# Patient Record
Sex: FEMALE | Race: WHITE | NOT HISPANIC OR LATINO | Employment: OTHER | URBAN - METROPOLITAN AREA
[De-identification: names, ages, dates, MRNs, and addresses within clinical notes are randomized per-mention and may not be internally consistent; named-entity substitution may affect disease eponyms.]

---

## 2017-01-04 ENCOUNTER — TELEPHONE (OUTPATIENT)
Dept: MEDICAL GROUP | Facility: CLINIC | Age: 82
End: 2017-01-04

## 2017-01-04 ENCOUNTER — HOSPITAL ENCOUNTER (OUTPATIENT)
Dept: PHYSICAL THERAPY | Facility: REHABILITATION | Age: 82
End: 2017-01-04
Attending: INTERNAL MEDICINE
Payer: MEDICARE

## 2017-01-04 DIAGNOSIS — G62.9 NEUROPATHY: ICD-10-CM

## 2017-01-04 PROCEDURE — 97110 THERAPEUTIC EXERCISES: CPT

## 2017-01-04 PROCEDURE — 97140 MANUAL THERAPY 1/> REGIONS: CPT

## 2017-01-04 RX ORDER — GABAPENTIN 300 MG/1
300 CAPSULE ORAL 3 TIMES DAILY
Qty: 90 CAP | Refills: 3 | Status: SHIPPED | OUTPATIENT
Start: 2017-01-04 | End: 2017-06-07

## 2017-01-05 NOTE — TELEPHONE ENCOUNTER
Please inform patient I have increased the dosage of gabapentin taking the new medication, If symptoms not improved or worsen return for evaluation.

## 2017-01-05 NOTE — TELEPHONE ENCOUNTER
Patient calling about her Gabapentin, Patient thinks that it is not strong enough  Please advise, she is at an 8 on the pain scale and nothing is helping  Please Call as soon as possible [phone]

## 2017-01-06 ENCOUNTER — HOSPITAL ENCOUNTER (OUTPATIENT)
Dept: PHYSICAL THERAPY | Facility: REHABILITATION | Age: 82
End: 2017-01-06
Attending: INTERNAL MEDICINE
Payer: MEDICARE

## 2017-01-06 PROCEDURE — 97110 THERAPEUTIC EXERCISES: CPT

## 2017-01-08 ENCOUNTER — HOSPITAL ENCOUNTER (OUTPATIENT)
Dept: RADIOLOGY | Facility: MEDICAL CENTER | Age: 82
End: 2017-01-08
Attending: INTERNAL MEDICINE
Payer: MEDICARE

## 2017-01-08 DIAGNOSIS — M79.605 LUMBAR PAIN WITH RADIATION DOWN LEFT LEG: ICD-10-CM

## 2017-01-08 DIAGNOSIS — M54.50 LUMBAR PAIN WITH RADIATION DOWN LEFT LEG: ICD-10-CM

## 2017-01-08 PROCEDURE — 72148 MRI LUMBAR SPINE W/O DYE: CPT

## 2017-01-09 ENCOUNTER — HOSPITAL ENCOUNTER (OUTPATIENT)
Dept: PHYSICAL THERAPY | Facility: REHABILITATION | Age: 82
End: 2017-01-09
Attending: INTERNAL MEDICINE
Payer: MEDICARE

## 2017-01-09 PROCEDURE — 97110 THERAPEUTIC EXERCISES: CPT

## 2017-01-11 ENCOUNTER — APPOINTMENT (OUTPATIENT)
Dept: PHYSICAL THERAPY | Facility: REHABILITATION | Age: 82
End: 2017-01-11
Attending: INTERNAL MEDICINE
Payer: MEDICARE

## 2017-01-11 ENCOUNTER — TELEPHONE (OUTPATIENT)
Dept: MEDICAL GROUP | Facility: CLINIC | Age: 82
End: 2017-01-11

## 2017-01-11 DIAGNOSIS — R93.7 ABNORMAL MRI, LUMBAR SPINE: ICD-10-CM

## 2017-01-11 DIAGNOSIS — G89.29 CHRONIC BILATERAL LOW BACK PAIN, WITH SCIATICA PRESENCE UNSPECIFIED: ICD-10-CM

## 2017-01-11 DIAGNOSIS — M48.061 SPINAL STENOSIS OF LUMBAR REGION: ICD-10-CM

## 2017-01-11 DIAGNOSIS — M54.5 CHRONIC BILATERAL LOW BACK PAIN, WITH SCIATICA PRESENCE UNSPECIFIED: ICD-10-CM

## 2017-01-11 NOTE — TELEPHONE ENCOUNTER
Patient called and is looking for the results of her MRI done on 1/8/17.  She states she is very anxious for results as PT has results however would not review with her.  To  for review.

## 2017-01-12 ENCOUNTER — TELEPHONE (OUTPATIENT)
Dept: MEDICAL GROUP | Facility: CLINIC | Age: 82
End: 2017-01-12

## 2017-01-12 ENCOUNTER — APPOINTMENT (OUTPATIENT)
Dept: PHYSICAL THERAPY | Facility: REHABILITATION | Age: 82
End: 2017-01-12
Attending: INTERNAL MEDICINE
Payer: MEDICARE

## 2017-01-12 NOTE — TELEPHONE ENCOUNTER
----- Message from Cash Fonseca M.D. sent at 1/11/2017  5:59 PM PST -----  Please inform patient of abnormal lumbar MRI, she has been referred to neurosurgery for further evaluation.

## 2017-01-13 ENCOUNTER — HOSPITAL ENCOUNTER (OUTPATIENT)
Dept: PHYSICAL THERAPY | Facility: REHABILITATION | Age: 82
End: 2017-01-13
Attending: INTERNAL MEDICINE
Payer: MEDICARE

## 2017-01-13 PROCEDURE — 97110 THERAPEUTIC EXERCISES: CPT

## 2017-01-16 ENCOUNTER — HOSPITAL ENCOUNTER (OUTPATIENT)
Dept: PHYSICAL THERAPY | Facility: REHABILITATION | Age: 82
End: 2017-01-16
Attending: INTERNAL MEDICINE
Payer: MEDICARE

## 2017-01-16 PROCEDURE — 97014 ELECTRIC STIMULATION THERAPY: CPT

## 2017-01-16 PROCEDURE — 97110 THERAPEUTIC EXERCISES: CPT

## 2017-01-18 ENCOUNTER — HOSPITAL ENCOUNTER (OUTPATIENT)
Dept: PHYSICAL THERAPY | Facility: REHABILITATION | Age: 82
End: 2017-01-18
Attending: INTERNAL MEDICINE
Payer: MEDICARE

## 2017-01-18 PROCEDURE — 97112 NEUROMUSCULAR REEDUCATION: CPT

## 2017-01-18 PROCEDURE — 97110 THERAPEUTIC EXERCISES: CPT

## 2017-01-19 ENCOUNTER — HOSPITAL ENCOUNTER (OUTPATIENT)
Dept: RADIOLOGY | Facility: MEDICAL CENTER | Age: 82
End: 2017-01-19
Attending: INTERNAL MEDICINE
Payer: MEDICARE

## 2017-01-19 DIAGNOSIS — Z13.9 SCREENING: ICD-10-CM

## 2017-01-19 DIAGNOSIS — M85.80 AGE-RELATED BONE LOSS: ICD-10-CM

## 2017-01-19 DIAGNOSIS — M85.80 OSTEOPENIA: ICD-10-CM

## 2017-01-19 PROCEDURE — 77080 DXA BONE DENSITY AXIAL: CPT

## 2017-01-19 PROCEDURE — 77063 BREAST TOMOSYNTHESIS BI: CPT

## 2017-01-24 ENCOUNTER — HOSPITAL ENCOUNTER (OUTPATIENT)
Dept: PHYSICAL THERAPY | Facility: REHABILITATION | Age: 82
End: 2017-01-24
Attending: INTERNAL MEDICINE
Payer: MEDICARE

## 2017-01-26 ENCOUNTER — HOSPITAL ENCOUNTER (OUTPATIENT)
Dept: PHYSICAL THERAPY | Facility: REHABILITATION | Age: 82
End: 2017-01-26
Attending: INTERNAL MEDICINE
Payer: MEDICARE

## 2017-01-26 PROCEDURE — 97110 THERAPEUTIC EXERCISES: CPT

## 2017-01-30 ENCOUNTER — APPOINTMENT (OUTPATIENT)
Dept: PHYSICAL THERAPY | Facility: REHABILITATION | Age: 82
End: 2017-01-30
Attending: INTERNAL MEDICINE
Payer: MEDICARE

## 2017-02-01 ENCOUNTER — HOSPITAL ENCOUNTER (OUTPATIENT)
Dept: PHYSICAL THERAPY | Facility: REHABILITATION | Age: 82
End: 2017-02-01
Attending: INTERNAL MEDICINE
Payer: MEDICARE

## 2017-02-01 PROCEDURE — 97110 THERAPEUTIC EXERCISES: CPT

## 2017-02-01 PROCEDURE — 97112 NEUROMUSCULAR REEDUCATION: CPT

## 2017-02-03 ENCOUNTER — HOSPITAL ENCOUNTER (OUTPATIENT)
Dept: PHYSICAL THERAPY | Facility: REHABILITATION | Age: 82
End: 2017-02-03
Attending: INTERNAL MEDICINE
Payer: MEDICARE

## 2017-02-03 PROCEDURE — 97110 THERAPEUTIC EXERCISES: CPT

## 2017-02-03 PROCEDURE — 97112 NEUROMUSCULAR REEDUCATION: CPT

## 2017-02-07 ENCOUNTER — HOSPITAL ENCOUNTER (OUTPATIENT)
Dept: PHYSICAL THERAPY | Facility: REHABILITATION | Age: 82
End: 2017-02-07
Attending: INTERNAL MEDICINE
Payer: MEDICARE

## 2017-02-07 PROCEDURE — 97112 NEUROMUSCULAR REEDUCATION: CPT

## 2017-02-07 PROCEDURE — 97110 THERAPEUTIC EXERCISES: CPT

## 2017-02-09 ENCOUNTER — HOSPITAL ENCOUNTER (OUTPATIENT)
Dept: PHYSICAL THERAPY | Facility: REHABILITATION | Age: 82
End: 2017-02-09
Attending: INTERNAL MEDICINE
Payer: MEDICARE

## 2017-02-09 PROCEDURE — 97112 NEUROMUSCULAR REEDUCATION: CPT

## 2017-02-09 PROCEDURE — 97110 THERAPEUTIC EXERCISES: CPT

## 2017-02-14 ENCOUNTER — HOSPITAL ENCOUNTER (OUTPATIENT)
Dept: PHYSICAL THERAPY | Facility: REHABILITATION | Age: 82
End: 2017-02-14
Attending: INTERNAL MEDICINE
Payer: MEDICARE

## 2017-02-14 PROCEDURE — 97110 THERAPEUTIC EXERCISES: CPT

## 2017-02-14 PROCEDURE — 97112 NEUROMUSCULAR REEDUCATION: CPT

## 2017-02-16 ENCOUNTER — HOSPITAL ENCOUNTER (OUTPATIENT)
Dept: PHYSICAL THERAPY | Facility: REHABILITATION | Age: 82
End: 2017-02-16
Attending: INTERNAL MEDICINE
Payer: MEDICARE

## 2017-02-16 PROCEDURE — 97110 THERAPEUTIC EXERCISES: CPT

## 2017-02-16 PROCEDURE — 97112 NEUROMUSCULAR REEDUCATION: CPT

## 2017-02-28 ENCOUNTER — OFFICE VISIT (OUTPATIENT)
Dept: CARDIOLOGY | Facility: MEDICAL CENTER | Age: 82
End: 2017-02-28
Payer: MEDICARE

## 2017-02-28 VITALS
WEIGHT: 148 LBS | HEART RATE: 82 BPM | HEIGHT: 63 IN | DIASTOLIC BLOOD PRESSURE: 80 MMHG | BODY MASS INDEX: 26.22 KG/M2 | OXYGEN SATURATION: 96 % | SYSTOLIC BLOOD PRESSURE: 152 MMHG

## 2017-02-28 DIAGNOSIS — Z96.659 STATUS POST TOTAL KNEE REPLACEMENT, UNSPECIFIED LATERALITY: ICD-10-CM

## 2017-02-28 DIAGNOSIS — R01.1 CARDIAC MURMUR: ICD-10-CM

## 2017-02-28 DIAGNOSIS — I34.0 NON-RHEUMATIC MITRAL REGURGITATION: ICD-10-CM

## 2017-02-28 PROCEDURE — 99214 OFFICE O/P EST MOD 30 MIN: CPT | Performed by: INTERNAL MEDICINE

## 2017-02-28 PROCEDURE — 1036F TOBACCO NON-USER: CPT | Performed by: INTERNAL MEDICINE

## 2017-02-28 PROCEDURE — G8432 DEP SCR NOT DOC, RNG: HCPCS | Performed by: INTERNAL MEDICINE

## 2017-02-28 PROCEDURE — G8484 FLU IMMUNIZE NO ADMIN: HCPCS | Performed by: INTERNAL MEDICINE

## 2017-02-28 PROCEDURE — 4040F PNEUMOC VAC/ADMIN/RCVD: CPT | Performed by: INTERNAL MEDICINE

## 2017-02-28 PROCEDURE — G8420 CALC BMI NORM PARAMETERS: HCPCS | Performed by: INTERNAL MEDICINE

## 2017-02-28 PROCEDURE — 1101F PT FALLS ASSESS-DOCD LE1/YR: CPT | Performed by: INTERNAL MEDICINE

## 2017-02-28 ASSESSMENT — ENCOUNTER SYMPTOMS
MUSCULOSKELETAL NEGATIVE: 1
RESPIRATORY NEGATIVE: 1
GASTROINTESTINAL NEGATIVE: 1
BRUISES/BLEEDS EASILY: 0
SORE THROAT: 0
FEVER: 0
CONSTITUTIONAL NEGATIVE: 1
CHILLS: 0
HEMOPTYSIS: 0
CARDIOVASCULAR NEGATIVE: 1
SHORTNESS OF BREATH: 0
LOSS OF CONSCIOUSNESS: 0
STRIDOR: 0
DIZZINESS: 0
PND: 0
EYES NEGATIVE: 1
NEUROLOGICAL NEGATIVE: 1
PALPITATIONS: 0
ORTHOPNEA: 0
COUGH: 0
WHEEZING: 0
CLAUDICATION: 0
SPUTUM PRODUCTION: 0
WEAKNESS: 0

## 2017-02-28 NOTE — PROGRESS NOTES
Subjective:   Yoli Carmichael is a 83 y.o. female who presents today as a follow-up for her hypertension and cardiac murmur. She's been going through a number of issues with her back and is scheduled to see a surgeon coming up here. She even went through a dobutamine stress echo approximately 5 months ago which was normal. She is concerned about a couple of instances she had after she was started on gabapentin. She said approximately 3 times she developed a tachycardia with associated shortness of breath dizziness changes in vision and a discomfort radiating down her left arm. The last approximately 2-3 hours in duration and self terminated. This is never happened to her before and since she stopped the gabapentin a few months ago has not come back. She does have borderline blood pressure but does not want take any medications. She otherwise has good functional capacity.    Past Medical History   Diagnosis Date   • Arrhythmia    • Arthritis      knees   • Bronchitis      long time ago   • Pneumonia      long time ago   • CATARACT      no surgery yet   • Dry eyes, bilateral 3/27/2014   • Osteopenia of the elderly 3/27/2014     Past Surgical History   Procedure Laterality Date   • Knee arthroplasty total  9/3/2013     Performed by Ramesh Styles M.D. at [facility]   • Tonsillectomy       Family History   Problem Relation Age of Onset   • Lung Disease Mother    • Heart Disease Father    • Lung Disease Father      Emphysema   • Cancer Maternal Aunt      Great Aunt and Cousin Ovarian CA   • Hypertension Maternal Aunt    • Cancer Daughter      Lung and Brain CA   • Cancer Daughter      Pituitary tumor     History   Smoking status   • Never Smoker    Smokeless tobacco   • Never Used     Comment: Pt exposed to second hand smoker     Allergies   Allergen Reactions   • Other Environmental      Seasonal allergies     Outpatient Encounter Prescriptions as of 2/28/2017   Medication Sig Dispense Refill   •  coenzyme Q-10 30 MG capsule Take 300 mg by mouth 2X A WEEK.     • ketotifen (ZADITOR) 0.025 % ophthalmic solution Place 1 Drop in both eyes 2 times a day.     • B Complex-C (SUPER B COMPLEX PO) Take  by mouth.     • Glycerin-Polysorbate 80 (REFRESH DRY EYE THERAPY) 1-1 % SOLN 1 Drop by Ophthalmic route 4 times a day.     • Calcium-Vitamin D-Vitamin K (CALCIUM + D) 500-1000-40 MG-UNT-MCG CHEW Take  by mouth 3 times a day.     • Multiple Vitamins-Minerals (PRESERVISION/LUTEIN PO) Take  by mouth 2 Times a Day.     • Cholecalciferol (VITAMIN D) 2000 UNITS CAPS Take 2,000 Units by mouth every day.     • Polyethyl Glycol-Propyl Glycol (SYSTANE OP) by Ophthalmic route as needed.     • gabapentin (NEURONTIN) 300 MG Cap Take 1 Cap by mouth 3 times a day. (Patient not taking: Reported on 2/28/2017) 90 Cap 3   • hydrocodone/acetaminophen (NORCO)  MG Tab Take 1-2 Tabs by mouth every 6 hours as needed. (Patient not taking: Reported on 2/28/2017) 30 Tab 0   • gabapentin (NEURONTIN) 100 MG Cap Take 1 Cap by mouth 3 times a day. (Patient not taking: Reported on 2/28/2017) 30 Cap 3   • MethylPREDNISolone (MEDROL DOSEPAK) 4 MG Tablet Therapy Pack UAD (Patient not taking: Reported on 2/28/2017) 1 Kit 0   • hydrocodone-acetaminophen (NORCO) 5-325 MG Tab per tablet Take 1 Tab by mouth every 6 hours as needed. (Patient not taking: Reported on 2/28/2017) 16 Tab 0   • cyclobenzaprine (FLEXERIL) 5 MG tablet Take 1 Tab by mouth 3 times a day as needed. (Patient not taking: Reported on 2/28/2017) 30 Tab 1     No facility-administered encounter medications on file as of 2/28/2017.     Review of Systems   Constitutional: Negative.  Negative for fever, chills and malaise/fatigue.   HENT: Negative.  Negative for sore throat.    Eyes: Negative.    Respiratory: Negative.  Negative for cough, hemoptysis, sputum production, shortness of breath, wheezing and stridor.    Cardiovascular: Negative.  Negative for chest pain, palpitations, orthopnea,  "claudication, leg swelling and PND.   Gastrointestinal: Negative.    Genitourinary: Negative.    Musculoskeletal: Negative.    Skin: Negative.    Neurological: Negative.  Negative for dizziness, loss of consciousness and weakness.   Endo/Heme/Allergies: Negative.  Does not bruise/bleed easily.   All other systems reviewed and are negative.       Objective:   /80 mmHg  Pulse 82  Ht 1.6 m (5' 3\")  Wt 67.132 kg (148 lb)  BMI 26.22 kg/m2  SpO2 96%    Physical Exam   Constitutional: She is oriented to person, place, and time. She appears well-developed and well-nourished. No distress.   HENT:   Head: Normocephalic.   Mouth/Throat: Oropharynx is clear and moist.   Eyes: EOM are normal. Pupils are equal, round, and reactive to light. Right eye exhibits no discharge. Left eye exhibits no discharge. No scleral icterus.   Neck: Normal range of motion. Neck supple. No JVD present. No tracheal deviation present.   Cardiovascular: Normal rate, regular rhythm, S1 normal, S2 normal, normal heart sounds, intact distal pulses and normal pulses.  Exam reveals no gallop, no S3, no S4 and no friction rub.    No murmur heard.   No systolic murmur is present    No diastolic murmur is present   Pulses:       Carotid pulses are 2+ on the right side, and 2+ on the left side.       Radial pulses are 2+ on the right side, and 2+ on the left side.        Dorsalis pedis pulses are 2+ on the right side, and 2+ on the left side.        Posterior tibial pulses are 2+ on the right side, and 2+ on the left side.   Pulmonary/Chest: Effort normal and breath sounds normal. No respiratory distress. She has no wheezes. She has no rales.   Abdominal: Soft. Bowel sounds are normal. She exhibits no distension and no mass. There is no tenderness. There is no rebound and no guarding.   Musculoskeletal: She exhibits no edema.   Neurological: She is alert and oriented to person, place, and time. No cranial nerve deficit.   Skin: Skin is warm and dry. " She is not diaphoretic. No pallor.   Psychiatric: She has a normal mood and affect. Her behavior is normal. Judgment and thought content normal.   Nursing note and vitals reviewed.      Assessment:     1. Cardiac murmur     2. Non-rheumatic mitral regurgitation     3. Status post total knee replacement, unspecified laterality         Medical Decision Making:  Today's Assessment / Status / Plan:     83-year-old female with back pain awaiting surgery with a instance which sounds consistent with either a sinus tachycardia or a run of atrial fibrillation in the setting of medications. Because this is not come back this is difficult diagnosis but seems that it may be a self-limiting instance. I did tell her to come back to clinic or call us as soon as possible should this return. At that point we will consider getting a Holter or event recorder. In terms of her preop risk stratification she did have a dobutamine echocardiogram which was unremarkable.    1. Tachycardia    - clinical follow up    2. HTN    - at goal    3. Pre-op    -Velazquez risk 0.22%, moderate risk    - no further testing    Thank for you allowing me to take part in your patient's care, please call should you have any questions or would like to discuss this patient.

## 2017-02-28 NOTE — Clinical Note
[facility]   [address] [place] [address]  [place] [address]  Phone: [phone]  Fax: [phone]              Yoli Carmichael  8/21/1933    Encounter Date: 2/28/2017    Margarito Hastings M.D.          PROGRESS NOTE:  Subjective:   Yoli Carmichael is a 83 y.o. female who presents today as a follow-up for her hypertension and cardiac murmur. She's been going through a number of issues with her back and is scheduled to see a surgeon coming up here. She even went through a dobutamine stress echo approximately 5 months ago which was normal. She is concerned about a couple of instances she had after she was started on gabapentin. She said approximately 3 times she developed a tachycardia with associated shortness of breath dizziness changes in vision and a discomfort radiating down her left arm. The last approximately 2-3 hours in duration and self terminated. This is never happened to her before and since she stopped the gabapentin a few months ago has not come back. She does have borderline blood pressure but does not want take any medications. She otherwise has good functional capacity.    Past Medical History   Diagnosis Date   • Arrhythmia    • Arthritis      knees   • Bronchitis      long time ago   • Pneumonia      long time ago   • CATARACT      no surgery yet   • Dry eyes, bilateral 3/27/2014   • Osteopenia of the elderly 3/27/2014     Past Surgical History   Procedure Laterality Date   • Knee arthroplasty total  9/3/2013     Performed by Ramesh Styles M.D. at [facility]   • Tonsillectomy       Family History   Problem Relation Age of Onset   • Lung Disease Mother    • Heart Disease Father    • Lung Disease Father      Emphysema   • Cancer Maternal Aunt      Great Aunt and Cousin Ovarian CA   • Hypertension Maternal Aunt    • Cancer Daughter      Lung and Brain CA   • Cancer Daughter      Pituitary tumor     History   Smoking status   • Never  Smoker    Smokeless tobacco   • Never Used     Comment: Pt exposed to second hand smoker     Allergies   Allergen Reactions   • Other Environmental      Seasonal allergies     Outpatient Encounter Prescriptions as of 2/28/2017   Medication Sig Dispense Refill   • coenzyme Q-10 30 MG capsule Take 300 mg by mouth 2X A WEEK.     • ketotifen (ZADITOR) 0.025 % ophthalmic solution Place 1 Drop in both eyes 2 times a day.     • B Complex-C (SUPER B COMPLEX PO) Take  by mouth.     • Glycerin-Polysorbate 80 (REFRESH DRY EYE THERAPY) 1-1 % SOLN 1 Drop by Ophthalmic route 4 times a day.     • Calcium-Vitamin D-Vitamin K (CALCIUM + D) 500-1000-40 MG-UNT-MCG CHEW Take  by mouth 3 times a day.     • Multiple Vitamins-Minerals (PRESERVISION/LUTEIN PO) Take  by mouth 2 Times a Day.     • Cholecalciferol (VITAMIN D) 2000 UNITS CAPS Take 2,000 Units by mouth every day.     • Polyethyl Glycol-Propyl Glycol (SYSTANE OP) by Ophthalmic route as needed.     • gabapentin (NEURONTIN) 300 MG Cap Take 1 Cap by mouth 3 times a day. (Patient not taking: Reported on 2/28/2017) 90 Cap 3   • hydrocodone/acetaminophen (NORCO)  MG Tab Take 1-2 Tabs by mouth every 6 hours as needed. (Patient not taking: Reported on 2/28/2017) 30 Tab 0   • gabapentin (NEURONTIN) 100 MG Cap Take 1 Cap by mouth 3 times a day. (Patient not taking: Reported on 2/28/2017) 30 Cap 3   • MethylPREDNISolone (MEDROL DOSEPAK) 4 MG Tablet Therapy Pack UAD (Patient not taking: Reported on 2/28/2017) 1 Kit 0   • hydrocodone-acetaminophen (NORCO) 5-325 MG Tab per tablet Take 1 Tab by mouth every 6 hours as needed. (Patient not taking: Reported on 2/28/2017) 16 Tab 0   • cyclobenzaprine (FLEXERIL) 5 MG tablet Take 1 Tab by mouth 3 times a day as needed. (Patient not taking: Reported on 2/28/2017) 30 Tab 1     No facility-administered encounter medications on file as of 2/28/2017.     Review of Systems   Constitutional: Negative.  Negative for fever, chills and malaise/fatigue.   "  HENT: Negative.  Negative for sore throat.    Eyes: Negative.    Respiratory: Negative.  Negative for cough, hemoptysis, sputum production, shortness of breath, wheezing and stridor.    Cardiovascular: Negative.  Negative for chest pain, palpitations, orthopnea, claudication, leg swelling and PND.   Gastrointestinal: Negative.    Genitourinary: Negative.    Musculoskeletal: Negative.    Skin: Negative.    Neurological: Negative.  Negative for dizziness, loss of consciousness and weakness.   Endo/Heme/Allergies: Negative.  Does not bruise/bleed easily.   All other systems reviewed and are negative.       Objective:   /80 mmHg  Pulse 82  Ht 1.6 m (5' 3\")  Wt 67.132 kg (148 lb)  BMI 26.22 kg/m2  SpO2 96%    Physical Exam   Constitutional: She is oriented to person, place, and time. She appears well-developed and well-nourished. No distress.   HENT:   Head: Normocephalic.   Mouth/Throat: Oropharynx is clear and moist.   Eyes: EOM are normal. Pupils are equal, round, and reactive to light. Right eye exhibits no discharge. Left eye exhibits no discharge. No scleral icterus.   Neck: Normal range of motion. Neck supple. No JVD present. No tracheal deviation present.   Cardiovascular: Normal rate, regular rhythm, S1 normal, S2 normal, normal heart sounds, intact distal pulses and normal pulses.  Exam reveals no gallop, no S3, no S4 and no friction rub.    No murmur heard.   No systolic murmur is present    No diastolic murmur is present   Pulses:       Carotid pulses are 2+ on the right side, and 2+ on the left side.       Radial pulses are 2+ on the right side, and 2+ on the left side.        Dorsalis pedis pulses are 2+ on the right side, and 2+ on the left side.        Posterior tibial pulses are 2+ on the right side, and 2+ on the left side.   Pulmonary/Chest: Effort normal and breath sounds normal. No respiratory distress. She has no wheezes. She has no rales.   Abdominal: Soft. Bowel sounds are normal. She " exhibits no distension and no mass. There is no tenderness. There is no rebound and no guarding.   Musculoskeletal: She exhibits no edema.   Neurological: She is alert and oriented to person, place, and time. No cranial nerve deficit.   Skin: Skin is warm and dry. She is not diaphoretic. No pallor.   Psychiatric: She has a normal mood and affect. Her behavior is normal. Judgment and thought content normal.   Nursing note and vitals reviewed.      Assessment:     1. Cardiac murmur     2. Non-rheumatic mitral regurgitation     3. Status post total knee replacement, unspecified laterality         Medical Decision Making:  Today's Assessment / Status / Plan:     83-year-old female with back pain awaiting surgery with a instance which sounds consistent with either a sinus tachycardia or a run of atrial fibrillation in the setting of medications. Because this is not come back this is difficult diagnosis but seems that it may be a self-limiting instance. I did tell her to come back to clinic or call us as soon as possible should this return. At that point we will consider getting a Holter or event recorder. In terms of her preop risk stratification she did have a dobutamine echocardiogram which was unremarkable.    1. Tachycardia    - clinical follow up    2. HTN    - at goal    3. Pre-op    -Velazquez risk 0.22%, moderate risk    - no further testing    Thank for you allowing me to take part in your patient's care, please call should you have any questions or would like to discuss this patient.      Cash Fonseca M.D.  [address] [place] [address]  VIA In Basket

## 2017-02-28 NOTE — MR AVS SNAPSHOT
"        oYli March Amol   2017 11:30 AM   Office Visit   MRN: [id number]    Department:  [facility]   Dept Phone:  [phone]    Description:  Female : 1933   Provider:  Margarito Hastings M.D.           Reason for Visit     New Patient           Allergies as of 2017     Allergen Noted Reactions    Other Environmental 2013       Seasonal allergies      You were diagnosed with     Cardiac murmur   [894972]       Non-rheumatic mitral regurgitation   [111802]       Status post total knee replacement, unspecified laterality   [521269]         Vital Signs     Blood Pressure Pulse Height Weight Body Mass Index Oxygen Saturation    152/80 mmHg 82 1.6 m (5' 3\") 67.132 kg (148 lb) 26.22 kg/m2 96%    Smoking Status                   Never Smoker            Basic Information     Date Of Birth Sex Race Ethnicity Preferred Language    1933 Female White Non- English      Your appointments     Mar 27, 2017 10:00 AM   Established Patient with Cash Fonseca M.D.   [facility] ([facility] [place])    [address] [place] [id number] [phone]           You will be receiving a confirmation call a few days before your appointment from our automated call confirmation system.              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   9/3/2013 - Present    Dry eyes, bilateral H04.123   3/27/2014 - Present    Osteopenia of the elderly (Chronic) M85.80   3/27/2014 - Present    Cough R05   10/28/2014 - Present    Cardiac murmur R01.1   2015 - Present    Mitral regurgitation I34.0   2015 - Present      Health Maintenance        Date Due Completion Dates    IMM DTaP/Tdap/Td Vaccine (1 - Tdap) 1952 ---    IMM PNEUMOCOCCAL 65+ (ADULT) LOW/MEDIUM RISK SERIES (2 of 2 - PPSV23) 3/9/2016 3/9/2015    IMM INFLUENZA (1) 2016    MAMMOGRAM 2018, 2015, 2014, " 11/20/2014, 11/19/2013, 11/5/2012, 11/3/2011, 10/12/2010, 10/9/2009, 10/9/2009, 9/16/2008, 9/16/2008, 8/27/2007, 8/27/2007    BONE DENSITY 1/19/2022 1/19/2017, 12/18/2015, 11/20/2014, 11/19/2013, 11/5/2012, 11/3/2011, 10/12/2010, 10/9/2009, 6/23/2008, 5/17/2007, 5/19/2005    COLONOSCOPY 3/27/2023 3/27/2013 (Prv Comp)    Override on 3/27/2013: Previously completed (Dr Davidson)            Current Immunizations     13-VALENT PCV PREVNAR 3/9/2015    Influenza TIV (IM) 9/4/2013  4:25 AM    SHINGLES VACCINE 3/26/2013      Below and/or attached are the medications your provider expects you to take. Review all of your home medications and newly ordered medications with your provider and/or pharmacist. Follow medication instructions as directed by your provider and/or pharmacist. Please keep your medication list with you and share with your provider. Update the information when medications are discontinued, doses are changed, or new medications (including over-the-counter products) are added; and carry medication information at all times in the event of emergency situations     Allergies:  OTHER ENVIRONMENTAL - (reactions not documented)               Medications  Valid as of: February 28, 2017 - 11:56 AM    Generic Name Brand Name Tablet Size Instructions for use    B Complex-C   Take  by mouth.        Calcium-Vitamin D-Vitamin K (Chew Tab) Calcium-Vitamin D-Vitamin K 500-1000-40 MG-UNT-MCG Take  by mouth 3 times a day.        Cholecalciferol (Cap) Vitamin D 2000 UNITS Take 2,000 Units by mouth every day.        Coenzyme Q10 (Cap) coenzyme Q-10 30 MG Take 300 mg by mouth 2X A WEEK.        Cyclobenzaprine HCl (Tab) FLEXERIL 5 MG Take 1 Tab by mouth 3 times a day as needed.        Gabapentin (Cap) NEURONTIN 100 MG Take 1 Cap by mouth 3 times a day.        Gabapentin (Cap) NEURONTIN 300 MG Take 1 Cap by mouth 3 times a day.        Glycerin-Polysorbate 80 (Solution) Glycerin-Polysorbate 80 1-1 % 1 Drop by Ophthalmic route 4  times a day.        Hydrocodone-Acetaminophen (Tab) NORCO 5-325 MG Take 1 Tab by mouth every 6 hours as needed.        Hydrocodone-Acetaminophen (Tab) NORCO  MG Take 1-2 Tabs by mouth every 6 hours as needed.        Ketotifen Fumarate (Solution) ZADITOR 0.025 % Place 1 Drop in both eyes 2 times a day.        MethylPREDNISolone (Tablet Therapy Pack) MEDROL DOSEPAK 4 MG UAD        Multiple Vitamins-Minerals   Take  by mouth 2 Times a Day.        Polyethyl Glycol-Propyl Glycol   by Ophthalmic route as needed.        .                 Medicines prescribed today were sent to:     [id number] - [place], [place] - [address]    [address] [address]    Phone: [phone] Fax: [phone]    Open 24 Hours?: No    [id number] - [place], [place] - [address]    [address] [facility] [address]    Phone: [phone] Fax: [phone]    Open 24 Hours?: No      Medication refill instructions:       If your prescription bottle indicates you have medication refills left, it is not necessary to call your provider’s office. Please contact your pharmacy and they will refill your medication.    If your prescription bottle indicates you do not have any refills left, you may request refills at any time through one of the following ways: The online [employer] system (except Urgent Care), by calling your provider’s office, or by asking your pharmacy to contact your provider’s office with a refill request. Medication refills are processed only during regular business hours and may not be available until the next business day. Your provider may request additional information or to have a follow-up visit with you prior to refilling your medication.   *Please Note: Medication refills are assigned a new Rx number when refilled electronically. Your pharmacy may indicate that no refills were authorized even though a new prescription for the same medication is available at the pharmacy. Please request  the medicine by name with the pharmacy before contacting your provider for a refill.           MyChart Access Code: Activation code not generated  Current MyChart Status: Active

## 2017-04-10 ENCOUNTER — OFFICE VISIT (OUTPATIENT)
Dept: URGENT CARE | Facility: PHYSICIAN GROUP | Age: 82
End: 2017-04-10
Payer: MEDICARE

## 2017-04-10 VITALS
SYSTOLIC BLOOD PRESSURE: 148 MMHG | OXYGEN SATURATION: 95 % | RESPIRATION RATE: 14 BRPM | DIASTOLIC BLOOD PRESSURE: 70 MMHG | BODY MASS INDEX: 26.05 KG/M2 | HEART RATE: 89 BPM | TEMPERATURE: 99.7 F | WEIGHT: 147 LBS

## 2017-04-10 DIAGNOSIS — J22 LRTI (LOWER RESPIRATORY TRACT INFECTION): ICD-10-CM

## 2017-04-10 PROCEDURE — 99214 OFFICE O/P EST MOD 30 MIN: CPT | Performed by: FAMILY MEDICINE

## 2017-04-10 PROCEDURE — G8419 CALC BMI OUT NRM PARAM NOF/U: HCPCS | Performed by: FAMILY MEDICINE

## 2017-04-10 PROCEDURE — G8432 DEP SCR NOT DOC, RNG: HCPCS | Performed by: FAMILY MEDICINE

## 2017-04-10 PROCEDURE — 4040F PNEUMOC VAC/ADMIN/RCVD: CPT | Performed by: FAMILY MEDICINE

## 2017-04-10 PROCEDURE — 1101F PT FALLS ASSESS-DOCD LE1/YR: CPT | Performed by: FAMILY MEDICINE

## 2017-04-10 PROCEDURE — 1036F TOBACCO NON-USER: CPT | Performed by: FAMILY MEDICINE

## 2017-04-10 RX ORDER — AMOXICILLIN AND CLAVULANATE POTASSIUM 875; 125 MG/1; MG/1
1 TABLET, FILM COATED ORAL 2 TIMES DAILY
Qty: 14 TAB | Refills: 0 | Status: SHIPPED | OUTPATIENT
Start: 2017-04-10 | End: 2017-04-17

## 2017-04-10 ASSESSMENT — ENCOUNTER SYMPTOMS
FEVER: 1
SHORTNESS OF BREATH: 1
WHEEZING: 1
COUGH: 1
CHILLS: 1

## 2017-04-10 NOTE — PATIENT INSTRUCTIONS

## 2017-04-10 NOTE — MR AVS SNAPSHOT
Yoli Carmichael   4/10/2017 11:00 AM   Office Visit   MRN: [id number]    Department:  [facility]   Dept Phone:  [phone]    Description:  Female : 1933   Provider:  Mich Deluca M.D.           Reason for Visit     Cough cough/congestion x5 days      Allergies as of 4/10/2017     Allergen Noted Reactions    Other Environmental 2013       Seasonal allergies      You were diagnosed with     LRTI (lower respiratory tract infection)   [688667]         Vital Signs     Blood Pressure Pulse Temperature Respirations Weight Oxygen Saturation    148/70 mmHg 89 37.6 °C (99.7 °F) 14 66.679 kg (147 lb) 95%    Smoking Status                   Never Smoker            Basic Information     Date Of Birth Sex Race Ethnicity Preferred Language    1933 Female White Non- English      Your appointments     May 08, 2017 11:00 AM   Established Patient with Cash Fonseca M.D.   [facility]    [address] [facility] [address] [phone]           You will be receiving a confirmation call a few days before your appointment from our automated call confirmation system.              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   9/3/2013 - Present    Dry eyes, bilateral H04.123   3/27/2014 - Present    Osteopenia of the elderly (Chronic) M85.80   3/27/2014 - Present    Cough R05   10/28/2014 - Present    Cardiac murmur R01.1   2015 - Present    Mitral regurgitation I34.0   2015 - Present      Health Maintenance        Date Due Completion Dates    IMM DTaP/Tdap/Td Vaccine (1 - Tdap) 1952 ---    IMM PNEUMOCOCCAL 65+ (ADULT) LOW/MEDIUM RISK SERIES (2 of 2 - PPSV23) 3/9/2016 3/9/2015    MAMMOGRAM 2018, 2015, 2014, 2014, 2013, 2012, 11/3/2011, 10/12/2010, 10/9/2009, 10/9/2009, 2008, 2008, 2007, 2007    BONE DENSITY 1/19/2022 1/19/2017, 12/18/2015, 11/20/2014, 11/19/2013, 11/5/2012, 11/3/2011, 10/12/2010, 10/9/2009, 6/23/2008, 5/17/2007, 5/19/2005    COLONOSCOPY 3/27/2023 3/27/2013 (Prv Comp)    Override on 3/27/2013: Previously completed (Dr Davidson)            Current Immunizations     13-VALENT PCV PREVNAR 3/9/2015    Influenza TIV (IM) 9/4/2013  4:25 AM    SHINGLES VACCINE 3/26/2013      Below and/or attached are the medications your provider expects you to take. Review all of your home medications and newly ordered medications with your provider and/or pharmacist. Follow medication instructions as directed by your provider and/or pharmacist. Please keep your medication list with you and share with your provider. Update the information when medications are discontinued, doses are changed, or new medications (including over-the-counter products) are added; and carry medication information at all times in the event of emergency situations     Allergies:  OTHER ENVIRONMENTAL - (reactions not documented)               Medications  Valid as of: April 10, 2017 - 11:48 AM    Generic Name Brand Name Tablet Size Instructions for use    Amoxicillin-Pot Clavulanate (Tab) AUGMENTIN 875-125 MG Take 1 Tab by mouth 2 times a day for 7 days.        B Complex-C   Take  by mouth.        Calcium-Vitamin D-Vitamin K (Chew Tab) Calcium-Vitamin D-Vitamin K 500-1000-40 MG-UNT-MCG Take  by mouth 3 times a day.        Cholecalciferol (Cap) Vitamin D 2000 UNITS Take 2,000 Units by mouth every day.        Coenzyme Q10 (Cap) coenzyme Q-10 30 MG Take 300 mg by mouth 2X A WEEK.        Cyclobenzaprine HCl (Tab) FLEXERIL 5 MG Take 1 Tab by mouth 3 times a day as needed.        Gabapentin (Cap) NEURONTIN 100 MG Take 1 Cap by mouth 3 times a day.        Gabapentin (Cap) NEURONTIN 300 MG Take 1 Cap by mouth 3 times a day.        Glycerin-Polysorbate 80 (Solution) Glycerin-Polysorbate 80 1-1 % 1 Drop by Ophthalmic route 4 times a day.         Hydrocodone-Acetaminophen (Tab) NORCO 5-325 MG Take 1 Tab by mouth every 6 hours as needed.        Hydrocodone-Acetaminophen (Tab) NORCO  MG Take 1-2 Tabs by mouth every 6 hours as needed.        Ketotifen Fumarate (Solution) ZADITOR 0.025 % Place 1 Drop in both eyes 2 times a day.        MethylPREDNISolone (Tablet Therapy Pack) MEDROL DOSEPAK 4 MG UAD        Multiple Vitamins-Minerals   Take  by mouth 2 Times a Day.        Polyethyl Glycol-Propyl Glycol   by Ophthalmic route as needed.        .                 Medicines prescribed today were sent to:     [id number] - [place], [place] - [address]    [address] [address]    Phone: [phone] Fax: [phone]    Open 24 Hours?: No    [id number] - [place], [place] - [address]    [address] [facility] [address]    Phone: [phone] Fax: [phone]    Open 24 Hours?: No      Medication refill instructions:       If your prescription bottle indicates you have medication refills left, it is not necessary to call your provider’s office. Please contact your pharmacy and they will refill your medication.    If your prescription bottle indicates you do not have any refills left, you may request refills at any time through one of the following ways: The online c[employer]ayda system (except Urgent Care), by calling your provider’s office, or by asking your pharmacy to contact your provider’s office with a refill request. Medication refills are processed only during regular business hours and may not be available until the next business day. Your provider may request additional information or to have a follow-up visit with you prior to refilling your medication.   *Please Note: Medication refills are assigned a new Rx number when refilled electronically. Your pharmacy may indicate that no refills were authorized even though a new prescription for the same medication is available at the pharmacy. Please request the medicine by name  with the pharmacy before contacting your provider for a refill.        Instructions    Bronchitis  Bronchitis is the body's way of reacting to injury and/or infection (inflammation) of the bronchi. Bronchi are the air tubes that extend from the windpipe into the lungs. If the inflammation becomes severe, it may cause shortness of breath.  CAUSES   Inflammation may be caused by:  · A virus.  · Germs (bacteria).  · Dust.  · Allergens.  · Pollutants and many other irritants.  The cells lining the bronchial tree are covered with tiny hairs (cilia). These constantly beat upward, away from the lungs, toward the mouth. This keeps the lungs free of pollutants. When these cells become too irritated and are unable to do their job, mucus begins to develop. This causes the characteristic cough of bronchitis. The cough clears the lungs when the cilia are unable to do their job. Without either of these protective mechanisms, the mucus would settle in the lungs. Then you would develop pneumonia.  Smoking is a common cause of bronchitis and can contribute to pneumonia. Stopping this habit is the single most important thing you can do to help yourself.  TREATMENT   · Your caregiver may prescribe an antibiotic if the cough is caused by bacteria. Also, medicines that open up your airways make it easier to breathe. Your caregiver may also recommend or prescribe an expectorant. It will loosen the mucus to be coughed up. Only take over-the-counter or prescription medicines for pain, discomfort, or fever as directed by your caregiver.  · Removing whatever causes the problem (smoking, for example) is critical to preventing the problem from getting worse.  · Cough suppressants may be prescribed for relief of cough symptoms.  · Inhaled medicines may be prescribed to help with symptoms now and to help prevent problems from returning.  · For those with recurrent (chronic) bronchitis, there may be a need for steroid medicines.  SEEK IMMEDIATE  MEDICAL CARE IF:   · During treatment, you develop more pus-like mucus (purulent sputum).  · You have a fever.  · Your baby is older than 3 months with a rectal temperature of 102° F (38.9° C) or higher.  · Your baby is 3 months old or younger with a rectal temperature of 100.4° F (38° C) or higher.  · You become progressively more ill.  · You have increased difficulty breathing, wheezing, or shortness of breath.  It is necessary to seek immediate medical care if you are elderly or sick from any other disease.  MAKE SURE YOU:   · Understand these instructions.  · Will watch your condition.  · Will get help right away if you are not doing well or get worse.  Document Released: 12/18/2006 Document Revised: 03/11/2013 Document Reviewed: 10/27/2009  [employer]® Patient Information ©2014 [employer].            [employer] Access Code: Activation code not generated  Current pocketfungames Status: Active

## 2017-04-11 NOTE — PROGRESS NOTES
Subjective:      Yoli Carmichael is a 83 y.o. female who presents with Cough            Cough  This is a new problem. The current episode started in the past 7 days. The problem has been gradually worsening. The problem occurs constantly. The cough is productive of sputum. Associated symptoms include chills, a fever, shortness of breath and wheezing. Pertinent negatives include no nasal congestion or postnasal drip.       Review of Systems   Constitutional: Positive for fever and chills.   HENT: Negative for postnasal drip.    Respiratory: Positive for cough, shortness of breath and wheezing.      Allergies   Allergen Reactions   • Other Environmental      Seasonal allergies         Objective:     /70 mmHg  Pulse 89  Temp(Src) 37.6 °C (99.7 °F)  Resp 14  Wt 66.679 kg (147 lb)  SpO2 95%     Physical Exam   Constitutional: She is oriented to person, place, and time. She appears well-developed and well-nourished. No distress.   HENT:   Head: Normocephalic and atraumatic.   Eyes: Conjunctivae and EOM are normal. Pupils are equal, round, and reactive to light.   Cardiovascular: Normal rate and regular rhythm.    No murmur heard.  Pulmonary/Chest: Effort normal. No respiratory distress. She has wheezes. She has no rales.   Abdominal: Soft. She exhibits no distension. There is no tenderness.   Neurological: She is alert and oriented to person, place, and time. She has normal reflexes. No sensory deficit.   Skin: Skin is warm and dry.   Psychiatric: She has a normal mood and affect.               Assessment/Plan:     1. LRTI (lower respiratory tract infection)  Differential diagnosis, natural history, supportive care, and indications for immediate follow-up discussed.   - amoxicillin-clavulanate (AUGMENTIN) 875-125 MG Tab; Take 1 Tab by mouth 2 times a day for 7 days.  Dispense: 14 Tab; Refill: 0

## 2017-05-10 ENCOUNTER — TELEPHONE (OUTPATIENT)
Dept: MEDICAL GROUP | Facility: CLINIC | Age: 82
End: 2017-05-10

## 2017-05-10 DIAGNOSIS — M54.50 CHRONIC BILATERAL LOW BACK PAIN WITHOUT SCIATICA: ICD-10-CM

## 2017-05-10 DIAGNOSIS — G89.29 CHRONIC BILATERAL LOW BACK PAIN WITHOUT SCIATICA: ICD-10-CM

## 2017-05-10 NOTE — TELEPHONE ENCOUNTER
Physical therapist called and said Yoli needs physical therapy    ICD Codes    Lower back pain M54.5    Hip Pain unspecified M25.559

## 2017-05-12 ENCOUNTER — HOSPITAL ENCOUNTER (OUTPATIENT)
Dept: PHYSICAL THERAPY | Facility: REHABILITATION | Age: 82
End: 2017-05-12
Attending: INTERNAL MEDICINE
Payer: MEDICARE

## 2017-05-12 PROCEDURE — 97140 MANUAL THERAPY 1/> REGIONS: CPT

## 2017-05-12 PROCEDURE — 97110 THERAPEUTIC EXERCISES: CPT

## 2017-05-16 ENCOUNTER — HOSPITAL ENCOUNTER (OUTPATIENT)
Dept: PHYSICAL THERAPY | Facility: REHABILITATION | Age: 82
End: 2017-05-16
Attending: INTERNAL MEDICINE
Payer: MEDICARE

## 2017-05-16 PROCEDURE — 97012 MECHANICAL TRACTION THERAPY: CPT

## 2017-05-16 PROCEDURE — 97110 THERAPEUTIC EXERCISES: CPT

## 2017-05-23 ENCOUNTER — HOSPITAL ENCOUNTER (OUTPATIENT)
Dept: PHYSICAL THERAPY | Facility: REHABILITATION | Age: 82
End: 2017-05-23
Attending: INTERNAL MEDICINE
Payer: MEDICARE

## 2017-05-23 PROCEDURE — 97110 THERAPEUTIC EXERCISES: CPT

## 2017-05-25 ENCOUNTER — HOSPITAL ENCOUNTER (OUTPATIENT)
Dept: PHYSICAL THERAPY | Facility: REHABILITATION | Age: 82
End: 2017-05-25
Attending: INTERNAL MEDICINE
Payer: MEDICARE

## 2017-05-25 PROCEDURE — 97112 NEUROMUSCULAR REEDUCATION: CPT

## 2017-05-25 PROCEDURE — 97110 THERAPEUTIC EXERCISES: CPT

## 2017-05-25 PROCEDURE — 97014 ELECTRIC STIMULATION THERAPY: CPT

## 2017-06-05 ENCOUNTER — HOSPITAL ENCOUNTER (OUTPATIENT)
Dept: PHYSICAL THERAPY | Facility: REHABILITATION | Age: 82
End: 2017-06-05
Attending: INTERNAL MEDICINE
Payer: MEDICARE

## 2017-06-05 PROCEDURE — 97110 THERAPEUTIC EXERCISES: CPT

## 2017-06-05 PROCEDURE — 97140 MANUAL THERAPY 1/> REGIONS: CPT

## 2017-06-07 ENCOUNTER — OFFICE VISIT (OUTPATIENT)
Dept: MEDICAL GROUP | Facility: PHYSICIAN GROUP | Age: 82
End: 2017-06-07
Payer: MEDICARE

## 2017-06-07 DIAGNOSIS — R73.9 HYPERGLYCEMIA: ICD-10-CM

## 2017-06-07 DIAGNOSIS — G89.29 CHRONIC LEFT-SIDED LOW BACK PAIN WITH LEFT-SIDED SCIATICA: ICD-10-CM

## 2017-06-07 DIAGNOSIS — E55.9 VITAMIN D INSUFFICIENCY: ICD-10-CM

## 2017-06-07 DIAGNOSIS — E78.5 DYSLIPIDEMIA: ICD-10-CM

## 2017-06-07 DIAGNOSIS — M54.42 CHRONIC LEFT-SIDED LOW BACK PAIN WITH LEFT-SIDED SCIATICA: ICD-10-CM

## 2017-06-07 PROCEDURE — G8419 CALC BMI OUT NRM PARAM NOF/U: HCPCS | Performed by: INTERNAL MEDICINE

## 2017-06-07 PROCEDURE — 1036F TOBACCO NON-USER: CPT | Performed by: INTERNAL MEDICINE

## 2017-06-07 PROCEDURE — 99214 OFFICE O/P EST MOD 30 MIN: CPT | Performed by: INTERNAL MEDICINE

## 2017-06-07 PROCEDURE — 1101F PT FALLS ASSESS-DOCD LE1/YR: CPT | Performed by: INTERNAL MEDICINE

## 2017-06-07 PROCEDURE — G8432 DEP SCR NOT DOC, RNG: HCPCS | Performed by: INTERNAL MEDICINE

## 2017-06-07 PROCEDURE — 4040F PNEUMOC VAC/ADMIN/RCVD: CPT | Performed by: INTERNAL MEDICINE

## 2017-06-07 NOTE — MR AVS SNAPSHOT
"Yoli March Benjaminboby   2017 9:20 AM   Office Visit   MRN: [id number]    Department:  [facility]   Dept Phone:  [phone]    Description:  Female : 1933   Provider:  Cash Fonseca M.D.           Allergies as of 2017     Allergen Noted Reactions    Other Environmental 2013       Seasonal allergies      You were diagnosed with     Chronic left-sided low back pain with left-sided sciatica   [2172519]       Vitamin D insufficiency   [941609]       Dyslipidemia   [832109]       Hyperglycemia   [102387]         Vital Signs     Blood Pressure Pulse Temperature Respirations Height Weight    122/68 mmHg 66 36.9 °C (98.5 °F) 16 1.6 m (5' 2.99\") 65.772 kg (145 lb)    Body Mass Index Oxygen Saturation Smoking Status             25.69 kg/m2 97% Never Smoker          Basic Information     Date Of Birth Sex Race Ethnicity Preferred Language    1933 Female White Non- English      Your appointments     2017  3:00 PM   PT Follow Up 45 Minutes with [facility] ([place])    [address]  [facility] [address]   [phone]            2017  9:30 AM   PT Follow Up 45 Minutes with [facility] ([place])    [address]  [facility] [address]   [phone]            2017 10:00 AM   Established Patient with Cash Fonseca M.D.   [facility] (--)    [address]  [address]  [facility] [address] [phone]           You will be receiving a confirmation call a few days before your appointment from our automated call confirmation system.              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   9/3/2013 - Present    Dry eyes, bilateral H04.123   3/27/2014 - Present    Osteopenia of the elderly (Chronic) M85.80   3/27/2014 - Present    Cough R05   10/28/2014 - Present    Cardiac " murmur R01.1   5/7/2015 - Present    Mitral regurgitation I34.0   5/7/2015 - Present      Health Maintenance        Date Due Completion Dates    IMM DTaP/Tdap/Td Vaccine (1 - Tdap) 8/21/1952 ---    IMM PNEUMOCOCCAL 65+ (ADULT) LOW/MEDIUM RISK SERIES (2 of 2 - PPSV23) 3/9/2016 3/9/2015    MAMMOGRAM 1/19/2018 1/19/2017, 12/18/2015, 12/2/2014, 11/20/2014, 11/19/2013, 11/5/2012, 11/3/2011, 10/12/2010, 10/9/2009, 10/9/2009, 9/16/2008, 9/16/2008, 8/27/2007, 8/27/2007    BONE DENSITY 1/19/2022 1/19/2017, 12/18/2015, 11/20/2014, 11/19/2013, 11/5/2012, 11/3/2011, 10/12/2010, 10/9/2009, 6/23/2008, 5/17/2007, 5/19/2005    COLONOSCOPY 3/27/2023 3/27/2013 (Prv Comp)    Override on 3/27/2013: Previously completed (Dr Davidson)            Current Immunizations     13-VALENT PCV PREVNAR 3/9/2015    Influenza TIV (IM) 9/4/2013  4:25 AM    SHINGLES VACCINE 3/26/2013      Below and/or attached are the medications your provider expects you to take. Review all of your home medications and newly ordered medications with your provider and/or pharmacist. Follow medication instructions as directed by your provider and/or pharmacist. Please keep your medication list with you and share with your provider. Update the information when medications are discontinued, doses are changed, or new medications (including over-the-counter products) are added; and carry medication information at all times in the event of emergency situations     Allergies:  OTHER ENVIRONMENTAL - (reactions not documented)               Medications  Valid as of: June 07, 2017 -  9:38 AM    Generic Name Brand Name Tablet Size Instructions for use    B Complex-C   Take  by mouth.        Calcium-Vitamin D-Vitamin K (Chew Tab) Calcium-Vitamin D-Vitamin K 500-1000-40 MG-UNT-MCG Take  by mouth 3 times a day.        Cholecalciferol (Cap) Vitamin D 2000 UNITS Take 2,000 Units by mouth every day.        Coenzyme Q10 (Cap) coenzyme Q-10 30 MG Take 300 mg by mouth 2X A WEEK.         Glycerin-Polysorbate 80 (Solution) Glycerin-Polysorbate 80 1-1 % 1 Drop by Ophthalmic route 4 times a day.        Ketotifen Fumarate (Solution) ZADITOR 0.025 % Place 1 Drop in both eyes 2 times a day.        Multiple Vitamins-Minerals   Take  by mouth 2 Times a Day.        .                 Medicines prescribed today were sent to:     [id number] - [place], [place] - [id number] [facility] [place] [address]    Phone: [phone] Fax: [phone]    Open 24 Hours?: No      Medication refill instructions:       If your prescription bottle indicates you have medication refills left, it is not necessary to call your provider’s office. Please contact your pharmacy and they will refill your medication.    If your prescription bottle indicates you do not have any refills left, you may request refills at any time through one of the following ways: The online [employer] system (except Urgent Care), by calling your provider’s office, or by asking your pharmacy to contact your provider’s office with a refill request. Medication refills are processed only during regular business hours and may not be available until the next business day. Your provider may request additional information or to have a follow-up visit with you prior to refilling your medication.   *Please Note: Medication refills are assigned a new Rx number when refilled electronically. Your pharmacy may indicate that no refills were authorized even though a new prescription for the same medication is available at the pharmacy. Please request the medicine by name with the pharmacy before contacting your provider for a refill.        Your To Do List     Future Labs/Procedures Complete By Expires    HEMOGLOBIN A1C  As directed 6/7/2018    LIPOPROTEIN A  As directed 6/7/2018    VITAMIN D,25 HYDROXY  As directed 6/7/2018         [employer] Access Code: Activation code not generated  Current [employer] Status: Active

## 2017-06-07 NOTE — PROGRESS NOTES
Subjective:  Yoli is a 83 y.o. female with the following   Past Medical History   Diagnosis Date   • Arrhythmia    • Arthritis      knees   • Bronchitis      long time ago   • Pneumonia      long time ago   • CATARACT      no surgery yet   • Dry eyes, bilateral 3/27/2014   • Osteopenia of the elderly 3/27/2014      Family History   Problem Relation Age of Onset   • Lung Disease Mother    • Heart Disease Father    • Lung Disease Father      Emphysema   • Cancer Maternal Aunt      Great Aunt and Cousin Ovarian CA   • Hypertension Maternal Aunt    • Cancer Daughter      Lung and Brain CA   • Cancer Daughter      Pituitary tumor     The patient is on the following medications:   Current outpatient prescriptions:   •  coenzyme Q-10 30 MG capsule, Take 300 mg by mouth 2X A WEEK., Disp: , Rfl:   •  ketotifen (ZADITOR) 0.025 % ophthalmic solution, Place 1 Drop in both eyes 2 times a day., Disp: , Rfl:   •  B Complex-C (SUPER B COMPLEX PO), Take  by mouth., Disp: , Rfl:   •  Glycerin-Polysorbate 80 (REFRESH DRY EYE THERAPY) 1-1 % SOLN, 1 Drop by Ophthalmic route 4 times a day., Disp: , Rfl:   •  Calcium-Vitamin D-Vitamin K (CALCIUM + D) 500-1000-40 MG-UNT-MCG CHEW, Take  by mouth 3 times a day., Disp: , Rfl:   •  Multiple Vitamins-Minerals (PRESERVISION/LUTEIN PO), Take  by mouth 2 Times a Day., Disp: , Rfl:   •  Cholecalciferol (VITAMIN D) 2000 UNITS CAPS, Take 2,000 Units by mouth every day., Disp: , Rfl:     HPI; patient is here today for follow-up visit, stating that could not tolerate side effects of gabapentin but seemed to help to control her back pain, she had neurosurgical consultation and given option of lumbar surgery, physical therapy or injection, per patient she took the physical therapy and seemed to help her significantly until recently that she she had recurrence after lifting her suitcase while traveling, currently attending physical therapy..  Patient is also on vitamin D and calcium supplements or  "vitamin D insufficiency and osteopenia denies recent fall or injuries. Patient also has had history of borderline hyperglycemia and dyslipidemia denies having polyuria, polydipsia, chest pain or history of CAD.  ROS:  See HPI    Blood pressure 122/68, pulse 66, temperature 36.9 °C (98.5 °F), resp. rate 16, height 1.6 m (5' 2.99\"), weight 65.772 kg (145 lb), SpO2 97 %.on RA  Objective:  Patient is well appearing and in no acute distress.  Pharynx is clear.  Neck is soft and supple with no cervical or supraclavicular lymphadenopathy, thyromegaly or masses, no JVD.  Lungs clear to auscultation bilaterally with normal respiratory effort. Abdomen soft, non-tender on palpation,not distended. Heart regular rate and rhythm without murmur. Extremities without any clubbing, cyanosis, or edema.    Assessment and Plan:    1. Lower back pain with left lower extremity radicular pain; status post neurosurgery consultation with Dr. Doe, per patient she had a good response to physical therapy first round, recently has recurrence after lifting heavy suitcase, started her physical therapy couple days ago. Patient also could not tolerate side effects of gabapentin, prefers no medical therapy.  Previous lumbar MRI;   FINDINGS:  Alignment in the lumbar spine is normal. Again noted is chronic severe collapse of T10. Marrow signal in the lumbar vertebral bodies shows chronic discogenic endplate changes with small multifocal Schmorl's node defects at L3-4, L4-5, L5-S1. There is   mild anterior marginal endplate spurring throughout the lumbar spine with moderate spurring at T11-T12.    The prevertebral and paraspinous soft tissues are unremarkable.. There is a large eccentric cortical cyst at the upper pole the right kidney measuring 54 mm. Smaller renal cysts are noted at the lower pole of the left kidney.    The gallbladder shows a large gallstone as well as innumerable additional gallstones (T2 axial images 26-34).    The conus is " normal in position and signal with its tip at the L1-L2 level.    There is mild-moderate degenerative disc space narrowing at L3-4 and L4-5 and advanced degenerative disc space narrowing at L5-S1. There are vacuum disc phenomena at L3-L4 and L4-L5.    At T12-L1, no abnormality.    At L1-2, no abnormality.    At L2-3, there is moderate diffuse disc bulging. There is a moderate-sized left-sided cephalad disc extrusion or free fragment which extends nearly up to the L2 superior endplate. There is left lateral recess stenosis. There is no overall central spinal   stenosis. There is moderate left foraminal stenosis and borderline inferior foraminal stenosis on the right.    At L3-4, there is moderate diffuse disc bulging including lateral disc bulging. There is mild hypertrophic facet arthropathy and ligamentum flavum hypertrophy. The thecal sac is at the lower range of normal without deacon central stenosis. There is marked   bilateral foraminal stenosis.    At L4-5, there is moderate disc bulging with a small superimposed central disc protrusion. There is moderate hypertrophic facet arthropathy and ligamentum flavum hypertrophy. There is marked central stenosis (T2 axial image 13, series 6). There is   moderate-marked bilateral foraminal stenosis.    At L5-S1, there is minor diffuse disc bulging and posterior marginal spurring. There is mild hypertrophic facet arthropathy. The thecal sac assumes a triangular configuration and is toward the lower range of normal. There is no deacon central stenosis.   There is moderate bilateral foraminal stenosis.         Impression        1.  Cholelithiasis.  2.  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 This was present on chest radiograph from 12/12/2014.  3.  L2-3 disc bulging and moderate-sized left-sided cephalad disc extrusion or free fragment. Left lateral recess compromise. Moderate left foraminal stenosis. Borderline right inferior foraminal stenosis.  4.  L3-4  moderate diffuse disc bulging with lateral disc bulging. Facet arthropathy. Marked bilateral foraminal stenosis.  5.  L4-5 moderate disc bulging. Small superimposed central disc protrusion. Moderate ligamentous and facet hypertrophy. Marked central stenosis.  6.  L5-S1 minor disc bulging and posterior marginal spurring. Mild facet arthropathy. Moderate bilateral foraminal stenosis with disc space narrowing contributing to the foraminal compromise.       2. Vitamin D insufficiency/ osteopenia; currently on OTC vitamin D3-2000 units on calcium supplements, previous bone density scan;   FINDINGS:  The mean bone mineral density for the lumbar spine is 1.000 g/cm2, which corresponds to a T score of -1.5 and a Z score of 0.3.    The proximal left femur has a mean bone mineral density of 0.743 g/cm2, with a T score of -2.1 and a Z score of 0.0.    When compared with the most recent study dated 12/18/2015, there has been a 6.4% increase in the bone mineral density of the lumbar spine and a 1.2% increase in the bone mineral density of the left femur.         Impression        According to the World Health Organization classification, bone mineral density of this patient is osteopenic.       3. History of hyperglycemia       4. History of dyslipidemia       Patient is advised on preventive and supportive care, diet and lifestyle modification, daily walking ,exercise as tolerated, Check requested labs and return for evaluation per scheduled appointment.  Please note that this dictation was created using voice recognition software. I have worked with consultants from the vendor as well as technical experts from [employer] [facility] [employer] to optimize the interface. I have made every reasonable attempt to correct obvious errors, but I expect that there are errors of grammar and possibly content that I did not discover before finalizing the note.

## 2017-06-08 ENCOUNTER — APPOINTMENT (OUTPATIENT)
Dept: PHYSICAL THERAPY | Facility: REHABILITATION | Age: 82
End: 2017-06-08
Attending: INTERNAL MEDICINE
Payer: MEDICARE

## 2017-06-09 ENCOUNTER — HOSPITAL ENCOUNTER (OUTPATIENT)
Dept: PHYSICAL THERAPY | Facility: REHABILITATION | Age: 82
End: 2017-06-09
Attending: INTERNAL MEDICINE
Payer: MEDICARE

## 2017-06-09 PROCEDURE — 97140 MANUAL THERAPY 1/> REGIONS: CPT | Mod: XU

## 2017-06-09 PROCEDURE — 97110 THERAPEUTIC EXERCISES: CPT

## 2017-06-09 PROCEDURE — 97012 MECHANICAL TRACTION THERAPY: CPT

## 2017-06-19 ENCOUNTER — HOSPITAL ENCOUNTER (OUTPATIENT)
Dept: LAB | Facility: MEDICAL CENTER | Age: 82
End: 2017-06-19
Attending: INTERNAL MEDICINE
Payer: MEDICARE

## 2017-06-19 DIAGNOSIS — E78.5 DYSLIPIDEMIA: ICD-10-CM

## 2017-06-19 DIAGNOSIS — R73.9 HYPERGLYCEMIA: ICD-10-CM

## 2017-06-19 DIAGNOSIS — E55.9 VITAMIN D INSUFFICIENCY: ICD-10-CM

## 2017-06-19 LAB
25(OH)D3 SERPL-MCNC: 39 NG/ML (ref 30–100)
EST. AVERAGE GLUCOSE BLD GHB EST-MCNC: 111 MG/DL
HBA1C MFR BLD: 5.5 % (ref 0–5.6)

## 2017-06-19 PROCEDURE — 83695 ASSAY OF LIPOPROTEIN(A): CPT

## 2017-06-19 PROCEDURE — 82306 VITAMIN D 25 HYDROXY: CPT

## 2017-06-19 PROCEDURE — 36415 COLL VENOUS BLD VENIPUNCTURE: CPT

## 2017-06-19 PROCEDURE — 83036 HEMOGLOBIN GLYCOSYLATED A1C: CPT

## 2017-06-21 LAB — LPA SERPL-MCNC: 21 MG/DL

## 2017-06-22 VITALS
HEART RATE: 66 BPM | BODY MASS INDEX: 25.69 KG/M2 | HEIGHT: 63 IN | WEIGHT: 145 LBS | OXYGEN SATURATION: 97 % | DIASTOLIC BLOOD PRESSURE: 68 MMHG | TEMPERATURE: 98.4 F | SYSTOLIC BLOOD PRESSURE: 122 MMHG | RESPIRATION RATE: 16 BRPM

## 2017-06-23 ENCOUNTER — HOSPITAL ENCOUNTER (OUTPATIENT)
Dept: PHYSICAL THERAPY | Facility: REHABILITATION | Age: 82
End: 2017-06-23
Attending: INTERNAL MEDICINE
Payer: MEDICARE

## 2017-06-23 PROCEDURE — 97110 THERAPEUTIC EXERCISES: CPT

## 2017-06-27 ENCOUNTER — HOSPITAL ENCOUNTER (OUTPATIENT)
Dept: PHYSICAL THERAPY | Facility: REHABILITATION | Age: 82
End: 2017-06-27
Attending: INTERNAL MEDICINE
Payer: MEDICARE

## 2017-06-27 PROCEDURE — 97112 NEUROMUSCULAR REEDUCATION: CPT

## 2017-06-27 PROCEDURE — 97110 THERAPEUTIC EXERCISES: CPT

## 2017-07-03 ENCOUNTER — HOSPITAL ENCOUNTER (OUTPATIENT)
Dept: PHYSICAL THERAPY | Facility: REHABILITATION | Age: 82
End: 2017-07-03
Attending: INTERNAL MEDICINE
Payer: MEDICARE

## 2017-07-03 PROCEDURE — 97112 NEUROMUSCULAR REEDUCATION: CPT

## 2017-07-03 PROCEDURE — 97110 THERAPEUTIC EXERCISES: CPT

## 2017-07-06 ENCOUNTER — HOSPITAL ENCOUNTER (OUTPATIENT)
Dept: PHYSICAL THERAPY | Facility: REHABILITATION | Age: 82
End: 2017-07-06
Attending: INTERNAL MEDICINE
Payer: MEDICARE

## 2017-07-06 PROCEDURE — 97110 THERAPEUTIC EXERCISES: CPT

## 2017-07-06 PROCEDURE — 97112 NEUROMUSCULAR REEDUCATION: CPT

## 2017-07-13 ENCOUNTER — APPOINTMENT (OUTPATIENT)
Dept: PHYSICAL THERAPY | Facility: REHABILITATION | Age: 82
End: 2017-07-13
Attending: INTERNAL MEDICINE
Payer: MEDICARE

## 2017-07-14 ENCOUNTER — HOSPITAL ENCOUNTER (OUTPATIENT)
Dept: PHYSICAL THERAPY | Facility: REHABILITATION | Age: 82
End: 2017-07-14
Attending: INTERNAL MEDICINE
Payer: MEDICARE

## 2017-07-14 PROCEDURE — 97014 ELECTRIC STIMULATION THERAPY: CPT

## 2017-07-14 PROCEDURE — 97110 THERAPEUTIC EXERCISES: CPT

## 2017-07-14 PROCEDURE — 97112 NEUROMUSCULAR REEDUCATION: CPT

## 2017-07-17 ENCOUNTER — OFFICE VISIT (OUTPATIENT)
Dept: MEDICAL GROUP | Facility: PHYSICIAN GROUP | Age: 82
End: 2017-07-17
Payer: MEDICARE

## 2017-07-17 VITALS
WEIGHT: 148 LBS | OXYGEN SATURATION: 93 % | HEART RATE: 78 BPM | RESPIRATION RATE: 18 BRPM | DIASTOLIC BLOOD PRESSURE: 70 MMHG | BODY MASS INDEX: 26.22 KG/M2 | SYSTOLIC BLOOD PRESSURE: 130 MMHG | TEMPERATURE: 97.7 F | HEIGHT: 63 IN

## 2017-07-17 DIAGNOSIS — E55.9 VITAMIN D DEFICIENCY: ICD-10-CM

## 2017-07-17 DIAGNOSIS — M51.36 DEGENERATIVE DISC DISEASE, LUMBAR: ICD-10-CM

## 2017-07-17 DIAGNOSIS — E78.5 DYSLIPIDEMIA: ICD-10-CM

## 2017-07-17 DIAGNOSIS — R73.9 HYPERGLYCEMIA: ICD-10-CM

## 2017-07-17 PROBLEM — M51.369 DEGENERATIVE DISC DISEASE, LUMBAR: Status: ACTIVE | Noted: 2017-07-17

## 2017-07-17 PROCEDURE — 99213 OFFICE O/P EST LOW 20 MIN: CPT | Performed by: INTERNAL MEDICINE

## 2017-07-17 ASSESSMENT — PATIENT HEALTH QUESTIONNAIRE - PHQ9: CLINICAL INTERPRETATION OF PHQ2 SCORE: 0

## 2017-07-17 NOTE — MR AVS SNAPSHOT
"Namitaliborio Carmichael   2017 3:40 PM   Office Visit   MRN: [id number]    Department:  [facility]   Dept Phone:  [phone]    Description:  Female : 1933   Provider:  Cash Fonseca M.D.           Reason for Visit     Follow-Up           Allergies as of 2017     Allergen Noted Reactions    Other Environmental 2013       Seasonal allergies      You were diagnosed with     Vitamin D deficiency   [0884458]       Hyperglycemia   [357918]       Dyslipidemia   [351637]         Vital Signs     Blood Pressure Pulse Temperature Respirations Height Weight    130/70 mmHg 78 36.5 °C (97.7 °F) 18 1.6 m (5' 2.99\") 67.132 kg (148 lb)    Body Mass Index Oxygen Saturation Smoking Status             26.22 kg/m2 93% Never Smoker          Basic Information     Date Of Birth Sex Race Ethnicity Preferred Language    1933 Female White Non- English      Your appointments     2017  9:30 AM   PT Follow Up 30 Minutes with Agapito LUIS M. Justin, P.T.   [facility] ([place]    [address]  [facility] [address]   [phone]            2017  9:30 AM   PT Follow Up 30 Minutes with Agapito E. Anderson, P.T.   [facility] ([place]    [address]  [facility] [address]   [phone]            2017 12:00 PM   PT Follow Up 30 Minutes with Agapito E. Anderson, P.T.   [facility] ([place]    [address]  [facility] [address]   [phone]            2017 10:30 AM   PT Follow Up 30 Minutes with Agapito E. Anderson, P.T.   [facility] ([place]    [address]  [facility] [address]   [phone]            2018 11:00 AM   Established Patient with Cash Fonseca M.D.   [facility] (--)    [address]  [address]  [facility] [id number] [phone]           You will be receiving a confirmation call a few days before your appointment from " our automated call confirmation system.              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   9/3/2013 - Present    Dry eyes, bilateral H04.123   3/27/2014 - Present    Osteopenia of the elderly (Chronic) M85.80   3/27/2014 - Present    Cough R05   10/28/2014 - Present    Cardiac murmur R01.1   5/7/2015 - Present    Mitral regurgitation I34.0   5/7/2015 - Present      Health Maintenance        Date Due Completion Dates    IMM DTaP/Tdap/Td Vaccine (1 - Tdap) 8/21/1952 ---    IMM PNEUMOCOCCAL 65+ (ADULT) LOW/MEDIUM RISK SERIES (2 of 2 - PPSV23) 3/9/2016 3/9/2015    IMM INFLUENZA (1) 9/1/2017 9/4/2013    MAMMOGRAM 1/19/2018 1/19/2017, 12/18/2015, 12/2/2014, 11/20/2014, 11/19/2013, 11/5/2012, 11/3/2011, 10/12/2010, 10/9/2009, 10/9/2009, 9/16/2008, 9/16/2008, 8/27/2007, 8/27/2007    BONE DENSITY 1/19/2022 1/19/2017, 12/18/2015, 11/20/2014, 11/19/2013, 11/5/2012, 11/3/2011, 10/12/2010, 10/9/2009, 6/23/2008, 5/17/2007, 5/19/2005    COLONOSCOPY 3/27/2023 3/27/2013 (Prv Comp)    Override on 3/27/2013: Previously completed (Dr Davidson)            Current Immunizations     13-VALENT PCV PREVNAR 3/9/2015    Influenza TIV (IM) 9/4/2013  4:25 AM    SHINGLES VACCINE 3/26/2013      Below and/or attached are the medications your provider expects you to take. Review all of your home medications and newly ordered medications with your provider and/or pharmacist. Follow medication instructions as directed by your provider and/or pharmacist. Please keep your medication list with you and share with your provider. Update the information when medications are discontinued, doses are changed, or new medications (including over-the-counter products) are added; and carry medication information at all times in the event of emergency situations     Allergies:  OTHER ENVIRONMENTAL - (reactions not documented)               Medications  Valid as of: July 17, 2017 -  4:00 PM      Generic Name Brand Name Tablet Size Instructions for use    B Complex-C   Take  by mouth.        Calcium-Vitamin D-Vitamin K (Chew Tab) Calcium-Vitamin D-Vitamin K 500-1000-40 MG-UNT-MCG Take  by mouth 3 times a day.        Cholecalciferol (Cap) Vitamin D 2000 UNITS Take 2,000 Units by mouth every day.        Coenzyme Q10 (Cap) coenzyme Q-10 30 MG Take 300 mg by mouth 2X A WEEK.        Glycerin-Polysorbate 80 (Solution) Glycerin-Polysorbate 80 1-1 % 1 Drop by Ophthalmic route 4 times a day.        Ketotifen Fumarate (Solution) ZADITOR 0.025 % Place 1 Drop in both eyes 2 times a day.        Multiple Vitamins-Minerals   Take  by mouth 2 Times a Day.        .                 Medicines prescribed today were sent to:     [id number] [place], [place] - [address] [address]    Phone: [phone] Fax: [phone]    Open 24 Hours?: No      Medication refill instructions:       If your prescription bottle indicates you have medication refills left, it is not necessary to call your provider’s office. Please contact your pharmacy and they will refill your medication.    If your prescription bottle indicates you do not have any refills left, you may request refills at any time through one of the following ways: The online [employer] system (except Urgent Care), by calling your provider’s office, or by asking your pharmacy to contact your provider’s office with a refill request. Medication refills are processed only during regular business hours and may not be available until the next business day. Your provider may request additional information or to have a follow-up visit with you prior to refilling your medication.   *Please Note: Medication refills are assigned a new Rx number when refilled electronically. Your pharmacy may indicate that no refills were authorized even though a new prescription for the same medication is available at the pharmacy. Please request the medicine  by name with the pharmacy before contacting your provider for a refill.        Your To Do List     Future Labs/Procedures Complete By Expires    HBA1C  1/13/2018 7/17/2018    VITAMIN D,25 HYDROXY  As directed 7/17/2018         MyChart Access Code: Activation code not generated  Current MyChart Status: Active

## 2017-07-17 NOTE — PROGRESS NOTES
"Subjective:  Yoli is a 83 y.o. female with the following   Past Medical History   Diagnosis Date   • Arrhythmia    • Arthritis      knees   • Bronchitis      long time ago   • Pneumonia      long time ago   • CATARACT      no surgery yet   • Dry eyes, bilateral 3/27/2014   • Osteopenia of the elderly 3/27/2014      Family History   Problem Relation Age of Onset   • Lung Disease Mother    • Heart Disease Father    • Lung Disease Father      Emphysema   • Cancer Maternal Aunt      Great Aunt and Cousin Ovarian CA   • Hypertension Maternal Aunt    • Cancer Daughter      Lung and Brain CA   • Cancer Daughter      Pituitary tumor     The patient is on the following medications:   Current outpatient prescriptions:   •  coenzyme Q-10 30 MG capsule, Take 300 mg by mouth 2X A WEEK., Disp: , Rfl:   •  ketotifen (ZADITOR) 0.025 % ophthalmic solution, Place 1 Drop in both eyes 2 times a day., Disp: , Rfl:   •  B Complex-C (SUPER B COMPLEX PO), Take  by mouth., Disp: , Rfl:   •  Glycerin-Polysorbate 80 (REFRESH DRY EYE THERAPY) 1-1 % SOLN, 1 Drop by Ophthalmic route 4 times a day., Disp: , Rfl:   •  Calcium-Vitamin D-Vitamin K (CALCIUM + D) 500-1000-40 MG-UNT-MCG CHEW, Take  by mouth 3 times a day., Disp: , Rfl:   •  Multiple Vitamins-Minerals (PRESERVISION/LUTEIN PO), Take  by mouth 2 Times a Day., Disp: , Rfl:   •  Cholecalciferol (VITAMIN D) 2000 UNITS CAPS, Take 2,000 Units by mouth every day., Disp: , Rfl:     HPI; Patient is here today for follow-up visit regarding her recent labs, she is being currently followed by physical therapy for chronic back pain stating that her symptoms are tolerable, could not tolerate oral medication for pain. She has had history of hyperglycemia denies having polyuria or polydipsia, currently on vitamin D supplements for vitamin D deficiency.  ROS:  See HPI    Blood pressure 130/70, pulse 78, temperature 36.5 °C (97.7 °F), resp. rate 18, height 1.6 m (5' 2.99\"), weight 67.132 kg (148 lb), " SpO2 93 %.on RA  Objective:  Patient is well appearing and in no acute distress.  Pharynx is clear.  Neck is soft and supple with no cervical or supraclavicular lymphadenopathy, thyromegaly or masses, no JVD.  Lungs clear to auscultation bilaterally with normal respiratory effort. Abdomen soft, non-tender on palpation,not distended. Heart regular rate and rhythm without murmur. Extremities without any clubbing, cyanosis, or edema.    Assessment and Plan:  1. History of hyperglycemia; gradual diet modification;     Ref. Range 9/19/2016 08:13 6/19/2017 09:11   Glycohemoglobin Latest Ref Range: 0.0-5.6 % 5.9 (H) 5.5       2. Borderline dyslipidemia; patient prefers no medical therapy.    Ref. Range 3/28/2014 09:10 1/13/2015 09:35 6/19/2017 09:11   Cholesterol,Tot Latest Ref Range: 100-199 mg/dL 185 208 (H)    Triglycerides Latest Ref Range: 0-149 mg/dL 180 (H) 126    HDL Latest Ref Range: >=40 mg/dL 55 63    LDL Latest Ref Range: <100 mg/dL 94 120 (H)    Lipoprotein A Latest Ref Range: <=29 mg/dL   21     3. Vitamin D deficiency; on OTC vitamin D3-5000 units daily.     Ref. Range 9/19/2016 08:13 6/19/2017 09:11   25-Hydroxy   Vitamin D 25 Latest Ref Range:  ng/mL 27 (L) 39       4. Chronic low back pain/ degenerative disc disease; status post neurosurgery consultation, currently followed by physical therapy.         5. Increased BMI        Patient is advised on preventive and supportive care, diet and lifestyle modification, daily walking ,exercise and weight loss, monitor labs.   Please note that this dictation was created using voice recognition software. I have worked with consultants from the vendor as well as technical experts from [employer] to optimize the interface. I have made every reasonable attempt to correct obvious errors, but I expect that there are errors of grammar and possibly content that I did not discover before finalizing the note.

## 2017-07-18 ENCOUNTER — APPOINTMENT (OUTPATIENT)
Dept: PHYSICAL THERAPY | Facility: REHABILITATION | Age: 82
End: 2017-07-18
Attending: INTERNAL MEDICINE
Payer: MEDICARE

## 2017-07-20 ENCOUNTER — HOSPITAL ENCOUNTER (OUTPATIENT)
Dept: PHYSICAL THERAPY | Facility: REHABILITATION | Age: 82
End: 2017-07-20
Attending: INTERNAL MEDICINE
Payer: MEDICARE

## 2017-07-20 PROCEDURE — 97014 ELECTRIC STIMULATION THERAPY: CPT

## 2017-07-20 PROCEDURE — 97110 THERAPEUTIC EXERCISES: CPT

## 2017-07-25 ENCOUNTER — APPOINTMENT (OUTPATIENT)
Dept: PHYSICAL THERAPY | Facility: REHABILITATION | Age: 82
End: 2017-07-25
Attending: INTERNAL MEDICINE
Payer: MEDICARE

## 2017-07-28 ENCOUNTER — HOSPITAL ENCOUNTER (OUTPATIENT)
Dept: PHYSICAL THERAPY | Facility: REHABILITATION | Age: 82
End: 2017-07-28
Attending: INTERNAL MEDICINE
Payer: MEDICARE

## 2017-07-28 PROCEDURE — 97112 NEUROMUSCULAR REEDUCATION: CPT

## 2017-07-28 PROCEDURE — 97110 THERAPEUTIC EXERCISES: CPT

## 2017-08-04 ENCOUNTER — PATIENT OUTREACH (OUTPATIENT)
Dept: HEALTH INFORMATION MANAGEMENT | Facility: OTHER | Age: 82
End: 2017-08-04

## 2017-08-04 NOTE — PROGRESS NOTES
Attempt #:2    WebIZ Checked & Epic Updated: yes  HealthConnect Verified: yes  Verify PCP: yes    Communication Preference Obtained: yes     Review Care Team: yes    Annual Wellness Visit Scheduling  1. Scheduling Status:Scheduled       Care Gap Scheduling (Attempt to Schedule EACH Overdue Care Gap!)     Health Maintenance Due   Topic Date Due   • IMM DTaP/Tdap/Td Vaccine (1 - Tdap) Scheduled   • Annual Wellness Visit  Scheduled   • IMM PNEUMOCOCCAL 65+ (ADULT) LOW/MEDIUM RISK SERIES (2 of 2 - PPSV23) Scheduled         MyChart Activation: already active  [employer] Daisha: no  Virtual Visits: no  Opt In to Text Messages: no

## 2017-08-22 ENCOUNTER — TELEPHONE (OUTPATIENT)
Dept: MEDICAL GROUP | Facility: PHYSICIAN GROUP | Age: 82
End: 2017-08-22

## 2017-08-22 NOTE — TELEPHONE ENCOUNTER
ANNUAL WELLNESS VISIT PRE-VISIT PLANNING     1.  Reviewed note from last office visit with PCP: YES    2.  If any orders were placed at last visit, do we have Results/Consult Notes?        •  Labs - Labs ordered, NOT completed. Patient advised to complete prior to next appointment.       •  Imaging - Imaging was not ordered at last office visit.       •  Referrals - No referrals were ordered at last office visit.    3.  Immunizations were updated in [facility] using WebIZ?: Yes       •  WebIZ Recommendations: PREVNAR (PCV13)  and TDAP       •  Is patient due for Tdap? NO       •  Is patient due for Shingles? NO     4.  Patient is due for the following Health Maintenance Topics:   Health Maintenance Due   Topic Date Due   • IMM DTaP/Tdap/Td Vaccine (1 - Tdap) 08/21/1952   • Annual Wellness Visit  03/28/2015   • IMM PNEUMOCOCCAL 65+ (ADULT) LOW/MEDIUM RISK SERIES (2 of 2 - PPSV23) 03/09/2016   • IMM INFLUENZA (1) 09/01/2017       - Patient has completed PREVNAR (PCV13)  and ZOSTAVAX (Shingles) Immunization(s) per WebIZ. Chart has been updated.    5.  Reviewed/Updated the following with patient:       •   Preferred Pharmacy? YES       •   Preferred Lab? YES       •   Medications? YES. Was Abstract Encounter opened and chart updated? YES       •   Social History? YES. Was Abstract Encounter opened and chart updated? YES       •   Family History? YES. Was Abstract Encounter opened and chart updated? YES    6.  Care Team Updated:       •   DME Company (gait device, O2, CPAP, etc.): NO       •   Other Specialists (eye doctor, derm, GYN, cardiology, endo, etc): YES    7.  Patient has the following Care Path diagnoses on Problem List:  NONE    8.  Specialty Comments was updated with diagnosis information provided by [facility]: NO    9.  Patient was advised: “This is a free wellness visit. The provider will screen for medical conditions to help you stay healthy. If you have other concerns to address you may be asked to discuss these at a  separate visit or there may be an additional fee.”     6.  Patient was informed to arrive 15 min prior to their scheduled appointment and bring in their medication bottles.

## 2017-08-30 ENCOUNTER — OFFICE VISIT (OUTPATIENT)
Dept: MEDICAL GROUP | Facility: PHYSICIAN GROUP | Age: 82
End: 2017-08-30
Payer: MEDICARE

## 2017-08-30 VITALS
RESPIRATION RATE: 16 BRPM | OXYGEN SATURATION: 94 % | WEIGHT: 148.59 LBS | HEIGHT: 63 IN | BODY MASS INDEX: 26.33 KG/M2 | HEART RATE: 83 BPM | SYSTOLIC BLOOD PRESSURE: 137 MMHG | TEMPERATURE: 98.1 F | DIASTOLIC BLOOD PRESSURE: 63 MMHG

## 2017-08-30 DIAGNOSIS — R63.8 INCREASED BMI: ICD-10-CM

## 2017-08-30 DIAGNOSIS — E55.9 VITAMIN D DEFICIENCY: ICD-10-CM

## 2017-08-30 DIAGNOSIS — M51.36 DEGENERATIVE DISC DISEASE, LUMBAR: ICD-10-CM

## 2017-08-30 DIAGNOSIS — M54.42 CHRONIC BILATERAL LOW BACK PAIN WITH LEFT-SIDED SCIATICA: ICD-10-CM

## 2017-08-30 DIAGNOSIS — G89.29 CHRONIC BILATERAL LOW BACK PAIN WITH LEFT-SIDED SCIATICA: ICD-10-CM

## 2017-08-30 PROCEDURE — G0439 PPPS, SUBSEQ VISIT: HCPCS | Performed by: INTERNAL MEDICINE

## 2017-08-30 ASSESSMENT — PATIENT HEALTH QUESTIONNAIRE - PHQ9: CLINICAL INTERPRETATION OF PHQ2 SCORE: 0

## 2017-08-30 NOTE — PROGRESS NOTES
Chief Complaint   Patient presents with   • Annual Wellness Visit         HPI:  Yoli is a 84 y.o. here for Medicare Annual Wellness Visit, Patient has had chronic intermittent low back pain usually when getting up in the morning, improves with walking and exercise, per patient she was seen by neurosurgery suggested epidural injection, per patient she is been thinking about it. Patient is also on vitamin D supplements for vitamin D deficiency.      Patient Active Problem List    Diagnosis Date Noted   • Degenerative disc disease, lumbar 07/17/2017   • Cardiac murmur 05/07/2015   • Mitral regurgitation 05/07/2015   • Cough 10/28/2014   • Dry eyes, bilateral 03/27/2014   • Osteopenia of the elderly 03/27/2014   • S/P total knee replacement right 09/03/2013       Current Outpatient Prescriptions   Medication Sig Dispense Refill   • coenzyme Q-10 30 MG capsule Take 300 mg by mouth 2X A WEEK.     • ketotifen (ZADITOR) 0.025 % ophthalmic solution Place 1 Drop in both eyes 2 times a day.     • B Complex-C (SUPER B COMPLEX PO) Take  by mouth.     • Glycerin-Polysorbate 80 (REFRESH DRY EYE THERAPY) 1-1 % SOLN 1 Drop by Ophthalmic route 4 times a day.     • Calcium-Vitamin D-Vitamin K (CALCIUM + D) 500-1000-40 MG-UNT-MCG CHEW Take  by mouth 3 times a day.     • Multiple Vitamins-Minerals (PRESERVISION/LUTEIN PO) Take  by mouth 2 Times a Day.     • Cholecalciferol (VITAMIN D) 2000 UNITS CAPS Take 2,000 Units by mouth every day.       No current facility-administered medications for this visit.         Patient is taking medications as noted in medication list.  Current supplements as per medication list.     Allergies: Other environmental    Current social contact/activities: bowling  [employer] traveling     Is patient current with immunizations? No, due for TDAP. Patient is interested in receiving PNEUMOVAX (PPSV23) today.    She  reports that she has never smoked. She has never used smokeless tobacco. She reports that  she drinks alcohol. She reports that she does not use drugs.  Counseling given: Not Answered        DPA/Advanced directive: Patient has Advanced Directive on file.     ROS:    Gait: Uses no assistive device   Ostomy: no   Other tubes: no   Amputations: no  Chronic oxygen use no   Last eye exam 04/17   Wears hearing aids: no   : Denies incontinence.       Screening:        Little interest or pleasure in doing things?  0 - not at all  Feeling down, depressed, or hopeless? 0 - not at all  Patient Health Questionnaire Score: 0    If depressive symptoms identified deferred to follow up visit unless specifically addressed in assessment and plan.    Interpretation of PHQ-9 Total Score   Score Severity   1-4 No Depression   5-9 Mild Depression   10-14 Moderate Depression   15-19 Moderately Severe Depression   20-27 Severe Depression    Screening for Cognitive Impairment    Three Minute Recall (apple, watch, ziggy)  3/3    Draw clock face with all 12 numbers set to the hand to show 10 minutes past 11 o'clock  1 5/5  If cognitive concerns identified, deferred for follow up unless specifically addressed in assessment and plan.    Fall Risk Assessment    Has the patient had two or more falls in the last year or any fall with injury in the last year?  No  If fall risk identified, deferred for follow up unless specifically addressed in assessment and plan.    Safety Assessment    Throw rugs on floor.  Yes  Handrails on all stairs.  Yes  Good lighting in all hallways.  Yes  Difficulty hearing.  No  Patient counseled about all safety risks that were identified.    Functional Assessment ADLs    Are there any barriers preventing you from cooking for yourself or meeting nutritional needs?  No.    Are there any barriers preventing you from driving safely or obtaining transportation?  No.    Are there any barriers preventing you from using a telephone or calling for help?  No.    Are there any barriers preventing you from shopping?   No.    Are there any barriers preventing you from taking care of your own finances?  No.    Are there any barriers preventing you from managing your medications?  No.    Are you currently engaging any exercise or physical activity?  No.       Health Maintenance Summary                IMM DTaP/Tdap/Td Vaccine Overdue 8/21/1952     Annual Wellness Visit Overdue 3/28/2015      Done 3/27/2014     IMM PNEUMOCOCCAL 65+ (ADULT) LOW/MEDIUM RISK SERIES Overdue 3/9/2016      Done 3/9/2015 Imm Admin: Pneumococcal Conjugate Vaccine (Prevnar/PCV-13)    IMM INFLUENZA Next Due 9/1/2017      Done 9/4/2013 Imm Admin: Influenza TIV (IM)    MAMMOGRAM Next Due 1/19/2018      Done 1/19/2017 MA-MAMMO SCREENING BILAT W/TOMOSYNTHESIS W/CAD     Patient has more history with this topic...    BONE DENSITY Next Due 1/19/2022      Done 1/19/2017 DS-BONE DENSITY STUDY (DEXA)     Patient has more history with this topic...    COLONOSCOPY Next Due 3/27/2023      Previously completed 3/27/2013 Dr Davidson          Patient Care Team:  Cash Fonseca M.D. as PCP - General (Internal Medicine)  Braxton Shannon O.D. as Consulting Physician (Optometry)  Kyara Bee M.D. as Consulting Physician (Ophthalmology)  Ruperto Patino M.D. as Consulting Physician (Cardiology)  Margarito Hastings M.D. (Cardiology)  Orlando Patel M.D. (Dermatology)    Social History   Substance Use Topics   • Smoking status: Never Smoker   • Smokeless tobacco: Never Used      Comment: Pt exposed to second hand smoker   • Alcohol use Yes      Comment: 1-2 per wk max     Family History   Problem Relation Age of Onset   • Lung Disease Mother    • Heart Disease Father    • Lung Disease Father      Emphysema   • Cancer Maternal Aunt      Great Aunt and Cousin Ovarian CA   • Hypertension Maternal Aunt    • Cancer Daughter      Lung and Brain CA   • Cancer Daughter      Pituitary tumor     She  has a past medical history of Arrhythmia; Arthritis; Bronchitis; CATARACT; Dry  "eyes, bilateral (3/27/2014); Osteopenia of the elderly (3/27/2014); and Pneumonia.   Past Surgical History:   Procedure Laterality Date   • KNEE ARTHROPLASTY TOTAL  9/3/2013    Performed by Ramesh Styles M.D. at SURGERY [facility] ORS   • TONSILLECTOMY             Exam:     Blood pressure 137/63, pulse 83, temperature 36.7 °C (98.1 °F), resp. rate 16, height 1.6 m (5' 3\"), weight 67.4 kg (148 lb 9.4 oz), SpO2 94 %. Body mass index is 26.32 kg/m².    Hearing    Dentition  Alert, oriented in no acute distress.  Eye contact is good, speech goal directed, affect calm      Assessment and Plan. The following treatment and monitoring plan is recommended:    1. History of hyperglycemia; gradual diet modification;      Ref. Range 9/19/2016 08:13 6/19/2017 09:11   Glycohemoglobin Latest Ref Range: 0.0-5.6 % 5.9 (H) 5.5         2. Borderline dyslipidemia; patient prefers no medical therapy.     Ref. Range 3/28/2014 09:10 1/13/2015 09:35 6/19/2017 09:11   Cholesterol,Tot Latest Ref Range: 100-199 mg/dL 185 208 (H)     Triglycerides Latest Ref Range: 0-149 mg/dL 180 (H) 126     HDL Latest Ref Range: >=40 mg/dL 55 63     LDL Latest Ref Range: <100 mg/dL 94 120 (H)     Lipoprotein A Latest Ref Range: <=29 mg/dL     21      3. Vitamin D deficiency; on OTC vitamin D3-5000 units daily.       Ref. Range 9/19/2016 08:13 6/19/2017 09:11   25-Hydroxy   Vitamin D 25 Latest Ref Range:  ng/mL 27 (L) 39         4. Chronic low back pain/ degenerative disc disease; status post neurosurgery consultation, Suggested epidural.    Patient her symptoms are more pronounced in the morning and improves with walking and exercise        5. Increased BMI           Patient is advised on preventive and supportive care regarding low back pain, diet and lifestyle modification, daily walking ,exercise and weight loss, monitor labs.     Services suggested:   Health Care Screening recommendations as per orders if indicated.  Referrals offered: " PT/OT/Nutrition counseling/Behavioral Health/Smoking cessation as per orders if indicated.    Discussion today about general wellness and lifestyle habits:    · Prevent falls and reduce trip hazards; Cautioned about securing or removing rugs.  · Have a working fire alarm and carbon monoxide detector;   · Engage in regular physical activity and social activities       Follow-up: No Follow-up on file.

## 2017-11-10 ENCOUNTER — OFFICE VISIT (OUTPATIENT)
Dept: CARDIOLOGY | Facility: MEDICAL CENTER | Age: 82
End: 2017-11-10
Payer: MEDICARE

## 2017-11-10 VITALS
BODY MASS INDEX: 27.18 KG/M2 | HEIGHT: 63 IN | OXYGEN SATURATION: 96 % | HEART RATE: 76 BPM | DIASTOLIC BLOOD PRESSURE: 80 MMHG | SYSTOLIC BLOOD PRESSURE: 144 MMHG | WEIGHT: 153.4 LBS

## 2017-11-10 DIAGNOSIS — I34.0 NON-RHEUMATIC MITRAL REGURGITATION: ICD-10-CM

## 2017-11-10 DIAGNOSIS — R01.1 CARDIAC MURMUR: ICD-10-CM

## 2017-11-10 DIAGNOSIS — R00.2 PALPITATIONS: ICD-10-CM

## 2017-11-10 DIAGNOSIS — I10 ESSENTIAL HYPERTENSION, BENIGN: ICD-10-CM

## 2017-11-10 PROCEDURE — 99204 OFFICE O/P NEW MOD 45 MIN: CPT | Performed by: INTERNAL MEDICINE

## 2017-11-10 RX ORDER — CYCLOSPORINE 0.5 MG/ML
1 EMULSION OPHTHALMIC 2 TIMES DAILY PRN
COMMUNITY
End: 2021-05-07

## 2017-11-10 RX ORDER — LOSARTAN POTASSIUM 25 MG/1
25 TABLET ORAL DAILY
Qty: 30 TAB | Refills: 6 | Status: SHIPPED | OUTPATIENT
Start: 2017-11-10 | End: 2017-12-11

## 2017-11-10 ASSESSMENT — ENCOUNTER SYMPTOMS
RESPIRATORY NEGATIVE: 1
PALPITATIONS: 1
LOSS OF CONSCIOUSNESS: 0
CONSTITUTIONAL NEGATIVE: 1
DIZZINESS: 1
MUSCULOSKELETAL NEGATIVE: 1
GASTROINTESTINAL NEGATIVE: 1

## 2017-11-10 NOTE — PROGRESS NOTES
Subjective:   Yoli Carmichael is a 84 y.o. female who presents todayFor evaluation of palpitations. She has infrequent episodes of feeling very lightheaded and a sensation as though something is passing in front of her eyes. She then develops palpitations. She was able to count her pulse on at least one of these episodes and noticed it to be in the 160-180 beat per minute range. She's had no deacon syncope.  There is no history of known MI, chest pain or exercise intolerance. She is not a smoker. The patient had a recent stress echo that was negative for ischemia.  Echo 2015 revealed EF of 60% and mild mitral insufficiency.  Past Medical History:   Diagnosis Date   • Arrhythmia    • Arthritis     knees   • Bronchitis     long time ago   • CATARACT     no surgery yet   • Dry eyes, bilateral 3/27/2014   • Osteopenia of the elderly 3/27/2014   • Pneumonia     long time ago     Past Surgical History:   Procedure Laterality Date   • KNEE ARTHROPLASTY TOTAL  9/3/2013    Performed by Ramesh Styles M.D. at SURGERY [facility] ORS   • TONSILLECTOMY       Family History   Problem Relation Age of Onset   • Lung Disease Mother    • Heart Disease Father    • Lung Disease Father      Emphysema   • Cancer Maternal Aunt      Great Aunt and Cousin Ovarian CA   • Hypertension Maternal Aunt    • Cancer Daughter      Lung and Brain CA   • Cancer Daughter      Pituitary tumor     History   Smoking Status   • Never Smoker   Smokeless Tobacco   • Never Used     Comment: Pt exposed to second hand smoker     Allergies   Allergen Reactions   • Other Environmental      Seasonal allergies     Outpatient Encounter Prescriptions as of 11/10/2017   Medication Sig Dispense Refill   • CycloSPORINE (RESTASIS OP) by Ophthalmic route.     • losartan (COZAAR) 25 MG Tab Take 1 Tab by mouth every day. 30 Tab 6   • coenzyme Q-10 30 MG capsule Take 300 mg by mouth 2X A WEEK.     • ketotifen (ZADITOR) 0.025 % ophthalmic solution Place 1 Drop in both eyes  "2 times a day.     • B Complex-C (SUPER B COMPLEX PO) Take  by mouth.     • Glycerin-Polysorbate 80 (REFRESH DRY EYE THERAPY) 1-1 % SOLN 1 Drop by Ophthalmic route 4 times a day.     • Calcium-Vitamin D-Vitamin K (CALCIUM + D) 500-1000-40 MG-UNT-MCG CHEW Take  by mouth 3 times a day.     • Multiple Vitamins-Minerals (PRESERVISION/LUTEIN PO) Take  by mouth 2 Times a Day.     • Cholecalciferol (VITAMIN D) 2000 UNITS CAPS Take 2,000 Units by mouth every day.       No facility-administered encounter medications on file as of 11/10/2017.      Review of Systems   Constitutional: Negative.    HENT: Negative.    Respiratory: Negative.    Cardiovascular: Positive for palpitations.   Gastrointestinal: Negative.    Musculoskeletal: Negative.    Skin: Negative.    Neurological: Positive for dizziness. Negative for loss of consciousness.   Endo/Heme/Allergies: Negative.         Objective:   /80   Pulse 76   Ht 1.6 m (5' 3\")   Wt 69.6 kg (153 lb 6.4 oz)   SpO2 96%   BMI 27.17 kg/m²     Physical Exam   Constitutional: She is oriented to person, place, and time. She appears well-developed and well-nourished. No distress.   HENT:   Head: Atraumatic.   Eyes: Conjunctivae and EOM are normal. Pupils are equal, round, and reactive to light.   Neck: Neck supple. No JVD present.   Cardiovascular: Normal rate and regular rhythm.    Murmur heard.   Systolic murmur is present with a grade of 1/6   Pulmonary/Chest: Effort normal and breath sounds normal. No respiratory distress. She has no wheezes. She has no rales.   Abdominal: Soft. There is no tenderness.   Musculoskeletal: She exhibits no edema.   Neurological: She is alert and oriented to person, place, and time.   Skin: Skin is warm and dry. She is not diaphoretic.       Assessment:     1. Cardiac murmur  HOLTER MONITOR / EVENT RECORDER   2. Palpitations  HOLTER MONITOR / EVENT RECORDER   3. Essential hypertension, benign  HOLTER MONITOR / EVENT RECORDER   4. Non-rheumatic " mitral regurgitation         Medical Decision Making:  Today's Assessment / Status / Plan:   The patient describes episodes of presyncope associated with rapid heart beating. Suspect she is either having PSVT or PAF. The episodes are infrequent and not related to any activity. She will need long-term monitoring and a SEEQ monitor will be ordered.  Her blood pressure by my reading is consistently 162 systolic. I think she should does have hypertension and she'll be started on a small dose of losartan at 25 mg a day. The patient will monitor blood pressures at home and, in about a month for reevaluation.

## 2017-11-13 ENCOUNTER — TELEPHONE (OUTPATIENT)
Dept: CARDIOLOGY | Facility: MEDICAL CENTER | Age: 82
End: 2017-11-13

## 2017-11-13 DIAGNOSIS — R55 POSTURAL DIZZINESS WITH PRESYNCOPE: ICD-10-CM

## 2017-11-13 DIAGNOSIS — R42 POSTURAL DIZZINESS WITH PRESYNCOPE: ICD-10-CM

## 2017-11-13 DIAGNOSIS — R00.2 PALPITATIONS: ICD-10-CM

## 2017-11-13 NOTE — TELEPHONE ENCOUNTER
----- Message from Rohini Hilario, Med Ass't sent at 11/13/2017  2:46 PM PST -----  Regarding: Seeq monitor needs to be changed to a Zio patch   CORINA Elder ordered this monitor for the patient last Friday. It needs to be changed due to the patient's insurance. The only monitor that [facility] will cover besides a holter is a Zio patch.    Thanks,    SK

## 2017-12-11 ENCOUNTER — OFFICE VISIT (OUTPATIENT)
Dept: CARDIOLOGY | Facility: MEDICAL CENTER | Age: 82
End: 2017-12-11
Payer: MEDICARE

## 2017-12-11 VITALS
HEIGHT: 64 IN | OXYGEN SATURATION: 95 % | SYSTOLIC BLOOD PRESSURE: 158 MMHG | DIASTOLIC BLOOD PRESSURE: 76 MMHG | HEART RATE: 74 BPM

## 2017-12-11 DIAGNOSIS — I34.0 NON-RHEUMATIC MITRAL REGURGITATION: ICD-10-CM

## 2017-12-11 DIAGNOSIS — E78.5 DYSLIPIDEMIA: ICD-10-CM

## 2017-12-11 DIAGNOSIS — I10 ESSENTIAL HYPERTENSION, BENIGN: Primary | ICD-10-CM

## 2017-12-11 DIAGNOSIS — R00.2 PALPITATIONS: ICD-10-CM

## 2017-12-11 PROCEDURE — 99214 OFFICE O/P EST MOD 30 MIN: CPT | Performed by: NURSE PRACTITIONER

## 2017-12-11 RX ORDER — LOSARTAN POTASSIUM 50 MG/1
50 TABLET ORAL EVERY EVENING
Qty: 90 TAB | Refills: 3 | Status: SHIPPED | OUTPATIENT
Start: 2017-12-11 | End: 2018-01-17

## 2017-12-11 ASSESSMENT — ENCOUNTER SYMPTOMS
CLAUDICATION: 0
SHORTNESS OF BREATH: 0
MYALGIAS: 0
DIZZINESS: 0
ABDOMINAL PAIN: 0
BACK PAIN: 1
WEAKNESS: 0
COUGH: 0
ORTHOPNEA: 0
PALPITATIONS: 0
PND: 0

## 2017-12-11 NOTE — PROGRESS NOTES
"Subjective:   Yoli  \"Duong\" Anca Carmichael is a 84 y.o. female who presents today To follow-up on high blood pressure and medications. She was last seen on November 10, 2017 by Dr. Velasquez at which time she was complaining of an episodes of a darkness going across her vision followed by palpitations and lightheadedness.    He ordered a Zio Patch which was not covered by her insurance therefore was changed to Holter monitor. The Holter is scheduled for December 27, 2017. She has not had any further episodes.    Her blood pressure was elevated at her last appointment therefore he added losartan 25 mg daily. She not been on any prescription medicines previous. She states her legs feel weak when she walks to her mailbox. She admits that she has not been exercising.    She denies any chest tightness, heaviness or pressure. No shortness of breath. No ankle edema.    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    Past Medical History:   Diagnosis Date   • Arrhythmia    • Arthritis     knees   • Bronchitis     long time ago   • CATARACT     no surgery yet   • Dry eyes, bilateral 3/27/2014   • Osteopenia of the elderly 3/27/2014   • Pneumonia     long time ago     Past Surgical History:   Procedure Laterality Date   • KNEE ARTHROPLASTY TOTAL  9/3/2013    Performed by Ramesh Styles M.D. at SURGERY [facility] ORS   • TONSILLECTOMY       Family History   Problem Relation Age of Onset   • Lung Disease Mother    • Heart Disease Father 54     MI   • Lung Disease Father      Emphysema   • Cancer Maternal Aunt      Great Aunt and Cousin Ovarian CA   • Hypertension Maternal Aunt    • Cancer Daughter      Lung and Brain CA   • Cancer Daughter      Pituitary tumor     History   Smoking Status   • Never Smoker   Smokeless Tobacco   • Never Used     Comment: Pt exposed to second hand smoker     Allergies   Allergen Reactions   • Other Environmental      Seasonal allergies " "    Outpatient Encounter Prescriptions as of 12/11/2017   Medication Sig Dispense Refill   • TURMERIC PO Take  by mouth.     • losartan (COZAAR) 50 MG Tab Take 1 Tab by mouth every evening. 90 Tab 3   • CycloSPORINE (RESTASIS OP) by Ophthalmic route.     • coenzyme Q-10 30 MG capsule Take 300 mg by mouth 2X A WEEK.     • ketotifen (ZADITOR) 0.025 % ophthalmic solution Place 1 Drop in both eyes 2 times a day.     • B Complex-C (SUPER B COMPLEX PO) Take  by mouth.     • Glycerin-Polysorbate 80 (REFRESH DRY EYE THERAPY) 1-1 % SOLN 1 Drop by Ophthalmic route 4 times a day.     • Calcium-Vitamin D-Vitamin K (CALCIUM + D) 500-1000-40 MG-UNT-MCG CHEW Take  by mouth 3 times a day.     • Multiple Vitamins-Minerals (PRESERVISION/LUTEIN PO) Take  by mouth 2 Times a Day.     • Cholecalciferol (VITAMIN D) 2000 UNITS CAPS Take 2,000 Units by mouth every day.     • [DISCONTINUED] losartan (COZAAR) 25 MG Tab Take 1 Tab by mouth every day. 30 Tab 6     No facility-administered encounter medications on file as of 12/11/2017.      Review of Systems   Constitutional: Negative for malaise/fatigue.   Respiratory: Negative for cough and shortness of breath.    Cardiovascular: Negative for chest pain, palpitations, orthopnea, claudication, leg swelling and PND.   Gastrointestinal: Negative for abdominal pain.   Musculoskeletal: Positive for back pain (chronic bulging disc). Negative for myalgias.   Neurological: Negative for dizziness and weakness.        Objective:   /76   Pulse 74   Ht 1.626 m (5' 4\")   SpO2 95%     Physical Exam   Constitutional: She is oriented to person, place, and time. She appears well-developed and well-nourished.   HENT:   Head: Normocephalic.   Eyes: Conjunctivae are normal.   Neck: No JVD present. No thyromegaly present.   Cardiovascular: Normal rate, regular rhythm, S1 normal and S2 normal.  Exam reveals no gallop, no S3, no S4 and no friction rub.    Murmur heard.   Systolic (fifth intercostal space " at the midclavicular line a soft 1/6 murmur.) murmur is present   Pulmonary/Chest: Effort normal and breath sounds normal. No respiratory distress. She has no wheezes. She has no rales.   Abdominal: Soft. Bowel sounds are normal. She exhibits no distension. There is no tenderness.   Musculoskeletal: She exhibits no edema.   Neurological: She is alert and oriented to person, place, and time.   Skin: Skin is warm and dry.   Psychiatric: She has a normal mood and affect.       Assessment:     1. Essential hypertension, benign     2. Palpitations     3. Non-rheumatic mitral regurgitation     4. Dyslipidemia  COMP METABOLIC PANEL    LIPID PROFILE       Medical Decision Making:  Today's Assessment / Status / Plan:     Hypertension: Her blood pressure is not controlled. We'll increase losartan to 50 mg daily. I've encouraged her to obtain a arm cuff and measure her blood pressure morning and evening.    Palpitations: No further episodes. I'm concerned about her risk of stroke being that she had a visual disturbance with her palpitations. I've recommended she take aspirin 81 mg. She is reluctant to do so due to bruising. I've left the decision up to her.    Mitral regurgitation: She has a soft 1/6 murmur. Asymptomatic.    Dyslipidemia: Last lipids done 2 years ago were slightly elevated. We'll check lipids and metabolic panel prior to follow-up appointment.    She will follow-up with myself in one month sooner if problems.    Collaborating Provider: Dr. Bhat.

## 2017-12-27 ENCOUNTER — NON-PROVIDER VISIT (OUTPATIENT)
Dept: CARDIOLOGY | Facility: MEDICAL CENTER | Age: 82
End: 2017-12-27
Payer: MEDICARE

## 2017-12-27 DIAGNOSIS — I47.10 SVT (SUPRAVENTRICULAR TACHYCARDIA): ICD-10-CM

## 2017-12-27 DIAGNOSIS — R00.2 PALPITATIONS: ICD-10-CM

## 2017-12-27 DIAGNOSIS — I49.3 PVC (PREMATURE VENTRICULAR CONTRACTION): ICD-10-CM

## 2017-12-28 ENCOUNTER — TELEPHONE (OUTPATIENT)
Dept: CARDIOLOGY | Facility: MEDICAL CENTER | Age: 82
End: 2017-12-28

## 2018-01-16 ENCOUNTER — HOSPITAL ENCOUNTER (OUTPATIENT)
Dept: LAB | Facility: MEDICAL CENTER | Age: 83
End: 2018-01-16
Attending: NURSE PRACTITIONER
Payer: MEDICARE

## 2018-01-16 ENCOUNTER — HOSPITAL ENCOUNTER (OUTPATIENT)
Dept: LAB | Facility: MEDICAL CENTER | Age: 83
End: 2018-01-16
Attending: INTERNAL MEDICINE
Payer: MEDICARE

## 2018-01-16 ENCOUNTER — TELEPHONE (OUTPATIENT)
Dept: MEDICAL GROUP | Facility: PHYSICIAN GROUP | Age: 83
End: 2018-01-16

## 2018-01-16 ENCOUNTER — TELEPHONE (OUTPATIENT)
Dept: CARDIOLOGY | Facility: MEDICAL CENTER | Age: 83
End: 2018-01-16

## 2018-01-16 DIAGNOSIS — E78.5 DYSLIPIDEMIA: ICD-10-CM

## 2018-01-16 DIAGNOSIS — R73.9 HYPERGLYCEMIA: ICD-10-CM

## 2018-01-16 DIAGNOSIS — E55.9 VITAMIN D DEFICIENCY: ICD-10-CM

## 2018-01-16 LAB
25(OH)D3 SERPL-MCNC: 42 NG/ML (ref 30–100)
ALBUMIN SERPL BCP-MCNC: 4.1 G/DL (ref 3.2–4.9)
ALBUMIN/GLOB SERPL: 1.4 G/DL
ALP SERPL-CCNC: 63 U/L (ref 30–99)
ALT SERPL-CCNC: 15 U/L (ref 2–50)
ANION GAP SERPL CALC-SCNC: 5 MMOL/L (ref 0–11.9)
AST SERPL-CCNC: 20 U/L (ref 12–45)
BILIRUB SERPL-MCNC: 0.7 MG/DL (ref 0.1–1.5)
BUN SERPL-MCNC: 16 MG/DL (ref 8–22)
CALCIUM SERPL-MCNC: 9.3 MG/DL (ref 8.5–10.5)
CHLORIDE SERPL-SCNC: 107 MMOL/L (ref 96–112)
CHOLEST SERPL-MCNC: 187 MG/DL (ref 100–199)
CO2 SERPL-SCNC: 27 MMOL/L (ref 20–33)
CREAT SERPL-MCNC: 0.88 MG/DL (ref 0.5–1.4)
EST. AVERAGE GLUCOSE BLD GHB EST-MCNC: 120 MG/DL
GLOBULIN SER CALC-MCNC: 2.9 G/DL (ref 1.9–3.5)
GLUCOSE SERPL-MCNC: 99 MG/DL (ref 65–99)
HBA1C MFR BLD: 5.8 % (ref 0–5.6)
HDLC SERPL-MCNC: 50 MG/DL
LDLC SERPL CALC-MCNC: 100 MG/DL
POTASSIUM SERPL-SCNC: 4.2 MMOL/L (ref 3.6–5.5)
PROT SERPL-MCNC: 7 G/DL (ref 6–8.2)
SODIUM SERPL-SCNC: 139 MMOL/L (ref 135–145)
TRIGL SERPL-MCNC: 187 MG/DL (ref 0–149)

## 2018-01-16 PROCEDURE — 36415 COLL VENOUS BLD VENIPUNCTURE: CPT

## 2018-01-16 PROCEDURE — 83036 HEMOGLOBIN GLYCOSYLATED A1C: CPT

## 2018-01-16 PROCEDURE — 82306 VITAMIN D 25 HYDROXY: CPT

## 2018-01-16 PROCEDURE — 80061 LIPID PANEL: CPT

## 2018-01-16 PROCEDURE — 80053 COMPREHEN METABOLIC PANEL: CPT

## 2018-01-17 ENCOUNTER — TELEPHONE (OUTPATIENT)
Dept: CARDIOLOGY | Facility: MEDICAL CENTER | Age: 83
End: 2018-01-17

## 2018-01-17 DIAGNOSIS — I10 ESSENTIAL HYPERTENSION, BENIGN: ICD-10-CM

## 2018-01-17 RX ORDER — LOSARTAN POTASSIUM 50 MG/1
TABLET ORAL
Qty: 60 TAB | Refills: 11 | OUTPATIENT
Start: 2018-01-17 | End: 2018-01-25 | Stop reason: SDUPTHER

## 2018-01-17 NOTE — TELEPHONE ENCOUNTER
Patient states her BP has been consistently high for the past 2 weeks, day and night.  She states there is quite a difference in readings in each arm with her new cuff.  She is taking Losartan 50mg at night.  She had the stomach flu a few weeks ago and has felt poorly ever since.  Her next FV is soon, 1/25 and she will bring her new BP cuff.  She will see her PCP regarding her weakness, shaky feeling, poor appetite and dizziness.  She is trying hard to take fluids and eat.  To JAVIER APN to please advise on consistent high -180's.

## 2018-01-17 NOTE — TELEPHONE ENCOUNTER
STEPHANIE Cruz R.N.   Caller: Unspecified (Yesterday,  4:15 PM)             I monitored the patient has been eating soup since she has been sick. If so the sodium may have raised her blood pressure. Whatever take losartan 50 mg twice a day and monitor blood pressure on the highest arm in the morning and in the evening. Call back in a few days with blood pressure readings.    Previous Messages         Patient informed.  New Losartan dose called in.

## 2018-01-17 NOTE — TELEPHONE ENCOUNTER
VOICEMAIL  1. Caller Name: Yoli                     Call Back Number: [phone] (home)     2. Message: Patient LVM stating her BP has been elevated and she would like to schedule and appointment.     3. Patient approves office to leave a detailed voicemail/MyChart message: yes

## 2018-01-17 NOTE — TELEPHONE ENCOUNTER
----- Message from Edith Jain sent at 1/16/2018  3:29 PM PST -----  Regarding: erratic b/p, tired, weak, lightheaded, no appetite  Contact: [phone]  JAVIER/mary    Pt calling to report erratic b/p since she started taking losartan.    At 3:30pm today 163/91, pulse 79.  On 1/13 b/p 182/82, pulse 86.  Pt is very tired and lightheaded, somewhat dizzy, feels weak, shaky, has no appetitie for about 2 weeks.  Please call Yoli at:  [phone]

## 2018-01-18 ENCOUNTER — TELEPHONE (OUTPATIENT)
Dept: CARDIOLOGY | Facility: MEDICAL CENTER | Age: 83
End: 2018-01-18

## 2018-01-18 PROCEDURE — 0298T PR EXT ECG > 48HR TO 21 DAY REVIEW AND INTERPRETATN: CPT | Performed by: INTERNAL MEDICINE

## 2018-01-18 PROCEDURE — 0296T PR EXT ECG > 48HR TO 21 DAY RCRD W/CONECT INTL RCRD: CPT | Performed by: INTERNAL MEDICINE

## 2018-01-18 RX ORDER — METOPROLOL SUCCINATE 25 MG/1
25 TABLET, EXTENDED RELEASE ORAL DAILY
Qty: 90 TAB | Refills: 3 | OUTPATIENT
Start: 2018-01-18 | End: 2019-01-19 | Stop reason: SDUPTHER

## 2018-01-18 NOTE — TELEPHONE ENCOUNTER
Dr. Velasquez reviewed recent [facility] report. Per Dr. Velasquez: documents runs of SVT, longest=36 min. Pt. To start Toprol 25mg QD.  Called pt. To advise. RX called to [facility]. Pt. Has FV 1-25-18 with Navdeep.

## 2018-01-25 ENCOUNTER — OFFICE VISIT (OUTPATIENT)
Dept: CARDIOLOGY | Facility: MEDICAL CENTER | Age: 83
End: 2018-01-25
Payer: MEDICARE

## 2018-01-25 VITALS
SYSTOLIC BLOOD PRESSURE: 142 MMHG | HEIGHT: 64 IN | HEART RATE: 68 BPM | DIASTOLIC BLOOD PRESSURE: 76 MMHG | OXYGEN SATURATION: 96 % | WEIGHT: 151 LBS | BODY MASS INDEX: 25.78 KG/M2

## 2018-01-25 DIAGNOSIS — R00.2 PALPITATIONS: ICD-10-CM

## 2018-01-25 DIAGNOSIS — I47.10 SVT (SUPRAVENTRICULAR TACHYCARDIA): ICD-10-CM

## 2018-01-25 DIAGNOSIS — I34.0 NON-RHEUMATIC MITRAL REGURGITATION: ICD-10-CM

## 2018-01-25 DIAGNOSIS — I10 ESSENTIAL HYPERTENSION, BENIGN: ICD-10-CM

## 2018-01-25 PROCEDURE — 99214 OFFICE O/P EST MOD 30 MIN: CPT | Performed by: NURSE PRACTITIONER

## 2018-01-25 RX ORDER — LOSARTAN POTASSIUM 50 MG/1
TABLET ORAL
Qty: 180 TAB | Refills: 3 | Status: SHIPPED | OUTPATIENT
Start: 2018-01-25 | End: 2019-01-15 | Stop reason: SDUPTHER

## 2018-01-25 ASSESSMENT — ENCOUNTER SYMPTOMS
MYALGIAS: 0
ABDOMINAL PAIN: 0
BACK PAIN: 1
PND: 0
CLAUDICATION: 0
ORTHOPNEA: 0
COUGH: 0
DIZZINESS: 0
SHORTNESS OF BREATH: 0
PALPITATIONS: 1
WEAKNESS: 0

## 2018-01-25 NOTE — PROGRESS NOTES
"Subjective:   Yoli \"Duong\"  Anca Carmichael is a 84 y.o. female who presents today to follow-up on palpitations. She had been seen by Dr. Velasquez ordered a Zio Patch that she had episodes of palpitations. The monitor showed SVT therefore she was called and started on Toprol-XL 25 mg daily.    Since starting this medication only a few days ago, she's not had any palpitations. She rarely feels them.    She has not been monitoring her blood pressure at home. She does complain of some lightheadedness and wonders if it is caused by the medications.    She feels generally well and denies any chest tightness, heaviness or pressure. No shortness of breath or ankle swelling.    Past Medical History:   Diagnosis Date   • Arrhythmia    • Arthritis     knees   • Bronchitis     long time ago   • CATARACT     no surgery yet   • Dry eyes, bilateral 3/27/2014   • Osteopenia of the elderly 3/27/2014   • Pneumonia     long time ago     Past Surgical History:   Procedure Laterality Date   • KNEE ARTHROPLASTY TOTAL  9/3/2013    Performed by Ramesh Styles M.D. at SURGERY [facility] ORS   • TONSILLECTOMY       Family History   Problem Relation Age of Onset   • Lung Disease Mother    • Heart Disease Father 54     MI   • Lung Disease Father      Emphysema   • Cancer Maternal Aunt      Great Aunt and Cousin Ovarian CA   • Hypertension Maternal Aunt    • Cancer Daughter      Lung and Brain CA   • Cancer Daughter      Pituitary tumor     History   Smoking Status   • Never Smoker   Smokeless Tobacco   • Never Used     Comment: Pt exposed to second hand smoker     Allergies   Allergen Reactions   • Other Environmental      Seasonal allergies     Outpatient Encounter Prescriptions as of 1/25/2018   Medication Sig Dispense Refill   • losartan (COZAAR) 50 MG Tab Take 1 tablet twice daily. 180 Tab 3   • metoprolol SR (TOPROL XL) 25 MG TABLET SR 24 HR Take 1 Tab by mouth every day. 90 Tab 3   • TURMERIC PO Take  by mouth.     • CycloSPORINE (RESTASIS " "OP) by Ophthalmic route 2 Times a Day.     • B Complex-C (SUPER B COMPLEX PO) Take  by mouth.     • Calcium-Vitamin D-Vitamin K (CALCIUM + D) 500-1000-40 MG-UNT-MCG CHEW Take 1,000 mg by mouth every day.     • Multiple Vitamins-Minerals (PRESERVISION/LUTEIN PO) Take  by mouth 2 Times a Day.     • Cholecalciferol (VITAMIN D) 2000 UNITS CAPS Take 6,000 Units by mouth every day.     • [DISCONTINUED] losartan (COZAAR) 50 MG Tab Take 1 tablet twice daily. (Patient taking differently: Take 50 mg by mouth 2 Times a Day. Take 1 tablet twice daily.) 60 Tab 11   • coenzyme Q-10 30 MG capsule Take 300 mg by mouth 2X A WEEK.     • ketotifen (ZADITOR) 0.025 % ophthalmic solution Place 1 Drop in both eyes 2 times a day.     • Glycerin-Polysorbate 80 (REFRESH DRY EYE THERAPY) 1-1 % SOLN 1 Drop by Ophthalmic route 4 times a day.       No facility-administered encounter medications on file as of 1/25/2018.      Review of Systems   Constitutional: Negative for malaise/fatigue.   Respiratory: Negative for cough and shortness of breath.    Cardiovascular: Positive for palpitations (rare.). Negative for chest pain, orthopnea, claudication, leg swelling and PND.   Gastrointestinal: Negative for abdominal pain.   Musculoskeletal: Positive for back pain (chronic bulging disc). Negative for myalgias.   Neurological: Negative for dizziness and weakness.        Objective:   /76   Pulse 68   Ht 1.626 m (5' 4\")   Wt 68.5 kg (151 lb)   SpO2 96%   BMI 25.92 kg/m²     Physical Exam   Constitutional: She is oriented to person, place, and time. She appears well-developed and well-nourished.   HENT:   Head: Normocephalic.   Eyes: Conjunctivae are normal.   Neck: No JVD present. No thyromegaly present.   Cardiovascular: Normal rate, regular rhythm, S1 normal and S2 normal.  Exam reveals no gallop, no S3, no S4 and no friction rub.    Murmur heard.   Systolic (fifth intercostal space at the midclavicular line a soft 1/6 murmur.) murmur is " present   Pulmonary/Chest: Effort normal and breath sounds normal. No respiratory distress. She has no wheezes. She has no rales.   Abdominal: Soft. Bowel sounds are normal. She exhibits no distension. There is no tenderness.   Musculoskeletal: She exhibits no edema.   Neurological: She is alert and oriented to person, place, and time.   Skin: Skin is warm and dry.   Psychiatric: She has a normal mood and affect.     Results for LARRY BAILEY (MRN [id number]) as of 1/25/2018 11:16   Ref. Range 1/16/2018 09:22   Sodium Latest Ref Range: 135 - 145 mmol/L 139   Potassium Latest Ref Range: 3.6 - 5.5 mmol/L 4.2   Chloride Latest Ref Range: 96 - 112 mmol/L 107   Co2 Latest Ref Range: 20 - 33 mmol/L 27   Anion Gap Latest Ref Range: 0.0 - 11.9  5.0   Glucose Latest Ref Range: 65 - 99 mg/dL 99   Bun Latest Ref Range: 8 - 22 mg/dL 16   Creatinine Latest Ref Range: 0.50 - 1.40 mg/dL 0.88   GFR If  Latest Ref Range: >60 mL/min/1.73 m 2 >60   GFR If Non  Latest Ref Range: >60 mL/min/1.73 m 2 >60   Calcium Latest Ref Range: 8.5 - 10.5 mg/dL 9.3   AST(SGOT) Latest Ref Range: 12 - 45 U/L 20   ALT(SGPT) Latest Ref Range: 2 - 50 U/L 15   Alkaline Phosphatase Latest Ref Range: 30 - 99 U/L 63   Total Bilirubin Latest Ref Range: 0.1 - 1.5 mg/dL 0.7   Albumin Latest Ref Range: 3.2 - 4.9 g/dL 4.1   Total Protein Latest Ref Range: 6.0 - 8.2 g/dL 7.0   Globulin Latest Ref Range: 1.9 - 3.5 g/dL 2.9   A-G Ratio Latest Units: g/dL 1.4   Cholesterol,Tot Latest Ref Range: 100 - 199 mg/dL 187   Triglycerides Latest Ref Range: 0 - 149 mg/dL 187 (H)   HDL Latest Ref Range: >=40 mg/dL 50   LDL Latest Ref Range: <100 mg/dL 100 (H)       December 27, 2017: Zio Patch shows episodes of SVT rate 130 to one 50 bpm.    Assessment:     1. Palpitations  ZIO PATCH MONITOR   2. SVT (supraventricular tachycardia) (CMS-HCC)     3. Essential hypertension, benign  losartan (COZAAR) 50 MG Tab   4. Non-rheumatic mitral  regurgitation         Medical Decision Making:  Today's Assessment / Status / Plan:     Palpitations: Patient really feels palpitations but had some previously. She's not had any since starting metoprolol.    SVT: Seen on Zio Patch monitor. She will take the Toprol to suppress the SVT. If she gets into a rapid, sustained rhythm she may take more metoprolol. Use of the EMS system for sustained SVT was reviewed with the patient.    Hypertension: Her blood pressures but higher than I would like it. She's only been on the Toprol for a few days. I hope that her blood pressure will come down after she has been on the medicine for a week or 2. I would like her to monitor her blood pressure. If her blood pressure is not improved we will increase the losartan to 100 mg.    Mitral regurgitation: She has a murmur but is essentially asymptomatic.    She will follow-up in 6 months with Dr. Velasquez or myself. She will send us a message using my chart with her blood pressure in a few weeks. She will follow-up sooner if problems.    Collaborating Provider: Dr. Allyssa Rose.

## 2018-01-30 ENCOUNTER — PATIENT OUTREACH (OUTPATIENT)
Dept: HEALTH INFORMATION MANAGEMENT | Facility: OTHER | Age: 83
End: 2018-01-30

## 2018-01-30 NOTE — PROGRESS NOTES
1. Attempt #: 1    2. HealthConnect Verified: yes    3. Verify PCP: yes    4. Care Team Updated:       •   DME Company (gait device, O2, CPAP, etc.): YES       •   Other Specialists (eye doctor, derm, GYN, cardiology, endo, etc): YES    5.  Reviewed/Updated the following with patient:       •   Communication Preference Obtained? YES       •   Preferred Pharmacy? YES       •   Preferred Lab? YES       •   Family History (document living status of immediate family members and if + hx of cancer, diabetes, hypertension, hyperlipidemia, heart attack, stroke) YES. Was Abstract Encounter opened and chart updated? YES    6. Byliner Activation: already active    7. Byliner Daisha: no    8. Annual Wellness Visit Scheduling  Scheduling Status:Scheduled      9. Care Gap Scheduling (Attempt to Schedule EACH Overdue Care Gap!)     Health Maintenance Due   Topic Date Due   • IMM DTaP/Tdap/Td Vaccine (1 - Tdap) 08/21/1952   • Annual Wellness Visit  03/28/2015   • IMM PNEUMOCOCCAL 65+ (ADULT) LOW/MEDIUM RISK SERIES (2 of 2 - PPSV23) 03/09/2016   • IMM INFLUENZA (1) 09/01/2017   • MAMMOGRAM  01/19/2018        Scheduled patient for Annual Wellness Visit    10. Patient was advised: “This is a free wellness visit. The provider will screen for medical conditions to help you stay healthy. If you have other concerns to address you may be asked to discuss these at a separate visit or there may be an additional fee.”     11. Patient was informed to arrive 15 min prior to their scheduled appointment and bring in their medication bottles.

## 2018-01-31 ENCOUNTER — HOSPITAL ENCOUNTER (OUTPATIENT)
Dept: RADIOLOGY | Facility: MEDICAL CENTER | Age: 83
End: 2018-01-31
Attending: FAMILY MEDICINE
Payer: MEDICARE

## 2018-01-31 DIAGNOSIS — Z12.31 SCREENING MAMMOGRAM, ENCOUNTER FOR: ICD-10-CM

## 2018-01-31 PROCEDURE — 77067 SCR MAMMO BI INCL CAD: CPT

## 2018-02-16 ENCOUNTER — TELEPHONE (OUTPATIENT)
Dept: CARDIOLOGY | Facility: MEDICAL CENTER | Age: 83
End: 2018-02-16

## 2018-02-16 NOTE — TELEPHONE ENCOUNTER
----- Message from Jeanette Arzate sent at 2/15/2018 11:11 AM PST -----  Regarding: Patient has questions about Losartan  JAVIER/Gia    Patient has questions about her Losartan and wants a call back at [phone].

## 2018-02-17 NOTE — TELEPHONE ENCOUNTER
Patient wanted to verify that her Losartan is total 100mg/d and yes this is correct.  She verbalizes understanding.

## 2018-03-07 ENCOUNTER — APPOINTMENT (RX ONLY)
Dept: URBAN - METROPOLITAN AREA CLINIC 4 | Facility: CLINIC | Age: 83
Setting detail: DERMATOLOGY
End: 2018-03-07

## 2018-03-07 DIAGNOSIS — D18.0 HEMANGIOMA: ICD-10-CM

## 2018-03-07 DIAGNOSIS — L20.89 OTHER ATOPIC DERMATITIS: ICD-10-CM

## 2018-03-07 DIAGNOSIS — D22 MELANOCYTIC NEVI: ICD-10-CM

## 2018-03-07 DIAGNOSIS — L81.4 OTHER MELANIN HYPERPIGMENTATION: ICD-10-CM

## 2018-03-07 DIAGNOSIS — Z85.828 PERSONAL HISTORY OF OTHER MALIGNANT NEOPLASM OF SKIN: ICD-10-CM

## 2018-03-07 DIAGNOSIS — L82.1 OTHER SEBORRHEIC KERATOSIS: ICD-10-CM

## 2018-03-07 PROBLEM — I10 ESSENTIAL (PRIMARY) HYPERTENSION: Status: ACTIVE | Noted: 2018-03-07

## 2018-03-07 PROBLEM — L20.84 INTRINSIC (ALLERGIC) ECZEMA: Status: ACTIVE | Noted: 2018-03-07

## 2018-03-07 PROBLEM — D18.01 HEMANGIOMA OF SKIN AND SUBCUTANEOUS TISSUE: Status: ACTIVE | Noted: 2018-03-07

## 2018-03-07 PROBLEM — D22.39 MELANOCYTIC NEVI OF OTHER PARTS OF FACE: Status: ACTIVE | Noted: 2018-03-07

## 2018-03-07 PROCEDURE — 99213 OFFICE O/P EST LOW 20 MIN: CPT

## 2018-03-07 PROCEDURE — ? COUNSELING

## 2018-03-07 ASSESSMENT — LOCATION SIMPLE DESCRIPTION DERM
LOCATION SIMPLE: LEFT PRETIBIAL REGION
LOCATION SIMPLE: RIGHT UPPER BACK
LOCATION SIMPLE: LEFT POSTERIOR UPPER ARM
LOCATION SIMPLE: ABDOMEN
LOCATION SIMPLE: RIGHT HAND
LOCATION SIMPLE: RIGHT FOREARM
LOCATION SIMPLE: LEFT FOREHEAD
LOCATION SIMPLE: RIGHT LOWER BACK
LOCATION SIMPLE: LEFT CHEEK
LOCATION SIMPLE: RIGHT UPPER ARM
LOCATION SIMPLE: LEFT THIGH
LOCATION SIMPLE: LEFT UPPER ARM
LOCATION SIMPLE: LEFT HAND
LOCATION SIMPLE: LEFT FOREARM
LOCATION SIMPLE: RIGHT POSTERIOR UPPER ARM
LOCATION SIMPLE: RIGHT PRETIBIAL REGION
LOCATION SIMPLE: RIGHT THIGH

## 2018-03-07 ASSESSMENT — LOCATION DETAILED DESCRIPTION DERM
LOCATION DETAILED: LEFT PROXIMAL DORSAL FOREARM
LOCATION DETAILED: RIGHT RADIAL DORSAL HAND
LOCATION DETAILED: LEFT ANTERIOR PROXIMAL THIGH
LOCATION DETAILED: LEFT ANTERIOR PROXIMAL UPPER ARM
LOCATION DETAILED: LEFT ULNAR DORSAL HAND
LOCATION DETAILED: LEFT MEDIAL FOREHEAD
LOCATION DETAILED: RIGHT PROXIMAL DORSAL FOREARM
LOCATION DETAILED: RIGHT SUPERIOR MEDIAL UPPER BACK
LOCATION DETAILED: RIGHT INFERIOR MEDIAL UPPER BACK
LOCATION DETAILED: RIGHT INFERIOR MEDIAL MIDBACK
LOCATION DETAILED: RIGHT ANTERIOR DISTAL UPPER ARM
LOCATION DETAILED: EPIGASTRIC SKIN
LOCATION DETAILED: RIGHT PROXIMAL PRETIBIAL REGION
LOCATION DETAILED: LEFT CENTRAL MALAR CHEEK
LOCATION DETAILED: RIGHT PROXIMAL POSTERIOR UPPER ARM
LOCATION DETAILED: RIGHT ANTERIOR DISTAL THIGH
LOCATION DETAILED: LEFT PROXIMAL POSTERIOR UPPER ARM
LOCATION DETAILED: LEFT PROXIMAL PRETIBIAL REGION

## 2018-03-07 ASSESSMENT — LOCATION ZONE DERM
LOCATION ZONE: FACE
LOCATION ZONE: ARM
LOCATION ZONE: TRUNK
LOCATION ZONE: HAND
LOCATION ZONE: LEG

## 2018-04-04 ENCOUNTER — PATIENT OUTREACH (OUTPATIENT)
Dept: HEALTH INFORMATION MANAGEMENT | Facility: OTHER | Age: 83
End: 2018-04-04

## 2018-04-04 NOTE — PROGRESS NOTES
1. Attempt #: FINAL  2. HealthConnect Verified: no    3. Verify PCP: yes    4. Care Team Updated:       •   DME Company (gait device, O2, CPAP, etc.): NO       •   Other Specialists (eye doctor, derm, GYN, cardiology, endo, etc): YES    5.  Reviewed/Updated the following with patient:       •   Communication Preference Obtained? YES       •   Preferred Pharmacy? YES       •   Preferred Lab? YES       •   Family History (document living status of immediate family members and if + hx of cancer, diabetes, hypertension, hyperlipidemia, heart attack, stroke) YES. Was Abstract Encounter opened and chart updated? NO    6. TotalTakeout Activation: already active    7. TotalTakeout Daisha: yes    8. Annual Wellness Visit Scheduling  Scheduling Status:Scheduled      9. Care Gap Scheduling (Attempt to Schedule EACH Overdue Care Gap!)     Health Maintenance Due   Topic Date Due   • IMM DTaP/Tdap/Td Vaccine (1 - Tdap) 08/21/1952   • Annual Wellness Visit  03/28/2015   • IMM PNEUMOCOCCAL 65+ (ADULT) LOW/MEDIUM RISK SERIES (2 of 2 - PPSV23) 03/09/2016        Patient has completed   10. Patient was advised: “This is a free wellness visit. The provider will screen for medical conditions to help you stay healthy. If you have other concerns to address you may be asked to discuss these at a separate visit or there may be an additional fee.”     11. Patient was informed to arrive 15 min prior to their scheduled appointment and bring in their medication bottles.

## 2018-05-25 NOTE — PROGRESS NOTES
Outcome: Left Message    Please transfer to Patient Outreach Team at [phone] when patient returns call.    WebIZ Checked & Epic Updated:  yes    Attempt # 1

## 2018-06-19 ENCOUNTER — OFFICE VISIT (OUTPATIENT)
Dept: MEDICAL GROUP | Facility: PHYSICIAN GROUP | Age: 83
End: 2018-06-19
Payer: MEDICARE

## 2018-06-19 VITALS
SYSTOLIC BLOOD PRESSURE: 128 MMHG | DIASTOLIC BLOOD PRESSURE: 56 MMHG | BODY MASS INDEX: 25.78 KG/M2 | OXYGEN SATURATION: 95 % | HEIGHT: 64 IN | HEART RATE: 92 BPM | TEMPERATURE: 97.9 F | WEIGHT: 151 LBS

## 2018-06-19 DIAGNOSIS — H04.123 DRY EYES, BILATERAL: ICD-10-CM

## 2018-06-19 DIAGNOSIS — E78.5 DYSLIPIDEMIA: ICD-10-CM

## 2018-06-19 DIAGNOSIS — S42.301A CLOSED FRACTURE OF SHAFT OF RIGHT HUMERUS, UNSPECIFIED FRACTURE MORPHOLOGY, INITIAL ENCOUNTER: ICD-10-CM

## 2018-06-19 DIAGNOSIS — R79.89 LOW SERUM VITAMIN D: ICD-10-CM

## 2018-06-19 DIAGNOSIS — I10 ESSENTIAL HYPERTENSION, BENIGN: ICD-10-CM

## 2018-06-19 PROBLEM — S42.309A CLOSED FRACTURE OF SHAFT OF HUMERUS: Status: ACTIVE | Noted: 2018-06-19

## 2018-06-19 PROCEDURE — 99214 OFFICE O/P EST MOD 30 MIN: CPT | Performed by: FAMILY MEDICINE

## 2018-06-19 RX ORDER — AMOXICILLIN 875 MG/1
875 TABLET, COATED ORAL 2 TIMES DAILY
COMMUNITY
End: 2018-07-02

## 2018-06-19 RX ORDER — SULFAMETHOXAZOLE AND TRIMETHOPRIM 800; 160 MG/1; MG/1
1 TABLET ORAL 2 TIMES DAILY
COMMUNITY
End: 2018-07-02

## 2018-06-19 NOTE — ASSESSMENT & PLAN NOTE
This problem is new to me. Patient sees ophthalmology for this issue. She is currently on 2 different eyedrops to help with symptom management. She also reports a family history of macular degeneration but currently denies any symptoms personally.

## 2018-06-19 NOTE — ASSESSMENT & PLAN NOTE
This problem is new to me. Patient reports compliance with losartan 50 mg daily and metoprolol 25 mg daily. She currently denies any headaches is reporting some mild dizziness. Review of her blood pressure shows that her diastolic pressures of 56 today.

## 2018-06-19 NOTE — ASSESSMENT & PLAN NOTE
This problem is new to me. Patient has been evaluated in the remote past for elevated cholesterol but not recently.

## 2018-06-19 NOTE — ASSESSMENT & PLAN NOTE
This problem is new to me. Again patient reports that it has been a while since she has been evaluated for her vitamin D level. She does take supplemental vitamin D 3000 international units twice daily

## 2018-06-19 NOTE — PROGRESS NOTES
Subjective:   Yoli Carmichael is a 84 y.o. female here today for dry eyes, hypertension, fracture of the humerus, elevated lipids, low vitamin D    Dry eyes, bilateral  This problem is new to me. Patient sees ophthalmology for this issue. She is currently on 2 different eyedrops to help with symptom management. She also reports a family history of macular degeneration but currently denies any symptoms personally.    Essential hypertension, benign  This problem is new to me. Patient reports compliance with losartan 50 mg daily and metoprolol 25 mg daily. She currently denies any headaches is reporting some mild dizziness. Review of her blood pressure shows that her diastolic pressures of 56 today.    Closed fracture of shaft of humerus  This problem is new to me. Patient reports approximately 2 weeks ago she fell on concrete and fractured the upper part of her humerus along with getting a bruise on multiple parts of her body. She was seen in the ER immediately and evaluated and has been placed in a splint. She is also followed up with orthopedist on multiple occasions and states that she has been told she does not need surgery at this time.    Dyslipidemia  This problem is new to me. Patient has been evaluated in the remote past for elevated cholesterol but not recently.    Low serum vitamin D  This problem is new to me. Again patient reports that it has been a while since she has been evaluated for her vitamin D level. She does take supplemental vitamin D 3000 international units twice daily     Patient is here today to establish care    Current medicines (including changes today)  Current Outpatient Prescriptions   Medication Sig Dispense Refill   • sulfamethoxazole-trimethoprim (BACTRIM DS) 800-160 MG tablet Take 1 Tab by mouth 2 times a day.     • amoxicillin (AMOXIL) 875 MG tablet Take 875 mg by mouth 2 times a day.     • losartan (COZAAR) 50 MG Tab Take 1 tablet twice daily. 180 Tab 3   • metoprolol SR  "(TOPROL XL) 25 MG TABLET SR 24 HR Take 1 Tab by mouth every day. 90 Tab 3   • TURMERIC PO Take  by mouth.     • CycloSPORINE (RESTASIS OP) by Ophthalmic route 2 Times a Day.     • coenzyme Q-10 30 MG capsule Take 300 mg by mouth 2X A WEEK.     • ketotifen (ZADITOR) 0.025 % ophthalmic solution Place 1 Drop in both eyes 2 times a day.     • B Complex-C (SUPER B COMPLEX PO) Take  by mouth.     • Glycerin-Polysorbate 80 (REFRESH DRY EYE THERAPY) 1-1 % SOLN 1 Drop by Ophthalmic route 4 times a day.     • Calcium-Vitamin D-Vitamin K (CALCIUM + D) 500-1000-40 MG-UNT-MCG CHEW Take 1,000 mg by mouth every day.     • Multiple Vitamins-Minerals (PRESERVISION/LUTEIN PO) Take  by mouth 2 Times a Day.     • Cholecalciferol (VITAMIN D) 2000 UNITS CAPS Take 6,000 Units by mouth every day.       No current facility-administered medications for this visit.      She  has a past medical history of Arrhythmia; Arthritis; Bronchitis; CATARACT; Dry eyes, bilateral (3/27/2014); Osteopenia of the elderly (3/27/2014); and Pneumonia.    ROS   No chest pain, no shortness of breath, no abdominal pain  + Right arm pain     Objective:     Blood pressure 128/56, pulse 92, temperature 36.6 °C (97.9 °F), height 1.626 m (5' 4\"), weight 68.5 kg (151 lb), SpO2 95 %. Body mass index is 25.92 kg/m².   Physical Exam:  Alert, oriented in no acute distress.  Eye contact is good, speech goal directed, affect calm  HEENT: conjunctiva non-injected, sclera non-icteric.  Pinna normal. TM pearly gray.   Oral mucous membranes pink and moist with no lesions.  Neck No adenopathy or masses in the neck or supraclavicular regions.  Lungs: clear to auscultation bilaterally with good excursion.  CV: regular rate and rhythm.  Abdomen: soft, nontender, No CVAT  Ext: no edema, color normal, vascularity normal, temperature normal  MSK: Right arm in a waste sling with no noted swelling or erythema extending into the hand or lower part of the arm      Assessment and Plan: "   The following treatment plan was discussed   1. Closed fracture of shaft of right humerus, unspecified fracture morphology, initial encounter      Stable. Continue follow-up and recommendations from orthopedics as directed   2. Essential hypertension, benign      Stable. Continue current medication. Since patient is having some dizziness, asked her to follow-up with cardiology   3. Dry eyes, bilateral      Stable. Continue current medications; continue follow-up with ophthalmology as directed   4. Dyslipidemia      Stable. Consider ordering labs after patient has gone through a healing process for her shoulder   5. Low serum vitamin D      Stable. Continue supplementation for now; recheck labs in 3 months       Followup: Return in about 3 months (around 9/19/2018) for shoulder fx, Short.

## 2018-06-19 NOTE — ASSESSMENT & PLAN NOTE
This problem is new to me. Patient reports approximately 2 weeks ago she fell on concrete and fractured the upper part of her humerus along with getting a bruise on multiple parts of her body. She was seen in the ER immediately and evaluated and has been placed in a splint. She is also followed up with orthopedist on multiple occasions and states that she has been told she does not need surgery at this time.

## 2018-06-21 ENCOUNTER — TELEPHONE (OUTPATIENT)
Dept: CARDIOLOGY | Facility: MEDICAL CENTER | Age: 83
End: 2018-06-21

## 2018-06-21 NOTE — TELEPHONE ENCOUNTER
I am not worried about a diastolic of 61.  I am more concerned that her systolic blood pressures in the 140s where the goal is 120-130 systolic.  Have her continue on her current medication regimen and monitor blood pressure morning and evening after sitting resting for 5 minutes.    Does sound like she needs to hydrate more.  She can take any kind of liquid for hydration.  It does not have to be water.  It is a fallacy that things that contain caffeine are dehydrating.  She could take coffee as long as is not excessive.  Have her call back in 2 weeks with blood pressure readings.

## 2018-06-21 NOTE — TELEPHONE ENCOUNTER
Patient saw her PCP on 6/19 for lightheadedness and dizziness and was told that her diastolic of 58 is too low and can be the cause her dizziness and that it must be >60.  Her BP today is 144/61.  She admits to not being adequately hydrated, states she does not like water and will try flavored water.  She is on Losartan 50mg BID and Toprol 25mg at night.  To JAVIER YOU to please advise.

## 2018-06-21 NOTE — TELEPHONE ENCOUNTER
----- Message from Jeanette Arzate sent at 6/21/2018  3:15 PM PDT -----  Regarding: Problems with dizziness, lightheadedness and low diastolic  JAVIER/Gia    Patient is having problems with dizziness, lightheadedness and her diastolic this afternoon is 61. She wants a call back at [phone].

## 2018-06-22 NOTE — TELEPHONE ENCOUNTER
STEPHANIE Cruz routed conversation to You 4 minutes ago (4:53 PM)      STEPHANIE Cruz 6 minutes ago (4:51 PM)        I am not worried about a diastolic of 61.  I am more concerned that her systolic blood pressures in the 140s where the goal is 120-130 systolic.  Have her continue on her current medication regimen and monitor blood pressure morning and evening after sitting resting for 5 minutes.     Does sound like she needs to hydrate more.  She can take any kind of liquid for hydration.  It does not have to be water.  It is a fallacy that things that contain caffeine are dehydrating.  She could take coffee as long as is not excessive.  Have her call back in 2 weeks with blood pressure readings.         Documentation      Patient informed and agrees.

## 2018-06-28 ENCOUNTER — TELEPHONE (OUTPATIENT)
Dept: CARDIOLOGY | Facility: MEDICAL CENTER | Age: 83
End: 2018-06-28

## 2018-06-28 NOTE — TELEPHONE ENCOUNTER
"Call received from patient with BP's for the past week.  Bp's mainly 140's/60's, lowest 129/56, highest 149/59.  She continues to feel very dizzy, vision is impaired, she can't focus and her legs are weak.  She states she is sure it is from her Toprol and Losartan, she \"read all these side effects\" and does not want to take them.  She also read that she must be weaned off of them.  To JAVIER YOU to please advise.  "

## 2018-06-29 ENCOUNTER — PHYSICAL THERAPY (OUTPATIENT)
Dept: PHYSICAL THERAPY | Facility: REHABILITATION | Age: 83
End: 2018-06-29
Attending: ORTHOPAEDIC SURGERY
Payer: MEDICARE

## 2018-06-29 VITALS — HEART RATE: 83 BPM | DIASTOLIC BLOOD PRESSURE: 65 MMHG | OXYGEN SATURATION: 94 % | SYSTOLIC BLOOD PRESSURE: 137 MMHG

## 2018-06-29 DIAGNOSIS — M25.511 RIGHT SHOULDER PAIN, UNSPECIFIED CHRONICITY: ICD-10-CM

## 2018-06-29 DIAGNOSIS — S42.221A 2-PART DISPLACED FRACTURE OF SURGICAL NECK OF RIGHT HUMERUS, INITIAL ENCOUNTER FOR CLOSED FRACTURE: ICD-10-CM

## 2018-06-29 PROCEDURE — 97161 PT EVAL LOW COMPLEX 20 MIN: CPT

## 2018-06-29 PROCEDURE — 97110 THERAPEUTIC EXERCISES: CPT

## 2018-06-29 SDOH — ECONOMIC STABILITY: GENERAL: QUALITY OF LIFE: FAIR

## 2018-06-29 ASSESSMENT — ENCOUNTER SYMPTOMS
PAIN SCALE AT HIGHEST: 9
PAIN SCALE: 0
PAIN SCALE AT LOWEST: 0

## 2018-06-29 NOTE — TELEPHONE ENCOUNTER
Tell patient that her blood pressures are acceptable.  If she was dizzy from the medication her blood pressure would be less than 100 systolic.  Also let her know that almost every medication says weakness and dizziness as a side effect.  In order to have the side effects the blood pressure would have to be very low.  Her symptoms are probably from something else and I recommend that she continue on her medications.    The metoprolol is to keep her heart from going too fast as she had SVT.  If she were to stop the metoprolol she may have a rapid heart rate that was very uncomfortable.  If she were to stop the losartan and her blood pressure became elevated she could have damaged her kidneys or suffer a stroke.  I suggest she schedule an appointment to evaluate these symptoms further.

## 2018-06-29 NOTE — OP THERAPY EVALUATION
Outpatient Physical Therapy  INITIAL EVALUATION    [facility] [place]  [address], [address]  [place] [address]  Phone:  [phone]  Fax:  [phone]    Date of Evaluation: 2018    Patient: Yoli Carmichael  YOB: 1933  MRN: [id number]     Referring Provider: Shukri Bradley M.D.  [address] [place] [address]   Referring Diagnosis Pain in right shoulder [M25.511];2-part displaced fracture of surgical neck of right humerus, initial encounter for closed fracture [S42.221A]     Time Calculation  Start time: 1330  Stop time: 1430 Time Calculation (min): 60 minutes     Physical Therapy Occurrence Codes    Date of onset of impairment:  18   Date physical therapy care plan established or reviewed:  18   Date physical therapy treatment started:  18          Chief Complaint: Shoulder Problem    Visit Diagnoses     ICD-10-CM   1. Right shoulder pain, unspecified chronicity M25.511   2. 2-part displaced fracture of surgical neck of right humerus, initial encounter for closed fracture S42.221A         Subjective:   History of Present Illness:     Date of onset:  2018  Quality of life:  Fair  Prior level of function:  New onset shoulder pain due to fall  Pain:     Current pain ratin    At best pain ratin    At worst pain ratin  Diagnostic Tests:     X-ray: abnormal    Activities of Daily Living:     Patient reported ADL status: Unable to wash hair  Limited with self care activities  Decreased dressing ability  Limited overall mobility  Unable to lift  Patient Goals:     Patient goals for therapy:  Increased strength, decreased pain and increased motion      Past Medical History:   Diagnosis Date   • Arrhythmia    • Arthritis     knees   • Bronchitis     long time ago   • CATARACT     no surgery yet   • Dry eyes, bilateral 3/27/2014   • Osteopenia of the elderly 3/27/2014   • Pneumonia     long time ago     Past Surgical History:    Procedure Laterality Date   • KNEE ARTHROPLASTY TOTAL  9/3/2013    Performed by Ramesh Styles M.D. at SURGERY [facility]   • TONSILLECTOMY       Social History   Substance Use Topics   • Smoking status: Never Smoker   • Smokeless tobacco: Never Used      Comment: Pt exposed to second hand smoker   • Alcohol use Yes      Comment: 1-2 week     Family and Occupational History     Social History   • Marital status:      Spouse name: N/A   • Number of children: N/A   • Years of education: N/A       Objective     Observations     Additional Observation Details  Noted multiple sites of bruising and noted a palpable lump on the lateral portion of the arm - x ray report not yet received     Postural Observations  Seated posture: poor  Standing posture: poor  Correction of posture: has no consistent effect        Neurological Testing     Sensation     Shoulder   Left Shoulder   Intact: light touch    Right Shoulder   Intact: light touch    Reflexes   Left   Biceps (C5/C6): normal (2+)  Triceps (C7): normal (2+)  Eldridge's reflex: negative    Right   Biceps (C5/C6): normal (2+)  Triceps (C7): normal (2+)  Eldridge's reflex: negative    Myotome testing   Cervical (left)   C4 (shoulder shrug): 5  C5 (deltoid): 5  C6 (biceps): 5  C7 (triceps): 5  C8 (thumb extension): 5  T1 (intrinsics): 5    Cervical (right)   C4 (shoulder shrug): 5  C8 (thumb extension): 5  T1 (intrinsics): 5    Palpation     Right   Hypertonic in the cervical paraspinals, levator scapulae and upper trapezius.   Tenderness of the anterior deltoid, biceps, pectoralis major and upper trapezius.     Active Range of Motion     Right Shoulder   Flexion: 119 degrees   Abduction: 119 degrees   External rotation 0°: 50 degrees     Passive Range of Motion   Left Shoulder   Flexion: 81 degrees   Abduction: 55 degrees   External rotation 0°: 30 degrees     Scapular Mobility     Additional Scapular Mobility Details  limited    Joint Play   Left Shoulder      Anterior capsule: hypomobile    Posterior capsule: hypomobile    Inferior capsule: hypomobile    Right Shoulder     Anterior capsule: hypomobile    Posterior capsule: hypomobile    Inferior capsule: hypomobile    Strength:      Left Shoulder   Planes of Motion   Flexion: 4+   Abduction: 4+   External rotation at 0°: 4   Isolated Muscles   Biceps: 5   Triceps: 5     Additional Strength Details  MMT on right deferred secondary to post fx status and no reported restrictions on script will call to clarify         Therapeutic Exercises (CPT 11028):     1. Pendulums , 10-30x 2-3x day    2. Wrist AROM, 10-30x 2-3x day    3. Elbow flexion/ext, 10-30x 2-3x day    4. Chair Pendulums for PROM flexion to first discomfort, 5-15x 1-3x day      Time-based treatments/modalities:  Therapeutic exercise minutes (CPT 23249): 10 minutes       Assessment, Response and Plan:   Impairments: abnormal or restricted ROM, activity intolerance, impaired functional mobility, impaired physical strength and pain with function    Assessment details:  Patient presents with signs and symptoms consistent with a humeral fracture. Patient limitations include weakness, decreased ROM, and pain. Patient demonstrated palpable lump on lateral arm. Patient will benefit from therapy to improve the aforementioned deficits and decrease further functional decline.   Prognosis: fair    Goals:   Short Term Goals:   1) Patient's flexion will improve by 90 to facilitate for eating tasks.  2) Patient's elbow extension will improve to 0 deg to facilitate normal elbow tasks.  Short term goal time span:  2-4 weeks      Long Term Goals:    1) Patient's shoulder flexion will improve by 30deg to allow for washing of hair.  2) Patient's SPADI will improve by 15 to demonstrate functional improvement  Long term goal time span:  6-8 weeks    Plan:   Therapy options:  Physical therapy treatment to continue  Planned therapy interventions:  E Stim Unattended (CPT 42777), Hot or  Cold Pack Therapy (CPT 35820), Manual Therapy (CPT 07315), Neuromuscular Re-education (CPT 69693), Therapeutic Exercise (CPT 81435) and Therapeutic Activities (CPT 72418)  Frequency:  2x week  Duration in weeks:  8  Discussed with:  Patient  Plan details:  Progress per protocol for displaced fx of humerus until MD motion restrictions are clarified,       Functional Limitation G-Codes and Severity Modifiers  PT Functional Assessment Tool Used: SPADI  PT Functional Assessment Score: 91   Current:     Goal:       Referring provider co-signature:  I have reviewed this plan of care and my co-signature certifies the need for services.  Certification Dates:   From 6/29/18     To 8/24/18    Physician Signature: ________________________________ Date: ______________

## 2018-06-29 NOTE — TELEPHONE ENCOUNTER
Patient informed and states she disagrees.  She states she saw her PCP and was advised to call us regarding her symptoms and low diastolic blood pressure.  She states she is going to cut both medications in half until she can be seen.  Scheduled for 7/2 as she requested to see JAVIER YOU.

## 2018-07-02 ENCOUNTER — OFFICE VISIT (OUTPATIENT)
Dept: CARDIOLOGY | Facility: MEDICAL CENTER | Age: 83
End: 2018-07-02
Payer: MEDICARE

## 2018-07-02 VITALS
HEART RATE: 76 BPM | SYSTOLIC BLOOD PRESSURE: 136 MMHG | OXYGEN SATURATION: 96 % | DIASTOLIC BLOOD PRESSURE: 62 MMHG | BODY MASS INDEX: 25.1 KG/M2 | WEIGHT: 147 LBS | HEIGHT: 64 IN

## 2018-07-02 DIAGNOSIS — I10 ESSENTIAL HYPERTENSION, BENIGN: ICD-10-CM

## 2018-07-02 DIAGNOSIS — R42 DIZZINESS: ICD-10-CM

## 2018-07-02 DIAGNOSIS — R00.2 PALPITATIONS: ICD-10-CM

## 2018-07-02 PROCEDURE — 99214 OFFICE O/P EST MOD 30 MIN: CPT | Performed by: NURSE PRACTITIONER

## 2018-07-02 ASSESSMENT — ENCOUNTER SYMPTOMS
ABDOMINAL PAIN: 0
PALPITATIONS: 1
PND: 0
COUGH: 0
BACK PAIN: 1
MYALGIAS: 0
WEAKNESS: 0
ORTHOPNEA: 0
CLAUDICATION: 0
DIZZINESS: 1
SHORTNESS OF BREATH: 0

## 2018-07-02 NOTE — PROGRESS NOTES
"Chief Complaint   Patient presents with   • Dizziness     wondering if it may be meds, last for a while and getting worse       Subjective:   Yoli \"Duong\"  Anca Carmichael is a 84 y.o. female who presents today complaining of dizziness.  She called the office a few times last week complaining of episodes of dizziness feeling that this was due to her medications.    She told my nurse that she had read all the side effects of her medications and felt that the dizziness was due to her meds.  She has been checking her blood pressure and it has been running 120-130/60-70.  This morning her blood pressure was 151/68 prior to taking medication.  She has no low blood pressures recorded.    She complains of feeling off balance.  She also describes  that her vision is not clear but she does not truly describe blurred vision.  Her last eye exam was 6 months ago.    She also states that her legs feel weak and shaky.  She sustained a fall a month ago and fractured her right humerus.  She is undergoing physical therapy.  She admits that she is worried about walking since she feels off balance.    She was given metoprolol for SVT which was found on monitor.  She rarely has any palpitations since starting the metoprolol.    Past Medical History:   Diagnosis Date   • Arrhythmia    • Arthritis     knees   • Bronchitis     long time ago   • CATARACT     no surgery yet   • Dry eyes, bilateral 3/27/2014   • Osteopenia of the elderly 3/27/2014   • Pneumonia     long time ago     Past Surgical History:   Procedure Laterality Date   • KNEE ARTHROPLASTY TOTAL  9/3/2013    Performed by Ramesh Styles M.D. at SURGERY [facility]   • TONSILLECTOMY       Family History   Problem Relation Age of Onset   • Lung Disease Mother      frequent pneumonia   • Heart Disease Father 54     MI   • Lung Disease Father      Emphysema   • Heart Attack Father    • Cancer Maternal Aunt      Great Aunt and Cousin Ovarian CA   • Hypertension Maternal Aunt    • " Cancer Daughter      Lung and Brain CA   • Seizures Daughter    • Cancer Daughter      Pituitary tumor   • Cancer Brother      leukemia     Social History     Social History   • Marital status:      Spouse name: N/A   • Number of children: N/A   • Years of education: N/A     Occupational History   • Not on file.     Social History Main Topics   • Smoking status: Never Smoker   • Smokeless tobacco: Never Used      Comment: Pt exposed to second hand smoker   • Alcohol use Yes      Comment: 1-2 week   • Drug use: Yes     Types: Marijuana      Comment: medical(prn)   • Sexual activity: Not on file     Other Topics Concern   • Not on file     Social History Narrative   • No narrative on file     Allergies   Allergen Reactions   • Other Environmental      Seasonal allergies     Outpatient Encounter Prescriptions as of 7/2/2018   Medication Sig Dispense Refill   • losartan (COZAAR) 50 MG Tab Take 1 tablet twice daily. 180 Tab 3   • metoprolol SR (TOPROL XL) 25 MG TABLET SR 24 HR Take 1 Tab by mouth every day. 90 Tab 3   • TURMERIC PO Take  by mouth.     • B Complex-C (SUPER B COMPLEX PO) Take  by mouth.     • Calcium-Vitamin D-Vitamin K (CALCIUM + D) 500-1000-40 MG-UNT-MCG CHEW Take 1,000 mg by mouth every day.     • Multiple Vitamins-Minerals (PRESERVISION/LUTEIN PO) Take  by mouth 2 Times a Day.     • Cholecalciferol (VITAMIN D) 2000 UNITS CAPS Take 6,000 Units by mouth every day.     • [DISCONTINUED] sulfamethoxazole-trimethoprim (BACTRIM DS) 800-160 MG tablet Take 1 Tab by mouth 2 times a day.     • [DISCONTINUED] amoxicillin (AMOXIL) 875 MG tablet Take 875 mg by mouth 2 times a day.     • CycloSPORINE (RESTASIS OP) by Ophthalmic route 2 Times a Day.     • coenzyme Q-10 30 MG capsule Take 300 mg by mouth 2X A WEEK.     • ketotifen (ZADITOR) 0.025 % ophthalmic solution Place 1 Drop in both eyes 2 times a day.     • Glycerin-Polysorbate 80 (REFRESH DRY EYE THERAPY) 1-1 % SOLN 1 Drop by Ophthalmic route 4 times a  "day.       No facility-administered encounter medications on file as of 7/2/2018.      Review of Systems   Constitutional: Negative for malaise/fatigue.   Respiratory: Negative for cough and shortness of breath.    Cardiovascular: Positive for palpitations (rare.). Negative for chest pain, orthopnea, claudication, leg swelling and PND.   Gastrointestinal: Negative for abdominal pain.   Musculoskeletal: Positive for back pain (chronic bulging disc). Negative for myalgias.   Neurological: Positive for dizziness (lightheaeded). Negative for weakness.        Objective:   /62   Pulse 76   Ht 1.626 m (5' 4\")   Wt 66.7 kg (147 lb)   SpO2 96%   BMI 25.23 kg/m²     Physical Exam   Constitutional: She is oriented to person, place, and time. She appears well-developed and well-nourished.   HENT:   Head: Normocephalic.   Eyes: EOM are normal.   Neck: No JVD present.   Cardiovascular: Normal rate and regular rhythm.    Murmur heard.   Systolic murmur is present with a grade of 2/6   Pulmonary/Chest: Effort normal and breath sounds normal.   Abdominal: Soft. Bowel sounds are normal.   Musculoskeletal: She exhibits no edema.   Neurological: She is alert and oriented to person, place, and time.   Romberg is with a small amount of sway but patient does not need to step out.  She is able to walk heel to toe for approximately 4 steps with good balance.  She was able to walk down the cobos with good strength and balance.  She said she had to concentrate with walking.  No obvious instability.   Skin: Skin is warm and dry.   Psychiatric: She has a normal mood and affect.   Anxious about her health and worried about  feeling off balance.       Assessment:     1. Dizziness     2. Essential hypertension, benign     3. Palpitations         Medical Decision Making:  Today's Assessment / Status / Plan:     Dizziness: She describes feeling lightheaded and having her vision not clear.  I do not believe this is due to her medications or " to her blood pressure as it is in a good range.    She has good strength and balance on exam today.  Her carotids are without bruit.  I do not find anything worrisome.    Hypertension: Her blood pressure is well controlled on her current regimen.  She appears to be tolerating the losartan fine.  She asked about changing medication.  I think if we change medications we would run into lots of side effects and the potential for her blood pressure to get out of control.  Also, I do not find a diastolic blood pressure of 60 to be worrisome.  She is willing to continue on the losartan.    Palpitations: She had SVT on a monitor.  She rarely has any palpitations now.  She had an episode this morning where for a few seconds her vision was not completely clear which may have been a short palpitation.  I would like her to continue on the metoprolol for SVT suppression as well as her blood pressure.    It appears she has anxiety about feeling off balance and is worried about falling.  This makes sense to me since she did have a fall and is recovering from a fracture.  I have reassured her that I do not think there is anything worrisome happening with her health.  I would like her to exercise to strengthen her legs.  Also would like her to take an aspirin 81 mg daily for stroke prevention.    She will follow-up as scheduled with myself on July 25, 2018.  We will also get her an appointment with Dr. Velasquez in 3-6 months.    Collaborating Provider: Dr. Antione Bhat.

## 2018-07-02 NOTE — LETTER
"     [facility]   [address] [place] [address] [address]  [place] [address]  Phone: [phone]  Fax: [phone]              Yoli Carmichael  8/21/1933    Encounter Date: 7/2/2018    STEPHANIE Cruz          PROGRESS NOTE:  Chief Complaint   Patient presents with   • Dizziness     wondering if it may be meds, last for a while and getting worse       Subjective:   Yoli \"Duong\"  Anca Carmichael is a 84 y.o. female who presents today complaining of dizziness.  She called the office a few times last week complaining of episodes of dizziness feeling that this was due to her medications.    She told my nurse that she had read all the side effects of her medications and felt that the dizziness was due to her meds.  She has been checking her blood pressure and it has been running 120-130/60-70.  This morning her blood pressure was 151/68 prior to taking medication.  She has no low blood pressures recorded.    She complains of feeling off balance.  She also describes  that her vision is not clear but she does not truly describe blurred vision.  Her last eye exam was 6 months ago.    She also states that her legs feel weak and shaky.  She sustained a fall a month ago and fractured her right humerus.  She is undergoing physical therapy.  She admits that she is worried about walking since she feels off balance.    She was given metoprolol for SVT which was found on monitor.  She rarely has any palpitations since starting the metoprolol.    Past Medical History:   Diagnosis Date   • Arrhythmia    • Arthritis     knees   • Bronchitis     long time ago   • CATARACT     no surgery yet   • Dry eyes, bilateral 3/27/2014   • Osteopenia of the elderly 3/27/2014   • Pneumonia     long time ago     Past Surgical History:   Procedure Laterality Date   • KNEE ARTHROPLASTY TOTAL  9/3/2013    Performed by Ramesh Styles M.D. at SURGERY [facility]   • TONSILLECTOMY       Family History   Problem " Relation Age of Onset   • Lung Disease Mother      frequent pneumonia   • Heart Disease Father 54     MI   • Lung Disease Father      Emphysema   • Heart Attack Father    • Cancer Maternal Aunt      Great Aunt and Cousin Ovarian CA   • Hypertension Maternal Aunt    • Cancer Daughter      Lung and Brain CA   • Seizures Daughter    • Cancer Daughter      Pituitary tumor   • Cancer Brother      leukemia     Social History     Social History   • Marital status:      Spouse name: N/A   • Number of children: N/A   • Years of education: N/A     Occupational History   • Not on file.     Social History Main Topics   • Smoking status: Never Smoker   • Smokeless tobacco: Never Used      Comment: Pt exposed to second hand smoker   • Alcohol use Yes      Comment: 1-2 week   • Drug use: Yes     Types: Marijuana      Comment: medical(prn)   • Sexual activity: Not on file     Other Topics Concern   • Not on file     Social History Narrative   • No narrative on file     Allergies   Allergen Reactions   • Other Environmental      Seasonal allergies     Outpatient Encounter Prescriptions as of 7/2/2018   Medication Sig Dispense Refill   • losartan (COZAAR) 50 MG Tab Take 1 tablet twice daily. 180 Tab 3   • metoprolol SR (TOPROL XL) 25 MG TABLET SR 24 HR Take 1 Tab by mouth every day. 90 Tab 3   • TURMERIC PO Take  by mouth.     • B Complex-C (SUPER B COMPLEX PO) Take  by mouth.     • Calcium-Vitamin D-Vitamin K (CALCIUM + D) 500-1000-40 MG-UNT-MCG CHEW Take 1,000 mg by mouth every day.     • Multiple Vitamins-Minerals (PRESERVISION/LUTEIN PO) Take  by mouth 2 Times a Day.     • Cholecalciferol (VITAMIN D) 2000 UNITS CAPS Take 6,000 Units by mouth every day.     • [DISCONTINUED] sulfamethoxazole-trimethoprim (BACTRIM DS) 800-160 MG tablet Take 1 Tab by mouth 2 times a day.     • [DISCONTINUED] amoxicillin (AMOXIL) 875 MG tablet Take 875 mg by mouth 2 times a day.     • CycloSPORINE (RESTASIS OP) by Ophthalmic route 2 Times a  "Day.     • coenzyme Q-10 30 MG capsule Take 300 mg by mouth 2X A WEEK.     • ketotifen (ZADITOR) 0.025 % ophthalmic solution Place 1 Drop in both eyes 2 times a day.     • Glycerin-Polysorbate 80 (REFRESH DRY EYE THERAPY) 1-1 % SOLN 1 Drop by Ophthalmic route 4 times a day.       No facility-administered encounter medications on file as of 7/2/2018.      Review of Systems   Constitutional: Negative for malaise/fatigue.   Respiratory: Negative for cough and shortness of breath.    Cardiovascular: Positive for palpitations (rare.). Negative for chest pain, orthopnea, claudication, leg swelling and PND.   Gastrointestinal: Negative for abdominal pain.   Musculoskeletal: Positive for back pain (chronic bulging disc). Negative for myalgias.   Neurological: Positive for dizziness (lightheaeded). Negative for weakness.        Objective:   /62   Pulse 76   Ht 1.626 m (5' 4\")   Wt 66.7 kg (147 lb)   SpO2 96%   BMI 25.23 kg/m²      Physical Exam   Constitutional: She is oriented to person, place, and time. She appears well-developed and well-nourished.   HENT:   Head: Normocephalic.   Eyes: EOM are normal.   Neck: No JVD present.   Cardiovascular: Normal rate and regular rhythm.    Murmur heard.   Systolic murmur is present with a grade of 2/6   Pulmonary/Chest: Effort normal and breath sounds normal.   Abdominal: Soft. Bowel sounds are normal.   Musculoskeletal: She exhibits no edema.   Neurological: She is alert and oriented to person, place, and time.   Romberg is with a small amount of sway but patient does not need to step out.  She is able to walk heel to toe for approximately 4 steps with good balance.  She was able to walk down the cobos with good strength and balance.  She said she had to concentrate with walking.  No obvious instability.   Skin: Skin is warm and dry.   Psychiatric: She has a normal mood and affect.   Anxious about her health and worried about  feeling off balance.       Assessment:     1. " Dizziness     2. Essential hypertension, benign     3. Palpitations         Medical Decision Making:  Today's Assessment / Status / Plan:     Dizziness: She describes feeling lightheaded and having her vision not clear.  I do not believe this is due to her medications or to her blood pressure as it is in a good range.    She has good strength and balance on exam today.  Her carotids are without bruit.  I do not find anything worrisome.    Hypertension: Her blood pressure is well controlled on her current regimen.  She appears to be tolerating the losartan fine.  She asked about changing medication.  I think if we change medications we would run into lots of side effects and the potential for her blood pressure to get out of control.  Also, I do not find a diastolic blood pressure of 60 to be worrisome.  She is willing to continue on the losartan.    Palpitations: She had SVT on a monitor.  She rarely has any palpitations now.  She had an episode this morning where for a few seconds her vision was not completely clear which may have been a short palpitation.  I would like her to continue on the metoprolol for SVT suppression as well as her blood pressure.    It appears she has anxiety about feeling off balance and is worried about falling.  This makes sense to me since she did have a fall and is recovering from a fracture.  I have reassured her that I do not think there is anything worrisome happening with her health.  I would like her to exercise to strengthen her legs.  Also would like her to take an aspirin 81 mg daily for stroke prevention.    She will follow-up as scheduled with myself on July 25, 2018.  We will also get her an appointment with Dr. Velasquez in 3-6 months.    Collaborating Provider: Dr. Antione Bhat.        No Recipients

## 2018-07-06 ENCOUNTER — PHYSICAL THERAPY (OUTPATIENT)
Dept: PHYSICAL THERAPY | Facility: REHABILITATION | Age: 83
End: 2018-07-06
Attending: ORTHOPAEDIC SURGERY
Payer: MEDICARE

## 2018-07-06 DIAGNOSIS — S42.221A 2-PART DISPLACED FRACTURE OF SURGICAL NECK OF RIGHT HUMERUS, INITIAL ENCOUNTER FOR CLOSED FRACTURE: ICD-10-CM

## 2018-07-06 DIAGNOSIS — M25.511 RIGHT SHOULDER PAIN, UNSPECIFIED CHRONICITY: ICD-10-CM

## 2018-07-06 PROCEDURE — 97110 THERAPEUTIC EXERCISES: CPT

## 2018-07-06 NOTE — OP THERAPY DAILY TREATMENT
Outpatient Physical Therapy  DAILY TREATMENT     [facility] [place]  [address], [address]  [place] [address]  Phone:  [phone]  Fax:  [phone]    Date: 07/06/2018    Patient: Yoli Carmichael  YOB: 1933  MRN: [id number]     Time Calculation  Start time: 1326  Stop time: 1404 Time Calculation (min): 38 minutes     Chief Complaint: Shoulder Problem    Visit #: 2    SUBJECTIVE:  Notes that her arm is getting better. States decreased pain and overall improved function.     OBJECTIVE:  Current objective measures: Shoulder PROM flexion 92deg          Therapeutic Exercises (CPT 97528):     1. Pulley, x5min scaption    2. Scap ret/depression, x15    3. Pendulums, x30    4. Elbow flexion/extension, x20 1lb    5. Table / Ball slides, x30    6. Deltoid isometrics with pillow, x15 each    7. AAROM elbow extension, x20, with L 1 band feedback    8. Gear shift flexion with dowel at corner on floor, x10    9. AAROM ball flexion, x20    10. AAROM flexion with cane, x20    Therapeutic Treatments and Modalities:     1. Hot or Cold Pack Therapy (CPT 87744), x5min    Time-based treatments/modalities:  Therapeutic exercise minutes (CPT 36900): 38 minutes       Pain rating before treatment: 0  Pain rating after treatment: 0    ASSESSMENT:   Response to treatment: Patient responded well to therapy with a slight increase in ROM with no increase in pain.     PLAN/RECOMMENDATIONS:   Plan for treatment: therapy treatment to continue next visit.  Planned interventions for next visit: E-stim unattended (CPT 06075), manual therapy (CPT 03501), neuromuscular re-education (CPT 96543) and therapeutic exercise (CPT 90804).

## 2018-07-09 ENCOUNTER — PHYSICAL THERAPY (OUTPATIENT)
Dept: PHYSICAL THERAPY | Facility: REHABILITATION | Age: 83
End: 2018-07-09
Attending: ORTHOPAEDIC SURGERY
Payer: MEDICARE

## 2018-07-09 DIAGNOSIS — S42.221A 2-PART DISPLACED FRACTURE OF SURGICAL NECK OF RIGHT HUMERUS, INITIAL ENCOUNTER FOR CLOSED FRACTURE: ICD-10-CM

## 2018-07-09 DIAGNOSIS — M25.511 RIGHT SHOULDER PAIN, UNSPECIFIED CHRONICITY: ICD-10-CM

## 2018-07-09 PROCEDURE — 97110 THERAPEUTIC EXERCISES: CPT

## 2018-07-09 PROCEDURE — 97014 ELECTRIC STIMULATION THERAPY: CPT

## 2018-07-09 NOTE — OP THERAPY DAILY TREATMENT
Outpatient Physical Therapy  DAILY TREATMENT     [facility] Outpatient Physical Therapy [place]  [address]  [place] [address]  Phone:  [phone]  Fax:  [phone]    Date: 07/09/2018    Patient: Yoli Carmichael  YOB: 1933  MRN: [id number]     Time Calculation  Start time: 1125  Stop time: 1203 Time Calculation (min): 38 minutes     Chief Complaint: Shoulder Problem    Visit #: 3    SUBJECTIVE:  Patient reports that she did a lot more around her home and notes now an increase ache in the arm.     OBJECTIVE:  Current objective measures: Shoulder PROM flexion 92deg          Therapeutic Exercises (CPT 37390):     1. Pulley, x5min scaption, Noted small increase in pain    2. Scap ret/depression, x15    3. Pendulums, x30    4. Elbow flexion/extension, x20 1lb    5. Table / Ball slides, x30    6. Deltoid isometrics with pillow, x15 each    8. Gear shift flexion with dowel at corner on floor, x10    9. AAROM ball flexion, x20    10. AAROM flexion with cane, x20    11. Dowel er to no tension, x10    Therapeutic Treatments and Modalities:     1. E Stim Unattended (CPT 50329), R shoulder IFC with CP 80-150hz    Time-based treatments/modalities:  Therapeutic exercise minutes (CPT 59447): 23 minutes       Pain rating before treatment: 1  Pain rating after treatment: 1 ache    ASSESSMENT:   Response to treatment: Patient responded fair to exercise. Noted an ache in her shoulder/arm pre and post exercise. Patient notes that symptoms have increase due to an increase in her activity.     PLAN/RECOMMENDATIONS:   Plan for treatment: therapy treatment to continue next visit.  Planned interventions for next visit: decrease exercise intesity.

## 2018-07-11 ENCOUNTER — PHYSICAL THERAPY (OUTPATIENT)
Dept: PHYSICAL THERAPY | Facility: REHABILITATION | Age: 83
End: 2018-07-11
Attending: ORTHOPAEDIC SURGERY
Payer: MEDICARE

## 2018-07-11 DIAGNOSIS — M25.511 RIGHT SHOULDER PAIN, UNSPECIFIED CHRONICITY: ICD-10-CM

## 2018-07-11 DIAGNOSIS — S42.221A 2-PART DISPLACED FRACTURE OF SURGICAL NECK OF RIGHT HUMERUS, INITIAL ENCOUNTER FOR CLOSED FRACTURE: ICD-10-CM

## 2018-07-11 PROCEDURE — 97140 MANUAL THERAPY 1/> REGIONS: CPT

## 2018-07-11 PROCEDURE — 97110 THERAPEUTIC EXERCISES: CPT

## 2018-07-11 NOTE — OP THERAPY DAILY TREATMENT
Outpatient Physical Therapy  DAILY TREATMENT     [facility] Outpatient Physical Therapy [place]  [address]  [place] [address]  Phone:  [phone]  Fax:  [phone]    Date: 07/11/2018    Patient: Yoli Carmichael  YOB: 1933  MRN: [id number]     Time Calculation  Start time: 0945  Stop time: 1024 Time Calculation (min): 39 minutes     Chief Complaint: Shoulder Problem    Visit #: 4    SUBJECTIVE:  Patient reports that her shoulder is doing slightly better. Notes that she has stopped using her iron.     OBJECTIVE:  Current objective measures: Shoulder flexion 85deg AAROM  ;  ER AAROM 23deg          Therapeutic Exercises (CPT 13258):     1. Pulley, DNT    2. Shoulder AAROM dowel: flexion/ER/Abd , x30 each    3. Scap ret/depression, 2x10    4. Pendulums , x30    5. Table top ball rolls, x30    6. Floor broom flexion/scaption/abduction, x30    Therapeutic Treatments and Modalities:     1. Manual Therapy (CPT 82873), R shoulder, PROM: flexion/ER/IR  x10min with STM to deltoid and bicep/tricep    Time-based treatments/modalities:  Manual therapy minutes (CPT 31944): 13 minutes  Therapeutic exercise minutes (CPT 39726): 25 minutes       Pain rating before treatment: 0  Pain rating after treatment: 0    ASSESSMENT:   Response to treatment: Patient responded fair to therapy with AAROM still below 90deg. Patient reports low pain and increasing function. She notes pain along the neck of the humerus, when present.     PLAN/RECOMMENDATIONS:   Plan for treatment: therapy treatment to continue next visit.  Planned interventions for next visit: E-stim unattended (CPT 67732), neuromuscular re-education (CPT 60145) and therapeutic exercise (CPT 45230).

## 2018-07-16 ENCOUNTER — PHYSICAL THERAPY (OUTPATIENT)
Dept: PHYSICAL THERAPY | Facility: REHABILITATION | Age: 83
End: 2018-07-16
Attending: ORTHOPAEDIC SURGERY
Payer: MEDICARE

## 2018-07-16 DIAGNOSIS — M25.511 RIGHT SHOULDER PAIN, UNSPECIFIED CHRONICITY: ICD-10-CM

## 2018-07-16 DIAGNOSIS — S42.221A 2-PART DISPLACED FRACTURE OF SURGICAL NECK OF RIGHT HUMERUS, INITIAL ENCOUNTER FOR CLOSED FRACTURE: ICD-10-CM

## 2018-07-16 PROCEDURE — 97110 THERAPEUTIC EXERCISES: CPT

## 2018-07-16 PROCEDURE — 97140 MANUAL THERAPY 1/> REGIONS: CPT

## 2018-07-16 NOTE — OP THERAPY DAILY TREATMENT
Outpatient Physical Therapy  DAILY TREATMENT     [facility] Outpatient Physical Therapy [place]  [address], [address]  [place] [address]  Phone:  [phone]  Fax:  [phone]    Date: 07/16/2018    Patient: Yoli Carmichael  YOB: 1933  MRN: [id number]     Time Calculation  Start time: 1155  Stop time: 1235 Time Calculation (min): 40 minutes     Chief Complaint: Shoulder Problem    Visit #: 5    SUBJECTIVE:  Patient reports more use of her arm but states she still hurts at her end of range.     OBJECTIVE:  Current objective measures: 89deg R shoulder flexion AAROM          Therapeutic Exercises (CPT 04237):     1. Pulley, x5min scaption/flexion    2. Shoulder AAROM dowel: flexion/ER/Abd , x30 each    3. Scap ret/depression, 2x10    4. Pendulums , x30    5. Table top ball rolls, x30    6. Floor broom flexion/scaption/abduction, x30    7. Abduction AAROM with dowel, x20    Therapeutic Treatments and Modalities:     1. Manual Therapy (CPT 16627), R shoulder, PROM: flexion/ER/IR  x15min with STM to deltoid and bicep/tricep    Time-based treatments/modalities:  Manual therapy minutes (CPT 36199): 15 minutes  Therapeutic exercise minutes (CPT 93871): 25 minutes       Pain rating before treatment: 0  Pain rating after treatment: 1    ASSESSMENT:   Response to treatment: Patient reported a slight increase in soreness from exercise today. ROM is slowly improving and function is improving as well.     PLAN/RECOMMENDATIONS:   Plan for treatment: therapy treatment to continue next visit.  Planned interventions for next visit: E-stim unattended (CPT 16015), manual therapy (CPT 33965), neuromuscular re-education (CPT 69326) and therapeutic exercise (CPT 00258).

## 2018-07-18 ENCOUNTER — PHYSICAL THERAPY (OUTPATIENT)
Dept: PHYSICAL THERAPY | Facility: REHABILITATION | Age: 83
End: 2018-07-18
Attending: ORTHOPAEDIC SURGERY
Payer: MEDICARE

## 2018-07-18 DIAGNOSIS — M25.511 RIGHT SHOULDER PAIN, UNSPECIFIED CHRONICITY: ICD-10-CM

## 2018-07-18 DIAGNOSIS — S42.221A 2-PART DISPLACED FRACTURE OF SURGICAL NECK OF RIGHT HUMERUS, INITIAL ENCOUNTER FOR CLOSED FRACTURE: ICD-10-CM

## 2018-07-18 PROCEDURE — 97110 THERAPEUTIC EXERCISES: CPT

## 2018-07-18 PROCEDURE — 97140 MANUAL THERAPY 1/> REGIONS: CPT

## 2018-07-18 NOTE — OP THERAPY DAILY TREATMENT
Outpatient Physical Therapy  DAILY TREATMENT     [facility] Outpatient Physical Therapy [place]  [address]  [place] [address]  Phone:  [phone]  Fax:  [phone]    Date: 07/18/2018    Patient: Yoli Carmichael  YOB: 1933  MRN: [id number]     Time Calculation  Start time: 0955  Stop time: 1033 Time Calculation (min): 38 minutes     Chief Complaint: No chief complaint on file.    Visit #: 6    SUBJECTIVE:  Patient reports soreness after exercise but notes that function is increasing. States she was able to use the shiftier in her car with one hand today.     OBJECTIVE:  Current objective measures: AAROM 91 deg on cane  [place] 103deg          Therapeutic Exercises (CPT 20773):     1. Pulley, x5min scaption/flexion/abduction    2. Shoulder AAROM dowel: flexion/ER/Abd , x30 each    3. Scap ret/depression, 2x10    4. Level 1 deltoid iso, x20 each    5. Table top ball rolls, x30    6. Floor broom flexion/scaption/abduction, x30    7. Abduction AAROM with dowel, x20    8. Bike, x5min    Therapeutic Treatments and Modalities:     1. Manual Therapy (CPT 45420), R shoulder, PROM: flexion/ER/IR  x8min     Time-based treatments/modalities:  Manual therapy minutes (CPT 45776): 8 minutes  Therapeutic exercise minutes (CPT 97964): 30 minutes       Pain rating before treatment: 0  Pain rating after treatment: 1    ASSESSMENT:   Response to treatment: Patient continues to gradually demonstrate increased ROM and reports of increased function. Patient notes minimal pain.     PLAN/RECOMMENDATIONS:   Plan for treatment: therapy treatment to continue next visit.  Planned interventions for next visit: E-stim unattended (CPT 29313), neuromuscular re-education (CPT 94611) and therapeutic exercise (CPT 52414).

## 2018-07-23 ENCOUNTER — PHYSICAL THERAPY (OUTPATIENT)
Dept: PHYSICAL THERAPY | Facility: REHABILITATION | Age: 83
End: 2018-07-23
Attending: ORTHOPAEDIC SURGERY
Payer: MEDICARE

## 2018-07-23 DIAGNOSIS — M25.511 RIGHT SHOULDER PAIN, UNSPECIFIED CHRONICITY: ICD-10-CM

## 2018-07-23 DIAGNOSIS — S42.221A 2-PART DISPLACED FRACTURE OF SURGICAL NECK OF RIGHT HUMERUS, INITIAL ENCOUNTER FOR CLOSED FRACTURE: ICD-10-CM

## 2018-07-23 PROCEDURE — 97112 NEUROMUSCULAR REEDUCATION: CPT

## 2018-07-23 PROCEDURE — 97110 THERAPEUTIC EXERCISES: CPT

## 2018-07-23 NOTE — OP THERAPY DAILY TREATMENT
Outpatient Physical Therapy  DAILY TREATMENT     [facility] Outpatient Physical Therapy [place]  [address]  [place] [address]  Phone:  [phone]  Fax:  [phone]    Date: 07/23/2018    Patient: Yoli Carmichael  YOB: 1933  MRN: [id number]     Time Calculation  Start time: 1008  Stop time: 1050 Time Calculation (min): 42 minutes     Chief Complaint: Shoulder Problem    Visit #: 7    SUBJECTIVE:  Patient reports that she is now able to drive without any increase in pain.     OBJECTIVE:  Current objective measures: Shoulder flexion 96deg with scap compensation   AAROM ER 28deg  AROM: ER 11          Therapeutic Exercises (CPT 78374):     1. Pulley, x5min scaption/flexion/abduction    2. Shoulder AAROM dowel: flexion/ER/Abd , x30 each    3. Scap ret/depression, 2x10    4. Level 1 deltoid iso, x20 each    5. [place] with R fixed, x30    6. Floor broom flexion/scaption/abduction, x30    7. Abduction AAROM with dowel, x20    8. Gear shift flexion, x30    Therapeutic Treatments and Modalities:     1. Manual Therapy (CPT 83012), R shoulder, PROM: flexion/ER/IR  x10min    Time-based treatments/modalities:  Therapeutic exercise minutes (CPT 40602): 30 minutes  Neuromusc re-ed, balance, coor, post minutes (CPT 35657): 10 minutes       Pain rating before treatment: 1  Pain rating after treatment: 1    ASSESSMENT:   Response to treatment: Patient's ROM is steadily improving. Patient still demonstrates compensation with scapular motion with flexion. Patient's AAROM/PROM continues to remains greater than AROM. Plan to increase strength training.     PLAN/RECOMMENDATIONS:   Plan for treatment: therapy treatment to continue next visit.  Planned interventions for next visit: E-stim unattended (CPT 05816), manual therapy (CPT 75741), neuromuscular re-education (CPT 02605) and therapeutic exercise (CPT 79131).

## 2018-07-25 ENCOUNTER — PHYSICAL THERAPY (OUTPATIENT)
Dept: PHYSICAL THERAPY | Facility: REHABILITATION | Age: 83
End: 2018-07-25
Attending: ORTHOPAEDIC SURGERY
Payer: MEDICARE

## 2018-07-25 DIAGNOSIS — M25.511 RIGHT SHOULDER PAIN, UNSPECIFIED CHRONICITY: ICD-10-CM

## 2018-07-25 DIAGNOSIS — S42.221A 2-PART DISPLACED FRACTURE OF SURGICAL NECK OF RIGHT HUMERUS, INITIAL ENCOUNTER FOR CLOSED FRACTURE: ICD-10-CM

## 2018-07-25 PROCEDURE — 97110 THERAPEUTIC EXERCISES: CPT

## 2018-07-25 PROCEDURE — 97140 MANUAL THERAPY 1/> REGIONS: CPT

## 2018-07-25 NOTE — OP THERAPY PROGRESS SUMMARY
Outpatient Physical Therapy  PROGRESS SUMMARY NOTE      [facility] [place]  [address]  [place] [address]  Phone:  [phone]  Fax:  [phone]    Date of Visit: 07/25/2018    Patient: Yoli Carmichael  YOB: 1933  MRN: [id number]     Referring Provider: Shukri Bradley M.D.  [address] [place] [address]   Referring Diagnosis Pain in right shoulder [M25.511];2-part displaced fracture of surgical neck of right humerus, initial encounter for closed fracture [S42.221A]     Visit Diagnoses     ICD-10-CM   1. Right shoulder pain, unspecified chronicity M25.511   2. 2-part displaced fracture of surgical neck of right humerus, initial encounter for closed fracture S42.221A       Rehab Potential: good    Physical Therapy Occurrence Codes    Date of onset of impairment:  6/2/18   Date physical therapy care plan established or reviewed:  6/29/18   Date physical therapy treatment started:  6/29/18          Cert Period: 7/25/18 to 9/19/18  Progress Report Period: 6/29/18 to 7/25/18    Functional Limitation G-Codes and Severity Modifiers  PT Functional Assessment Tool Used: SPADI  PT Functional Assessment Score: 38         Objective Findings and Assessment:   Patient progression towards goals: Patient has now been seen for 8 visits. Patient is progressing gradually with most gains in ER > flexion > IR. Patient continues to note an ache post therapy. Patient's function has significantly improved now with a SPADI score of 38. Patient will continue to progressed with ROM pending all is fine and healing with her x-ray.     Objective findings and assessment details: ER AAROM: 41deg at 0 R  Flexion AAROM 100deg  Elbow ext AROM: 0deg    Goals:   Short Term Goals:   1) Patient's flexion will improve by 90 to facilitate for eating tasks. MET  2) Patient's elbow extension will improve to 0 deg to facilitate normal elbow tasks. MET  Short term goal time span:  4-6 weeks       Long Term Goals:    1) Patient's shoulder flexion will improve by 30deg to allow for washing of hair. Not met  2) Patient's SPADI will improve by 15 to demonstrate functional improvement. MET  Long term goal time span:  6-8 weeks    Plan:   Planned therapy interventions:  E Stim Unattended (CPT 96043), Manual Therapy (CPT 57589), Neuromuscular Re-education (CPT 20316), Therapeutic Exercise (CPT 37924) and Hot or Cold Pack Therapy (CPT 80079)  Frequency:  2x week  Duration in weeks:  8  Plan details:  Continue to progress per post op fracture protocol.         Referring provider co-signature:  I have reviewed this plan of care and my co-signature certifies the need for services.    Physician Signature: ________________________________ Date: ______________

## 2018-07-25 NOTE — OP THERAPY DAILY TREATMENT
Outpatient Physical Therapy  DAILY TREATMENT     [facility] Outpatient Physical Therapy [place]  [address]  [place] [address]  Phone:  [phone]  Fax:  [phone]    Date: 07/25/2018    Patient: Yoli Carmichael  YOB: 1933  MRN: [id number]     Time Calculation  Start time: 1008  Stop time: 1050 Time Calculation (min): 42 minutes     Chief Complaint: Shoulder Problem    Visit #: 8    SUBJECTIVE:  Patient reports that her arm feels achy today. Notes overall increased pain.     OBJECTIVE:  Current objective measures: ER AAROM: 41deg at 0 R  Flexion AAROM 100deg  Elbow ext AROM: 0deg    PT Functional Assessment Tool Used: SPADI  PT Functional Assessment Score: 38       Therapeutic Exercises (CPT 12853):     1. Pulley, x5min scaption/flexion/abduction    2. Shoulder AAROM dowel: flexion/ER/Abd , x30 each    3. Scap ret/depression, 2x10    4. Level 1 deltoid iso, x20 each    5. [place] with R fixed, x30    6. Floor broom flexion/scaption/abduction, x30    7. Abduction AAROM with dowel, x20    8. Gear shift flexion, x30    9. Wall walks, x20    Therapeutic Treatments and Modalities:     1. Manual Therapy (CPT 32201), R shoulder, PROM: flexion/ER/IR  x10min    Time-based treatments/modalities:  Manual therapy minutes (CPT 30810): 10 minutes  Therapeutic exercise minutes (CPT 62713): 32 minutes       Pain rating before treatment: 3  Pain rating after treatment: 0    ASSESSMENT:   Response to treatment: Patient's ROM is gradually improving as well is function. Patient continues to demonstrate restriction with terminal flexion.     PLAN/RECOMMENDATIONS:   Plan for treatment: therapy treatment to continue next visit.  Planned interventions for next visit: E-stim unattended (CPT 51714), manual therapy (CPT 04602), neuromuscular re-education (CPT 97627) and therapeutic exercise (CPT 62419).

## 2018-07-30 ENCOUNTER — PHYSICAL THERAPY (OUTPATIENT)
Dept: PHYSICAL THERAPY | Facility: REHABILITATION | Age: 83
End: 2018-07-30
Attending: ORTHOPAEDIC SURGERY
Payer: MEDICARE

## 2018-07-30 DIAGNOSIS — S42.221A 2-PART DISPLACED FRACTURE OF SURGICAL NECK OF RIGHT HUMERUS, INITIAL ENCOUNTER FOR CLOSED FRACTURE: ICD-10-CM

## 2018-07-30 DIAGNOSIS — M25.511 RIGHT SHOULDER PAIN, UNSPECIFIED CHRONICITY: ICD-10-CM

## 2018-07-30 PROCEDURE — 97110 THERAPEUTIC EXERCISES: CPT

## 2018-07-30 NOTE — OP THERAPY DAILY TREATMENT
Outpatient Physical Therapy  DAILY TREATMENT     [facility] Outpatient Physical Therapy [place]  [address]  [place] [facility] [address]  Phone:  [phone]  Fax:  [phone]    Date: 07/30/2018    Patient: Yoli Carmichael  YOB: 1933  MRN: [id number]     Time Calculation  Start time: 1000  Stop time: 1038 Time Calculation (min): 38 minutes     Chief Complaint: Shoulder Problem    Visit #: 9    SUBJECTIVE:  Patient reports that she is feeling good today with no pain at this time.     OBJECTIVE:  Current objective measures: 102 deg flexion          Therapeutic Exercises (CPT 73130):     1. Pulley, x5min scaption/flexion/abduction    2. Shoulder AAROM dowel: flexion/ER/Abd , x30 each    3. Scap ret/depression, 2x10    4. Level 1 deltoid iso, x20 each    5. [place] with R fixed, x30    6. Floor broom flexion/scaption/abduction, x30    7. Abduction AAROM with dowel, x20    8. Gear shift flexion, x30    9. Wall walks, x20    10. Supine AAROM flexion dowel, x20    11. Bike, x5min      Time-based treatments/modalities:  Therapeutic exercise minutes (CPT 67686): 38 minutes       Pain rating before treatment: 0  Pain rating after treatment: 0    ASSESSMENT:   Response to treatment: Patient has responded well to therapy with an overall increase in ROM with minimal increase in discomfort.     PLAN/RECOMMENDATIONS:   Plan for treatment: therapy treatment to continue next visit.  Planned interventions for next visit: manual therapy (CPT 35844), neuromuscular re-education (CPT 85596) and therapeutic exercise (CPT 70168).

## 2018-08-01 ENCOUNTER — PHYSICAL THERAPY (OUTPATIENT)
Dept: PHYSICAL THERAPY | Facility: REHABILITATION | Age: 83
End: 2018-08-01
Attending: ORTHOPAEDIC SURGERY
Payer: MEDICARE

## 2018-08-01 DIAGNOSIS — S42.221A 2-PART DISPLACED FRACTURE OF SURGICAL NECK OF RIGHT HUMERUS, INITIAL ENCOUNTER FOR CLOSED FRACTURE: ICD-10-CM

## 2018-08-01 DIAGNOSIS — M25.511 RIGHT SHOULDER PAIN, UNSPECIFIED CHRONICITY: ICD-10-CM

## 2018-08-01 PROCEDURE — 97140 MANUAL THERAPY 1/> REGIONS: CPT

## 2018-08-01 PROCEDURE — 97110 THERAPEUTIC EXERCISES: CPT

## 2018-08-01 NOTE — OP THERAPY DAILY TREATMENT
Outpatient Physical Therapy  DAILY TREATMENT     [facility] Outpatient Physical Therapy [place]  [address]  [place] [facility] [address]  Phone:  [phone]  Fax:  [phone]    Date: 08/01/2018    Patient: Yoli Carmichael  YOB: 1933  MRN: [id number]     Time Calculation  Start time: 0955  Stop time: 1034 Time Calculation (min): 39 minutes     Chief Complaint: Shoulder Problem    Visit #: 10    SUBJECTIVE:  Patient reports that the shoulder is doing good with little to no pain. Notes only pain at end ranges of stretching.     OBJECTIVE:  Current objective measures: Shoulder flexion AROM:  90deg        AAROM:  106deg     R          Therapeutic Exercises (CPT 84336):     1. Pulley, x5min scaption/flexion/abduction    2. Shoulder AAROM dowel: flexion/ER/Abd supine, x30 each    3. Scap ret/depression, 2x10    6. Floor broom flexion/scaption/abduction, x30    7. Abduction AAROM with dowel, x20    8. Gear shift flexion, x30    9. Wall walks, x20    10. Supine AAROM flexion dowel, x20    11. Bike, x5min    12. Scap 3lbs, xfatigue    Therapeutic Treatments and Modalities:     1. Manual Therapy (CPT 08585), PROM / MWM R shoulder into flexion and ER, x13min    Time-based treatments/modalities:  Manual therapy minutes (CPT 25120): 13 minutes  Therapeutic exercise minutes (CPT 83498): 25 minutes       Pain rating before treatment: 0  Pain rating after treatment: 0    ASSESSMENT:   Response to treatment: Patient responded well to therapy with an increase in ROM with no increase in pain. AROM continues to lag behind.    PLAN/RECOMMENDATIONS:   Plan for treatment: therapy treatment to continue next visit.  Planned interventions for next visit: manual therapy (CPT 00275), neuromuscular re-education (CPT 19924) and therapeutic exercise (CPT 21451).

## 2018-08-07 ENCOUNTER — PHYSICAL THERAPY (OUTPATIENT)
Dept: PHYSICAL THERAPY | Facility: REHABILITATION | Age: 83
End: 2018-08-07
Attending: ORTHOPAEDIC SURGERY
Payer: MEDICARE

## 2018-08-07 DIAGNOSIS — S42.221A 2-PART DISPLACED FRACTURE OF SURGICAL NECK OF RIGHT HUMERUS, INITIAL ENCOUNTER FOR CLOSED FRACTURE: ICD-10-CM

## 2018-08-07 DIAGNOSIS — M25.511 RIGHT SHOULDER PAIN, UNSPECIFIED CHRONICITY: ICD-10-CM

## 2018-08-07 PROCEDURE — 97110 THERAPEUTIC EXERCISES: CPT

## 2018-08-07 PROCEDURE — 97140 MANUAL THERAPY 1/> REGIONS: CPT

## 2018-08-07 NOTE — OP THERAPY DAILY TREATMENT
Outpatient Physical Therapy  DAILY TREATMENT     [facility] Outpatient Physical Therapy [place]  [address]  [place] [address]  Phone:  [phone]  Fax:  [phone]    Date: 08/07/2018    Patient: Yoli Carmichael  YOB: 1933  MRN: [id number]     Time Calculation  Start time: 1138  Stop time: 1218 Time Calculation (min): 40 minutes     Chief Complaint: Shoulder Problem    Visit #: 11    SUBJECTIVE:  Patient reports that she has been doing more with her shoulder but notes that improvement is still gradual. Notes she was able to wash her hair.     OBJECTIVE:  Current objective measures:   Shoulder flexion AROM:  92deg        AAROM:  109deg     R          Therapeutic Exercises (CPT 35919):     1. Pulley, x5min scaption/flexion/abduction    2. Shoulder AAROM dowel: flexion/ER/Abd supine, x30 each    3. Scap ret/depression, 2x10    6. Floor broom flexion/scaption/abduction, x30    7. Abduction AAROM with dowel, x20    8. Gear shift flexion, x30    9. Wall walks, x20    10. Supine AAROM flexion dowel, x20    12. Scap 3lbs, xfatigue    Therapeutic Treatments and Modalities:     1. Manual Therapy (CPT 70371), PROM / MWM R shoulder into flexion and ER, x10min    Time-based treatments/modalities:  Manual therapy minutes (CPT 81905): 10 minutes  Therapeutic exercise minutes (CPT 57978): 30 minutes       Pain rating before treatment: 0  Pain rating after treatment: 0    ASSESSMENT:   Response to treatment: Patient responded well to therapy with an overall increase in ROM. Patient continues to demonstrate a capsular end feel at the end of flexion.     PLAN/RECOMMENDATIONS:   Plan for treatment: therapy treatment to continue next visit.  Planned interventions for next visit: manual therapy (CPT 63694), neuromuscular re-education (CPT 32261) and therapeutic exercise (CPT 02261).

## 2018-08-08 ENCOUNTER — PHYSICAL THERAPY (OUTPATIENT)
Dept: PHYSICAL THERAPY | Facility: REHABILITATION | Age: 83
End: 2018-08-08
Attending: ORTHOPAEDIC SURGERY
Payer: MEDICARE

## 2018-08-08 DIAGNOSIS — S42.221A 2-PART DISPLACED FRACTURE OF SURGICAL NECK OF RIGHT HUMERUS, INITIAL ENCOUNTER FOR CLOSED FRACTURE: ICD-10-CM

## 2018-08-08 DIAGNOSIS — M25.511 RIGHT SHOULDER PAIN, UNSPECIFIED CHRONICITY: ICD-10-CM

## 2018-08-08 PROCEDURE — 97110 THERAPEUTIC EXERCISES: CPT

## 2018-08-08 NOTE — OP THERAPY DAILY TREATMENT
Outpatient Physical Therapy  DAILY TREATMENT     [facility] Outpatient Physical Therapy [place]  [address]  [place] [address]  Phone:  [phone]  Fax:  [phone]    Date: 08/08/2018    Patient: Yoli Carmichael  YOB: 1933  MRN: [id number]     Time Calculation  Start time: 1100  Stop time: 1138 Time Calculation (min): 38 minutes     Chief Complaint: Shoulder Problem    Visit #: 12    SUBJECTIVE:  Patient notes that she did well yesterday. Notes that she felt good leaving here. Notes that she still cannot brush her hair fully with R hand.     OBJECTIVE:  Current objective measures:   RC strength screen R: Supra 3-/5  ER: 4/5  IR: 4/5          Therapeutic Exercises (CPT 04849):     1. Pulley, x5min scaption/flexion/abduction    2. Shoulder AAROM dowel: flexion/ER/Abd supine, x30 each    3. Scap ret/depression, 2x10    4. Scaption 1lb, xfatigue    5. Supine ER with pillow and dowel, x20    6. Floor broom flexion/scaption/abduction, x30    7. Abduction AAROM with dowel, x20    8. Gear shift flexion, x30    9. Wall walks, x20    10. Supine AAROM flexion dowel, x20      Time-based treatments/modalities:  Therapeutic exercise minutes (CPT 64814): 38 minutes       Pain rating before treatment: 0  Pain rating after treatment: 1    ASSESSMENT:   Response to treatment: Patient's ROM gains are beginning to slow. Patient does demonstrate weakness with supra testing. Patient will continue to push end range stretching to light discomfort and continue as long as pain does not increase.     PLAN/RECOMMENDATIONS:   Plan for treatment: therapy treatment to continue next visit.  Planned interventions for next visit: manual therapy (CPT 14798), neuromuscular re-education (CPT 36368) and therapeutic exercise (CPT 54165).

## 2018-08-13 ENCOUNTER — APPOINTMENT (OUTPATIENT)
Dept: PHYSICAL THERAPY | Facility: REHABILITATION | Age: 83
End: 2018-08-13
Attending: ORTHOPAEDIC SURGERY
Payer: MEDICARE

## 2018-08-14 ENCOUNTER — TELEPHONE (OUTPATIENT)
Dept: MEDICAL GROUP | Facility: PHYSICIAN GROUP | Age: 83
End: 2018-08-14

## 2018-08-14 NOTE — TELEPHONE ENCOUNTER
Future Appointments       Provider Department Center    8/15/2018 12:00 PM Agapito SAUL Anderson, PT, DPT [facility] Outpatient Physical Therapy [facility]    8/17/2018 8:00 AM Agapito SAUL Anderson, PT, DPT [facility] Outpatient Physical Therapy [place]    8/20/2018 12:00 PM Agapito SAUL Anderson, PT, DPT [facility] Outpatient Physical Therapy [facility]    8/22/2018 1:30 PM Agapito SAUL Anderson, PT, DPT [facility] Outpatient Physical Therapy [facility]    9/5/2018 10:20 AM Rohini Solano D.O.; [facility]  [facility]    9/27/2018 10:00 AM Rohini Solano D.O. [facility]    11/6/2018 10:40 AM Reuben Velasquez M.D. [facility]       ANNUAL WELLNESS VISIT PRE-VISIT PLANNING WITH OUTREACH    1.  If any orders were placed at last visit, do we have Results/Consult Notes?        •  Labs - Labs were not ordered at last office visit.       •  Imaging - Imaging was not ordered at last office visit.       •  Referrals - No referrals were ordered at last office visit.    2.  Immunizations were updated in Epic using WebIZ?:Yes       •  WebIZ Recommendations: PNEUMOVAX (PPSV23), TDAP and SHINGRIX (Shingles)       •  Is patient due for Tdap? YES. Patient was notified of copay/out of pocket cost.       •  Is patient due for Shingles?YES. Patient was notified of copay/out of pocket cost.    3.  Patient has the following Care Path diagnoses on Problem List:  NONE    4.  MDX printed for Provider? YES     Health Maintenance Due   Topic Date Due   • IMM DTaP/Tdap/Td Vaccine (1 - Tdap) 08/21/1952   • IMM ZOSTER VACCINES (2 of 3) 05/21/2013   • Annual Wellness Visit  Scheduled for 9/5/18   • IMM PNEUMOCOCCAL 65+ (ADULT) LOW/MEDIUM RISK SERIES (2 of 2 - PPSV23) 03/09/2016

## 2018-08-15 ENCOUNTER — PHYSICAL THERAPY (OUTPATIENT)
Dept: PHYSICAL THERAPY | Facility: REHABILITATION | Age: 83
End: 2018-08-15
Attending: ORTHOPAEDIC SURGERY
Payer: MEDICARE

## 2018-08-15 DIAGNOSIS — M25.511 RIGHT SHOULDER PAIN, UNSPECIFIED CHRONICITY: ICD-10-CM

## 2018-08-15 DIAGNOSIS — S42.221A 2-PART DISPLACED FRACTURE OF SURGICAL NECK OF RIGHT HUMERUS, INITIAL ENCOUNTER FOR CLOSED FRACTURE: ICD-10-CM

## 2018-08-15 PROCEDURE — 97110 THERAPEUTIC EXERCISES: CPT

## 2018-08-15 PROCEDURE — 97140 MANUAL THERAPY 1/> REGIONS: CPT

## 2018-08-15 NOTE — OP THERAPY DAILY TREATMENT
Outpatient Physical Therapy  DAILY TREATMENT     [facility] Outpatient Physical Therapy [place]  [address], [address]  [place] [facility] [address]  Phone:  [phone]  Fax:  [phone]    Date: 08/15/2018    Patient: Yoli Carmichael  YOB: 1933  MRN: [id number]     Time Calculation  Start time: 1200  Stop time: 1240 Time Calculation (min): 40 minutes     Chief Complaint: Shoulder Problem    Visit #: 13    SUBJECTIVE:  Patient notes increased soreness in the shoulder today. Notes that symptoms have been worse only after end range stretching.     OBJECTIVE:  Current objective measures:   Shoulder flexion AROM:  90deg        AAROM:  118deg     R          Therapeutic Exercises (CPT 68164):     1. Pulley, x5min scaption/flexion/abduction    2. Shoulder AAROM dowel: flexion/ER/Abd supine, x30 each    3. Scap ret/depression, 2x10    6. Floor broom flexion/scaption/abduction, x30    7. Abduction AAROM with dowel, x20    8. Gear shift flexion, x30    9. Wall walks, x20    12. Scap 3lbs, xfatigue    Therapeutic Treatments and Modalities:     1. Manual Therapy (CPT 57006), PROM / MWM R shoulder into flexion and ER, x10min    Time-based treatments/modalities:  Manual therapy minutes (CPT 97421): 10 minutes  Therapeutic exercise minutes (CPT 28671): 30 minutes       Pain rating before treatment: 2  Pain rating after treatment: 2    ASSESSMENT:   Response to treatment: Patient's PROM/AAROM improved however noted a small decrease in AROM. Patient is now able to do her hair with compensation.     PLAN/RECOMMENDATIONS:   Plan for treatment: therapy treatment to continue next visit.  Planned interventions for next visit: E-stim unattended (CPT 69537), manual therapy (CPT 65603), neuromuscular re-education (CPT 06571) and therapeutic exercise (CPT 04659).

## 2018-08-17 ENCOUNTER — PHYSICAL THERAPY (OUTPATIENT)
Dept: PHYSICAL THERAPY | Facility: REHABILITATION | Age: 83
End: 2018-08-17
Attending: ORTHOPAEDIC SURGERY
Payer: MEDICARE

## 2018-08-17 DIAGNOSIS — M25.511 RIGHT SHOULDER PAIN, UNSPECIFIED CHRONICITY: ICD-10-CM

## 2018-08-17 DIAGNOSIS — S42.221A 2-PART DISPLACED FRACTURE OF SURGICAL NECK OF RIGHT HUMERUS, INITIAL ENCOUNTER FOR CLOSED FRACTURE: ICD-10-CM

## 2018-08-17 PROCEDURE — 97140 MANUAL THERAPY 1/> REGIONS: CPT

## 2018-08-17 PROCEDURE — 97110 THERAPEUTIC EXERCISES: CPT

## 2018-08-17 NOTE — OP THERAPY DAILY TREATMENT
Outpatient Physical Therapy  DAILY TREATMENT     [facility] Outpatient Physical Therapy [place]  [address], [address]  [place] [place] [address]  Phone:  [phone]  Fax:  [phone]    Date: 08/17/2018    Patient: Yoli Carmichael  YOB: 1933  MRN: [id number]     Time Calculation  Start time: 0800  Stop time: 0838 Time Calculation (min): 38 minutes     Chief Complaint: Shoulder Problem    Visit #: 14    SUBJECTIVE:  Patient reports that her shoulder is sore only after activity. Notes that she is feeling ok today.     OBJECTIVE:  Current objective measures:   Shoulder flexion  R AROM:  96deg        AAROM:  118deg  With compensation          Therapeutic Exercises (CPT 42590):     1. Pulley, x5min scaption/flexion/abduction    2. Shoulder AAROM dowel: flexion/ER/Abd supine, x30 each    3. Scap ret/depression, 2x10    6. Floor broom flexion/scaption/abduction, x30    7. Abduction AAROM with dowel, x20    8. Gear shift flexion, x30    9. Wall walks, x20    12. Scap 3lbs, xfatigue    Therapeutic Treatments and Modalities:     1. Manual Therapy (CPT 17260), PROM / MWM R shoulder into flexion and ER, x10min    Time-based treatments/modalities:  Manual therapy minutes (CPT 87379): 10 minutes  Therapeutic exercise minutes (CPT 71686): 28 minutes       Pain rating before treatment: 0  Pain rating after treatment: 2    ASSESSMENT:   Response to treatment: Patient responded well to therapy with a small increase in AROM. Due to a slight plateau in PROM patient will trial a more aggressive HEP.     PLAN/RECOMMENDATIONS:   Plan for treatment: therapy treatment to continue next visit.  Planned interventions for next visit: manual therapy (CPT 61998), neuromuscular re-education (CPT 10221) and therapeutic exercise (CPT 43028).

## 2018-08-20 ENCOUNTER — PHYSICAL THERAPY (OUTPATIENT)
Dept: PHYSICAL THERAPY | Facility: REHABILITATION | Age: 83
End: 2018-08-20
Attending: ORTHOPAEDIC SURGERY
Payer: MEDICARE

## 2018-08-20 DIAGNOSIS — M25.511 RIGHT SHOULDER PAIN, UNSPECIFIED CHRONICITY: ICD-10-CM

## 2018-08-20 DIAGNOSIS — S42.221A 2-PART DISPLACED FRACTURE OF SURGICAL NECK OF RIGHT HUMERUS, INITIAL ENCOUNTER FOR CLOSED FRACTURE: ICD-10-CM

## 2018-08-20 PROCEDURE — 97110 THERAPEUTIC EXERCISES: CPT

## 2018-08-20 PROCEDURE — 97140 MANUAL THERAPY 1/> REGIONS: CPT

## 2018-08-20 NOTE — OP THERAPY DAILY TREATMENT
Outpatient Physical Therapy  DAILY TREATMENT     [facility] Outpatient Physical Therapy [place]  [address]  [place] [address]  Phone:  [phone]  Fax:  [phone]    Date: 08/20/2018    Patient: Yoli Carmichael  YOB: 1933  MRN: [id number]     Time Calculation  Start time: 1152  Stop time: 1230 Time Calculation (min): 38 minutes     Chief Complaint: Shoulder Problem    Visit #: 15    SUBJECTIVE:  Patient reports no pain and states she feels slight improvement.     OBJECTIVE:  Current objective measures:   Shoulder flexion  R AROM:  96deg        AAROM:  121deg  With compensation          Therapeutic Exercises (CPT 29704):     1. Pulley, x5min scaption/flexion/abduction    2. Shoulder AAROM dowel: flexion/ER/Abd supine, x30 each    3. Scap ret/depression, 2x10    6. Floor broom flexion/scaption/abduction, x30    7. Abduction AAROM with dowel, x20    8. Gear shift flexion, x30    9. Wall walks, x20    12. Scap 3lbs, xfatigue    Therapeutic Treatments and Modalities:     1. Manual Therapy (CPT 89811), PROM / MWM R shoulder into flexion and ER, x10min    Time-based treatments/modalities:  Manual therapy minutes (CPT 94850): 10 minutes  Therapeutic exercise minutes (CPT 93855): 28 minutes       Pain rating before treatment: 0  Pain rating after treatment: 1    ASSESSMENT:   Response to treatment: Patient continues to demonstrate gradual gains with therapy. Patient will now trial 1x per week. If gains continue wean to HEP.     PLAN/RECOMMENDATIONS:   Plan for treatment: therapy treatment to continue next visit.  Planned interventions for next visit: manual therapy (CPT 42721), neuromuscular re-education (CPT 05888) and therapeutic exercise (CPT 69578).

## 2018-08-22 ENCOUNTER — PHYSICAL THERAPY (OUTPATIENT)
Dept: PHYSICAL THERAPY | Facility: REHABILITATION | Age: 83
End: 2018-08-22
Attending: ORTHOPAEDIC SURGERY
Payer: MEDICARE

## 2018-08-22 DIAGNOSIS — S42.221A 2-PART DISPLACED FRACTURE OF SURGICAL NECK OF RIGHT HUMERUS, INITIAL ENCOUNTER FOR CLOSED FRACTURE: ICD-10-CM

## 2018-08-22 DIAGNOSIS — M25.511 RIGHT SHOULDER PAIN, UNSPECIFIED CHRONICITY: ICD-10-CM

## 2018-08-22 PROCEDURE — 97140 MANUAL THERAPY 1/> REGIONS: CPT

## 2018-08-22 PROCEDURE — 97110 THERAPEUTIC EXERCISES: CPT

## 2018-08-22 NOTE — OP THERAPY DAILY TREATMENT
Outpatient Physical Therapy  DAILY TREATMENT     [facility] Outpatient Physical Therapy [place]  [address]  [place] [address]  Phone:  [phone]  Fax:  [phone]    Date: 08/22/2018    Patient: Yoli Carmichael  YOB: 1933  MRN: [id number]     Time Calculation  Start time: 1323  Stop time: 1401 Time Calculation (min): 38 minutes     Chief Complaint: Shoulder Problem    Visit #: 16    SUBJECTIVE:  Notes that the shoulder is feeling ok. States she can now partially comb her hear with her right arm.     OBJECTIVE:  Current objective measures:   Shoulder flexion  R AROM:  96deg        AAROM:  121deg  With compensation          Therapeutic Exercises (CPT 34486):     1. Pulley, x5min scaption/flexion/abduction    2. Shoulder AAROM dowel: flexion/ER/Abd supine, x30 each    3. Scap ret/depression, 2x10    6. Floor broom flexion/scaption/abduction, x30    7. Abduction AAROM with dowel, x20    8. Gear shift flexion, x30    9. Wall walks, x20    10. AROM ER/IR L1, xfatigue    12. Scap 3lbs, xfatigue    Therapeutic Treatments and Modalities:     1. Manual Therapy (CPT 13710), PROM / MWM R shoulder into flexion and ER, x10min    Time-based treatments/modalities:  Manual therapy minutes (CPT 56457): 8 minutes  Therapeutic exercise minutes (CPT 45837): 30 minutes       Pain rating before treatment: 0  Pain rating after treatment: 3    ASSESSMENT:   Response to treatment: Patient responded fair to therapy. ROM gains have been slowing down. Patient to work more on AROM to restore more function. Patient will now miss one week of PT due to vacation.     PLAN/RECOMMENDATIONS:   Plan for treatment: therapy treatment to continue next visit.  Planned interventions for next visit: manual therapy (CPT 76347), neuromuscular re-education (CPT 30011) and therapeutic exercise (CPT 52410).

## 2018-09-07 ENCOUNTER — PHYSICAL THERAPY (OUTPATIENT)
Dept: PHYSICAL THERAPY | Facility: REHABILITATION | Age: 83
End: 2018-09-07
Attending: ORTHOPAEDIC SURGERY
Payer: MEDICARE

## 2018-09-07 DIAGNOSIS — M25.511 RIGHT SHOULDER PAIN, UNSPECIFIED CHRONICITY: ICD-10-CM

## 2018-09-07 DIAGNOSIS — S42.221A 2-PART DISPLACED FRACTURE OF SURGICAL NECK OF RIGHT HUMERUS, INITIAL ENCOUNTER FOR CLOSED FRACTURE: ICD-10-CM

## 2018-09-07 PROCEDURE — 97110 THERAPEUTIC EXERCISES: CPT

## 2018-09-07 PROCEDURE — 97140 MANUAL THERAPY 1/> REGIONS: CPT

## 2018-09-07 NOTE — OP THERAPY DAILY TREATMENT
Outpatient Physical Therapy  DAILY TREATMENT     [facility] Outpatient Physical Therapy [place]  [address]  [facility] [address]  Phone:  [phone]  Fax:  [phone]    Date: 09/07/2018    Patient: Yoli Carmichael  YOB: 1933  MRN: [id number]     Time Calculation  Start time: 1200  Stop time: 1240 Time Calculation (min): 40 minutes     Chief Complaint: Shoulder Problem    Visit #: 17    SUBJECTIVE:  Patient reports that she did not keep up with HEP on vacation. States that she feels her shoulder tightened up. Notes that she can comb slightly more of her hair.     OBJECTIVE:  Current objective measures:   Shoulder flexion  R AROM:  93deg        AAROM:  123deg  With compensation          Therapeutic Exercises (CPT 78478):     1. Pulley, x5min scaption/flexion/abduction    2. Shoulder AAROM dowel: flexion/ER/Abd supine, x30 each    3. Scap ret/depression, 2x10    6. Floor broom flexion/scaption/abduction, x30    7. Abduction AAROM with dowel, x20    8. Gear shift flexion, x30    9. Wall walks, x20    10. AROM ER/IR L1, xfatigue    12. Scap 3lbs, xfatigue    Therapeutic Treatments and Modalities:     1. Manual Therapy (CPT 96180), PROM / MWM R shoulder into flexion and ER, x10min    Time-based treatments/modalities:  Manual therapy minutes (CPT 89548): 10 minutes  Therapeutic exercise minutes (CPT 14898): 30 minutes       Pain rating before treatment: 0  Pain rating after treatment: 1    ASSESSMENT:   Response to treatment: Patient responded fair to therapy with a slight increase in pain and fatigue. Patient will be trailed with 1-2 more sessions to determine if further improvement is possible if not consider discharge to [facility].     PLAN/RECOMMENDATIONS:   Plan for treatment: therapy treatment to continue next visit.  Planned interventions for next visit: manual therapy (CPT 89997) and therapeutic exercise (CPT 78112).

## 2018-09-12 ENCOUNTER — APPOINTMENT (OUTPATIENT)
Dept: PHYSICAL THERAPY | Facility: REHABILITATION | Age: 83
End: 2018-09-12
Attending: ORTHOPAEDIC SURGERY
Payer: MEDICARE

## 2018-09-19 ENCOUNTER — PHYSICAL THERAPY (OUTPATIENT)
Dept: PHYSICAL THERAPY | Facility: REHABILITATION | Age: 83
End: 2018-09-19
Attending: ORTHOPAEDIC SURGERY
Payer: MEDICARE

## 2018-09-19 DIAGNOSIS — S42.221A 2-PART DISPLACED FRACTURE OF SURGICAL NECK OF RIGHT HUMERUS, INITIAL ENCOUNTER FOR CLOSED FRACTURE: ICD-10-CM

## 2018-09-19 DIAGNOSIS — M25.511 RIGHT SHOULDER PAIN, UNSPECIFIED CHRONICITY: ICD-10-CM

## 2018-09-19 PROCEDURE — 97110 THERAPEUTIC EXERCISES: CPT

## 2018-09-19 NOTE — OP THERAPY DAILY TREATMENT
Outpatient Physical Therapy  DAILY TREATMENT     [facility] Outpatient Physical Therapy [place]  [address]  [place] [address]  Phone:  [phone]  Fax:  [phone]    Date: 09/19/2018    Patient: Yoli Carmichael  YOB: 1933  MRN: [id number]     Time Calculation  Start time: 0925  Stop time: 1003 Time Calculation (min): 38 minutes     Chief Complaint: Shoulder Problem    Visit #: 18    SUBJECTIVE:  Patient reports that symptoms are still doing ok. Notes pain only when she reaches her end range. Patient notes she is still unable to brush the back of her hair with her R hand.     OBJECTIVE:  Current objective measures:   Flexion AAROM 123deg  Flexion AROM: 100deg          Therapeutic Exercises (CPT 33706):     1. Pulley, x5min scaption/flexion/abduction    2. Shoulder AAROM dowel: flexion/ER/Abd supine, x30 each    3. Scap ret/depression, 2x10    4. Table slides, x20    5. Pendulums, x20    6. Floor broom flexion/scaption/abduction, x30    7. Abduction AAROM with dowel, x20    8. Gear shift flexion, x30    9. Wall walks, x20    10. AROM ER/IR L2, xfatigue    12. Scap 3lbs, xfatigue      Time-based treatments/modalities:  Therapeutic exercise minutes (CPT 74094): 38 minutes        Pain rating before treatment: 0  Pain rating after treatment: 2    ASSESSMENT:   Response to treatment:  Patient has demonstrated slight change with AROM. Patient continues to remain stable with her PROM/AAROM. Patient will now trial a HEP for 1 week. Patient is reaching near max improvement at this time. Plan to fully reval at next visit       PLAN/RECOMMENDATIONS:   Plan for treatment: therapy treatment to continue next visit.  Planned interventions for next visit: manual therapy (CPT 68986), neuromuscular re-education (CPT 66493) and therapeutic exercise (CPT 62791).

## 2018-09-27 ENCOUNTER — OFFICE VISIT (OUTPATIENT)
Dept: MEDICAL GROUP | Facility: PHYSICIAN GROUP | Age: 83
End: 2018-09-27
Payer: MEDICARE

## 2018-09-27 ENCOUNTER — PHYSICAL THERAPY (OUTPATIENT)
Dept: PHYSICAL THERAPY | Facility: REHABILITATION | Age: 83
End: 2018-09-27
Attending: ORTHOPAEDIC SURGERY
Payer: MEDICARE

## 2018-09-27 VITALS
WEIGHT: 146 LBS | HEIGHT: 64 IN | HEART RATE: 82 BPM | OXYGEN SATURATION: 96 % | SYSTOLIC BLOOD PRESSURE: 118 MMHG | DIASTOLIC BLOOD PRESSURE: 64 MMHG | BODY MASS INDEX: 24.92 KG/M2 | TEMPERATURE: 97.8 F

## 2018-09-27 DIAGNOSIS — S42.221A 2-PART DISPLACED FRACTURE OF SURGICAL NECK OF RIGHT HUMERUS, INITIAL ENCOUNTER FOR CLOSED FRACTURE: ICD-10-CM

## 2018-09-27 DIAGNOSIS — M85.80 OSTEOPENIA OF THE ELDERLY: Chronic | ICD-10-CM

## 2018-09-27 DIAGNOSIS — S42.301D CLOSED FRACTURE OF SHAFT OF RIGHT HUMERUS WITH ROUTINE HEALING, UNSPECIFIED FRACTURE MORPHOLOGY, SUBSEQUENT ENCOUNTER: ICD-10-CM

## 2018-09-27 DIAGNOSIS — M25.511 RIGHT SHOULDER PAIN, UNSPECIFIED CHRONICITY: ICD-10-CM

## 2018-09-27 DIAGNOSIS — I10 ESSENTIAL HYPERTENSION, BENIGN: ICD-10-CM

## 2018-09-27 DIAGNOSIS — F32.9 REACTIVE DEPRESSION: ICD-10-CM

## 2018-09-27 PROCEDURE — 99214 OFFICE O/P EST MOD 30 MIN: CPT | Performed by: FAMILY MEDICINE

## 2018-09-27 PROCEDURE — 97110 THERAPEUTIC EXERCISES: CPT

## 2018-09-27 RX ORDER — ESCITALOPRAM OXALATE 10 MG/1
10 TABLET ORAL DAILY
Qty: 90 TAB | Refills: 0 | Status: SHIPPED | OUTPATIENT
Start: 2018-09-27 | End: 2018-10-20 | Stop reason: SDUPTHER

## 2018-09-27 ASSESSMENT — PATIENT HEALTH QUESTIONNAIRE - PHQ9
SUM OF ALL RESPONSES TO PHQ QUESTIONS 1-9: 6
CLINICAL INTERPRETATION OF PHQ2 SCORE: 2
5. POOR APPETITE OR OVEREATING: 0 - NOT AT ALL

## 2018-09-27 NOTE — ASSESSMENT & PLAN NOTE
Ongoing issue.  Patient has completed her course of physical therapy.  She states that she still has decreased active range of motion.  She is Michi followed up with orthopedist she was recommended to surgery.  At this time she has declined surgery.  She currently denies any numbness or tingling; only reports restrictive active range of motion; currently denies any pain.

## 2018-09-27 NOTE — ASSESSMENT & PLAN NOTE
This problem is new to me.  Patient is reporting depressive symptoms due to multiple stressors in her family.  She states that she feels sad and feels it is difficult to manage because of the sadness.  She currently denies any suicidal or homicidal ideation including no previous attempts, no current plan and a good support system.  Patient reports that her daughter had previously been on an antidepressant and did very well.

## 2018-09-27 NOTE — PROGRESS NOTES
Subjective:   Yoli Carmichael is a 85 y.o. female here today for osteopenia, hypertension, fractured humerus, depression    Osteopenia of the elderly  Ongoing issues; patient was diagnosed with osteopenia based on results from previous DEXA scan.  Her most recent fracture was to the humerus of the right arm.  Otherwise she does not have a significant history of long bone or vertebral fractures.    Essential hypertension, benign  Ongoing issues; patient compliant with losartan 50 mg daily; currently denies any headache, dizziness, chest pain; current blood pressure is within a recommended range    Closed fracture of shaft of humerus  Ongoing issue.  Patient has completed her course of physical therapy.  She states that she still has decreased active range of motion.  She is Michi followed up with orthopedist she was recommended to surgery.  At this time she has declined surgery.  She currently denies any numbness or tingling; only reports restrictive active range of motion; currently denies any pain.    Reactive depression  This problem is new to me.  Patient is reporting depressive symptoms due to multiple stressors in her family.  She states that she feels sad and feels it is difficult to manage because of the sadness.  She currently denies any suicidal or homicidal ideation including no previous attempts, no current plan and a good support system.  Patient reports that her daughter had previously been on an antidepressant and did very well.         Current medicines (including changes today)  Current Outpatient Prescriptions   Medication Sig Dispense Refill   • escitalopram (LEXAPRO) 10 MG Tab Take 1 Tab by mouth every day. 90 Tab 0   • losartan (COZAAR) 50 MG Tab Take 1 tablet twice daily. 180 Tab 3   • metoprolol SR (TOPROL XL) 25 MG TABLET SR 24 HR Take 1 Tab by mouth every day. 90 Tab 3   • TURMERIC PO Take  by mouth.     • CycloSPORINE (RESTASIS OP) by Ophthalmic route 2 Times a Day.     • coenzyme Q-10 30  "MG capsule Take 300 mg by mouth 2X A WEEK.     • ketotifen (ZADITOR) 0.025 % ophthalmic solution Place 1 Drop in both eyes 2 times a day.     • B Complex-C (SUPER B COMPLEX PO) Take  by mouth.     • Glycerin-Polysorbate 80 (REFRESH DRY EYE THERAPY) 1-1 % SOLN 1 Drop by Ophthalmic route 4 times a day.     • Calcium-Vitamin D-Vitamin K (CALCIUM + D) 500-1000-40 MG-UNT-MCG CHEW Take 1,000 mg by mouth every day.     • Multiple Vitamins-Minerals (PRESERVISION/LUTEIN PO) Take  by mouth 2 Times a Day.     • Cholecalciferol (VITAMIN D) 2000 UNITS CAPS Take 6,000 Units by mouth every day.       No current facility-administered medications for this visit.      She  has a past medical history of Arrhythmia; Arthritis; Bronchitis; CATARACT; Dry eyes, bilateral (3/27/2014); Osteopenia of the elderly (3/27/2014); and Pneumonia.    ROS   No chest pain, no shortness of breath, no abdominal pain  + Feelings of sadness     Objective:     Blood pressure 118/64, pulse 82, temperature 36.6 °C (97.8 °F), height 1.626 m (5' 4\"), weight 66.2 kg (146 lb), SpO2 96 %. Body mass index is 25.06 kg/m².   Physical Exam:  Alert, oriented in no acute distress.  Eye contact is good, speech goal directed, affect calm  HEENT: conjunctiva non-injected, sclera non-icteric.  Pinna normal. TM pearly gray.   Oral mucous membranes pink and moist with no lesions.  Neck No adenopathy or masses in the neck or supraclavicular regions.  Lungs: clear to auscultation bilaterally with good excursion.  CV: regular rate and rhythm.  Abdomen: soft, nontender, No CVAT  Ext: no edema, color normal, vascularity normal, temperature normal  Psych: Normal affect and mood with good insight and judgment to current condition      Assessment and Plan:   The following treatment plan was discussed   1. Closed fracture of shaft of right humerus with routine healing, unspecified fracture morphology, subsequent encounter      Stable.  Strongly encouraged patient to follow with " physical therapy and with orthopedist to consider other options if necessary.   2. Osteopenia of the elderly  DS-BONE DENSITY STUDY (DEXA)    Stable.  Bone scan ordered for reevaluation; monitor for results   3. Reactive depression      Uncontrolled; trial of Lexapro 10 mg provided; medication reviewed with patient; monitor for tolerance and effect   4. Essential hypertension, benign      Stable.  Continue current medications; follow with cardiology as directed       Followup: Return in about 3 months (around 12/27/2018) for depression, Short.

## 2018-09-27 NOTE — OP THERAPY DISCHARGE SUMMARY
Outpatient Physical Therapy  DISCHARGE SUMMARY NOTE      [facility] [place]  [address]  [place] [address]  Phone:  [phone]  Fax:  [phone]    Date of Visit: 09/27/2018    Patient: Yoli Carmichael  YOB: 1933  MRN: [id number]     Referring Provider: Shukri Bradley M.D.  [address] [place] [address]   Referring Diagnosis Pain in right shoulder [M25.511];2-part displaced fracture of surgical neck of right humerus, initial encounter for closed fracture [S42.221A]     Physical Therapy Occurrence Codes    Date of onset of impairment:  6/2/18   Date physical therapy care plan established or reviewed:  6/29/18   Date physical therapy treatment started:  6/29/18              Your patient is being discharged from Physical Therapy with the following comments:   · Goals partially met    Comments:  Patient has reached near max medical improvement.      Limitations Remaining:  Unable to brush the back of her hair with R hand    Recommendations:  Discharge to [facility]    Agapito Anderson PT, DPT    Date: 9/27/2018

## 2018-09-27 NOTE — ASSESSMENT & PLAN NOTE
Ongoing issues; patient compliant with losartan 50 mg daily; currently denies any headache, dizziness, chest pain; current blood pressure is within a recommended range

## 2018-09-27 NOTE — OP THERAPY DAILY TREATMENT
Outpatient Physical Therapy  DAILY TREATMENT     [facility] Outpatient Physical Therapy [place]  [address]  [place] [facility] [address]  Phone:  [phone]  Fax:  [phone]    Date: 09/27/2018    Patient: Yoli Carmichael  YOB: 1933  MRN: [id number]     Time Calculation  Start time: 1350  Stop time: 1430 Time Calculation (min): 40 minutes     Chief Complaint: Shoulder Problem    Visit #: 19    SUBJECTIVE:  Patient reports that her motion has stabilized and that she is still does her HEP. Notes that she does not want to go to ortho at this time and is ok with discharge to [facility].     OBJECTIVE:  Current objective measures:   Flexion AAROM 123deg  Flexion AROM: 100deg    Short Term Goals:   1) Patient's flexion will improve by 90 to facilitate for eating tasks. MET  2) Patient's elbow extension will improve to 0 deg to facilitate normal elbow tasks. MET        Long Term Goals:    1) Patient's shoulder flexion will improve by 30deg to allow for washing of hair. MET  2) Patient's SPADI will improve by 15 to demonstrate functional improvement Not assessed           Therapeutic Exercises (CPT 25338):     1. Pulley, x5min scaption/flexion/abduction    2. Shoulder AAROM dowel: flexion/ER/Abd supine, x30 each    3. Scap ret/depression, 2x10    4. Table slides, x20    5. Pendulums, x20    6. Floor broom flexion/scaption/abduction, x30    7. Abduction AAROM with dowel, x20    8. Gear shift flexion, x30    9. Wall walks, x20    10. AROM ER/IR L2, xfatigue    12. Scap 3lbs, xfatigue      Time-based treatments/modalities:  Therapeutic exercise minutes (CPT 10144): 40 minutes       Pain rating before treatment: 1  Pain rating after treatment: 1    ASSESSMENT:   Response to treatment: Patient has reached near maximal improvement at this time. Patient will now continue with HEP and continue to work on ROM.     PLAN/RECOMMENDATIONS:   Plan for treatment: Discharge to [facility].  Planned interventions for next visit:  Discharge to [facility].

## 2018-09-27 NOTE — ASSESSMENT & PLAN NOTE
Ongoing issues; patient was diagnosed with osteopenia based on results from previous DEXA scan.  Her most recent fracture was to the humerus of the right arm.  Otherwise she does not have a significant history of long bone or vertebral fractures.

## 2018-10-03 ENCOUNTER — HOSPITAL ENCOUNTER (OUTPATIENT)
Dept: RADIOLOGY | Facility: MEDICAL CENTER | Age: 83
End: 2018-10-03
Attending: FAMILY MEDICINE
Payer: MEDICARE

## 2018-10-03 DIAGNOSIS — M85.80 OSTEOPENIA OF THE ELDERLY: Chronic | ICD-10-CM

## 2018-10-03 PROCEDURE — 77080 DXA BONE DENSITY AXIAL: CPT

## 2018-10-26 ENCOUNTER — OFFICE VISIT (OUTPATIENT)
Dept: URGENT CARE | Facility: PHYSICIAN GROUP | Age: 83
End: 2018-10-26
Payer: MEDICARE

## 2018-10-26 ENCOUNTER — HOSPITAL ENCOUNTER (OUTPATIENT)
Facility: MEDICAL CENTER | Age: 83
End: 2018-10-26
Attending: NURSE PRACTITIONER
Payer: MEDICARE

## 2018-10-26 VITALS
RESPIRATION RATE: 20 BRPM | OXYGEN SATURATION: 97 % | HEART RATE: 60 BPM | SYSTOLIC BLOOD PRESSURE: 138 MMHG | TEMPERATURE: 97 F | DIASTOLIC BLOOD PRESSURE: 80 MMHG

## 2018-10-26 DIAGNOSIS — L03.115 CELLULITIS OF RIGHT LOWER EXTREMITY: ICD-10-CM

## 2018-10-26 DIAGNOSIS — L53.9 ERYTHEMA: ICD-10-CM

## 2018-10-26 DIAGNOSIS — M25.473 ANKLE SWELLING, UNSPECIFIED LATERALITY: ICD-10-CM

## 2018-10-26 LAB
ALBUMIN SERPL BCP-MCNC: 4 G/DL (ref 3.2–4.9)
ALBUMIN/GLOB SERPL: 1.3 G/DL
ALP SERPL-CCNC: 55 U/L (ref 30–99)
ALT SERPL-CCNC: 14 U/L (ref 2–50)
ANION GAP SERPL CALC-SCNC: 9 MMOL/L (ref 0–11.9)
AST SERPL-CCNC: 19 U/L (ref 12–45)
BILIRUB SERPL-MCNC: 0.8 MG/DL (ref 0.1–1.5)
BNP SERPL-MCNC: 158 PG/ML (ref 0–100)
BUN SERPL-MCNC: 18 MG/DL (ref 8–22)
CALCIUM SERPL-MCNC: 9.7 MG/DL (ref 8.5–10.5)
CHLORIDE SERPL-SCNC: 107 MMOL/L (ref 96–112)
CO2 SERPL-SCNC: 27 MMOL/L (ref 20–33)
CREAT SERPL-MCNC: 0.9 MG/DL (ref 0.5–1.4)
GLOBULIN SER CALC-MCNC: 3.2 G/DL (ref 1.9–3.5)
GLUCOSE SERPL-MCNC: 93 MG/DL (ref 65–99)
POTASSIUM SERPL-SCNC: 4.3 MMOL/L (ref 3.6–5.5)
PROT SERPL-MCNC: 7.2 G/DL (ref 6–8.2)
SODIUM SERPL-SCNC: 143 MMOL/L (ref 135–145)

## 2018-10-26 PROCEDURE — 99214 OFFICE O/P EST MOD 30 MIN: CPT | Performed by: NURSE PRACTITIONER

## 2018-10-26 PROCEDURE — 80053 COMPREHEN METABOLIC PANEL: CPT

## 2018-10-26 PROCEDURE — 83880 ASSAY OF NATRIURETIC PEPTIDE: CPT

## 2018-10-26 RX ORDER — CEPHALEXIN 500 MG/1
500 CAPSULE ORAL 4 TIMES DAILY
Qty: 28 CAP | Refills: 0 | Status: SHIPPED | OUTPATIENT
Start: 2018-10-26 | End: 2018-11-02

## 2018-10-26 NOTE — PROGRESS NOTES
Chief Complaint   Patient presents with   • Rash     x1 week, both ankles       HISTORY OF PRESENT ILLNESS: Patient is a 85 y.o. female who presents to urgent care today with complaints of ankle swelling and redness. States that for the past week she has had some swelling to bilateral ankles, R>L. In addition, she has noticed a gradual onset of some redness to her anterior right shin. The area is somewhat tender, non-itchy, non-draining. She denies any injury or trauma. She denies fever, chills, joint pain, malaise, urinary changes, shortness of breath, chest pain, or fatigue. States she otherwise feels well. She has not tried anything for treatment. Denies previous history of the same. She has a history of palpitations, HTN and SVT; denies history of known heart failure, she sees a cardiologist for her cardiac history. She admits to recently starting Lexapro, otherwise denies any recent changes to her health.         Patient Active Problem List    Diagnosis Date Noted   • Reactive depression 09/27/2018   • Closed fracture of shaft of humerus 06/19/2018   • Dyslipidemia 06/19/2018   • Low serum vitamin D 06/19/2018   • Palpitations 11/10/2017   • Essential hypertension, benign 11/10/2017   • Degenerative disc disease, lumbar 07/17/2017   • Non-rheumatic mitral regurgitation 05/07/2015   • Cough 10/28/2014   • Dry eyes, bilateral 03/27/2014   • Osteopenia of the elderly 03/27/2014   • S/P total knee replacement right 09/03/2013       Allergies:Other environmental    Current Outpatient Prescriptions Ordered in [facility]   Medication Sig Dispense Refill   • escitalopram (LEXAPRO) 10 MG Tab TAKE ONE TABLET BY MOUTH ONE TIME DAILY  90 Tab 2   • losartan (COZAAR) 50 MG Tab Take 1 tablet twice daily. 180 Tab 3   • metoprolol SR (TOPROL XL) 25 MG TABLET SR 24 HR Take 1 Tab by mouth every day. 90 Tab 3   • TURMERIC PO Take  by mouth.     • CycloSPORINE (RESTASIS OP) by Ophthalmic route 2 Times a Day.     • coenzyme Q-10 30 MG  capsule Take 300 mg by mouth 2X A WEEK.     • ketotifen (ZADITOR) 0.025 % ophthalmic solution Place 1 Drop in both eyes 2 times a day.     • B Complex-C (SUPER B COMPLEX PO) Take  by mouth.     • Glycerin-Polysorbate 80 (REFRESH DRY EYE THERAPY) 1-1 % SOLN 1 Drop by Ophthalmic route 4 times a day.     • Calcium-Vitamin D-Vitamin K (CALCIUM + D) 500-1000-40 MG-UNT-MCG CHEW Take 1,000 mg by mouth every day.     • Multiple Vitamins-Minerals (PRESERVISION/LUTEIN PO) Take  by mouth 2 Times a Day.     • Cholecalciferol (VITAMIN D) 2000 UNITS CAPS Take 6,000 Units by mouth every day.       No current [facility]-ordered facility-administered medications on file.        Past Medical History:   Diagnosis Date   • Arrhythmia    • Arthritis     knees   • Bronchitis     long time ago   • CATARACT     no surgery yet   • Dry eyes, bilateral 3/27/2014   • Osteopenia of the elderly 3/27/2014   • Pneumonia     long time ago       Social History   Substance Use Topics   • Smoking status: Never Smoker   • Smokeless tobacco: Never Used      Comment: Pt exposed to second hand smoker   • Alcohol use Yes      Comment: 1-2 week       Family Status   Relation Status   • Mo  at age 92        old age   • Fa  at age 85        CHF   • MAunt    • Rishi  at age 48        Lung C A   • Rishi Alive   • Rishi Alive   • Bro      Family History   Problem Relation Age of Onset   • Lung Disease Mother         frequent pneumonia   • Heart Disease Father 54        MI   • Lung Disease Father         Emphysema   • Heart Attack Father    • Cancer Maternal Aunt         Great Aunt and Cousin Ovarian CA   • Hypertension Maternal Aunt    • Cancer Daughter         Lung and Brain CA   • Seizures Daughter    • Cancer Daughter         Pituitary tumor   • Cancer Brother         leukemia       ROS:  Review of Systems   Constitutional: Negative for fever, chills, weight loss, malaise, and fatigue.   HENT: Negative for ear pain, nosebleeds,  congestion, sore throat and neck pain.    Eyes: Negative for vision changes.   Neuro: Negative for headache, sensory changes, weakness, seizure, LOC.   Cardiovascular: Negative for chest pain, palpitations, orthopnea.  Respiratory: Negative for cough, sputum production, shortness of breath and wheezing.   Gastrointestinal: Negative for abdominal pain, nausea, vomiting or diarrhea.   Genitourinary: Negative for dysuria, urgency and frequency.  Musculoskeletal: Positive for bilateral leg swelling, R>L. Negative for falls, neck pain, back pain, joint pain, myalgias.   Skin: Positive for tender erythema RLE. Negative for diaphoresis.     Exam:  Blood pressure 138/80, pulse 60, temperature 36.1 °C (97 °F), resp. rate 20, SpO2 97 %.  General: well-nourished, well-developed female in NAD  Head: normocephalic, atraumatic  Eyes: PERRLA, no conjunctival injection, acuity grossly intact, lids normal.  Ears: normal shape and symmetry, no tenderness, no discharge. External canals are without any significant edema or erythema. Tympanic membranes are without any inflammation, no effusion. Gross auditory acuity is intact.  Nose: symmetrical without tenderness, no discharge.  Mouth/Throat: reasonable hygiene, no erythema, exudates or tonsillar enlargement.  Neck: no masses, range of motion within normal limits, no tracheal deviation. No obvious thyroid enlargement.   Lymph: no cervical adenopathy. No supraclavicular adenopathy.   Neuro: alert and oriented. Cranial nerves 1-12 grossly intact. No sensory deficit.   Cardiovascular: regular rate and rhythm. No edema.  Pulmonary: no distress. Chest is symmetrical with respiration, no wheezes, crackles, or rhonchi.   Musculoskeletal: appropriate muscle tone, gait is stable. There is trace edema noted to left ankle. There is +1 edema noted to right ankle.   Skin: warm, dry, intact, no clubbing, no cyanosis, no rashes. There is a [id number] area of erythema to anterior RLE, non-draining, no  lesions, no streaking.   Psych: appropriate mood, affect, judgement.         Assessment/Plan:  1. Ankle swelling, unspecified laterality  COMP METABOLIC PANEL    BTYPE NATRIURETIC PEPTIDE   2. Cellulitis of right lower extremity  COMP METABOLIC PANEL    BTYPE NATRIURETIC PEPTIDE    cephALEXin (KEFLEX) 500 MG Cap         Lab Results   Component Value Date/Time    SODIUM 143 10/26/2018 12:38 PM    POTASSIUM 4.3 10/26/2018 12:38 PM    CHLORIDE 107 10/26/2018 12:38 PM    CO2 27 10/26/2018 12:38 PM    GLUCOSE 93 10/26/2018 12:38 PM    BUN 18 10/26/2018 12:38 PM    CREATININE 0.90 10/26/2018 12:38 PM              The patient is a very pleasant 86 y/o female with complaints of bilateral leg swelling and right sided erythema. She is non-toxic in appearance. There is +1 dependent edema noted right side and a trace left side. Her CMP is without significant abnormalities. Her BNP is slightly elevated at 158 and this could account for her her swelling. She denies history of known CHF. Her labs are discussed with her and she is instructed to follow up with her PCP this week regarding. She is given STRICT ER precautions. In addition, there is a small area of erythema noted to RLE and will cover with abx due to suspected infectious process, which also may lead to more swelling in this extremity. Probiotic used encouraged. Supportive care, differential diagnoses, and indications for immediate follow-up discussed with patient. Pathogenesis of diagnosis discussed including typical length and natural progression. Instructed to return to clinic or nearest emergency department for any change in condition, further concerns, or worsening of symptoms. Patient states understanding of the plan of care and discharge instructions.  Instructed to make an appointment, for follow up, with her primary care provider.        Please note that this dictation was created using voice recognition software. I have made every reasonable attempt to  correct obvious errors, but I expect that there are errors of grammar and possibly content that I did not discover before finalizing the note.      JAREK Villar.           Addendum 10/31/18: The patient was called for re-evaluation, she reports improvement in erythema and swelling, denies worsening, she has a follow up with cardiology tomorrow, encouraged to call back to the clinic or return to clinic with any questions or concerns.       JAREK Villar.

## 2018-11-01 ENCOUNTER — OFFICE VISIT (OUTPATIENT)
Dept: CARDIOLOGY | Facility: MEDICAL CENTER | Age: 83
End: 2018-11-01
Payer: MEDICARE

## 2018-11-01 VITALS
WEIGHT: 149 LBS | OXYGEN SATURATION: 95 % | SYSTOLIC BLOOD PRESSURE: 144 MMHG | BODY MASS INDEX: 25.44 KG/M2 | DIASTOLIC BLOOD PRESSURE: 72 MMHG | HEART RATE: 60 BPM | HEIGHT: 64 IN

## 2018-11-01 DIAGNOSIS — I10 ESSENTIAL HYPERTENSION, BENIGN: ICD-10-CM

## 2018-11-01 DIAGNOSIS — I34.0 NON-RHEUMATIC MITRAL REGURGITATION: ICD-10-CM

## 2018-11-01 DIAGNOSIS — R60.0 LOCALIZED EDEMA: Primary | ICD-10-CM

## 2018-11-01 PROCEDURE — 99214 OFFICE O/P EST MOD 30 MIN: CPT | Performed by: NURSE PRACTITIONER

## 2018-11-01 RX ORDER — POTASSIUM CHLORIDE 20 MEQ/1
20 TABLET, EXTENDED RELEASE ORAL PRN
Qty: 30 TAB | Refills: 11 | Status: SHIPPED | OUTPATIENT
Start: 2018-11-01 | End: 2020-06-26

## 2018-11-01 RX ORDER — FUROSEMIDE 40 MG/1
40 TABLET ORAL PRN
Qty: 30 TAB | Refills: 11 | Status: ON HOLD | OUTPATIENT
Start: 2018-11-01 | End: 2021-04-04

## 2018-11-01 ASSESSMENT — ENCOUNTER SYMPTOMS
PALPITATIONS: 0
SHORTNESS OF BREATH: 0
COUGH: 0
ORTHOPNEA: 0
MYALGIAS: 0
BACK PAIN: 1
DIZZINESS: 1
CLAUDICATION: 0
ABDOMINAL PAIN: 0
PND: 0
WEAKNESS: 0

## 2018-11-01 NOTE — LETTER
"     [facility]   [address] [place] [address] [address]  [address] [address]  Phone: [phone]  Fax: [phone]              Yoli Carmichael  8/21/1933    Encounter Date: 11/1/2018    STEPHANIE Cruz          PROGRESS NOTE:  Chief Complaint   Patient presents with   • HTN (Uncontrolled)     F/V DX:HTN       Subjective:   Yoli \"Duong\"  Anca Carmichael is a 85 y.o. female who presents today to follow-up on ankle edema.  She went to urgent care on October 26, 2018 and was evaluated for edema plus a red area on her right ankle.  It was not felt that she had a cellulitis and there was no break in the skin.  The only laboratory abnormality was a slight increase in her BNP at 158.    She continues to have ankle edema which is minimal in the morning and worsens throughout the day.  She does admit to drinking a large amount of water and did have chili yesterday which was salty per her report.  She has a history of some ankle swelling particularly with traveling and has been on Lasix in the past.    She denies any shortness of breath, palpitations or chest discomfort.  She is worried about congestive heart failure since her father had this.    Past Medical History:   Diagnosis Date   • Arrhythmia    • Arthritis     knees   • Bronchitis     long time ago   • CATARACT     no surgery yet   • Dry eyes, bilateral 3/27/2014   • Osteopenia of the elderly 3/27/2014   • Pneumonia     long time ago     Past Surgical History:   Procedure Laterality Date   • KNEE ARTHROPLASTY TOTAL  9/3/2013    Performed by Ramesh Styles M.D. at SURGERY [facility] ORS   • TONSILLECTOMY       Family History   Problem Relation Age of Onset   • Lung Disease Mother         frequent pneumonia   • Heart Disease Father 54        MI   • Lung Disease Father         Emphysema   • Heart Attack Father    • Cancer Maternal Aunt         Great Aunt and Cousin Ovarian CA   • Hypertension Maternal Aunt    • Cancer Daughter    "   Lung and Brain CA   • Seizures Daughter    • Cancer Daughter         Pituitary tumor   • Cancer Brother         leukemia     Social History     Social History   • Marital status:      Spouse name: N/A   • Number of children: N/A   • Years of education: N/A     Occupational History   • Not on file.     Social History Main Topics   • Smoking status: Never Smoker   • Smokeless tobacco: Never Used      Comment: Pt exposed to second hand smoker   • Alcohol use Yes      Comment: 1-2 week   • Drug use: Yes     Types: Marijuana      Comment: medical(prn)   • Sexual activity: Not on file     Other Topics Concern   • Not on file     Social History Narrative   • No narrative on file     Allergies   Allergen Reactions   • Other Environmental      Seasonal allergies     Outpatient Encounter Prescriptions as of 11/1/2018   Medication Sig Dispense Refill   • furosemide (LASIX) 40 MG Tab Take 1 Tab by mouth as needed. For ankle swelling. 30 Tab 11   • potassium chloride SA (KDUR) 20 MEQ Tab CR Take 1 Tab by mouth as needed. When taking Lasix. 30 Tab 11   • cephALEXin (KEFLEX) 500 MG Cap Take 1 Cap by mouth 4 times a day for 7 days. 28 Cap 0   • escitalopram (LEXAPRO) 10 MG Tab TAKE ONE TABLET BY MOUTH ONE TIME DAILY  90 Tab 2   • losartan (COZAAR) 50 MG Tab Take 1 tablet twice daily. 180 Tab 3   • metoprolol SR (TOPROL XL) 25 MG TABLET SR 24 HR Take 1 Tab by mouth every day. 90 Tab 3   • TURMERIC PO Take  by mouth.     • CycloSPORINE (RESTASIS OP) by Ophthalmic route 2 Times a Day.     • coenzyme Q-10 30 MG capsule Take 300 mg by mouth 2X A WEEK.     • ketotifen (ZADITOR) 0.025 % ophthalmic solution Place 1 Drop in both eyes 2 times a day.     • B Complex-C (SUPER B COMPLEX PO) Take  by mouth.     • Glycerin-Polysorbate 80 (REFRESH DRY EYE THERAPY) 1-1 % SOLN 1 Drop by Ophthalmic route 4 times a day.     • Calcium-Vitamin D-Vitamin K (CALCIUM + D) 500-1000-40 MG-UNT-MCG CHEW Take 1,000 mg by mouth every day.     • Multiple  "Vitamins-Minerals (PRESERVISION/LUTEIN PO) Take  by mouth 2 Times a Day.     • Cholecalciferol (VITAMIN D) 2000 UNITS CAPS Take 6,000 Units by mouth every day.       No facility-administered encounter medications on file as of 11/1/2018.      Review of Systems   Constitutional: Negative for malaise/fatigue.   Respiratory: Negative for cough and shortness of breath.    Cardiovascular: Positive for leg swelling (Right slightly worse than the left.). Negative for chest pain, palpitations, orthopnea, claudication and PND.   Gastrointestinal: Negative for abdominal pain.   Musculoskeletal: Positive for back pain (chronic bulging disc). Negative for myalgias.   Neurological: Positive for dizziness (lightheaeded). Negative for weakness.        Objective:   /72 (BP Location: Left arm, Patient Position: Sitting, BP Cuff Size: Adult)   Pulse 60   Ht 1.626 m (5' 4\")   Wt 67.6 kg (149 lb)   SpO2 95%   BMI 25.58 kg/m²      Physical Exam   Constitutional: She is oriented to person, place, and time. She appears well-developed and well-nourished.   HENT:   Head: Normocephalic.   Eyes: EOM are normal.   Neck: No JVD present.   Cardiovascular: Normal rate, regular rhythm and normal heart sounds.    Pulmonary/Chest: Effort normal and breath sounds normal.   Abdominal: Soft. Bowel sounds are normal.   Musculoskeletal: She exhibits edema (1+ edema on the right trace edema on the left.  Some superficial varicose veins present bilaterally.).   Neurological: She is alert and oriented to person, place, and time.   Skin: Skin is warm and dry.   Psychiatric: She has a normal mood and affect.     Results for LARRY BAILEY (MRN [id number])    Ref. Range 10/26/2018 12:38   Sodium Latest Ref Range: 135 - 145 mmol/L 143   Potassium Latest Ref Range: 3.6 - 5.5 mmol/L 4.3   Chloride Latest Ref Range: 96 - 112 mmol/L 107   Co2 Latest Ref Range: 20 - 33 mmol/L 27   Anion Gap Latest Ref Range: 0.0 - 11.9  9.0   Glucose Latest Ref Range: " 65 - 99 mg/dL 93   Bun Latest Ref Range: 8 - 22 mg/dL 18   Creatinine Latest Ref Range: 0.50 - 1.40 mg/dL 0.90   GFR If  Latest Ref Range: >60 mL/min/1.73 m 2 >60   GFR If Nonedema: She has mild  Latest Ref Range: >60 mL/min/1.73 m 2 59 (A)   Calcium Latest Ref Range: 8.5 - 10.5 mg/dL 9.7   AST(SGOT) Latest Ref Range: 12 - 45 U/L 19   ALT(SGPT) Latest Ref Range: 2 - 50 U/L 14   Alkaline Phosphatase Latest Ref Range: 30 - 99 U/L 55   Total Bilirubin Latest Ref Range: 0.1 - 1.5 mg/dL 0.8   Albumin Latest Ref Range: 3.2 - 4.9 g/dL 4.0   Total Protein Latest Ref Range: 6.0 - 8.2 g/dL 7.2   Globulin Latest Ref Range: 1.9 - 3.5 g/dL 3.2   A-G Ratio Latest Units: g/dL 1.3   B Natriuretic Peptide Latest Ref Range: 0 - 100 pg/mL 158 (H)     Assessment:     1. Localized edema  EC-ECHOCARDIOGRAM COMPLETE W/O CONT    furosemide (LASIX) 40 MG Tab    potassium chloride SA (KDUR) 20 MEQ Tab CR   2. Non-rheumatic mitral regurgitation     3. Essential hypertension, benign         Medical Decision Making:  Today's Assessment / Status / Plan:   Edema: She has mild ankle edema which is probably multifactorial including venous insufficiency, dependency, fluid and sodium indiscretion.  I will give her Lasix 40 mg and potassium 20 mEq to take as needed.  She can take a half dose if needed.  Written instructions are given.    Mitral regurgitation: She has a history of mitral regurgitation and her last echo was in 2016.  Her ejection fraction was normal at that time therefore I am not worried about a slight increase in BNP of 158.  Older women often have a BNP higher than 100 which can be normal for them.  I find no heart failure signs or symptoms.  However I will do echocardiogram to rule out a cardiomyopathy or worsening valve disease.    Hypertension: Her blood pressure is elevated in the office today.  She will continue to check it at home.  She tells me her blood pressure normally runs in the 130  systolic    She will follow-up as scheduled with Dr. Velasquez on November 6, 2018.      Collaborating Provider: Dr Salinas.        No Recipients

## 2018-11-01 NOTE — PROGRESS NOTES
"Chief Complaint   Patient presents with   • HTN (Uncontrolled)     F/V DX:HTN       Subjective:   Yoli \"Duong\"  Anca Carmichael is a 85 y.o. female who presents today to follow-up on ankle edema.  She went to urgent care on October 26, 2018 and was evaluated for edema plus a red area on her right ankle.  It was not felt that she had a cellulitis and there was no break in the skin.  The only laboratory abnormality was a slight increase in her BNP at 158.    She continues to have ankle edema which is minimal in the morning and worsens throughout the day.  She does admit to drinking a large amount of water and did have chili yesterday which was salty per her report.  She has a history of some ankle swelling particularly with traveling and has been on Lasix in the past.    She denies any shortness of breath, palpitations or chest discomfort.  She is worried about congestive heart failure since her father had this.    Past Medical History:   Diagnosis Date   • Arrhythmia    • Arthritis     knees   • Bronchitis     long time ago   • CATARACT     no surgery yet   • Dry eyes, bilateral 3/27/2014   • Osteopenia of the elderly 3/27/2014   • Pneumonia     long time ago     Past Surgical History:   Procedure Laterality Date   • KNEE ARTHROPLASTY TOTAL  9/3/2013    Performed by Ramesh Styles M.D. at SURGERY [facility] ORS   • TONSILLECTOMY       Family History   Problem Relation Age of Onset   • Lung Disease Mother         frequent pneumonia   • Heart Disease Father 54        MI   • Lung Disease Father         Emphysema   • Heart Attack Father    • Cancer Maternal Aunt         Great Aunt and Cousin Ovarian CA   • Hypertension Maternal Aunt    • Cancer Daughter         Lung and Brain CA   • Seizures Daughter    • Cancer Daughter         Pituitary tumor   • Cancer Brother         leukemia     Social History     Social History   • Marital status:      Spouse name: N/A   • Number of children: N/A   • Years of education: N/A "     Occupational History   • Not on file.     Social History Main Topics   • Smoking status: Never Smoker   • Smokeless tobacco: Never Used      Comment: Pt exposed to second hand smoker   • Alcohol use Yes      Comment: 1-2 week   • Drug use: Yes     Types: Marijuana      Comment: medical(prn)   • Sexual activity: Not on file     Other Topics Concern   • Not on file     Social History Narrative   • No narrative on file     Allergies   Allergen Reactions   • Other Environmental      Seasonal allergies     Outpatient Encounter Prescriptions as of 11/1/2018   Medication Sig Dispense Refill   • furosemide (LASIX) 40 MG Tab Take 1 Tab by mouth as needed. For ankle swelling. 30 Tab 11   • potassium chloride SA (KDUR) 20 MEQ Tab CR Take 1 Tab by mouth as needed. When taking Lasix. 30 Tab 11   • cephALEXin (KEFLEX) 500 MG Cap Take 1 Cap by mouth 4 times a day for 7 days. 28 Cap 0   • escitalopram (LEXAPRO) 10 MG Tab TAKE ONE TABLET BY MOUTH ONE TIME DAILY  90 Tab 2   • losartan (COZAAR) 50 MG Tab Take 1 tablet twice daily. 180 Tab 3   • metoprolol SR (TOPROL XL) 25 MG TABLET SR 24 HR Take 1 Tab by mouth every day. 90 Tab 3   • TURMERIC PO Take  by mouth.     • CycloSPORINE (RESTASIS OP) by Ophthalmic route 2 Times a Day.     • coenzyme Q-10 30 MG capsule Take 300 mg by mouth 2X A WEEK.     • ketotifen (ZADITOR) 0.025 % ophthalmic solution Place 1 Drop in both eyes 2 times a day.     • B Complex-C (SUPER B COMPLEX PO) Take  by mouth.     • Glycerin-Polysorbate 80 (REFRESH DRY EYE THERAPY) 1-1 % SOLN 1 Drop by Ophthalmic route 4 times a day.     • Calcium-Vitamin D-Vitamin K (CALCIUM + D) 500-1000-40 MG-UNT-MCG CHEW Take 1,000 mg by mouth every day.     • Multiple Vitamins-Minerals (PRESERVISION/LUTEIN PO) Take  by mouth 2 Times a Day.     • Cholecalciferol (VITAMIN D) 2000 UNITS CAPS Take 6,000 Units by mouth every day.       No facility-administered encounter medications on file as of 11/1/2018.      Review of Systems  "  Constitutional: Negative for malaise/fatigue.   Respiratory: Negative for cough and shortness of breath.    Cardiovascular: Positive for leg swelling (Right slightly worse than the left.). Negative for chest pain, palpitations, orthopnea, claudication and PND.   Gastrointestinal: Negative for abdominal pain.   Musculoskeletal: Positive for back pain (chronic bulging disc). Negative for myalgias.   Neurological: Positive for dizziness (lightheaeded). Negative for weakness.        Objective:   /72 (BP Location: Left arm, Patient Position: Sitting, BP Cuff Size: Adult)   Pulse 60   Ht 1.626 m (5' 4\")   Wt 67.6 kg (149 lb)   SpO2 95%   BMI 25.58 kg/m²     Physical Exam   Constitutional: She is oriented to person, place, and time. She appears well-developed and well-nourished.   HENT:   Head: Normocephalic.   Eyes: EOM are normal.   Neck: No JVD present.   Cardiovascular: Normal rate, regular rhythm and normal heart sounds.    Pulmonary/Chest: Effort normal and breath sounds normal.   Abdominal: Soft. Bowel sounds are normal.   Musculoskeletal: She exhibits edema (1+ edema on the right trace edema on the left.  Some superficial varicose veins present bilaterally.).   Neurological: She is alert and oriented to person, place, and time.   Skin: Skin is warm and dry.   Psychiatric: She has a normal mood and affect.     Results for LARRY BAILEY (MRN [id number])    Ref. Range 10/26/2018 12:38   Sodium Latest Ref Range: 135 - 145 mmol/L 143   Potassium Latest Ref Range: 3.6 - 5.5 mmol/L 4.3   Chloride Latest Ref Range: 96 - 112 mmol/L 107   Co2 Latest Ref Range: 20 - 33 mmol/L 27   Anion Gap Latest Ref Range: 0.0 - 11.9  9.0   Glucose Latest Ref Range: 65 - 99 mg/dL 93   Bun Latest Ref Range: 8 - 22 mg/dL 18   Creatinine Latest Ref Range: 0.50 - 1.40 mg/dL 0.90   GFR If  Latest Ref Range: >60 mL/min/1.73 m 2 >60   GFR If Nonedema: She has mild  Latest Ref Range: >60 mL/min/1.73 m " 2 59 (A)   Calcium Latest Ref Range: 8.5 - 10.5 mg/dL 9.7   AST(SGOT) Latest Ref Range: 12 - 45 U/L 19   ALT(SGPT) Latest Ref Range: 2 - 50 U/L 14   Alkaline Phosphatase Latest Ref Range: 30 - 99 U/L 55   Total Bilirubin Latest Ref Range: 0.1 - 1.5 mg/dL 0.8   Albumin Latest Ref Range: 3.2 - 4.9 g/dL 4.0   Total Protein Latest Ref Range: 6.0 - 8.2 g/dL 7.2   Globulin Latest Ref Range: 1.9 - 3.5 g/dL 3.2   A-G Ratio Latest Units: g/dL 1.3   B Natriuretic Peptide Latest Ref Range: 0 - 100 pg/mL 158 (H)     Assessment:     1. Localized edema  EC-ECHOCARDIOGRAM COMPLETE W/O CONT    furosemide (LASIX) 40 MG Tab    potassium chloride SA (KDUR) 20 MEQ Tab CR   2. Non-rheumatic mitral regurgitation     3. Essential hypertension, benign         Medical Decision Making:  Today's Assessment / Status / Plan:   Edema: She has mild ankle edema which is probably multifactorial including venous insufficiency, dependency, fluid and sodium indiscretion.  I will give her Lasix 40 mg and potassium 20 mEq to take as needed.  She can take a half dose if needed.  Written instructions are given.    Mitral regurgitation: She has a history of mitral regurgitation and her last echo was in 2016.  Her ejection fraction was normal at that time therefore I am not worried about a slight increase in BNP of 158.  Older women often have a BNP higher than 100 which can be normal for them.  I find no heart failure signs or symptoms.  However I will do echocardiogram to rule out a cardiomyopathy or worsening valve disease.    Hypertension: Her blood pressure is elevated in the office today.  She will continue to check it at home.  She tells me her blood pressure normally runs in the 130 systolic    She will follow-up as scheduled with Dr. Velasquez on November 6, 2018.      Collaborating Provider: Dr Salinas.

## 2018-11-02 ENCOUNTER — HOSPITAL ENCOUNTER (OUTPATIENT)
Dept: CARDIOLOGY | Facility: MEDICAL CENTER | Age: 83
End: 2018-11-02
Attending: NURSE PRACTITIONER
Payer: MEDICARE

## 2018-11-02 DIAGNOSIS — R60.0 LOCALIZED EDEMA: ICD-10-CM

## 2018-11-02 LAB
LV EJECT FRACT  99904: 70
LV EJECT FRACT MOD 2C 99903: 75.98
LV EJECT FRACT MOD 4C 99902: 76.23
LV EJECT FRACT MOD BP 99901: 76.09

## 2018-11-02 PROCEDURE — 93306 TTE W/DOPPLER COMPLETE: CPT | Mod: 26 | Performed by: INTERNAL MEDICINE

## 2018-11-02 PROCEDURE — 93306 TTE W/DOPPLER COMPLETE: CPT

## 2018-11-06 ENCOUNTER — OFFICE VISIT (OUTPATIENT)
Dept: CARDIOLOGY | Facility: MEDICAL CENTER | Age: 83
End: 2018-11-06
Payer: MEDICARE

## 2018-11-06 VITALS
WEIGHT: 146 LBS | HEIGHT: 64 IN | HEART RATE: 64 BPM | SYSTOLIC BLOOD PRESSURE: 146 MMHG | OXYGEN SATURATION: 93 % | BODY MASS INDEX: 24.92 KG/M2 | DIASTOLIC BLOOD PRESSURE: 60 MMHG

## 2018-11-06 DIAGNOSIS — R60.0 LOCALIZED EDEMA: ICD-10-CM

## 2018-11-06 DIAGNOSIS — I47.10 SVT (SUPRAVENTRICULAR TACHYCARDIA): ICD-10-CM

## 2018-11-06 DIAGNOSIS — I10 ESSENTIAL HYPERTENSION, BENIGN: ICD-10-CM

## 2018-11-06 DIAGNOSIS — I34.0 NON-RHEUMATIC MITRAL REGURGITATION: ICD-10-CM

## 2018-11-06 DIAGNOSIS — E78.5 DYSLIPIDEMIA: ICD-10-CM

## 2018-11-06 PROCEDURE — 99213 OFFICE O/P EST LOW 20 MIN: CPT | Performed by: INTERNAL MEDICINE

## 2018-11-06 ASSESSMENT — ENCOUNTER SYMPTOMS
CONSTITUTIONAL NEGATIVE: 1
PALPITATIONS: 0
DIZZINESS: 0
NEUROLOGICAL NEGATIVE: 1
DEPRESSION: 1
GASTROINTESTINAL NEGATIVE: 1
MUSCULOSKELETAL NEGATIVE: 1
RESPIRATORY NEGATIVE: 1
LOSS OF CONSCIOUSNESS: 0

## 2018-11-06 NOTE — PROGRESS NOTES
Chief Complaint   Patient presents with   • Shortness of Breath       Subjective:   Yoli Carmichael is a 85 y.o. female who presents today for follow-up of palpitations, SVT treated with metoprolol, and hypertension.  Since starting metoprolol, her palpitations have gotten much better.  SVT was documented on outpatient monitoring.  She has been doing some traveling.  The patient fell while in [place] and fractured her right humerus.  She is just finishing physical therapy.  No chest pain.  She has chronic mild edema of the right ankle after her knee replacement surgery.  She rarely takes furosemide.  Echo from earlier this month shows EF of 70%, mild LVH and mild mitral insufficiency.  Pulmonary pressure is 40.    Past Medical History:   Diagnosis Date   • Arrhythmia    • Arthritis     knees   • Bronchitis     long time ago   • CATARACT     no surgery yet   • Dry eyes, bilateral 3/27/2014   • Osteopenia of the elderly 3/27/2014   • Pneumonia     long time ago     Past Surgical History:   Procedure Laterality Date   • KNEE ARTHROPLASTY TOTAL  9/3/2013    Performed by Ramesh Styles M.D. at SURGERY [facility]   • TONSILLECTOMY       Family History   Problem Relation Age of Onset   • Lung Disease Mother         frequent pneumonia   • Heart Disease Father 54        MI   • Lung Disease Father         Emphysema   • Heart Attack Father    • Cancer Maternal Aunt         Great Aunt and Cousin Ovarian CA   • Hypertension Maternal Aunt    • Cancer Daughter         Lung and Brain CA   • Seizures Daughter    • Cancer Daughter         Pituitary tumor   • Cancer Brother         leukemia     Social History     Social History   • Marital status:      Spouse name: N/A   • Number of children: N/A   • Years of education: N/A     Occupational History   • Not on file.     Social History Main Topics   • Smoking status: Never Smoker   • Smokeless tobacco: Never Used      Comment: Pt exposed to second hand smoker   •  Alcohol use Yes      Comment: 1-2 week   • Drug use: Yes     Types: Marijuana      Comment: medical(prn)   • Sexual activity: Not on file     Other Topics Concern   • Not on file     Social History Narrative   • No narrative on file     Allergies   Allergen Reactions   • Other Environmental      Seasonal allergies     Outpatient Encounter Prescriptions as of 11/6/2018   Medication Sig Dispense Refill   • furosemide (LASIX) 40 MG Tab Take 1 Tab by mouth as needed. For ankle swelling. 30 Tab 11   • potassium chloride SA (KDUR) 20 MEQ Tab CR Take 1 Tab by mouth as needed. When taking Lasix. 30 Tab 11   • escitalopram (LEXAPRO) 10 MG Tab TAKE ONE TABLET BY MOUTH ONE TIME DAILY  90 Tab 2   • losartan (COZAAR) 50 MG Tab Take 1 tablet twice daily. 180 Tab 3   • metoprolol SR (TOPROL XL) 25 MG TABLET SR 24 HR Take 1 Tab by mouth every day. 90 Tab 3   • TURMERIC PO Take  by mouth.     • coenzyme Q-10 30 MG capsule Take 300 mg by mouth 2X A WEEK.     • B Complex-C (SUPER B COMPLEX PO) Take  by mouth.     • Multiple Vitamins-Minerals (PRESERVISION/LUTEIN PO) Take  by mouth 2 Times a Day.     • Cholecalciferol (VITAMIN D) 2000 UNITS CAPS Take 6,000 Units by mouth every day.     • CycloSPORINE (RESTASIS OP) by Ophthalmic route 2 Times a Day.     • ketotifen (ZADITOR) 0.025 % ophthalmic solution Place 1 Drop in both eyes 2 times a day.     • Glycerin-Polysorbate 80 (REFRESH DRY EYE THERAPY) 1-1 % SOLN 1 Drop by Ophthalmic route 4 times a day.     • Calcium-Vitamin D-Vitamin K (CALCIUM + D) 500-1000-40 MG-UNT-MCG CHEW Take 1,000 mg by mouth every day.       No facility-administered encounter medications on file as of 11/6/2018.      Review of Systems   Constitutional: Negative.    HENT: Negative.    Respiratory: Negative.    Cardiovascular: Positive for leg swelling. Negative for palpitations.   Gastrointestinal: Negative.    Musculoskeletal: Negative.    Skin: Negative.    Neurological: Negative.  Negative for dizziness and loss  "of consciousness.   Endo/Heme/Allergies: Negative.    Psychiatric/Behavioral: Positive for depression.        Objective:   /60 (BP Location: Left arm, Patient Position: Sitting, BP Cuff Size: Adult)   Pulse 64   Ht 1.626 m (5' 4\")   Wt 66.2 kg (146 lb)   SpO2 93%   BMI 25.06 kg/m²     Physical Exam   Constitutional: She is oriented to person, place, and time. No distress.   HENT:   Head: Normocephalic and atraumatic.   Eyes: Pupils are equal, round, and reactive to light. No scleral icterus.   Neck: No JVD present.   Cardiovascular: Normal rate and regular rhythm.    No murmur heard.  Pulmonary/Chest: No respiratory distress. She has no wheezes.   Abdominal: Soft. She exhibits no distension. There is no tenderness.   Musculoskeletal: She exhibits edema.   1+ edema on the right, none on the left   Neurological: She is alert and oriented to person, place, and time.   Skin: Skin is warm.   Psychiatric: She has a normal mood and affect.       Assessment:     1. Dyslipidemia     2. Essential hypertension, benign     3. Localized edema     4. Non-rheumatic mitral regurgitation     5. SVT (supraventricular tachycardia) (HCC)         Medical Decision Making:  Today's Assessment / Status / Plan:   Hypertension: Under good control her current medications.    SVT: Controlled with low-dose metoprolol.    Edema of the right ankle: This is most likely secondary to her previous knee surgery.  Her echo results were reviewed with her.  She was reassured that the mitral valve is only mildly regurgitant and will never cause a problem.  Return in 6 months.  Discussed appropriate use of diuretic.  Also discussed some physical therapy for strengthening of her quadriceps muscles  "

## 2018-11-25 ENCOUNTER — OFFICE VISIT (OUTPATIENT)
Dept: URGENT CARE | Facility: PHYSICIAN GROUP | Age: 83
End: 2018-11-25
Payer: MEDICARE

## 2018-11-25 VITALS
BODY MASS INDEX: 25.1 KG/M2 | SYSTOLIC BLOOD PRESSURE: 140 MMHG | WEIGHT: 147 LBS | DIASTOLIC BLOOD PRESSURE: 76 MMHG | HEART RATE: 60 BPM | RESPIRATION RATE: 16 BRPM | TEMPERATURE: 98.4 F | OXYGEN SATURATION: 97 % | HEIGHT: 64 IN

## 2018-11-25 DIAGNOSIS — R05.9 COUGH: ICD-10-CM

## 2018-11-25 PROCEDURE — 99214 OFFICE O/P EST MOD 30 MIN: CPT | Performed by: PHYSICIAN ASSISTANT

## 2018-11-25 RX ORDER — DOXYCYCLINE HYCLATE 100 MG
100 TABLET ORAL 2 TIMES DAILY
Qty: 20 TAB | Refills: 0 | Status: SHIPPED | OUTPATIENT
Start: 2018-11-25 | End: 2018-12-05

## 2018-11-25 RX ORDER — BENZONATATE 200 MG/1
200 CAPSULE ORAL 3 TIMES DAILY PRN
Qty: 60 CAP | Refills: 0 | Status: SHIPPED | OUTPATIENT
Start: 2018-11-25 | End: 2018-12-11

## 2018-11-25 NOTE — PROGRESS NOTES
Subjective:      Yoli Carmichael is a 85 y.o. female who presents with Cough (dry cough x 10 days)    PMH:  has a past medical history of Arrhythmia; Arthritis; Bronchitis; CATARACT; Dry eyes, bilateral (3/27/2014); Osteopenia of the elderly (3/27/2014); and Pneumonia.  MEDS:   Current Outpatient Prescriptions:   •  doxycycline (VIBRAMYCIN) 100 MG Tab, Take 1 Tab by mouth 2 times a day for 10 days., Disp: 20 Tab, Rfl: 0  •  benzonatate (TESSALON) 200 MG capsule, Take 1 Cap by mouth 3 times a day as needed for Cough., Disp: 60 Cap, Rfl: 0  •  Calcium-Vitamin D-Vitamin K (CALCIUM + D) 500-1000-40 MG-UNT-MCG CHEW, Take 1,000 mg by mouth every day., Disp: , Rfl:   •  furosemide (LASIX) 40 MG Tab, Take 1 Tab by mouth as needed. For ankle swelling., Disp: 30 Tab, Rfl: 11  •  potassium chloride SA (KDUR) 20 MEQ Tab CR, Take 1 Tab by mouth as needed. When taking Lasix., Disp: 30 Tab, Rfl: 11  •  escitalopram (LEXAPRO) 10 MG Tab, TAKE ONE TABLET BY MOUTH ONE TIME DAILY  (Patient not taking: Reported on 11/25/2018), Disp: 90 Tab, Rfl: 2  •  losartan (COZAAR) 50 MG Tab, Take 1 tablet twice daily., Disp: 180 Tab, Rfl: 3  •  metoprolol SR (TOPROL XL) 25 MG TABLET SR 24 HR, Take 1 Tab by mouth every day., Disp: 90 Tab, Rfl: 3  •  TURMERIC PO, Take  by mouth., Disp: , Rfl:   •  CycloSPORINE (RESTASIS OP), by Ophthalmic route 2 Times a Day., Disp: , Rfl:   •  coenzyme Q-10 30 MG capsule, Take 300 mg by mouth 2X A WEEK., Disp: , Rfl:   •  ketotifen (ZADITOR) 0.025 % ophthalmic solution, Place 1 Drop in both eyes 2 times a day., Disp: , Rfl:   •  B Complex-C (SUPER B COMPLEX PO), Take  by mouth., Disp: , Rfl:   •  Glycerin-Polysorbate 80 (REFRESH DRY EYE THERAPY) 1-1 % SOLN, 1 Drop by Ophthalmic route 4 times a day., Disp: , Rfl:   •  Multiple Vitamins-Minerals (PRESERVISION/LUTEIN PO), Take  by mouth 2 Times a Day., Disp: , Rfl:   •  Cholecalciferol (VITAMIN D) 2000 UNITS CAPS, Take 6,000 Units by mouth every day., Disp: , Rfl:    ALLERGIES:   Allergies   Allergen Reactions   • Other Environmental      Seasonal allergies     SURGHX:   Past Surgical History:   Procedure Laterality Date   • KNEE ARTHROPLASTY TOTAL  9/3/2013    Performed by Ramesh Styles M.D. at SURGERY [facility]   • TONSILLECTOMY       SOCHX:  reports that she has never smoked. She has never used smokeless tobacco. She reports that she drinks alcohol. She reports that she uses drugs, including Marijuana.  FH: family history includes Cancer in her brother, daughter, daughter, and maternal aunt; Heart Attack in her father; Heart Disease (age of onset: 54) in her father; Hypertension in her maternal aunt; Lung Disease in her father and mother; Seizures in her daughter.          Patient presents with:  Cough: dry cough x 10 days, intermittent fever but none today.  Pt has cough that began during a recent trip to [place]. Pt denies wheeze, sob, orthopnea, or lower extremity swelling.  Pt states she expected the cough to be improving by now, but it seems to have remained the same or slightly worsened.  Her daughter who traveled with her also had this cough but hers has resolved already.  Pt had PNA a few years ago, feels similar     Cough   This is a new problem. The current episode started 1 to 4 weeks ago. The problem has been gradually worsening. The problem occurs every few minutes. The cough is non-productive (mostly dry, but is productive at times). Associated symptoms include a fever (intermittent). Pertinent negatives include no chest pain, ear congestion, ear pain, headaches, nasal congestion, postnasal drip, rhinorrhea or wheezing. Risk factors for lung disease include travel. She has tried body position changes (tessalon) for the symptoms. The treatment provided mild relief. Her past medical history is significant for pneumonia.       Review of Systems   Constitutional: Positive for fever (intermittent).   HENT: Negative for ear pain, postnasal drip and rhinorrhea.   "  Respiratory: Positive for cough. Negative for wheezing.    Cardiovascular: Negative for chest pain.   Neurological: Negative for headaches.          Objective:     /76 (BP Location: Left arm, Patient Position: Sitting, BP Cuff Size: Adult)   Pulse 60   Temp 36.9 °C (98.4 °F) (Temporal)   Resp 16   Ht 1.626 m (5' 4\")   Wt 66.7 kg (147 lb)   SpO2 97%   BMI 25.23 kg/m²      Physical Exam   Constitutional: She is oriented to person, place, and time. She appears well-developed and well-nourished. No distress.   HENT:   Head: Normocephalic and atraumatic.   Nose: Nose normal.   Eyes: Pupils are equal, round, and reactive to light. Conjunctivae and EOM are normal.   Neck: Normal range of motion. Neck supple.   Cardiovascular: Normal rate, regular rhythm and normal heart sounds.    Pulmonary/Chest: Effort normal. She has no decreased breath sounds. She has no wheezes. She has rhonchi. She has no rales.   Abdominal: Soft.   Musculoskeletal: Normal range of motion.   Neurological: She is alert and oriented to person, place, and time. Gait normal.   Skin: Skin is warm and dry. Capillary refill takes less than 2 seconds.   Psychiatric: She has a normal mood and affect.   Nursing note and vitals reviewed.         Assessment/Plan:     1. Cough  doxycycline (VIBRAMYCIN) 100 MG Tab    benzonatate (TESSALON) 200 MG capsule     Pt has history of PNA with similar symptoms,  will start on abx.  If no improvement in symptoms, pt to return to clinic for xray.     Rx for tessalon refilled, pt feels she gets better relief from this than with rx cough medicine.      PT should follow up with PCP in 1-2 days for re-evaluation if symptoms have not improved.  Discussed red flags and reasons to return to UC or ED.  Pt and/or family verbalized understanding of diagnosis and follow up instructions and was offered informational handout on diagnosis.  PT discharged.          "

## 2018-11-27 ASSESSMENT — ENCOUNTER SYMPTOMS
HEADACHES: 0
FEVER: 1
COUGH: 1
WHEEZING: 0
RHINORRHEA: 0

## 2018-11-30 ENCOUNTER — HOSPITAL ENCOUNTER (OUTPATIENT)
Dept: RADIOLOGY | Facility: MEDICAL CENTER | Age: 83
End: 2018-11-30
Attending: PHYSICIAN ASSISTANT
Payer: MEDICARE

## 2018-11-30 ENCOUNTER — OFFICE VISIT (OUTPATIENT)
Dept: URGENT CARE | Facility: PHYSICIAN GROUP | Age: 83
End: 2018-11-30
Payer: MEDICARE

## 2018-11-30 VITALS
HEART RATE: 80 BPM | BODY MASS INDEX: 24.59 KG/M2 | SYSTOLIC BLOOD PRESSURE: 104 MMHG | TEMPERATURE: 98.7 F | RESPIRATION RATE: 12 BRPM | WEIGHT: 144 LBS | HEIGHT: 64 IN | DIASTOLIC BLOOD PRESSURE: 60 MMHG | OXYGEN SATURATION: 97 %

## 2018-11-30 DIAGNOSIS — R05.3 PERSISTENT COUGH: ICD-10-CM

## 2018-11-30 DIAGNOSIS — R05.3 PERSISTENT COUGH: Primary | ICD-10-CM

## 2018-11-30 DIAGNOSIS — R11.0 NAUSEA: ICD-10-CM

## 2018-11-30 DIAGNOSIS — T88.7XXA SIDE EFFECT OF MEDICATION: ICD-10-CM

## 2018-11-30 PROCEDURE — 99214 OFFICE O/P EST MOD 30 MIN: CPT | Performed by: PHYSICIAN ASSISTANT

## 2018-11-30 PROCEDURE — 71046 X-RAY EXAM CHEST 2 VIEWS: CPT

## 2018-11-30 RX ORDER — AMOXICILLIN AND CLAVULANATE POTASSIUM 875; 125 MG/1; MG/1
1 TABLET, FILM COATED ORAL 2 TIMES DAILY
Qty: 20 TAB | Refills: 0 | Status: SHIPPED | OUTPATIENT
Start: 2018-11-30 | End: 2018-12-11

## 2018-11-30 ASSESSMENT — ENCOUNTER SYMPTOMS
WHEEZING: 0
COUGH: 1
SPUTUM PRODUCTION: 1
FEVER: 0

## 2018-11-30 NOTE — PROGRESS NOTES
Subjective:      Yoli Carmichael is a 85 y.o. female who presents with Cough (on going cough, nausea, loss of appetite, x 12 days  sob x 2days)    PMH:  has a past medical history of Arrhythmia; Arthritis; Bronchitis; CATARACT; Dry eyes, bilateral (3/27/2014); Osteopenia of the elderly (3/27/2014); and Pneumonia.  MEDS:   Current Outpatient Prescriptions:   •  doxycycline (VIBRAMYCIN) 100 MG Tab, Take 1 Tab by mouth 2 times a day for 10 days. (Patient not taking: Reported on 11/30/2018), Disp: 20 Tab, Rfl: 0  •  benzonatate (TESSALON) 200 MG capsule, Take 1 Cap by mouth 3 times a day as needed for Cough. (Patient not taking: Reported on 11/30/2018), Disp: 60 Cap, Rfl: 0  •  furosemide (LASIX) 40 MG Tab, Take 1 Tab by mouth as needed. For ankle swelling., Disp: 30 Tab, Rfl: 11  •  potassium chloride SA (KDUR) 20 MEQ Tab CR, Take 1 Tab by mouth as needed. When taking Lasix., Disp: 30 Tab, Rfl: 11  •  escitalopram (LEXAPRO) 10 MG Tab, TAKE ONE TABLET BY MOUTH ONE TIME DAILY  (Patient not taking: Reported on 11/25/2018), Disp: 90 Tab, Rfl: 2  •  losartan (COZAAR) 50 MG Tab, Take 1 tablet twice daily., Disp: 180 Tab, Rfl: 3  •  metoprolol SR (TOPROL XL) 25 MG TABLET SR 24 HR, Take 1 Tab by mouth every day., Disp: 90 Tab, Rfl: 3  •  TURMERIC PO, Take  by mouth., Disp: , Rfl:   •  CycloSPORINE (RESTASIS OP), by Ophthalmic route 2 Times a Day., Disp: , Rfl:   •  coenzyme Q-10 30 MG capsule, Take 300 mg by mouth 2X A WEEK., Disp: , Rfl:   •  ketotifen (ZADITOR) 0.025 % ophthalmic solution, Place 1 Drop in both eyes 2 times a day., Disp: , Rfl:   •  B Complex-C (SUPER B COMPLEX PO), Take  by mouth., Disp: , Rfl:   •  Glycerin-Polysorbate 80 (REFRESH DRY EYE THERAPY) 1-1 % SOLN, 1 Drop by Ophthalmic route 4 times a day., Disp: , Rfl:   •  Calcium-Vitamin D-Vitamin K (CALCIUM + D) 500-1000-40 MG-UNT-MCG CHEW, Take 1,000 mg by mouth every day., Disp: , Rfl:   •  Multiple Vitamins-Minerals (PRESERVISION/LUTEIN PO), Take  by  mouth 2 Times a Day., Disp: , Rfl:   •  Cholecalciferol (VITAMIN D) 2000 UNITS CAPS, Take 6,000 Units by mouth every day., Disp: , Rfl:   ALLERGIES:   Allergies   Allergen Reactions   • Other Environmental      Seasonal allergies     SURGHX:   Past Surgical History:   Procedure Laterality Date   • KNEE ARTHROPLASTY TOTAL  9/3/2013    Performed by Ramesh Styles M.D. at SURGERY [facility]   • TONSILLECTOMY       SOCHX:  reports that she has never smoked. She has never used smokeless tobacco. She reports that she drinks alcohol. She reports that she uses drugs, including Marijuana.  FH: Reviewed with patient, not pertinent to this visit.           Patient presents with:  Cough: on going cough, nausea, loss of appetite, x 12 days  sob x 2days.  Pt was started on doxycycline for her cough 5 days ago.  Pt states she has had significant nausea and just doesn't feel well after taking each dose.  Pt did not take it this morning due to the nausea,  but is concerned about her cough not improving.             Cough   This is a new problem. The current episode started 1 to 4 weeks ago. The problem has been unchanged. The problem occurs every few minutes. The cough is productive of sputum. Pertinent negatives include no chest pain, fever, nasal congestion, postnasal drip or wheezing. The symptoms are aggravated by exercise, lying down and cold air. She has tried body position changes (antibiotics) for the symptoms. The treatment provided no relief. Her past medical history is significant for pneumonia.       Review of Systems   Constitutional: Negative for fever.   HENT: Negative for postnasal drip.    Respiratory: Positive for cough and sputum production. Negative for wheezing.    Cardiovascular: Negative for chest pain.   All other systems reviewed and are negative.         Objective:     /60 (BP Location: Left arm, Patient Position: Sitting, BP Cuff Size: Adult)   Pulse 80   Temp 37.1 °C (98.7 °F) (Temporal)    "Resp 12   Ht 1.626 m (5' 4\")   Wt 65.3 kg (144 lb)   SpO2 97%   BMI 24.72 kg/m²      Physical Exam   Constitutional: She is oriented to person, place, and time. She appears well-developed and well-nourished. No distress.   HENT:   Head: Normocephalic and atraumatic.   Nose: Nose normal.   Mouth/Throat: Oropharynx is clear and moist.   Eyes: Pupils are equal, round, and reactive to light. Conjunctivae and EOM are normal.   Neck: Normal range of motion. Neck supple.   Cardiovascular: Normal rate, regular rhythm and normal heart sounds.    Pulmonary/Chest: Effort normal and breath sounds normal. She has no rales.   Abdominal: Soft.   Musculoskeletal: Normal range of motion.   Neurological: She is alert and oriented to person, place, and time. Gait normal.   Skin: Skin is warm and dry. Capillary refill takes less than 2 seconds.   Psychiatric: She has a normal mood and affect.   Nursing note and vitals reviewed.            Impression       Slight hyperinflation. No focal consolidation or pleural effusions. No radiographic evidence of pneumonia.   Reading Provider Reading Date   Isra Gatica M.D. Nov 30, 2018   Signing Provider Signing Date Signing Time   Isra Gatica M.D. Nov 30, 2018  2:02 PM       Assessment/Plan:     1. Persistent cough  DX-CHEST-2 VIEWS    amoxicillin-clavulanate (AUGMENTIN) 875-125 MG Tab   2. Nausea     3. Side effect of medication       Pt to dc her doxycycline and tessalon, pt can begin the augmentin today.      PT should follow up with PCP in 1-2 days for re-evaluation if symptoms have not improved.  Discussed red flags and reasons to return to UC or ED.  Pt and/or family verbalized understanding of diagnosis and follow up instructions and was offered informational handout on diagnosis.  PT discharged.       .        "

## 2018-12-11 ENCOUNTER — OFFICE VISIT (OUTPATIENT)
Dept: MEDICAL GROUP | Facility: PHYSICIAN GROUP | Age: 83
End: 2018-12-11
Payer: MEDICARE

## 2018-12-11 VITALS
RESPIRATION RATE: 12 BRPM | BODY MASS INDEX: 24.41 KG/M2 | HEART RATE: 94 BPM | HEIGHT: 64 IN | TEMPERATURE: 98.2 F | WEIGHT: 143 LBS | OXYGEN SATURATION: 98 % | SYSTOLIC BLOOD PRESSURE: 122 MMHG | DIASTOLIC BLOOD PRESSURE: 64 MMHG

## 2018-12-11 DIAGNOSIS — I10 ESSENTIAL HYPERTENSION, BENIGN: ICD-10-CM

## 2018-12-11 DIAGNOSIS — R05.9 COUGH: ICD-10-CM

## 2018-12-11 DIAGNOSIS — I47.10 SVT (SUPRAVENTRICULAR TACHYCARDIA): ICD-10-CM

## 2018-12-11 PROCEDURE — 99214 OFFICE O/P EST MOD 30 MIN: CPT | Performed by: FAMILY MEDICINE

## 2018-12-11 NOTE — ASSESSMENT & PLAN NOTE
Chronic stable medical condition.  Currently well controlled on metoprolol and losartan.  Patient would like to decrease her pill burden and is requesting to decrease her dose of losartan today.

## 2018-12-11 NOTE — ASSESSMENT & PLAN NOTE
Chronic ongoing medical condition.  Chronic persistent cough with postnasal drip.  Seen twice in urgent care and treated both with doxycycline and Augmentin and no change in cough.  States that her symptoms started when she was placed on metoprolol and losartan.  Thinking back she says that she may have had similar symptoms since she had a trip to [place] last summer.  Denies fevers chills, chest pain, shortness of breath, night sweats

## 2018-12-11 NOTE — ASSESSMENT & PLAN NOTE
"Chronic ongoing medical condition.  Currently following with cardiology Dr. Velasquez and taking metoprolol 25 mg daily and losartan 50 mg twice daily.  She is intermittently having \"episodes\" and will check her blood pressure and find her heart rate is in the 160s.  Previous Holter monitor testing shows intermittent SVT no history of A. fib or a flutter.  No syncope, dizziness, loss of consciousness.  "

## 2018-12-11 NOTE — PROGRESS NOTES
"CC:  Diagnoses of Cough, SVT (supraventricular tachycardia) (HCC), and Essential hypertension, benign were pertinent to this visit.    HISTORY OF THE PRESENT ILLNESS: Patient is a 85 y.o. female. This pleasant patient is here today to establish primary care provider.    Cough  Chronic ongoing medical condition.  Chronic persistent cough with postnasal drip.  Seen twice in urgent care and treated both with doxycycline and Augmentin and no change in cough.  States that her symptoms started when she was placed on metoprolol and losartan.  Thinking back she says that she may have had similar symptoms since she had a trip to [place] last summer.  Denies fevers chills, chest pain, shortness of breath, night sweats    SVT (supraventricular tachycardia) (HCC)  Chronic ongoing medical condition.  Currently following with cardiology Dr. Velasquez and taking metoprolol 25 mg daily and losartan 50 mg twice daily.  She is intermittently having \"episodes\" and will check her blood pressure and find her heart rate is in the 160s.  Previous Holter monitor testing shows intermittent SVT no history of A. fib or a flutter.  No syncope, dizziness, loss of consciousness.    Essential hypertension, benign  Chronic stable medical condition.  Currently well controlled on metoprolol and losartan.  Patient would like to decrease her pill burden and is requesting to decrease her dose of losartan today.    Allergies: Doxycycline and Other environmental    Current Outpatient Prescriptions Ordered in [facility]   Medication Sig Dispense Refill   • losartan (COZAAR) 50 MG Tab Take 1 tablet twice daily. 180 Tab 3   • furosemide (LASIX) 40 MG Tab Take 1 Tab by mouth as needed. For ankle swelling. 30 Tab 11   • potassium chloride SA (KDUR) 20 MEQ Tab CR Take 1 Tab by mouth as needed. When taking Lasix. 30 Tab 11   • metoprolol SR (TOPROL XL) 25 MG TABLET SR 24 HR Take 1 Tab by mouth every day. 90 Tab 3   • TURMERIC PO Take  by mouth.     • CycloSPORINE " (RESTASIS OP) by Ophthalmic route 2 Times a Day.     • coenzyme Q-10 30 MG capsule Take 300 mg by mouth 2X A WEEK.     • ketotifen (ZADITOR) 0.025 % ophthalmic solution Place 1 Drop in both eyes 2 times a day.     • B Complex-C (SUPER B COMPLEX PO) Take  by mouth.     • Glycerin-Polysorbate 80 (REFRESH DRY EYE THERAPY) 1-1 % SOLN 1 Drop by Ophthalmic route 4 times a day.     • Calcium-Vitamin D-Vitamin K (CALCIUM + D) 500-1000-40 MG-UNT-MCG CHEW Take 1,000 mg by mouth every day.     • Multiple Vitamins-Minerals (PRESERVISION/LUTEIN PO) Take  by mouth 2 Times a Day.     • Cholecalciferol (VITAMIN D) 2000 UNITS CAPS Take 6,000 Units by mouth every day.       No current [facility]-ordered facility-administered medications on file.        Past Medical History:   Diagnosis Date   • Arrhythmia    • Arthritis     knees   • Bronchitis     long time ago   • CATARACT     no surgery yet   • Dry eyes, bilateral 3/27/2014   • Osteopenia of the elderly 3/27/2014   • Pneumonia     long time ago       Past Surgical History:   Procedure Laterality Date   • KNEE ARTHROPLASTY TOTAL  9/3/2013    Performed by Ramesh Styles M.D. at SURGERY [facility]S   • TONSILLECTOMY         Social History   Substance Use Topics   • Smoking status: Never Smoker   • Smokeless tobacco: Never Used      Comment: Pt exposed to second hand smoker   • Alcohol use Yes      Comment: 1-2 week       Social History     Social History Narrative   • No narrative on file       Family History   Problem Relation Age of Onset   • Lung Disease Mother         frequent pneumonia   • Heart Disease Father 54        MI   • Lung Disease Father         Emphysema   • Heart Attack Father    • Cancer Maternal Aunt         Great Aunt and Cousin Ovarian CA   • Hypertension Maternal Aunt    • Cancer Daughter         Lung and Brain CA   • Seizures Daughter    • Cancer Daughter         Pituitary tumor   • Cancer Brother         leukemia       ROS:   Constitutional: No Fevers,  "Chills  Resp: No Shortness of breath  CV: No Chest pain  GI: No Nausea/Vomiting  Skin: No rashes        Exam: Blood pressure 122/64, pulse 94, temperature 36.8 °C (98.2 °F), resp. rate 12, height 1.626 m (5' 4\"), weight 64.9 kg (143 lb), SpO2 98 %. Body mass index is 24.55 kg/m².    GENERAL: No acute distress  HENT: Atraumatic, normocephalic, external auditory canals clear bilaterally, TMs translucent and pearly gray, nares clear without discharge, posterior pharynx clear without erythema or exudates.  EYES: Extraocular movements intact, pupils equal and reactive to light.  NECK: Supple, FROM  CHEST: No deformities, Equal chest expansion, regular rate and rhythm, no murmurs, gallops, or rubs.  RESP: Unlabored, no stridor or audible wheeze.  No wheezes, crackles, nor rhonchi  ABD: Soft, Nontender, Non-Distended.  Normal bowel sounds.  No hepatosplenomegaly  Extremities: No Clubbing, Cyanosis, or Edema.  Skin: Warm/dry, without rases  Neuro: A/O x 4, CN 2-12 Grossly intact, Motor/sensory grosly intact  Psych: Normal behavior, normal affect      Assessment/Plan:  1. Cough  Chronic ongoing medical condition.  Chronic postnasal drip.  Discussed OTC nasal saline rinse nasal steroid use once daily.  Follow-up in 2 weeks    2. SVT (supraventricular tachycardia) (HCC)  Chronic stable medical condition.  Currently well controlled with metoprolol.  Continue to follow with cardiology.    3. Essential hypertension, benign  Chronic stable medical condition.  Trial of losartan 50 mg once daily intended to decrease patient's pill burden.  Follow-up blood pressure in 2 weeks.      Please note that this dictation was created using voice recognition software. I have made every reasonable attempt to correct obvious errors, but I expect that there are errors of grammar and possibly content that I did not discover before finalizing the note.  "

## 2018-12-21 ENCOUNTER — TELEPHONE (OUTPATIENT)
Dept: MEDICAL GROUP | Facility: PHYSICIAN GROUP | Age: 83
End: 2018-12-21

## 2018-12-21 NOTE — TELEPHONE ENCOUNTER
Future Appointments       Provider Department Center    12/26/2018 1:20 PM Juan Santana M.D. [facility] [place]    5/21/2019 10:40 AM Reuben Velasquez M.D. [facility]           ESTABLISHED PATIENT PRE-VISIT PLANNING     Note: Patient will not be contacted if there is no indication to call. PT was not Contacted.    1.    Reviewed note from last office visit with PCP: YES Last office visit: 12/11/18    2.  If any orders were placed at last visit, do we have Results/Consult Notes?        •  Labs - Labs were not ordered at last office visit.        •  Imaging - Imaging was not ordered at last office visit.        •  Referrals - No referrals were ordered at last office visit.     3.  Immunizations were updated in [facility] using WebIZ?: Yes       •  Web Iz Recommendations: FLU, PNEUMOVAX (PPSV23), TDAP and SHINGRIX (Shingles)    4.  Patient is due for the following Health Maintenance Topics:   Health Maintenance Due   Topic Date Due   • IMM DTaP/Tdap/Td Vaccine (1 - Tdap) 08/21/1952   • IMM ZOSTER VACCINES (2 of 3) 05/21/2013   • Annual Wellness Visit  03/28/2015   • IMM PNEUMOCOCCAL 65+ (ADULT) LOW/MEDIUM RISK SERIES (2 of 2 - PPSV23) 03/09/2016   • IMM INFLUENZA (1) 09/01/2018       5.  Patient was not informed to arrive 15 min prior to their scheduled appointment and bring in their medication bottles.

## 2018-12-26 ENCOUNTER — OFFICE VISIT (OUTPATIENT)
Dept: MEDICAL GROUP | Facility: PHYSICIAN GROUP | Age: 83
End: 2018-12-26
Payer: MEDICARE

## 2018-12-26 VITALS
BODY MASS INDEX: 23.9 KG/M2 | HEIGHT: 64 IN | HEART RATE: 74 BPM | TEMPERATURE: 97 F | WEIGHT: 140 LBS | DIASTOLIC BLOOD PRESSURE: 62 MMHG | OXYGEN SATURATION: 97 % | SYSTOLIC BLOOD PRESSURE: 118 MMHG

## 2018-12-26 DIAGNOSIS — I10 ESSENTIAL HYPERTENSION, BENIGN: ICD-10-CM

## 2018-12-26 PROCEDURE — 99214 OFFICE O/P EST MOD 30 MIN: CPT | Performed by: FAMILY MEDICINE

## 2018-12-26 ASSESSMENT — PAIN SCALES - GENERAL: PAINLEVEL: NO PAIN

## 2018-12-27 NOTE — ASSESSMENT & PLAN NOTE
Chronic ongoing medical condition.  Currently well controlled with lisinopril and metoprolol.  At last visit we decreased her losartan to 50 mg daily which she is taking as one half tab twice daily.  Blood pressure log within normal limits.  Blood pressure today in the office normal.  Patient denies any visual acuity changes any headaches, denies lightheadedness, dizziness, syncope.

## 2018-12-27 NOTE — PROGRESS NOTES
CC:The encounter diagnosis was Essential hypertension, benign.      HISTORY OF PRESENT ILLNESS: Patient is a 85 y.o. female established patient who presents today to follow-up on blood pressure      Essential hypertension, benign  Chronic ongoing medical condition.  Currently well controlled with lisinopril and metoprolol.  At last visit we decreased her losartan to 50 mg daily which she is taking as one half tab twice daily.  Blood pressure log within normal limits.  Blood pressure today in the office normal.  Patient denies any visual acuity changes any headaches, denies lightheadedness, dizziness, syncope.      Patient Active Problem List    Diagnosis Date Noted   • SVT (supraventricular tachycardia) ([facility]) 11/06/2018   • Localized edema 11/01/2018   • Reactive depression 09/27/2018   • Closed fracture of shaft of humerus 06/19/2018   • Dyslipidemia 06/19/2018   • Low serum vitamin D 06/19/2018   • Palpitations 11/10/2017   • Essential hypertension, benign 11/10/2017   • Degenerative disc disease, lumbar 07/17/2017   • Non-rheumatic mitral regurgitation 05/07/2015   • Cough 10/28/2014   • Dry eyes, bilateral 03/27/2014   • Osteopenia of the elderly 03/27/2014   • S/P total knee replacement right 09/03/2013      Allergies:Doxycycline and Other environmental    Current Outpatient Prescriptions   Medication Sig Dispense Refill   • furosemide (LASIX) 40 MG Tab Take 1 Tab by mouth as needed. For ankle swelling. 30 Tab 11   • potassium chloride SA (KDUR) 20 MEQ Tab CR Take 1 Tab by mouth as needed. When taking Lasix. 30 Tab 11   • losartan (COZAAR) 50 MG Tab Take 1 tablet twice daily. 180 Tab 3   • metoprolol SR (TOPROL XL) 25 MG TABLET SR 24 HR Take 1 Tab by mouth every day. 90 Tab 3   • TURMERIC PO Take  by mouth.     • CycloSPORINE (RESTASIS OP) by Ophthalmic route 2 Times a Day.     • coenzyme Q-10 30 MG capsule Take 300 mg by mouth 2X A WEEK.     • ketotifen (ZADITOR) 0.025 % ophthalmic solution Place 1 Drop in  "both eyes 2 times a day.     • B Complex-C (SUPER B COMPLEX PO) Take  by mouth.     • Glycerin-Polysorbate 80 (REFRESH DRY EYE THERAPY) 1-1 % SOLN 1 Drop by Ophthalmic route 4 times a day.     • Calcium-Vitamin D-Vitamin K (CALCIUM + D) 500-1000-40 MG-UNT-MCG CHEW Take 1,000 mg by mouth every day.     • Multiple Vitamins-Minerals (PRESERVISION/LUTEIN PO) Take  by mouth 2 Times a Day.     • Cholecalciferol (VITAMIN D) 2000 UNITS CAPS Take 6,000 Units by mouth every day.       No current facility-administered medications for this visit.        Social History   Substance Use Topics   • Smoking status: Never Smoker   • Smokeless tobacco: Never Used      Comment: Pt exposed to second hand smoker   • Alcohol use Yes      Comment: 1-2 week     Social History     Social History Narrative   • No narrative on file       Family History   Problem Relation Age of Onset   • Lung Disease Mother         frequent pneumonia   • Heart Disease Father 54        MI   • Lung Disease Father         Emphysema   • Heart Attack Father    • Cancer Maternal Aunt         Great Aunt and Cousin Ovarian CA   • Hypertension Maternal Aunt    • Cancer Daughter         Lung and Brain CA   • Seizures Daughter    • Cancer Daughter         Pituitary tumor   • Cancer Brother         leukemia       Review of Systems:  Denies chest pain and shortness of breath      Exam:    Blood pressure 118/62, pulse 74, temperature 36.1 °C (97 °F), height 1.626 m (5' 4\"), weight 63.5 kg (140 lb), SpO2 97 %. Body mass index is 24.03 kg/m².    GENERAL: No acute distress  HENT: Atraumatic, normocephalic  EYES: Extraocular movements intact, pupils equal and reactive to light  NECK: Supple, FROM  CHEST: No deformities, Equal chest expansion  RESP: Unlabored, no stridor or audible wheeze  ABD: Soft, Nontender, Non-Distended  Extremities: No Clubbing, Cyanosis, or Edema  Skin: Warm/dry, without rases  Neuro: A/O x 4, CN 2-12 Grossly intact, Motor/sensory grosly intact  Psych: " Normal behavior, normal affect      Assessment/Plan:  1. Essential hypertension, benign  Chronic ongoing medical condition.  Counseled on decreasing losartan from 50 mg daily (25 mg twice daily) to 25 mg daily.  Extensive amount of time spent discussing losartan and recent medicine recall that has been in the news lately.  Advised patient that she check with her pharmacy to verify that her losartan did not come from a recalled manufacture.  Extensive amount of time discussing the recall of losartan from specific manufactures.  Counseled at length about de-escalating medication regimen at her request to find the lowest effective dose of medication.  Counseled on the necessity of metoprolol given her other comorbid conditions.    Patient was seen for 25 minutes face to face of which, 22 minutes was spent counseling regarding the above mentioned problems.

## 2019-01-08 ENCOUNTER — NON-PROVIDER VISIT (OUTPATIENT)
Dept: MEDICAL GROUP | Facility: PHYSICIAN GROUP | Age: 84
End: 2019-01-08
Payer: MEDICARE

## 2019-01-08 ENCOUNTER — TELEPHONE (OUTPATIENT)
Dept: MEDICAL GROUP | Facility: PHYSICIAN GROUP | Age: 84
End: 2019-01-08

## 2019-01-08 VITALS — DIASTOLIC BLOOD PRESSURE: 64 MMHG | SYSTOLIC BLOOD PRESSURE: 122 MMHG

## 2019-01-08 NOTE — PROGRESS NOTES
Yoli Carmichael is a 85 y.o. female here for a non-provider visit for bp check     122/64    If abnormal, was an in office provider notified today? Yes  Routed to PCP? Yes

## 2019-01-08 NOTE — TELEPHONE ENCOUNTER
If pain is not improving or is worsening, I recommend following up with either urgent care or with me.

## 2019-01-11 ENCOUNTER — OFFICE VISIT (OUTPATIENT)
Dept: MEDICAL GROUP | Facility: PHYSICIAN GROUP | Age: 84
End: 2019-01-11
Payer: MEDICARE

## 2019-01-11 VITALS
HEART RATE: 71 BPM | HEIGHT: 64 IN | SYSTOLIC BLOOD PRESSURE: 132 MMHG | TEMPERATURE: 94 F | DIASTOLIC BLOOD PRESSURE: 64 MMHG | BODY MASS INDEX: 24.07 KG/M2 | WEIGHT: 141 LBS | OXYGEN SATURATION: 94 %

## 2019-01-11 DIAGNOSIS — H92.01 EAR PAIN, RIGHT: ICD-10-CM

## 2019-01-11 PROCEDURE — 99213 OFFICE O/P EST LOW 20 MIN: CPT | Performed by: FAMILY MEDICINE

## 2019-01-11 ASSESSMENT — PAIN SCALES - GENERAL: PAINLEVEL: 1=MINIMAL PAIN

## 2019-01-11 NOTE — ASSESSMENT & PLAN NOTE
New problem.  Patient with impacted right external auditory canal that was disimpacted 2 weeks ago and had some residual ear pain that continued to improve.  However she was concerned about the duration of her pain.  Pain is currently resolved as of yesterday.  States she has normal hearing, no tinnitus, no bleeding.

## 2019-01-11 NOTE — PROGRESS NOTES
CC:The encounter diagnosis was Ear pain, right.      HISTORY OF PRESENT ILLNESS: Patient is a 85 y.o. female established patient who presents today to follow-up on ear pain after right cerumen disimpaction.    Ear pain, right  New problem.  Patient with impacted right external auditory canal that was disimpacted 2 weeks ago and had some residual ear pain that continued to improve.  However she was concerned about the duration of her pain.  Pain is currently resolved as of yesterday.  States she has normal hearing, no tinnitus, no bleeding.      Patient Active Problem List    Diagnosis Date Noted   • Ear pain, right 01/11/2019   • SVT (supraventricular tachycardia) ([facility]) 11/06/2018   • Localized edema 11/01/2018   • Reactive depression 09/27/2018   • Closed fracture of shaft of humerus 06/19/2018   • Dyslipidemia 06/19/2018   • Low serum vitamin D 06/19/2018   • Palpitations 11/10/2017   • Essential hypertension, benign 11/10/2017   • Degenerative disc disease, lumbar 07/17/2017   • Non-rheumatic mitral regurgitation 05/07/2015   • Cough 10/28/2014   • Dry eyes, bilateral 03/27/2014   • Osteopenia of the elderly 03/27/2014   • S/P total knee replacement right 09/03/2013      Allergies:Doxycycline and Other environmental    Current Outpatient Prescriptions   Medication Sig Dispense Refill   • furosemide (LASIX) 40 MG Tab Take 1 Tab by mouth as needed. For ankle swelling. 30 Tab 11   • potassium chloride SA (KDUR) 20 MEQ Tab CR Take 1 Tab by mouth as needed. When taking Lasix. 30 Tab 11   • losartan (COZAAR) 50 MG Tab Take 1 tablet twice daily. 180 Tab 3   • metoprolol SR (TOPROL XL) 25 MG TABLET SR 24 HR Take 1 Tab by mouth every day. 90 Tab 3   • TURMERIC PO Take  by mouth.     • CycloSPORINE (RESTASIS OP) by Ophthalmic route 2 Times a Day.     • coenzyme Q-10 30 MG capsule Take 300 mg by mouth 2X A WEEK.     • ketotifen (ZADITOR) 0.025 % ophthalmic solution Place 1 Drop in both eyes 2 times a day.     • B Complex-C  "(SUPER B COMPLEX PO) Take  by mouth.     • Glycerin-Polysorbate 80 (REFRESH DRY EYE THERAPY) 1-1 % SOLN 1 Drop by Ophthalmic route 4 times a day.     • Calcium-Vitamin D-Vitamin K (CALCIUM + D) 500-1000-40 MG-UNT-MCG CHEW Take 1,000 mg by mouth every day.     • Multiple Vitamins-Minerals (PRESERVISION/LUTEIN PO) Take  by mouth 2 Times a Day.     • Cholecalciferol (VITAMIN D) 2000 UNITS CAPS Take 6,000 Units by mouth every day.       No current facility-administered medications for this visit.        Social History   Substance Use Topics   • Smoking status: Never Smoker   • Smokeless tobacco: Never Used      Comment: Pt exposed to second hand smoker   • Alcohol use Yes      Comment: 1-2 week     Social History     Social History Narrative   • No narrative on file       Family History   Problem Relation Age of Onset   • Lung Disease Mother         frequent pneumonia   • Heart Disease Father 54        MI   • Lung Disease Father         Emphysema   • Heart Attack Father    • Cancer Maternal Aunt         Great Aunt and Cousin Ovarian CA   • Hypertension Maternal Aunt    • Cancer Daughter         Lung and Brain CA   • Seizures Daughter    • Cancer Daughter         Pituitary tumor   • Cancer Brother         leukemia       Review of Systems:    Denies chest pain and shortness of breath    Exam:    Blood pressure 132/64, pulse 71, temperature (!) 34.4 °C (94 °F), height 1.626 m (5' 4\"), weight 64 kg (141 lb), SpO2 94 %. Body mass index is 24.2 kg/m².    GENERAL: No acute distress  HENT: Atraumatic, normocephalic, TMs clear bilaterally.  No cerumen noted  EYES: Extraocular movements intact, pupils equal and reactive to light  NECK: Supple, FROM  CHEST: No deformities, Equal chest expansion  RESP: Unlabored, no stridor or audible wheeze  ABD: Soft, Nontender, Non-Distended  Extremities: No Clubbing, Cyanosis, or Edema  Skin: Warm/dry, without rases  Neuro: A/O x 4, CN 2-12 Grossly intact, Motor/sensory grosly intact  Psych: " Normal behavior, normal affect      Assessment/Plan:  1. Ear pain, right  New problem to examiner.  Patient presents with ear pain status post disimpaction.  Currently resolved.  Counseled on nasal saline and nasal steroid use to aid with any potential eustachian tube dysfunction related to edema from disimpaction.    Follow-up at regular scheduled appointment on March 7, 2019.

## 2019-01-15 ENCOUNTER — TELEPHONE (OUTPATIENT)
Dept: CARDIOLOGY | Facility: MEDICAL CENTER | Age: 84
End: 2019-01-15

## 2019-01-15 DIAGNOSIS — I10 ESSENTIAL HYPERTENSION, BENIGN: ICD-10-CM

## 2019-01-15 RX ORDER — LOSARTAN POTASSIUM 50 MG/1
TABLET ORAL
Qty: 180 TAB | Refills: 0 | COMMUNITY
Start: 2019-01-15 | End: 2019-06-20

## 2019-01-15 NOTE — TELEPHONE ENCOUNTER
Per Dr. Velasquez: pt. Can increase Toprol to 25mg BID if palpitations occur more frequently.  Left message for pt. To call.

## 2019-01-15 NOTE — TELEPHONE ENCOUNTER
"Spoke with pt. She reported 3 brief episodes of SVT this month. Duration <1 min. Today she was ironing when she experienced it. IA=754/74 right afterward. P=150 during palpitations, then decreased to 83 bpm 5 min. Later.   She takes Toprol 25mg QD and PCP has decreased Losartan to 25mg 1/2 tab daily. She is feeling \"really well\" on lower Losartan dose.   To Dr. Velasquez.  "

## 2019-01-15 NOTE — TELEPHONE ENCOUNTER
"----- Message from Sana Keene sent at 1/15/2019 11:00 AM PST -----  Regarding: patient had another episode  CORINA/Jael      Patient said she was told to call right away if she had another \"episode\". She wants a call back at [phone].  "

## 2019-01-17 NOTE — TELEPHONE ENCOUNTER
Spoke with pt. She will monitor frequency of episodes. If palpitations occur more frequently or last longer, she will call; then she can increase Toprol to BID. For now she will continue Toprol 25mg once daily.

## 2019-01-19 DIAGNOSIS — I10 ESSENTIAL HYPERTENSION: ICD-10-CM

## 2019-01-21 RX ORDER — METOPROLOL SUCCINATE 25 MG/1
TABLET, EXTENDED RELEASE ORAL
Qty: 90 TAB | Refills: 3 | Status: SHIPPED | OUTPATIENT
Start: 2019-01-21 | End: 2019-05-21 | Stop reason: SDUPTHER

## 2019-03-13 ENCOUNTER — APPOINTMENT (RX ONLY)
Dept: URBAN - METROPOLITAN AREA CLINIC 22 | Facility: CLINIC | Age: 84
Setting detail: DERMATOLOGY
End: 2019-03-13

## 2019-03-13 DIAGNOSIS — L81.4 OTHER MELANIN HYPERPIGMENTATION: ICD-10-CM

## 2019-03-13 DIAGNOSIS — Z85.828 PERSONAL HISTORY OF OTHER MALIGNANT NEOPLASM OF SKIN: ICD-10-CM

## 2019-03-13 DIAGNOSIS — L57.0 ACTINIC KERATOSIS: ICD-10-CM

## 2019-03-13 DIAGNOSIS — D18.0 HEMANGIOMA: ICD-10-CM

## 2019-03-13 DIAGNOSIS — L85.3 XEROSIS CUTIS: ICD-10-CM

## 2019-03-13 DIAGNOSIS — L72.0 EPIDERMAL CYST: ICD-10-CM

## 2019-03-13 DIAGNOSIS — D22 MELANOCYTIC NEVI: ICD-10-CM

## 2019-03-13 DIAGNOSIS — L82.1 OTHER SEBORRHEIC KERATOSIS: ICD-10-CM

## 2019-03-13 PROBLEM — D22.0 MELANOCYTIC NEVI OF LIP: Status: ACTIVE | Noted: 2019-03-13

## 2019-03-13 PROBLEM — D18.01 HEMANGIOMA OF SKIN AND SUBCUTANEOUS TISSUE: Status: ACTIVE | Noted: 2019-03-13

## 2019-03-13 PROBLEM — D22.5 MELANOCYTIC NEVI OF TRUNK: Status: ACTIVE | Noted: 2019-03-13

## 2019-03-13 PROCEDURE — 17003 DESTRUCT PREMALG LES 2-14: CPT

## 2019-03-13 PROCEDURE — ? LIQUID NITROGEN

## 2019-03-13 PROCEDURE — 17000 DESTRUCT PREMALG LESION: CPT

## 2019-03-13 PROCEDURE — ? DEFER

## 2019-03-13 PROCEDURE — ? COUNSELING

## 2019-03-13 PROCEDURE — 99214 OFFICE O/P EST MOD 30 MIN: CPT | Mod: 25

## 2019-03-13 ASSESSMENT — LOCATION ZONE DERM
LOCATION ZONE: TRUNK
LOCATION ZONE: NOSE
LOCATION ZONE: FACE
LOCATION ZONE: ARM
LOCATION ZONE: HAND
LOCATION ZONE: LIP
LOCATION ZONE: LEG

## 2019-03-13 ASSESSMENT — LOCATION SIMPLE DESCRIPTION DERM
LOCATION SIMPLE: RIGHT POSTERIOR UPPER ARM
LOCATION SIMPLE: RIGHT TEMPLE
LOCATION SIMPLE: CHEST
LOCATION SIMPLE: LEFT FOREARM
LOCATION SIMPLE: RIGHT THIGH
LOCATION SIMPLE: LEFT THIGH
LOCATION SIMPLE: RIGHT FOREARM
LOCATION SIMPLE: NOSE
LOCATION SIMPLE: RIGHT LIP
LOCATION SIMPLE: UPPER BACK
LOCATION SIMPLE: LEFT UPPER ARM
LOCATION SIMPLE: LEFT LIP
LOCATION SIMPLE: RIGHT HAND
LOCATION SIMPLE: LEFT POSTERIOR UPPER ARM
LOCATION SIMPLE: ABDOMEN
LOCATION SIMPLE: LEFT FOREHEAD
LOCATION SIMPLE: RIGHT UPPER BACK
LOCATION SIMPLE: RIGHT EYEBROW

## 2019-03-13 ASSESSMENT — LOCATION DETAILED DESCRIPTION DERM
LOCATION DETAILED: STERNAL NOTCH
LOCATION DETAILED: RIGHT MEDIAL UPPER BACK
LOCATION DETAILED: EPIGASTRIC SKIN
LOCATION DETAILED: LEFT ANTERIOR PROXIMAL THIGH
LOCATION DETAILED: RIGHT CENTRAL TEMPLE
LOCATION DETAILED: LEFT INFERIOR VERMILION LIP
LOCATION DETAILED: RIGHT MEDIAL EYEBROW
LOCATION DETAILED: RIGHT ANTERIOR DISTAL THIGH
LOCATION DETAILED: LEFT PROXIMAL POSTERIOR UPPER ARM
LOCATION DETAILED: RIGHT NASAL DORSUM
LOCATION DETAILED: SUPERIOR THORACIC SPINE
LOCATION DETAILED: LOWER STERNUM
LOCATION DETAILED: LEFT VENTRAL PROXIMAL FOREARM
LOCATION DETAILED: RIGHT INFERIOR VERMILION LIP
LOCATION DETAILED: RIGHT VENTRAL PROXIMAL FOREARM
LOCATION DETAILED: NASAL ROOT
LOCATION DETAILED: RIGHT DISTAL POSTERIOR UPPER ARM
LOCATION DETAILED: SUBXIPHOID
LOCATION DETAILED: INFERIOR THORACIC SPINE
LOCATION DETAILED: LEFT ANTERIOR PROXIMAL UPPER ARM
LOCATION DETAILED: RIGHT RADIAL DORSAL HAND
LOCATION DETAILED: LEFT INFERIOR MEDIAL FOREHEAD

## 2019-03-13 NOTE — PROCEDURE: DEFER
Instructions (Optional): Explained cosmetic and will incur additional fee for service
Detail Level: Detailed
Introduction Text (Please End With A Colon): The following procedure was deferred:

## 2019-03-13 NOTE — HPI: BUMPS
[facility] ambulatory encounter  INTERNAL MEDICINE OFFICE VISIT    CHIEF COMPLAINT:    Chief Complaint   Patient presents with   â¢ Sinus Problem   â¢ Sore Throat   â¢ Ear Problem       SUBJECTIVE:  Paul Fontanez is a 32year old female who presented requesting evaluation for sinus pain/pressure, ear pain, and headache. She states she began having sinus pressure on Monday. Ear pressure began on Wednesday. Today she began with headaches/pressure behind her eyes. She thinks she might have run a temperature on Monday into Tuesday, but did not check. Yesterday she took DayQuil. She states she has an 11 month at home (whom she breast feeds) who is also sick. Review of systems:     Constitutional:  No fevers or chills. No fatigue. Respiratory:  No shortness of breath. No cough. No wheezing. Cardiovascular:  No chest pain. No palpitations. No edema. No syncope. PAST HISTORIES:  I have reviewed the patient's medications and allergies, past medical, surgical, social and family history, updating these as appropriate. See Histories section of the electronic medical record for a display of this information. OBJECTIVE:  Physical Exam:    Vital Signs:    Vitals:    03/01/19 1424   BP: 90/54   Pulse: 64   Temp: 98.2 Â°F (36.8 Â°C)     HEENT:  Normocephalic, atraumatic. Conjunctivae pink. Mucous membranes moist and pink. Bilateral tympanic membranes minimally convex, no erythema. Pharynx with minimal erythema. No enlargement of lymph nodes. Respiratory:  Clear to auscultation bilaterally. Normal inspiratory effort. Cardiovascular:  Regular rate and rhythm. Normal S1, S2. No S3, S4. LAB RESULTS:  Pertinent labs reviewed. Assessment:   1. Sinus pain            PLAN:    No orders of the defined types were placed in this encounter. Return if symptoms worsen or fail to improve. Mucinex 600-1200 mg twice a day for sinus congestion/mucus production for next 7 days.     Claritin or other
antihistamine daily for post nasal drip-take at least 7 days. Tylenol or Ibuprofen for pain/fever as needed  Drink plenty of fluids. Instructions provided as documented in the after visit summary. The patient indicated understanding of the diagnosis and agreed with the plan of care. Adolph Zambrano, NP Student acting as Scribe for [place] who saw and examined patient today.
How Severe Are Your Bumps?: mild
Have Your Bumps Been Treated?: not been treated
Is This A New Presentation, Or A Follow-Up?: Bump

## 2019-03-19 ENCOUNTER — OFFICE VISIT (OUTPATIENT)
Dept: MEDICAL GROUP | Facility: PHYSICIAN GROUP | Age: 84
End: 2019-03-19
Payer: MEDICARE

## 2019-03-19 VITALS
WEIGHT: 142 LBS | HEART RATE: 63 BPM | BODY MASS INDEX: 24.24 KG/M2 | OXYGEN SATURATION: 98 % | HEIGHT: 64 IN | TEMPERATURE: 98.8 F | DIASTOLIC BLOOD PRESSURE: 82 MMHG | SYSTOLIC BLOOD PRESSURE: 136 MMHG

## 2019-03-19 DIAGNOSIS — R26.89 IMBALANCE: ICD-10-CM

## 2019-03-19 DIAGNOSIS — I10 ESSENTIAL HYPERTENSION, BENIGN: ICD-10-CM

## 2019-03-19 DIAGNOSIS — I47.10 SVT (SUPRAVENTRICULAR TACHYCARDIA): ICD-10-CM

## 2019-03-19 PROCEDURE — 99214 OFFICE O/P EST MOD 30 MIN: CPT | Performed by: FAMILY MEDICINE

## 2019-03-19 PROCEDURE — 8041 PR SCP AHA: Performed by: FAMILY MEDICINE

## 2019-03-19 NOTE — PROGRESS NOTES
"Subjective:     Yoli Carmichael is a 85 y.o. female here today for blood pressure and balance follow-up and Annual Health Assessment.    Imbalance  New problem to examiner.  Patient states that she has ongoing gait imbalance.  Since her ground-level fall in [place] in June of last year she has had ongoing difficulty with balance feeling that her feet are not steady underneath her.  Denies any room spinning dizziness, denies any near-syncope or syncope.  Symptoms wax and wane over time.  No known aggravating or alleviating factors.  No unilateral or focal neuro deficits that she has noticed.    Essential hypertension, benign  Chronic stable medical condition.  Currently well controlled on losartan 50 mg daily and metoprolol 25 mg daily.  Currently following with Dr. Velasquez Of cardiology.    SVT (supraventricular tachycardia) (HCC)  Chronic ongoing medical condition.  Currently following with cardiology, Dr. Velasquez.  Currently taking metoprolol 25 mg daily and losartan 50 mg daily.  She continues to complain of \"episodes\" of having her heart rate in the 160s.  Denies any syncope, dizziness, loss of consciousness.      Health Maintenance Summary                IMM DTaP/Tdap/Td Vaccine Overdue 8/21/1952     IMM ZOSTER VACCINES Overdue 5/21/2013      Done 3/26/2013 Imm Admin: Zoster Vaccine Live (ZVL) (Zostavax)    Annual Wellness Visit Overdue 3/28/2015      Done 3/27/2014     IMM PNEUMOCOCCAL 65+ (ADULT) LOW/MEDIUM RISK SERIES Overdue 3/9/2016      Done 3/9/2015 Imm Admin: Pneumococcal Conjugate Vaccine (Prevnar/PCV-13)    MAMMOGRAM Overdue 1/31/2019      Done 1/31/2018 MA-MAMMO SCREENING BILAT W/TOMOSYNTHESIS W/CAD     Patient has more history with this topic...    BONE DENSITY Next Due 10/3/2023      Done 10/3/2018 DS-BONE DENSITY STUDY (DEXA)     Patient has more history with this topic...    COLONOSCOPY Next Due 2/8/2028      Done 2/8/2018 REFERRAL TO GI FOR COLONOSCOPY     Patient has more history with this " topic...          Annual Health Assessment Questions:    1.  Are you currently engaging in any exercise or physical activity? No      2.  How would you describe your mood or emotional well-being today? good    3.  Have you had any falls in the last year? Yes    4.  Have you noticed any problems with your balance or had difficulty walking? Yes balance issue has exercised to do daily    5.  In the last six months have you experienced any leakage of urine? No    6. DPA/Advanced Directive: Patient has Living Will, but it is not on file. Instructed to bring in a copy to scan into their chart.    Current medicines (including changes today)  Current Outpatient Prescriptions   Medication Sig Dispense Refill   • metoprolol SR (TOPROL XL) 25 MG TABLET SR 24 HR TAKE 1 TABLET BY MOUTH EVERY DAY 90 Tab 3   • losartan (COZAAR) 50 MG Tab Take 1/2 tablet twice daily 180 Tab 0   • furosemide (LASIX) 40 MG Tab Take 1 Tab by mouth as needed. For ankle swelling. 30 Tab 11   • potassium chloride SA (KDUR) 20 MEQ Tab CR Take 1 Tab by mouth as needed. When taking Lasix. 30 Tab 11   • TURMERIC PO Take  by mouth.     • CycloSPORINE (RESTASIS OP) by Ophthalmic route 2 Times a Day.     • coenzyme Q-10 30 MG capsule Take 300 mg by mouth 2X A WEEK.     • ketotifen (ZADITOR) 0.025 % ophthalmic solution Place 1 Drop in both eyes 2 times a day.     • B Complex-C (SUPER B COMPLEX PO) Take  by mouth.     • Glycerin-Polysorbate 80 (REFRESH DRY EYE THERAPY) 1-1 % SOLN 1 Drop by Ophthalmic route 4 times a day.     • Calcium-Vitamin D-Vitamin K (CALCIUM + D) 500-1000-40 MG-UNT-MCG CHEW Take 1,000 mg by mouth every day.     • Multiple Vitamins-Minerals (PRESERVISION/LUTEIN PO) Take  by mouth 2 Times a Day.     • Cholecalciferol (VITAMIN D) 2000 UNITS CAPS Take 6,000 Units by mouth every day.       No current facility-administered medications for this visit.        She  has a past medical history of Arrhythmia; Arthritis; Bronchitis; CATARACT; Dry eyes,  "bilateral (3/27/2014); Osteopenia of the elderly (3/27/2014); and Pneumonia.    Doxycycline and Other environmental    She  reports that she has never smoked. She has never used smokeless tobacco. She reports that she drinks alcohol. She reports that she does not use drugs.  Counseling given: Yes      ROS   No chest pain, no shortness of breath, no abdominal pain.     Objective:     Physical Exam:  Blood pressure 136/82, pulse 63, temperature 37.1 °C (98.8 °F), height 1.626 m (5' 4\"), weight 64.4 kg (142 lb), SpO2 98 %. Body mass index is 24.37 kg/m².     Constitutional: Alert, no distress.  Skin: Warm, dry, good turgor, no rashes in visible areas.  Eye: Equal, round and reactive, conjunctiva clear, lids normal.  ENMT: Lips without lesions, good dentition, oropharynx clear.  Neck: Trachea midline, no masses, no thyromegaly. No cervical or supraclavicular lymphadenopathy.  Respiratory: Unlabored respiratory effort, lungs clear to auscultation, no wheezes, no rhonchi.  Cardiovascular: Normal S1, S2, no murmur, no edema.  Abdomen: Soft, non-tender, no masses, no hepatosplenomegaly.  Psych: Alert and oriented x3, normal affect and mood.    Assessment and Plan:     1. Imbalance  New problem to examiner.  Referral to physical therapy as below.  Follow-up in 3-4 months.  - REFERRAL TO PHYSICAL THERAPY Reason for Therapy: Eval/Treat/Report    2. Essential hypertension, benign  Chronic ongoing medical condition.  Currently well controlled with losartan and metoprolol.  No changes indicated at this time.    3. SVT (supraventricular tachycardia) (HCC)  Chronic stable medical condition.  Continue to follow with cardiology, continue metoprolol.    Discussion today about general wellness and lifestyle habits:    · Engage in regular physical activity and social activities.  · Prevent falls and reduce trip hazards; using ambulatory aides, hearing and vision testing if appropriate.  · Steps to improve urinary " incontinence.  · Advanced care planning.    Follow-Up: Return in about 3 months (around 6/19/2019).         PLEASE NOTE: This dictation was created using voice recognition software. I have made every reasonable attempt to correct obvious errors, but I expect that there are errors of grammar and possibly content that I did not discover before finalizing the note.

## 2019-03-19 NOTE — ASSESSMENT & PLAN NOTE
Chronic stable medical condition.  Currently well controlled on losartan 50 mg daily and metoprolol 25 mg daily.  Currently following with Dr. Velasquez Of cardiology.

## 2019-03-19 NOTE — ASSESSMENT & PLAN NOTE
"Chronic ongoing medical condition.  Currently following with cardiology, Dr. Velasquez.  Currently taking metoprolol 25 mg daily and losartan 50 mg daily.  She continues to complain of \"episodes\" of having her heart rate in the 160s.  Denies any syncope, dizziness, loss of consciousness.  "

## 2019-03-19 NOTE — ASSESSMENT & PLAN NOTE
New problem to examiner.  Patient states that she has ongoing gait imbalance.  Since her ground-level fall in [place] in June of last year she has had ongoing difficulty with balance feeling that her feet are not steady underneath her.  Denies any room spinning dizziness, denies any near-syncope or syncope.  Symptoms wax and wane over time.  No known aggravating or alleviating factors.  No unilateral or focal neuro deficits that she has noticed.

## 2019-04-08 ENCOUNTER — PHYSICAL THERAPY (OUTPATIENT)
Dept: PHYSICAL THERAPY | Facility: REHABILITATION | Age: 84
End: 2019-04-08
Attending: FAMILY MEDICINE
Payer: MEDICARE

## 2019-04-08 DIAGNOSIS — R26.89 BALANCE PROBLEM: ICD-10-CM

## 2019-04-08 PROCEDURE — 97110 THERAPEUTIC EXERCISES: CPT

## 2019-04-08 PROCEDURE — 97161 PT EVAL LOW COMPLEX 20 MIN: CPT

## 2019-04-08 SDOH — ECONOMIC STABILITY: GENERAL: QUALITY OF LIFE: GOOD

## 2019-04-08 ASSESSMENT — ACTIVITIES OF DAILY LIVING (ADL): POOR_BALANCE: 1

## 2019-04-08 ASSESSMENT — ENCOUNTER SYMPTOMS
PAIN SCALE AT HIGHEST: 0
PAIN SCALE AT LOWEST: 0
PAIN SCALE: 0

## 2019-04-08 NOTE — OP THERAPY EVALUATION
Outpatient Physical Therapy  INITIAL EVALUATION    [facility] [place]  [address]  [place] [address]  Phone:  [phone]  Fax:  [phone]    Date of Evaluation: 2019    Patient: Yoli Carmichael  YOB: 1933  MRN: [id number]     Referring Provider: Juan Santana M.D.  [address] [facility] [place] [address]   Referring Diagnosis Imbalance [R26.89]     Time Calculation  Start time: 1400  Stop time: 1500 Time Calculation (min): 60 minutes     Physical Therapy Occurrence Codes    Date of onset of impairment:  3/19/19   Date physical therapy care plan established or reviewed:  19   Date physical therapy treatment started:  19          Chief Complaint: Loss Of Balance    Visit Diagnoses     ICD-10-CM   1. Balance problem R26.89         Subjective:   History of Present Illness:     Date of onset:  3/19/2019  Quality of life:  Good  Prior level of function:  Minor ongoing balance deficits   Pain:     Current pain ratin    At best pain ratin    At worst pain ratin  Activities of Daily Living:     Patient reported ADL status: Limited with rapid turning   Limited with backward walking  Patient Goals:     Patient goals for therapy:  Increased strength, improved balance and increased motion    Patient is a 85 y.o. female that presents to therapy with balance difficulties. States that symptoms were insidious in onset. Reports that she occasionally trips but does not hit the floor. Notes that she feels it maybe her eyes. Reports that symptoms now not changing. States that she has to concentrate on her walking.   Past Medical History:   Diagnosis Date   • Arrhythmia    • Arthritis     knees   • Bronchitis     long time ago   • CATARACT     no surgery yet   • Dry eyes, bilateral 3/27/2014   • Osteopenia of the elderly 3/27/2014   • Pneumonia     long time ago     Past Surgical History:   Procedure Laterality Date   • KNEE ARTHROPLASTY TOTAL   9/3/2013    Performed by Ramesh Styles M.D. at [facility]   • TONSILLECTOMY       Social History   Substance Use Topics   • Smoking status: Never Smoker   • Smokeless tobacco: Never Used      Comment: Pt exposed to second hand smoker   • Alcohol use Yes      Comment: 1-2 week     Family and Occupational History     Social History   • Marital status:      Spouse name: N/A   • Number of children: N/A   • Years of education: N/A       Objective     Postural Observations  Seated posture: fair  Standing posture: fair        Neurological Testing     Reflexes   Left   Patellar (L4): trace (1+)  Achilles (S1): trace (1+)  Ankle clonus reflex: negative  Babinski sign: negative    Right   Patellar (L4): trace (1+)  Achilles (S1): trace (1+)  Ankle clonus reflex: negative  Babinski sign: negative    Myotome testing   Lumbar (left)   L1 (hip flexors): 5  L2 (hip flexors): 5  L3 (knee extensors): 5  L4 (ankle dorsiflexors): 5  L5 (great toe extension): 5  S1 (ankle plantar flexors): 5    Lumbar (right)   L1 (hip flexors): 5  L2 (hip flexors): 5  L3 (knee extensors): 5  L4 (ankle dorsiflexors): 5  L5 (great toe extension): 5  S1 (ankle plantar flexors): 5    Dermatome testing   Lumbar (left)   All left lumbar dermatomes intact    Lumbar (right)   All right lumbar dermatomes intact    Additional Neurological Details  Proprioception: intact  Shin to shin (-)    Active Range of Motion     Lumbar   Flexion: within functional limits  Extension: decreased  Left lateral flexion: decreased  Right lateral flexion: decreased    Additional Active Range of Motion Details  Limited hip ER/IR B  Flexion WNL    Strength:      Left Hip   Planes of Motion   Abduction: 4  Adduction: 4-    Right Hip   Planes of Motion   Abduction: 4  Adduction: 4-    Left Knee   Flexion: 5    Right Knee   Flexion: 5        Therapeutic Exercises (CPT 18612):     1. Basic Tra, x5min    2. Seated hip abd, xfatigue      Time-based  treatments/modalities:  Therapeutic exercise minutes (CPT 31505): 10 minutes       Assessment, Response and Plan:   Impairments: abnormal or restricted ROM, impaired balance and limited mobility    Assessment details:  Patient presents with signs and symptoms consistent with minor balance deficit. Patient limitations include weakness and minor balance deficits. Patient demonstrated difficulty with single limb support. Patient will benefit from skilled therapy to improve the aforementioned deficits and decrease further functional decline.   Prognosis: fair    Goals:   Short Term Goals:   1) Patient's hip abd will improve by a half muscle grade to facilitate improved hip control.  2) Patient's ability to SLS will improve to facilitate standing >5seconds.  Short term goal time span:  2-4 weeks      Long Term Goals:    1) Patient's TUG score will improve to 12sec or less.  2) Patient's BBS will improve by 8 to demonstrate functional improvement  Long term goal time span:  6-8 weeks    Plan:   Therapy options:  Physical therapy treatment to continue  Planned therapy interventions:  E Stim Unattended (CPT 65075), Hot or Cold Pack Therapy (CPT 90439), Manual Therapy (CPT 69295), Neuromuscular Re-education (CPT 01753) and Therapeutic Exercise (CPT 40257)  Frequency:  2x week  Duration in weeks:  6  Discussed with:  Patient      Functional Limitations and Severity Modifiers  PT Functional Assessment Tool Used: BBS/TUG/5x5 sit to stand  PT Functional Assessment Score: 45/13sec no ad/10sec     Referring provider co-signature:  I have reviewed this plan of care and my co-signature certifies the need for services.  Certification Dates:   From 4/8/19     To 5/27/19    Physician Signature: ________________________________ Date: ______________

## 2019-04-11 ENCOUNTER — PHYSICAL THERAPY (OUTPATIENT)
Dept: PHYSICAL THERAPY | Facility: REHABILITATION | Age: 84
End: 2019-04-11
Attending: FAMILY MEDICINE
Payer: MEDICARE

## 2019-04-11 DIAGNOSIS — R26.89 BALANCE PROBLEM: ICD-10-CM

## 2019-04-11 PROCEDURE — 97112 NEUROMUSCULAR REEDUCATION: CPT

## 2019-04-11 PROCEDURE — 97110 THERAPEUTIC EXERCISES: CPT

## 2019-04-11 NOTE — OP THERAPY DAILY TREATMENT
Outpatient Physical Therapy  DAILY TREATMENT     [facility] [place]  [address], [address]  [facility] [address]  Phone:  [phone]  Fax:  [phone]    Date: 04/11/2019    Patient: Yoli Carmichael  YOB: 1933  MRN: [id number]     Time Calculation  Start time: 0900  Stop time: 0930 Time Calculation (min): 30 minutes     Chief Complaint: Loss Of Balance    Visit #: 2    SUBJECTIVE:  Patient reports no falls and states that she is feeling ok at this time.    OBJECTIVE:  Current objective measures:   Increased sway with Romberg          Therapeutic Exercises (CPT 62542):     1. Basic TrA edu, x5min    2. Bike , x5min    3. Basic TrA with LE lift, x20    Therapeutic Treatments and Modalities:     1. Neuromuscular Re-education (CPT 54505), balance in corner NBOS eyes open/closed; balance on foam pad eyes open/closed nbos/wbos    Time-based treatments/modalities:  Therapeutic exercise minutes (CPT 60473): 15 minutes  Neuromusc re-ed, balance, coor, post minutes (CPT 29146): 15 minutes       Pain rating before treatment: 0  Pain rating after treatment: 0    ASSESSMENT:   Response to treatment: Patient responded well to therapy with an in session improvement from 2 sec NBOS on pad to 10sec NBOS on pad.     PLAN/RECOMMENDATIONS:   Plan for treatment: therapy treatment to continue next visit.  Planned interventions for next visit: manual therapy (CPT 07136), neuromuscular re-education (CPT 53125) and therapeutic exercise (CPT 70143).

## 2019-04-17 ENCOUNTER — PHYSICAL THERAPY (OUTPATIENT)
Dept: PHYSICAL THERAPY | Facility: REHABILITATION | Age: 84
End: 2019-04-17
Attending: FAMILY MEDICINE
Payer: MEDICARE

## 2019-04-17 DIAGNOSIS — R26.89 BALANCE PROBLEM: ICD-10-CM

## 2019-04-17 PROCEDURE — 97110 THERAPEUTIC EXERCISES: CPT

## 2019-04-17 PROCEDURE — 97112 NEUROMUSCULAR REEDUCATION: CPT

## 2019-04-17 NOTE — OP THERAPY DAILY TREATMENT
Outpatient Physical Therapy  DAILY TREATMENT     [facility] [place]  [address]  [facility] [address]  Phone:  [phone]  Fax:  [phone]    Date: 04/17/2019    Patient: Yoli Carmichael  YOB: 1933  MRN: [id number]     Time Calculation  Start time: 1005  Stop time: 1043 Time Calculation (min): 38 minutes     Chief Complaint: Loss Of Balance    Visit #: 3    SUBJECTIVE:  Patient reports that symptoms are stable. States that overall she is doing well. No slips or falls.     OBJECTIVE:  Current objective measures:   Balance on foam roller now 11sec max          Therapeutic Exercises (CPT 59205):     1. Basic TrA edu, x5min    2. Bike , x5min    3. Basic TrA with LE lift, x20    4. Seated hip abd L2, xfatigue    5. Sit to stand, x10    Therapeutic Treatments and Modalities:     1. Neuromuscular Re-education (CPT 14077), balance in corner NBOS eyes open/closed; balance on foam pad eyes open/closed nbos/wbos balance on 1/2 foam roller ; sit to stand transfers with eyes closed; DD sits with all legs off; seated on dd with contralateral flexion    Time-based treatments/modalities:  Therapeutic exercise minutes (CPT 37210): 15 minutes  Neuromusc re-ed, balance, coor, post minutes (CPT 05684): 23 minutes       Pain rating before treatment: 0  Pain rating after treatment: 0    ASSESSMENT:   Response to treatment: Patient is starting to progress with balance training demonstrating an increase in hold times. Patient will continue with balance training program as above.     PLAN/RECOMMENDATIONS:   Plan for treatment: therapy treatment to continue next visit.  Planned interventions for next visit: E-stim unattended (CPT 44861), manual therapy (CPT 52665), neuromuscular re-education (CPT 44630) and therapeutic exercise (CPT 75527).

## 2019-04-23 ENCOUNTER — PHYSICAL THERAPY (OUTPATIENT)
Dept: PHYSICAL THERAPY | Facility: REHABILITATION | Age: 84
End: 2019-04-23
Attending: FAMILY MEDICINE
Payer: MEDICARE

## 2019-04-23 DIAGNOSIS — R26.89 BALANCE PROBLEM: ICD-10-CM

## 2019-04-23 PROCEDURE — 97110 THERAPEUTIC EXERCISES: CPT

## 2019-04-23 PROCEDURE — 97112 NEUROMUSCULAR REEDUCATION: CPT

## 2019-04-23 NOTE — OP THERAPY DAILY TREATMENT
Outpatient Physical Therapy  DAILY TREATMENT     [facility] [place]  [address], [address]  [facility] [address]  Phone:  [phone]  Fax:  [phone]    Date: 2019    Patient: Yoli Carmichael  YOB: 1933  MRN: [id number]     Time Calculation  Start time: 1000  Stop time: 1030 Time Calculation (min): 30 minutes     Chief Complaint: Loss Of Balance    Visit #: 4    SUBJECTIVE:  Patient reports no slips or trips. States she is doing fairly well.     OBJECTIVE:  Current objective measures:   TUsec          Therapeutic Exercises (CPT 78603):     2. Nu Step, x8min    3. Basic TrA with LE lift, x20    4. Shuttle, L5 x90    5. Sit to stand, x10    Therapeutic Treatments and Modalities:     1. Neuromuscular Re-education (CPT 33734), balance in corner NBOS eyes open/closed; balance on foam pad eyes open/closed nbos/wbos balance on 1/2 foam roller ; sit to stand transfers with eyes closed; DD sits with all legs off; seated on dd with contralateral flexion    Time-based treatments/modalities:  Therapeutic exercise minutes (CPT 87081): 20 minutes  Neuromusc re-ed, balance, coor, post minutes (CPT 86774): 10 minutes       Pain rating before treatment: 0  Pain rating after treatment: 0    ASSESSMENT:   Response to treatment: Patient responded well to therapy with an overall improvement in 1/2 foam roller stance time. Now 15sec.    PLAN/RECOMMENDATIONS:   Plan for treatment: therapy treatment to continue next visit.  Planned interventions for next visit: E-stim unattended (CPT 78362), manual therapy (CPT 61748), neuromuscular re-education (CPT 72264) and therapeutic exercise (CPT 25250).

## 2019-04-25 ENCOUNTER — PHYSICAL THERAPY (OUTPATIENT)
Dept: PHYSICAL THERAPY | Facility: REHABILITATION | Age: 84
End: 2019-04-25
Attending: FAMILY MEDICINE
Payer: MEDICARE

## 2019-04-25 DIAGNOSIS — R26.89 BALANCE PROBLEM: ICD-10-CM

## 2019-04-25 PROCEDURE — 97112 NEUROMUSCULAR REEDUCATION: CPT

## 2019-04-25 PROCEDURE — 97110 THERAPEUTIC EXERCISES: CPT

## 2019-04-25 NOTE — OP THERAPY DAILY TREATMENT
Outpatient Physical Therapy  DAILY TREATMENT     [facility] [place]  [address], [address]  [facility] [address]  Phone:  [phone]  Fax:  [phone]    Date: 2019    Patient: Yoli Carmichael  YOB: 1933  MRN: [id number]     Time Calculation  Start time: 1000  Stop time: 1030 Time Calculation (min): 30 minutes     Chief Complaint: Loss Of Balance    Visit #: 5    SUBJECTIVE:  Notes no trips or slips. States she is doing well.     OBJECTIVE:  Current objective measures:   TU.18sec          Therapeutic Exercises (CPT 61225):     2. Nu Step, x8min    3. Basic TrA with LE lift, x20    4. Shuttle, L5 x90    5. Sit to stand, x10    Therapeutic Treatments and Modalities:     1. Neuromuscular Re-education (CPT 13304), balance in corner NBOS eyes open/closed; balance on foam pad eyes open/closed nbos/wbos balance on 1/2 foam roller ; sit to stand transfers with eyes closed; DD sits with all legs off; standing dd     Time-based treatments/modalities:  Therapeutic exercise minutes (CPT 04023): 15 minutes  Neuromusc re-ed, balance, coor, post minutes (CPT 92403): 15 minutes       Pain rating before treatment: 0  Pain rating after treatment: 0    ASSESSMENT:   Response to treatment: Patient responded well to therapy with an improvement in TUG score. Patient will continue with her current program.     PLAN/RECOMMENDATIONS:   Plan for treatment: therapy treatment to continue next visit.  Planned interventions for next visit: manual therapy (CPT 92172), neuromuscular re-education (CPT 92656) and therapeutic exercise (CPT 44465).

## 2019-04-30 ENCOUNTER — PHYSICAL THERAPY (OUTPATIENT)
Dept: PHYSICAL THERAPY | Facility: REHABILITATION | Age: 84
End: 2019-04-30
Attending: FAMILY MEDICINE
Payer: MEDICARE

## 2019-04-30 DIAGNOSIS — R26.89 BALANCE PROBLEM: ICD-10-CM

## 2019-04-30 PROCEDURE — 97112 NEUROMUSCULAR REEDUCATION: CPT

## 2019-04-30 PROCEDURE — 97110 THERAPEUTIC EXERCISES: CPT

## 2019-05-02 ENCOUNTER — PHYSICAL THERAPY (OUTPATIENT)
Dept: PHYSICAL THERAPY | Facility: REHABILITATION | Age: 84
End: 2019-05-02
Attending: FAMILY MEDICINE
Payer: MEDICARE

## 2019-05-02 DIAGNOSIS — R26.89 BALANCE PROBLEM: ICD-10-CM

## 2019-05-02 PROCEDURE — 97112 NEUROMUSCULAR REEDUCATION: CPT

## 2019-05-02 PROCEDURE — 97110 THERAPEUTIC EXERCISES: CPT

## 2019-05-02 NOTE — OP THERAPY DAILY TREATMENT
Outpatient Physical Therapy  DAILY TREATMENT     [facility] [place]  [address]  [facility] [address]  Phone:  [phone]  Fax:  [phone]    Date: 2019    Patient: Yoli Carmichael  YOB: 1933  MRN: [id number]     Time Calculation  Start time: 1000  Stop time: 1030 Time Calculation (min): 30 minutes     Chief Complaint: Loss Of Balance    Visit #: 7    SUBJECTIVE:  Patient reports no slips or trips. Notes that she still has difficulty walking in tandem.     OBJECTIVE:  Current objective measures:   TU.94sec  5x5 sit to stand: 8.84sec          Therapeutic Exercises (CPT 76356):     2. Nu Step, x10min, L5    3. Basic TrA with LE lift, x20    5. Sit to stand, x10    Therapeutic Treatments and Modalities:     1. Neuromuscular Re-education (CPT 02682), balance in corner NBOS eyes open/closed; balance on foam pad eyes open/closed nbos/wbos balance on 1/2 foam roller ; sit to stand transfers with eyes closed; DD sits with all legs off; standing dd     Time-based treatments/modalities:  Therapeutic exercise minutes (CPT 71512): 15 minutes  Neuromusc re-ed, balance, coor, post minutes (CPT 53270): 15 minutes       Pain rating before treatment: 0  Pain rating after treatment: 0    ASSESSMENT:   Response to treatment: Patient responded well to therapy with an overall improvement in TUG score and 5x5 sit to stand score.     PLAN/RECOMMENDATIONS:   Plan for treatment: therapy treatment to continue next visit.  Planned interventions for next visit: manual therapy (CPT 56791), neuromuscular re-education (CPT 91397) and therapeutic exercise (CPT 60017).

## 2019-05-07 ENCOUNTER — PHYSICAL THERAPY (OUTPATIENT)
Dept: PHYSICAL THERAPY | Facility: REHABILITATION | Age: 84
End: 2019-05-07
Attending: FAMILY MEDICINE
Payer: MEDICARE

## 2019-05-07 DIAGNOSIS — R26.89 BALANCE PROBLEM: ICD-10-CM

## 2019-05-07 PROCEDURE — 97112 NEUROMUSCULAR REEDUCATION: CPT

## 2019-05-07 PROCEDURE — 97110 THERAPEUTIC EXERCISES: CPT

## 2019-05-07 NOTE — OP THERAPY DAILY TREATMENT
Outpatient Physical Therapy  DAILY TREATMENT     [facility] [place]  [address]  [facility] [address]  Phone:  [phone]  Fax:  [phone]    Date: 2019    Patient: Yoli Carmichael  YOB: 1933  MRN: [id number]     Time Calculation  Start time: 1100  Stop time: 1130 Time Calculation (min): 30 minutes     Chief Complaint: Loss Of Balance    Visit #: 8    SUBJECTIVE:  Patient reports that she did well over her recent trip. No trips or slips even over uneven terrain.     OBJECTIVE:  Current objective measures:   TU.5sec          Therapeutic Exercises (CPT 69152):     2. Nu Step, x10min, L5    3. Basic TrA with LE lift, x20    5. Sit to stand, x10    Therapeutic Treatments and Modalities:     1. Neuromuscular Re-education (CPT 90405), balance in corner NBOS eyes open/closed; balance on foam pad eyes open/closed nbos/wbos balance on 1/2 foam roller ; sit to stand transfers with eyes closed; DD sits with all legs off; standing dd     Time-based treatments/modalities:  Therapeutic exercise minutes (CPT 93099): 15 minutes  Neuromusc re-ed, balance, coor, post minutes (CPT 33653): 15 minutes           ASSESSMENT:   Response to treatment: Patient reports that she is doing better. Notes an overall improvement in strength and endurance. Patient still does not feel 100% confident for advanced challenges.     PLAN/RECOMMENDATIONS:   Plan for treatment: therapy treatment to continue next visit.  Planned interventions for next visit: manual therapy (CPT 00206), neuromuscular re-education (CPT 89638) and therapeutic exercise (CPT 69318).

## 2019-05-09 ENCOUNTER — PHYSICAL THERAPY (OUTPATIENT)
Dept: PHYSICAL THERAPY | Facility: REHABILITATION | Age: 84
End: 2019-05-09
Attending: FAMILY MEDICINE
Payer: MEDICARE

## 2019-05-09 DIAGNOSIS — R26.89 BALANCE PROBLEM: ICD-10-CM

## 2019-05-09 PROCEDURE — 97110 THERAPEUTIC EXERCISES: CPT

## 2019-05-09 PROCEDURE — 97112 NEUROMUSCULAR REEDUCATION: CPT

## 2019-05-09 NOTE — OP THERAPY DAILY TREATMENT
Outpatient Physical Therapy  DAILY TREATMENT     [facility] [place]  [address], [address]  [place] [address]  Phone:  [phone]  Fax:  [phone]    Date: 05/09/2019    Patient: Yoli Carmichael  YOB: 1933  MRN: [id number]     Time Calculation  Start time: 1100  Stop time: 1130 Time Calculation (min): 30 minutes     Chief Complaint: Loss Of Balance    Visit #: 9    SUBJECTIVE:  Patient reports no slips trips or falls. States she is doing well.   OBJECTIVE:  Current objective measures:   Now able to ambulate mod tandem          Therapeutic Exercises (CPT 17949):     2. Nu Step, x10min, L5    3. Basic TrA with LE lift, x20    4. SL hip abduciton, x15 b    5. Sit to stand, x10, eyes closed    Therapeutic Treatments and Modalities:     1. Neuromuscular Re-education (CPT 69268), balance in corner NBOS eyes open/closed; balance on foam pad eyes open/closed nbos/wbos balance on 1/2 foam roller ; sit to stand transfers with eyes closed; DD sits with all legs off; standing dd     Time-based treatments/modalities:  Therapeutic exercise minutes (CPT 67919): 15 minutes  Neuromusc re-ed, balance, coor, post minutes (CPT 00902): 15 minutes       Pain rating before treatment: 0  Pain rating after treatment: 0    ASSESSMENT:   Response to treatment: Patient responded well now able to ambulate mod tandem with CGA. Patient is progressing well with decreased TUG score and improved mobility.     PLAN/RECOMMENDATIONS:   Plan for treatment: therapy treatment to continue next visit.  Planned interventions for next visit: continue with current treatment.

## 2019-05-14 ENCOUNTER — APPOINTMENT (OUTPATIENT)
Dept: PHYSICAL THERAPY | Facility: REHABILITATION | Age: 84
End: 2019-05-14
Attending: FAMILY MEDICINE
Payer: MEDICARE

## 2019-05-16 ENCOUNTER — APPOINTMENT (OUTPATIENT)
Dept: PHYSICAL THERAPY | Facility: REHABILITATION | Age: 84
End: 2019-05-16
Attending: FAMILY MEDICINE
Payer: MEDICARE

## 2019-05-21 ENCOUNTER — OFFICE VISIT (OUTPATIENT)
Dept: CARDIOLOGY | Facility: MEDICAL CENTER | Age: 84
End: 2019-05-21
Payer: MEDICARE

## 2019-05-21 VITALS
SYSTOLIC BLOOD PRESSURE: 132 MMHG | DIASTOLIC BLOOD PRESSURE: 64 MMHG | WEIGHT: 147 LBS | HEIGHT: 64 IN | OXYGEN SATURATION: 97 % | BODY MASS INDEX: 25.1 KG/M2 | HEART RATE: 72 BPM

## 2019-05-21 DIAGNOSIS — E78.5 DYSLIPIDEMIA: ICD-10-CM

## 2019-05-21 DIAGNOSIS — I10 ESSENTIAL HYPERTENSION, BENIGN: ICD-10-CM

## 2019-05-21 DIAGNOSIS — I10 ESSENTIAL HYPERTENSION: ICD-10-CM

## 2019-05-21 DIAGNOSIS — R26.89 IMBALANCE: ICD-10-CM

## 2019-05-21 DIAGNOSIS — I47.10 SVT (SUPRAVENTRICULAR TACHYCARDIA): ICD-10-CM

## 2019-05-21 PROCEDURE — 99214 OFFICE O/P EST MOD 30 MIN: CPT | Performed by: INTERNAL MEDICINE

## 2019-05-21 RX ORDER — METOPROLOL SUCCINATE 50 MG/1
TABLET, EXTENDED RELEASE ORAL
Qty: 90 TAB | Refills: 3 | Status: SHIPPED | OUTPATIENT
Start: 2019-05-21 | End: 2020-05-18

## 2019-05-21 RX ORDER — LOSARTAN POTASSIUM 25 MG/1
12.5 TABLET ORAL DAILY
COMMUNITY
End: 2019-09-11 | Stop reason: SDUPTHER

## 2019-05-21 RX ORDER — AMOXICILLIN 500 MG/1
CAPSULE ORAL
COMMUNITY
Start: 2019-04-08 | End: 2020-06-26

## 2019-05-21 ASSESSMENT — ENCOUNTER SYMPTOMS
MEMORY LOSS: 0
RESPIRATORY NEGATIVE: 1
PALPITATIONS: 0
MUSCULOSKELETAL NEGATIVE: 1
DEPRESSION: 1
GASTROINTESTINAL NEGATIVE: 1
NEUROLOGICAL NEGATIVE: 1
LOSS OF CONSCIOUSNESS: 0
CONSTITUTIONAL NEGATIVE: 1
DIZZINESS: 0

## 2019-05-21 NOTE — PROGRESS NOTES
Chief Complaint   Patient presents with   • HTN (Controlled)   • Supraventricular Tachycardia (SVT)   • Palpitations       Subjective:   Yoli Carmichael is a 85 y.o. female who presents today for follow-up of SVT.  She had a bit more problems with palpitations and has increased her metoprolol to 25 mg a morning and 12 and half in the afternoon which has helped, but not totally alleviated the symptoms.  Otherwise no interval problems except for difficulty with balance.  She is currently undergoing outpatient physical therapy for this.    Past Medical History:   Diagnosis Date   • Arrhythmia    • Arthritis     knees   • Bronchitis     long time ago   • CATARACT     no surgery yet   • Dry eyes, bilateral 3/27/2014   • Osteopenia of the elderly 3/27/2014   • Pneumonia     long time ago     Past Surgical History:   Procedure Laterality Date   • KNEE ARTHROPLASTY TOTAL  9/3/2013    Performed by Ramesh Styles M.D. at SURGERY [facility] ORS   • TONSILLECTOMY       Family History   Problem Relation Age of Onset   • Lung Disease Mother         frequent pneumonia   • Heart Disease Father 54        MI   • Lung Disease Father         Emphysema   • Heart Attack Father    • Cancer Maternal Aunt         Great Aunt and Cousin Ovarian CA   • Hypertension Maternal Aunt    • Cancer Daughter         Lung and Brain CA   • Seizures Daughter    • Cancer Daughter         Pituitary tumor   • Cancer Brother         leukemia     Social History     Social History   • Marital status:      Spouse name: N/A   • Number of children: N/A   • Years of education: N/A     Occupational History   • Not on file.     Social History Main Topics   • Smoking status: Never Smoker   • Smokeless tobacco: Never Used      Comment: Pt exposed to second hand smoker   • Alcohol use Yes      Comment: 1-2 week   • Drug use: No   • Sexual activity: No     Other Topics Concern   • Not on file     Social History Narrative   • No narrative on file     Allergies    Allergen Reactions   • Doxycycline Nausea   • Other Environmental      Seasonal allergies     Outpatient Encounter Prescriptions as of 5/21/2019   Medication Sig Dispense Refill   • losartan (COZAAR) 25 MG Tab Take 12.5 mg by mouth 2 Times a Day.     • metoprolol SR (TOPROL XL) 50 MG TABLET SR 24 HR One tab P.O. daily 90 Tab 3   • furosemide (LASIX) 40 MG Tab Take 1 Tab by mouth as needed. For ankle swelling. 30 Tab 11   • TURMERIC PO Take  by mouth.     • coenzyme Q-10 30 MG capsule Take 300 mg by mouth 2X A WEEK.     • B Complex-C (SUPER B COMPLEX PO) Take  by mouth.     • Calcium-Vitamin D-Vitamin K (CALCIUM + D) 500-1000-40 MG-UNT-MCG CHEW Take 1,000 mg by mouth every day.     • Multiple Vitamins-Minerals (PRESERVISION/LUTEIN PO) Take  by mouth 2 Times a Day.     • Cholecalciferol (VITAMIN D) 2000 UNITS CAPS Take 6,000 Units by mouth every day.     • amoxicillin (AMOXIL) 500 MG Cap      • [DISCONTINUED] metoprolol SR (TOPROL XL) 25 MG TABLET SR 24 HR TAKE 1 TABLET BY MOUTH EVERY DAY 90 Tab 3   • losartan (COZAAR) 50 MG Tab Take 1/2 tablet twice daily 180 Tab 0   • potassium chloride SA (KDUR) 20 MEQ Tab CR Take 1 Tab by mouth as needed. When taking Lasix. 30 Tab 11   • CycloSPORINE (RESTASIS OP) by Ophthalmic route 2 Times a Day.     • ketotifen (ZADITOR) 0.025 % ophthalmic solution Place 1 Drop in both eyes 2 times a day.     • Glycerin-Polysorbate 80 (REFRESH DRY EYE THERAPY) 1-1 % SOLN 1 Drop by Ophthalmic route 4 times a day.       No facility-administered encounter medications on file as of 5/21/2019.      Review of Systems   Constitutional: Negative.    HENT: Negative.    Respiratory: Negative.    Cardiovascular: Negative for palpitations and leg swelling.   Gastrointestinal: Negative.    Musculoskeletal: Negative.    Skin: Negative.    Neurological: Negative.  Negative for dizziness and loss of consciousness.        Difficulty with balance   Endo/Heme/Allergies: Negative.    Psychiatric/Behavioral:  "Positive for depression. Negative for memory loss.        Objective:   /64 (BP Location: Left arm, Patient Position: Sitting, BP Cuff Size: Adult)   Pulse 72   Ht 1.626 m (5' 4\")   Wt 66.7 kg (147 lb)   SpO2 97%   BMI 25.23 kg/m²     Physical Exam   Constitutional: She is oriented to person, place, and time. No distress.   HENT:   Head: Normocephalic and atraumatic.   Eyes: Pupils are equal, round, and reactive to light. No scleral icterus.   Neck: No JVD present.   Cardiovascular: Normal rate and regular rhythm.    No murmur heard.  Pulmonary/Chest: No respiratory distress. She has no wheezes.   Abdominal: Soft. She exhibits no distension. There is no tenderness.   Musculoskeletal: She exhibits no edema.   Neurological: She is alert and oriented to person, place, and time.   Skin: Skin is warm.   Psychiatric: She has a normal mood and affect.       Assessment:     1. Dyslipidemia  Lipid Profile    CBC WITH DIFFERENTIAL    Comp Metabolic Panel   2. Essential hypertension, benign  Comp Metabolic Panel   3. Imbalance     4. SVT (supraventricular tachycardia) (HCC)     5. Essential hypertension  metoprolol SR (TOPROL XL) 50 MG TABLET SR 24 HR       Medical Decision Making:  Today's Assessment / Status / Plan:   SVT: Incompletely controlled on a total dose of 37.5 mg of metoprolol daily.  We will increase her metoprolol succinate to 50 mg a day.  She is reassured that this is still in the low-dose range.    Hypertension: Under good control on her current medical regimen.    Balance problems: In reviewing her chart she has not had a B12 level drawn since 2012.  She requests additional lab work as well.  B12 will be drawn.  Return in about a month for reevaluation of her SVT and the higher dose metoprolol.  "

## 2019-05-22 ENCOUNTER — PHYSICAL THERAPY (OUTPATIENT)
Dept: PHYSICAL THERAPY | Facility: REHABILITATION | Age: 84
End: 2019-05-22
Attending: FAMILY MEDICINE
Payer: MEDICARE

## 2019-05-22 DIAGNOSIS — R26.89 BALANCE PROBLEM: ICD-10-CM

## 2019-05-22 PROCEDURE — 97112 NEUROMUSCULAR REEDUCATION: CPT

## 2019-05-22 NOTE — OP THERAPY DAILY TREATMENT
Outpatient Physical Therapy  DAILY TREATMENT     [facility] [place]  [address], [address]  [place] [address]  Phone:  [phone]  Fax:  [phone]    Date: 2019    Patient: Yoli Carmichael  YOB: 1933  MRN: [id number]     Time Calculation  Start time: 1400  Stop time: 1430 Time Calculation (min): 30 minutes     Chief Complaint: Loss Of Balance    Visit #: 10    SUBJECTIVE:  Patient reports no slips or trips and states she was able to go on a small hike with no difficulty.     OBJECTIVE:  Current objective measures:   TU.2sec          Therapeutic Exercises (CPT 29327):     2. Nu Step, x10min, L5    3. Basic TrA with LE lift, x20    4. SL hip abduciton, x15 b    5. Sit to stand, x10, eyes closed    Therapeutic Treatments and Modalities:     1. Neuromuscular Re-education (CPT 62545), balance in corner NBOS eyes open/closed; balance on foam pad eyes open/closed nbos/wbos balance on 1/2 foam roller ; sit to stand transfers with eyes closed; DD sits with all legs off; standing dd     Time-based treatments/modalities:  Therapeutic exercise minutes (CPT 12701): 10 minutes  Neuromusc re-ed, balance, coor, post minutes (CPT 36040): 20 minutes         ASSESSMENT:   Response to treatment: Reassess next session. Patient will decrease in therapy frequency due to progress. Plan to continue to transition patient to [facility].     PLAN/RECOMMENDATIONS:   Plan for treatment: therapy treatment to continue next visit.  Planned interventions for next visit: manual therapy (CPT 29377), neuromuscular re-education (CPT 90528) and therapeutic exercise (CPT 85169).

## 2019-05-31 ENCOUNTER — APPOINTMENT (OUTPATIENT)
Dept: PHYSICAL THERAPY | Facility: REHABILITATION | Age: 84
End: 2019-05-31
Attending: FAMILY MEDICINE
Payer: MEDICARE

## 2019-06-06 ENCOUNTER — PHYSICAL THERAPY (OUTPATIENT)
Dept: PHYSICAL THERAPY | Facility: REHABILITATION | Age: 84
End: 2019-06-06
Attending: FAMILY MEDICINE
Payer: MEDICARE

## 2019-06-06 DIAGNOSIS — M25.511 RIGHT SHOULDER PAIN, UNSPECIFIED CHRONICITY: ICD-10-CM

## 2019-06-06 DIAGNOSIS — S42.221A 2-PART DISPLACED FRACTURE OF SURGICAL NECK OF RIGHT HUMERUS, INITIAL ENCOUNTER FOR CLOSED FRACTURE: ICD-10-CM

## 2019-06-06 DIAGNOSIS — R26.89 BALANCE PROBLEM: ICD-10-CM

## 2019-06-06 PROCEDURE — 97112 NEUROMUSCULAR REEDUCATION: CPT

## 2019-06-06 PROCEDURE — 97110 THERAPEUTIC EXERCISES: CPT

## 2019-06-06 NOTE — OP THERAPY DAILY TREATMENT
Outpatient Physical Therapy  DAILY TREATMENT     [facility] Outpatient Physical Therapy [place]  [address], [address]  [place] [address]  Phone:  [phone]  Fax:  [phone]    Date: 2019    Patient: Yoli Carmichael  YOB: 1933  MRN: [id number]     Time Calculation  Start time: 1200  Stop time: 1230 Time Calculation (min): 30 minutes     Chief Complaint: Loss Of Balance    Visit #: 11    SUBJECTIVE:  Patient reports that she is doing well. Notes no slips or falls. Patient will trial a gym.     OBJECTIVE:  Current objective measures:   TU.2sec  5x5 sit to stand: 8.84sec          Therapeutic Exercises (CPT 41700):     2. Nu Step, x10min, L5    3. Basic TrA with LE lift, x20    4. SL hip abduciton, x15 b    5. Sit to stand, x10, eyes closed    Therapeutic Treatments and Modalities:     1. Neuromuscular Re-education (CPT 18686), balance in corner NBOS eyes open/closed; balance on foam pad eyes open/closed nbos/wbos balance on 1/2 foam roller ; sit to stand transfers with eyes closed; DD sits with all legs off; standing dd     Time-based treatments/modalities:  Therapeutic exercise minutes (CPT 71932): 10 minutes  Neuromusc re-ed, balance, coor, post minutes (CPT 89252): 20 minutes       Pain rating before treatment: 0  Pain rating after treatment: 0    ASSESSMENT:   Response to treatment: Patient responded well to therapy with an improvement in gait an overall improved TUG and sit to stand score.     PLAN/RECOMMENDATIONS:   Plan for treatment: Trial full HEP.  Planned interventions for next visit: Hold PT 3 weeks

## 2019-06-11 ENCOUNTER — TELEPHONE (OUTPATIENT)
Dept: CARDIOLOGY | Facility: MEDICAL CENTER | Age: 84
End: 2019-06-11

## 2019-06-13 ENCOUNTER — APPOINTMENT (OUTPATIENT)
Dept: PHYSICAL THERAPY | Facility: REHABILITATION | Age: 84
End: 2019-06-13
Attending: FAMILY MEDICINE
Payer: MEDICARE

## 2019-06-14 ENCOUNTER — HOSPITAL ENCOUNTER (OUTPATIENT)
Dept: LAB | Facility: MEDICAL CENTER | Age: 84
End: 2019-06-14
Attending: INTERNAL MEDICINE
Payer: MEDICARE

## 2019-06-14 DIAGNOSIS — I10 ESSENTIAL HYPERTENSION, BENIGN: ICD-10-CM

## 2019-06-14 DIAGNOSIS — E78.5 DYSLIPIDEMIA: ICD-10-CM

## 2019-06-14 LAB
BASOPHILS # BLD AUTO: 1.1 % (ref 0–1.8)
BASOPHILS # BLD: 0.08 K/UL (ref 0–0.12)
EOSINOPHIL # BLD AUTO: 0.22 K/UL (ref 0–0.51)
EOSINOPHIL NFR BLD: 3.1 % (ref 0–6.9)
ERYTHROCYTE [DISTWIDTH] IN BLOOD BY AUTOMATED COUNT: 45.8 FL (ref 35.9–50)
HCT VFR BLD AUTO: 41.4 % (ref 37–47)
HGB BLD-MCNC: 13.1 G/DL (ref 12–16)
IMM GRANULOCYTES # BLD AUTO: 0.04 K/UL (ref 0–0.11)
IMM GRANULOCYTES NFR BLD AUTO: 0.6 % (ref 0–0.9)
LYMPHOCYTES # BLD AUTO: 2.97 K/UL (ref 1–4.8)
LYMPHOCYTES NFR BLD: 41.3 % (ref 22–41)
MCH RBC QN AUTO: 28.4 PG (ref 27–33)
MCHC RBC AUTO-ENTMCNC: 31.6 G/DL (ref 33.6–35)
MCV RBC AUTO: 89.6 FL (ref 81.4–97.8)
MONOCYTES # BLD AUTO: 0.87 K/UL (ref 0–0.85)
MONOCYTES NFR BLD AUTO: 12.1 % (ref 0–13.4)
NEUTROPHILS # BLD AUTO: 3.01 K/UL (ref 2–7.15)
NEUTROPHILS NFR BLD: 41.8 % (ref 44–72)
NRBC # BLD AUTO: 0 K/UL
NRBC BLD-RTO: 0 /100 WBC
PLATELET # BLD AUTO: 236 K/UL (ref 164–446)
PMV BLD AUTO: 10.7 FL (ref 9–12.9)
RBC # BLD AUTO: 4.62 M/UL (ref 4.2–5.4)
VIT B12 SERPL-MCNC: 353 PG/ML (ref 211–911)
WBC # BLD AUTO: 7.2 K/UL (ref 4.8–10.8)

## 2019-06-14 PROCEDURE — 80053 COMPREHEN METABOLIC PANEL: CPT

## 2019-06-14 PROCEDURE — 85025 COMPLETE CBC W/AUTO DIFF WBC: CPT

## 2019-06-14 PROCEDURE — 36415 COLL VENOUS BLD VENIPUNCTURE: CPT

## 2019-06-14 PROCEDURE — 82607 VITAMIN B-12: CPT

## 2019-06-14 PROCEDURE — 80061 LIPID PANEL: CPT

## 2019-06-15 LAB
ALBUMIN SERPL BCP-MCNC: 3.9 G/DL (ref 3.2–4.9)
ALBUMIN/GLOB SERPL: 1.3 G/DL
ALP SERPL-CCNC: 57 U/L (ref 30–99)
ALT SERPL-CCNC: 11 U/L (ref 2–50)
ANION GAP SERPL CALC-SCNC: 6 MMOL/L (ref 0–11.9)
AST SERPL-CCNC: 18 U/L (ref 12–45)
BILIRUB SERPL-MCNC: 0.6 MG/DL (ref 0.1–1.5)
BUN SERPL-MCNC: 22 MG/DL (ref 8–22)
CALCIUM SERPL-MCNC: 9.2 MG/DL (ref 8.5–10.5)
CHLORIDE SERPL-SCNC: 108 MMOL/L (ref 96–112)
CHOLEST SERPL-MCNC: 198 MG/DL (ref 100–199)
CO2 SERPL-SCNC: 25 MMOL/L (ref 20–33)
CREAT SERPL-MCNC: 0.99 MG/DL (ref 0.5–1.4)
GLOBULIN SER CALC-MCNC: 3 G/DL (ref 1.9–3.5)
GLUCOSE SERPL-MCNC: 95 MG/DL (ref 65–99)
HDLC SERPL-MCNC: 59 MG/DL
LDLC SERPL CALC-MCNC: 114 MG/DL
POTASSIUM SERPL-SCNC: 4.3 MMOL/L (ref 3.6–5.5)
PROT SERPL-MCNC: 6.9 G/DL (ref 6–8.2)
SODIUM SERPL-SCNC: 139 MMOL/L (ref 135–145)
TRIGL SERPL-MCNC: 125 MG/DL (ref 0–149)

## 2019-06-18 ENCOUNTER — TELEPHONE (OUTPATIENT)
Dept: MEDICAL GROUP | Facility: PHYSICIAN GROUP | Age: 84
End: 2019-06-18

## 2019-06-18 NOTE — TELEPHONE ENCOUNTER
Future Appointments       Provider Department Center    6/20/2019 10:45 AM STEPHANIE Cruz [facility]     6/26/2019 11:20 AM Juan Santana M.D. [facility] [place]        ESTABLISHED PATIENT PRE-VISIT PLANNING     Patient was NOT contacted to complete PVP.    1.  Reviewed notes from the last few office visits within the medical group: Yes 3/19/19    2.  If any orders were placed at last visit or intended to be done for this visit (i.e. 6 mos follow-up), do we have Results/Consult Notes?        •  Labs - Labs ordered, completed on 6/14/19 and results are in chart.       •  Imaging - Imaging was not ordered at last office visit.       •  Referrals - Referral ordered, patient has NOT been seen.    3. Is this appointment scheduled as a Hospital Follow-Up? No    4.  Immunizations were updated in [employer] using WebIZ?: Yes       •  Web Iz Recommendations: PNEUMOVAX (PPSV23), TDAP, VARICELLA (Chicken Pox)  and SHINGRIX (Shingles)    5.  Patient is due for the following Health Maintenance Topics:   Health Maintenance Due   Topic Date Due   • IMM DTaP/Tdap/Td Vaccine (1 - Tdap) 08/21/1952   • IMM ZOSTER VACCINES (2 of 3) 05/21/2013   • Annual Wellness Visit  03/28/2015   • IMM PNEUMOCOCCAL 65+ (ADULT) LOW/MEDIUM RISK SERIES (2 of 2 - PPSV23) 03/09/2016   • MAMMOGRAM  01/31/2019       6. Orders for overdue Health Maintenance topics pended in Pre-Charting? NO    7.  AHA (MDX) form printed for Provider? No, already completed    8.  Patient was NOT informed to arrive 15 min prior to their scheduled appointment and bring in their medication bottles.

## 2019-06-20 ENCOUNTER — OFFICE VISIT (OUTPATIENT)
Dept: CARDIOLOGY | Facility: MEDICAL CENTER | Age: 84
End: 2019-06-20
Payer: MEDICARE

## 2019-06-20 VITALS
HEIGHT: 64 IN | OXYGEN SATURATION: 96 % | SYSTOLIC BLOOD PRESSURE: 140 MMHG | HEART RATE: 64 BPM | WEIGHT: 147 LBS | DIASTOLIC BLOOD PRESSURE: 56 MMHG | BODY MASS INDEX: 25.1 KG/M2

## 2019-06-20 DIAGNOSIS — I10 ESSENTIAL HYPERTENSION, BENIGN: ICD-10-CM

## 2019-06-20 DIAGNOSIS — R60.0 LOCALIZED EDEMA: ICD-10-CM

## 2019-06-20 DIAGNOSIS — R00.2 PALPITATIONS: ICD-10-CM

## 2019-06-20 PROCEDURE — 99214 OFFICE O/P EST MOD 30 MIN: CPT | Performed by: NURSE PRACTITIONER

## 2019-06-20 ASSESSMENT — ENCOUNTER SYMPTOMS
ABDOMINAL PAIN: 0
WEAKNESS: 0
COUGH: 0
SHORTNESS OF BREATH: 0
DIZZINESS: 1
CLAUDICATION: 0
PND: 0
MYALGIAS: 0
PALPITATIONS: 0
ORTHOPNEA: 0

## 2019-06-20 NOTE — PROGRESS NOTES
"Chief Complaint   Patient presents with   • Heart Murmur     follow up lab results       Subjective:   Yoli  \"Duong\" Anca Carmichael is a 85 y.o. female who presents today to follow-up on palpitations and hypertension.  She was last seen by Dr. Velasquez on May 21, 2019.  He changed metoprolol tartrate to metoprolol succinate 50 mg once daily.  Her primary care was worried that her blood pressure was too low therefore losartan was reduced to 12.5 mg daily.  She has been taking both of these medications in the morning.    She had an episode a few days ago where she felt very lightheaded, had blurry vision and fatigue.  This resolved after a few minutes.  She states this is never happened to her before.  She is felt fine since.  She has not been checking her blood pressure at home.    She denies any palpitations and feels like if the metoprolol is working.  She does feel lightheaded despite normal blood pressures.    Past Medical History:   Diagnosis Date   • Arrhythmia    • Arthritis     knees   • Bronchitis     long time ago   • CATARACT     no surgery yet   • Dry eyes, bilateral 3/27/2014   • Osteopenia of the elderly 3/27/2014   • Pneumonia     long time ago     Past Surgical History:   Procedure Laterality Date   • KNEE ARTHROPLASTY TOTAL  9/3/2013    Performed by Ramesh Styles M.D. at SURGERY [facility] ORS   • TONSILLECTOMY       Family History   Problem Relation Age of Onset   • Lung Disease Mother         frequent pneumonia   • Heart Disease Father 54        MI   • Lung Disease Father         Emphysema   • Heart Attack Father    • Cancer Maternal Aunt         Great Aunt and Cousin Ovarian CA   • Hypertension Maternal Aunt    • Cancer Daughter         Lung and Brain CA   • Seizures Daughter    • Cancer Daughter         Pituitary tumor   • Cancer Brother         leukemia     Social History     Social History   • Marital status:      Spouse name: N/A   • Number of children: N/A   • Years of education: N/A "     Occupational History   • Not on file.     Social History Main Topics   • Smoking status: Never Smoker   • Smokeless tobacco: Never Used      Comment: Pt exposed to second hand smoker   • Alcohol use Yes      Comment: 1-2 week   • Drug use: No   • Sexual activity: No     Other Topics Concern   • Not on file     Social History Narrative   • No narrative on file     Allergies   Allergen Reactions   • Doxycycline Nausea   • Other Environmental      Seasonal allergies     Outpatient Encounter Prescriptions as of 6/20/2019   Medication Sig Dispense Refill   • losartan (COZAAR) 25 MG Tab Take 12.5 mg by mouth every day.     • metoprolol SR (TOPROL XL) 50 MG TABLET SR 24 HR One tab P.O. daily 90 Tab 3   • TURMERIC PO Take  by mouth.     • coenzyme Q-10 30 MG capsule Take 300 mg by mouth 2X A WEEK.     • B Complex-C (SUPER B COMPLEX PO) Take  by mouth.     • Calcium-Vitamin D-Vitamin K (CALCIUM + D) 500-1000-40 MG-UNT-MCG CHEW Take 1,000 mg by mouth every day.     • Multiple Vitamins-Minerals (PRESERVISION/LUTEIN PO) Take  by mouth 2 Times a Day.     • Cholecalciferol (VITAMIN D) 2000 UNITS CAPS Take 6,000 Units by mouth every day.     • amoxicillin (AMOXIL) 500 MG Cap      • [DISCONTINUED] losartan (COZAAR) 50 MG Tab Take 1/2 tablet twice daily 180 Tab 0   • furosemide (LASIX) 40 MG Tab Take 1 Tab by mouth as needed. For ankle swelling. 30 Tab 11   • potassium chloride SA (KDUR) 20 MEQ Tab CR Take 1 Tab by mouth as needed. When taking Lasix. 30 Tab 11   • CycloSPORINE (RESTASIS OP) by Ophthalmic route 2 Times a Day.     • ketotifen (ZADITOR) 0.025 % ophthalmic solution Place 1 Drop in both eyes 2 times a day.     • Glycerin-Polysorbate 80 (REFRESH DRY EYE THERAPY) 1-1 % SOLN 1 Drop by Ophthalmic route 4 times a day.       No facility-administered encounter medications on file as of 6/20/2019.      Review of Systems   Constitutional: Negative for malaise/fatigue.   Respiratory: Negative for cough and shortness of breath.  "   Cardiovascular: Negative for chest pain, palpitations, orthopnea, claudication, leg swelling and PND.   Gastrointestinal: Negative for abdominal pain.   Musculoskeletal: Negative for myalgias.   Neurological: Positive for dizziness (lightheaded). Negative for weakness.        Objective:   /56 (BP Location: Left arm, Patient Position: Sitting)   Pulse 64   Ht 1.626 m (5' 4\")   Wt 66.7 kg (147 lb)   SpO2 96%   BMI 25.23 kg/m²     Physical Exam   Constitutional: She is oriented to person, place, and time. She appears well-developed and well-nourished.   HENT:   Head: Normocephalic.   Eyes: EOM are normal.   Neck: No JVD present.   Cardiovascular: Normal rate, regular rhythm and normal heart sounds.    Pulmonary/Chest: Effort normal and breath sounds normal.   Abdominal: Soft. Bowel sounds are normal.   Musculoskeletal: She exhibits no edema.   Neurological: She is alert and oriented to person, place, and time.   Skin: Skin is warm and dry.   Psychiatric: She has a normal mood and affect.     Results for LARRY BAILEY (MRN [id number])    Ref. Range 6/14/2019 07:44   WBC Latest Ref Range: 4.8 - 10.8 K/uL 7.2   RBC Latest Ref Range: 4.20 - 5.40 M/uL 4.62   Hemoglobin Latest Ref Range: 12.0 - 16.0 g/dL 13.1   Hematocrit Latest Ref Range: 37.0 - 47.0 % 41.4   MCV Latest Ref Range: 81.4 - 97.8 fL 89.6   MCH Latest Ref Range: 27.0 - 33.0 pg 28.4   MCHC Latest Ref Range: 33.6 - 35.0 g/dL 31.6 (L)   RDW Latest Ref Range: 35.9 - 50.0 fL 45.8   Platelet Count Latest Ref Range: 164 - 446 K/uL 236   MPV Latest Ref Range: 9.0 - 12.9 fL 10.7   Neutrophils-Polys Latest Ref Range: 44.00 - 72.00 % 41.80 (L)   Neutrophils (Absolute) Latest Ref Range: 2.00 - 7.15 K/uL 3.01   Lymphocytes Latest Ref Range: 22.00 - 41.00 % 41.30 (H)   Lymphs (Absolute) Latest Ref Range: 1.00 - 4.80 K/uL 2.97   Monocytes Latest Ref Range: 0.00 - 13.40 % 12.10   Monos (Absolute) Latest Ref Range: 0.00 - 0.85 K/uL 0.87 (H)   Eosinophils Latest " Ref Range: 0.00 - 6.90 % 3.10   Eos (Absolute) Latest Ref Range: 0.00 - 0.51 K/uL 0.22   Basophils Latest Ref Range: 0.00 - 1.80 % 1.10   Baso (Absolute) Latest Ref Range: 0.00 - 0.12 K/uL 0.08   Immature Granulocytes Latest Ref Range: 0.00 - 0.90 % 0.60   Immature Granulocytes (abs) Latest Ref Range: 0.00 - 0.11 K/uL 0.04   Nucleated RBC Latest Units: /100 WBC 0.00   NRBC (Absolute) Latest Units: K/uL 0.00   Sodium Latest Ref Range: 135 - 145 mmol/L 139   Potassium Latest Ref Range: 3.6 - 5.5 mmol/L 4.3   Chloride Latest Ref Range: 96 - 112 mmol/L 108   Co2 Latest Ref Range: 20 - 33 mmol/L 25   Anion Gap Latest Ref Range: 0.0 - 11.9  6.0   Glucose Latest Ref Range: 65 - 99 mg/dL 95   Bun Latest Ref Range: 8 - 22 mg/dL 22   Creatinine Latest Ref Range: 0.50 - 1.40 mg/dL 0.99   GFR If  Latest Ref Range: >60 mL/min/1.73 m 2 >60   GFR If Non  Latest Ref Range: >60 mL/min/1.73 m 2 53 (A)   Calcium Latest Ref Range: 8.5 - 10.5 mg/dL 9.2   AST(SGOT) Latest Ref Range: 12 - 45 U/L 18   ALT(SGPT) Latest Ref Range: 2 - 50 U/L 11   Alkaline Phosphatase Latest Ref Range: 30 - 99 U/L 57   Total Bilirubin Latest Ref Range: 0.1 - 1.5 mg/dL 0.6   Albumin Latest Ref Range: 3.2 - 4.9 g/dL 3.9   Total Protein Latest Ref Range: 6.0 - 8.2 g/dL 6.9   Globulin Latest Ref Range: 1.9 - 3.5 g/dL 3.0   A-G Ratio Latest Units: g/dL 1.3   Cholesterol,Tot Latest Ref Range: 100 - 199 mg/dL 198   Triglycerides Latest Ref Range: 0 - 149 mg/dL 125   HDL Latest Ref Range: >=40 mg/dL 59   LDL Latest Ref Range: <100 mg/dL 114 (H)   Vitamin B12 -True Cobalamin Latest Ref Range: 211 - 911 pg/mL 353     Assessment:     1. Essential hypertension, benign     2. Palpitations     3. Localized edema         Medical Decision Making:  Today's Assessment / Status / Plan:   Hypertension: Blood pressure is a bit higher than I would like it today.  She had an episode of lightheadedness it sounds like she had a low blood pressure.  It  may be that she was taking both the losartan and metoprolol together.  I would like her to take the losartan in the morning.  Since she is on losartan 12.5 mg we will have her monitor her blood pressure and if it is consistently greater than 140 we will have her go back to losartan 25 mg daily.    Palpitations: Improved on the higher dose of metoprolol.  I would like her to take the metoprolol succinate 50 mg in the evening.  Again she will monitor her blood pressure.    Edema: None today.  Take Lasix as needed.  She has not needed to take any for several weeks.    She will follow-up in 6 months with Dr Velasquez.  She has an appointment approximately month with her primary care who can check her blood pressure again and adjust medications as needed.    Collaborating Provider: Dr Salinas.    Please note that this dictation was created using voice recognition software. I have made every reasonable attempt to correct obvious errors, but it is possible there are errors of grammar and possibly content that I did not discover before finalizing the note.

## 2019-06-21 ENCOUNTER — APPOINTMENT (OUTPATIENT)
Dept: PHYSICAL THERAPY | Facility: REHABILITATION | Age: 84
End: 2019-06-21
Attending: FAMILY MEDICINE
Payer: MEDICARE

## 2019-06-26 ENCOUNTER — OFFICE VISIT (OUTPATIENT)
Dept: MEDICAL GROUP | Facility: PHYSICIAN GROUP | Age: 84
End: 2019-06-26
Payer: MEDICARE

## 2019-06-26 VITALS
OXYGEN SATURATION: 94 % | HEART RATE: 84 BPM | DIASTOLIC BLOOD PRESSURE: 60 MMHG | TEMPERATURE: 97.7 F | BODY MASS INDEX: 25.44 KG/M2 | HEIGHT: 64 IN | WEIGHT: 149 LBS | SYSTOLIC BLOOD PRESSURE: 150 MMHG

## 2019-06-26 DIAGNOSIS — R00.2 PALPITATIONS: ICD-10-CM

## 2019-06-26 DIAGNOSIS — F33.1 MODERATE EPISODE OF RECURRENT MAJOR DEPRESSIVE DISORDER (HCC): ICD-10-CM

## 2019-06-26 DIAGNOSIS — I10 ESSENTIAL HYPERTENSION, BENIGN: ICD-10-CM

## 2019-06-26 PROCEDURE — 99214 OFFICE O/P EST MOD 30 MIN: CPT | Performed by: FAMILY MEDICINE

## 2019-06-26 RX ORDER — ESCITALOPRAM OXALATE 5 MG/1
5 TABLET ORAL DAILY
Qty: 30 TAB | Refills: 0 | Status: SHIPPED | OUTPATIENT
Start: 2019-06-26 | End: 2019-07-23 | Stop reason: SDUPTHER

## 2019-06-26 ASSESSMENT — PAIN SCALES - GENERAL: PAINLEVEL: NO PAIN

## 2019-06-26 NOTE — ASSESSMENT & PLAN NOTE
Chronic ongoing medical condition.  Previously on Lexapro 10 mg daily and was discontinued due to possible lack of efficacy any longer.  Patient currently feeling symptoms of emotional lability, depression and sadness, low energy and increased eating.  Denies any suicidal thoughts or desires.  Requesting to restart Lexapro.

## 2019-06-26 NOTE — ASSESSMENT & PLAN NOTE
Chronic stable medical condition.  Palpitations have resolved since increasing metoprolol to 50 mg daily.

## 2019-06-26 NOTE — ASSESSMENT & PLAN NOTE
Chronic ongoing medical condition.  Elevated blood pressures today.  Patient had losartan reduced from 50 mg to 12.5 mg and metoprolol increased to 50 mg daily from 25 mg daily.  Changes were due to recurrent SVT however patient states that she has less energy which may possibly be related to her depression.

## 2019-06-26 NOTE — PROGRESS NOTES
CC:Diagnoses of Moderate episode of recurrent major depressive disorder (HCC), Essential hypertension, benign, and Palpitations were pertinent to this visit.      HISTORY OF PRESENT ILLNESS: Patient is a 85 y.o. female established patient who presents today to follow-up on blood pressure.    Moderate episode of recurrent major depressive disorder (HCC)  Chronic ongoing medical condition.  Previously on Lexapro 10 mg daily and was discontinued due to possible lack of efficacy any longer.  Patient currently feeling symptoms of emotional lability, depression and sadness, low energy and increased eating.  Denies any suicidal thoughts or desires.  Requesting to restart Lexapro.    Essential hypertension, benign  Chronic ongoing medical condition.  Elevated blood pressures today.  Patient had losartan reduced from 50 mg to 12.5 mg and metoprolol increased to 50 mg daily from 25 mg daily.  Changes were due to recurrent SVT however patient states that she has less energy which may possibly be related to her depression.    Palpitations  Chronic stable medical condition.  Palpitations have resolved since increasing metoprolol to 50 mg daily.      Patient Active Problem List    Diagnosis Date Noted   • Moderate episode of recurrent major depressive disorder (HCC) 06/26/2019   • Imbalance 03/19/2019   • Ear pain, right 01/11/2019   • SVT (supraventricular tachycardia) (HCC) 11/06/2018   • Localized edema 11/01/2018   • Reactive depression 09/27/2018   • Closed fracture of shaft of humerus 06/19/2018   • Dyslipidemia 06/19/2018   • Low serum vitamin D 06/19/2018   • Palpitations 11/10/2017   • Essential hypertension, benign 11/10/2017   • Degenerative disc disease, lumbar 07/17/2017   • Non-rheumatic mitral regurgitation 05/07/2015   • Dry eyes, bilateral 03/27/2014   • Osteopenia of the elderly 03/27/2014   • S/P total knee replacement right 09/03/2013      Allergies:Doxycycline and Other environmental    Current Outpatient  Prescriptions   Medication Sig Dispense Refill   • escitalopram (LEXAPRO) 5 MG tablet Take 1 Tab by mouth every day. 30 Tab 0   • losartan (COZAAR) 25 MG Tab Take 12.5 mg by mouth every day.     • metoprolol SR (TOPROL XL) 50 MG TABLET SR 24 HR One tab P.O. daily 90 Tab 3   • furosemide (LASIX) 40 MG Tab Take 1 Tab by mouth as needed. For ankle swelling. 30 Tab 11   • TURMERIC PO Take  by mouth.     • CycloSPORINE (RESTASIS OP) by Ophthalmic route 2 Times a Day.     • coenzyme Q-10 30 MG capsule Take 300 mg by mouth 2X A WEEK.     • ketotifen (ZADITOR) 0.025 % ophthalmic solution Place 1 Drop in both eyes 2 times a day.     • B Complex-C (SUPER B COMPLEX PO) Take  by mouth.     • Glycerin-Polysorbate 80 (REFRESH DRY EYE THERAPY) 1-1 % SOLN 1 Drop by Ophthalmic route 4 times a day.     • Calcium-Vitamin D-Vitamin K (CALCIUM + D) 500-1000-40 MG-UNT-MCG CHEW Take 1,000 mg by mouth every day.     • Multiple Vitamins-Minerals (PRESERVISION/LUTEIN PO) Take  by mouth 2 Times a Day.     • Cholecalciferol (VITAMIN D) 2000 UNITS CAPS Take 6,000 Units by mouth every day.     • amoxicillin (AMOXIL) 500 MG Cap      • potassium chloride SA (KDUR) 20 MEQ Tab CR Take 1 Tab by mouth as needed. When taking Lasix. 30 Tab 11     No current facility-administered medications for this visit.        Social History   Substance Use Topics   • Smoking status: Never Smoker   • Smokeless tobacco: Never Used      Comment: Pt exposed to second hand smoker   • Alcohol use Yes      Comment: 1-2 week     Social History     Social History Narrative   • No narrative on file       Family History   Problem Relation Age of Onset   • Lung Disease Mother         frequent pneumonia   • Heart Disease Father 54        MI   • Lung Disease Father         Emphysema   • Heart Attack Father    • Cancer Maternal Aunt         Great Aunt and Cousin Ovarian CA   • Hypertension Maternal Aunt    • Cancer Daughter         Lung and Brain CA   • Seizures Daughter    •  "Cancer Daughter         Pituitary tumor   • Cancer Brother         leukemia       Review of Systems:    MSK: No weakness  Skin: No rashes  Neuro: No Headaches  Psych: No Suicidal ideations    All remaining systems reviewed and found to be negative, except as stated above.    Exam:    /60 (BP Location: Left arm, Patient Position: Sitting, BP Cuff Size: Adult)   Pulse 84   Temp 36.5 °C (97.7 °F)   Ht 1.626 m (5' 4\")   Wt 67.6 kg (149 lb)   SpO2 94%  Body mass index is 25.58 kg/m².    General:  Well nourished, well developed female in NAD  HENT: Atraumatic, normocephalic  EYES: Extraocular movements intact, pupils equal and reactive to light  NECK: Supple, FROM  CHEST: No deformities, Equal chest expansion  RESP: Unlabored, no stridor or audible wheeze  ABD: Soft, Nontender, Non-Distended  Extremities: No Clubbing, Cyanosis, or Edema  Skin: Warm/dry, without rashes  Neuro: A/O x 4, CN 2-12 Grossly intact, Motor/sensory grossly intact  Psych: Normal behavior, normal affect      Assessment/Plan:  1. Moderate episode of recurrent major depressive disorder (HCC)  Chronic ongoing medical problem.  Restarting Lexapro.  Follow-up in 1 month.  - escitalopram (LEXAPRO) 5 MG tablet; Take 1 Tab by mouth every day.  Dispense: 30 Tab; Refill: 0    2. Essential hypertension, benign  Chronic ongoing medical condition.  Continue metoprolol 50 mg daily and losartan 12.5 mg daily.  Follow-up in 1 month.    3. Palpitations  Chronic stable medical condition.  Currently resolving on new dose of metoprolol.  Follow-up in 1 month for blood pressure check      Please note that this dictation was created using voice recognition software. I have worked with consultants from the vendor as well as technical experts from [employer] to optimize the interface. I have made every reasonable attempt to correct obvious errors, but I expect that there are errors of grammar and possibly content that I did not discover before finalizing the " note.

## 2019-07-23 DIAGNOSIS — F33.1 MODERATE EPISODE OF RECURRENT MAJOR DEPRESSIVE DISORDER (HCC): ICD-10-CM

## 2019-07-23 NOTE — TELEPHONE ENCOUNTER
Was the patient seen in the last year in this department? Yes LOV 06/26/19    Does patient have an active prescription for medications requested? No     Received Request Via: Pharmacy

## 2019-07-24 RX ORDER — ESCITALOPRAM OXALATE 5 MG/1
5 TABLET ORAL DAILY
Qty: 90 TAB | Refills: 3 | Status: SHIPPED | OUTPATIENT
Start: 2019-07-24 | End: 2020-06-26 | Stop reason: SDUPTHER

## 2019-07-30 ENCOUNTER — APPOINTMENT (OUTPATIENT)
Dept: MEDICAL GROUP | Facility: PHYSICIAN GROUP | Age: 84
End: 2019-07-30
Payer: MEDICARE

## 2019-08-01 ENCOUNTER — TELEPHONE (OUTPATIENT)
Dept: MEDICAL GROUP | Facility: PHYSICIAN GROUP | Age: 84
End: 2019-08-01

## 2019-08-01 NOTE — TELEPHONE ENCOUNTER
Future Appointments       Provider Department Center    8/8/2019 10:00 AM Juan Santana M.D. [facility] [place] [facility]    12/31/2019 10:20 AM Reuben Velasquez M.D. [facility]         ESTABLISHED PATIENT PRE-VISIT PLANNING     Patient was NOT contacted to complete PVP.    1.  Reviewed notes from the last few office visits within the medical group: Yes 6/26/19    2.  If any orders were placed at last visit or intended to be done for this visit (i.e. 6 mos follow-up), do we have Results/Consult Notes?        •  Labs - Labs were not ordered at last office visit.       •  Imaging - Imaging was not ordered at last office visit.       •  Referrals - No referrals were ordered at last office visit.    3. Is this appointment scheduled as a Hospital Follow-Up? No    4.  Immunizations were updated in [employer] using Web[employer]?: Yes       •  Web [employer] Recommendations: FLU, PNEUMOVAX (PPSV23), TDAP, VARICELLA (Chicken Pox)  and SHINGRIX (Shingles)    5.  Patient is due for the following Health Maintenance Topics:   Health Maintenance Due   Topic Date Due   • IMM DTaP/Tdap/Td Vaccine (1 - Tdap) 08/21/1952   • IMM ZOSTER VACCINES (2 of 3) 05/21/2013   • Annual Wellness Visit  03/28/2015   • IMM PNEUMOCOCCAL VACCINE: 65+ Years (2 of 2 - PPSV23) 03/09/2016   • MAMMOGRAM  01/31/2019       6. Orders for overdue Health Maintenance topics pended in Pre-Charting? NO    7.  AHA (MDX) form printed for Provider? No, already completed    8.  Patient was NOT informed to arrive 15 min prior to their scheduled appointment and bring in their medication bottles.

## 2019-08-08 ENCOUNTER — OFFICE VISIT (OUTPATIENT)
Dept: MEDICAL GROUP | Facility: PHYSICIAN GROUP | Age: 84
End: 2019-08-08
Payer: MEDICARE

## 2019-08-08 VITALS
SYSTOLIC BLOOD PRESSURE: 124 MMHG | WEIGHT: 151 LBS | HEIGHT: 64 IN | BODY MASS INDEX: 25.78 KG/M2 | HEART RATE: 74 BPM | OXYGEN SATURATION: 96 % | DIASTOLIC BLOOD PRESSURE: 64 MMHG | TEMPERATURE: 98.7 F

## 2019-08-08 DIAGNOSIS — F33.1 MODERATE EPISODE OF RECURRENT MAJOR DEPRESSIVE DISORDER (HCC): ICD-10-CM

## 2019-08-08 PROCEDURE — 99214 OFFICE O/P EST MOD 30 MIN: CPT | Performed by: FAMILY MEDICINE

## 2019-08-08 ASSESSMENT — PAIN SCALES - GENERAL: PAINLEVEL: NO PAIN

## 2019-08-08 NOTE — PROGRESS NOTES
CC:The encounter diagnosis was Moderate episode of recurrent major depressive disorder (HCC).      HISTORY OF PRESENT ILLNESS: Patient is a 85 y.o. female established patient who presents today to follow-up on depression.    Moderate episode of recurrent major depressive disorder (HCC)  Chronic ongoing medical condition.  Patient here to follow-up on restarting her Lexapro at 5 mg daily.  Patient states that she has felt better but it is subtle.  She is not crying as often as she used to and in general feels better about her emotional state.  Denies any suicidality.  Does not want a dose change today.      Patient Active Problem List    Diagnosis Date Noted   • Moderate episode of recurrent major depressive disorder (HCC) 06/26/2019   • Imbalance 03/19/2019   • Ear pain, right 01/11/2019   • SVT (supraventricular tachycardia) ([facility]) 11/06/2018   • Localized edema 11/01/2018   • Reactive depression 09/27/2018   • Closed fracture of shaft of humerus 06/19/2018   • Dyslipidemia 06/19/2018   • Low serum vitamin D 06/19/2018   • Palpitations 11/10/2017   • Essential hypertension, benign 11/10/2017   • Degenerative disc disease, lumbar 07/17/2017   • Non-rheumatic mitral regurgitation 05/07/2015   • Dry eyes, bilateral 03/27/2014   • Osteopenia of the elderly 03/27/2014   • S/P total knee replacement right 09/03/2013      Allergies:Doxycycline and Other environmental    Current Outpatient Medications   Medication Sig Dispense Refill   • Zoster Vac Recomb Adjuvanted (SHINGRIX) 50 MCG/0.5ML Recon Susp 0.5 mL by Intramuscular route Once for 1 dose. 0.5 mL 1   • escitalopram (LEXAPRO) 5 MG tablet Take 1 Tab by mouth every day. 90 Tab 3   • amoxicillin (AMOXIL) 500 MG Cap      • losartan (COZAAR) 25 MG Tab Take 12.5 mg by mouth every day.     • metoprolol SR (TOPROL XL) 50 MG TABLET SR 24 HR One tab P.O. daily 90 Tab 3   • furosemide (LASIX) 40 MG Tab Take 1 Tab by mouth as needed. For ankle swelling. 30 Tab 11   • potassium  chloride SA (KDUR) 20 MEQ Tab CR Take 1 Tab by mouth as needed. When taking Lasix. 30 Tab 11   • TURMERIC PO Take  by mouth.     • CycloSPORINE (RESTASIS OP) by Ophthalmic route 2 Times a Day.     • coenzyme Q-10 30 MG capsule Take 300 mg by mouth 2X A WEEK.     • ketotifen (ZADITOR) 0.025 % ophthalmic solution Place 1 Drop in both eyes 2 times a day.     • B Complex-C (SUPER B COMPLEX PO) Take  by mouth.     • Glycerin-Polysorbate 80 (REFRESH DRY EYE THERAPY) 1-1 % SOLN 1 Drop by Ophthalmic route 4 times a day.     • Calcium-Vitamin D-Vitamin K (CALCIUM + D) 500-1000-40 MG-UNT-MCG CHEW Take 1,000 mg by mouth every day.     • Multiple Vitamins-Minerals (PRESERVISION/LUTEIN PO) Take  by mouth 2 Times a Day.     • Cholecalciferol (VITAMIN D) 2000 UNITS CAPS Take 6,000 Units by mouth every day.       No current facility-administered medications for this visit.        Social History     Tobacco Use   • Smoking status: Never Smoker   • Smokeless tobacco: Never Used   • Tobacco comment: Pt exposed to second hand smoker   Substance Use Topics   • Alcohol use: Yes     Comment: 1-2 week   • Drug use: No     Social History     Social History Narrative   • Not on file       Family History   Problem Relation Age of Onset   • Lung Disease Mother         frequent pneumonia   • Heart Disease Father 54        MI   • Lung Disease Father         Emphysema   • Heart Attack Father    • Cancer Maternal Aunt         Great Aunt and Cousin Ovarian CA   • Hypertension Maternal Aunt    • Cancer Daughter         Lung and Brain CA   • Seizures Daughter    • Cancer Daughter         Pituitary tumor   • Cancer Brother         leukemia       Review of Systems:    MSK: No weakness  Skin: No rashes  Neuro: No Headaches  Psych: No Suicidal ideations    All remaining systems reviewed and found to be negative, except as stated above.    Exam:    /64 (BP Location: Left arm, Patient Position: Sitting, BP Cuff Size: Adult)   Pulse 74   Temp 37.1  "°C (98.7 °F)   Ht 1.626 m (5' 4\")   Wt 68.5 kg (151 lb)   SpO2 96%  Body mass index is 25.92 kg/m².    General:  Well nourished, well developed female in NAD  HENT: Atraumatic, normocephalic  EYES: Extraocular movements intact, pupils equal and reactive to light  NECK: Supple, FROM  CHEST: No deformities, Equal chest expansion  RESP: Unlabored, no stridor or audible wheeze  ABD: Soft, Nontender, Non-Distended  Extremities: No Clubbing, Cyanosis, or Edema  Skin: Warm/dry, without rashes  Neuro: A/O x 4, CN 2-12 Grossly intact, Motor/sensory grossly intact  Psych: Normal behavior, normal affect    Assessment/Plan:  1. Moderate episode of recurrent major depressive disorder (HCC)  Chronic ongoing medical condition.  Continue Lexapro 5 mg daily.  Extensive time spent counseling on depression, anxiety, and expected changes in emotional state while on medication.  Counseled on seeking out therapy and professional counseling.    Patient was seen for 25 minutes face to face of which, 15 minutes was spent counseling regarding the above mentioned problems, care coordination, and answering questions.    Please note that this dictation was created using voice recognition software. I have worked with consultants from the vendor as well as technical experts from [facility] [email or web address] to optimize the interface. I have made every reasonable attempt to correct obvious errors, but I expect that there are errors of grammar and possibly content that I did not discover before finalizing the note.  "

## 2019-08-08 NOTE — ASSESSMENT & PLAN NOTE
Chronic ongoing medical condition.  Patient here to follow-up on restarting her Lexapro at 5 mg daily.  Patient states that she has felt better but it is subtle.  She is not crying as often as she used to and in general feels better about her emotional state.  Denies any suicidality.  Does not want a dose change today.

## 2019-08-22 ENCOUNTER — TELEPHONE (OUTPATIENT)
Dept: PHYSICAL THERAPY | Facility: REHABILITATION | Age: 84
End: 2019-08-22

## 2019-08-22 NOTE — OP THERAPY DISCHARGE SUMMARY
Outpatient Physical Therapy  DISCHARGE SUMMARY NOTE      [facility] [place]  [address], [address]  [place] [address]  Phone:  [phone]  Fax:  [phone]    Date of Visit: 08/22/2019    Patient: Duong Carmichael  YOB: 1933  MRN: [id number]     Referring Provider: Juan Santana M.D.   Referring Diagnosis Imbalance [R26.89]     Physical Therapy Occurrence Codes    Date of onset of impairment:  3/19/19   Date physical therapy care plan established or reviewed:  4/8/19   Date physical therapy treatment started:  4/8/19            Your patient is being discharged from Physical Therapy with the following comments:   · Goals partially met    Comments:  See last note     Limitations Remaining:  See last note    Recommendations:  Admin discharge secondary to lapse in POC      Agapito Anderson, PT, DPT    Date: 8/22/2019

## 2019-09-11 ENCOUNTER — TELEPHONE (OUTPATIENT)
Dept: CARDIOLOGY | Facility: MEDICAL CENTER | Age: 84
End: 2019-09-11

## 2019-09-11 DIAGNOSIS — I10 ESSENTIAL HYPERTENSION, BENIGN: Primary | ICD-10-CM

## 2019-09-11 RX ORDER — LOSARTAN POTASSIUM 25 MG/1
12.5 TABLET ORAL DAILY
Qty: 50 TAB | Refills: 3 | Status: SHIPPED | OUTPATIENT
Start: 2019-09-11 | End: 2020-10-20

## 2019-09-11 NOTE — TELEPHONE ENCOUNTER
Pt of  is requesting a new prescription for Losartan Potassium 12.5 mg. Pharmacy is [facility] in [place]. If question's can be reached at [phone].    Thank You

## 2019-09-12 ENCOUNTER — TELEPHONE (OUTPATIENT)
Dept: MEDICAL GROUP | Facility: PHYSICIAN GROUP | Age: 84
End: 2019-09-12

## 2019-09-12 DIAGNOSIS — Z78.0 POSTMENOPAUSAL: ICD-10-CM

## 2019-09-12 DIAGNOSIS — Z12.39 SCREENING FOR BREAST CANCER: ICD-10-CM

## 2019-09-13 NOTE — TELEPHONE ENCOUNTER
Please sign the pended orders for Mammo and Dexa the patient is trying schedule these and the imaging dept told her she needs an order from her Doctor

## 2019-10-16 ENCOUNTER — HOSPITAL ENCOUNTER (OUTPATIENT)
Dept: RADIOLOGY | Facility: MEDICAL CENTER | Age: 84
End: 2019-10-16
Attending: FAMILY MEDICINE
Payer: MEDICARE

## 2019-10-16 DIAGNOSIS — Z78.0 POSTMENOPAUSAL: ICD-10-CM

## 2019-10-16 DIAGNOSIS — Z12.39 SCREENING FOR BREAST CANCER: ICD-10-CM

## 2019-10-16 PROCEDURE — 77063 BREAST TOMOSYNTHESIS BI: CPT

## 2019-10-16 PROCEDURE — 77080 DXA BONE DENSITY AXIAL: CPT

## 2019-11-07 ENCOUNTER — OFFICE VISIT (OUTPATIENT)
Dept: URGENT CARE | Facility: PHYSICIAN GROUP | Age: 84
End: 2019-11-07
Payer: MEDICARE

## 2019-11-07 VITALS
HEIGHT: 64 IN | OXYGEN SATURATION: 98 % | TEMPERATURE: 98 F | WEIGHT: 151 LBS | SYSTOLIC BLOOD PRESSURE: 140 MMHG | DIASTOLIC BLOOD PRESSURE: 70 MMHG | RESPIRATION RATE: 14 BRPM | BODY MASS INDEX: 25.78 KG/M2 | HEART RATE: 60 BPM

## 2019-11-07 DIAGNOSIS — L03.032 CELLULITIS OF FIFTH TOE OF LEFT FOOT: ICD-10-CM

## 2019-11-07 PROCEDURE — 99213 OFFICE O/P EST LOW 20 MIN: CPT | Performed by: FAMILY MEDICINE

## 2019-11-07 RX ORDER — CEPHALEXIN 250 MG/1
250 CAPSULE ORAL 4 TIMES DAILY
Qty: 28 CAP | Refills: 0 | Status: SHIPPED | OUTPATIENT
Start: 2019-11-07 | End: 2019-11-14

## 2019-11-07 ASSESSMENT — ENCOUNTER SYMPTOMS
VOMITING: 0
MYALGIAS: 0
CHILLS: 0
EYE PAIN: 0
FEVER: 0
DIZZINESS: 0
SHORTNESS OF BREATH: 0
NAUSEA: 0
SORE THROAT: 0

## 2019-11-07 NOTE — PROGRESS NOTES
"Subjective:   Yoli Carmichael is a 86 y.o. female who presents for Toe Pain (x 3 weeks, L pinky toe. discolored, swelling )        86-year-old female presents to the urgent care with a chief complaint of left fifth small toe redness and swelling.  The patient initially noticed mild sensitivity in the left fifth toe while driving to the [place] to go on a cruise.  The patient was able to go on the cruise trip yet with gradual onset of swelling redness and some pain in the toe.  The patient denies traumatic injury denies any object falling on the toe.  The patient has recently had a pedicure with no complications.  Patient denies a history of gout.    Toe Injury   This is a new problem. Episode onset: October 19, 2019. Pertinent negatives include no chest pain, chills, fever, myalgias, nausea, rash, sore throat or vomiting. Exacerbated by: Direct pressure. Treatments tried: Antibiotic cream. The treatment provided no relief.     Review of Systems   Constitutional: Negative for chills and fever.   HENT: Negative for sore throat.    Eyes: Negative for pain.   Respiratory: Negative for shortness of breath.    Cardiovascular: Negative for chest pain.   Gastrointestinal: Negative for nausea and vomiting.   Genitourinary: Negative for hematuria.   Musculoskeletal: Negative for myalgias.   Skin: Negative for rash.   Neurological: Negative for dizziness.     Allergies   Allergen Reactions   • Other Environmental      Seasonal allergies      Objective:   /70   Pulse 60   Temp 36.7 °C (98 °F) (Temporal)   Resp 14   Ht 1.613 m (5' 3.5\")   Wt 68.5 kg (151 lb)   SpO2 98%   BMI 26.33 kg/m²   Physical Exam  Constitutional:       General: She is not in acute distress.     Appearance: She is well-developed.   HENT:      Head: Normocephalic and atraumatic.   Eyes:      Conjunctiva/sclera: Conjunctivae normal.      Pupils: Pupils are equal, round, and reactive to light.   Cardiovascular:      Rate and Rhythm: Normal " rate and regular rhythm.      Heart sounds: Murmur present.   Pulmonary:      Effort: Pulmonary effort is normal. No respiratory distress.      Breath sounds: Normal breath sounds.   Abdominal:      General: There is no distension.      Palpations: Abdomen is soft.      Tenderness: There is no tenderness.   Musculoskeletal:         General: Swelling and tenderness present.      Left foot: Normal range of motion. Tenderness and swelling present. No bony tenderness.      Comments: Left fifth toe diffuse erythema, edema, no ingrown toenail, no deformity diffuse mild tenderness to palpation, no fluctuant abscess   Skin:     General: Skin is warm and dry.   Neurological:      Mental Status: She is alert and oriented to person, place, and time.      Sensory: No sensory deficit.      Deep Tendon Reflexes: Reflexes are normal and symmetric.           Assessment/Plan:   1. Cellulitis of fifth toe of left foot  - cephALEXin (KEFLEX) 250 MG Cap; Take 1 Cap by mouth 4 times a day for 7 days.  Dispense: 28 Cap; Refill: 0    Differential diagnosis, natural history, supportive care, and indications for immediate follow-up discussed.     Advised the patient to follow-up with the primary care physician for recheck, reevaluation, and consideration of further management.

## 2019-11-08 ENCOUNTER — PATIENT OUTREACH (OUTPATIENT)
Dept: HEALTH INFORMATION MANAGEMENT | Facility: OTHER | Age: 84
End: 2019-11-08

## 2019-11-22 ENCOUNTER — TELEPHONE (OUTPATIENT)
Dept: MEDICAL GROUP | Facility: PHYSICIAN GROUP | Age: 84
End: 2019-11-22

## 2019-11-22 RX ORDER — AMOXICILLIN AND CLAVULANATE POTASSIUM 875; 125 MG/1; MG/1
1 TABLET, FILM COATED ORAL 2 TIMES DAILY
Qty: 20 TAB | Refills: 0 | Status: SHIPPED | OUTPATIENT
Start: 2019-11-22 | End: 2020-06-26

## 2019-11-22 NOTE — TELEPHONE ENCOUNTER
VOICEMAIL : 11/22/19 @ 9:38 am  1. Caller Name: Yoli Carmichael                      Call Back Number: [phone] (home)     2. Message: Pt called asking for a refill on her antibiotic for her infected toe that hasn't cleared up totally. Pt stated she was advised by [facility] Visit on 11/07/19 if it doesn't clear up to come in and get another round of antibiotics but Pt is requesting if you can fill it for her but will return to [facility] if need be.    3. Patient approves office to leave a detailed voicemail/MyChart message: N\A

## 2019-11-24 ENCOUNTER — OFFICE VISIT (OUTPATIENT)
Dept: URGENT CARE | Facility: PHYSICIAN GROUP | Age: 84
End: 2019-11-24
Payer: MEDICARE

## 2019-11-24 VITALS
HEART RATE: 61 BPM | BODY MASS INDEX: 25.27 KG/M2 | OXYGEN SATURATION: 97 % | TEMPERATURE: 97.8 F | SYSTOLIC BLOOD PRESSURE: 152 MMHG | HEIGHT: 64 IN | WEIGHT: 148 LBS | DIASTOLIC BLOOD PRESSURE: 76 MMHG

## 2019-11-24 DIAGNOSIS — L08.9 TOE INFECTION: ICD-10-CM

## 2019-11-24 PROCEDURE — 99214 OFFICE O/P EST MOD 30 MIN: CPT | Performed by: PHYSICIAN ASSISTANT

## 2019-11-24 RX ORDER — SULFAMETHOXAZOLE AND TRIMETHOPRIM 800; 160 MG/1; MG/1
1 TABLET ORAL 2 TIMES DAILY
Qty: 14 TAB | Refills: 0 | Status: SHIPPED | OUTPATIENT
Start: 2019-11-24 | End: 2019-12-01

## 2019-11-24 RX ORDER — SULFAMETHOXAZOLE AND TRIMETHOPRIM 800; 160 MG/1; MG/1
1 TABLET ORAL 2 TIMES DAILY
Qty: 14 TAB | Refills: 0 | Status: SHIPPED | OUTPATIENT
Start: 2019-11-24 | End: 2019-11-24 | Stop reason: SDUPTHER

## 2019-11-24 ASSESSMENT — ENCOUNTER SYMPTOMS
PALPITATIONS: 0
SHORTNESS OF BREATH: 0
SORE THROAT: 0
MYALGIAS: 0
FEVER: 0
COUGH: 0

## 2019-11-25 NOTE — PROGRESS NOTES
Subjective:      Duong Carmichael is a 86 y.o. female who presents with Toe Pain (cellulitis on toe, left foot, still sensitive, swelling [facility])            Rash   This is a new problem. The current episode started in the past 7 days. The problem has been waxing and waning since onset. Location: left foot. The rash is characterized by pain, peeling and redness. She was exposed to nothing. Pertinent negatives include no cough, fever, joint pain, shortness of breath or sore throat. Past treatments include antibiotics. The treatment provided significant relief.       Review of Systems   Constitutional: Negative for fever and malaise/fatigue.   HENT: Negative for sore throat.    Respiratory: Negative for cough and shortness of breath.    Cardiovascular: Negative for chest pain and palpitations.   Musculoskeletal: Negative for joint pain and myalgias.   Skin: Positive for rash.   All other systems reviewed and are negative.    PMH:  has a past medical history of Arrhythmia, Arthritis, Bronchitis, CATARACT, Dry eyes, bilateral (3/27/2014), Osteopenia of the elderly (3/27/2014), and Pneumonia.  MEDS:   Current Outpatient Medications:   •  sulfamethoxazole-trimethoprim (BACTRIM DS) 800-160 MG tablet, Take 1 Tab by mouth 2 times a day for 7 days., Disp: 14 Tab, Rfl: 0  •  mupirocin (BACTROBAN) 2 % Ointment, Apply 1 cm to affected area(s) 2 times a day for 10 days., Disp: 1 Tube, Rfl: 0  •  losartan (COZAAR) 25 MG Tab, Take 0.5 Tabs by mouth every day., Disp: 50 Tab, Rfl: 3  •  escitalopram (LEXAPRO) 5 MG tablet, Take 1 Tab by mouth every day., Disp: 90 Tab, Rfl: 3  •  metoprolol SR (TOPROL XL) 50 MG TABLET SR 24 HR, One tab P.O. daily, Disp: 90 Tab, Rfl: 3  •  furosemide (LASIX) 40 MG Tab, Take 1 Tab by mouth as needed. For ankle swelling., Disp: 30 Tab, Rfl: 11  •  potassium chloride SA (KDUR) 20 MEQ Tab CR, Take 1 Tab by mouth as needed. When taking Lasix., Disp: 30 Tab, Rfl: 11  •  TURMERIC PO, Take  by mouth., Disp: , Rfl:  "  •  CycloSPORINE (RESTASIS OP), by Ophthalmic route 2 Times a Day., Disp: , Rfl:   •  coenzyme Q-10 30 MG capsule, Take 300 mg by mouth 2X A WEEK., Disp: , Rfl:   •  ketotifen (ZADITOR) 0.025 % ophthalmic solution, Place 1 Drop in both eyes 2 times a day., Disp: , Rfl:   •  B Complex-C (SUPER B COMPLEX PO), Take  by mouth., Disp: , Rfl:   •  Glycerin-Polysorbate 80 (REFRESH DRY EYE THERAPY) 1-1 % SOLN, 1 Drop by Ophthalmic route 4 times a day., Disp: , Rfl:   •  Calcium-Vitamin D-Vitamin K (CALCIUM + D) 500-1000-40 MG-UNT-MCG CHEW, Take 1,000 mg by mouth every day., Disp: , Rfl:   •  Cholecalciferol (VITAMIN D) 2000 UNITS CAPS, Take 6,000 Units by mouth every day., Disp: , Rfl:   •  amoxicillin-clavulanate (AUGMENTIN) 875-125 MG Tab, Take 1 Tab by mouth 2 times a day. (Patient not taking: Reported on 11/24/2019), Disp: 20 Tab, Rfl: 0  •  amoxicillin (AMOXIL) 500 MG Cap, , Disp: , Rfl:   •  Multiple Vitamins-Minerals (PRESERVISION/LUTEIN PO), Take  by mouth 2 Times a Day., Disp: , Rfl:   ALLERGIES:   Allergies   Allergen Reactions   • Other Environmental      Seasonal allergies     SURGHX:   Past Surgical History:   Procedure Laterality Date   • KNEE ARTHROPLASTY TOTAL  9/3/2013    Performed by Ramesh Styles M.D. at SURGERY [facility]S   • TONSILLECTOMY       SOCHX:  reports that she has never smoked. She has never used smokeless tobacco. She reports current alcohol use. She reports that she does not use drugs.  FH: Family history was reviewed, no pertinent findings to report  Medications, Allergies, and current problem list reviewed today in Epic         Objective:     Blood Pressure 152/76   Pulse 61   Temperature 36.6 °C (97.8 °F)   Height 1.626 m (5' 4\")   Weight 67.1 kg (148 lb)   Oxygen Saturation 97%   Body Mass Index 25.40 kg/m²      Physical Exam  Vitals signs reviewed.   Constitutional:       Appearance: She is well-developed.   Cardiovascular:      Rate and Rhythm: Normal rate and regular " rhythm.      Heart sounds: Normal heart sounds.   Pulmonary:      Effort: Pulmonary effort is normal.      Breath sounds: Normal breath sounds.   Musculoskeletal: Normal range of motion.   Skin:     General: Skin is warm and dry.      Findings: Erythema present.      Comments: Rash distributions:  Located:left fifth digit of foot  Size: 2x3 cm  Pattern: cellulitic   Neurological:      Mental Status: She is alert and oriented to person, place, and time.   Psychiatric:         Behavior: Behavior normal.         Thought Content: Thought content normal.         Judgment: Judgment normal.                 Assessment/Plan:       1. Toe infection    - sulfamethoxazole-trimethoprim (BACTRIM DS) 800-160 MG tablet; Take 1 Tab by mouth 2 times a day for 7 days.  Dispense: 14 Tab; Refill: 0  - mupirocin (BACTROBAN) 2 % Ointment; Apply 1 cm to affected area(s) 2 times a day for 10 days.  Dispense: 1 Tube; Refill: 0    Differential diagnosis, natural history, supportive care discussed. Follow-up with primary care provider within 7-10 days, emergency room precautions discussed.  Patient and/or family appears understanding of information.  Handout and review of patients diagnosis and treatment was discussed extensively.

## 2019-12-31 ENCOUNTER — OFFICE VISIT (OUTPATIENT)
Dept: CARDIOLOGY | Facility: MEDICAL CENTER | Age: 84
End: 2019-12-31
Payer: MEDICARE

## 2019-12-31 VITALS
DIASTOLIC BLOOD PRESSURE: 72 MMHG | OXYGEN SATURATION: 97 % | BODY MASS INDEX: 25.1 KG/M2 | WEIGHT: 147 LBS | SYSTOLIC BLOOD PRESSURE: 134 MMHG | HEIGHT: 64 IN | HEART RATE: 60 BPM

## 2019-12-31 DIAGNOSIS — I10 ESSENTIAL HYPERTENSION, BENIGN: ICD-10-CM

## 2019-12-31 DIAGNOSIS — I47.10 SVT (SUPRAVENTRICULAR TACHYCARDIA): ICD-10-CM

## 2019-12-31 PROCEDURE — 99213 OFFICE O/P EST LOW 20 MIN: CPT | Performed by: INTERNAL MEDICINE

## 2019-12-31 RX ORDER — INFLUENZA A VIRUS A/MICHIGAN/45/2015 X-275 (H1N1) ANTIGEN (FORMALDEHYDE INACTIVATED), INFLUENZA A VIRUS A/SINGAPORE/INFIMH-16-0019/2016 IVR-186 (H3N2) ANTIGEN (FORMALDEHYDE INACTIVATED), AND INFLUENZA B VIRUS B/MARYLAND/15/2016 BX-69A (A B/COLORADO/6/2017-LIKE VIRUS) ANTIGEN (FORMALDEHYDE INACTIVATED) 60; 60; 60 UG/.5ML; UG/.5ML; UG/.5ML
INJECTION, SUSPENSION INTRAMUSCULAR
Refills: 0 | COMMUNITY
Start: 2019-11-16 | End: 2020-06-26

## 2019-12-31 ASSESSMENT — ENCOUNTER SYMPTOMS
DEPRESSION: 1
GASTROINTESTINAL NEGATIVE: 1
RESPIRATORY NEGATIVE: 1
LOSS OF CONSCIOUSNESS: 0
CONSTITUTIONAL NEGATIVE: 1
PALPITATIONS: 0
NEUROLOGICAL NEGATIVE: 1
INSOMNIA: 0
MEMORY LOSS: 0
MUSCULOSKELETAL NEGATIVE: 1
DIZZINESS: 0

## 2019-12-31 NOTE — PROGRESS NOTES
Chief Complaint   Patient presents with   • Hypertension       Subjective:   Duong Carmichael is a 86 y.o. female who presents today for follow-up of SVT and hypertension.  Last visit, her metoprolol was increased and she is tolerating this well.  She has had no palpitations at all.  No history of exertional dyspnea, dizziness or edema.  Her blood pressures at home have been under good control.  No other interval health issues.  Patient is accompanied by her daughter today.    Past Medical History:   Diagnosis Date   • Arrhythmia    • Arthritis     knees   • Bronchitis     long time ago   • CATARACT     no surgery yet   • Dry eyes, bilateral 3/27/2014   • Osteopenia of the elderly 3/27/2014   • Pneumonia     long time ago     Past Surgical History:   Procedure Laterality Date   • KNEE ARTHROPLASTY TOTAL  9/3/2013    Performed by Ramesh Styles M.D. at [facility]   • TONSILLECTOMY       Family History   Problem Relation Age of Onset   • Lung Disease Mother         frequent pneumonia   • Heart Disease Father 54        MI   • Lung Disease Father         Emphysema   • Heart Attack Father    • Cancer Maternal Aunt         Great Aunt and Cousin Ovarian CA   • Hypertension Maternal Aunt    • Cancer Daughter         Lung and Brain CA   • Seizures Daughter    • Cancer Daughter         Pituitary tumor   • Cancer Brother         leukemia     Social History     Socioeconomic History   • Marital status:      Spouse name: Not on file   • Number of children: Not on file   • Years of education: Not on file   • Highest education level: Not on file   Occupational History   • Not on file   Social Needs   • Financial resource strain: Not on file   • Food insecurity:     Worry: Not on file     Inability: Not on file   • Transportation needs:     Medical: Not on file     Non-medical: Not on file   Tobacco Use   • Smoking status: Never Smoker   • Smokeless tobacco: Never Used   • Tobacco comment: Pt exposed to second hand  smoker   Substance and Sexual Activity   • Alcohol use: Yes     Comment: 1-2 week   • Drug use: No   • Sexual activity: Never   Lifestyle   • Physical activity:     Days per week: Not on file     Minutes per session: Not on file   • Stress: Not on file   Relationships   • Social connections:     Talks on phone: Not on file     Gets together: Not on file     Attends Restorationist service: Not on file     Active member of club or organization: Not on file     Attends meetings of clubs or organizations: Not on file     Relationship status: Not on file   • Intimate partner violence:     Fear of current or ex partner: Not on file     Emotionally abused: Not on file     Physically abused: Not on file     Forced sexual activity: Not on file   Other Topics Concern   • Not on file   Social History Narrative   • Not on file     Allergies   Allergen Reactions   • Other Environmental      Seasonal allergies     Outpatient Encounter Medications as of 12/31/2019   Medication Sig Dispense Refill   • losartan (COZAAR) 25 MG Tab Take 0.5 Tabs by mouth every day. 50 Tab 3   • escitalopram (LEXAPRO) 5 MG tablet Take 1 Tab by mouth every day. 90 Tab 3   • amoxicillin (AMOXIL) 500 MG Cap      • metoprolol SR (TOPROL XL) 50 MG TABLET SR 24 HR One tab P.O. daily 90 Tab 3   • furosemide (LASIX) 40 MG Tab Take 1 Tab by mouth as needed. For ankle swelling. 30 Tab 11   • potassium chloride SA (KDUR) 20 MEQ Tab CR Take 1 Tab by mouth as needed. When taking Lasix. 30 Tab 11   • TURMERIC PO Take  by mouth.     • CycloSPORINE (RESTASIS OP) by Ophthalmic route 2 Times a Day.     • coenzyme Q-10 30 MG capsule Take 300 mg by mouth 2X A WEEK.     • ketotifen (ZADITOR) 0.025 % ophthalmic solution Place 1 Drop in both eyes 2 times a day.     • B Complex-C (SUPER B COMPLEX PO) Take  by mouth.     • Glycerin-Polysorbate 80 (REFRESH DRY EYE THERAPY) 1-1 % SOLN 1 Drop by Ophthalmic route 4 times a day.     • Calcium-Vitamin D-Vitamin K (CALCIUM + D)  "500-1000-40 MG-UNT-MCG CHEW Take 1,000 mg by mouth every day.     • Multiple Vitamins-Minerals (PRESERVISION/LUTEIN PO) Take  by mouth 2 Times a Day.     • Cholecalciferol (VITAMIN D) 2000 UNITS CAPS Take 6,000 Units by mouth every day.     • FLUZONE HIGH-DOSE 0.5 ML Suspension Prefilled Syringe injection ADM 0.5ML IM UTD  0   • amoxicillin-clavulanate (AUGMENTIN) 875-125 MG Tab Take 1 Tab by mouth 2 times a day. (Patient not taking: Reported on 12/31/2019) 20 Tab 0     No facility-administered encounter medications on file as of 12/31/2019.      Review of Systems   Constitutional: Negative.    HENT: Negative.    Respiratory: Negative.    Cardiovascular: Negative for palpitations and leg swelling.   Gastrointestinal: Negative.    Musculoskeletal: Negative.    Skin: Negative.    Neurological: Negative.  Negative for dizziness and loss of consciousness.        Difficulty with balance   Endo/Heme/Allergies: Negative.    Psychiatric/Behavioral: Positive for depression. Negative for memory loss. The patient does not have insomnia.         Objective:   /72 (BP Location: Left arm, Patient Position: Sitting, BP Cuff Size: Adult)   Pulse 60   Ht 1.613 m (5' 3.5\")   Wt 66.7 kg (147 lb)   SpO2 97%   BMI 25.63 kg/m²     Physical Exam   Constitutional: She is oriented to person, place, and time. No distress.   HENT:   Head: Normocephalic and atraumatic.   Eyes: Pupils are equal, round, and reactive to light. No scleral icterus.   Neck: No JVD present.   Cardiovascular: Normal rate and regular rhythm.   Murmur heard.   Systolic murmur is present with a grade of 1/6.  Pulmonary/Chest: No respiratory distress. She has no wheezes.   Abdominal: Soft. She exhibits no distension. There is no tenderness.   Musculoskeletal:         General: No edema.   Neurological: She is alert and oriented to person, place, and time.   Skin: Skin is warm.   Psychiatric: She has a normal mood and affect.       Assessment:     1. SVT " (supraventricular tachycardia) (HCC)     2. Essential hypertension, benign         Medical Decision Making:  Today's Assessment / Status / Plan:   PSVT: Controlled on current medical regimen.  Continue same dose of metoprolol.  Hypertension: Under excellent control on current medications.  All in all she is doing well.  Return in 1 year.

## 2020-03-05 ENCOUNTER — APPOINTMENT (RX ONLY)
Dept: URBAN - METROPOLITAN AREA CLINIC 22 | Facility: CLINIC | Age: 85
Setting detail: DERMATOLOGY
End: 2020-03-05

## 2020-03-05 DIAGNOSIS — L82.0 INFLAMED SEBORRHEIC KERATOSIS: ICD-10-CM

## 2020-03-05 DIAGNOSIS — L72.0 EPIDERMAL CYST: ICD-10-CM

## 2020-03-05 DIAGNOSIS — D18.0 HEMANGIOMA: ICD-10-CM

## 2020-03-05 DIAGNOSIS — L81.4 OTHER MELANIN HYPERPIGMENTATION: ICD-10-CM

## 2020-03-05 DIAGNOSIS — L82.1 OTHER SEBORRHEIC KERATOSIS: ICD-10-CM

## 2020-03-05 DIAGNOSIS — Z85.828 PERSONAL HISTORY OF OTHER MALIGNANT NEOPLASM OF SKIN: ICD-10-CM

## 2020-03-05 DIAGNOSIS — D22 MELANOCYTIC NEVI: ICD-10-CM

## 2020-03-05 PROBLEM — D22.5 MELANOCYTIC NEVI OF TRUNK: Status: ACTIVE | Noted: 2020-03-05

## 2020-03-05 PROBLEM — D18.01 HEMANGIOMA OF SKIN AND SUBCUTANEOUS TISSUE: Status: ACTIVE | Noted: 2020-03-05

## 2020-03-05 PROCEDURE — ? COUNSELING

## 2020-03-05 PROCEDURE — 17110 DESTRUCTION B9 LES UP TO 14: CPT

## 2020-03-05 PROCEDURE — 99214 OFFICE O/P EST MOD 30 MIN: CPT | Mod: 25

## 2020-03-05 PROCEDURE — ? LIQUID NITROGEN

## 2020-03-05 ASSESSMENT — LOCATION SIMPLE DESCRIPTION DERM
LOCATION SIMPLE: LEFT FOREHEAD
LOCATION SIMPLE: RIGHT TEMPLE
LOCATION SIMPLE: CHEST
LOCATION SIMPLE: RIGHT UPPER BACK
LOCATION SIMPLE: RIGHT FOREARM
LOCATION SIMPLE: RIGHT SHOULDER
LOCATION SIMPLE: LEFT FOREARM
LOCATION SIMPLE: LEFT LIP
LOCATION SIMPLE: LEFT UPPER ARM
LOCATION SIMPLE: UPPER BACK
LOCATION SIMPLE: LEFT THIGH
LOCATION SIMPLE: ABDOMEN

## 2020-03-05 ASSESSMENT — LOCATION DETAILED DESCRIPTION DERM
LOCATION DETAILED: LEFT INFERIOR MEDIAL FOREHEAD
LOCATION DETAILED: LOWER STERNUM
LOCATION DETAILED: RIGHT CENTRAL TEMPLE
LOCATION DETAILED: LEFT MEDIAL SUPERIOR CHEST
LOCATION DETAILED: RIGHT VENTRAL PROXIMAL FOREARM
LOCATION DETAILED: RIGHT ANTERIOR SHOULDER
LOCATION DETAILED: LEFT ANTERIOR PROXIMAL THIGH
LOCATION DETAILED: EPIGASTRIC SKIN
LOCATION DETAILED: INFERIOR THORACIC SPINE
LOCATION DETAILED: LEFT ANTERIOR DISTAL UPPER ARM
LOCATION DETAILED: SUPERIOR THORACIC SPINE
LOCATION DETAILED: LEFT INFERIOR VERMILION LIP
LOCATION DETAILED: LEFT VENTRAL PROXIMAL FOREARM
LOCATION DETAILED: RIGHT MEDIAL UPPER BACK
LOCATION DETAILED: STERNAL NOTCH

## 2020-03-05 ASSESSMENT — LOCATION ZONE DERM
LOCATION ZONE: TRUNK
LOCATION ZONE: LIP
LOCATION ZONE: ARM
LOCATION ZONE: LEG
LOCATION ZONE: FACE

## 2020-05-12 ENCOUNTER — TELEPHONE (OUTPATIENT)
Dept: HEALTH INFORMATION MANAGEMENT | Facility: OTHER | Age: 85
End: 2020-05-12

## 2020-05-12 NOTE — TELEPHONE ENCOUNTER
Good morning ,    The patient has requested lab orders to complete before her next appointment.    Thank you.

## 2020-05-12 NOTE — PROGRESS NOTES
1. HealthConnect Verified: yes    2. Verify PCP: yes    3. Review and add  to Care Team: yes    4. WebIZ Checked & Epic Updated: Yes  WebIZ Recommendations: PNEUMOVAX (PPSV23), TDAP and SHINGRIX (Shingles)  Is patient due for Tdap? YES. Patient was not notified of copay/out of pocket cost.  Is patient due for Shingles? YES. Patient was not notified of copay/out of pocket cost.    5. Reviewed/Updated the following with patient:       •   Communication Preference Obtained? YES  • [employer] Activation: sent activation code       •   E-Mail Address Obtained? YES       •   Appointment Day and Time Preferences? YES       •   Preferred Pharmacy? YES       •   Preferred Lab? YES    6. Care Gap Scheduling (Attempt to Schedule EACH Overdue Care Gap!)    Scheduled patient for Annual Wellness Visit

## 2020-05-16 DIAGNOSIS — I10 ESSENTIAL HYPERTENSION: ICD-10-CM

## 2020-05-16 NOTE — TELEPHONE ENCOUNTER
Please call notify patient that lab work will not be required prior to next appointment.  If lab work is required we can ordered at that appointment time.

## 2020-05-18 RX ORDER — METOPROLOL SUCCINATE 50 MG/1
TABLET, EXTENDED RELEASE ORAL
Qty: 100 TAB | Refills: 2 | Status: SHIPPED | OUTPATIENT
Start: 2020-05-18 | End: 2021-02-22

## 2020-06-17 ENCOUNTER — TELEPHONE (OUTPATIENT)
Dept: MEDICAL GROUP | Facility: PHYSICIAN GROUP | Age: 85
End: 2020-06-17

## 2020-06-17 NOTE — TELEPHONE ENCOUNTER
ESTABLISHED PATIENT PRE-VISIT PLANNING     Patient was NOT contacted to complete PVP.    1.  Reviewed notes from the last few office visits within the medical group: Yes, lov 08/08/2019    2.  If any orders were placed at last visit or intended to be done for this visit (i.e. 6 mos follow-up), do we have Results/Consult Notes?        •  Labs - Labs were not ordered at last office visit.       •  Imaging - Imaging was not ordered at last office visit.       •  Referrals - No referrals were ordered at last office visit.    3. Is this appointment scheduled as a Hospital Follow-Up? No    4.  Immunizations were updated in [facility] using WebIZ?: Yes       •  Web Iz Recommendations: FLU, PNEUMOVAX (PPSV23), TDAP, VARICELLA (Chicken Pox)  and SHINGRIX (Shingles)    5.  Patient is due for the following Health Maintenance Topics:   Health Maintenance Due   Topic Date Due   • IMM DTaP/Tdap/Td Vaccine (1 - Tdap) 08/21/1952   • Annual Wellness Visit  03/28/2015   • IMM PNEUMOCOCCAL VACCINE: 65+ Years (2 of 2 - PPSV23) 03/09/2016   • IMM ZOSTER VACCINES (3 of 3) 10/03/2019     6. Orders for overdue Health Maintenance topics pended in Pre-Charting? NO    7.  AHA (MDX) form printed for Provider? YES    8.  Patient was NOT informed to arrive 15 min prior to their scheduled appointment and bring in their medication bottles.

## 2020-06-26 ENCOUNTER — OFFICE VISIT (OUTPATIENT)
Dept: MEDICAL GROUP | Facility: PHYSICIAN GROUP | Age: 85
End: 2020-06-26
Payer: MEDICARE

## 2020-06-26 VITALS
OXYGEN SATURATION: 92 % | TEMPERATURE: 98.7 F | HEART RATE: 69 BPM | WEIGHT: 148 LBS | BODY MASS INDEX: 26.22 KG/M2 | HEIGHT: 63 IN

## 2020-06-26 DIAGNOSIS — Z23 NEED FOR VACCINATION: ICD-10-CM

## 2020-06-26 DIAGNOSIS — I47.10 SVT (SUPRAVENTRICULAR TACHYCARDIA) (HCC): ICD-10-CM

## 2020-06-26 DIAGNOSIS — I10 ESSENTIAL HYPERTENSION, BENIGN: ICD-10-CM

## 2020-06-26 DIAGNOSIS — E78.5 DYSLIPIDEMIA: ICD-10-CM

## 2020-06-26 DIAGNOSIS — F33.1 MODERATE EPISODE OF RECURRENT MAJOR DEPRESSIVE DISORDER (HCC): ICD-10-CM

## 2020-06-26 PROCEDURE — 99214 OFFICE O/P EST MOD 30 MIN: CPT | Performed by: FAMILY MEDICINE

## 2020-06-26 PROCEDURE — G0009 ADMIN PNEUMOCOCCAL VACCINE: HCPCS | Performed by: FAMILY MEDICINE

## 2020-06-26 PROCEDURE — 90732 PPSV23 VACC 2 YRS+ SUBQ/IM: CPT | Performed by: FAMILY MEDICINE

## 2020-06-26 PROCEDURE — 8041 PR SCP AHA: Performed by: FAMILY MEDICINE

## 2020-06-26 RX ORDER — ESCITALOPRAM OXALATE 5 MG/1
5 TABLET ORAL DAILY
Qty: 90 TAB | Refills: 3 | Status: SHIPPED | OUTPATIENT
Start: 2020-06-26 | End: 2021-10-11

## 2020-06-26 ASSESSMENT — FIBROSIS 4 INDEX: FIB4 SCORE: 1.98

## 2020-06-26 ASSESSMENT — ENCOUNTER SYMPTOMS: GENERAL WELL-BEING: EXCELLENT

## 2020-06-26 ASSESSMENT — ACTIVITIES OF DAILY LIVING (ADL): BATHING_REQUIRES_ASSISTANCE: 0

## 2020-06-26 NOTE — ASSESSMENT & PLAN NOTE
Chronic ongoing medical condition.  Patient not currently taking any cholesterol-lowering medication at this time.  New laboratory studies ordered to investigate.  No interval MI or stroke.

## 2020-06-26 NOTE — ASSESSMENT & PLAN NOTE
Chronic ongoing medical condition.  Patient currently taking losartan 12.5 mg once daily, metoprolol succinate 50 mg once daily.  Blood pressure currently well controlled today.  Denies any headaches, dizziness, chest pain, shortness of breath.

## 2020-06-26 NOTE — ASSESSMENT & PLAN NOTE
Chronic stable medical condition.  Currently follows with Dr. Velasquez of cardiology.  Currently rate controlled with metoprolol succinate 50 mg once daily.  Denies any dizziness, lightheadedness, syncope.

## 2020-06-26 NOTE — ASSESSMENT & PLAN NOTE
Chronic stable medical condition.  Currently taking Lexapro 5 mg once daily.  Patient contemplating discontinuing medication which I think is a possibility however she has numerous stressors from deaths in the family earlier this year.  PHQ 9 score of 0 today.

## 2020-06-26 NOTE — PROGRESS NOTES
Subjective:     Yoli Carmichael is a 86 y.o. female here today for Annual Health Assessment.  Patient denies any pain or medical complaints today.    Dyslipidemia  Chronic ongoing medical condition.  Patient not currently taking any cholesterol-lowering medication at this time.  New laboratory studies ordered to investigate.  No interval MI or stroke.    Essential hypertension, benign  Chronic ongoing medical condition.  Patient currently taking losartan 12.5 mg once daily, metoprolol succinate 50 mg once daily.  Blood pressure currently well controlled today.  Denies any headaches, dizziness, chest pain, shortness of breath.    Moderate episode of recurrent major depressive disorder (HCC)  Chronic stable medical condition.  Currently taking Lexapro 5 mg once daily.  Patient contemplating discontinuing medication which I think is a possibility however she has numerous stressors from deaths in the family earlier this year.  PHQ 9 score of 0 today.    SVT (supraventricular tachycardia) (HCC)  Chronic stable medical condition.  Currently follows with Dr. Velasquez of cardiology.  Currently rate controlled with metoprolol succinate 50 mg once daily.  Denies any dizziness, lightheadedness, syncope.     Health Maintenance Summary                IMM DTaP/Tdap/Td Vaccine Overdue 8/21/1952     Annual Wellness Visit Overdue 3/28/2015      Done 3/27/2014     IMM PNEUMOCOCCAL VACCINE: 65+ Years Overdue 3/9/2016      Done 3/9/2015 Imm Admin: Pneumococcal Conjugate Vaccine (Prevnar/PCV-13)    IMM ZOSTER VACCINES Overdue 10/3/2019      Done 8/8/2019 Imm Admin: Recombinant Zoster Vaccine (RZV) (Shingrix)     Patient has more history with this topic...    MAMMOGRAM Next Due 10/16/2020      Done 10/16/2019 MA-SCREENING MAMMO BILAT W/TOMOSYNTHESIS W/CAD     Patient has more history with this topic...    BONE DENSITY Next Due 10/16/2024      Done 10/16/2019 DS-BONE DENSITY STUDY (DEXA)     Patient has more history with this topic...     COLONOSCOPY Next Due 2/8/2028      Done 2/8/2018 REFERRAL TO GI FOR COLONOSCOPY     Patient has more history with this topic...         Annual Health Assessment Questions:     1.  Are you currently engaging in any exercise or physical activity? Yes    2.  How would you describe your mood or emotional well-being today? good    3.  Have you had any falls in the last year? No    4.  Have you noticed any problems with your balance or had difficulty walking? No    5.  In the last six months have you experienced any leakage of urine? No    6. DPA/Advanced Directive: Patient has Advanced Directive on file.     Current medicines (including changes today)  Current Outpatient Medications   Medication Sig Dispense Refill   • escitalopram (LEXAPRO) 5 MG tablet Take 1 Tab by mouth every day. 90 Tab 3   • metoprolol SR (TOPROL XL) 50 MG TABLET SR 24 HR TAKE ONE TABLET BY MOUTH ONCE DAILY 100 Tab 2   • losartan (COZAAR) 25 MG Tab Take 0.5 Tabs by mouth every day. 50 Tab 3   • furosemide (LASIX) 40 MG Tab Take 1 Tab by mouth as needed. For ankle swelling. 30 Tab 11   • TURMERIC PO Take  by mouth.     • CycloSPORINE (RESTASIS OP) by Ophthalmic route 2 Times a Day.     • coenzyme Q-10 30 MG capsule Take 300 mg by mouth 2X A WEEK.     • ketotifen (ZADITOR) 0.025 % ophthalmic solution Place 1 Drop in both eyes 2 times a day.     • B Complex-C (SUPER B COMPLEX PO) Take  by mouth.     • Glycerin-Polysorbate 80 (REFRESH DRY EYE THERAPY) 1-1 % SOLN 1 Drop by Ophthalmic route 4 times a day.     • Calcium-Vitamin D-Vitamin K (CALCIUM + D) 500-1000-40 MG-UNT-MCG CHEW Take 1,000 mg by mouth every day.     • Cholecalciferol (VITAMIN D) 2000 UNITS CAPS Take 6,000 Units by mouth every day.       No current facility-administered medications for this visit.        She  has a past medical history of Arrhythmia, Arthritis, Bronchitis, CATARACT, Dry eyes, bilateral (3/27/2014), Osteopenia of the elderly (3/27/2014), and Pneumonia.    Other  "environmental    She  reports that she has never smoked. She has never used smokeless tobacco. She reports current alcohol use. She reports that she does not use drugs.  Counseling given: Not Answered  Comment: Pt exposed to second hand smoker      ROS   No chest pain, no shortness of breath, no abdominal pain.     Objective:     Physical Exam:  Pulse 69   Temp 37.1 °C (98.7 °F) (Temporal)   Ht 1.6 m (5' 3\")   Wt 67.1 kg (148 lb)   SpO2 92%  Body mass index is 26.22 kg/m².   Constitutional: Alert, no distress.  Skin: Warm, dry, good turgor, no rashes in visible areas.  Eye: Equal, round and reactive, conjunctiva clear, lids normal.  ENMT: Lips without lesions, good dentition, oropharynx clear.  Neck: Trachea midline, no masses, no thyromegaly. No cervical or supraclavicular lymphadenopathy.  Respiratory: Unlabored respiratory effort, lungs clear to auscultation, no wheezes, no rhonchi.  Cardiovascular: Normal S1, S2, no murmur, no edema.  Abdomen: Soft, non-tender, no masses, no hepatosplenomegaly.  Psych: Alert and oriented x3, normal affect and mood.    Assessment and Plan:     1. Moderate episode of recurrent major depressive disorder (HCC)  Chronic stable medical condition.  Currently in remission.  Continue citalopram for the time being.  Gave the option for patient to discontinue at her leisure.  No need to taper.  - escitalopram (LEXAPRO) 5 MG tablet; Take 1 Tab by mouth every day.  Dispense: 90 Tab; Refill: 3    2. SVT (supraventricular tachycardia) (HCC)  Chronic paroxysmal SVT.  Continue metoprolol succinate 50 mg once daily.  Follow-up with labs as below.  - CBC WITH DIFFERENTIAL; Future  - Comp Metabolic Panel; Future  - Lipid Profile; Future    3. Essential hypertension, benign  Stable medical condition.  Continue losartan and metoprolol as prescribed.  Labs as below.  - CBC WITH DIFFERENTIAL; Future  - Comp Metabolic Panel; Future  - Lipid Profile; Future    4. Dyslipidemia  Chronic ongoing medical " condition.  Reevaluate with labs as below.  - CBC WITH DIFFERENTIAL; Future  - Comp Metabolic Panel; Future  - Lipid Profile; Future    5. Need for vaccination  - Pneumococal Polysaccharide Vaccine 23-Valent =>3YO SQ/IM      Discussion today about general wellness and lifestyle habits:    · Engage in regular physical activity and social activities.  · Prevent falls and reduce trip hazards; using ambulatory aides, hearing and vision testing if appropriate.  · Steps to improve urinary incontinence.  · Advanced care planning.    Follow-Up: Return in about 1 year (around 6/26/2021) for AWV- hc.         PLEASE NOTE: This dictation was created using voice recognition software. I have made every reasonable attempt to correct obvious errors, but I expect that there are errors of grammar and possibly content that I did not discover before finalizing the note.

## 2020-06-29 ENCOUNTER — HOSPITAL ENCOUNTER (OUTPATIENT)
Dept: LAB | Facility: MEDICAL CENTER | Age: 85
End: 2020-06-29
Attending: FAMILY MEDICINE
Payer: MEDICARE

## 2020-06-29 DIAGNOSIS — E78.5 DYSLIPIDEMIA: ICD-10-CM

## 2020-06-29 DIAGNOSIS — I47.10 SVT (SUPRAVENTRICULAR TACHYCARDIA) (HCC): ICD-10-CM

## 2020-06-29 DIAGNOSIS — I10 ESSENTIAL HYPERTENSION, BENIGN: ICD-10-CM

## 2020-06-29 LAB
ALBUMIN SERPL BCP-MCNC: 4 G/DL (ref 3.2–4.9)
ALBUMIN/GLOB SERPL: 1.4 G/DL
ALP SERPL-CCNC: 68 U/L (ref 30–99)
ALT SERPL-CCNC: 12 U/L (ref 2–50)
ANION GAP SERPL CALC-SCNC: 11 MMOL/L (ref 7–16)
AST SERPL-CCNC: 22 U/L (ref 12–45)
BASOPHILS # BLD AUTO: 1.5 % (ref 0–1.8)
BASOPHILS # BLD: 0.1 K/UL (ref 0–0.12)
BILIRUB SERPL-MCNC: 0.6 MG/DL (ref 0.1–1.5)
BUN SERPL-MCNC: 16 MG/DL (ref 8–22)
CALCIUM SERPL-MCNC: 9.1 MG/DL (ref 8.5–10.5)
CHLORIDE SERPL-SCNC: 105 MMOL/L (ref 96–112)
CHOLEST SERPL-MCNC: 200 MG/DL (ref 100–199)
CO2 SERPL-SCNC: 23 MMOL/L (ref 20–33)
CREAT SERPL-MCNC: 0.82 MG/DL (ref 0.5–1.4)
EOSINOPHIL # BLD AUTO: 0.17 K/UL (ref 0–0.51)
EOSINOPHIL NFR BLD: 2.5 % (ref 0–6.9)
ERYTHROCYTE [DISTWIDTH] IN BLOOD BY AUTOMATED COUNT: 46.5 FL (ref 35.9–50)
FASTING STATUS PATIENT QL REPORTED: NORMAL
GLOBULIN SER CALC-MCNC: 2.8 G/DL (ref 1.9–3.5)
GLUCOSE SERPL-MCNC: 91 MG/DL (ref 65–99)
HCT VFR BLD AUTO: 43.7 % (ref 37–47)
HDLC SERPL-MCNC: 62 MG/DL
HGB BLD-MCNC: 13.7 G/DL (ref 12–16)
IMM GRANULOCYTES # BLD AUTO: 0.03 K/UL (ref 0–0.11)
IMM GRANULOCYTES NFR BLD AUTO: 0.4 % (ref 0–0.9)
LDLC SERPL CALC-MCNC: 111 MG/DL
LYMPHOCYTES # BLD AUTO: 2.3 K/UL (ref 1–4.8)
LYMPHOCYTES NFR BLD: 33.8 % (ref 22–41)
MCH RBC QN AUTO: 28.2 PG (ref 27–33)
MCHC RBC AUTO-ENTMCNC: 31.4 G/DL (ref 33.6–35)
MCV RBC AUTO: 89.9 FL (ref 81.4–97.8)
MONOCYTES # BLD AUTO: 0.72 K/UL (ref 0–0.85)
MONOCYTES NFR BLD AUTO: 10.6 % (ref 0–13.4)
NEUTROPHILS # BLD AUTO: 3.48 K/UL (ref 2–7.15)
NEUTROPHILS NFR BLD: 51.2 % (ref 44–72)
NRBC # BLD AUTO: 0 K/UL
NRBC BLD-RTO: 0 /100 WBC
PLATELET # BLD AUTO: 201 K/UL (ref 164–446)
PMV BLD AUTO: 11.1 FL (ref 9–12.9)
POTASSIUM SERPL-SCNC: 4.1 MMOL/L (ref 3.6–5.5)
PROT SERPL-MCNC: 6.8 G/DL (ref 6–8.2)
RBC # BLD AUTO: 4.86 M/UL (ref 4.2–5.4)
SODIUM SERPL-SCNC: 139 MMOL/L (ref 135–145)
TRIGL SERPL-MCNC: 136 MG/DL (ref 0–149)
WBC # BLD AUTO: 6.8 K/UL (ref 4.8–10.8)

## 2020-06-29 PROCEDURE — 36415 COLL VENOUS BLD VENIPUNCTURE: CPT

## 2020-06-29 PROCEDURE — 80053 COMPREHEN METABOLIC PANEL: CPT

## 2020-06-29 PROCEDURE — 85025 COMPLETE CBC W/AUTO DIFF WBC: CPT

## 2020-06-29 PROCEDURE — 80061 LIPID PANEL: CPT

## 2020-07-20 ENCOUNTER — PATIENT OUTREACH (OUTPATIENT)
Dept: HEALTH INFORMATION MANAGEMENT | Facility: OTHER | Age: 85
End: 2020-07-20

## 2020-07-20 NOTE — PROGRESS NOTES
Member called and wanted to know why she had a bill with a balance of $10.00 for date of service 06-26-20 because she stated she made a payment with her debit card for that day. I did let her know that [facility] did not receive a payment and if she could find the statement from her bank for that day with the balance then we can fix that. I checked her insurance and there is the claim [id number] with a co pay of $10.00.

## 2020-10-17 DIAGNOSIS — I10 ESSENTIAL HYPERTENSION, BENIGN: ICD-10-CM

## 2020-10-20 ENCOUNTER — PATIENT MESSAGE (OUTPATIENT)
Dept: CARDIOLOGY | Facility: MEDICAL CENTER | Age: 85
End: 2020-10-20

## 2020-10-20 RX ORDER — LOSARTAN POTASSIUM 25 MG/1
TABLET ORAL
Qty: 50 TAB | Refills: 0 | Status: SHIPPED | OUTPATIENT
Start: 2020-10-20 | End: 2021-01-28

## 2020-11-01 ENCOUNTER — HOSPITAL ENCOUNTER (OUTPATIENT)
Facility: MEDICAL CENTER | Age: 85
End: 2020-11-01
Attending: PHYSICIAN ASSISTANT
Payer: MEDICARE

## 2020-11-01 PROCEDURE — U0003 INFECTIOUS AGENT DETECTION BY NUCLEIC ACID (DNA OR RNA); SEVERE ACUTE RESPIRATORY SYNDROME CORONAVIRUS 2 (SARS-COV-2) (CORONAVIRUS DISEASE [COVID-19]), AMPLIFIED PROBE TECHNIQUE, MAKING USE OF HIGH THROUGHPUT TECHNOLOGIES AS DESCRIBED BY CMS-2020-01-R: HCPCS

## 2020-11-02 LAB
COVID ORDER STATUS COVID19: NORMAL
SARS-COV-2 RNA RESP QL NAA+PROBE: NOTDETECTED
SPECIMEN SOURCE: NORMAL

## 2020-11-10 ENCOUNTER — TELEPHONE (OUTPATIENT)
Dept: HEALTH INFORMATION MANAGEMENT | Facility: OTHER | Age: 85
End: 2020-11-10

## 2020-11-10 DIAGNOSIS — M85.80 OSTEOPENIA OF THE ELDERLY: Chronic | ICD-10-CM

## 2020-11-10 DIAGNOSIS — Z78.0 POSTMENOPAUSAL: ICD-10-CM

## 2020-11-10 NOTE — TELEPHONE ENCOUNTER
Hello,  Can you please have your MA acknowledge patients care gaps?  She has completed her AWV and has gotten her vaccines at her local pharmacy.   Thank you!  Rupali

## 2020-12-29 ENCOUNTER — HOSPITAL ENCOUNTER (OUTPATIENT)
Dept: RADIOLOGY | Facility: MEDICAL CENTER | Age: 85
End: 2020-12-29
Attending: FAMILY MEDICINE
Payer: MEDICARE

## 2020-12-29 DIAGNOSIS — Z12.31 VISIT FOR SCREENING MAMMOGRAM: ICD-10-CM

## 2020-12-29 DIAGNOSIS — M85.80 OSTEOPENIA OF THE ELDERLY: ICD-10-CM

## 2020-12-29 DIAGNOSIS — Z78.0 POSTMENOPAUSAL: ICD-10-CM

## 2020-12-29 PROCEDURE — 77080 DXA BONE DENSITY AXIAL: CPT

## 2020-12-29 PROCEDURE — 77067 SCR MAMMO BI INCL CAD: CPT

## 2020-12-30 NOTE — RESULT ENCOUNTER NOTE
Hello.  I am covering some test results for Dr. Santana, today.   Your recent DEXA scan notes some osteoporosis (thin bone), but this is a similar result as before.   If you wish to discuss this further with your doctor, please contact him when he returns next week.  Thank you,    Donnell Vaca M.D.

## 2020-12-30 NOTE — RESULT ENCOUNTER NOTE
Hello.  I am covering some test results for Dr. Santana, today.   Your results show a normal mammogram test.   You can discuss this further with your doctor, when they return next week.  Thank you,   Donnell Vaca M.D.

## 2021-01-05 ENCOUNTER — OFFICE VISIT (OUTPATIENT)
Dept: CARDIOLOGY | Facility: MEDICAL CENTER | Age: 86
End: 2021-01-05
Payer: MEDICARE

## 2021-01-05 VITALS
DIASTOLIC BLOOD PRESSURE: 76 MMHG | BODY MASS INDEX: 25.37 KG/M2 | WEIGHT: 143.2 LBS | HEART RATE: 66 BPM | SYSTOLIC BLOOD PRESSURE: 120 MMHG | RESPIRATION RATE: 14 BRPM | OXYGEN SATURATION: 96 % | HEIGHT: 63 IN

## 2021-01-05 VITALS — HEIGHT: 63 IN | BODY MASS INDEX: 26.22 KG/M2

## 2021-01-05 DIAGNOSIS — R00.2 PALPITATIONS: ICD-10-CM

## 2021-01-05 DIAGNOSIS — I34.0 NON-RHEUMATIC MITRAL REGURGITATION: ICD-10-CM

## 2021-01-05 DIAGNOSIS — R60.0 LOCALIZED EDEMA: ICD-10-CM

## 2021-01-05 DIAGNOSIS — I47.10 SVT (SUPRAVENTRICULAR TACHYCARDIA) (HCC): ICD-10-CM

## 2021-01-05 PROCEDURE — 99213 OFFICE O/P EST LOW 20 MIN: CPT | Performed by: INTERNAL MEDICINE

## 2021-01-05 PROCEDURE — 99999 PR NO CHARGE: CPT | Performed by: INTERNAL MEDICINE

## 2021-01-05 RX ORDER — A/SINGAPORE/GP1908/2015 IVR-180 (AN A/MICHIGAN/45/2015 (H1N1)PDM09-LIKE VIRUS, A/HONG KONG/4801/2014, NYMC X-263B (H3N2) (AN A/HONG KONG/4801/2014-LIKE VIRUS), AND B/BRISBANE/60/2008, WILD TYPE (A B/BRISBANE/60/2008-LIKE VIRUS) 15; 15; 15 UG/.5ML; UG/.5ML; UG/.5ML
INJECTION, SUSPENSION INTRAMUSCULAR
COMMUNITY
Start: 2020-10-09 | End: 2021-04-02

## 2021-01-05 ASSESSMENT — ENCOUNTER SYMPTOMS
PALPITATIONS: 0
DIZZINESS: 1
SHORTNESS OF BREATH: 0
DEPRESSION: 1
RESPIRATORY NEGATIVE: 1
MUSCULOSKELETAL NEGATIVE: 1
CARDIOVASCULAR NEGATIVE: 1
GASTROINTESTINAL NEGATIVE: 1
CONSTITUTIONAL NEGATIVE: 1

## 2021-01-05 ASSESSMENT — FIBROSIS 4 INDEX: FIB4 SCORE: 2.75

## 2021-01-05 NOTE — PROGRESS NOTES
"Chief Complaint   Patient presents with   • Hypertension   • Dyslipidemia       Subjective:   Duong Carmichael is a 87 y.o. female who presents today for follow-up of hypertension and SVT.  She has had no sense of palpitations.  Only complaint is that she gets a rather ill-defined and very brief sensation of something \"passing across her face\".  This lasts only a second or so noise occurs with standing.  She has had no syncope.  No sense of sustained tachyarrhythmias.  Past Medical History:   Diagnosis Date   • Arrhythmia    • Arthritis     knees   • Bronchitis     long time ago   • CATARACT     no surgery yet   • Dry eyes, bilateral 3/27/2014   • Osteopenia of the elderly 3/27/2014   • Pneumonia     long time ago     Past Surgical History:   Procedure Laterality Date   • KNEE ARTHROPLASTY TOTAL  9/3/2013    Performed by Ramesh Styles M.D. at SURGERY [facility]   • TONSILLECTOMY       Family History   Problem Relation Age of Onset   • Lung Disease Mother         frequent pneumonia   • Heart Disease Father 54        MI   • Lung Disease Father         Emphysema   • Heart Attack Father    • Cancer Maternal Aunt         Great Aunt and Cousin Ovarian CA   • Hypertension Maternal Aunt    • Cancer Daughter         Lung and Brain CA   • Seizures Daughter    • Cancer Daughter         Pituitary tumor   • Cancer Brother         leukemia     Social History     Socioeconomic History   • Marital status:      Spouse name: Not on file   • Number of children: Not on file   • Years of education: Not on file   • Highest education level: Not on file   Occupational History   • Not on file   Social Needs   • Financial resource strain: Not on file   • Food insecurity     Worry: Not on file     Inability: Not on file   • Transportation needs     Medical: Not on file     Non-medical: Not on file   Tobacco Use   • Smoking status: Never Smoker   • Smokeless tobacco: Never Used   • Tobacco comment: Pt exposed to second hand smoker "   Substance and Sexual Activity   • Alcohol use: Yes     Comment: 1-2 week   • Drug use: No   • Sexual activity: Never   Lifestyle   • Physical activity     Days per week: Not on file     Minutes per session: Not on file   • Stress: Not on file   Relationships   • Social connections     Talks on phone: Not on file     Gets together: Not on file     Attends Protestant service: Not on file     Active member of club or organization: Not on file     Attends meetings of clubs or organizations: Not on file     Relationship status: Not on file   • Intimate partner violence     Fear of current or ex partner: Not on file     Emotionally abused: Not on file     Physically abused: Not on file     Forced sexual activity: Not on file   Other Topics Concern   • Not on file   Social History Narrative   • Not on file     Allergies   Allergen Reactions   • Other Environmental      Seasonal allergies     Outpatient Encounter Medications as of 1/5/2021   Medication Sig Dispense Refill   • FLUAD QUADRIVALENT 0.5 ML Prefilled Syringe      • losartan (COZAAR) 25 MG Tab take 1/2 tablet by mouth every day  50 Tab 0   • escitalopram (LEXAPRO) 5 MG tablet Take 1 Tab by mouth every day. 90 Tab 3   • metoprolol SR (TOPROL XL) 50 MG TABLET SR 24 HR TAKE ONE TABLET BY MOUTH ONCE DAILY 100 Tab 2   • furosemide (LASIX) 40 MG Tab Take 1 Tab by mouth as needed. For ankle swelling. 30 Tab 11   • coenzyme Q-10 30 MG capsule Take 300 mg by mouth 2X A WEEK.     • ketotifen (ZADITOR) 0.025 % ophthalmic solution Place 1 Drop in both eyes 2 times a day.     • B Complex-C (SUPER B COMPLEX PO) Take  by mouth.     • Glycerin-Polysorbate 80 (REFRESH DRY EYE THERAPY) 1-1 % SOLN 1 Drop by Ophthalmic route 4 times a day.     • Calcium-Vitamin D-Vitamin K (CALCIUM + D) 500-1000-40 MG-UNT-MCG CHEW Take 1,000 mg by mouth every day.     • Cholecalciferol (VITAMIN D) 2000 UNITS CAPS Take 6,000 Units by mouth every day.     • TURMERIC PO Take  by mouth.     •  "CycloSPORINE (RESTASIS OP) by Ophthalmic route 2 Times a Day.       No facility-administered encounter medications on file as of 1/5/2021.      Review of Systems   Constitutional: Negative.    HENT: Negative.    Respiratory: Negative.  Negative for shortness of breath.    Cardiovascular: Negative.  Negative for chest pain and palpitations.   Gastrointestinal: Negative.    Musculoskeletal: Negative.    Skin: Negative.    Neurological: Positive for dizziness.        Very brief episodes of like something passing across her face that lasts only a second or 2   Endo/Heme/Allergies: Negative.    Psychiatric/Behavioral: Positive for depression.        Objective:   /76 (BP Location: Left arm, Patient Position: Sitting, BP Cuff Size: Adult)   Pulse 66   Resp 14   Ht 1.6 m (5' 3\")   Wt 65 kg (143 lb 3.2 oz)   SpO2 96%   BMI 25.37 kg/m²     Physical Exam   Constitutional: She is oriented to person, place, and time. No distress.   Evidence of orthostatic blood pressure with blood pressure dropping from 155 systolic left arm supine to 130 systolic left arm standing   HENT:   Head: Normocephalic and atraumatic.   Eyes: Pupils are equal, round, and reactive to light. No scleral icterus.   Neck: No JVD present.   Cardiovascular: Normal rate and regular rhythm.   No murmur heard.  Pulmonary/Chest: No respiratory distress. She has no wheezes.   Abdominal: Soft. She exhibits no distension. There is no abdominal tenderness.   Musculoskeletal:         General: No edema.   Neurological: She is alert and oriented to person, place, and time.   Skin: Skin is warm.   Psychiatric: She has a normal mood and affect.       Assessment:     1. SVT (supraventricular tachycardia) (HCC)  Holter Monitor Study   2. Palpitations  Holter Monitor Study   3. Non-rheumatic mitral regurgitation  Holter Monitor Study   4. Localized edema  Holter Monitor Study       Medical Decision Making:  Today's Assessment / Status / Plan:   PSVT: No " symptomatic palpitations.  She is on metoprolol    Hypertension under good control with current medication.  She has significant orthostatic hypotension as noted in the physical exam    Ill-defined episodes of transient lightheadedness.  She describes it as something passing across her face lasts only a a second or so.  This may be due to orthostatic hypotension, arrhythmia, sinus pause in center.  A heart monitor will be placed.  Otherwise return in 6 months

## 2021-01-11 DIAGNOSIS — Z23 NEED FOR VACCINATION: ICD-10-CM

## 2021-01-28 DIAGNOSIS — I10 ESSENTIAL HYPERTENSION, BENIGN: ICD-10-CM

## 2021-01-28 RX ORDER — LOSARTAN POTASSIUM 25 MG/1
TABLET ORAL
Qty: 50 TAB | Refills: 6 | Status: ON HOLD
Start: 2021-01-28 | End: 2021-04-04

## 2021-02-16 ENCOUNTER — HOSPITAL ENCOUNTER (OUTPATIENT)
Dept: LAB | Facility: MEDICAL CENTER | Age: 86
End: 2021-02-16
Attending: OPHTHALMOLOGY
Payer: MEDICARE

## 2021-02-16 LAB
RHEUMATOID FACT SER IA-ACNC: <10 IU/ML (ref 0–14)
TREPONEMA PALLIDUM IGG+IGM AB [PRESENCE] IN SERUM OR PLASMA BY IMMUNOASSAY: NORMAL

## 2021-02-16 PROCEDURE — 85549 MURAMIDASE: CPT

## 2021-02-16 PROCEDURE — 86255 FLUORESCENT ANTIBODY SCREEN: CPT

## 2021-02-16 PROCEDURE — 86038 ANTINUCLEAR ANTIBODIES: CPT

## 2021-02-16 PROCEDURE — 82164 ANGIOTENSIN I ENZYME TEST: CPT

## 2021-02-16 PROCEDURE — 86431 RHEUMATOID FACTOR QUANT: CPT

## 2021-02-16 PROCEDURE — 36415 COLL VENOUS BLD VENIPUNCTURE: CPT

## 2021-02-16 PROCEDURE — 83516 IMMUNOASSAY NONANTIBODY: CPT | Mod: 91

## 2021-02-16 PROCEDURE — 86480 TB TEST CELL IMMUN MEASURE: CPT

## 2021-02-16 PROCEDURE — 86780 TREPONEMA PALLIDUM: CPT

## 2021-02-17 LAB
GAMMA INTERFERON BACKGROUND BLD IA-ACNC: 0.03 IU/ML
M TB IFN-G BLD-IMP: NEGATIVE
M TB IFN-G CD4+ BCKGRND COR BLD-ACNC: 0 IU/ML
MITOGEN IGNF BCKGRD COR BLD-ACNC: >10 IU/ML
QFT TB2 - NIL TBQ2: 0 IU/ML

## 2021-02-18 LAB — ACE SERPL-CCNC: 27 U/L (ref 9–67)

## 2021-02-19 LAB
ANCA IGG TITR SER IF: NORMAL {TITER}
NUCLEAR IGG SER QL IA: NORMAL

## 2021-02-20 LAB
MYELOPEROXIDASE AB SER-ACNC: 3 AU/ML (ref 0–19)
PROTEINASE3 AB SER-ACNC: 1 AU/ML (ref 0–19)
T PALLIDUM IGG SER QL IF: NON REACTIVE

## 2021-02-22 DIAGNOSIS — I10 ESSENTIAL HYPERTENSION: ICD-10-CM

## 2021-02-22 LAB — LYSOZYME SERPL-MCNC: 1.52 UG/ML

## 2021-02-23 RX ORDER — METOPROLOL SUCCINATE 50 MG/1
TABLET, EXTENDED RELEASE ORAL
Qty: 100 TABLET | Refills: 1 | Status: SHIPPED | OUTPATIENT
Start: 2021-02-23 | End: 2021-05-03

## 2021-02-26 ENCOUNTER — PATIENT OUTREACH (OUTPATIENT)
Dept: HEALTH INFORMATION MANAGEMENT | Facility: OTHER | Age: 86
End: 2021-02-26

## 2021-03-01 ENCOUNTER — NON-PROVIDER VISIT (OUTPATIENT)
Dept: CARDIOLOGY | Facility: MEDICAL CENTER | Age: 86
End: 2021-03-01
Payer: MEDICARE

## 2021-03-01 ENCOUNTER — TELEPHONE (OUTPATIENT)
Dept: CARDIOLOGY | Facility: MEDICAL CENTER | Age: 86
End: 2021-03-01

## 2021-03-01 DIAGNOSIS — R00.2 PALPITATIONS: ICD-10-CM

## 2021-03-01 PROCEDURE — 93268 ECG RECORD/REVIEW: CPT | Performed by: INTERNAL MEDICINE

## 2021-03-01 NOTE — TELEPHONE ENCOUNTER
Patient enrolled in the 30 day Bio-[employer] [facility] Heart monitoring program per .  >In office hookup with Baseline transmitted.  >Pending EOS.

## 2021-04-02 ENCOUNTER — HOSPITAL ENCOUNTER (INPATIENT)
Facility: MEDICAL CENTER | Age: 86
LOS: 1 days | DRG: 641 | End: 2021-04-04
Attending: EMERGENCY MEDICINE | Admitting: INTERNAL MEDICINE
Payer: MEDICARE

## 2021-04-02 DIAGNOSIS — N17.9 AKI (ACUTE KIDNEY INJURY) (HCC): ICD-10-CM

## 2021-04-02 DIAGNOSIS — R55 SYNCOPE, UNSPECIFIED SYNCOPE TYPE: ICD-10-CM

## 2021-04-02 LAB
ALBUMIN SERPL BCP-MCNC: 4 G/DL (ref 3.2–4.9)
ALBUMIN/GLOB SERPL: 1.3 G/DL
ALP SERPL-CCNC: 66 U/L (ref 30–99)
ALT SERPL-CCNC: 11 U/L (ref 2–50)
ANION GAP SERPL CALC-SCNC: 14 MMOL/L (ref 7–16)
APPEARANCE UR: CLEAR
AST SERPL-CCNC: 21 U/L (ref 12–45)
BASOPHILS # BLD AUTO: 0.9 % (ref 0–1.8)
BASOPHILS # BLD: 0.08 K/UL (ref 0–0.12)
BILIRUB SERPL-MCNC: 0.4 MG/DL (ref 0.1–1.5)
BILIRUB UR QL STRIP.AUTO: NEGATIVE
BUN SERPL-MCNC: 28 MG/DL (ref 8–22)
CALCIUM SERPL-MCNC: 9.6 MG/DL (ref 8.4–10.2)
CHLORIDE SERPL-SCNC: 104 MMOL/L (ref 96–112)
CO2 SERPL-SCNC: 21 MMOL/L (ref 20–33)
COLOR UR: YELLOW
CREAT SERPL-MCNC: 1.81 MG/DL (ref 0.5–1.4)
EKG IMPRESSION: NORMAL
EOSINOPHIL # BLD AUTO: 0.18 K/UL (ref 0–0.51)
EOSINOPHIL NFR BLD: 2.1 % (ref 0–6.9)
ERYTHROCYTE [DISTWIDTH] IN BLOOD BY AUTOMATED COUNT: 47.2 FL (ref 35.9–50)
GLOBULIN SER CALC-MCNC: 3 G/DL (ref 1.9–3.5)
GLUCOSE SERPL-MCNC: 158 MG/DL (ref 65–99)
GLUCOSE UR STRIP.AUTO-MCNC: NEGATIVE MG/DL
HCT VFR BLD AUTO: 38 % (ref 37–47)
HGB BLD-MCNC: 12.3 G/DL (ref 12–16)
IMM GRANULOCYTES # BLD AUTO: 0.05 K/UL (ref 0–0.11)
IMM GRANULOCYTES NFR BLD AUTO: 0.6 % (ref 0–0.9)
KETONES UR STRIP.AUTO-MCNC: NEGATIVE MG/DL
LEUKOCYTE ESTERASE UR QL STRIP.AUTO: NEGATIVE
LYMPHOCYTES # BLD AUTO: 1.65 K/UL (ref 1–4.8)
LYMPHOCYTES NFR BLD: 18.9 % (ref 22–41)
MAGNESIUM SERPL-MCNC: 2.2 MG/DL (ref 1.5–2.5)
MCH RBC QN AUTO: 28 PG (ref 27–33)
MCHC RBC AUTO-ENTMCNC: 32.4 G/DL (ref 33.6–35)
MCV RBC AUTO: 86.4 FL (ref 81.4–97.8)
MICRO URNS: NORMAL
MONOCYTES # BLD AUTO: 0.81 K/UL (ref 0–0.85)
MONOCYTES NFR BLD AUTO: 9.3 % (ref 0–13.4)
NEUTROPHILS # BLD AUTO: 5.95 K/UL (ref 2–7.15)
NEUTROPHILS NFR BLD: 68.2 % (ref 44–72)
NITRITE UR QL STRIP.AUTO: NEGATIVE
NRBC # BLD AUTO: 0 K/UL
NRBC BLD-RTO: 0 /100 WBC
PH UR STRIP.AUTO: 6 [PH] (ref 5–8)
PLATELET # BLD AUTO: 231 K/UL (ref 164–446)
PMV BLD AUTO: 10.1 FL (ref 9–12.9)
POTASSIUM SERPL-SCNC: 4 MMOL/L (ref 3.6–5.5)
PROT SERPL-MCNC: 7 G/DL (ref 6–8.2)
PROT UR QL STRIP: NEGATIVE MG/DL
RBC # BLD AUTO: 4.4 M/UL (ref 4.2–5.4)
RBC UR QL AUTO: NEGATIVE
SODIUM SERPL-SCNC: 139 MMOL/L (ref 135–145)
SP GR UR STRIP.AUTO: 1.01
TROPONIN T SERPL-MCNC: 17 NG/L (ref 6–19)
TROPONIN T SERPL-MCNC: 20 NG/L (ref 6–19)
TSH SERPL DL<=0.005 MIU/L-ACNC: 1.51 UIU/ML (ref 0.38–5.33)
WBC # BLD AUTO: 8.7 K/UL (ref 4.8–10.8)

## 2021-04-02 PROCEDURE — G0378 HOSPITAL OBSERVATION PER HR: HCPCS

## 2021-04-02 PROCEDURE — 84484 ASSAY OF TROPONIN QUANT: CPT

## 2021-04-02 PROCEDURE — 99285 EMERGENCY DEPT VISIT HI MDM: CPT

## 2021-04-02 PROCEDURE — 80053 COMPREHEN METABOLIC PANEL: CPT

## 2021-04-02 PROCEDURE — U0005 INFEC AGEN DETEC AMPLI PROBE: HCPCS

## 2021-04-02 PROCEDURE — 83036 HEMOGLOBIN GLYCOSYLATED A1C: CPT

## 2021-04-02 PROCEDURE — 99219 PR INITIAL OBSERVATION CARE,LEVL II: CPT | Performed by: INTERNAL MEDICINE

## 2021-04-02 PROCEDURE — 84443 ASSAY THYROID STIM HORMONE: CPT

## 2021-04-02 PROCEDURE — 93005 ELECTROCARDIOGRAM TRACING: CPT | Performed by: EMERGENCY MEDICINE

## 2021-04-02 PROCEDURE — 83735 ASSAY OF MAGNESIUM: CPT

## 2021-04-02 PROCEDURE — 93005 ELECTROCARDIOGRAM TRACING: CPT

## 2021-04-02 PROCEDURE — U0003 INFECTIOUS AGENT DETECTION BY NUCLEIC ACID (DNA OR RNA); SEVERE ACUTE RESPIRATORY SYNDROME CORONAVIRUS 2 (SARS-COV-2) (CORONAVIRUS DISEASE [COVID-19]), AMPLIFIED PROBE TECHNIQUE, MAKING USE OF HIGH THROUGHPUT TECHNOLOGIES AS DESCRIBED BY CMS-2020-01-R: HCPCS

## 2021-04-02 PROCEDURE — 81003 URINALYSIS AUTO W/O SCOPE: CPT

## 2021-04-02 PROCEDURE — 85025 COMPLETE CBC W/AUTO DIFF WBC: CPT

## 2021-04-02 PROCEDURE — 36415 COLL VENOUS BLD VENIPUNCTURE: CPT

## 2021-04-02 RX ORDER — METOPROLOL SUCCINATE 25 MG/1
50 TABLET, EXTENDED RELEASE ORAL
Status: DISCONTINUED | OUTPATIENT
Start: 2021-04-03 | End: 2021-04-04 | Stop reason: HOSPADM

## 2021-04-02 RX ORDER — BISACODYL 10 MG
10 SUPPOSITORY, RECTAL RECTAL
Status: DISCONTINUED | OUTPATIENT
Start: 2021-04-02 | End: 2021-04-04 | Stop reason: HOSPADM

## 2021-04-02 RX ORDER — SODIUM CHLORIDE, SODIUM LACTATE, POTASSIUM CHLORIDE, CALCIUM CHLORIDE 600; 310; 30; 20 MG/100ML; MG/100ML; MG/100ML; MG/100ML
500 INJECTION, SOLUTION INTRAVENOUS ONCE
Status: ACTIVE | OUTPATIENT
Start: 2021-04-02 | End: 2021-04-03

## 2021-04-02 RX ORDER — POLYETHYLENE GLYCOL 3350 17 G/17G
1 POWDER, FOR SOLUTION ORAL
Status: DISCONTINUED | OUTPATIENT
Start: 2021-04-02 | End: 2021-04-04 | Stop reason: HOSPADM

## 2021-04-02 RX ORDER — ACETAMINOPHEN 325 MG/1
650 TABLET ORAL EVERY 6 HOURS PRN
Status: DISCONTINUED | OUTPATIENT
Start: 2021-04-02 | End: 2021-04-04 | Stop reason: HOSPADM

## 2021-04-02 RX ORDER — AMOXICILLIN 250 MG
2 CAPSULE ORAL 2 TIMES DAILY
Status: DISCONTINUED | OUTPATIENT
Start: 2021-04-02 | End: 2021-04-04 | Stop reason: HOSPADM

## 2021-04-02 RX ORDER — ONDANSETRON 4 MG/1
4 TABLET, ORALLY DISINTEGRATING ORAL EVERY 4 HOURS PRN
Status: DISCONTINUED | OUTPATIENT
Start: 2021-04-02 | End: 2021-04-04 | Stop reason: HOSPADM

## 2021-04-02 RX ORDER — ESCITALOPRAM OXALATE 10 MG/1
5 TABLET ORAL DAILY
Status: DISCONTINUED | OUTPATIENT
Start: 2021-04-03 | End: 2021-04-04 | Stop reason: HOSPADM

## 2021-04-02 RX ORDER — GINSENG 100 MG
50 CAPSULE ORAL DAILY
COMMUNITY
End: 2021-05-07

## 2021-04-02 RX ORDER — ONDANSETRON 2 MG/ML
4 INJECTION INTRAMUSCULAR; INTRAVENOUS EVERY 4 HOURS PRN
Status: DISCONTINUED | OUTPATIENT
Start: 2021-04-02 | End: 2021-04-04 | Stop reason: HOSPADM

## 2021-04-02 RX ORDER — PREDNISOLONE ACETATE 10 MG/ML
1 SUSPENSION/ DROPS OPHTHALMIC SEE ADMIN INSTRUCTIONS
COMMUNITY
Start: 2021-03-15 | End: 2021-05-18

## 2021-04-02 RX ORDER — LOSARTAN POTASSIUM 25 MG/1
12.5 TABLET ORAL EVERY EVENING
Status: DISCONTINUED | OUTPATIENT
Start: 2021-04-02 | End: 2021-04-02

## 2021-04-02 ASSESSMENT — LIFESTYLE VARIABLES
HAVE PEOPLE ANNOYED YOU BY CRITICIZING YOUR DRINKING: NO
AVERAGE NUMBER OF DAYS PER WEEK YOU HAVE A DRINK CONTAINING ALCOHOL: 0
ON A TYPICAL DAY WHEN YOU DRINK ALCOHOL HOW MANY DRINKS DO YOU HAVE: 1
TOTAL SCORE: 0
EVER HAD A DRINK FIRST THING IN THE MORNING TO STEADY YOUR NERVES TO GET RID OF A HANGOVER: NO
CONSUMPTION TOTAL: NEGATIVE
HAVE YOU EVER FELT YOU SHOULD CUT DOWN ON YOUR DRINKING: NO
ALCOHOL_USE: YES
TOTAL SCORE: 0
HOW MANY TIMES IN THE PAST YEAR HAVE YOU HAD 5 OR MORE DRINKS IN A DAY: 0
TOTAL SCORE: 0
EVER FELT BAD OR GUILTY ABOUT YOUR DRINKING: NO

## 2021-04-02 ASSESSMENT — PATIENT HEALTH QUESTIONNAIRE - PHQ9
1. LITTLE INTEREST OR PLEASURE IN DOING THINGS: NOT AT ALL
SUM OF ALL RESPONSES TO PHQ9 QUESTIONS 1 AND 2: 0
2. FEELING DOWN, DEPRESSED, IRRITABLE, OR HOPELESS: NOT AT ALL

## 2021-04-02 ASSESSMENT — ENCOUNTER SYMPTOMS
BACK PAIN: 0
DIARRHEA: 0
SHORTNESS OF BREATH: 0
CONSTIPATION: 0
HEADACHES: 0
ABDOMINAL PAIN: 0
NAUSEA: 0
NERVOUS/ANXIOUS: 0
VOMITING: 0
DIZZINESS: 0
FEVER: 0
COUGH: 0
INSOMNIA: 0
PALPITATIONS: 0

## 2021-04-02 ASSESSMENT — COGNITIVE AND FUNCTIONAL STATUS - GENERAL
SUGGESTED CMS G CODE MODIFIER MOBILITY: CH
SUGGESTED CMS G CODE MODIFIER DAILY ACTIVITY: CH
DAILY ACTIVITIY SCORE: 24
MOBILITY SCORE: 24

## 2021-04-02 ASSESSMENT — FIBROSIS 4 INDEX
FIB4 SCORE: 2.75
FIB4 SCORE: 2.38

## 2021-04-02 NOTE — ED TRIAGE NOTES
"Pt is accompanied by family.  She presents complaining of dizziness with a syncopal episode experienced at approximately 1215 this afternoon, and lasting for a minute.    She has a BIO-TEL heart monitor in place.  She is followed by cardiology for recurrence of lightheadedness.   Chief Complaint   Patient presents with   • Dizziness   • Syncope     BP (!) 167/64   Pulse 84   Temp 36.1 °C (97 °F) (Temporal)   Resp 20   Ht 1.6 m (5' 3\")   Wt 61 kg (134 lb 7.7 oz)   SpO2 94%   BMI 23.82 kg/m²      "

## 2021-04-02 NOTE — ED PROVIDER NOTES
ED Provider Note    CHIEF COMPLAINT  Chief Complaint   Patient presents with   • Dizziness   • Syncope       HPI  Yoli Carmichael is a 87 y.o. female who presents with a syncopal event while she was speaking with her grandson.  She states that she felt flushed and lost postural tone.  She does not recall what happened next.  She reportedly passed out for seconds and her grandson caught her before she fell to the ground so she did not sustain any trauma.  She was able to walk weekly with support inside the home after this event.    She notes that over the past several months she has had intermittent flushing episodes and lightheadedness though never syncope.  She has been seen by Dr. Velasquez from cardiology for this and has an event recorder in place.  She is supposed to have it interpreted on Sunday.    No chest pain or trouble breathing.  No headache.  Feels generally weak though no focal weakness.  No vomiting or diarrhea recently.  No bloody stool or black stool.  No fevers or recent illness.    Incidentally, she is being followed by an  ophthalmologist/retina specialist and being treated with steroid eyedrops for as of yet undiagnosed retinopathy.  She notes that vision has improved with eyedrops.  Again, denies any headaches or neck pain.  Denies any focal numbness or weakness.    REVIEW OF SYSTEMS  See HPI for further details. All other systems are negative.     PAST MEDICAL HISTORY   has a past medical history of Arrhythmia, Arthritis, Bronchitis, CATARACT, Dry eyes, bilateral (3/27/2014), Osteopenia of the elderly (3/27/2014), and Pneumonia.    SOCIAL HISTORY  Social History     Tobacco Use   • Smoking status: Never Smoker   • Smokeless tobacco: Never Used   • Tobacco comment: Pt exposed to second hand smoker   Substance and Sexual Activity   • Alcohol use: Yes     Comment: Occasionally   • Drug use: No   • Sexual activity: Never       SURGICAL HISTORY   has a past surgical history that includes knee  "arthroplasty total (9/3/2013) and tonsillectomy.    CURRENT MEDICATIONS  Home Medications     Reviewed by Matthew Ayers (Pharmacy Tech) on 04/02/21 at 1628  Med List Status: Complete   Medication Last Dose Status   B Complex-C (SUPER B COMPLEX PO) 4/1/2021 Active   Cholecalciferol (VITAMIN D-3) 125 MCG (5000 UT) Tab 4/1/2021 Active   Coenzyme Q10 (COQ10 PO) 3/31/2021 Active   cyclosporin (RESTASIS) 0.05 % ophthalmic emulsion prn Active   escitalopram (LEXAPRO) 5 MG tablet 4/1/2021 Active   furosemide (LASIX) 40 MG Tab prn Active   Glycerin-Polysorbate 80 (REFRESH DRY EYE THERAPY) 1-1 % SOLN prn Active   losartan (COZAAR) 25 MG Tab 4/2/2021 Active   metoprolol SR (TOPROL XL) 50 MG TABLET SR 24 HR 4/1/2021 Active   prednisoLONE acetate (PRED FORTE) 1 % Suspension 4/2/2021 Active   vitamin D (CHOLECALCIFEROL) 1000 Unit (25 mcg) Tab 4/1/2021 Active   Zinc 50 MG Tab 4/1/2021 Active                ALLERGIES  Allergies   Allergen Reactions   • Other Environmental      Seasonal allergies       PHYSICAL EXAM  VITAL SIGNS: BP (!) 167/64   Pulse 84   Temp 36.1 °C (97 °F) (Temporal)   Resp 20   Ht 1.6 m (5' 3\")   Wt 61 kg (134 lb 7.7 oz)   SpO2 94%   BMI 23.82 kg/m²   Pulse ox interpretation: I interpret this pulse ox as normal.  Constitutional: Alert in no apparent distress.  HENT: No signs of trauma, Bilateral external ears normal, Nose normal.   Eyes: Pupils are equal and reactive, Conjunctiva normal, Non-icteric.   Neck: Normal range of motion, No tenderness, Supple, No stridor.   Cardiovascular: Regular rate and rhythm.  Systolic murmur.  Thorax & Lungs: Normal breath sounds, No respiratory distress, No wheezing, No chest tenderness.   Abdomen: Bowel sounds normal, Soft, No tenderness, No masses, No pulsatile masses. No peritoneal signs.  Skin: Warm, Dry, No erythema, No rash.   Back: No bony tenderness, No CVA tenderness.   Extremities: Intact distal pulses, No edema, No tenderness, No " cyanosis  Musculoskeletal: Good range of motion in all major joints. No tenderness to palpation or major deformities noted.   Neurologic: Alert, cranial nerves II through XII grossly intact, normal motor function and gait, Normal sensory function, No focal deficits noted.         DIAGNOSTIC STUDIES / PROCEDURES    EKG - Physician interpretation  Results for orders placed or performed during the hospital encounter of 21   EKG   Result Value Ref Range    Report       [facility] Emergency Dept.    Test Date:  2021  Pt Name:    LARRY BAILEY               Department: [facility]  MRN:        [id number]                      Room:  Gender:     Female                       Technician: ROGELIO  :        [id number]                   Requested By:ER TRIAGE PROTOCOL  Order #:    [id number]                    Reading MD: JOE SALDIVAR MD    Measurements  Intervals                                Axis  Rate:       74                           P:          42  HI:         206                          QRS:        60  QRSD:       78                           T:          81  QT:         392  QTc:        435    Interpretive Statements  Sinus rhythm  Compared to ECG 2015 14:30:20  Electronically Signed On 2021 15:38:51 PDT by JOE SALDIVAR MD         LABS  Labs Reviewed   CBC WITH DIFFERENTIAL - Abnormal; Notable for the following components:       Result Value    MCHC 32.4 (*)     Lymphocytes 18.90 (*)     All other components within normal limits   COMP METABOLIC PANEL - Abnormal; Notable for the following components:    Glucose 158 (*)     Bun 28 (*)     Creatinine 1.81 (*)     All other components within normal limits   TROPONIN - Abnormal; Notable for the following components:    Troponin T 20 (*)     All other components within normal limits   ESTIMATED GFR - Abnormal; Notable for the following components:    GFR If  32 (*)     GFR If Non  26 (*)     All other  components within normal limits   MAGNESIUM   URINALYSIS   SARS-COV-2, PCR (IN-HOUSE)    Narrative:     Have you been in close contact with a person who is suspected  or known to be positive for COVID-19 within the last 30 days  (e.g. last seen that person < 30 days ago)->Unknown         RADIOLOGY  No orders to display         COURSE & MEDICAL DECISION MAKING    Medications   lactated ringers infusion (BOLUS) (has no administration in time range)       Pertinent Labs & Imaging studies reviewed. (See chart for details)  87 y.o. female presenting after a syncopal event.  She was caught prior to falling to the ground.  She does not recall all of the events.  No reported seizure activity.  Syncopal event lasted seconds.  She did have a prodrome where she felt flushed.  She has been monitored with an event recorder for recurrent flushing sensation and lightheadedness though never syncope.  Her cardiologist is Dr. Velasquez.   Spoke with Dr. Blanco on call for cardiology he will look into the event recorder to see if there was an arrhythmia to explain the patient's syncopal event today.    Spoke with the hospitalist who is agreeable to the hospitalization for further work-up of syncope.  No obvious suggestion of subarachnoid hemorrhage and no significant risk factors for pulmonary embolus.  No chest pain to suggest ACS or dissection.  No history to suggest GI bleed and no evidence of anemia.  No nausea, vomiting, diarrhea however the patient does have BRIAN concerning for possible prerenal cause as the patient does not have prior history of renal failure.  Urinalysis is unremarkable, no proteinuria or evidence of infection.  She was given a small fluid bolus to see if this should help with her renal function.    HYDRATION: Based on the patient's presentation of Acute Kindney Injury and Dehydration the patient was given IV fluids. IV Hydration was used because oral hydration was not as rapid as required. Upon recheck following  "hydration, the patient was stable.      /44   Pulse 72   Temp 36.1 °C (97 °F) (Temporal)   Resp (!) 25   Ht 1.6 m (5' 3\")   Wt 61 kg (134 lb 7.7 oz)   SpO2 93%   BMI 23.82 kg/m²       FINAL IMPRESSION  1. Syncope, unspecified syncope type    2. BRIAN (acute kidney injury) (HCC)            Electronically signed by: Skyler Hassan M.D., 4/2/2021 3:41 PM    "

## 2021-04-02 NOTE — CONSULTS
Asked by Dr. Hassan to evaluate the patient with Biotel but noted syncope today we checked in with Biotel and she has not had any significant arrhythmia today longest pause noted so far has been 2 seconds    Her syncope was unlikely due to rhythm issues would do standard work-up for her age and she can complete the Biotel for formal assessment.    Please call cardiology for any concerning rhythm changes while you are on observation    It is my pleasure to participate in the care of Ms. Carmichael.  Please do not hesitate to contact me with questions or concerns.    Margarito Blanco MD PhD [facility]  Cardiologist [facility]    Please note that this dictation was created using voice recognition software. There may be errors I did not discover before finalizing the note.     4/2/2021  4:48 PM

## 2021-04-02 NOTE — ED NOTES
Pharmacy Medication Reconciliation      Medication reconciliation updated and complete per pt at bedside  Allergies have been verified and updated   No oral ABX within the last 14 days  [facility]

## 2021-04-03 ENCOUNTER — APPOINTMENT (OUTPATIENT)
Dept: RADIOLOGY | Facility: MEDICAL CENTER | Age: 86
DRG: 641 | End: 2021-04-03
Attending: FAMILY MEDICINE
Payer: MEDICARE

## 2021-04-03 ENCOUNTER — APPOINTMENT (OUTPATIENT)
Dept: RADIOLOGY | Facility: MEDICAL CENTER | Age: 86
DRG: 641 | End: 2021-04-03
Attending: INTERNAL MEDICINE
Payer: MEDICARE

## 2021-04-03 ENCOUNTER — APPOINTMENT (OUTPATIENT)
Dept: CARDIOLOGY | Facility: MEDICAL CENTER | Age: 86
DRG: 641 | End: 2021-04-03
Attending: INTERNAL MEDICINE
Payer: MEDICARE

## 2021-04-03 LAB
ALBUMIN SERPL BCP-MCNC: 3.6 G/DL (ref 3.2–4.9)
BUN SERPL-MCNC: 28 MG/DL (ref 8–22)
CALCIUM SERPL-MCNC: 9.3 MG/DL (ref 8.4–10.2)
CHLORIDE SERPL-SCNC: 107 MMOL/L (ref 96–112)
CO2 SERPL-SCNC: 19 MMOL/L (ref 20–33)
CREAT SERPL-MCNC: 1.63 MG/DL (ref 0.5–1.4)
D DIMER PPP IA.FEU-MCNC: 0.82 UG/ML (FEU) (ref 0–0.5)
ERYTHROCYTE [DISTWIDTH] IN BLOOD BY AUTOMATED COUNT: 47.4 FL (ref 35.9–50)
EST. AVERAGE GLUCOSE BLD GHB EST-MCNC: 123 MG/DL
GLUCOSE SERPL-MCNC: 103 MG/DL (ref 65–99)
HBA1C MFR BLD: 5.9 % (ref 4–5.6)
HCT VFR BLD AUTO: 35.3 % (ref 37–47)
HGB BLD-MCNC: 11.2 G/DL (ref 12–16)
LV EJECT FRACT  99904: 75
LV EJECT FRACT MOD 2C 99903: 79.59
LV EJECT FRACT MOD 4C 99902: 82.25
LV EJECT FRACT MOD BP 99901: 80.53
MAGNESIUM SERPL-MCNC: 2.2 MG/DL (ref 1.5–2.5)
MCH RBC QN AUTO: 27.8 PG (ref 27–33)
MCHC RBC AUTO-ENTMCNC: 31.7 G/DL (ref 33.6–35)
MCV RBC AUTO: 87.6 FL (ref 81.4–97.8)
PHOSPHATE SERPL-MCNC: 3.3 MG/DL (ref 2.5–4.5)
PLATELET # BLD AUTO: 214 K/UL (ref 164–446)
PMV BLD AUTO: 10.8 FL (ref 9–12.9)
POTASSIUM SERPL-SCNC: 4.3 MMOL/L (ref 3.6–5.5)
RBC # BLD AUTO: 4.03 M/UL (ref 4.2–5.4)
SARS-COV-2 RNA RESP QL NAA+PROBE: NOTDETECTED
SODIUM SERPL-SCNC: 139 MMOL/L (ref 135–145)
SPECIMEN SOURCE: NORMAL
WBC # BLD AUTO: 10.4 K/UL (ref 4.8–10.8)

## 2021-04-03 PROCEDURE — 700102 HCHG RX REV CODE 250 W/ 637 OVERRIDE(OP): Performed by: INTERNAL MEDICINE

## 2021-04-03 PROCEDURE — A9270 NON-COVERED ITEM OR SERVICE: HCPCS | Performed by: FAMILY MEDICINE

## 2021-04-03 PROCEDURE — 80069 RENAL FUNCTION PANEL: CPT

## 2021-04-03 PROCEDURE — A9270 NON-COVERED ITEM OR SERVICE: HCPCS | Performed by: INTERNAL MEDICINE

## 2021-04-03 PROCEDURE — 93306 TTE W/DOPPLER COMPLETE: CPT | Mod: 26 | Performed by: INTERNAL MEDICINE

## 2021-04-03 PROCEDURE — 76536 US EXAM OF HEAD AND NECK: CPT

## 2021-04-03 PROCEDURE — 83735 ASSAY OF MAGNESIUM: CPT

## 2021-04-03 PROCEDURE — 770020 HCHG ROOM/CARE - TELE (206)

## 2021-04-03 PROCEDURE — 700111 HCHG RX REV CODE 636 W/ 250 OVERRIDE (IP): Performed by: INTERNAL MEDICINE

## 2021-04-03 PROCEDURE — 85379 FIBRIN DEGRADATION QUANT: CPT

## 2021-04-03 PROCEDURE — 96372 THER/PROPH/DIAG INJ SC/IM: CPT

## 2021-04-03 PROCEDURE — 85027 COMPLETE CBC AUTOMATED: CPT

## 2021-04-03 PROCEDURE — 99232 SBSQ HOSP IP/OBS MODERATE 35: CPT | Performed by: FAMILY MEDICINE

## 2021-04-03 PROCEDURE — 71045 X-RAY EXAM CHEST 1 VIEW: CPT

## 2021-04-03 PROCEDURE — 93306 TTE W/DOPPLER COMPLETE: CPT

## 2021-04-03 PROCEDURE — 700102 HCHG RX REV CODE 250 W/ 637 OVERRIDE(OP): Performed by: FAMILY MEDICINE

## 2021-04-03 PROCEDURE — 700105 HCHG RX REV CODE 258: Performed by: FAMILY MEDICINE

## 2021-04-03 PROCEDURE — 97165 OT EVAL LOW COMPLEX 30 MIN: CPT

## 2021-04-03 PROCEDURE — 93880 EXTRACRANIAL BILAT STUDY: CPT

## 2021-04-03 PROCEDURE — 97161 PT EVAL LOW COMPLEX 20 MIN: CPT

## 2021-04-03 RX ORDER — SODIUM CHLORIDE 9 MG/ML
INJECTION, SOLUTION INTRAVENOUS CONTINUOUS
Status: DISCONTINUED | OUTPATIENT
Start: 2021-04-03 | End: 2021-04-04 | Stop reason: HOSPADM

## 2021-04-03 RX ORDER — ATORVASTATIN CALCIUM 20 MG/1
20 TABLET, FILM COATED ORAL EVERY EVENING
Status: DISCONTINUED | OUTPATIENT
Start: 2021-04-03 | End: 2021-04-04 | Stop reason: HOSPADM

## 2021-04-03 RX ADMIN — METOPROLOL SUCCINATE 50 MG: 25 TABLET, EXTENDED RELEASE ORAL at 23:16

## 2021-04-03 RX ADMIN — SODIUM CHLORIDE: 9 INJECTION, SOLUTION INTRAVENOUS at 11:14

## 2021-04-03 RX ADMIN — ENOXAPARIN SODIUM 40 MG: 40 INJECTION SUBCUTANEOUS at 05:34

## 2021-04-03 RX ADMIN — ESCITALOPRAM OXALATE 5 MG: 10 TABLET ORAL at 05:32

## 2021-04-03 RX ADMIN — ATORVASTATIN CALCIUM 20 MG: 20 TABLET, FILM COATED ORAL at 17:37

## 2021-04-03 RX ADMIN — METOPROLOL SUCCINATE 50 MG: 25 TABLET, EXTENDED RELEASE ORAL at 00:04

## 2021-04-03 RX ADMIN — SODIUM CHLORIDE: 9 INJECTION, SOLUTION INTRAVENOUS at 23:18

## 2021-04-03 ASSESSMENT — COGNITIVE AND FUNCTIONAL STATUS - GENERAL
HELP NEEDED FOR BATHING: A LITTLE
MOBILITY SCORE: 24
DAILY ACTIVITIY SCORE: 23
SUGGESTED CMS G CODE MODIFIER DAILY ACTIVITY: CI
SUGGESTED CMS G CODE MODIFIER MOBILITY: CH

## 2021-04-03 ASSESSMENT — GAIT ASSESSMENTS
DEVIATION: STEP TO
GAIT LEVEL OF ASSIST: SUPERVISED
DISTANCE (FEET): 200

## 2021-04-03 ASSESSMENT — ENCOUNTER SYMPTOMS
FEVER: 0
SHORTNESS OF BREATH: 0
COUGH: 0
DIZZINESS: 0
FOCAL WEAKNESS: 0
CHILLS: 0

## 2021-04-03 ASSESSMENT — FIBROSIS 4 INDEX: FIB4 SCORE: 2.57

## 2021-04-03 ASSESSMENT — ACTIVITIES OF DAILY LIVING (ADL): TOILETING: INDEPENDENT

## 2021-04-03 NOTE — PROGRESS NOTES
2 RN skin check completed with Shonda CANO.  Devices in place (telemetry monitor, PIV, personal heart monitor).  Skin assessed under devices (intact).  Confirmed pressure ulcers found on (none).  New potential pressure ulcers noted on (none). Wound consult placed (not needed).  The following interventions in place: pt turns self and has pillows for support/positioning.    Additional: Bilateral feet dryness, lotion provided. Skin overall intact, no abnormalities to note.

## 2021-04-03 NOTE — ASSESSMENT & PLAN NOTE
- Witnessed complete syncope short duration, no trauma as grandson was able to catch the patient before hitting the ground.   -Patient was given a cardiac event monitor due to these near syncopal events - nothing seen on monitor   -Hold losartan and Lasix - pt states she hasn't taken her Lasix in years  - Echo with LVOT obstruction - discussed with Dr Dailey, not true HOCM but would cause syncope in a volume depleted state  - Pt states she barely drinks any water, does not like the taste.  We discussed the absolute need of avoiding dehydration as it will cause recurrent syncope with her LVOT obstruction.   - COntinue IVFs, renal function is slowly improving, pt appears euvolemic on exam. 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  Can discuss with Cardiology at her next follow up. ASA/Lipitor at discharge.

## 2021-04-03 NOTE — CARE PLAN
Problem: Communication  Goal: The ability to communicate needs accurately and effectively will improve  Outcome: PROGRESSING AS EXPECTED     Problem: Safety  Goal: Will remain free from injury  Outcome: PROGRESSING AS EXPECTED  Goal: Will remain free from falls  Outcome: PROGRESSING AS EXPECTED     Problem: Venous Thromboembolism (VTW)/Deep Vein Thrombosis (DVT) Prevention:  Goal: Patient will participate in Venous Thrombosis (VTE)/Deep Vein Thrombosis (DVT)Prevention Measures  Outcome: PROGRESSING AS EXPECTED     Problem: Knowledge Deficit  Goal: Knowledge of disease process/condition, treatment plan, diagnostic tests, and medications will improve  Outcome: PROGRESSING AS EXPECTED

## 2021-04-03 NOTE — ASSESSMENT & PLAN NOTE
Patient noted to be on losartan and metoprolol at home  -We will hold losartan due to acute renal failure  -Continue metoprolol to prevent any rebound tachycardia  - Add Norvasc as renal function not completely normalized, will not discharge with her home Losartan

## 2021-04-03 NOTE — THERAPY
Physical Therapy   Initial Evaluation     Patient Name: Yoli Carmichael  Age:  87 y.o., Sex:  female  Medical Record #: [id number]  Today's Date: 4/3/2021          Assessment  Patient is 87 y.o. female with a diagnosis of syncope Pt lives at home alone but will be staying with daughter for a while.Pt is safe with bed mob,transfers,ambulation and stairs,she has no equipment needs     Plan    Recommend Physical Therapy for Evaluation only      04/03/21 1100   Prior Living Situation   Prior Services None   Housing / Facility 1 Story House   Steps Into Home 1   Steps In Home 0   Equipment Owned None   Prior Level of Functional Mobility   Bed Mobility Independent   Transfer Status Independent   Ambulation Independent   Distance Ambulation (Feet)   (1/2 block)   Assistive Devices Used None   Stairs Independent   Balance Assessment   Sitting Balance (Static) Good   Sitting Balance (Dynamic) Good   Standing Balance (Static) Good   Standing Balance (Dynamic) Fair +   Weight Shift Sitting Good   Weight Shift Standing Good   Gait Analysis   Gait Level Of Assist Supervised   Assistive Device None   Distance (Feet) 200   # of Times Distance was Traveled 1   Deviation Step To   # of Stairs Climbed 2   Level of Assist with Stairs Supervised   Weight Bearing Status full   Functional Mobility   Sit to Stand Supervised   Bed, Chair, Wheelchair Transfer Supervised   Transfer Method Stand Step   Activity Tolerance   Sitting Edge of Bed 10   Standing 10   Patient / Family Goals    Patient / Family Goal #1 Home   Anticipated Discharge Equipment and Recommendations   DC Equipment Recommendations None   Discharge Recommendations Anticipate that the patient will have no further physical therapy needs after discharge from the hospital       DC Equipment Recommendations: (P) None  Discharge Recommendations: (P) Anticipate that the patient will have no further physical therapy needs after discharge from the hospital

## 2021-04-03 NOTE — PROGRESS NOTES
Assumed care of patient, bedside report complete with RACHAEL Connolly.  Patient resting in bed, no voiced needs at this time.  Safety precautions in place.

## 2021-04-03 NOTE — PROGRESS NOTES
Patient requested her night time dose of metoprolol. Dose was scheduled for 0600. Spoke to Manda in pharmacy and medication rescheduled to now 4/3/2021 0000.

## 2021-04-03 NOTE — PROGRESS NOTES
Telemetry Shift Summary     Rhythm SR with 1st degree AVB  HR Range 60s-70s  Ectopy rare PACs, rare PVCs  Measurement .32/.08/.42        Normal Values  Rhythm SR  HR Range    Measurements 0.12-0.20 / 0.06-0.10  / 0.30-0.52

## 2021-04-03 NOTE — H&P
Hospital Medicine History & Physical Note    Date of Service  4/2/2021    Primary Care Physician  Juan Santana M.D.    Consultants  Dr. Blanco cardiology    Code Status  Full Code    Chief Complaint  Chief Complaint   Patient presents with   • Dizziness   • Syncope       History of Presenting Illness  87F Pmh arrhythmia, arthritis, bronchitis, prior near syncope admitted 4/2/2021 Dizziness and syncopal episode at 1215.  Patient stated she was outside with her grandson, leaning on the side of his car when she began to feel flushed and started to slip down.  Grandson was able to catch the patient before she fell down.  Patient mentioned for a brief moment she could not recall the events likely passed out.  Patient does remember her grandson helping her into the house.  Feels generalized weakness.  After the event the patient denied any incontinence urinary or fecal, denied any confusion, denied any tongue biting.    Patient has event recorder from Dr. Velasquez for history of near syncope events.  ED spoke with Dr. Blacno, left note patient's [employer] event recorder, he noted there was no significant arrhythmia today and longest pause was 2 seconds, syncope unlikely due to any rhythm issues.  EDP ordered  mL 1 time.    Review of Systems  Review of Systems   Constitutional: Negative for fever and malaise/fatigue.   Respiratory: Negative for cough and shortness of breath.    Cardiovascular: Negative for chest pain and palpitations.   Gastrointestinal: Negative for abdominal pain, constipation, diarrhea, nausea and vomiting.   Musculoskeletal: Negative for back pain and joint pain.   Neurological: Negative for dizziness and headaches.   Psychiatric/Behavioral: The patient is not nervous/anxious and does not have insomnia.        Past Medical History   has a past medical history of Arrhythmia, Arthritis, Bronchitis, CATARACT, Dry eyes, bilateral (3/27/2014), Osteopenia of the elderly (3/27/2014), and  Pneumonia.    Surgical History   has a past surgical history that includes knee arthroplasty total (9/3/2013) and tonsillectomy.     Family History  family history includes Cancer in her brother, daughter, daughter, and maternal aunt; Heart Attack in her father; Heart Disease (age of onset: 54) in her father; Hypertension in her maternal aunt; Lung Disease in her father and mother; Seizures in her daughter.     Social History   reports that she has never smoked. She has never used smokeless tobacco. She reports current alcohol use. She reports that she does not use drugs.    Allergies  Allergies   Allergen Reactions   • Other Environmental      Seasonal allergies       Medications  Prior to Admission Medications   Prescriptions Last Dose Informant Patient Reported? Taking?   B Complex-C (SUPER B COMPLEX PO) 4/1/2021 at am Patient Yes No   Sig: Take 1 tablet by mouth every morning.   Cholecalciferol (VITAMIN D-3) 125 MCG (5000 UT) Tab 4/1/2021 at am Patient Yes Yes   Sig: Take 5,000 Units by mouth every day. 5000 unit tablet + 1000 unit tablet for a total of 6000 units daily   Coenzyme Q10 (COQ10 PO) 3/31/2021 at am Patient Yes Yes   Sig: Take 1 capsule by mouth two times a week. Sunday & Wednesday   Glycerin-Polysorbate 80 (REFRESH DRY EYE THERAPY) 1-1 % SOLN prn at prn Patient Yes No   Sig: Administer 1 Drop into affected eye(s) 4 times a day as needed (dry eye).   Zinc 50 MG Tab 4/1/2021 at am Patient Yes Yes   Sig: Take 50 mg by mouth every day.   cyclosporin (RESTASIS) 0.05 % ophthalmic emulsion prn at prn Patient Yes No   Sig: Administer 1 Drop into affected eye(s) 2 times a day as needed (dry eye).   escitalopram (LEXAPRO) 5 MG tablet 4/1/2021 at am Patient No No   Sig: Take 1 Tab by mouth every day.   furosemide (LASIX) 40 MG Tab prn at prn Patient No No   Sig: Take 1 Tab by mouth as needed. For ankle swelling.   Patient taking differently: Take 40 mg by mouth 1 time a day as needed. For ankle swelling.    losartan (COZAAR) 25 MG Tab 4/2/2021 at 0930 Patient No No   Sig: TAKE 1/2 TABLET BY MOUTH ONCE DAILY.   metoprolol SR (TOPROL XL) 50 MG TABLET SR 24 HR 4/1/2021 at pm Patient No No   Sig: TAKE ONE TABLET BY MOUTH ONCE DAILY   prednisoLONE acetate (PRED FORTE) 1 % Suspension 4/2/2021 at 1200 Patient Yes No   Sig: Administer 1 Drop into both eyes see administration instructions. 1 drop in left eye four times a day   1 drop in right eye six times a day   vitamin D (CHOLECALCIFEROL) 1000 Unit (25 mcg) Tab 4/1/2021 at am Patient Yes No   Sig: Take 1,000 Units by mouth every day. 1000 unit tablet + 5000 unit tablet for a total of 6000 units daily      Facility-Administered Medications: None       Physical Exam  Temp:  [36.1 °C (97 °F)-36.7 °C (98 °F)] 36.7 °C (98 °F)  Pulse:  [68-84] 74  Resp:  [15-25] 19  BP: (127-167)/(44-84) 157/56  SpO2:  [93 %-96 %] 96 %    Physical Exam  Vitals and nursing note reviewed.   Constitutional:       General: She is not in acute distress.     Appearance: Normal appearance. She is not ill-appearing.   Cardiovascular:      Rate and Rhythm: Normal rate.      Pulses: Normal pulses.           Carotid pulses are on the right side with bruit.     Heart sounds: Murmur (Loudest in apex area) present.   Pulmonary:      Effort: Pulmonary effort is normal. No respiratory distress.      Breath sounds: Normal breath sounds. No wheezing.   Abdominal:      General: Abdomen is flat. Bowel sounds are normal. There is no distension.      Palpations: Abdomen is soft.      Tenderness: There is no abdominal tenderness.   Musculoskeletal:         General: No swelling or tenderness. Normal range of motion.      Right lower leg: Edema (Trace) present.      Left lower leg: Edema (Trace) present.   Skin:     General: Skin is warm.      Capillary Refill: Capillary refill takes less than 2 seconds.      Coloration: Skin is not jaundiced.      Findings: No erythema.   Neurological:      General: No focal deficit  present.      Mental Status: She is alert and oriented to person, place, and time. Mental status is at baseline.      Motor: No weakness.   Psychiatric:         Mood and Affect: Mood normal.         Behavior: Behavior normal.         Thought Content: Thought content normal.         Judgment: Judgment normal.         Laboratory:  Recent Labs     04/02/21  1609   WBC 8.7   RBC 4.40   HEMOGLOBIN 12.3   HEMATOCRIT 38.0   MCV 86.4   MCH 28.0   MCHC 32.4*   RDW 47.2   PLATELETCT 231   MPV 10.1     Recent Labs     04/02/21  1609   SODIUM 139   POTASSIUM 4.0   CHLORIDE 104   CO2 21   GLUCOSE 158*   BUN 28*   CREATININE 1.81*   CALCIUM 9.6     Recent Labs     04/02/21  1609   ALTSGPT 11   ASTSGOT 21   ALKPHOSPHAT 66   TBILIRUBIN 0.4   GLUCOSE 158*         No results for input(s): NTPROBNP in the last 72 hours.      Recent Labs     04/02/21  1609   TROPONINT 20*       Imaging:  EC-ECHOCARDIOGRAM COMPLETE W/O CONT    (Results Pending)   US-CAROTID DOPPLER BILAT    (Results Pending)     Assessment/Plan:  I anticipate this patient is appropriate for observation status at this time.    * Syncope- (present on admission)  Assessment & Plan  Patient had witnessed complete syncope short duration, no trauma as grandson was able to catch the patient before hitting the ground.  Patient is noted to be on losartan and Lasix at home, acute renal failure on admission.  Patient may be severely volume depleted.  Patient's first troponin = 20.  Patient's description of prior near syncopal events, included sitting on her couch.  Patient was given a cardiac event monitor due to these near syncopal events.  Physical exam includes right-sided carotid bruit.  Differential includes vasovagal versus volume depletion, less likely vascular and autonomic dysfunction.  -We will check orthostatic vitals  -Hold losartan and Lasix  -PT/OT  -2D echocardiogram ordered  -Carotid ultrasounds ordered  -A1c, TSH and troponins ordered    Essential hypertension,  benign- (present on admission)  Assessment & Plan  Patient noted to be on losartan Lasix and metoprolol at home  -We will hold losartan Lasix due to acute renal failure  -We will need to restart metoprolol to prevent any rebound tachycardia    Palpitations- (present on admission)  Assessment & Plan  Patient is on event recorder by itself at this time, Dr. Blanco cardiology evaluated and did not find any arrhythmias at this time.  -Continue patient's metoprolol home dose    Acute renal failure (HCC)- (present on admission)  Assessment & Plan  Patient's creatinine is 1.8, highly elevated from her baseline 0.8.  Patient is noted to be on losartan and Lasix at home.  Patient may be severely volume depleted causing her syncope.  -Patient will need IV fluids at this time  -Hold losartan and Lasix  -Orthostatic blood pressures    DVT prophylaxis Lovenox

## 2021-04-03 NOTE — PROGRESS NOTES
[facility] Medicine Daily Progress Note    Date of Service  4/3/2021    Chief Complaint  87 y.o. female admitted 4/2/2021 with syncope    Hospital Course  87F Pmh arrhythmia, arthritis, bronchitis, prior near syncope admitted 4/2/2021 Dizziness and syncopal episode at 1215.  Patient stated she was outside with her grandson, leaning on the side of his car when she began to feel flushed and started to slip down.  Grandson was able to catch the patient before she fell down.  Patient mentioned for a brief moment she could not recall the events likely passed out.  Patient does remember her grandson helping her into the house.  Feels generalized weakness.  After the event the patient denied any incontinence urinary or fecal, denied any confusion, denied any tongue biting.     Patient has event recorder from Dr. Velasquez for history of near syncope events.  ED spoke with Dr. Blanco, left note patient's [employer] event recorder, he noted there was no significant arrhythmia today and longest pause was 2 seconds, syncope unlikely due to any rhythm issues.  EDP ordered  mL 1 time.       Interval Problem Update  4/3/21 - Pt with mild improvement in Cr, no recurrence of symptoms, not orthostatic. Bps stable. States she doesn't drink much water at all as she doesn't prefer the taste. Staying for IVFs today.    Consultants/Specialty  Cardiology    Code Status  Full Code    Disposition  Home in the am    Review of Systems  Review of Systems   Constitutional: Negative for chills and fever.   Respiratory: Negative for cough and shortness of breath.    Cardiovascular: Negative for chest pain and leg swelling.   Neurological: Negative for dizziness and focal weakness.   All other systems reviewed and are negative.       Physical Exam  Temp:  [36.1 °C (97 °F)-36.7 °C (98 °F)] 36.4 °C (97.5 °F)  Pulse:  [64-86] 69  Resp:  [15-25] 16  BP: (125-167)/(44-84) 143/59  SpO2:  [93 %-98 %] 95 %    Physical Exam  Vitals and nursing note reviewed.    Constitutional:       Appearance: Normal appearance.   HENT:      Head: Normocephalic and atraumatic.      Mouth/Throat:      Mouth: Mucous membranes are dry.   Cardiovascular:      Rate and Rhythm: Normal rate and regular rhythm.      Heart sounds: No murmur.   Pulmonary:      Effort: Pulmonary effort is normal. No respiratory distress.      Breath sounds: Normal breath sounds. No wheezing or rales.   Abdominal:      General: Abdomen is flat. Bowel sounds are normal.      Palpations: Abdomen is soft.   Musculoskeletal:         General: No swelling.   Skin:     General: Skin is warm and dry.   Neurological:      General: No focal deficit present.      Mental Status: She is alert and oriented to person, place, and time.   Psychiatric:         Mood and Affect: Mood normal.         Behavior: Behavior normal.         Fluids    Intake/Output Summary (Last 24 hours) at 4/3/2021 1100  Last data filed at 4/3/2021 0800  Gross per 24 hour   Intake 780 ml   Output --   Net 780 ml       Laboratory  Recent Labs     04/02/21  1609 04/03/21  0211   WBC 8.7 10.4   RBC 4.40 4.03*   HEMOGLOBIN 12.3 11.2*   HEMATOCRIT 38.0 35.3*   MCV 86.4 87.6   MCH 28.0 27.8   MCHC 32.4* 31.7*   RDW 47.2 47.4   PLATELETCT 231 214   MPV 10.1 10.8     Recent Labs     04/02/21  1609 04/03/21  0211   SODIUM 139 139   POTASSIUM 4.0 4.3   CHLORIDE 104 107   CO2 21 19*   GLUCOSE 158* 103*   BUN 28* 28*   CREATININE 1.81* 1.63*   CALCIUM 9.6 9.3                   Imaging  EC-ECHOCARDIOGRAM COMPLETE W/O CONT   Final Result      US-CAROTID DOPPLER BILAT    (Results Pending)   DX-CHEST-PORTABLE (1 VIEW)    (Results Pending)        Assessment/Plan  * Syncope- (present on admission)  Assessment & Plan  - Witnessed complete syncope short duration, no trauma as grandson was able to catch the patient before hitting the ground.   -Patient was given a cardiac event monitor due to these near syncopal events.   -Hold losartan and Lasix - pt states she hasn't taken  her Lasix in years  - Echo with LVOT obstruction - discussed with Dr Dailey, not true HOCM but would cause syncope in a volume depleted state  - Pt states she barely drinks any water, does not like the taste.  We discussed the absolute need of avoiding dehydration as it will cause recurrent syncope with her LVOT obstruction.   - Start IVFs, if renal function better in the am, can dc  - Carotids pending  - Urine lytes pending     Essential hypertension, benign- (present on admission)  Assessment & Plan  Patient noted to be on losartan and metoprolol at home  -We will hold losartan due to acute renal failure  -Continue metoprolol to prevent any rebound tachycardia    Palpitations- (present on admission)  Assessment & Plan  Patient is on event recorder by itself at this time, Dr. Blanco cardiology evaluated and did not find any arrhythmias at this time.  -Continue patient's metoprolol home dose    Acute renal failure (HCC)- (present on admission)  Assessment & Plan  Patient's creatinine is 1.6, highly elevated from her baseline 0.8.  Patient is noted to be on losartan at home.    Volume depleted on exam, start IVFs  Encourage PO intake, pt states she drinks very little water       VTE prophylaxis: Renally dosed Lovenox

## 2021-04-03 NOTE — ASSESSMENT & PLAN NOTE
Patient is on event recorder by itself at this time, Dr. Blanco cardiology evaluated and did not find any arrhythmias at this time.  -Continue patient's metoprolol home dose

## 2021-04-03 NOTE — PROGRESS NOTES
Bedside report received from Cristin CANO. Patient is in bed and stable. Bed alarm is on. Bed locked and in lowest position, upper side rails up, call light in reach, whiteboard updated. POC discussed and pt verbalizes understanding.

## 2021-04-03 NOTE — ASSESSMENT & PLAN NOTE
Patient's baseline creatinine appears to be 0.8-1.0, still at 1.53 today  Continue IVFs  Encourage PO intake, pt states she drinks very little water  Urine lytes pending, renal ultrasound pending

## 2021-04-03 NOTE — PROGRESS NOTES
Telemetry Shift Summary     Rhythm Sinus rhythm with first degree AVB  HR Range 69-87 touched down to 46  Ectopy  rare PACs, rare Couplets, 1.3 second pause  Measurements .28/.08/.38              Normal Values  Rhythm SR  HR Range    Measurements 0.12-0.20 / 0.06-0.10  / 0.30-0.52

## 2021-04-03 NOTE — THERAPY
"Occupational Therapy   Initial Evaluation     Patient Name: Yoli Carmichael  Age:  87 y.o., Sex:  female  Medical Record #: [id number]  Today's Date: 4/3/2021          Assessment  Patient is 87 y.o. female admitted following syncopal event. A&Ox4. Shows good balance during ADL's. Performs basic self-cares with Sup. Good safety, good activity tolerance noted. Pt lives alone; indep prior with dtr helping with IADL's. Dtr present, states pt will stay with her upon d/c. No further acute OT needs.    Plan  Recommend Occupational Therapy for Evaluation only for the following treatments:  .  DC Equipment Recommendations: (P) Tub / Shower Seat  Discharge Recommendations: (P) Anticipate that the patient will have no further occupational therapy needs after discharge from the hospital      04/03/21 1129   Prior Living Situation   Prior Services Intermittent Physical Support for ADL Per Family   Housing / Facility 1 Story House   Steps Into Home 1   Steps In Home 0   Bathroom Set up Walk In Shower   Equipment Owned None   Lives with - Patient's Self Care Capacity Alone and Able to Care For Self   Comments Dtr lives ~10\" away; helps with some IADL's since Nov.   Prior Level of ADL Function   Self Feeding Independent   Grooming / Hygiene Independent   Bathing Independent   Dressing Independent   Toileting Independent   Prior Level of IADL Function   Medication Management Independent   Laundry Independent   Kitchen Mobility Independent   Finances Unable To Determine At This Time   Home Management Independent   Shopping Requires Assist   Prior Level Of Mobility Independent Without Device in Home   Driving / Transportation Relatives / Others Provide Transportation   Occupation (Pre-Hospital Vocational) Retired Due To Age   Leisure Interests Hobbies   Balance Assessment   Sitting Balance (Static) Good   Sitting Balance (Dynamic) Good   Standing Balance (Static) Good   Standing Balance (Dynamic) Fair +   Weight Shift Sitting Good "   Weight Shift Standing Good   Bed Mobility    Supine to Sit Modified Independent   ADL Assessment   Eating Independent   Grooming Supervision;Standing   Lower Body Dressing Supervision   Toileting Supervision   Functional Mobility   Sit to Stand Supervised   Bed, Chair, Wheelchair Transfer Supervised   Toilet Transfers Supervised

## 2021-04-04 ENCOUNTER — APPOINTMENT (OUTPATIENT)
Dept: RADIOLOGY | Facility: MEDICAL CENTER | Age: 86
DRG: 641 | End: 2021-04-04
Attending: FAMILY MEDICINE
Payer: MEDICARE

## 2021-04-04 VITALS
TEMPERATURE: 97.7 F | BODY MASS INDEX: 24.24 KG/M2 | HEIGHT: 64 IN | DIASTOLIC BLOOD PRESSURE: 50 MMHG | RESPIRATION RATE: 16 BRPM | OXYGEN SATURATION: 99 % | HEART RATE: 60 BPM | SYSTOLIC BLOOD PRESSURE: 145 MMHG | WEIGHT: 141.98 LBS

## 2021-04-04 PROBLEM — R55 SYNCOPE: Status: RESOLVED | Noted: 2021-04-02 | Resolved: 2021-04-04

## 2021-04-04 PROBLEM — N17.9 ACUTE RENAL FAILURE (HCC): Status: RESOLVED | Noted: 2021-04-02 | Resolved: 2021-04-04

## 2021-04-04 PROBLEM — R00.2 PALPITATIONS: Status: RESOLVED | Noted: 2017-11-10 | Resolved: 2021-04-04

## 2021-04-04 PROBLEM — D64.9 ANEMIA: Status: ACTIVE | Noted: 2021-04-04

## 2021-04-04 LAB
ANION GAP SERPL CALC-SCNC: 9 MMOL/L (ref 7–16)
BASOPHILS # BLD AUTO: 0.9 % (ref 0–1.8)
BASOPHILS # BLD: 0.08 K/UL (ref 0–0.12)
BUN SERPL-MCNC: 26 MG/DL (ref 8–22)
CALCIUM SERPL-MCNC: 8.4 MG/DL (ref 8.4–10.2)
CHLORIDE SERPL-SCNC: 111 MMOL/L (ref 96–112)
CO2 SERPL-SCNC: 19 MMOL/L (ref 20–33)
CREAT SERPL-MCNC: 1.53 MG/DL (ref 0.5–1.4)
CREAT UR-MCNC: 28.92 MG/DL
EOSINOPHIL # BLD AUTO: 0.41 K/UL (ref 0–0.51)
EOSINOPHIL NFR BLD: 4.6 % (ref 0–6.9)
ERYTHROCYTE [DISTWIDTH] IN BLOOD BY AUTOMATED COUNT: 47.7 FL (ref 35.9–50)
FERRITIN SERPL-MCNC: 146 NG/ML (ref 10–291)
FOLATE SERPL-MCNC: 15.2 NG/ML
GLUCOSE SERPL-MCNC: 91 MG/DL (ref 65–99)
HCT VFR BLD AUTO: 31.8 % (ref 37–47)
HGB BLD-MCNC: 9.9 G/DL (ref 12–16)
IMM GRANULOCYTES # BLD AUTO: 0.04 K/UL (ref 0–0.11)
IMM GRANULOCYTES NFR BLD AUTO: 0.4 % (ref 0–0.9)
IRON SATN MFR SERPL: 28 % (ref 15–55)
IRON SERPL-MCNC: 54 UG/DL (ref 40–170)
LYMPHOCYTES # BLD AUTO: 2.55 K/UL (ref 1–4.8)
LYMPHOCYTES NFR BLD: 28.6 % (ref 22–41)
MCH RBC QN AUTO: 27.3 PG (ref 27–33)
MCHC RBC AUTO-ENTMCNC: 31.1 G/DL (ref 33.6–35)
MCV RBC AUTO: 87.6 FL (ref 81.4–97.8)
MONOCYTES # BLD AUTO: 1.11 K/UL (ref 0–0.85)
MONOCYTES NFR BLD AUTO: 12.4 % (ref 0–13.4)
NEUTROPHILS # BLD AUTO: 4.73 K/UL (ref 2–7.15)
NEUTROPHILS NFR BLD: 53.1 % (ref 44–72)
NRBC # BLD AUTO: 0 K/UL
NRBC BLD-RTO: 0 /100 WBC
PLATELET # BLD AUTO: 189 K/UL (ref 164–446)
PMV BLD AUTO: 10.6 FL (ref 9–12.9)
POTASSIUM SERPL-SCNC: 4 MMOL/L (ref 3.6–5.5)
RBC # BLD AUTO: 3.63 M/UL (ref 4.2–5.4)
SODIUM SERPL-SCNC: 139 MMOL/L (ref 135–145)
SODIUM UR-SCNC: 66 MMOL/L
TIBC SERPL-MCNC: 194 UG/DL (ref 250–450)
UIBC SERPL-MCNC: 140 UG/DL (ref 110–370)
VIT B12 SERPL-MCNC: 623 PG/ML (ref 211–911)
WBC # BLD AUTO: 8.9 K/UL (ref 4.8–10.8)

## 2021-04-04 PROCEDURE — A9270 NON-COVERED ITEM OR SERVICE: HCPCS | Performed by: INTERNAL MEDICINE

## 2021-04-04 PROCEDURE — A9270 NON-COVERED ITEM OR SERVICE: HCPCS | Performed by: FAMILY MEDICINE

## 2021-04-04 PROCEDURE — 83540 ASSAY OF IRON: CPT

## 2021-04-04 PROCEDURE — 700111 HCHG RX REV CODE 636 W/ 250 OVERRIDE (IP): Performed by: FAMILY MEDICINE

## 2021-04-04 PROCEDURE — 700102 HCHG RX REV CODE 250 W/ 637 OVERRIDE(OP): Performed by: INTERNAL MEDICINE

## 2021-04-04 PROCEDURE — 82607 VITAMIN B-12: CPT

## 2021-04-04 PROCEDURE — 82728 ASSAY OF FERRITIN: CPT

## 2021-04-04 PROCEDURE — 85025 COMPLETE CBC W/AUTO DIFF WBC: CPT

## 2021-04-04 PROCEDURE — 700102 HCHG RX REV CODE 250 W/ 637 OVERRIDE(OP): Performed by: FAMILY MEDICINE

## 2021-04-04 PROCEDURE — 82570 ASSAY OF URINE CREATININE: CPT

## 2021-04-04 PROCEDURE — 84300 ASSAY OF URINE SODIUM: CPT

## 2021-04-04 PROCEDURE — 83550 IRON BINDING TEST: CPT

## 2021-04-04 PROCEDURE — 76775 US EXAM ABDO BACK WALL LIM: CPT

## 2021-04-04 PROCEDURE — 82746 ASSAY OF FOLIC ACID SERUM: CPT

## 2021-04-04 PROCEDURE — 99239 HOSP IP/OBS DSCHRG MGMT >30: CPT | Performed by: FAMILY MEDICINE

## 2021-04-04 PROCEDURE — 700105 HCHG RX REV CODE 258: Performed by: FAMILY MEDICINE

## 2021-04-04 PROCEDURE — 80048 BASIC METABOLIC PNL TOTAL CA: CPT

## 2021-04-04 RX ORDER — AMLODIPINE BESYLATE 5 MG/1
5 TABLET ORAL
Status: DISCONTINUED | OUTPATIENT
Start: 2021-04-04 | End: 2021-04-04 | Stop reason: HOSPADM

## 2021-04-04 RX ORDER — AMLODIPINE BESYLATE 5 MG/1
5 TABLET ORAL DAILY
Qty: 30 TABLET | Refills: 0 | Status: SHIPPED | OUTPATIENT
Start: 2021-04-05 | End: 2021-04-30

## 2021-04-04 RX ORDER — ATORVASTATIN CALCIUM 20 MG/1
20 TABLET, FILM COATED ORAL EVERY EVENING
Qty: 30 TABLET | Refills: 0 | Status: SHIPPED | OUTPATIENT
Start: 2021-04-04 | End: 2021-05-03

## 2021-04-04 RX ADMIN — ATORVASTATIN CALCIUM 20 MG: 20 TABLET, FILM COATED ORAL at 16:38

## 2021-04-04 RX ADMIN — ENOXAPARIN SODIUM 30 MG: 30 INJECTION SUBCUTANEOUS at 05:36

## 2021-04-04 RX ADMIN — ESCITALOPRAM OXALATE 5 MG: 10 TABLET ORAL at 05:37

## 2021-04-04 RX ADMIN — AMLODIPINE BESYLATE 5 MG: 5 TABLET ORAL at 09:17

## 2021-04-04 RX ADMIN — SODIUM CHLORIDE: 9 INJECTION, SOLUTION INTRAVENOUS at 09:03

## 2021-04-04 ASSESSMENT — PAIN DESCRIPTION - PAIN TYPE
TYPE: ACUTE PAIN

## 2021-04-04 ASSESSMENT — ENCOUNTER SYMPTOMS
FEVER: 0
CHILLS: 0
SHORTNESS OF BREATH: 0
DIZZINESS: 0
COUGH: 0
FOCAL WEAKNESS: 0

## 2021-04-04 ASSESSMENT — FIBROSIS 4 INDEX: FIB4 SCORE: 2.91

## 2021-04-04 NOTE — PROGRESS NOTES
Bedside report received from Marissa CANO. Patient is in bed and stable. Bed locked and in lowest position, upper side rails up, call light in reach, whiteboard updated. POC discussed and pt verbalizes understanding.

## 2021-04-04 NOTE — PROGRESS NOTES
[facility] Medicine Daily Progress Note    Date of Service  4/4/2021    Chief Complaint  87 y.o. female admitted 4/2/2021 with syncope    Hospital Course  87F Pmh arrhythmia, arthritis, bronchitis, prior near syncope admitted 4/2/2021 Dizziness and syncopal episode at 1215.  Patient stated she was outside with her grandson, leaning on the side of his car when she began to feel flushed and started to slip down.  Grandson was able to catch the patient before she fell down.  Patient mentioned for a brief moment she could not recall the events likely passed out.  Patient does remember her grandson helping her into the house.  Feels generalized weakness.  After the event the patient denied any incontinence urinary or fecal, denied any confusion, denied any tongue biting.     Patient has event recorder from Dr. Velasquez for history of near syncope events.  ED spoke with Dr. Blanco, left note patient's [employer] event recorder, he noted there was no significant arrhythmia today and longest pause was 2 seconds, syncope unlikely due to any rhythm issues.  EDP ordered  mL 1 time.       Interval Problem Update  4/3/21 - Pt with mild improvement in Cr, no recurrence of symptoms, not orthostatic. Bps stable. States she doesn't drink much water at all as she doesn't prefer the taste. Staying for IVFs today.    4/4 - Mild improvement in creatinine, pt states she is urinating quite a bit but I/os not documented.Renal function is not quite to her baseline but shows steady improvement. No further neurologic symptoms.  Awaiting thyroid ultrasound and urine lytes, getting renal u/s today as well.     Consultants/Specialty  Cardiology    Code Status  Full Code    Disposition  Home today vs tomorrow    Review of Systems  Review of Systems   Constitutional: Negative for chills and fever.   Respiratory: Negative for cough and shortness of breath.    Cardiovascular: Negative for chest pain and leg swelling.   Neurological: Negative for  dizziness and focal weakness.   All other systems reviewed and are negative.       Physical Exam  Temp:  [36.3 °C (97.3 °F)-36.5 °C (97.7 °F)] 36.5 °C (97.7 °F)  Pulse:  [61-70] 61  Resp:  [16-18] 18  BP: (129-158)/(40-59) 158/57  SpO2:  [93 %-100 %] 93 %    Physical Exam  Vitals and nursing note reviewed.   Constitutional:       Appearance: Normal appearance.   HENT:      Head: Normocephalic and atraumatic.      Mouth/Throat:      Mouth: Mucous membranes are dry.   Cardiovascular:      Rate and Rhythm: Normal rate and regular rhythm.      Heart sounds: No murmur.   Pulmonary:      Effort: Pulmonary effort is normal. No respiratory distress.      Breath sounds: Normal breath sounds. No wheezing or rales.   Abdominal:      General: Abdomen is flat. Bowel sounds are normal.      Palpations: Abdomen is soft.   Musculoskeletal:         General: No swelling.   Skin:     General: Skin is warm and dry.   Neurological:      General: No focal deficit present.      Mental Status: She is alert and oriented to person, place, and time.   Psychiatric:         Mood and Affect: Mood normal.         Behavior: Behavior normal.         Fluids    Intake/Output Summary (Last 24 hours) at 4/4/2021 0755  Last data filed at 4/4/2021 0607  Gross per 24 hour   Intake 1200 ml   Output --   Net 1200 ml       Laboratory  Recent Labs     04/02/21  1609 04/03/21  0211 04/04/21  0158   WBC 8.7 10.4 8.9   RBC 4.40 4.03* 3.63*   HEMOGLOBIN 12.3 11.2* 9.9*   HEMATOCRIT 38.0 35.3* 31.8*   MCV 86.4 87.6 87.6   MCH 28.0 27.8 27.3   MCHC 32.4* 31.7* 31.1*   RDW 47.2 47.4 47.7   PLATELETCT 231 214 189   MPV 10.1 10.8 10.6     Recent Labs     04/02/21  1609 04/03/21  0211 04/04/21  0158   SODIUM 139 139 139   POTASSIUM 4.0 4.3 4.0   CHLORIDE 104 107 111   CO2 21 19* 19*   GLUCOSE 158* 103* 91   BUN 28* 28* 26*   CREATININE 1.81* 1.63* 1.53*   CALCIUM 9.6 9.3 8.4                   Imaging  US-CAROTID DOPPLER BILAT   Final Result      DX-CHEST-PORTABLE (1  VIEW)   Final Result      No acute cardiopulmonary abnormality.      EC-ECHOCARDIOGRAM COMPLETE W/O CONT   Final Result      US-THYROID    (Results Pending)   US-RENAL    (Results Pending)        Assessment/Plan  * Syncope- (present on admission)  Assessment & Plan  - Witnessed complete syncope short duration, no trauma as grandson was able to catch the patient before hitting the ground.   -Patient was given a cardiac event monitor due to these near syncopal events - nothing seen on monitor   -Hold losartan and Lasix - pt states she hasn't taken her Lasix in years  - Echo with LVOT obstruction - discussed with Dr Dailey, not true HOCM but would cause syncope in a volume depleted state  - Pt states she barely drinks any water, does not like the taste.  We discussed the absolute need of avoiding dehydration as it will cause recurrent syncope with her LVOT obstruction.   - COntinue IVFs, renal function is slowly improving, pt appears euvolemic on exam. 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  Can discuss with Cardiology at her next follow up. ASA/Lipitor at discharge.       Essential hypertension, benign- (present on admission)  Assessment & Plan  Patient noted to be on losartan and metoprolol at home  -We will hold losartan due to acute renal failure  -Continue metoprolol to prevent any rebound tachycardia  - Add Norvasc as renal function not completely normalized, will not discharge with her home Losartan    Palpitations- (present on admission)  Assessment & Plan  Patient is on event recorder by itself at this time, Dr. Blanco cardiology evaluated and did not find any arrhythmias at this time.  -Continue patient's metoprolol home dose    Anemia  Assessment & Plan  - Check indices, no overt bleeding     Acute renal failure (HCC)- (present on admission)  Assessment & Plan  Patient's baseline creatinine appears to  be 0.8-1.0, still at 1.53 today  Continue IVFs  Encourage PO intake, pt states she drinks very little water  Urine lytes pending, renal ultrasound pending        VTE prophylaxis: Renally dosed Lovenox

## 2021-04-04 NOTE — DISCHARGE INSTRUCTIONS
Discharge Instructions    Discharged to home by car with relative. Discharged via wheelchair, hospital escort: Yes.  Special equipment needed: Not Applicable    Be sure to schedule a follow-up appointment with your primary care doctor or any specialists as instructed.     Discharge Plan:   Diet Plan: Discussed  Activity Level: Discussed  Confirmed Follow up Appointment: Patient to Call and Schedule Appointment  Confirmed Symptoms Management: Discussed  Medication Reconciliation Updated: Yes    I understand that a diet low in cholesterol, fat, and sodium is recommended for good health. Unless I have been given specific instructions below for another diet, I accept this instruction as my diet prescription.   Other diet:     Special Instructions: None    · Is patient discharged on Warfarin / Coumadin?   No     Depression / Suicide Risk    As you are discharged from this [facility] facility, it is important to learn how to keep safe from harming yourself.    Recognize the warning signs:  · Abrupt changes in personality, positive or negative- including increase in energy   · Giving away possessions  · Change in eating patterns- significant weight changes-  positive or negative  · Change in sleeping patterns- unable to sleep or sleeping all the time   · Unwillingness or inability to communicate  · Depression  · Unusual sadness, discouragement and loneliness  · Talk of wanting to die  · Neglect of personal appearance   · Rebelliousness- reckless behavior  · Withdrawal from people/activities they love  · Confusion- inability to concentrate     If you or a loved one observes any of these behaviors or has concerns about self-harm, here's what you can do:  · Talk about it- your feelings and reasons for harming yourself  · Remove any means that you might use to hurt yourself (examples: pills, rope, extension cords, firearm)  · Get professional help from the community (Mental Health, Substance Abuse, psychological  counseling)  · Do not be alone:Call your Safe Contact- someone whom you trust who will be there for you.  · Call your local CRISIS HOTLINE 384-6900 or 636-647-1567  · Call your local Children's Mobile Crisis Response Team [place] (985) 510-5398 or www.Attensity  · Call the toll free National Suicide Prevention Hotlines   · National Suicide Prevention Lifeline 967-464-EDYW (5291)  · National Hope Line Network 800-SUICIDE (767-3734)    Discharge Instructions    Discharged to home by car with relative. Discharged via wheelchair, hospital escort: Yes.  Special equipment needed: Not Applicable    Be sure to schedule a follow-up appointment with your primary care doctor or any specialists as instructed.     Discharge Plan:        I understand that a diet low in cholesterol, fat, and sodium is recommended for good health. Unless I have been given specific instructions below for another diet, I accept this instruction as my diet prescription.   Other diet: Cardiac      · Is patient discharged on Warfarin / Coumadin?   No     Depression / Suicide Risk    As you are discharged from this [facility] facility, it is important to learn how to keep safe from harming yourself.    Recognize the warning signs:  · Abrupt changes in personality, positive or negative- including increase in energy   · Giving away possessions  · Change in eating patterns- significant weight changes-  positive or negative  · Change in sleeping patterns- unable to sleep or sleeping all the time   · Unwillingness or inability to communicate  · Depression  · Unusual sadness, discouragement and loneliness  · Talk of wanting to die  · Neglect of personal appearance   · Rebelliousness- reckless behavior  · Withdrawal from people/activities they love  · Confusion- inability to concentrate     If you or a loved one observes any of these behaviors or has concerns about self-harm, here's what you can do:  · Talk about it- your feelings and reasons  for harming yourself  · Remove any means that you might use to hurt yourself (examples: pills, rope, extension cords, firearm)  · Get professional help from the community (Mental Health, Substance Abuse, psychological counseling)  · Do not be alone:Call your Safe Contact- someone whom you trust who will be there for you.  · Call your local CRISIS HOTLINE 115-4002 or 826-393-4775  · Call your local Children's Mobile Crisis Response Team [place] (789) 592-2450 or [employer]  · Call the toll free National Suicide Prevention Hotlines   · National Suicide Prevention Lifeline 308-433-QAKZ (4617)  · [employer] Line Network 800-SUICIDE (330-4808)        Acute Kidney Injury, Adult    Acute kidney injury is a sudden worsening of kidney function. The kidneys are organs that have several jobs. They filter the blood to remove waste products and extra fluid. They also maintain a healthy balance of minerals and hormones in the body, which helps control blood pressure and keep bones strong. With this condition, your kidneys do not do their jobs as well as they should.  This condition ranges from mild to severe. Over time it may develop into long-lasting (chronic) kidney disease. Early detection and treatment may prevent acute kidney injury from developing into a chronic condition.  What are the causes?  Common causes of this condition include:  · A problem with blood flow to the kidneys. This may be caused by:  ? Low blood pressure (hypotension) or shock.  ? Blood loss.  ? Heart and blood vessel (cardiovascular) disease.  ? Severe burns.  ? Liver disease.  · Direct damage to the kidneys. This may be caused by:  ? Certain medicines.  ? A kidney infection.  ? Poisoning.  ? Being around or in contact with toxic substances.  ? A surgical wound.  ? A hard, direct hit to the kidney area.  · A sudden blockage of urine flow. This may be caused by:  ? Cancer.  ? Kidney stones.  ? An enlarged prostate in males.  What are  the signs or symptoms?  Symptoms of this condition may not be obvious until the condition becomes severe. Symptoms of this condition can include:  · Tiredness (lethargy), or difficulty staying awake.  · Nausea or vomiting.  · Swelling (edema) of the face, legs, ankles, or feet.  · Problems with urination, such as:  ? Abdominal pain, or pain along the side of your stomach (flank).  ? Decreased urine production.  ? Decrease in the force of urine flow.  · Muscle twitches and cramps, especially in the legs.  · Confusion or trouble concentrating.  · Loss of appetite.  · Fever.  How is this diagnosed?  This condition may be diagnosed with tests, including:  · Blood tests.  · Urine tests.  · Imaging tests.  · A test in which a sample of tissue is removed from the kidneys to be examined under a microscope (kidney biopsy).  How is this treated?  Treatment for this condition depends on the cause and how severe the condition is. In mild cases, treatment may not be needed. The kidneys may heal on their own. In more severe cases, treatment will involve:  · Treating the cause of the kidney injury. This may involve changing any medicines you are taking or adjusting your dosage.  · Fluids. You may need specialized IV fluids to balance your body's needs.  · Having a catheter placed to drain urine and prevent blockages.  · Preventing problems from occurring. This may mean avoiding certain medicines or procedures that can cause further injury to the kidneys.  In some cases treatment may also require:  · A procedure to remove toxic wastes from the body (dialysis or continuous renal replacement therapy - CRRT).  · Surgery. This may be done to repair a torn kidney, or to remove the blockage from the urinary system.  Follow these instructions at home:  Medicines  · Take over-the-counter and prescription medicines only as told by your health care provider.  · Do not take any new medicines without your health care provider's approval. Many  medicines can worsen your kidney damage.  · Do not take any vitamin and mineral supplements without your health care provider's approval. Many nutritional supplements can worsen your kidney damage.  Lifestyle  · If your health care provider prescribed changes to your diet, follow them. You may need to decrease the amount of protein you eat.  · Achieve and maintain a healthy weight. If you need help with this, ask your health care provider.  · Start or continue an exercise plan. Try to exercise at least 30 minutes a day, 5 days a week.  · Do not use any tobacco products, such as cigarettes, chewing tobacco, and e-cigarettes. If you need help quitting, ask your health care provider.  General instructions  · Keep track of your blood pressure. Report changes in your blood pressure as told by your health care provider.  · Stay up to date with immunizations. Ask your health care provider which immunizations you need.  · Keep all follow-up visits as told by your health care provider. This is important.  Where to find more information  · American Association of Kidney Patients: www.aakp.org  · National Kidney Foundation: www.kidney.org  · American Kidney Fund: www.akfinc.org  · Life Options Rehabilitation Program:  ? www.lifeoptions.org  ? www.kidneyschool.org  Contact a health care provider if:  · Your symptoms get worse.  · You develop new symptoms.  Get help right away if:  · You develop symptoms of worsening kidney disease, which include:  ? Headaches.  ? Abnormally dark or light skin.  ? Easy bruising.  ? Frequent hiccups.  ? Chest pain.  ? Shortness of breath.  ? End of menstruation in women.  ? Seizures.  ? Confusion or altered mental status.  ? Abdominal or back pain.  ? Itchiness.  · You have a fever.  · Your body is producing less urine.  · You have pain or bleeding when you urinate.  Summary  · Acute kidney injury is a sudden worsening of kidney function.  · Acute kidney injury can be caused by problems with  blood flow to the kidneys, direct damage to the kidneys, and sudden blockage of urine flow.  · Symptoms of this condition may not be obvious until it becomes severe. Symptoms may include edema, lethargy, confusion, nausea or vomiting, and problems passing urine.  · This condition can usually be diagnosed with blood tests, urine tests, and imaging tests. Sometimes a kidney biopsy is done to diagnose this condition.  · Treatment for this condition often involves treating the underlying cause. It is treated with fluids, medicines, dialysis, diet changes, or surgery.  This information is not intended to replace advice given to you by your health care provider. Make sure you discuss any questions you have with your health care provider.  Document Released: 07/02/2012 Document Revised: 11/30/2018 Document Reviewed: 12/08/2017  [employer] Patient Education © 2020 [employer]        Atherosclerosis    Atherosclerosis is narrowing and hardening of the arteries. Arteries are blood vessels that carry blood from the heart to all parts of the body. This blood contains oxygen. Arteries can become narrow or clogged with a buildup of fat, cholesterol, calcium, and other substances (plaque). Plaque decreases the amount of blood that can flow through the artery.  Atherosclerosis can affect any artery in the body, including:  · Heart arteries (coronary artery disease). This may cause a heart attack.  · Brain arteries. This may cause a stroke (cerebrovascular accident).  · Leg, arm, and pelvis arteries (peripheral artery disease). This may cause pain and numbness.  · Kidney arteries. This may cause kidney (renal) failure.  Treatment may slow the disease and prevent further damage to the heart, brain, peripheral arteries, and kidneys.  What are the causes?  Atherosclerosis develops slowly over many years. The inner layers of your arteries become damaged and allow the gradual buildup of plaque. The exact cause of atherosclerosis is not  "fully understood. Symptoms of atherosclerosis do not occur until the artery becomes narrow or blocked.  What increases the risk?  The following factors may make you more likely to develop this condition:  · High blood pressure.  · High cholesterol.  · Being middle-aged or older.  · Having a family history of atherosclerosis.  · Having high blood fats (triglycerides).  · Diabetes.  · Being overweight.  · Smoking tobacco.  · Not exercising enough (sedentary lifestyle).  · Having a substance in the blood called C-reactive protein (CRP). This is a sign of increased levels of inflammation in the body.  · Sleep apnea.  · Being stressed.  · Drinking too much alcohol.  What are the signs or symptoms?  This condition may not cause any symptoms. If you have symptoms, they are caused by damage to an area of your body that is not getting enough blood.  · Coronary artery disease may cause chest pain and shortness of breath.  · Decreased blood supply to your brain may cause a stroke. Signs of a stroke may include sudden:  ? Weakness on one side of the body.  ? Confusion.  ? Changes in vision.  ? Inability to speak or understand speech.  ? Loss of balance, coordination, or the ability to walk.  ? Severe headache.  ? Loss of consciousness.  · Peripheral arterial disease may cause pain and numbness, often in the legs and hips.  · Renal failure may cause fatigue, nausea, swelling, and itchy skin.  How is this diagnosed?  This condition is diagnosed based on your medical history and a physical exam. During the exam:  · Your health care provider will:  ? Check your pulse in different places.  ? Listen for a \"whooshing\" sound over your arteries (bruit).  · You may have tests, such as:  ? Blood tests to check your levels of cholesterol, triglycerides, and CRP.  ? Electrocardiogram (ECG) to check for heart damage.  ? Chest X-ray to see if you have an enlarged heart, which is a sign of heart failure.  ? Stress test to see how your heart " reacts to exercise.  ? Echocardiogram to get images of the inside of your heart.  ? Ankle-brachial index to compare blood pressure in your arms to blood pressure in your ankles.  ? Ultrasound of your peripheral arteries to check blood flow.  ? CT scan to check for damage to your heart or brain.  ? X-rays of blood vessels after dye has been injected (angiogram) to check blood flow.  How is this treated?  Treatment starts with lifestyle changes, which may include:  · Changing your diet.  · Losing weight.  · Reducing stress.  · Exercising and being physically active more regularly.  · Not smoking.  You may also need medicine to:  · Lower triglycerides and cholesterol.  · Control blood pressure.  · Prevent blood clots.  · Lower inflammation in your body.  · Control your blood sugar.  Sometimes, surgery is needed to:  · Remove plaque from an artery (endarterectomy).  · Open or widen a narrowed heart artery (angioplasty).  · Create a new path for your blood with one of these procedures:  ? Heart (coronary) artery bypass graft surgery.  ? Peripheral artery bypass graft surgery.  Follow these instructions at home:  Eating and drinking    · Eat a heart-healthy diet. Talk with your health care provider or a diet and nutrition specialist (dietitian) if you need help. A heart-healthy diet involves:  ? Limiting unhealthy fats and increasing healthy fats. Some examples of healthy fats are olive oil and canola oil.  ? Eating plant-based foods, such as fruits, vegetables, nuts, whole grains, and legumes (such as peas and lentils).  · Limit alcohol intake to no more than 1 drink a day for nonpregnant women and 2 drinks a day for men. One drink equals 12 oz of beer, 5 oz of wine, or 1½ oz of hard liquor.  Lifestyle  · Follow an exercise program as told by your health care provider.  · Maintain a healthy weight. Lose weight if your health care provider says that you need to do that.  · Rest when you are tired.  · Learn to manage your  "stress.  · Do not use any products that contain nicotine or tobacco, such as cigarettes and e-cigarettes. If you need help quitting, ask your health care provider.  · Do not abuse drugs.  General instructions  · Take over-the-counter and prescription medicines only as told by your health care provider.  · Manage other health conditions as told by your health care provider.  · Keep all follow-up visits as told by your health care provider. This is important.  Contact a health care provider if:  · You have chest pain or discomfort. This includes squeezing chest pain that may feel like indigestion (angina).  · You have shortness of breath.  · You have an irregular heartbeat.  · You have unexplained fatigue.  · You have unexplained pain or numbness in an arm, leg, or hip.  · You have nausea, swelling of your hands or feet, and itchy skin.  Get help right away if:  · You have any symptoms of a heart attack, such as:  ? Chest pain.  ? Shortness of breath.  ? Pain in your neck, jaw, arms, back, or stomach.  ? Cold sweat.  ? Nausea.  ? Light-headedness.  · You have any symptoms of a stroke. \"BE FAST\" is an easy way to remember the main warning signs of a stroke:  ? B - Balance. Signs are dizziness, sudden trouble walking, or loss of balance.  ? E - Eyes. Signs are trouble seeing or a sudden change in vision.  ? F - Face. Signs are sudden weakness or numbness of the face, or the face or eyelid drooping on one side.  ? A - Arms. Signs are weakness or numbness in an arm. This happens suddenly and usually on one side of the body.  ? S - Speech. Signs are sudden trouble speaking, slurred speech, or trouble understanding what people say.  ? T - Time. Time to call emergency services. Write down what time symptoms started.  · You have other signs of a stroke, such as:  ? A sudden, severe headache with no known cause.  ? Nausea or vomiting.  ? Seizure.  These symptoms may represent a serious problem that is an emergency. Do not wait " to see if the symptoms will go away. Get medical help right away. Call your local emergency services (911 in the [place] Do not drive yourself to the hospital.  Summary  · Atherosclerosis is narrowing and hardening of the arteries.  · Arteries can become narrow or clogged with a buildup of fat, cholesterol, calcium, and other substances (plaque).  · This condition may not cause any symptoms. If you do have symptoms, they are caused by damage to an area of your body that is not getting enough blood.  · Treatment may include lifestyle changes and medicines. In some cases, surgery is needed.  This information is not intended to replace advice given to you by your health care provider. Make sure you discuss any questions you have with your health care provider.  Document Released: 03/09/2005 Document Revised: 03/29/2019 Document Reviewed: 08/23/2018  [employer] Patient Education © 2020 [employer]        Thyroid Needle Biopsy    Thyroid needle biopsy is a procedure to remove small samples of tissue or fluid from the thyroid gland. The samples are then examined under a microscope. The thyroid is a gland in the lower front area of the neck. It produces hormones that affect many important body processes, including growth and development, body temperature, and how the body uses food for energy (metabolism). This procedure is often done to help diagnose cancer, infection, or other problems with the thyroid.  During this procedure, a thin needle (fine needle) is inserted through the skin and into the thyroid gland. This is less invasive than a procedure in which an incision is made over the thyroid (open thyroid biopsy). Sometimes, an open thyroid biopsy may be done during a different surgery, such as surgery to remove a part or a whole section (lobe) of the thyroid gland (open lobectomy).  Tell a health care provider about:  · Any allergies you have.  · All medicines you are taking, including vitamins, herbs, eye drops,  creams, and over-the-counter medicines.  · Any problems you or family members have had with anesthetic medicines.  · Any blood disorders you have.  · Any surgeries you have had.  · Any medical conditions you have.  · Whether you are pregnant or may be pregnant.  What are the risks?  Generally, this is a safe procedure. However, problems may occur, including:  · Infection.  · Bleeding.  · Allergic reactions to medicines.  · Damage to nerves or blood vessels in the neck.  What happens before the procedure?  Medicines  · Ask your health care provider about:  ? Changing or stopping your regular medicines. This is especially important if you are taking diabetes medicines or blood thinners.  ? Taking medicines such as aspirin and ibuprofen. These medicines can thin your blood. Do not take these medicines unless your health care provider tells you to take them.  ? Taking over-the-counter medicines, vitamins, herbs, and supplements.  General instructions  · You may have blood tests.  · You may have an ultrasound before or during the needle biopsy.  What happens during the procedure?  · You will be asked to lie on your back with your head tipped backward to extend your neck. You may be asked to avoid coughing, talking, swallowing, or making sounds during some parts of the procedure.  · To lower your risk of infection:  ? Your health care team will wash or sanitize their hands.  ? The skin over your thyroid will be cleaned with a germ-killing (antiseptic) solution.  · A local anesthetic (lidocaine) may be injected into the skin over your thyroid, to numb the area.  · An ultrasound may be done to help guide the needle to the desired area of your thyroid.  · A fine needle will be inserted into your thyroid. The needle will be used to remove tissue or fluid samples as needed. The samples will be sent to a lab for examination.  · The needle will be removed.  · Pressure may be applied to your neck to reduce swelling and stop  bleeding.  The procedure may vary among health care providers and hospitals.  What happens after the procedure?  · It is up to you to get the results of your procedure. Ask your health care provider, or the department that is doing the procedure, when your results will be ready.  Summary  · Thyroid needle biopsy is a procedure to remove small samples of tissue or fluid from the thyroid gland.  · During this procedure, a thin needle (fine needle) is inserted through the skin and into the thyroid gland. This is less invasive than a procedure in which an incision is made over the thyroid (open thyroid biopsy).  · You will be asked to lie on your back with your head tipped backward to extend your neck. You may be asked to avoid coughing, talking, swallowing, or making sounds during some parts of the procedure.  This information is not intended to replace advice given to you by your health care provider. Make sure you discuss any questions you have with your health care provider.  Document Released: 10/14/2008 Document Revised: 11/30/2018 Document Reviewed: 10/01/2018  [employer] Patient Education © 2020 [employer]      Hand Washing  Germs such as bacteria, viruses, and parasites are found everywhere. They can be in the air and water. They can also be on surfaces like food, door handles, and your skin. Every day, your hands touch germs. Many of these germs can make you and your family sick. Washing your hands is one of the best ways to lower your risk of getting and sharing germs.  When should I wash my hands?  You should wash your hands whenever you think they are dirty. You should also wash your hands:  · Before:  ? Visiting a baby or anyone with a weakened disease-fighting system (immunesystem).  ? Putting in and taking out contact lenses.  · After:  ? Using the bathroom or helping someone else use the bathroom.  ? Working or playing outside.  ? Touching or taking out the garbage.  ? Touching anything dirty around  your home.  ? Sneezing, coughing, or blowing your nose.  ? Using a phone, including your mobile phone.  ? Touching an animal, animal food, animal poop, or its toys or leash.  ? Touching money.  ? Using household  or poisonous chemicals.  ? Handling dirty clothes, bedding, or rags.  ? Using public transportation.  ? Going shopping, especially if you use a shopping cart or basket.  ? Shaking hands.  ? Handling livestock, such as cows or sheep.  · Before and after:  ? Preparing food.  ? Eating.  ? Visiting or taking care of someone who is sick. This includes touching used tissues, toys, and clothes.  ? Changing a bandage (dressing).  ? Taking care of an injury or wound.  ? Giving or taking medicine.  ? Preparing a bottle for a baby.  ? Feeding a baby or young child.  ? Changing a diaper.  What is the right way to wash my hands?    2. Wet your hands with clean, running water. Turn off the water or move your hands out of the running water.  3. Apply liquid soap or bar soap to your hands.  4. Rub your hands together quickly to create lather.  5. Keep rubbing your hands together for at least 20 seconds. Thoroughly scrub all parts of your hands. This includes scrubbing under your fingernails and between your fingers.  6. Rinse your hands with clean, running water. Do this until all the soap is gone.  7. Dry your hands using an air dryer or a clean paper or cloth towel, or let your hands air-dry. Do not use your clothing or a dirty towel to dry your hands.  If you are in a public restroom, use your towel:  · To turn off the water faucet.  · To open the bathroom door.  How can I clean my hands if I do not have soap and water?    If soap and clean water are not available, use a hand-washing wipe, spray, or gel (hand ). Use one that contains at least 60% alcohol. If you are handling food, gels are not recommended as a replacement for hand washing with soap and water.  To use these products, follow the  directions on the product, and:  · Apply enough product to cover your hands.  · Make sure you wipe, rub, or spray the product so that it reaches every part of your hands and wrists. Include the backs of your hands, between your fingers, and under your fingernails.  · Rub the product onto your hands until it dries.  Summary  · Every day, your hands touch germs. Many of these germs can make you and your family sick.  · Washing your hands is one of the best ways to lower your risk of getting and sharing germs.  · If soap and clean water are not available, use a hand-washing wipe, spray, or gel.  This information is not intended to replace advice given to you by your health care provider. Make sure you discuss any questions you have with your health care provider.  Document Released: 11/30/2009 Document Revised: 09/26/2018 Document Reviewed: 09/26/2018  [employer] Patient Education © 2020 [employer]

## 2021-04-04 NOTE — DISCHARGE SUMMARY
Discharge Summary    CHIEF COMPLAINT ON ADMISSION  Chief Complaint   Patient presents with   • Dizziness   • Syncope       Reason for Admission  dizziness, fainted, light-headed, *     Admission Date  4/2/2021    CODE STATUS  Full Code    HPI & HOSPITAL COURSE  This is a 87 y.o. female here with syncope; she'd had issues with pre-syncope prior to admission, and actually had a [employer] event recorder in place that Dr Velasquez had ordered as an outpatient. Per Dr Blanco, there were no significant arrhythmias reocrded with the longest pause being 2 seconds. Echo, however, did reveal an LVOT obstruction which was discussed with Dr Dailey - in a volume depleted state, this certainly could have caused her syncopal episodes.  Pt does affirm that she drinks very little water throughout the day.    Patient's creatinine was elevated on admission - while creatinine did improve from 1.81 to 1.53 with IVFs, implying some degree of volume depletion, it did not improve as rapidly as anticipated. Renal ultrasound demonstrated medical renal disease without hydronephrosis - this, in combination with anemia of chronic disease by indices and a FeNA >1% indicates that pt has likely had progression to CKD since her last labs one year ago.I discussed with her that she may benefit from referral to Nephrology as an outpatient and I have sent a message to her PCP. In the meantime, until her creatinine demonstrates stability at her new baseline, I am changing her Losartan to Norvasc.    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 I will start a statin.    Thyroid nodule was noted on carotid u/s and a dedicated study of the thyroid has been obtained and is pending read; pt prefers to follow up on the read with her PCP rather than continuing hospitalization while awaiting read.      Therefore, she is discharged in good and stable  condition to home with close outpatient follow-up.    The patient met 2-midnight criteria for an inpatient stay at the time of discharge.    Discharge Date  4/4/21    FOLLOW UP ITEMS POST DISCHARGE  Thyroid ultrasound read    DISCHARGE DIAGNOSES  Principal Problem (Resolved):    Syncope POA: Yes  Active Problems:    Essential hypertension, benign POA: Yes    Anemia POA: Unknown  Resolved Problems:    Palpitations POA: Yes    Acute renal failure (HCC) POA: Yes      FOLLOW UP  Future Appointments   Date Time Provider Department Center   6/25/2021 10:20 AM Juan Santana M.D. [facility] [place]   6/29/2021 11:00 AM Reuben Velasquez M.D. [facility] None     Juan Santana M.D.  [address]  [place] [address]  [phone]    In 1 week      Reuben Velasquez M.D.  [address]  [place] [id number] [phone]    In 1 week        MEDICATIONS ON DISCHARGE     Medication List      START taking these medications      Instructions   amLODIPine 5 MG Tabs  Start taking on: April 5, 2021  Commonly known as: NORVASC   Take 1 tablet by mouth every day.  Dose: 5 mg     atorvastatin 20 MG Tabs  Commonly known as: LIPITOR   Take 1 tablet by mouth every evening.  Dose: 20 mg        CONTINUE taking these medications      Instructions   COQ10 PO   Take 1 capsule by mouth two times a week. Sunday & Wednesday  Dose: 1 capsule     escitalopram 5 MG tablet  Commonly known as: Lexapro   Take 1 Tab by mouth every day.  Dose: 5 mg     metoprolol SR 50 MG Tb24  Commonly known as: TOPROL XL   TAKE ONE TABLET BY MOUTH ONCE DAILY     prednisoLONE acetate 1 % Susp  Commonly known as: PRED FORTE   Administer 1 Drop into both eyes see administration instructions. 1 drop in left eye four times a day   1 drop in right eye six times a day  Dose: 1 Drop     Refresh Dry Eye Therapy 1-1 % Soln  Generic drug: Glycerin-Polysorbate 80   Administer 1 Drop into affected eye(s) 4 times a day as needed (dry eye).  Dose: 1 Drop     Restasis 0.05 %  ophthalmic emulsion  Generic drug: cyclosporin   Administer 1 Drop into affected eye(s) 2 times a day as needed (dry eye).  Dose: 1 Drop     SUPER B COMPLEX PO   Take 1 tablet by mouth every morning.  Dose: 1 tablet     * Vitamin D-3 125 MCG (5000 UT) Tabs   Take 5,000 Units by mouth every day. 5000 unit tablet + 1000 unit tablet for a total of 6000 units daily  Dose: 5,000 Units     * vitamin D 1000 Unit (25 mcg) Tabs  Commonly known as: cholecalciferol   Take 1,000 Units by mouth every day. 1000 unit tablet + 5000 unit tablet for a total of 6000 units daily  Dose: 1,000 Units     Zinc 50 MG Tabs   Take 50 mg by mouth every day.  Dose: 50 mg         * This list has 2 medication(s) that are the same as other medications prescribed for you. Read the directions carefully, and ask your doctor or other care provider to review them with you.            STOP taking these medications    furosemide 40 MG Tabs  Commonly known as: LASIX     losartan 25 MG Tabs  Commonly known as: COZAAR            Allergies  Allergies   Allergen Reactions   • Other Environmental      Seasonal allergies       DIET  Orders Placed This Encounter   Procedures   • Diet Order Diet: Cardiac     Standing Status:   Standing     Number of Occurrences:   1     Order Specific Question:   Diet:     Answer:   Cardiac [6]       ACTIVITY  As tolerated.  Weight bearing as tolerated    CONSULTATIONS  Cardiology     PROCEDURES  None    LABORATORY  Lab Results   Component Value Date    SODIUM 139 04/04/2021    POTASSIUM 4.0 04/04/2021    CHLORIDE 111 04/04/2021    CO2 19 (L) 04/04/2021    GLUCOSE 91 04/04/2021    BUN 26 (H) 04/04/2021    CREATININE 1.53 (H) 04/04/2021        Lab Results   Component Value Date    WBC 8.9 04/04/2021    HEMOGLOBIN 9.9 (L) 04/04/2021    HEMATOCRIT 31.8 (L) 04/04/2021    PLATELETCT 189 04/04/2021        Total time of the discharge process exceeds 38 minutes.

## 2021-04-04 NOTE — CARE PLAN
Problem: Communication  Goal: The ability to communicate needs accurately and effectively will improve  Outcome: PROGRESSING AS EXPECTED  Intervention: [place] patient and significant other/support system to call light to alert staff of needs  Flowsheets (Taken 4/4/2021 0108)  Oriented to::   Call Light & Bedside Controls   Bedside Report  Intervention: Educate patient and significant other/support system about the plan of care, procedures, treatments, medications and allow for questions  Note: Discussed POC with pt including thyroid u/s and possible outpatient biopsy after discharge, pt verbalized understanding.     Problem: Knowledge Deficit  Goal: Knowledge of the prescribed therapeutic regimen will improve  Outcome: PROGRESSING AS EXPECTED  Intervention: Discuss information regarding therpeutic regimen and document in education  Note: Discussed proper oral hydration to prevent dehydration with pt, as well as importance of adhering to medication regimen. Pt verbalized understanding.

## 2021-04-04 NOTE — CARE PLAN
Problem: Communication  Goal: The ability to communicate needs accurately and effectively will improve  Outcome: PROGRESSING AS EXPECTED     Problem: Safety  Goal: Will remain free from injury  Outcome: PROGRESSING AS EXPECTED  Goal: Will remain free from falls  Outcome: PROGRESSING AS EXPECTED     Problem: Venous Thromboembolism (VTW)/Deep Vein Thrombosis (DVT) Prevention:  Goal: Patient will participate in Venous Thrombosis (VTE)/Deep Vein Thrombosis (DVT)Prevention Measures  Outcome: PROGRESSING AS EXPECTED     Problem: Bowel/Gastric:  Goal: Normal bowel function is maintained or improved  Outcome: PROGRESSING AS EXPECTED  Goal: Will not experience complications related to bowel motility  Outcome: PROGRESSING AS EXPECTED     Problem: Knowledge Deficit  Goal: Knowledge of disease process/condition, treatment plan, diagnostic tests, and medications will improve  Outcome: PROGRESSING AS EXPECTED  Goal: Knowledge of the prescribed therapeutic regimen will improve  Outcome: PROGRESSING AS EXPECTED

## 2021-04-04 NOTE — PROGRESS NOTES
Pt aox4, calm, eupneic on room air. Moves all four extremities, up to bathroom frequently with standby assist. Denies pain. On tele monitor, SR in 60s. POC reviewed, including plan for Renal US, and need for urine sample. Pt verbalizes understanding. Assessment complete. Questions addressed, call light within reach, pt instructed to call for needs.

## 2021-04-04 NOTE — DISCHARGE PLANNING
Anticipated Discharge Disposition:   Home when medically cleared    Action:   Chart review complete.     Discussed patient's plan of care and plans for discharge during rounds.   Per MD, patient is pending a thyroid and renal US. Patient may discharge tomorrow if medically cleared. RM CM will continue to follow and assist as needed.     Barriers to Discharge:   Medical Clearance    Plan:   Wait for medical clearance   Hospital care management will continue to assist with discharge planning needs.     Care Transition Team Assessment    In the event of an emergency, the patient's NOK is her son, Clarita Sweeney.     Information Source  Orientation : Oriented x 4  Information Given By: Other (Comments)(chart review)  Who is responsible for making decisions for patient? : Patient    Elopement Risk  Legal Hold: No  Ambulatory or Self Mobile in Wheelchair: No-Not an Elopement Risk  Elopement Risk: Not at Risk for Elopement    Interdisciplinary Discharge Planning  Does Admitting Nurse Feel This Could be a Complex Discharge?: No  Primary Care Physician: ROSAURA CHOWDHURY M.D  Lives with - Patient's Self Care Capacity: Alone and Able to Care For Self  Patient or legal guardian wants to designate a caregiver: No  Support Systems: Children, Family Member(s)  Housing / Facility: [address]  Able to Return to Previous ADL's: Yes  Mobility Issues: No  Prior Services: Intermittent Physical Support for ADL Per Family  Patient Prefers to be Discharged to:: home  Assistance Needed: Unknown at this Time  Durable Medical Equipment: Other - Specify(cane)    Discharge Preparedness  What is your plan after discharge?: Home with help  What are your discharge supports?: Other (comment)(family members)  Prior Functional Level: Ambulatory    Functional Assesment  Prior Functional Level: Ambulatory    Finances  Financial Barriers to Discharge: No  Prescription Coverage: Yes    Vision / Hearing Impairment  Vision Impairment : Yes  Right Eye  Vision: Impaired, Wears Glasses  Left Eye Vision: Impaired, Wears Glasses  Hearing Impairment : No  Does Pt Need Special Equipment for the Hearing Impaired?: No    Advance Directive  Advance Directive?: None    Domestic Abuse  Have you ever been the victim of abuse or violence?: No  Physical Abuse or Sexual Abuse: No  Verbal Abuse or Emotional Abuse: No  Possible Abuse/Neglect Reported to:: Not Applicable    Psychological Assessment  History of Substance Abuse: None  History of Psychiatric Problems: No    Discharge Risks or Barriers  Discharge risks or barriers?: Complex medical needs  Patient risk factors: Complex medical needs    Anticipated Discharge Information  Discharge Disposition: Still a Patient (30)

## 2021-04-04 NOTE — PROGRESS NOTES
Telemetry Shift Summary     Rhythm SR/SB with 1*AVB  HR Range 50-70's  Ectopy None  Measurements 0.28 / 0.08 / 0.46         Normal Values  Rhythm SR  HR Range    Measurements 0.12-0.20 / 0.06-0.10  / 0.30-0.52

## 2021-04-05 ENCOUNTER — TELEPHONE (OUTPATIENT)
Dept: MEDICAL GROUP | Facility: PHYSICIAN GROUP | Age: 86
End: 2021-04-05

## 2021-04-05 DIAGNOSIS — E04.2 MULTIPLE THYROID NODULES: ICD-10-CM

## 2021-04-05 NOTE — PROGRESS NOTES
8150 Discharge education completed including general instructions and information related to chronic kidney disease, acute kidney injury, atherosclerosis, diet, activity, s/s to seek medical treatment. IV removed. Questions addressed. Educated to  new prescriptions, and medication information reviewed. Pt aox4, calm, on room air. Belongings sent with patient. Pt escorted via family member, declines an escort.

## 2021-04-05 NOTE — TELEPHONE ENCOUNTER
Patient left me a voicemail stating that she needs a follow up appointment.     I called patient back and got her voicemail. I gave patient my direct line so that she can call me directly to schedule an appointment.

## 2021-04-06 ENCOUNTER — OFFICE VISIT (OUTPATIENT)
Dept: CARDIOLOGY | Facility: MEDICAL CENTER | Age: 86
End: 2021-04-06
Payer: MEDICARE

## 2021-04-06 VITALS
WEIGHT: 135.4 LBS | OXYGEN SATURATION: 97 % | HEART RATE: 77 BPM | SYSTOLIC BLOOD PRESSURE: 130 MMHG | BODY MASS INDEX: 23.99 KG/M2 | DIASTOLIC BLOOD PRESSURE: 70 MMHG | HEIGHT: 63 IN | RESPIRATION RATE: 14 BRPM

## 2021-04-06 DIAGNOSIS — E78.5 DYSLIPIDEMIA: ICD-10-CM

## 2021-04-06 DIAGNOSIS — I47.10 SVT (SUPRAVENTRICULAR TACHYCARDIA) (HCC): ICD-10-CM

## 2021-04-06 DIAGNOSIS — I10 ESSENTIAL HYPERTENSION, BENIGN: ICD-10-CM

## 2021-04-06 DIAGNOSIS — N17.9 AKI (ACUTE KIDNEY INJURY) (HCC): ICD-10-CM

## 2021-04-06 DIAGNOSIS — E04.1 RIGHT THYROID NODULE: ICD-10-CM

## 2021-04-06 DIAGNOSIS — I34.0 NON-RHEUMATIC MITRAL REGURGITATION: ICD-10-CM

## 2021-04-06 DIAGNOSIS — D64.9 ANEMIA, UNSPECIFIED TYPE: ICD-10-CM

## 2021-04-06 DIAGNOSIS — N18.32 STAGE 3B CHRONIC KIDNEY DISEASE: ICD-10-CM

## 2021-04-06 PROCEDURE — 99214 OFFICE O/P EST MOD 30 MIN: CPT | Performed by: NURSE PRACTITIONER

## 2021-04-06 ASSESSMENT — ENCOUNTER SYMPTOMS
VOMITING: 0
SPUTUM PRODUCTION: 0
PND: 0
ORTHOPNEA: 0
CLAUDICATION: 0
DIZZINESS: 1
WEAKNESS: 0
PALPITATIONS: 0
WHEEZING: 0
SHORTNESS OF BREATH: 0
HEMOPTYSIS: 0
COUGH: 0
BLURRED VISION: 1
NAUSEA: 0

## 2021-04-06 ASSESSMENT — FIBROSIS 4 INDEX: FIB4 SCORE: 2.91

## 2021-04-06 NOTE — PROGRESS NOTES
Chief Complaint   Patient presents with   • Hypertension     F/V Dx:Essential hypertension, benign   • Supraventricular Tachycardia (SVT)     F/V Dx:SVT (supraventricular tachycardia) (HCC)       Subjective:   Duong Carmichael is a 87 y.o. female who presents today for hospital follow up syncope.     She is followed by Dr. Velasquez in our clinic, history of hypertension, SVT, and dizziness.     Hospitalized for presyncope 4/4/2021.  biotel monitor showed no significant arrhythmias with the longest pause being 2 seconds.  Echocardiogram showed LVOTpeak velocity is 2.6 m/sec. Systolic anterior motion of the anterior mitral valve leaflet..  Creatinine was elevated.  Ultrasound demonstrated renal disease without any hydronephrosis. Noted to be dehydrated and iv fluids was given. Carotid duplex showed greater than 50% narrowing right ICA, unable to do ct due to BRIAN. incidental finding right thyroid 2.9 cm mass. Surgery consult for outpatient biopsy.     Patient had two episodes since being home. While she was doing activity around the house. Very brief episodes of like something passing across her face that lasts only a second or 2, lightheadedness.     Denies any chest pain, sob, palpitations or leg swelling. She is currently living with her daughter and have been trying to increase her physical activity     Past Medical History:   Diagnosis Date   • Arrhythmia    • Arthritis     knees   • Bronchitis     long time ago   • CATARACT     no surgery yet   • Dry eyes, bilateral 3/27/2014   • Osteopenia of the elderly 3/27/2014   • Pneumonia     long time ago     Past Surgical History:   Procedure Laterality Date   • KNEE ARTHROPLASTY TOTAL  9/3/2013    Performed by Ramesh Styles M.D. at SURGERY [facility]   • TONSILLECTOMY       Family History   Problem Relation Age of Onset   • Lung Disease Mother         frequent pneumonia   • Heart Disease Father 54        MI   • Lung Disease Father         Emphysema   • Heart Attack  Father    • Cancer Maternal Aunt         Great Aunt and Cousin Ovarian CA   • Hypertension Maternal Aunt    • Cancer Daughter         Lung and Brain CA   • Seizures Daughter    • Cancer Daughter         Pituitary tumor   • Cancer Brother         leukemia     Social History     Socioeconomic History   • Marital status:      Spouse name: Not on file   • Number of children: Not on file   • Years of education: Not on file   • Highest education level: Not on file   Occupational History   • Not on file   Tobacco Use   • Smoking status: Never Smoker   • Smokeless tobacco: Never Used   • Tobacco comment: Pt exposed to second hand smoker   Substance and Sexual Activity   • Alcohol use: Yes     Comment: Occasionally   • Drug use: No   • Sexual activity: Never   Other Topics Concern   • Not on file   Social History Narrative   • Not on file     Social Determinants of Health     Financial Resource Strain:    • Difficulty of Paying Living Expenses:    Food Insecurity:    • Worried About Running Out of Food in the Last Year:    • Ran Out of Food in the Last Year:    Transportation Needs:    • Lack of Transportation (Medical):    • Lack of Transportation (Non-Medical):    Physical Activity:    • Days of Exercise per Week:    • Minutes of Exercise per Session:    Stress:    • Feeling of Stress :    Social Connections:    • Frequency of Communication with Friends and Family:    • Frequency of Social Gatherings with Friends and Family:    • Attends [employer] Services:    • Active Member of Clubs or Organizations:    • Attends Club or Organization Meetings:    • Marital Status:    Intimate Partner Violence:    • Fear of Current or Ex-Partner:    • Emotionally Abused:    • Physically Abused:    • Sexually Abused:      Allergies   Allergen Reactions   • Other Environmental      Seasonal allergies     Outpatient Encounter Medications as of 4/6/2021   Medication Sig Dispense Refill   • amLODIPine (NORVASC) 5 MG Tab Take 1 tablet by  mouth every day. 30 tablet 0   • atorvastatin (LIPITOR) 20 MG Tab Take 1 tablet by mouth every evening. 30 tablet 0   • prednisoLONE acetate (PRED FORTE) 1 % Suspension Administer 1 Drop into both eyes see administration instructions. 1 drop in left eye four times a day   1 drop in right eye six times a day     • Cholecalciferol (VITAMIN D-3) 125 MCG (5000 UT) Tab Take 5,000 Units by mouth every day. 5000 unit tablet + 1000 unit tablet for a total of 6000 units daily     • Coenzyme Q10 (COQ10 PO) Take 1 capsule by mouth two times a week. Sunday & Wednesday     • Zinc 50 MG Tab Take 50 mg by mouth every day.     • metoprolol SR (TOPROL XL) 50 MG TABLET SR 24 HR TAKE ONE TABLET BY MOUTH ONCE DAILY 100 tablet 1   • escitalopram (LEXAPRO) 5 MG tablet Take 1 Tab by mouth every day. 90 Tab 3   • cyclosporin (RESTASIS) 0.05 % ophthalmic emulsion Administer 1 Drop into affected eye(s) 2 times a day as needed (dry eye).     • B Complex-C (SUPER B COMPLEX PO) Take 1 tablet by mouth every morning.     • Glycerin-Polysorbate 80 (REFRESH DRY EYE THERAPY) 1-1 % SOLN Administer 1 Drop into affected eye(s) 4 times a day as needed (dry eye).     • vitamin D (CHOLECALCIFEROL) 1000 Unit (25 mcg) Tab Take 1,000 Units by mouth every day. 1000 unit tablet + 5000 unit tablet for a total of 6000 units daily       No facility-administered encounter medications on file as of 4/6/2021.     Review of Systems   Constitutional: Negative for malaise/fatigue.   Eyes: Positive for blurred vision.   Respiratory: Negative for cough, hemoptysis, sputum production, shortness of breath and wheezing.    Cardiovascular: Negative for chest pain, palpitations, orthopnea, claudication, leg swelling and PND.   Gastrointestinal: Negative for nausea and vomiting.   Neurological: Positive for dizziness. Negative for weakness.        Objective:   /70 (BP Location: Left arm, Patient Position: Sitting, BP Cuff Size: Adult)   Pulse 77   Resp 14   Ht 1.6 m  "(5' 3\")   Wt 61.4 kg (135 lb 6.4 oz)   SpO2 97%   BMI 23.99 kg/m²     Physical Exam   Constitutional: She is oriented to person, place, and time. She appears well-developed and well-nourished. No distress.   HENT:   Head: Normocephalic and atraumatic.   Eyes: Pupils are equal, round, and reactive to light.   Neck: No JVD present.   Cardiovascular: Normal rate, regular rhythm and normal heart sounds.   No murmur heard.  Pulmonary/Chest: Effort normal and breath sounds normal.   Abdominal: Soft. Bowel sounds are normal. She exhibits no distension.   Musculoskeletal:         General: No edema.   Neurological: She is alert and oriented to person, place, and time.   Skin: Skin is warm and dry. She is not diaphoretic.   Psychiatric: She has a normal mood and affect. Her behavior is normal. Judgment and thought content normal.         Echocardiography Laboratory   4/3/2021  Compared to the images of the prior study done 11/2/2018, intracavitary   gradient is present.  Hyperdynamic left ventricular systolic function.  Left ventricular ejection fraction is visually estimated to be greater than 75%.  Evidence of increased intracavity gradient, peak velocity is 2.6 m/sec.  Systolic anterior motion of the anterior mitral valve leaflet.    Carotid Duplex  4/3/2021   Greater than 50% narrowing right ICA   Right thyroid 2.9 cm mass for which dedicated thyroid US is advised to    characterize further.  Biopsy may be warranted.    US renal   4/4/2021  1.  No hydronephrosis.  2.  Bilateral renal cysts, which do not require imaging follow-up.  3.  Findings a medical renal disease.    Lab Results   Component Value Date/Time    CHOLSTRLTOT 200 (H) 06/29/2020 10:29 AM     (H) 06/29/2020 10:29 AM    HDL 62 06/29/2020 10:29 AM    TRIGLYCERIDE 136 06/29/2020 10:29 AM       Lab Results   Component Value Date/Time    SODIUM 139 04/04/2021 01:58 AM    POTASSIUM 4.0 04/04/2021 01:58 AM    CHLORIDE 111 04/04/2021 01:58 AM    CO2 19 (L) " 04/04/2021 01:58 AM    GLUCOSE 91 04/04/2021 01:58 AM    BUN 26 (H) 04/04/2021 01:58 AM    CREATININE 1.53 (H) 04/04/2021 01:58 AM     Lab Results   Component Value Date/Time    ALKPHOSPHAT 66 04/02/2021 04:09 PM    ASTSGOT 21 04/02/2021 04:09 PM    ALTSGPT 11 04/02/2021 04:09 PM    TBILIRUBIN 0.4 04/02/2021 04:09 PM        Assessment:     1. SVT (supraventricular tachycardia) (HCC)     2. Essential hypertension, benign     3. Anemia, unspecified type  CBC WITHOUT DIFFERENTIAL   4. Stage 3b chronic kidney disease  Basic Metabolic Panel   5. Dyslipidemia     6. Non-rheumatic mitral regurgitation     7. BRIAN (acute kidney injury) (HCC)     8. Right thyroid nodule         Medical Decision Making:  Today's Assessment / Status / Plan:     1. SVT:  - history of SVT , biotel monitor showed 140s bpm  - continue metoprolol XL 50mg qd     2. Hypertension:  - stable   - continue amlodipine 5mg qd     3. LVOT:  - recommend patient stay hydrated   - Evidence of increased intracavity gradient, peak velocity is 2.6 m/sec.    4. Anemia:  - repeat CBC, hemodilution contributing drop in the h&H. Denies any blood in the urine or stool     5. BRIAN:  - repeat BMP   - reinforce staying hydrated     6. Right thyroid nodule:  - Right thyroid 2.9 cm mass for which dedicated thyroid US is advised to    characterize further.    - biopsy pending surgery consult

## 2021-04-08 ENCOUNTER — HOSPITAL ENCOUNTER (OUTPATIENT)
Dept: LAB | Facility: MEDICAL CENTER | Age: 86
End: 2021-04-08
Attending: NURSE PRACTITIONER
Payer: MEDICARE

## 2021-04-08 DIAGNOSIS — N18.32 STAGE 3B CHRONIC KIDNEY DISEASE: ICD-10-CM

## 2021-04-08 DIAGNOSIS — D64.9 ANEMIA, UNSPECIFIED TYPE: ICD-10-CM

## 2021-04-08 LAB
ANION GAP SERPL CALC-SCNC: 10 MMOL/L (ref 7–16)
BUN SERPL-MCNC: 17 MG/DL (ref 8–22)
CALCIUM SERPL-MCNC: 9.1 MG/DL (ref 8.5–10.5)
CHLORIDE SERPL-SCNC: 109 MMOL/L (ref 96–112)
CO2 SERPL-SCNC: 20 MMOL/L (ref 20–33)
CREAT SERPL-MCNC: 1.55 MG/DL (ref 0.5–1.4)
ERYTHROCYTE [DISTWIDTH] IN BLOOD BY AUTOMATED COUNT: 48.9 FL (ref 35.9–50)
FASTING STATUS PATIENT QL REPORTED: NORMAL
GLUCOSE SERPL-MCNC: 107 MG/DL (ref 65–99)
HCT VFR BLD AUTO: 36 % (ref 37–47)
HGB BLD-MCNC: 11.4 G/DL (ref 12–16)
MCH RBC QN AUTO: 27.9 PG (ref 27–33)
MCHC RBC AUTO-ENTMCNC: 31.7 G/DL (ref 33.6–35)
MCV RBC AUTO: 88.2 FL (ref 81.4–97.8)
PLATELET # BLD AUTO: 213 K/UL (ref 164–446)
PMV BLD AUTO: 10.9 FL (ref 9–12.9)
POTASSIUM SERPL-SCNC: 4 MMOL/L (ref 3.6–5.5)
RBC # BLD AUTO: 4.08 M/UL (ref 4.2–5.4)
SODIUM SERPL-SCNC: 139 MMOL/L (ref 135–145)
WBC # BLD AUTO: 9.9 K/UL (ref 4.8–10.8)

## 2021-04-08 PROCEDURE — 85027 COMPLETE CBC AUTOMATED: CPT

## 2021-04-08 PROCEDURE — 80048 BASIC METABOLIC PNL TOTAL CA: CPT

## 2021-04-08 PROCEDURE — 36415 COLL VENOUS BLD VENIPUNCTURE: CPT

## 2021-04-14 ENCOUNTER — OFFICE VISIT (OUTPATIENT)
Dept: MEDICAL GROUP | Facility: PHYSICIAN GROUP | Age: 86
End: 2021-04-14
Payer: MEDICARE

## 2021-04-14 VITALS
HEIGHT: 63 IN | HEART RATE: 77 BPM | TEMPERATURE: 98.2 F | BODY MASS INDEX: 23.92 KG/M2 | DIASTOLIC BLOOD PRESSURE: 52 MMHG | WEIGHT: 135 LBS | SYSTOLIC BLOOD PRESSURE: 118 MMHG | OXYGEN SATURATION: 97 %

## 2021-04-14 DIAGNOSIS — I47.10 SVT (SUPRAVENTRICULAR TACHYCARDIA) (HCC): ICD-10-CM

## 2021-04-14 DIAGNOSIS — R53.81 PHYSICAL DECONDITIONING: ICD-10-CM

## 2021-04-14 DIAGNOSIS — F33.42 RECURRENT MAJOR DEPRESSIVE DISORDER, IN FULL REMISSION (HCC): ICD-10-CM

## 2021-04-14 DIAGNOSIS — N17.9 AKI (ACUTE KIDNEY INJURY) (HCC): ICD-10-CM

## 2021-04-14 DIAGNOSIS — E07.9 THYROID MASS: ICD-10-CM

## 2021-04-14 PROCEDURE — 99215 OFFICE O/P EST HI 40 MIN: CPT | Performed by: FAMILY MEDICINE

## 2021-04-14 PROCEDURE — 8041 PR SCP AHA: Performed by: FAMILY MEDICINE

## 2021-04-14 ASSESSMENT — ENCOUNTER SYMPTOMS
LOSS OF CONSCIOUSNESS: 1
CONSTIPATION: 0
SHORTNESS OF BREATH: 0
STRIDOR: 0
ABDOMINAL PAIN: 0
COUGH: 0
BACK PAIN: 0
BLOOD IN STOOL: 0
NAUSEA: 0
WEIGHT LOSS: 0
DIARRHEA: 0
CHILLS: 0
FLANK PAIN: 0
DIZZINESS: 1
MYALGIAS: 0
SINUS PAIN: 0
FEVER: 0
VOMITING: 0
DEPRESSION: 0
NECK PAIN: 0

## 2021-04-14 ASSESSMENT — LIFESTYLE VARIABLES: SUBSTANCE_ABUSE: 0

## 2021-04-14 ASSESSMENT — FIBROSIS 4 INDEX: FIB4 SCORE: 2.59

## 2021-04-14 NOTE — PROGRESS NOTES
"Annual Health Assessment Questions:    1.  Are you currently engaging in any exercise or physical activity? Yes    2.  How would you describe your mood or emotional well-being today? good    3.  Have you had any falls in the last year? Yes    4.  Have you noticed any problems with your balance or had difficulty walking? Yes    5.  In the last six months have you experienced any leakage of urine? No    6. DPA/Advanced Directive: Patient has Advanced Directive, but it is not on file. Instructed to bring in a copy to scan into their chart.     CC:Diagnoses of SVT (supraventricular tachycardia) ([facility]), Thyroid mass, BRIAN (acute kidney injury) (HCC), and Physical deconditioning were pertinent to this visit.      HISTORY OF PRESENT ILLNESS: Patient is a 87 y.o. female established patient who presents today to follow-up for recent hospitalization.  On 4/2/2021 patient was brought to the hospital emergency department for dizziness and syncope.  Patient had been having an ongoing work-up for arrhythmias and had been wearing a Zio patch monitor for 3 weeks prior to this presentation.  She had a prodrome of flushing, fatigue, and lightheadedness prior to LOC.  Patient was in the hospital for 2 days and was discharged on 4/4/2021 with a diagnosis of dehydration contributing to her syncope.  She also had acute kidney injury demonstrating/supporting dehydration as well.  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  Follow-up thyroid ultrasound demonstrates right thyroid lobe mass at 3.3 cm but no abnormality in thyroid labs.  Renal ultrasound shows no abnormalities.  Today patient states that she feels that she is returning to normal but still has episodes of lightheadedness and flushing that seem to pass on their own after a few seconds.  This is consistent with previous \"episodes\" that " she felt that seem to be correlating with paroxysmal supraventricular tachycardia noted on her prolonged heart monitor study.      Patient Active Problem List    Diagnosis Date Noted   • Essential hypertension, benign 11/10/2017   • BRIAN (acute kidney injury) ([facility]) 04/06/2021   • Right thyroid nodule 04/06/2021   • Anemia 04/04/2021   • Moderate episode of recurrent major depressive disorder ([facility]) 06/26/2019   • Imbalance 03/19/2019   • Ear pain, right 01/11/2019   • SVT (supraventricular tachycardia) ([facility]) 11/06/2018   • Localized edema 11/01/2018   • Reactive depression 09/27/2018   • Closed fracture of shaft of humerus 06/19/2018   • Dyslipidemia 06/19/2018   • Low serum vitamin D 06/19/2018   • Degenerative disc disease, lumbar 07/17/2017   • Non-rheumatic mitral regurgitation 05/07/2015   • Dry eyes, bilateral 03/27/2014   • Osteopenia of the elderly 03/27/2014   • S/P total knee replacement right 09/03/2013      Allergies:Other environmental    Current Outpatient Medications   Medication Sig Dispense Refill   • amLODIPine (NORVASC) 5 MG Tab Take 1 tablet by mouth every day. 30 tablet 0   • atorvastatin (LIPITOR) 20 MG Tab Take 1 tablet by mouth every evening. 30 tablet 0   • prednisoLONE acetate (PRED FORTE) 1 % Suspension Administer 1 Drop into both eyes see administration instructions. 1 drop in left eye four times a day   1 drop in right eye six times a day     • Cholecalciferol (VITAMIN D-3) 125 MCG (5000 UT) Tab Take 5,000 Units by mouth every day. 5000 unit tablet + 1000 unit tablet for a total of 6000 units daily     • Coenzyme Q10 (COQ10 PO) Take 1 capsule by mouth two times a week. Sunday & Wednesday     • Zinc 50 MG Tab Take 50 mg by mouth every day.     • metoprolol SR (TOPROL XL) 50 MG TABLET SR 24 HR TAKE ONE TABLET BY MOUTH ONCE DAILY 100 tablet 1   • escitalopram (LEXAPRO) 5 MG tablet Take 1 Tab by mouth every day. 90 Tab 3   • cyclosporin (RESTASIS) 0.05 % ophthalmic emulsion Administer 1 Drop  into affected eye(s) 2 times a day as needed (dry eye).     • B Complex-C (SUPER B COMPLEX PO) Take 1 tablet by mouth every morning.     • Glycerin-Polysorbate 80 (REFRESH DRY EYE THERAPY) 1-1 % SOLN Administer 1 Drop into affected eye(s) 4 times a day as needed (dry eye).     • vitamin D (CHOLECALCIFEROL) 1000 Unit (25 mcg) Tab Take 1,000 Units by mouth every day. 1000 unit tablet + 5000 unit tablet for a total of 6000 units daily       No current facility-administered medications for this visit.       Social History     Tobacco Use   • Smoking status: Never Smoker   • Smokeless tobacco: Never Used   • Tobacco comment: Pt exposed to second hand smoker   Substance Use Topics   • Alcohol use: Yes     Comment: Occasionally   • Drug use: No     Social History     Social History Narrative   • Not on file       Family History   Problem Relation Age of Onset   • Lung Disease Mother         frequent pneumonia   • Heart Disease Father 54        MI   • Lung Disease Father         Emphysema   • Heart Attack Father    • Cancer Maternal Aunt         Great Aunt and Cousin Ovarian CA   • Hypertension Maternal Aunt    • Cancer Daughter         Lung and Brain CA   • Seizures Daughter    • Cancer Daughter         Pituitary tumor   • Cancer Brother         leukemia       Review of Systems:  Review of Systems   Constitutional: Positive for malaise/fatigue. Negative for chills, fever and weight loss.   HENT: Negative for congestion and sinus pain.    Respiratory: Negative for cough, shortness of breath and stridor.    Cardiovascular: Negative for chest pain.   Gastrointestinal: Negative for abdominal pain, blood in stool, constipation, diarrhea, nausea and vomiting.   Genitourinary: Negative for dysuria, flank pain, frequency, hematuria and urgency.   Musculoskeletal: Negative for back pain, joint pain, myalgias and neck pain.   Neurological: Positive for dizziness and loss of consciousness (Presentation to ED).  "  Psychiatric/Behavioral: Negative for depression, substance abuse and suicidal ideas.        Exam:    /52 (BP Location: Right arm, Patient Position: Sitting, BP Cuff Size: Adult)   Pulse 77   Temp 36.8 °C (98.2 °F) (Temporal)   Ht 1.595 m (5' 2.8\")   Wt 61.2 kg (135 lb)   SpO2 97%  Body mass index is 24.07 kg/m².    General:  Well nourished, well developed female in NAD  Psych: Normal Behavior, Normal affect    LABS: 4/2/21 through 4/4/2021 and 4/8/2021: Results reviewed and discussed with the patient, questions answered.    Cardiac event monitor results reviewed and summarized as above    Ultrasound, chest x-ray, echocardiogram, reviewed and summarized as above.  Discussed with patient and daughter today.  Questions answered.    ED MD and hospitalist notes reviewed and summarized as above.    Assessment/Plan:  1. SVT (supraventricular tachycardia) (HCC)  Agree with discontinuing losartan and continuing amlodipine 5 mg daily.  Blood pressure currently well controlled.  Follow-up labs ordered as below.  Counseled on vagal maneuvers and handout provided today for vagal maneuvers should she feel tachycardic and symptomatic again in the future.  Follow-up with vagal maneuvers do not resolve her symptoms and tachycardia    2. Thyroid mass  New problem to examiner.  Fine-needle aspiration under ultrasound guidance ordered as below.  - US-FNA BIOPSY W/ US GUIDANCE; Future    3. BRIAN (acute kidney injury) (HCC)  Follow-up with labs below.  Continue minimum 32 ounces of water per day.  - CBC WITH DIFFERENTIAL; Future  - Basic Metabolic Panel; Future    4. Physical deconditioning  Referral to PT for ongoing feelings of weakness.  - REFERRAL TO PHYSICAL THERAPY    5.  Recurrent MDD in full remission  Stable.  Continue Lexapro 5 mg daily      My total time spent caring for the patient on the day of the encounter was 45 minutes.   This does not include time spent on separately billable procedures/tests.    Follow-up " in 1 month    Please note that this dictation was created using voice recognition software. I have worked with consultants from the vendor as well as technical experts from [facility] to optimize the interface. I have made every reasonable attempt to correct obvious errors, but I expect that there are errors of grammar and possibly content that I did not discover before finalizing the note.

## 2021-04-23 ENCOUNTER — OFFICE VISIT (OUTPATIENT)
Dept: CARDIOLOGY | Facility: MEDICAL CENTER | Age: 86
End: 2021-04-23
Payer: MEDICARE

## 2021-04-23 VITALS
BODY MASS INDEX: 23.21 KG/M2 | WEIGHT: 131 LBS | HEIGHT: 63 IN | SYSTOLIC BLOOD PRESSURE: 122 MMHG | HEART RATE: 74 BPM | OXYGEN SATURATION: 97 % | DIASTOLIC BLOOD PRESSURE: 64 MMHG

## 2021-04-23 DIAGNOSIS — I42.1 HOCM (HYPERTROPHIC OBSTRUCTIVE CARDIOMYOPATHY) (HCC): ICD-10-CM

## 2021-04-23 DIAGNOSIS — I10 ESSENTIAL HYPERTENSION, BENIGN: ICD-10-CM

## 2021-04-23 DIAGNOSIS — I47.10 SVT (SUPRAVENTRICULAR TACHYCARDIA) (HCC): ICD-10-CM

## 2021-04-23 DIAGNOSIS — E78.5 DYSLIPIDEMIA: ICD-10-CM

## 2021-04-23 PROCEDURE — 99214 OFFICE O/P EST MOD 30 MIN: CPT | Performed by: INTERNAL MEDICINE

## 2021-04-23 ASSESSMENT — FIBROSIS 4 INDEX: FIB4 SCORE: 2.59

## 2021-04-23 NOTE — PROGRESS NOTES
"Chief Complaint   Patient presents with   • Supraventricular Tachycardia (SVT)   • HTN (Controlled)   • Dyslipidemia       Subjective:   Duong Carmichael is a 87 y.o. female who presents today for home follow-up of hypertension and SVT.  She had no sense of palpitations.  Recent monitoring reveals no significant pauses or SVT.  She had an episode of syncope and was hospitalized in early April.  The tape patient was taken off metoprolol on discharge on amlodipine.  Her echo from April 3 was personally reviewed.  Shows hyperdynamic LV with EF of 75% and greater and evidence of mid cavity obstruction and a mid cavity outflow gradient.  She complains of the same symptom that she has in the past she feels as though something is \"passing over her face\" and she gets lightheaded.  She had an episode of syncope that prompted a hospitalization in early April but none since.    Past Medical History:   Diagnosis Date   • Arrhythmia    • Arthritis     knees   • Bronchitis     long time ago   • CATARACT     no surgery yet   • Dry eyes, bilateral 3/27/2014   • Osteopenia of the elderly 3/27/2014   • Pneumonia     long time ago     Past Surgical History:   Procedure Laterality Date   • KNEE ARTHROPLASTY TOTAL  9/3/2013    Performed by Ramesh Styles M.D. at SURGERY [facility]S   • TONSILLECTOMY       Family History   Problem Relation Age of Onset   • Lung Disease Mother         frequent pneumonia   • Heart Disease Father 54        MI   • Lung Disease Father         Emphysema   • Heart Attack Father    • Cancer Maternal Aunt         Great Aunt and Cousin Ovarian CA   • Hypertension Maternal Aunt    • Cancer Daughter         Lung and Brain CA   • Seizures Daughter    • Cancer Daughter         Pituitary tumor   • Cancer Brother         leukemia     Social History     Socioeconomic History   • Marital status:      Spouse name: Not on file   • Number of children: Not on file   • Years of education: Not on file   • Highest " education level: Not on file   Occupational History   • Not on file   Tobacco Use   • Smoking status: Never Smoker   • Smokeless tobacco: Never Used   • Tobacco comment: Pt exposed to second hand smoker   Substance and Sexual Activity   • Alcohol use: Yes     Comment: Occasionally   • Drug use: No   • Sexual activity: Never   Other Topics Concern   • Not on file   Social History Narrative   • Not on file     Social Determinants of Health     Financial Resource Strain:    • Difficulty of Paying Living Expenses:    Food Insecurity:    • Worried About Running Out of Food in the Last Year:    • Ran Out of Food in the Last Year:    Transportation Needs:    • Lack of Transportation (Medical):    • Lack of Transportation (Non-Medical):    Physical Activity:    • Days of Exercise per Week:    • Minutes of Exercise per Session:    Stress:    • Feeling of Stress :    Social Connections:    • Frequency of Communication with Friends and Family:    • Frequency of Social Gatherings with Friends and Family:    • Attends Church Services:    • Active Member of Clubs or Organizations:    • Attends Club or Organization Meetings:    • Marital Status:    Intimate Partner Violence:    • Fear of Current or Ex-Partner:    • Emotionally Abused:    • Physically Abused:    • Sexually Abused:      Allergies   Allergen Reactions   • Other Environmental      Seasonal allergies     Outpatient Encounter Medications as of 4/23/2021   Medication Sig Dispense Refill   • amLODIPine (NORVASC) 5 MG Tab Take 1 tablet by mouth every day. 30 tablet 0   • atorvastatin (LIPITOR) 20 MG Tab Take 1 tablet by mouth every evening. 30 tablet 0   • prednisoLONE acetate (PRED FORTE) 1 % Suspension Administer 1 Drop into both eyes see administration instructions. 1 drop in left eye four times a day   1 drop in right eye six times a day     • Cholecalciferol (VITAMIN D-3) 125 MCG (5000 UT) Tab Take 5,000 Units by mouth every day. 5000 unit tablet + 1000 unit tablet  "for a total of 6000 units daily     • Coenzyme Q10 (COQ10 PO) Take 1 capsule by mouth two times a week. Sunday & Wednesday     • Zinc 50 MG Tab Take 50 mg by mouth every day.     • metoprolol SR (TOPROL XL) 50 MG TABLET SR 24 HR TAKE ONE TABLET BY MOUTH ONCE DAILY 100 tablet 1   • escitalopram (LEXAPRO) 5 MG tablet Take 1 Tab by mouth every day. 90 Tab 3   • cyclosporin (RESTASIS) 0.05 % ophthalmic emulsion Administer 1 Drop into affected eye(s) 2 times a day as needed (dry eye).     • B Complex-C (SUPER B COMPLEX PO) Take 1 tablet by mouth every morning.     • Glycerin-Polysorbate 80 (REFRESH DRY EYE THERAPY) 1-1 % SOLN Administer 1 Drop into affected eye(s) 4 times a day as needed (dry eye).     • vitamin D (CHOLECALCIFEROL) 1000 Unit (25 mcg) Tab Take 1,000 Units by mouth every day. 1000 unit tablet + 5000 unit tablet for a total of 6000 units daily       No facility-administered encounter medications on file as of 4/23/2021.     ROS     Objective:   /64 (BP Location: Left arm, Patient Position: Sitting, BP Cuff Size: Adult)   Pulse 74   Ht 1.6 m (5' 3\")   Wt 59.4 kg (131 lb)   SpO2 97%   BMI 23.21 kg/m²     Physical Exam    Assessment:     1. SVT (supraventricular tachycardia) ([facility])  Basic Metabolic Panel   2. Essential hypertension, benign  Basic Metabolic Panel   3. Dyslipidemia  Basic Metabolic Panel   4. HOCM (hypertrophic obstructive cardiomyopathy) ([facility])  Basic Metabolic Panel       Medical Decision Making:  Today's Assessment / Status / Plan:   Hypertension: Under excellent control on current medications but patient has some mid cavity obliteration and therefore should not be in amlodipine.  Orthostatic blood pressure demonstrated 10 mm drop from lying to standing today.  She should be taken off amlodipine because of the mid cavity obliteration embolism will be restarted on metoprolol but only 25 mg a day.    Syncope: Etiology is uncertain may have been vasovagal.      Acute kidney injury: Lab " in the end the hospital demonstrated BUN and creatinine of 21.81 respectively.  GFR 26 on admission.  This is a new diagnosis for her.  We will repeat lab before next visit in 2 to 3 weeks.    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  Although the gradient is not severe drug such as diuretics and amlodipine should be avoided.    Plan:  Discontinue amlodipine  Restart metoprolol though at a lower dose (25 mg a day)  Because of her advanced age, she can discontinue statin that was added for primary prevention  Lab before next visit  Return 2 to 3 weeks

## 2021-04-27 ENCOUNTER — HOSPITAL ENCOUNTER (OUTPATIENT)
Dept: RADIOLOGY | Facility: MEDICAL CENTER | Age: 86
End: 2021-04-27
Attending: FAMILY MEDICINE
Payer: MEDICARE

## 2021-04-27 ENCOUNTER — TELEPHONE (OUTPATIENT)
Dept: CARDIOLOGY | Facility: MEDICAL CENTER | Age: 86
End: 2021-04-27

## 2021-04-27 DIAGNOSIS — E07.9 THYROID MASS: ICD-10-CM

## 2021-04-27 LAB — CYTOLOGY REG CYTOL: NORMAL

## 2021-04-27 PROCEDURE — 10005 FNA BX W/US GDN 1ST LES: CPT

## 2021-04-27 PROCEDURE — 88112 CYTOPATH CELL ENHANCE TECH: CPT

## 2021-04-27 NOTE — PROGRESS NOTES
Outpatient Interventional Radiology RN Note:    US-guided fine needle aspiration of right thyroid nodule completed by Dr. Ordaz; Procedure explained by MD prior to start and consent obtained, all questions/concerns addressed; Site marked and visualized; No procedural sedation required.    Procedure completed via right anterior neck; 1 jar of cytolyt obtained and sent to pathology for analysis; Puncture site covered with bandaid upon completion.    Patient tolerated the procedure well; Provided with appropriate discharge education, all questions/concerns addressed; Patient discharged home.

## 2021-04-27 NOTE — TELEPHONE ENCOUNTER
----- Message from Reuben Velasquez M.D. sent at 4/27/2021  7:43 AM PDT -----  Lab reviewed.  Renal function is reduced but stable.  I think she has a follow-up appointment in the very near future.  ----- Message -----  From: Jael Prince L.P.N.  Sent: 4/9/2021   2:54 PM PDT  To: Reuben Velasquez M.D.     6-10-21 with MAHAMED

## 2021-04-30 ENCOUNTER — TELEPHONE (OUTPATIENT)
Dept: CARDIOLOGY | Facility: MEDICAL CENTER | Age: 86
End: 2021-04-30

## 2021-04-30 NOTE — TELEPHONE ENCOUNTER
"Spoke with pt. She had 5 brief episodes (2-3 min.) of SVT on Sun. And I after awaking this am. She reported feeling \"shakey\" and unbalanced; she feels episodes coming and sits down or holds on to something.   Dr. Velasquez restarted pt. On Toprol 25mg Q PM (pt. Was previously taking Toprol 50mg).   Scheduled pt. To see [facility] on Mon.  "

## 2021-04-30 NOTE — TELEPHONE ENCOUNTER
RS    Pt called stating last night she had an episode and her blood pressure was 81/60 pulse 148, Pt took her readings again 5 minutes later and her blood pressure was 126/61 pulse 82. Pt states she had another episode this morning, but did not check her blood pressure. Pt states she is feeling ok now because she is sitting. Please call Pt back at [phone].    Thank you

## 2021-05-03 ENCOUNTER — OFFICE VISIT (OUTPATIENT)
Dept: CARDIOLOGY | Facility: MEDICAL CENTER | Age: 86
End: 2021-05-03
Payer: MEDICARE

## 2021-05-03 VITALS
SYSTOLIC BLOOD PRESSURE: 116 MMHG | HEIGHT: 63 IN | OXYGEN SATURATION: 96 % | RESPIRATION RATE: 14 BRPM | WEIGHT: 129 LBS | DIASTOLIC BLOOD PRESSURE: 60 MMHG | HEART RATE: 66 BPM | BODY MASS INDEX: 22.86 KG/M2

## 2021-05-03 DIAGNOSIS — I42.1 HOCM (HYPERTROPHIC OBSTRUCTIVE CARDIOMYOPATHY) (HCC): ICD-10-CM

## 2021-05-03 DIAGNOSIS — I34.0 NON-RHEUMATIC MITRAL REGURGITATION: ICD-10-CM

## 2021-05-03 DIAGNOSIS — I10 ESSENTIAL HYPERTENSION, BENIGN: ICD-10-CM

## 2021-05-03 DIAGNOSIS — I10 ESSENTIAL HYPERTENSION: ICD-10-CM

## 2021-05-03 DIAGNOSIS — E78.5 DYSLIPIDEMIA: ICD-10-CM

## 2021-05-03 DIAGNOSIS — I47.10 SVT (SUPRAVENTRICULAR TACHYCARDIA) (HCC): ICD-10-CM

## 2021-05-03 DIAGNOSIS — R26.89 IMBALANCE: ICD-10-CM

## 2021-05-03 PROCEDURE — 99214 OFFICE O/P EST MOD 30 MIN: CPT | Performed by: NURSE PRACTITIONER

## 2021-05-03 RX ORDER — METOPROLOL SUCCINATE 25 MG/1
25 TABLET, EXTENDED RELEASE ORAL DAILY
Qty: 90 TABLET | Refills: 3 | Status: ON HOLD | OUTPATIENT
Start: 2021-05-03 | End: 2021-05-13 | Stop reason: SDUPTHER

## 2021-05-03 ASSESSMENT — ENCOUNTER SYMPTOMS
SHORTNESS OF BREATH: 0
WHEEZING: 0
HEMOPTYSIS: 0
COUGH: 0
ORTHOPNEA: 0
PND: 0
PALPITATIONS: 0
WEAKNESS: 0
SPUTUM PRODUCTION: 0
BLURRED VISION: 1
DIZZINESS: 1
CLAUDICATION: 0
NAUSEA: 0
VOMITING: 0

## 2021-05-03 ASSESSMENT — FIBROSIS 4 INDEX: FIB4 SCORE: 2.59

## 2021-05-03 NOTE — PROGRESS NOTES
Chief Complaint   Patient presents with   • Supraventricular Tachycardia (SVT)       Subjective:   Duong Carmichael is a 87 y.o. female who presents today for follow up PSVT.     She is followed by Dr. Velasquez in our clinic, history of hypertension, SVT, and dizziness.     Hospitalized for presyncope 4/4/2021.  biotel monitor showed no significant arrhythmias with the longest pause being 2 seconds.  Echocardiogram showed LVOTpeak velocity is 2.6 m/sec. Systolic anterior motion of the anterior mitral valve leaflet..  Creatinine was elevated.  Ultrasound demonstrated renal disease without any hydronephrosis. Noted to be dehydrated and iv fluids was given. Carotid duplex showed greater than 50% narrowing right ICA, unable to do ct due to BRIAN. incidental finding right thyroid 2.9 cm mass. biospy showed cyst.    Patient called Friday, having lightheadedness dizziness.  She noted that her blood pressure dropped to systolic in the 80s with episodes of tachycardia up to the 140s.  Resolved within 5 minutes.  Patient had no improvement with vasovagal maneuvers.  She has been trying to increase her fluid intake.  She continues to take metoprolol 25 mg XL.    Denies any chest pain, sob, palpitations or leg swelling.     Past Medical History:   Diagnosis Date   • Arrhythmia    • Arthritis     knees   • Bronchitis     long time ago   • CATARACT     no surgery yet   • Dry eyes, bilateral 3/27/2014   • Osteopenia of the elderly 3/27/2014   • Pneumonia     long time ago     Past Surgical History:   Procedure Laterality Date   • KNEE ARTHROPLASTY TOTAL  9/3/2013    Performed by Ramesh Styles M.D. at SURGERY [facility] ORS   • TONSILLECTOMY       Family History   Problem Relation Age of Onset   • Lung Disease Mother         frequent pneumonia   • Heart Disease Father 54        MI   • Lung Disease Father         Emphysema   • Heart Attack Father    • Cancer Maternal Aunt         Great Aunt and Cousin Ovarian CA   • Hypertension Maternal  Aunt    • Cancer Daughter         Lung and Brain CA   • Seizures Daughter    • Cancer Daughter         Pituitary tumor   • Cancer Brother         leukemia     Social History     Socioeconomic History   • Marital status:      Spouse name: Not on file   • Number of children: Not on file   • Years of education: Not on file   • Highest education level: Not on file   Occupational History   • Not on file   Tobacco Use   • Smoking status: Never Smoker   • Smokeless tobacco: Never Used   • Tobacco comment: Pt exposed to second hand smoker   Substance and Sexual Activity   • Alcohol use: Yes     Comment: Occasionally   • Drug use: No   • Sexual activity: Never   Other Topics Concern   • Not on file   Social History Narrative   • Not on file     Social Determinants of Health     Financial Resource Strain:    • Difficulty of Paying Living Expenses:    Food Insecurity:    • Worried About Running Out of Food in the Last Year:    • Ran Out of Food in the Last Year:    Transportation Needs:    • Lack of Transportation (Medical):    • Lack of Transportation (Non-Medical):    Physical Activity:    • Days of Exercise per Week:    • Minutes of Exercise per Session:    Stress:    • Feeling of Stress :    Social Connections:    • Frequency of Communication with Friends and Family:    • Frequency of Social Gatherings with Friends and Family:    • Attends [employer] Services:    • Active Member of Clubs or Organizations:    • Attends Club or Organization Meetings:    • Marital Status:    Intimate Partner Violence:    • Fear of Current or Ex-Partner:    • Emotionally Abused:    • Physically Abused:    • Sexually Abused:      Allergies   Allergen Reactions   • Other Environmental      Seasonal allergies     Outpatient Encounter Medications as of 5/3/2021   Medication Sig Dispense Refill   • metoprolol SR (TOPROL XL) 25 MG TABLET SR 24 HR Take 1 tablet by mouth every day. 90 tablet 3   • prednisoLONE acetate (PRED FORTE) 1 % Suspension  Administer 1 Drop into both eyes see administration instructions. 1 drop in left eye four times a day   1 drop in right eye six times a day     • Cholecalciferol (VITAMIN D-3) 125 MCG (5000 UT) Tab Take 5,000 Units by mouth every day. 5000 unit tablet + 1000 unit tablet for a total of 6000 units daily     • Coenzyme Q10 (COQ10 PO) Take 1 capsule by mouth two times a week. Sunday & Wednesday     • Zinc 50 MG Tab Take 50 mg by mouth every day.     • escitalopram (LEXAPRO) 5 MG tablet Take 1 Tab by mouth every day. 90 Tab 3   • cyclosporin (RESTASIS) 0.05 % ophthalmic emulsion Administer 1 Drop into affected eye(s) 2 times a day as needed (dry eye).     • B Complex-C (SUPER B COMPLEX PO) Take 1 tablet by mouth every morning.     • Glycerin-Polysorbate 80 (REFRESH DRY EYE THERAPY) 1-1 % SOLN Administer 1 Drop into affected eye(s) 4 times a day as needed (dry eye).     • vitamin D (CHOLECALCIFEROL) 1000 Unit (25 mcg) Tab Take 1,000 Units by mouth every day. 1000 unit tablet + 5000 unit tablet for a total of 6000 units daily     • [DISCONTINUED] atorvastatin (LIPITOR) 20 MG Tab Take 1 tablet by mouth every evening. 30 tablet 0   • [DISCONTINUED] metoprolol SR (TOPROL XL) 50 MG TABLET SR 24 HR TAKE ONE TABLET BY MOUTH ONCE DAILY (Patient taking differently: Take 25 mg by mouth every day.) 100 tablet 1     No facility-administered encounter medications on file as of 5/3/2021.     Review of Systems   Constitutional: Negative for malaise/fatigue.   Eyes: Positive for blurred vision.   Respiratory: Negative for cough, hemoptysis, sputum production, shortness of breath and wheezing.    Cardiovascular: Negative for chest pain, palpitations, orthopnea, claudication, leg swelling and PND.   Gastrointestinal: Negative for nausea and vomiting.   Neurological: Positive for dizziness. Negative for weakness.        Objective:   /60 (BP Location: Right arm, Patient Position: Sitting, BP Cuff Size: Adult)   Pulse 66   Resp 14    "Ht 1.6 m (5' 3\")   Wt 58.5 kg (129 lb)   SpO2 96%   BMI 22.85 kg/m²     Physical Exam   Constitutional: She is oriented to person, place, and time. She appears well-developed and well-nourished. No distress.   HENT:   Head: Normocephalic and atraumatic.   Eyes: Pupils are equal, round, and reactive to light.   Neck: No JVD present.   Cardiovascular: Normal rate, regular rhythm and normal heart sounds.   No murmur heard.  Pulmonary/Chest: Effort normal and breath sounds normal.   Abdominal: Soft. Bowel sounds are normal. She exhibits no distension.   Musculoskeletal:         General: No edema.   Neurological: She is alert and oriented to person, place, and time.   Skin: Skin is warm and dry. She is not diaphoretic.   Psychiatric: She has a normal mood and affect. Her behavior is normal. Judgment and thought content normal.         Echocardiography Laboratory   4/3/2021  Compared to the images of the prior study done 11/2/2018, intracavitary   gradient is present.  Hyperdynamic left ventricular systolic function.  Left ventricular ejection fraction is visually estimated to be greater than 75%.  Evidence of increased intracavity gradient, peak velocity is 2.6 m/sec.  Systolic anterior motion of the anterior mitral valve leaflet.    Carotid Duplex  4/3/2021   Greater than 50% narrowing right ICA   Right thyroid 2.9 cm mass for which dedicated thyroid US is advised to    characterize further.  Biopsy may be warranted.    US renal   4/4/2021  1.  No hydronephrosis.  2.  Bilateral renal cysts, which do not require imaging follow-up.  3.  Findings a medical renal disease.    Lab Results   Component Value Date/Time    CHOLSTRLTOT 200 (H) 06/29/2020 10:29 AM     (H) 06/29/2020 10:29 AM    HDL 62 06/29/2020 10:29 AM    TRIGLYCERIDE 136 06/29/2020 10:29 AM       Lab Results   Component Value Date/Time    SODIUM 139 04/08/2021 01:46 PM    POTASSIUM 4.0 04/08/2021 01:46 PM    CHLORIDE 109 04/08/2021 01:46 PM    CO2 20 " 04/08/2021 01:46 PM    GLUCOSE 107 (H) 04/08/2021 01:46 PM    BUN 17 04/08/2021 01:46 PM    CREATININE 1.55 (H) 04/08/2021 01:46 PM     Lab Results   Component Value Date/Time    ALKPHOSPHAT 66 04/02/2021 04:09 PM    ASTSGOT 21 04/02/2021 04:09 PM    ALTSGPT 11 04/02/2021 04:09 PM    TBILIRUBIN 0.4 04/02/2021 04:09 PM        Assessment:     1. Essential hypertension  metoprolol SR (TOPROL XL) 25 MG TABLET SR 24 HR   2. SVT (supraventricular tachycardia) (HCC)     3. Imbalance  CT-HEAD W/O   4. Dyslipidemia     5. Non-rheumatic mitral regurgitation     6. Essential hypertension, benign     7. HOCM (hypertrophic obstructive cardiomyopathy) (HCC)         Medical Decision Making:  Today's Assessment / Status / Plan:     1. PSVT:  - history of SVT , biotel monitor showed 140s bpm. Having brief episodes of palpitations   - she will increase metoprolol XL 37.5mg qd. Will discuss with Dr. Velasquez regarding alternative medications.     2. Hypertension:  - stable     3. LVOT:  - recommend patient stay hydrated   - Evidence of increased intracavity gradient, peak velocity is 2.6 m/sec.  - continue bb therapy     5. A or CKI:  - repeat BMP   - reinforce staying hydrated     6. Right thyroid nodule:  - Right thyroid 2.9 cm mass for which dedicated thyroid US is advised to    characterize further.    - biopsy showed cyst, following up    7. Imbalance and weight loss 12lbs within a month:  - CT head     Follow up in 1 week, sooner as needed.

## 2021-05-04 ENCOUNTER — HOSPITAL ENCOUNTER (OUTPATIENT)
Dept: RADIOLOGY | Facility: MEDICAL CENTER | Age: 86
End: 2021-05-04
Attending: NURSE PRACTITIONER
Payer: MEDICARE

## 2021-05-04 DIAGNOSIS — R26.89 IMBALANCE: ICD-10-CM

## 2021-05-04 PROCEDURE — 70450 CT HEAD/BRAIN W/O DYE: CPT | Mod: MG

## 2021-05-05 ENCOUNTER — HOSPITAL ENCOUNTER (OUTPATIENT)
Dept: LAB | Facility: MEDICAL CENTER | Age: 86
End: 2021-05-05
Attending: FAMILY MEDICINE
Payer: MEDICARE

## 2021-05-05 DIAGNOSIS — N17.9 AKI (ACUTE KIDNEY INJURY) (HCC): ICD-10-CM

## 2021-05-05 LAB
ANION GAP SERPL CALC-SCNC: 11 MMOL/L (ref 7–16)
BASOPHILS # BLD AUTO: 1.2 % (ref 0–1.8)
BASOPHILS # BLD: 0.1 K/UL (ref 0–0.12)
BUN SERPL-MCNC: 27 MG/DL (ref 8–22)
CALCIUM SERPL-MCNC: 9.4 MG/DL (ref 8.5–10.5)
CHLORIDE SERPL-SCNC: 107 MMOL/L (ref 96–112)
CO2 SERPL-SCNC: 21 MMOL/L (ref 20–33)
CREAT SERPL-MCNC: 1.61 MG/DL (ref 0.5–1.4)
EOSINOPHIL # BLD AUTO: 0.34 K/UL (ref 0–0.51)
EOSINOPHIL NFR BLD: 3.9 % (ref 0–6.9)
ERYTHROCYTE [DISTWIDTH] IN BLOOD BY AUTOMATED COUNT: 50.4 FL (ref 35.9–50)
FASTING STATUS PATIENT QL REPORTED: NORMAL
GLUCOSE SERPL-MCNC: 100 MG/DL (ref 65–99)
HCT VFR BLD AUTO: 35.2 % (ref 37–47)
HGB BLD-MCNC: 11.1 G/DL (ref 12–16)
IMM GRANULOCYTES # BLD AUTO: 0.05 K/UL (ref 0–0.11)
IMM GRANULOCYTES NFR BLD AUTO: 0.6 % (ref 0–0.9)
LYMPHOCYTES # BLD AUTO: 2.44 K/UL (ref 1–4.8)
LYMPHOCYTES NFR BLD: 28.3 % (ref 22–41)
MCH RBC QN AUTO: 28.7 PG (ref 27–33)
MCHC RBC AUTO-ENTMCNC: 31.5 G/DL (ref 33.6–35)
MCV RBC AUTO: 91 FL (ref 81.4–97.8)
MONOCYTES # BLD AUTO: 1.07 K/UL (ref 0–0.85)
MONOCYTES NFR BLD AUTO: 12.4 % (ref 0–13.4)
NEUTROPHILS # BLD AUTO: 4.62 K/UL (ref 2–7.15)
NEUTROPHILS NFR BLD: 53.6 % (ref 44–72)
NRBC # BLD AUTO: 0 K/UL
NRBC BLD-RTO: 0 /100 WBC
PLATELET # BLD AUTO: 227 K/UL (ref 164–446)
PMV BLD AUTO: 11 FL (ref 9–12.9)
POTASSIUM SERPL-SCNC: 4 MMOL/L (ref 3.6–5.5)
RBC # BLD AUTO: 3.87 M/UL (ref 4.2–5.4)
SODIUM SERPL-SCNC: 139 MMOL/L (ref 135–145)
WBC # BLD AUTO: 8.6 K/UL (ref 4.8–10.8)

## 2021-05-05 PROCEDURE — 80048 BASIC METABOLIC PNL TOTAL CA: CPT

## 2021-05-05 PROCEDURE — 36415 COLL VENOUS BLD VENIPUNCTURE: CPT

## 2021-05-05 PROCEDURE — 85025 COMPLETE CBC W/AUTO DIFF WBC: CPT

## 2021-05-06 ENCOUNTER — TELEPHONE (OUTPATIENT)
Dept: MEDICAL GROUP | Facility: PHYSICIAN GROUP | Age: 86
End: 2021-05-06

## 2021-05-06 NOTE — TELEPHONE ENCOUNTER
Future Appointments       Provider Department Center    5/7/2021 1:00 PM Reuben [facility]asquez M.D. [facility]     5/12/2021 1:30 PM Juan Santana M.D. [facility]    6/2/2021 2:45 PM Cris Patel, PT, DPT [facility]    6/25/2021 10:30 AM Juan Santana M.D. [facility]        ESTABLISHED PATIENT PRE-VISIT PLANNING     Patient was contacted to complete PVP.     Note: Patient will not be contacted if there is no indication to call.     1.  Reviewed notes from the last few office visits within the medical group: Yes, LOV 04/14/2021    2.  If any orders were placed at last visit or intended to be done for this visit (i.e. 6 mos follow-up), do we have Results/Consult Notes?         •  Labs - Labs ordered, completed on 05/05/21 and results are in chart.  Note: If patient appointment is for lab review and patient did not complete labs, check with provider if OK to reschedule patient until labs completed.       •  Imaging - Imaging was not ordered at last office visit.       •  Referrals - Referral ordered, patient has NOT been seen. Patient is needing to have home PT, I offered to send mess to provider, pt declined as she is coming in on 05/12/21 and would like to discuss the referral to PT at that time with provider.    3. Is this appointment scheduled as a Hospital Follow-Up? No    4.  Immunizations were updated in Epic using Reconcile Outside Information activity? Yes    5.  Patient is due for the following Health Maintenance Topics:   Health Maintenance Due   Topic Date Due   • IMM DTaP/Tdap/Td Vaccine (1 - Tdap) Never done   • Annual Wellness Visit  03/28/2015     6.  AHA (Pulse8) form printed for Provider? N/A   Patient was advised to arrive 15 minutes prior to scheduled appointment

## 2021-05-07 ENCOUNTER — HOSPITAL ENCOUNTER (OUTPATIENT)
Dept: RADIOLOGY | Facility: MEDICAL CENTER | Age: 86
End: 2021-05-07
Attending: PHYSICIAN ASSISTANT
Payer: MEDICARE

## 2021-05-07 ENCOUNTER — APPOINTMENT (OUTPATIENT)
Dept: CARDIOLOGY | Facility: MEDICAL CENTER | Age: 86
End: 2021-05-07
Payer: MEDICARE

## 2021-05-07 ENCOUNTER — OFFICE VISIT (OUTPATIENT)
Dept: URGENT CARE | Facility: PHYSICIAN GROUP | Age: 86
End: 2021-05-07
Payer: MEDICARE

## 2021-05-07 ENCOUNTER — HOSPITAL ENCOUNTER (OUTPATIENT)
Facility: MEDICAL CENTER | Age: 86
End: 2021-05-13
Attending: EMERGENCY MEDICINE | Admitting: STUDENT IN AN ORGANIZED HEALTH CARE EDUCATION/TRAINING PROGRAM
Payer: MEDICARE

## 2021-05-07 VITALS
RESPIRATION RATE: 18 BRPM | TEMPERATURE: 97.9 F | HEIGHT: 63 IN | SYSTOLIC BLOOD PRESSURE: 120 MMHG | OXYGEN SATURATION: 91 % | WEIGHT: 130.6 LBS | DIASTOLIC BLOOD PRESSURE: 50 MMHG | BODY MASS INDEX: 23.14 KG/M2 | HEART RATE: 83 BPM

## 2021-05-07 DIAGNOSIS — R00.2 INTERMITTENT PALPITATIONS: ICD-10-CM

## 2021-05-07 DIAGNOSIS — R07.81 RIB PAIN ON LEFT SIDE: ICD-10-CM

## 2021-05-07 DIAGNOSIS — R55 VASOVAGAL SYNCOPE: ICD-10-CM

## 2021-05-07 DIAGNOSIS — N18.32 STAGE 3B CHRONIC KIDNEY DISEASE: ICD-10-CM

## 2021-05-07 DIAGNOSIS — I10 ESSENTIAL HYPERTENSION: ICD-10-CM

## 2021-05-07 DIAGNOSIS — S22.42XA CLOSED FRACTURE OF MULTIPLE RIBS OF LEFT SIDE, INITIAL ENCOUNTER: ICD-10-CM

## 2021-05-07 LAB — EKG IMPRESSION: NORMAL

## 2021-05-07 PROCEDURE — U0003 INFECTIOUS AGENT DETECTION BY NUCLEIC ACID (DNA OR RNA); SEVERE ACUTE RESPIRATORY SYNDROME CORONAVIRUS 2 (SARS-COV-2) (CORONAVIRUS DISEASE [COVID-19]), AMPLIFIED PROBE TECHNIQUE, MAKING USE OF HIGH THROUGHPUT TECHNOLOGIES AS DESCRIBED BY CMS-2020-01-R: HCPCS

## 2021-05-07 PROCEDURE — U0005 INFEC AGEN DETEC AMPLI PROBE: HCPCS

## 2021-05-07 PROCEDURE — 99215 OFFICE O/P EST HI 40 MIN: CPT | Performed by: PHYSICIAN ASSISTANT

## 2021-05-07 PROCEDURE — 93005 ELECTROCARDIOGRAM TRACING: CPT | Performed by: EMERGENCY MEDICINE

## 2021-05-07 PROCEDURE — G0378 HOSPITAL OBSERVATION PER HR: HCPCS

## 2021-05-07 PROCEDURE — 93005 ELECTROCARDIOGRAM TRACING: CPT

## 2021-05-07 PROCEDURE — 99220 PR INITIAL OBSERVATION CARE,LEVL III: CPT | Performed by: STUDENT IN AN ORGANIZED HEALTH CARE EDUCATION/TRAINING PROGRAM

## 2021-05-07 PROCEDURE — 99285 EMERGENCY DEPT VISIT HI MDM: CPT

## 2021-05-07 PROCEDURE — 93000 ELECTROCARDIOGRAM COMPLETE: CPT | Performed by: PHYSICIAN ASSISTANT

## 2021-05-07 PROCEDURE — 71101 X-RAY EXAM UNILAT RIBS/CHEST: CPT | Mod: LT

## 2021-05-07 RX ORDER — METHOCARBAMOL 500 MG/1
500 TABLET, FILM COATED ORAL 3 TIMES DAILY
Status: COMPLETED | OUTPATIENT
Start: 2021-05-08 | End: 2021-05-10

## 2021-05-07 RX ORDER — MORPHINE SULFATE 4 MG/ML
2 INJECTION, SOLUTION INTRAMUSCULAR; INTRAVENOUS
Status: DISCONTINUED | OUTPATIENT
Start: 2021-05-07 | End: 2021-05-08

## 2021-05-07 RX ORDER — GUAIFENESIN/DEXTROMETHORPHAN 100-10MG/5
10 SYRUP ORAL EVERY 6 HOURS PRN
Status: DISCONTINUED | OUTPATIENT
Start: 2021-05-07 | End: 2021-05-13 | Stop reason: HOSPADM

## 2021-05-07 RX ORDER — PREDNISOLONE ACETATE 10 MG/ML
1 SUSPENSION/ DROPS OPHTHALMIC 4 TIMES DAILY
Status: DISCONTINUED | OUTPATIENT
Start: 2021-05-08 | End: 2021-05-13 | Stop reason: HOSPADM

## 2021-05-07 RX ORDER — ACETAMINOPHEN 325 MG/1
650 TABLET ORAL EVERY 6 HOURS PRN
Status: DISCONTINUED | OUTPATIENT
Start: 2021-05-07 | End: 2021-05-07

## 2021-05-07 RX ORDER — LIDOCAINE 50 MG/G
1 PATCH TOPICAL EVERY 24 HOURS
Status: DISCONTINUED | OUTPATIENT
Start: 2021-05-08 | End: 2021-05-13 | Stop reason: HOSPADM

## 2021-05-07 RX ORDER — OXYCODONE HYDROCHLORIDE 5 MG/1
2.5 TABLET ORAL
Status: DISCONTINUED | OUTPATIENT
Start: 2021-05-07 | End: 2021-05-09

## 2021-05-07 RX ORDER — ONDANSETRON 2 MG/ML
4 INJECTION INTRAMUSCULAR; INTRAVENOUS EVERY 4 HOURS PRN
Status: DISCONTINUED | OUTPATIENT
Start: 2021-05-07 | End: 2021-05-13 | Stop reason: HOSPADM

## 2021-05-07 RX ORDER — METOPROLOL SUCCINATE 25 MG/1
25 TABLET, EXTENDED RELEASE ORAL DAILY
Status: DISCONTINUED | OUTPATIENT
Start: 2021-05-08 | End: 2021-05-07

## 2021-05-07 RX ORDER — PREDNISOLONE ACETATE 10 MG/ML
1 SUSPENSION/ DROPS OPHTHALMIC
Status: DISCONTINUED | OUTPATIENT
Start: 2021-05-08 | End: 2021-05-08

## 2021-05-07 RX ORDER — ZINC SULFATE 50(220)MG
220 CAPSULE ORAL DAILY
Status: DISCONTINUED | OUTPATIENT
Start: 2021-05-08 | End: 2021-05-08

## 2021-05-07 RX ORDER — AMOXICILLIN 250 MG
2 CAPSULE ORAL 2 TIMES DAILY
Status: DISCONTINUED | OUTPATIENT
Start: 2021-05-07 | End: 2021-05-08

## 2021-05-07 RX ORDER — ACETAMINOPHEN 325 MG/1
650 TABLET ORAL EVERY 6 HOURS
Status: DISCONTINUED | OUTPATIENT
Start: 2021-05-08 | End: 2021-05-08

## 2021-05-07 RX ORDER — BISACODYL 10 MG
10 SUPPOSITORY, RECTAL RECTAL
Status: DISCONTINUED | OUTPATIENT
Start: 2021-05-07 | End: 2021-05-08

## 2021-05-07 RX ORDER — POLYETHYLENE GLYCOL 3350 17 G/17G
1 POWDER, FOR SOLUTION ORAL
Status: DISCONTINUED | OUTPATIENT
Start: 2021-05-07 | End: 2021-05-08

## 2021-05-07 RX ORDER — MORPHINE SULFATE 4 MG/ML
4 INJECTION, SOLUTION INTRAMUSCULAR; INTRAVENOUS ONCE
Status: DISCONTINUED | OUTPATIENT
Start: 2021-05-07 | End: 2021-05-08

## 2021-05-07 RX ORDER — ESCITALOPRAM OXALATE 10 MG/1
5 TABLET ORAL DAILY
Status: DISCONTINUED | OUTPATIENT
Start: 2021-05-08 | End: 2021-05-13 | Stop reason: HOSPADM

## 2021-05-07 RX ORDER — KETOTIFEN FUMARATE 0.25 MG/ML
1 SOLUTION/ DROPS OPHTHALMIC
COMMUNITY
End: 2021-05-18

## 2021-05-07 RX ORDER — METOPROLOL TARTRATE 1 MG/ML
5 INJECTION, SOLUTION INTRAVENOUS
Status: DISCONTINUED | OUTPATIENT
Start: 2021-05-07 | End: 2021-05-08

## 2021-05-07 RX ORDER — HYDRALAZINE HYDROCHLORIDE 50 MG/1
50 TABLET, FILM COATED ORAL EVERY 8 HOURS PRN
Status: DISCONTINUED | OUTPATIENT
Start: 2021-05-07 | End: 2021-05-08

## 2021-05-07 RX ORDER — OXYCODONE HYDROCHLORIDE 5 MG/1
5 TABLET ORAL
Status: DISCONTINUED | OUTPATIENT
Start: 2021-05-07 | End: 2021-05-09

## 2021-05-07 RX ORDER — CARBOXYMETHYLCELLULOSE SODIUM 5 MG/ML
1 SOLUTION/ DROPS OPHTHALMIC 4 TIMES DAILY PRN
Status: DISCONTINUED | OUTPATIENT
Start: 2021-05-07 | End: 2021-05-13 | Stop reason: HOSPADM

## 2021-05-07 RX ORDER — VITAMIN B COMPLEX
1000 TABLET ORAL DAILY
Status: DISCONTINUED | OUTPATIENT
Start: 2021-05-08 | End: 2021-05-08

## 2021-05-07 RX ORDER — ONDANSETRON 4 MG/1
4 TABLET, ORALLY DISINTEGRATING ORAL EVERY 4 HOURS PRN
Status: DISCONTINUED | OUTPATIENT
Start: 2021-05-07 | End: 2021-05-13 | Stop reason: HOSPADM

## 2021-05-07 RX ORDER — ACETAMINOPHEN 325 MG/1
650 TABLET ORAL EVERY 6 HOURS PRN
Status: DISCONTINUED | OUTPATIENT
Start: 2021-05-13 | End: 2021-05-08

## 2021-05-07 RX ORDER — METOPROLOL SUCCINATE 25 MG/1
37.5 TABLET, EXTENDED RELEASE ORAL DAILY
Status: DISCONTINUED | OUTPATIENT
Start: 2021-05-08 | End: 2021-05-10

## 2021-05-07 ASSESSMENT — ENCOUNTER SYMPTOMS
FALLS: 1
BLURRED VISION: 0
ABDOMINAL PAIN: 0
PALPITATIONS: 1
NUMBNESS: 0
TINGLING: 0
LOSS OF CONSCIOUSNESS: 0
FEVER: 0
CHILLS: 0
BOWEL INCONTINENCE: 0
DIZZINESS: 1
DOUBLE VISION: 0

## 2021-05-07 ASSESSMENT — FIBROSIS 4 INDEX
FIB4 SCORE: 2.43
FIB4 SCORE: 2.43

## 2021-05-08 PROBLEM — Z71.89 ADVANCE CARE PLANNING: Status: ACTIVE | Noted: 2021-05-08

## 2021-05-08 PROBLEM — N28.9 RENAL INSUFFICIENCY: Status: ACTIVE | Noted: 2021-05-08

## 2021-05-08 PROBLEM — R55 SYNCOPE: Chronic | Status: ACTIVE | Noted: 2021-04-02

## 2021-05-08 PROBLEM — T79.6XXA TRAUMATIC RHABDOMYOLYSIS (HCC): Status: ACTIVE | Noted: 2021-05-08

## 2021-05-08 PROBLEM — F32.A DEPRESSION: Status: ACTIVE | Noted: 2018-09-27

## 2021-05-08 PROBLEM — I42.1 HOCM (HYPERTROPHIC OBSTRUCTIVE CARDIOMYOPATHY) (HCC): Status: RESOLVED | Noted: 2021-04-23 | Resolved: 2021-05-08

## 2021-05-08 PROBLEM — N28.9 RENAL INSUFFICIENCY: Status: RESOLVED | Noted: 2021-05-08 | Resolved: 2021-05-08

## 2021-05-08 PROBLEM — S22.42XG CLOSED FRACTURE OF MULTIPLE RIBS OF LEFT SIDE WITH DELAYED HEALING: Status: ACTIVE | Noted: 2021-05-08

## 2021-05-08 PROBLEM — E04.1 RIGHT THYROID NODULE: Status: RESOLVED | Noted: 2021-04-06 | Resolved: 2021-05-08

## 2021-05-08 PROBLEM — E78.5 DYSLIPIDEMIA: Status: RESOLVED | Noted: 2018-06-19 | Resolved: 2021-05-08
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
## 2021-05-08 PROBLEM — E55.9 VITAMIN D DEFICIENCY: Status: RESOLVED | Noted: 2021-05-08 | Resolved: 2021-05-08

## 2021-05-08 PROBLEM — E55.9 VITAMIN D DEFICIENCY: Status: ACTIVE | Noted: 2021-05-08

## 2021-05-08 LAB
ALBUMIN SERPL BCP-MCNC: 3.8 G/DL (ref 3.2–4.9)
ALBUMIN/GLOB SERPL: 1.2 G/DL
ALP SERPL-CCNC: 61 U/L (ref 30–99)
ALT SERPL-CCNC: 14 U/L (ref 2–50)
ANION GAP SERPL CALC-SCNC: 10 MMOL/L (ref 7–16)
AST SERPL-CCNC: 23 U/L (ref 12–45)
BASOPHILS # BLD AUTO: 0.6 % (ref 0–1.8)
BASOPHILS # BLD: 0.06 K/UL (ref 0–0.12)
BILIRUB SERPL-MCNC: 0.5 MG/DL (ref 0.1–1.5)
BUN SERPL-MCNC: 26 MG/DL (ref 8–22)
CALCIUM SERPL-MCNC: 9.2 MG/DL (ref 8.5–10.5)
CHLORIDE SERPL-SCNC: 107 MMOL/L (ref 96–112)
CK SERPL-CCNC: 158 U/L (ref 0–154)
CO2 SERPL-SCNC: 20 MMOL/L (ref 20–33)
CREAT SERPL-MCNC: 1.45 MG/DL (ref 0.5–1.4)
EKG IMPRESSION: NORMAL
EOSINOPHIL # BLD AUTO: 0.05 K/UL (ref 0–0.51)
EOSINOPHIL NFR BLD: 0.5 % (ref 0–6.9)
ERYTHROCYTE [DISTWIDTH] IN BLOOD BY AUTOMATED COUNT: 49.6 FL (ref 35.9–50)
GLOBULIN SER CALC-MCNC: 3.1 G/DL (ref 1.9–3.5)
GLUCOSE SERPL-MCNC: 114 MG/DL (ref 65–99)
HCT VFR BLD AUTO: 33 % (ref 37–47)
HGB BLD-MCNC: 10.6 G/DL (ref 12–16)
IMM GRANULOCYTES # BLD AUTO: 0.07 K/UL (ref 0–0.11)
IMM GRANULOCYTES NFR BLD AUTO: 0.6 % (ref 0–0.9)
LYMPHOCYTES # BLD AUTO: 1.99 K/UL (ref 1–4.8)
LYMPHOCYTES NFR BLD: 18.3 % (ref 22–41)
MAGNESIUM SERPL-MCNC: 2.2 MG/DL (ref 1.5–2.5)
MCH RBC QN AUTO: 28.6 PG (ref 27–33)
MCHC RBC AUTO-ENTMCNC: 32.1 G/DL (ref 33.6–35)
MCV RBC AUTO: 89.2 FL (ref 81.4–97.8)
MONOCYTES # BLD AUTO: 1.2 K/UL (ref 0–0.85)
MONOCYTES NFR BLD AUTO: 11 % (ref 0–13.4)
NEUTROPHILS # BLD AUTO: 7.5 K/UL (ref 2–7.15)
NEUTROPHILS NFR BLD: 69 % (ref 44–72)
NRBC # BLD AUTO: 0 K/UL
NRBC BLD-RTO: 0 /100 WBC
PHOSPHATE SERPL-MCNC: 3.4 MG/DL (ref 2.5–4.5)
PLATELET # BLD AUTO: 221 K/UL (ref 164–446)
PMV BLD AUTO: 10.3 FL (ref 9–12.9)
POTASSIUM SERPL-SCNC: 3.9 MMOL/L (ref 3.6–5.5)
PROT SERPL-MCNC: 6.9 G/DL (ref 6–8.2)
RBC # BLD AUTO: 3.7 M/UL (ref 4.2–5.4)
SARS-COV-2 RNA RESP QL NAA+PROBE: NOTDETECTED
SODIUM SERPL-SCNC: 137 MMOL/L (ref 135–145)
SPECIMEN SOURCE: NORMAL
TROPONIN T SERPL-MCNC: 21 NG/L (ref 6–19)
TROPONIN T SERPL-MCNC: 26 NG/L (ref 6–19)
TSH SERPL DL<=0.005 MIU/L-ACNC: 3.13 UIU/ML (ref 0.38–5.33)
WBC # BLD AUTO: 10.9 K/UL (ref 4.8–10.8)

## 2021-05-08 PROCEDURE — G0378 HOSPITAL OBSERVATION PER HR: HCPCS

## 2021-05-08 PROCEDURE — 94669 MECHANICAL CHEST WALL OSCILL: CPT

## 2021-05-08 PROCEDURE — 97161 PT EVAL LOW COMPLEX 20 MIN: CPT

## 2021-05-08 PROCEDURE — 85025 COMPLETE CBC W/AUTO DIFF WBC: CPT

## 2021-05-08 PROCEDURE — 96366 THER/PROPH/DIAG IV INF ADDON: CPT

## 2021-05-08 PROCEDURE — 96372 THER/PROPH/DIAG INJ SC/IM: CPT | Mod: XU

## 2021-05-08 PROCEDURE — 83735 ASSAY OF MAGNESIUM: CPT

## 2021-05-08 PROCEDURE — 700101 HCHG RX REV CODE 250: Performed by: STUDENT IN AN ORGANIZED HEALTH CARE EDUCATION/TRAINING PROGRAM

## 2021-05-08 PROCEDURE — 82550 ASSAY OF CK (CPK): CPT

## 2021-05-08 PROCEDURE — 96365 THER/PROPH/DIAG IV INF INIT: CPT

## 2021-05-08 PROCEDURE — 93010 ELECTROCARDIOGRAM REPORT: CPT | Performed by: INTERNAL MEDICINE

## 2021-05-08 PROCEDURE — 84100 ASSAY OF PHOSPHORUS: CPT

## 2021-05-08 PROCEDURE — 700111 HCHG RX REV CODE 636 W/ 250 OVERRIDE (IP): Performed by: STUDENT IN AN ORGANIZED HEALTH CARE EDUCATION/TRAINING PROGRAM

## 2021-05-08 PROCEDURE — 93005 ELECTROCARDIOGRAM TRACING: CPT | Performed by: STUDENT IN AN ORGANIZED HEALTH CARE EDUCATION/TRAINING PROGRAM

## 2021-05-08 PROCEDURE — 700102 HCHG RX REV CODE 250 W/ 637 OVERRIDE(OP): Performed by: STUDENT IN AN ORGANIZED HEALTH CARE EDUCATION/TRAINING PROGRAM

## 2021-05-08 PROCEDURE — 80053 COMPREHEN METABOLIC PANEL: CPT

## 2021-05-08 PROCEDURE — A9270 NON-COVERED ITEM OR SERVICE: HCPCS | Performed by: STUDENT IN AN ORGANIZED HEALTH CARE EDUCATION/TRAINING PROGRAM

## 2021-05-08 PROCEDURE — 84484 ASSAY OF TROPONIN QUANT: CPT | Mod: 91

## 2021-05-08 PROCEDURE — 84443 ASSAY THYROID STIM HORMONE: CPT

## 2021-05-08 PROCEDURE — 36415 COLL VENOUS BLD VENIPUNCTURE: CPT

## 2021-05-08 PROCEDURE — 99226 PR SUBSEQUENT OBSERVATION CARE,LEVEL III: CPT | Mod: GC | Performed by: INTERNAL MEDICINE

## 2021-05-08 PROCEDURE — 94760 N-INVAS EAR/PLS OXIMETRY 1: CPT

## 2021-05-08 PROCEDURE — 97165 OT EVAL LOW COMPLEX 30 MIN: CPT

## 2021-05-08 RX ORDER — BISACODYL 10 MG
10 SUPPOSITORY, RECTAL RECTAL
Status: DISCONTINUED | OUTPATIENT
Start: 2021-05-08 | End: 2021-05-13 | Stop reason: HOSPADM

## 2021-05-08 RX ORDER — POLYETHYLENE GLYCOL 3350 17 G/17G
1 POWDER, FOR SOLUTION ORAL DAILY
Status: DISCONTINUED | OUTPATIENT
Start: 2021-05-08 | End: 2021-05-13 | Stop reason: HOSPADM

## 2021-05-08 RX ORDER — METOPROLOL TARTRATE 1 MG/ML
5 INJECTION, SOLUTION INTRAVENOUS
Status: DISCONTINUED | OUTPATIENT
Start: 2021-05-08 | End: 2021-05-13 | Stop reason: HOSPADM

## 2021-05-08 RX ORDER — ACETAMINOPHEN 325 MG/1
650 TABLET ORAL EVERY 4 HOURS
Status: DISCONTINUED | OUTPATIENT
Start: 2021-05-08 | End: 2021-05-13 | Stop reason: HOSPADM

## 2021-05-08 RX ORDER — MAGNESIUM SULFATE HEPTAHYDRATE 40 MG/ML
2 INJECTION, SOLUTION INTRAVENOUS ONCE
Status: COMPLETED | OUTPATIENT
Start: 2021-05-08 | End: 2021-05-08

## 2021-05-08 RX ORDER — ACETAMINOPHEN 325 MG/1
650 TABLET ORAL EVERY 4 HOURS PRN
Status: DISCONTINUED | OUTPATIENT
Start: 2021-05-08 | End: 2021-05-08

## 2021-05-08 RX ORDER — KETOTIFEN FUMARATE 0.25 MG/ML
1 SOLUTION/ DROPS OPHTHALMIC
Status: DISCONTINUED | OUTPATIENT
Start: 2021-05-08 | End: 2021-05-08

## 2021-05-08 RX ORDER — HEPARIN SODIUM 5000 [USP'U]/ML
5000 INJECTION, SOLUTION INTRAVENOUS; SUBCUTANEOUS EVERY 8 HOURS
Status: DISCONTINUED | OUTPATIENT
Start: 2021-05-08 | End: 2021-05-12

## 2021-05-08 RX ORDER — POTASSIUM CHLORIDE 20 MEQ/1
20 TABLET, EXTENDED RELEASE ORAL ONCE
Status: COMPLETED | OUTPATIENT
Start: 2021-05-08 | End: 2021-05-08

## 2021-05-08 RX ORDER — CAPSAICIN 0.025 %
CREAM (GRAM) TOPICAL 3 TIMES DAILY
Status: DISCONTINUED | OUTPATIENT
Start: 2021-05-08 | End: 2021-05-13 | Stop reason: HOSPADM

## 2021-05-08 RX ORDER — ATORVASTATIN CALCIUM 40 MG/1
40 TABLET, FILM COATED ORAL EVERY EVENING
Status: DISCONTINUED | OUTPATIENT
Start: 2021-05-08 | End: 2021-05-08

## 2021-05-08 RX ORDER — AMOXICILLIN 250 MG
2 CAPSULE ORAL 2 TIMES DAILY
Status: DISCONTINUED | OUTPATIENT
Start: 2021-05-08 | End: 2021-05-13 | Stop reason: HOSPADM

## 2021-05-08 RX ORDER — PREDNISOLONE ACETATE 10 MG/ML
1 SUSPENSION/ DROPS OPHTHALMIC
Status: DISCONTINUED | OUTPATIENT
Start: 2021-05-08 | End: 2021-05-10

## 2021-05-08 RX ADMIN — METOPROLOL TARTRATE 12.5 MG: 25 TABLET, FILM COATED ORAL at 01:17

## 2021-05-08 RX ADMIN — ACETAMINOPHEN 650 MG: 325 TABLET, FILM COATED ORAL at 05:22

## 2021-05-08 RX ADMIN — METHOCARBAMOL 500 MG: 500 TABLET ORAL at 09:24

## 2021-05-08 RX ADMIN — METHOCARBAMOL 500 MG: 500 TABLET ORAL at 17:28

## 2021-05-08 RX ADMIN — PREDNISOLONE ACETATE 1 DROP: 10 SUSPENSION/ DROPS OPHTHALMIC at 20:05

## 2021-05-08 RX ADMIN — MAGNESIUM SULFATE 2 G: 2 INJECTION INTRAVENOUS at 01:38

## 2021-05-08 RX ADMIN — PREDNISOLONE ACETATE 1 DROP: 10 SUSPENSION/ DROPS OPHTHALMIC at 23:03

## 2021-05-08 RX ADMIN — HEPARIN SODIUM 5000 UNITS: 5000 INJECTION, SOLUTION INTRAVENOUS; SUBCUTANEOUS at 13:56

## 2021-05-08 RX ADMIN — Medication 1000 UNITS: at 05:22

## 2021-05-08 RX ADMIN — PREDNISOLONE ACETATE 1 DROP: 10 SUSPENSION/ DROPS OPHTHALMIC at 09:24

## 2021-05-08 RX ADMIN — ACETAMINOPHEN 650 MG: 325 TABLET, FILM COATED ORAL at 09:25

## 2021-05-08 RX ADMIN — PREDNISOLONE ACETATE 1 DROP: 10 SUSPENSION/ DROPS OPHTHALMIC at 17:00

## 2021-05-08 RX ADMIN — HEPARIN SODIUM 5000 UNITS: 5000 INJECTION, SOLUTION INTRAVENOUS; SUBCUTANEOUS at 21:34

## 2021-05-08 RX ADMIN — PREDNISOLONE ACETATE 1 DROP: 10 SUSPENSION/ DROPS OPHTHALMIC at 13:53

## 2021-05-08 RX ADMIN — LIDOCAINE 1 PATCH: 50 PATCH TOPICAL at 23:04

## 2021-05-08 RX ADMIN — ESCITALOPRAM OXALATE 5 MG: 10 TABLET ORAL at 01:10

## 2021-05-08 RX ADMIN — CAPSAICIN: 0.25 CREAM TOPICAL at 09:26

## 2021-05-08 RX ADMIN — POTASSIUM CHLORIDE 20 MEQ: 1500 TABLET, EXTENDED RELEASE ORAL at 02:24

## 2021-05-08 RX ADMIN — ACETAMINOPHEN 650 MG: 325 TABLET, FILM COATED ORAL at 21:33

## 2021-05-08 RX ADMIN — ESCITALOPRAM OXALATE 5 MG: 10 TABLET ORAL at 17:29

## 2021-05-08 RX ADMIN — LIDOCAINE 1 PATCH: 50 PATCH TOPICAL at 01:11

## 2021-05-08 RX ADMIN — METOPROLOL SUCCINATE 37.5 MG: 25 TABLET, EXTENDED RELEASE ORAL at 05:22

## 2021-05-08 RX ADMIN — ZINC SULFATE 220 MG (50 MG) CAPSULE 220 MG: CAPSULE at 05:22

## 2021-05-08 RX ADMIN — PREDNISOLONE ACETATE 1 DROP: 10 SUSPENSION/ DROPS OPHTHALMIC at 17:28

## 2021-05-08 RX ADMIN — METHOCARBAMOL 500 MG: 500 TABLET ORAL at 23:04

## 2021-05-08 RX ADMIN — PREDNISOLONE ACETATE 1 DROP: 10 SUSPENSION/ DROPS OPHTHALMIC at 13:45

## 2021-05-08 RX ADMIN — ACETAMINOPHEN 650 MG: 325 TABLET, FILM COATED ORAL at 17:28

## 2021-05-08 RX ADMIN — OXYCODONE 2.5 MG: 5 TABLET ORAL at 01:09

## 2021-05-08 RX ADMIN — ACETAMINOPHEN 650 MG: 325 TABLET, FILM COATED ORAL at 01:10

## 2021-05-08 RX ADMIN — ACETAMINOPHEN 650 MG: 325 TABLET, FILM COATED ORAL at 13:53

## 2021-05-08 RX ADMIN — METHOCARBAMOL 500 MG: 500 TABLET ORAL at 01:10

## 2021-05-08 ASSESSMENT — COGNITIVE AND FUNCTIONAL STATUS - GENERAL
TURNING FROM BACK TO SIDE WHILE IN FLAT BAD: A LITTLE
SUGGESTED CMS G CODE MODIFIER MOBILITY: CH
DAILY ACTIVITIY SCORE: 24
DRESSING REGULAR LOWER BODY CLOTHING: A LITTLE
MOVING TO AND FROM BED TO CHAIR: A LOT
MOBILITY SCORE: 21
SUGGESTED CMS G CODE MODIFIER DAILY ACTIVITY: CH
SUGGESTED CMS G CODE MODIFIER MOBILITY: CJ
MOBILITY SCORE: 24
DAILY ACTIVITIY SCORE: 22
HELP NEEDED FOR BATHING: A LITTLE
SUGGESTED CMS G CODE MODIFIER DAILY ACTIVITY: CJ

## 2021-05-08 ASSESSMENT — LIFESTYLE VARIABLES
TOTAL SCORE: 0
CONSUMPTION TOTAL: INCOMPLETE
TOTAL SCORE: 0
ON A TYPICAL DAY WHEN YOU DRINK ALCOHOL HOW MANY DRINKS DO YOU HAVE: 2
HAVE PEOPLE ANNOYED YOU BY CRITICIZING YOUR DRINKING: NO
HOW MANY TIMES IN THE PAST YEAR HAVE YOU HAD 5 OR MORE DRINKS IN A DAY: 0
DOES PATIENT WANT TO STOP DRINKING: NO
AVERAGE NUMBER OF DAYS PER WEEK YOU HAVE A DRINK CONTAINING ALCOHOL: 1
CONSUMPTION TOTAL: INCOMPLETE
ALCOHOL_USE: NO
TOTAL SCORE: 0
EVER HAD A DRINK FIRST THING IN THE MORNING TO STEADY YOUR NERVES TO GET RID OF A HANGOVER: NO
HAVE YOU EVER FELT YOU SHOULD CUT DOWN ON YOUR DRINKING: NO
EVER FELT BAD OR GUILTY ABOUT YOUR DRINKING: NO

## 2021-05-08 ASSESSMENT — FIBROSIS 4 INDEX: FIB4 SCORE: 2.49

## 2021-05-08 ASSESSMENT — ENCOUNTER SYMPTOMS
LOSS OF CONSCIOUSNESS: 1
FOCAL WEAKNESS: 0
HEADACHES: 0
ABDOMINAL PAIN: 0
ORTHOPNEA: 0
COUGH: 0
VOMITING: 0
LOSS OF CONSCIOUSNESS: 0
WEAKNESS: 0
WHEEZING: 0
SENSORY CHANGE: 0
DOUBLE VISION: 0
SHORTNESS OF BREATH: 0
DEPRESSION: 0
SPUTUM PRODUCTION: 0
NAUSEA: 0
BRUISES/BLEEDS EASILY: 0
BLURRED VISION: 0
FLANK PAIN: 0
PALPITATIONS: 1
CHILLS: 0
DIZZINESS: 1
HEARTBURN: 0
SPEECH CHANGE: 0
FEVER: 0
MYALGIAS: 1
BACK PAIN: 1
FALLS: 1

## 2021-05-08 ASSESSMENT — COPD QUESTIONNAIRES
DO YOU EVER COUGH UP ANY MUCUS OR PHLEGM?: NO/ONLY WITH OCCASIONAL COLDS OR INFECTIONS
HAVE YOU SMOKED AT LEAST 100 CIGARETTES IN YOUR ENTIRE LIFE: NO/DON'T KNOW
COPD SCREENING SCORE: 3
DURING THE PAST 4 WEEKS HOW MUCH DID YOU FEEL SHORT OF BREATH: SOME OF THE TIME

## 2021-05-08 ASSESSMENT — PAIN DESCRIPTION - PAIN TYPE
TYPE: ACUTE PAIN

## 2021-05-08 ASSESSMENT — GAIT ASSESSMENTS
GAIT LEVEL OF ASSIST: SUPERVISED
DEVIATION: OTHER (COMMENT)
DISTANCE (FEET): 140
ASSISTIVE DEVICE: FRONT WHEEL WALKER

## 2021-05-08 ASSESSMENT — ACTIVITIES OF DAILY LIVING (ADL): TOILETING: INDEPENDENT

## 2021-05-08 ASSESSMENT — PATIENT HEALTH QUESTIONNAIRE - PHQ9
2. FEELING DOWN, DEPRESSED, IRRITABLE, OR HOPELESS: NOT AT ALL
1. LITTLE INTEREST OR PLEASURE IN DOING THINGS: NOT AT ALL
SUM OF ALL RESPONSES TO PHQ9 QUESTIONS 1 AND 2: 0

## 2021-05-08 NOTE — ASSESSMENT & PLAN NOTE
Goal of care discussed with patient and patient's daughters -- supportive care, tele monitoring and adjust meds as needed   FULL code status confirmed  Advance are planning: 15mins

## 2021-05-08 NOTE — ASSESSMENT & PLAN NOTE
Resolved  Patient reports falling and being down for 15 to 20 minutes  Witnessed fall  No loss of consciousness

## 2021-05-08 NOTE — PROGRESS NOTES
"Daily Progress Note:     Date of Service: 5/8/2021  Primary Team: [facility] IM Blue Team   Attending: Dr. Tidwell  Senior Resident: Dr. Villarreal  Intern: Dr. Abraham  Contact:  [phone]    Chief Complaint: Near syncope    Subjective:  Patient is a 87-year-old female who presents with near syncope.  Has past medical history of paroxysmal SVT, hypertension, CKD that developed over the last 1 year, and 3 tooth abscesses and December 2020.  Of note recently her blood pressure medications metoprolol 25 increased to 37.5 and ARB stopped by cardiology.    Patient reports that she was fixing her hair, standing in the bathroom when she had near syncopal episode. Denies any loss of consciousness. Witnessed by daughter. She was down for 15 to 20 minutes. Reports aura/vision changes prior to episodes. Reports that \"spells\" have been going on for 2 years but increasing in frequency. Now she is having an episode every 2 months. At time of episode, heart rate of 145 and blood pressure 50s/40s. Blood pressure and heart rate returned to normal in 5 minutes.    Review of Systems:   Review of Systems   Cardiovascular: Positive for palpitations.   Musculoskeletal: Positive for back pain, falls and myalgias.   Neurological: Positive for dizziness. Negative for loss of consciousness.   All other systems reviewed and are negative.    Objective Data:   Vitals:   Temp:  [35.8 °C (96.5 °F)-36.8 °C (98.3 °F)] 35.8 °C (96.5 °F)  Pulse:  [66-77] 71  Resp:  [16-18] 18  BP: (102-159)/(49-76) 118/55  SpO2:  [85 %-96 %] 96 %    Physical Exam:  Physical Exam  Constitutional:       Appearance: Normal appearance.   Cardiovascular:      Rate and Rhythm: Normal rate.      Heart sounds: Murmur (Systolic) present.   Pulmonary:      Effort: Pulmonary effort is normal.      Breath sounds: No wheezing or rales.   Abdominal:      General: Abdomen is flat. There is no distension.      Palpations: Abdomen is soft.      Tenderness: There is no abdominal " tenderness. There is no guarding or rebound.   Musculoskeletal:         General: Tenderness present.      Cervical back: Normal range of motion.      Right lower leg: Edema (Pitting edema of lower extremities bilaterally) present.      Left lower leg: Edema present.      Comments: Left posterior chest/ribs tender to palpation.  No bruising or swelling.  Lidocaine patch noted above area of tenderness   Skin:     General: Skin is warm.      Capillary Refill: Capillary refill takes less than 2 seconds.      Findings: No bruising.   Neurological:      Mental Status: She is alert.     Labs:  Recent Labs     05/08/21  0000   WBC 10.9*   RBC 3.70*   HEMOGLOBIN 10.6*   HEMATOCRIT 33.0*   MCV 89.2   MCH 28.6   RDW 49.6   PLATELETCT 221   MPV 10.3   NEUTSPOLYS 69.00   LYMPHOCYTES 18.30*   MONOCYTES 11.00   EOSINOPHILS 0.50   BASOPHILS 0.60     Recent Labs     05/08/21  0000   SODIUM 137   POTASSIUM 3.9   CHLORIDE 107   CO2 20   GLUCOSE 114*   BUN 26*   CPKTOTAL 158*     Micro:  None    EKG:  Normal sinus rhythm    Imaging:   XR ribs:  1.  Acute fractures of left posterolateral ribs 5-8. No pneumothorax.  2.  Left basilar atelectasis.    Meds:  Current Outpatient Medications   Medication Instructions   • B Complex-C (SUPER B COMPLEX PO) 1 tablet, Oral, EVERY MORNING   • Coenzyme Q10 (COQ10 PO) 1 capsule, Oral, 2X A WEEK, Sunday & Wednesday   • escitalopram (LEXAPRO) 5 mg, Oral, DAILY   • Glycerin-Polysorbate 80 (REFRESH DRY EYE THERAPY) 1-1 % SOLN 1 Drop, Ophthalmic, 4 TIMES DAILY PRN   • ketotifen (ZADITOR) 0.025 % ophthalmic solution 1 Drop, Both Eyes, 1 TIME DAILY PRN   • metoprolol SR (TOPROL XL) 25 mg, Oral, DAILY   • Multiple Vitamins-Minerals (ICAPS AREDS 2 PO) 1 tablet, Oral, DAILY   • prednisoLONE acetate (PRED FORTE) 1 % Suspension 1 Drop, Both Eyes, SEE ADMIN INSTRUCTIONS, 1 drop in left eye four times a day <BR>1 drop in right eye six times a day   • Propylene Glycol (SYSTANE BALANCE OP) 1 Drop, Both Eyes, 4 TIMES  "DAILY PRN   • vitamin D (CHOLECALCIFEROL) 1,000 Units, Oral, DAILY, 1000 unit tablet + 5000 unit tablet for a total of 6000 units daily   • Vitamin D-3 5,000 Units, Oral, DAILY, 5000 unit tablet + 1000 unit tablet for a total of 6000 units daily      Scheduled Medications   Medication Dose Frequency   • heparin  5,000 Units Q8HRS   • polyethylene glycol/lytes  1 Packet DAILY    And   • senna-docusate  2 tablet BID   • acetaminophen  650 mg Q4HRS   • capsaicin   TID   • escitalopram  5 mg DAILY   • prednisoLONE acetate  1 Drop 4X/DAY   • metoprolol SR  37.5 mg DAILY   • Pharmacy Consult Request  1 Each PHARMACY TO DOSE   • methocarbamol  500 mg TID   • lidocaine  1 Patch Q24HR      Consultants/Specialty:    Problem Representation:   * Near syncope likely vasovagal  Assessment & Plan  No loss of consciousness   History of hyperdynamic not hypertrophic left ventricular systolic function  Of note, recent event recorder last month was negative for ischemia, however heart rate up to 140s and patient reports \"spells\" during this timeframe  Of note, carotid ultrasound 4/2021 with right carotid stenosis 50 to 70% and left carotid stenosis less than 50%  Also recent increase of metoprolol dose to 37.5  Patient reported tachycardia, lightheadedness, dizziness prior to fall  Orthostatic hypotension and autonomic dysfunction possibly contributory  EKG on admission sinus rhythm without heart block or ST changes.  Heart rate 73  Troponin 21 -> 26.  Not significantly elevated  Orthostatic vitals negative  As patient had echo done recently in April 2021, will not repeat this admission given no significant change in presentation  Continue to monitor on telemetry  Cardiology consulted, who recommend continuing current management and follow-up outpatient.  Believe that near syncope is vasovagal in etiology  Continue to avoid antihypertensives and diuretics  Goal heart rate less than 120    SVT (supraventricular tachycardia) (HCC)- " (present on admission)  Assessment & Plan  Paroxysmal SVT  Serial EKGs  Cont recently increased home Toprolol XL 37.5mg daily.  Patient reports increasing this medication 2 days ago, with symptoms occurring yesterday  Patient received metoprolol 12.5mg PO x1 in ER for tachycardia  PRN IV metoprolol 5mg for HR > 150  Tachycardia possibly secondary to rib fracture pain  Monitor and replete potassium and magnesium as needed    Stage 3b chronic kidney disease  Assessment & Plan  Minimize nephrotoxic agents, renally dose meds  Creatinine is stable at new baseline (1.5-1.8)  Unlikely BRIAN   BUN to creatinine ratio 17.9  Creatinine 1.61 on admission.  Improving  GFR 30-34  DVT prophylaxis with heparin    Traumatic rhabdomyolysis (HCC)  Assessment & Plan  Patient reports falling and being down for 15 to 20 minutes  Witnessed fall  No loss of consciousness      Closed fracture of multiple ribs of left side with delayed healing  Assessment & Plan  Fall with rib fractures prior to presentation   CT head on 5/4 negative for intracranial hemorrhage  X-ray shows left posterior lateral ribs 5-8   No evidence of pneumothorax  Continue pain control as needed to prevent atelectasis  Incentive spirometer  PT OT  Fall precautions    Essential hypertension, benign- (present on admission)  Assessment & Plan  Recent d/c of ARB and increased Toprolol XL to 37.5mg by outpt cardiology  Cont Toprolol XL 37.5mg  PRN hydralazine    Fall  Assessment & Plan  -see closed fracture of multiple ribs of left side with delayed hearing plan    Depression  Assessment & Plan  Continue Escitalopram, to treat underlying depression as well as vasovagal symptoms    Advance care planning  Assessment & Plan  Goal of care discussed with patient and patient's daughters -- supportive care, tele monitoring and adjust meds as needed   FULL code status confirmed  Advance are planning: 15mins    Code status: Full code  Diet: Cardiac  Lines/ tubes/ drains:  IV  fluids:  Abx (dates):  GI ppx:  DVT ppx: Heparin  Dispo: PT OT pending

## 2021-05-08 NOTE — DISCHARGE PLANNING
Care Transition Team Assessment    Patient lives alone in single story house. NOK is daughter Clarita Gutierrez.   Per therapy notes last visit daughter is support system to patient.    DC needs to be determined by care team.    Information Source  Information Given By: (chart review during Covid Pandemic)  Who is responsible for making decisions for patient? : Patient    Readmission Evaluation  Is this a readmission?: No    Elopement Risk  Legal Hold: No  Ambulatory or Self Mobile in Wheelchair: Yes  Disoriented: No  Psychiatric Symptoms: None  History of Wandering: No  Elopement this Admit: No  Vocalizing Wanting to Leave: No  Displays Behaviors, Body Language Wanting to Leave: No-Not at Risk for Elopement    Interdisciplinary Discharge Planning  Does Admitting Nurse Feel This Could be a Complex Discharge?: No  Support Systems: Family Member(s)  Housing / Facility: [address] [facility]  Able to Return to Previous ADL's: Future Time w/Therapy  Mobility Issues: No  Prior Services: Intermittent Physical Support for ADL Per Family  Assistance Needed: Unknown at this Time  Durable Medical Equipment: Not Applicable    Discharge Preparedness  Prior Functional Level: Ambulatory, Independent with Activities of Daily Living    Functional Assesment  Prior Functional Level: Ambulatory, Independent with Activities of Daily Living    Finances  Financial Barriers to Discharge: No  Prescription Coverage: Yes    Vision / Hearing Impairment  Vision Impairment : Yes  Right Eye Vision: Wears Glasses  Left Eye Vision: Wears Glasses  Hearing Impairment : No         Advance Directive  Advance Directive?: None    Domestic Abuse  Have you ever been the victim of abuse or violence?: No    Psychological Assessment  History of Substance Abuse: None  History of Psychiatric Problems: No    Discharge Risks or Barriers  Patient risk factors: Vulnerable adult    Anticipated Discharge Information  Discharge Disposition: Still a Patient (30)

## 2021-05-08 NOTE — PROGRESS NOTES
Spiritual Care Note    Patient Information     Patient's Name: Yoli Carmichael   MRN: [id number]    YOB: 1933   Age and Gender: 87 y.o. female   [facility]   Room (and Bed): [place] [address]   Ethnicity or Nationality:     Primary Language: English   Hoahaoism/Spiritual preference: Anabaptism   Place of Residence: [place]   Family/Friends/Others Present: Yes, daughters   Clinical Team Present: No   Medical Diagnosis(-es)/Procedure(s): Syncope/multiple fractures   Code Status: Full Code    Date of Admission: 5/7/2021   Length of Stay: 0 days        Spiritual Care Provider Information:  Name of Spiritual Care Provider: Sarah Melendez  Title of Spiritual Care Provider: Associate   Phone Number: [phone]  E-mail: [email or web address]  Total time : 15 minutes    Spiritual Screen Results:    Gen Nursing  Spiritual Screen  Was spiritual care education provided to the patient?: Yes     Palliative Care  PC Hoahaoism/Spiritual Screening  Was spiritual care education provided to the patient?: Yes      Encounter/Request Information  Encounter/Request Type   Visited With: Patient and family together  Nature of the Visit: Initial, On shift  General Visit: Yes  Referral From/ Origin of Request: Epic nursing  Referral To: Other /SCP    Religous Needs/Values  Hoahaoism Needs Visit  Hoahaoism Needs: Prayer    Spiritual Assessment     Spiritual Care Encounters    Observations/Symptoms: Accepting, Thankfulness    Interaction/Conversation: Pt, with two daughters at bedside, requested prayer for herself and family, especially a seven-year-old great-granddaughter with a seizure disorder.  Pt and family thanked the  very warmly.  This  will refer the pt to  Terri, who normally covers this unit.    Assessment: Need    Need: Seeking Spiritual Assistance and Support, Family Issues/Concern    Interventions: Compassionate presence, conversation, active listening,  prayer.    Outcomes: Spiritual Comfort    Plan: (Refer to colleague.)    Notes:

## 2021-05-08 NOTE — ED PROVIDER NOTES
"ED Provider Note    CHIEF COMPLAINT  Chief Complaint   Patient presents with   • Syncope     Pt states she was \"getting ready to go out in the bathroom and I had one of my spells where I pass out. I fell back onto the bath tub and hit my left side, it knocked the wind out of me and I couldnt breath.\"   • T-5000 GLF     Pt c/o L sided rib pain and swelling s/p GLF in bathroom. -blood thinners, denies hitting her head.       HPI  Yoli Carmichael is a 87 y.o. female who presents to the emergency department chief complaint of a near syncopal episode.  She was admitted last month for syncopal episode and at that time was evaluated to have EF of 75% with little hyperdynamic cardiac contractility as well as paroxysmal SVT after 3 weeks of Holter monitoring.  She states she feels her heart rate get really fast and then her vision starts to go black.  There is a possible component of orthostasis as well during her hospitalization.  She was started on metoprolol by Dr. Velasquez her cardiologist but is down to 37.5 milligrams instead of 50 mg.  Today she was standing and had been standing for a while and finished her make-up when she felt the spell, on and fell and landed on her left ribs on the edge of the bathtub.  She did not hit her head she had continued pain in the left flank so was seen in urgent care.  They diagnosed her with left-sided rib fractures without pneumohemothorax.    She did not have any chest pain or shortness of breath prior to the event and she did not fully lose consciousness.  There is not associated one-sided weakness numbness or tingling's.  It was associated with tachycardia.    REVIEW OF SYSTEMS  Positives as above. Pertinent negatives include nausea vomiting fevers chills cough congestion chest pain shortness of breath leg swelling easy bleeding bruising headache unilateral weakness numbness or tingling  All other review of systems are negative    PAST MEDICAL HISTORY   has a past medical history " "of Arrhythmia, Arthritis, Bronchitis, CATARACT, Dry eyes, bilateral (3/27/2014), Osteopenia of the elderly (3/27/2014), and Pneumonia.    SOCIAL HISTORY  Social History     Tobacco Use   • Smoking status: Never Smoker   • Smokeless tobacco: Never Used   • Tobacco comment: Pt exposed to second hand smoker   Substance and Sexual Activity   • Alcohol use: Yes     Comment: Occasionally   • Drug use: No   • Sexual activity: Never       SURGICAL HISTORY   has a past surgical history that includes knee arthroplasty total (9/3/2013) and tonsillectomy.    CURRENT MEDICATIONS  Home Medications     Reviewed by Marissa Aldana R.N. (Registered Nurse) on 05/07/21 at 1920  Med List Status: Not Addressed   Medication Last Dose Status   B Complex-C (SUPER B COMPLEX PO)  Active   Cholecalciferol (VITAMIN D-3) 125 MCG (5000 UT) Tab  Active   Coenzyme Q10 (COQ10 PO)  Active   cyclosporin (RESTASIS) 0.05 % ophthalmic emulsion  Active   escitalopram (LEXAPRO) 5 MG tablet  Active   Glycerin-Polysorbate 80 (REFRESH DRY EYE THERAPY) 1-1 % SOLN  Active   metoprolol SR (TOPROL XL) 25 MG TABLET SR 24 HR  Active   prednisoLONE acetate (PRED FORTE) 1 % Suspension  Active   vitamin D (CHOLECALCIFEROL) 1000 Unit (25 mcg) Tab  Active   Zinc 50 MG Tab  Active                ALLERGIES  Allergies   Allergen Reactions   • Other Environmental      Seasonal allergies       PHYSICAL EXAM  VITAL SIGNS: /59   Pulse 73   Temp 36.8 °C (98.3 °F) (Temporal)   Resp 16   Ht 1.6 m (5' 3\")   Wt 59.5 kg (131 lb 2.8 oz)   SpO2 93%   BMI 23.24 kg/m²    Pulse ox interpretation: I interpret this pulse ox as normal.  Constitutional: Alert in no apparent distress.  HENT: Normocephalic atraumatic, MMM  Eyes: PER, Conjunctiva normal, Non-icteric.   Neck: Normal range of motion, No tenderness, Supple, No stridor.   Cardiovascular: Regular rate and rhythm, 2 out of 6 systolic flow murmur  Thorax & Lungs: Normal breath sounds, No respiratory distress, No " wheezing, left posterior rib tenderness and swelling noted  Abdomen: Bowel sounds normal, Soft, No tenderness, No pulsatile masses. No peritoneal signs.  Skin: Warm, Dry, No erythema, No rash.   Back: No bony tenderness, No CVA tenderness.   Extremities/MSK: Intact equal distal pulses, No edema, No tenderness, No cyanosis, no major deformities noted  Neurologic: Alert and oriented x3, No focal deficits noted.       DIFFERENTIAL DIAGNOSIS AND WORK UP PLAN    This is a 87 y.o. female who presents with multiple near syncopal episodes after being discharged last month most likely secondary to proximal SVT episodes where her heart rate goes quite quickly.  She has a history of a type I AV block on her prior EKG as well as her cardiac monitoring and continues to have a type I AV block today otherwise no acute change on EKG.  In the setting of her age the fact that she got multiple rib fractures will require pain control has been unsteady on her feet with multiple near syncopal episodes I believe she should be hospitalized and I discussed this with the family for further evaluation may be medication modification in order to help control these spells.  They can also consult cardiology tomorrow for their input      Family feels comfortable with this plan.  She did have blood drawn 2 days ago which showed continued chronic kidney disease    DIAGNOSTIC STUDIES / PROCEDURES    EKG  Results for orders placed or performed during the hospital encounter of 21   EKG (NOW)   Result Value Ref Range    Report       [facility] Emergency Dept.    Test Date:  2021  Pt Name:    LARRY BAILEY               Department: ER  MRN:        [id number]                      Room:  Gender:     Female                       Technician: [id number]  :        [id number]                   Requested By:ER TRIAGE PROTOCOL  Order #:    [id number]                    Reading MD: Neema Thomas MD    Measurements  Intervals                                 Axis  Rate:       73                           P:          0  HI:         201                          QRS:        31  QRSD:       76                           T:          62  QT:         388  QTc:        428    Interpretive Statements  Sinus rhythm rate 73 no ST elevations or ST depressions no abnormal T wave  inversions no pathognomonic Q waves borderline prolonged type I AV block  otherwise normal intervals normal axis  Compared to ECG 04/02/2021 15:25:50  No significant changes  Electronically Signed On 5-7-2021 22:18:23 PDT by Neema Thomas MD          LABS  Pertinent Lab Findings    Labs Reviewed   CBC WITH DIFFERENTIAL   COMP METABOLIC PANEL   TROPONIN   SARS-COV-2, PCR (IN-HOUSE)   CBC WITH DIFFERENTIAL   RENAL FUNCTION PANEL   MAGNESIUM   CREATINE KINASE   TROPONIN   TROPONIN       RADIOLOGY  EC-ECHOCARDIOGRAM COMPLETE W/O CONT    (Results Pending)     The radiologist's interpretation of all radiological studies have been reviewed by me.      COURSE & MEDICAL DECISION MAKING  Pertinent Labs & Imaging studies reviewed. (See chart for details)    I had a long discussion with the family about the plan for hospitalization and they have agreed for it.  She will be given incentive spirometer pain management hospitalization.    I spoke w Dr Dailey who has accepted the patient for hospitalization    I verified that the patient was wearing a mask and I was wearing appropriate PPE every time I entered the room. The patient's mask was on the patient at all times during my encounter except for a brief view of the oropharynx.          FINAL IMPRESSION  1.  Near syncope  2.  Left rib fractures  3.  Paroxysmal SVT        Electronically signed by: Neema Thomas M.D., 5/7/2021 10:16 PM    This dictation has been created using voice recognition software and/or scribes. The accuracy of the dictation is limited by the abilities of the software and the expertise of the scribes. I expect there may be  some errors of grammar and possibly content. I made every attempt to manually correct the errors within my dictation. However, errors related to voice recognition software and/or scribes may still exist and should be interpreted within the appropriate context.

## 2021-05-08 NOTE — PROGRESS NOTES
Received report from Jax CANO. Pt arrived to unit at 0040 via gurney escorted by Jax CANO. Assessment complete. VSS. No signs of distress noted at this time. Tele monitor in place. Monitor room notified. Pt c/o pain 8/10. Fall precautions and appropriate signs in place. Pt oriented to unit routine, call light/phone system within reach. Personal belongings within reach. RN extension number provided. Pt educated regarding fall precautions. Bed alarm is on. No isolation precautions in place. Pt denies any further needs at this time.

## 2021-05-08 NOTE — ASSESSMENT & PLAN NOTE
History of paroxysmal SVT  No episodes noted this admission  Cont recently increased home Toprolol XL 37.5mg daily.  Patient reports increasing this medication 2 days ago, with symptoms occurring yesterday  Patient received metoprolol 12.5mg PO x1 in ER for tachycardia  PRN IV metoprolol 5mg for HR > 150  Tachycardia possibly secondary to rib fracture pain  Monitor and replete potassium and magnesium as needed  Plan for ablation Wednesday.  Patient agreeable

## 2021-05-08 NOTE — PROGRESS NOTES
Subjective:   Yoli Carmichael is a 87 y.o. female who presents today with   Chief Complaint   Patient presents with   • Fall     injury on left side on back, hurt, bruised, today.   • Dizziness     feels heart is racing.     Patient's daughter is present today  Fall  The accident occurred 6 to 12 hours ago. The fall occurred while standing. She fell from a height of 1 to 2 ft. She landed on carpet. Point of impact: left ribs. Pain location: left ribs. The pain is moderate. The symptoms are aggravated by movement and rotation. Pertinent negatives include no abdominal pain, bowel incontinence, fever, loss of consciousness, numbness or tingling. She has tried nothing for the symptoms. The treatment provided no relief.     Patient states she recently was taken off of losartan 25 mg and is now taking 37.5 mg of metoprolol.  Echo showed LVOT at hospital visit early last month.  Patient has been having episodes of feeling flush, dizziness and palpitations.  When these are occurring her heart rate is in the 140s to 160s range.  She has been following up with cardiology.  Patient's daughter states that she was standing right next to her and they were talking and patient told her that she was having another one of her episodes.  PMH:  has a past medical history of Arrhythmia, Arthritis, Bronchitis, CATARACT, Dry eyes, bilateral (3/27/2014), Osteopenia of the elderly (3/27/2014), and Pneumonia.  MEDS:   Current Outpatient Medications:   •  metoprolol SR (TOPROL XL) 25 MG TABLET SR 24 HR, Take 1 tablet by mouth every day. (Patient taking differently: Take 37.5 mg by mouth every day.), Disp: 90 tablet, Rfl: 3  •  prednisoLONE acetate (PRED FORTE) 1 % Suspension, Administer 1 Drop into both eyes see administration instructions. 1 drop in left eye four times a day  1 drop in right eye six times a day, Disp: , Rfl:   •  Cholecalciferol (VITAMIN D-3) 125 MCG (5000 UT) Tab, Take 5,000 Units by mouth every day. 5000 unit tablet +  1000 unit tablet for a total of 6000 units daily, Disp: , Rfl:   •  Coenzyme Q10 (COQ10 PO), Take 1 capsule by mouth two times a week. Sunday & Wednesday, Disp: , Rfl:   •  Zinc 50 MG Tab, Take 50 mg by mouth every day., Disp: , Rfl:   •  escitalopram (LEXAPRO) 5 MG tablet, Take 1 Tab by mouth every day., Disp: 90 Tab, Rfl: 3  •  cyclosporin (RESTASIS) 0.05 % ophthalmic emulsion, Administer 1 Drop into affected eye(s) 2 times a day as needed (dry eye)., Disp: , Rfl:   •  B Complex-C (SUPER B COMPLEX PO), Take 1 tablet by mouth every morning., Disp: , Rfl:   •  Glycerin-Polysorbate 80 (REFRESH DRY EYE THERAPY) 1-1 % SOLN, Administer 1 Drop into affected eye(s) 4 times a day as needed (dry eye)., Disp: , Rfl:   •  vitamin D (CHOLECALCIFEROL) 1000 Unit (25 mcg) Tab, Take 1,000 Units by mouth every day. 1000 unit tablet + 5000 unit tablet for a total of 6000 units daily, Disp: , Rfl:   ALLERGIES:   Allergies   Allergen Reactions   • Other Environmental      Seasonal allergies     SURGHX:   Past Surgical History:   Procedure Laterality Date   • KNEE ARTHROPLASTY TOTAL  9/3/2013    Performed by Ramesh Styles M.D. at SURGERY [facility]   • TONSILLECTOMY       SOCHX:  reports that she has never smoked. She has never used smokeless tobacco. She reports current alcohol use. She reports that she does not use drugs.  FH: Reviewed with patient, not pertinent to this visit.       Review of Systems   Constitutional: Negative for chills and fever.   Eyes: Negative for blurred vision and double vision.   Cardiovascular: Positive for palpitations. Negative for chest pain.   Gastrointestinal: Negative for abdominal pain and bowel incontinence.   Musculoskeletal: Positive for falls.        Left rib pain   Neurological: Positive for dizziness (intermittent). Negative for tingling, loss of consciousness and numbness.        Objective:   /50 (BP Location: Left arm, Patient Position: Sitting, BP Cuff Size: Adult)   Pulse 83    "Temp 36.6 °C (97.9 °F) (Temporal)   Resp 18   Ht 1.6 m (5' 3\")   Wt 59.2 kg (130 lb 9.6 oz)   SpO2 91%   BMI 23.13 kg/m²   Physical Exam  Vitals and nursing note reviewed.   Constitutional:       General: She is not in acute distress.     Appearance: Normal appearance. She is well-developed. She is not ill-appearing or toxic-appearing.   HENT:      Head: Normocephalic and atraumatic.      Right Ear: Hearing normal.      Left Ear: Hearing normal.   Eyes:      Pupils: Pupils are equal, round, and reactive to light.   Cardiovascular:      Rate and Rhythm: Normal rate and regular rhythm.      Heart sounds: Normal heart sounds. No murmur. No friction rub. No gallop.    Pulmonary:      Effort: Pulmonary effort is normal.      Breath sounds: Normal breath sounds.   Chest:      Chest wall: Tenderness present. No deformity or crepitus.   Musculoskeletal:        Back:       Comments: Tenderness palpation of the left side of the ribs wrapping around towards the thoracic back.  No midline spinous process tenderness.  Swelling and ecchymosis to the left rib cage.  Pain in the ribs with abduction of the left upper extremity.   Skin:     General: Skin is warm and dry.   Neurological:      Mental Status: She is alert.      Coordination: Coordination normal.   Psychiatric:         Mood and Affect: Mood normal.       DX ribs  FINDINGS:  Acute fractures of left posterolateral ribs 5-8. No pneumothorax.  The lungs are hyperinflated. Left basilar atelectasis.  Unremarkable cardiomediastinal silhouette.  Atherosclerosis.     IMPRESSION:     1.  Acute fractures of left posterolateral ribs 5-8. No pneumothorax.  2.  Left basilar atelectasis.    EKG  Rate 71 Compared to EKG from 4/2/21 shows prolonged DE interval.   No acute ST wave changes.  Assessment/Plan:   Assessment    1. Closed fracture of multiple ribs of left side, initial encounter  - [id number] (WITH 1-VIEW CXR) LEFT; Future    2. Intermittent palpitations  - EKG - " Clinic Performed  Unknown cause to her syncopal episodes through cardiology work-ups to this point.  Potentially patient is euvolemic although she states that she has been drinking lots of fluids her GFR was still decreased and BUN was increased from labs done 2 days ago.  Also concerning findings for new prolonged MD interval.  Discussed with patient and her daughter that I would recommend higher level of evaluation in the emergency room tonight given new findings.  Will defer pain management till after she has had further work-up.  Greater than 40 minutes were spent reviewing patient's chart, examining and obtaining history from patient, and discussing plan of care.  Patient's daughter feels comfortable with taking her to the ER although I did offer private transport.    Please note that this dictation was created using voice recognition software. I have made every reasonable attempt to correct obvious errors, but I expect that there are errors of grammar and possibly content that I did not discover before finalizing the note.    Christiano Lee PA-C

## 2021-05-08 NOTE — ASSESSMENT & PLAN NOTE
Fall with rib fractures prior to presentation   CT head on 5/4 negative for intracranial hemorrhage  X-ray shows left posterior lateral ribs 5-8   No evidence of pneumothorax  Continue pain control as needed to prevent atelectasis, however avoid oxycodone as it gave her dizziness  Incentive spirometer  PT OT  Fall precautions

## 2021-05-08 NOTE — ED TRIAGE NOTES
"Chief Complaint   Patient presents with   • Syncope     Pt states she was \"getting ready to go out in the bathroom and I had one of my spells where I pass out. I fell back onto the bath tub and hit my left side, it knocked the wind out of me and I couldnt breath.\"   • T-5000 GLF     Pt c/o L sided rib pain and swelling s/p GLF in bathroom. -blood thinners, denies hitting her head.       Ambulatroy to triage for above complaint.   Educated on triage process, encourage to inform staff of any changes.     /59   Pulse 73   Temp 36.8 °C (98.3 °F) (Temporal)   Resp 16   Ht 1.6 m (5' 3\")   Wt 59.5 kg (131 lb 2.8 oz)   SpO2 93%   BMI 23.24 kg/m² '  "

## 2021-05-08 NOTE — PROGRESS NOTES
Received bedside report from RN, pt care assumed. Patient asleep in bed, calm/unlabored breathing. No signs of acute distress noted at this time. Bed locked/in lowest position, call light within reach, hourly rounding initiated.

## 2021-05-08 NOTE — ASSESSMENT & PLAN NOTE
Cr 1.61 on admission, with baseline of 1.5-1.8  Creatinine has been improving  Continue to encourage fluids  Avoid NSAIDs

## 2021-05-08 NOTE — PROGRESS NOTES
· 2 RN skin check complete with Wilfredo CANO  · Devices in place N/A .  · Skin assessed under devices N/A.  · Confirmed pressure ulcers found on N/A.  · New potential pressure ulcers noted on N/A. Wound consult placed? N/A. Photo uploaded? N/A.   · The following interventions are in place N/A.      All skin intact. No evidence of skin breakdown.

## 2021-05-08 NOTE — H&P
"Hospital Medicine History & Physical Note    Date of Service  5/7/2021    Primary Care Physician  Juan Santana M.D.    Consultants  None    Code Status  Full Code    Chief Complaint  Chief Complaint   Patient presents with   • Syncope     Pt states she was \"getting ready to go out in the bathroom and I had one of my spells where I pass out. I fell back onto the bath tub and hit my left side, it knocked the wind out of me and I couldnt breath.\"   • T-5000 GLF     Pt c/o L sided rib pain and swelling s/p GLF in bathroom. -blood thinners, denies hitting her head.       History of Presenting Illness  87 y.o. female with history of PSVT, hypertension, mitral valve regurgitation, HOCM and CKD III who presented 5/7/2021 with episode of syncope.  History obtained from patient and patient's 2 daughters who are present at bedside emergency room.  Patient is independent female who lives alone at home but care for by the 2 daughters who also alternate in providing care for patient.  He has significant history of PSVT, HTN and brittle renal function - her Toprolol XL dose was increased from 25mg to 37.5mg, then ARB was discontinued approximately a week prior.  Today, she had an episode of palpitations when she stood up to walk to the bathroom, reported feeling lightheaded.  Her daughter was next year at that time, reported she had passed out and perhaps may have hit the bathtub as she was falling.  Daughter stated she, patient's head and denies head injury.  Patient was taken to urgent care earlier, found to have acute fracture of left posterior lateral ribs 5-8 with no evidence of pneumothorax.  She was subsequently sent to ER for.    In ER, EKG showed sinus rhythm with heart rate of 73, no apparent heart block and no ST elevation or depressions. BUN/Cr 27/1.61. Admitted to telemetry unit for further workup and treatment.    Review of Systems  Review of Systems   Constitutional: Negative for chills and fever.   HENT: " Negative for hearing loss and tinnitus.    Eyes: Negative for blurred vision and double vision.   Respiratory: Negative for cough, sputum production, shortness of breath and wheezing.    Cardiovascular: Positive for palpitations. Negative for chest pain, orthopnea and leg swelling.   Gastrointestinal: Negative for abdominal pain, heartburn, nausea and vomiting.   Genitourinary: Negative for dysuria and flank pain.   Musculoskeletal: Positive for back pain, falls, joint pain and myalgias.   Skin: Negative for itching and rash.   Neurological: Positive for dizziness and loss of consciousness. Negative for sensory change, speech change, focal weakness, weakness and headaches.   Endo/Heme/Allergies: Negative for environmental allergies. Does not bruise/bleed easily.   Psychiatric/Behavioral: Negative for depression and suicidal ideas.   All other systems reviewed and are negative.      Past Medical History   has a past medical history of Arrhythmia, Arthritis, Bronchitis, CATARACT, Dry eyes, bilateral (3/27/2014), Osteopenia of the elderly (3/27/2014), and Pneumonia.    Surgical History   has a past surgical history that includes knee arthroplasty total (9/3/2013) and tonsillectomy.     Family History  family history includes Cancer in her brother, daughter, daughter, and maternal aunt; Heart Attack in her father; Heart Disease (age of onset: 54) in her father; Hypertension in her maternal aunt; Lung Disease in her father and mother; Seizures in her daughter.     Social History   reports that she has never smoked. She has never used smokeless tobacco. She reports current alcohol use. She reports that she does not use drugs.    Allergies  Allergies   Allergen Reactions   • Other Environmental      Seasonal allergies       Medications  Prior to Admission Medications   Prescriptions Last Dose Informant Patient Reported? Taking?   B Complex-C (SUPER B COMPLEX PO) 5/6/2021 at AM Patient Yes No   Sig: Take 1 tablet by mouth  every morning.   Cholecalciferol (VITAMIN D-3) 125 MCG (5000 UT) Tab  Patient Yes No   Sig: Take 5,000 Units by mouth every day. 5000 unit tablet + 1000 unit tablet for a total of 6000 units daily   Coenzyme Q10 (COQ10 PO)  Patient Yes No   Sig: Take 1 capsule by mouth two times a week. Sunday & Wednesday   Glycerin-Polysorbate 80 (REFRESH DRY EYE THERAPY) 1-1 % SOLN PRN at PRN   Patient Yes No   Sig: Administer 1 Drop into affected eye(s) 4 times a day as needed (dry eye).   Zinc 50 MG Tab  Patient Yes No   Sig: Take 50 mg by mouth every day.   cyclosporin (RESTASIS) 0.05 % ophthalmic emulsion  Patient Yes No   Sig: Administer 1 Drop into affected eye(s) 2 times a day as needed (dry eye).   escitalopram (LEXAPRO) 5 MG tablet 5/6/2021 at K Patient No No   Sig: Take 1 Tab by mouth every day.   metoprolol SR (TOPROL XL) 25 MG TABLET SR 24 HR   No No   Sig: Take 1 tablet by mouth every day.   Patient taking differently: Take 37.5 mg by mouth every day.   prednisoLONE acetate (PRED FORTE) 1 % Suspension  Patient Yes No   Sig: Administer 1 Drop into both eyes see administration instructions. 1 drop in left eye four times a day   1 drop in right eye six times a day   vitamin D (CHOLECALCIFEROL) 1000 Unit (25 mcg) Tab  Patient Yes No   Sig: Take 1,000 Units by mouth every day. 1000 unit tablet + 5000 unit tablet for a total of 6000 units daily      Facility-Administered Medications: None       Physical Exam  Temp:  [36.8 °C (98.2 °F)-36.8 °C (98.3 °F)] 36.8 °C (98.2 °F)  Pulse:  [73-77] 77  Resp:  [16] 16  BP: (151-159)/(59-76) 151/76  SpO2:  [85 %-94 %] 94 %    Physical Exam  Constitutional:       Appearance: Normal appearance.   HENT:      Head: Normocephalic and atraumatic.      Nose: Nose normal.      Mouth/Throat:      Mouth: Mucous membranes are dry.      Pharynx: Oropharynx is clear.   Eyes:      General: No scleral icterus.     Extraocular Movements: Extraocular movements intact.   Cardiovascular:      Rate and  Rhythm: Regular rhythm. Tachycardia present.      Pulses: Normal pulses.      Heart sounds: No friction rub. No gallop.    Pulmonary:      Effort: Pulmonary effort is normal. No respiratory distress.      Breath sounds: No wheezing or rales.   Abdominal:      General: There is no distension.      Palpations: Abdomen is soft.      Tenderness: There is no abdominal tenderness. There is no guarding.   Musculoskeletal:         General: Tenderness and signs of injury present.      Cervical back: Neck supple. No tenderness.      Right lower leg: No edema.      Left lower leg: No edema.      Comments: Tender to palpation in left lateral thoracic region, no respiratory distress   Skin:     General: Skin is warm and dry.      Capillary Refill: Capillary refill takes 2 to 3 seconds.      Coloration: Skin is pale.   Neurological:      General: No focal deficit present.      Mental Status: She is alert and oriented to person, place, and time.      Motor: No weakness.   Psychiatric:         Mood and Affect: Mood normal.         Laboratory:  Recent Labs     05/05/21  1034 05/08/21  0000   WBC 8.6 10.9*   RBC 3.87* 3.70*   HEMOGLOBIN 11.1* 10.6*   HEMATOCRIT 35.2* 33.0*   MCV 91.0 89.2   MCH 28.7 28.6   MCHC 31.5* 32.1*   RDW 50.4* 49.6   PLATELETCT 227 221   MPV 11.0 10.3     Recent Labs     05/05/21  1034 05/08/21  0000   SODIUM 139 137   POTASSIUM 4.0 3.9   CHLORIDE 107 107   CO2 21 20   GLUCOSE 100* 114*   BUN 27* 26*   CREATININE 1.61* 1.45*   CALCIUM 9.4 9.2     Recent Labs     05/05/21  1034 05/08/21  0000   ALTSGPT  --  14   ASTSGOT  --  23   ALKPHOSPHAT  --  61   TBILIRUBIN  --  0.5   GLUCOSE 100* 114*         No results for input(s): NTPROBNP in the last 72 hours.      Recent Labs     05/08/21  0000   TROPONINT 21*       Imaging:  EC-ECHOCARDIOGRAM COMPLETE W/O CONT    (Results Pending)         Assessment/Plan:  I anticipate this patient is appropriate for observation status at this time.    * Syncope  Assessment &  Plan  Reported tachycardia, lightheadedness, dizziness prior to fall  Orthostatic hypotension and autonomic dysfunction likely contributory  H/o PSVT, MR and HOCM  Initial EKG - no HB or ST depression/elevation\  Cont trending EKG and CE    Orthostatic VS  F/u repeat echo  Tele monitoring    SVT (supraventricular tachycardia) ([facility])- (present on admission)  Assessment & Plan  Paroxysmal SVT  Serial EKGs  Cont recently increased home Toprolol XL 37.5mg daily  Metoprolol 12.5mg PO x1 in ER due to tachycardia  PRN IV metoprolol 5mg for HR > 150  Monitor tele    Pain control to maintain HR < 90 -100  Maintain K>4.0 and Mg>2.0    Stage 3b chronic kidney disease  Assessment & Plan  Minimize nephrotoxic agents, renally dose meds    Traumatic rhabdomyolysis (HCC)  Assessment & Plan  S/p syncope/fall   - gentle IVF    Closed fracture of multiple ribs of left side with delayed healing  Assessment & Plan  Xray rib: acute fracture of left posterior lateral ribs 5-8 with no evidence of pneumothorax  Pain control: lidoderm, tylenol, Robaxin, oxycodone, morphine  PT/OT    Essential hypertension, benign- (present on admission)  Assessment & Plan  Recent d/c of ARB and increased Toprolol XL to 37.5mg by outpt cardiology  Cont Toprolol XL 37.5mg  PRN hydralazine    Renal insufficiency  Assessment & Plan  Cr 1.61, BL ~1.5-1.8  At baseline  Avoid NSAID    Vitamin D deficiency  Assessment & Plan  supplement    Fall  Assessment & Plan  PT/OT  Fall precautions    HOCM (hypertrophic obstructive cardiomyopathy) ([facility])- (present on admission)  Assessment & Plan  F/u echo    Right thyroid nodule- (present on admission)  Assessment & Plan  Outpatient f/u  Recent bx    Depression  Assessment & Plan  Lexapro    Dyslipidemia- (present on admission)  Assessment & Plan  No longer on statin for prevention due to age as per primary cardiology    Osteopenia of the elderly- (present on admission)  Assessment & Plan  Vit D supplement  Fall  precautions    Dry eyes, bilateral- (present on admission)  Assessment & Plan  Eye drops    Advance care planning  Assessment & Plan  Goal of care discussed with patient and patient's daughters -- supportive care, tele monitoring and adjust meds as needed -- FULL code status confirmed  Advance are planning: 15mins    VTE ppx: heparin SQ  Code: full  Diet: cardiac  Dispo: admit

## 2021-05-08 NOTE — ASSESSMENT & PLAN NOTE
"No loss of consciousness   History of hyperdynamic not hypertrophic left ventricular systolic function  History of paroxysmal SVT  Of note, recent event recorder last month showed SVT episodes, with heart rate up to 140s, and patient reports \"spells\" during this timeframe  Of note, carotid ultrasound 4/2021 with right carotid stenosis 50 to 70% and left carotid stenosis less than 50%  Also recent increase of metoprolol dose to 37.5  Patient reported tachycardia, lightheadedness, dizziness prior to fall  EKG on admission sinus rhythm without heart block or ST changes.  Heart rate 73  Troponin 21 -> 26.  Not significantly elevated  Orthostatic vitals negative  As patient had echo done recently in April 2021, will not repeat this admission given no significant change in presentation  Continue to monitor on telemetry  Continue to avoid antihypertensives and diuretics  Cardiology consulted. Yvon Morrison Agarwal saw this patient  EP consulted.  Dr. Hollis recommends EP study with ablation and pacer.  Patient agreeable.  Planned for 5/12  "

## 2021-05-08 NOTE — THERAPY
Occupational Therapy   Initial Evaluation     Patient Name: Yoli Carmichael  Age:  87 y.o., Sex:  female  Medical Record #: [id number]  Today's Date: 5/8/2021     Precautions  Precautions: Fall Risk    Assessment  Patient is 87 y.o. female with a diagnosis of fall with rib fxs due to syncope.  Additional factors influencing patient status / progress: Pt has excellent social support and family can assist 24/7 as needed. Pt's preference is to go home with family assistance. Pt can benefit from acute OT to improve balance and activity tolerance during ADLs. Pt could benefit from HH OT to ensure that home is safe and all AE is in place to help reduce risk of further falls during ADLs.     Plan    Recommend Occupational Therapy 3 times per week until therapy goals are met for the following treatments:  Neuro Re-Education / Balance, Self Care/Activities of Daily Living, Therapeutic Activities and Therapeutic Exercises.    DC Equipment Recommendations: Front-Wheel Walker  Discharge Recommendations: Recommend home health for continued occupational therapy services        05/08/21 1604   Prior Living Situation   Prior Services Intermittent Physical Support for ADL Per Family   Housing / Facility 1 Story House   Steps Into Home 1   Steps In Home 0   Bathroom Set up Walk In Shower   Equipment Owned None  (has shower seat in garage she can put in bathroom)   Lives with - Patient's Self Care Capacity Alone and Unable to Care For Self  (family is staying with pt to assist 24/7)   Comments Family reports they can continue to stay with pt as needed   Prior Level of ADL Function   Self Feeding Independent   Grooming / Hygiene Independent   Bathing Independent   Dressing Independent   Toileting Independent   Prior Level of IADL Function   Medication Management Independent   Laundry Requires Assist   Kitchen Mobility Requires Assist   Finances Independent   Home Management Requires Assist   Shopping Requires Assist   Prior Level Of  Mobility Supervision Without Device in Community;Supervision Without Device in Home   Driving / Transportation Relatives / Others Provide Transportation   History of Falls   History of Falls   (adm with fall 2/2 sycope)   Precautions   Precautions Fall Risk   Cognition    Cognition / Consciousness WDL   Level of Consciousness Alert   Comments Pleasant and cooperative   Balance Assessment   Sitting Balance (Static) Fair +   Sitting Balance (Dynamic) Fair +   Standing Balance (Static) Fair   Standing Balance (Dynamic) Fair   Weight Shift Sitting Fair   Weight Shift Standing Fair   Comments using FWW; limited by rib pain   Bed Mobility    Supine to Sit Minimal Assist   Sit to Supine Minimal Assist   Scooting Supervised   ADL Assessment   Eating Supervision   Grooming Standing;Supervision   Upper Body Dressing Supervision   Lower Body Dressing Moderate Assist   Toileting Supervision   Functional Mobility   Sit to Stand Supervised   Bed, Chair, Wheelchair Transfer Supervised   Toilet Transfers Supervised   Transfer Method Stand Step   Comments using FWW   Activity Tolerance   Sitting in Chair toilet 5 min   Sitting Edge of Bed 10 min   Standing 3 min + 5 min   Patient / Family Goals   Patient / Family Goal #1 Go home   Short Term Goals   Short Term Goal # 1 LB dressing with AE if needed at supervised level of assist   Short Term Goal # 2 seated bathing at supervised level of assist.    Problem List   Problem List Decreased Active Daily Living Skills;Decreased Homemaking Skills;Decreased Functional Mobility;Decreased Activity Tolerance;Impaired Postural Control / Balance   Anticipated Discharge Equipment and Recommendations   DC Equipment Recommendations Front-Wheel Walker   Discharge Recommendations Recommend home health for continued occupational therapy services

## 2021-05-08 NOTE — NON-PROVIDER
"[facility] INTERNAL MEDICINE PROGRESS NOTE     Attending: Hector Tidwell MD  Senior Resident: Kate Villarreal MD  Eduardo Resident: Kristine Abraham MD    PATIENT: Yoli Carmichael; [id number]; 8/21/1933    SUBJECTIVE: Yoli Carmichael is a pleasant 87-year-old female with a past history of PSVT, HTN, mitral valve regurgitation, hyperdynamic left ventricular systolic dysfunction, and CKD stage 3 who was admitted for an episode of near syncope. The patient has a history of syncopal/near syncopal events in the past and states that she gets these \"spells\" where her face gets flushed and she feels her heart rate start to race and get palpitations. Yesterday, the patient had been standing for a while when she felt the event come on and fell, hitting her left sided chest on the bathtub edge. She did not hit her head in the fall. After this, she went to the urgent care and was diagnosed with multiple rib fractures on the left side. She states that she did not have any chest pain or shortness of breath prior to the event and did not fully lose consciousness. No complaints of hemiparesthesias or other neurologic symptoms.     The patient was admitted for a syncopal event last month where she states she did lose consciousness. She was evaluated afterwards with a 3 week Holter monitor that showed no signs of arrhythmia but heart rate did extend up into the 140s. Further work up showed an EF of 75% with little hyperdynamic cardiac contractility as well as PSVT. She recently had her Toprolol XL dose increased from 25 mg to 37.5 mg and her ARB was discontinued about 1 week ago     In the ED she was given one dose of Metoprolol 12.5 mg due to her initial tachycardia. EKG showed NSR with a rate 73. No ST elevations or depressions. Prolonged RI interval. Her BUN was 27 and Cr was 1.61.    Today, she is having pain in her back and ribs secondary to the fall.    ROS: a full 12 point review of systems was done and is otherwise negative " "other than HPI    OBJECTIVE:  Vitals:    05/08/21 0219 05/08/21 0428 05/08/21 0723 05/08/21 0800   BP:  124/58  118/61   Pulse:  66  67   Resp:  16  16   Temp:  36.3 °C (97.3 °F)  36.1 °C (96.9 °F)   TempSrc:  Temporal     SpO2: 94% 94%  93%   Weight:       Height:   1.6 m (5' 2.99\")      No intake or output data in the 24 hours ending 05/08/21 0728    PE:  General: No acute distress, resting comfortably in bed.  HEENT: normocephalic, atraumatic, PERRLA, EOMI, mucous membranes are pink and moist. Posterior oropharynx without edema or erythema.  Neck: No palpable neck/thyroid mass. No carotid bruit.  Cardiovascular: Heart RRR with a holosystolic murmur. No rubs or gallops. Distal Pulses 2+ with testing of radial and DP/PT pulses  Respiratory: symmetric chest expansion, lungs CTA bilaterally with no wheezes rales or rhonci  Abdomen: soft, nontender, nondistended, no masses palpated, +BS  Musculoskeletal: strength 5/5 in four extremities. Tender to palpation in the left lateral T-spine. No bony vertebral point tenderness. No overlying ecchymosis over the are of the posterolateral left chest wall.  Neuro: non focal with no numbness, tingling or changes in sensation. A&Ox3      LABS:  Recent Labs     05/08/21  0000   WBC 10.9*   RBC 3.70*   HEMOGLOBIN 10.6*   HEMATOCRIT 33.0*   MCV 89.2   MCH 28.6   RDW 49.6   PLATELETCT 221   MPV 10.3   NEUTSPOLYS 69.00   LYMPHOCYTES 18.30*   MONOCYTES 11.00   EOSINOPHILS 0.50   BASOPHILS 0.60     Recent Labs     05/08/21  0000   SODIUM 137   POTASSIUM 3.9   CHLORIDE 107   CO2 20   BUN 26*   CREATININE 1.45*   CALCIUM 9.2   MAGNESIUM 2.2   PHOSPHORUS 3.4   ALBUMIN 3.8     Estimated GFR/CRCL = Estimated Creatinine Clearance: 22.6 mL/min (A) (by C-G formula based on SCr of 1.45 mg/dL (H)).  Recent Labs     05/08/21  0000   GLUCOSE 114*     Recent Labs     05/08/21  0000   ASTSGOT 23   ALTSGPT 14   TBILIRUBIN 0.5   ALKPHOSPHAT 61   GLOBULIN 3.1     Recent Labs     05/08/21  0000   CPKTOTAL " 158*       IMAGING:        Imaging:  Rib x-ray 5/7/2021  IMPRESSION:     1.  Acute fractures of left posterolateral ribs 5-8. No pneumothorax.  2.  Left basilar atelectasis.    No orders to display         MEDS:  Current Facility-Administered Medications   Medication Last Admin   • heparin injection 5,000 Units     • polyethylene glycol/lytes (MIRALAX) PACKET 1 Packet      And   • senna-docusate (PERICOLACE or SENOKOT S) 8.6-50 MG per tablet 2 tablet      And   • magnesium hydroxide (MILK OF MAGNESIA) suspension 30 mL      And   • bisacodyl (DULCOLAX) suppository 10 mg     • acetaminophen (Tylenol) tablet 650 mg 650 mg at 05/08/21 0925   • capsaicin (Zostrix) 0.025 % cream Given at 05/08/21 0926   • Metoprolol Tartrate (LOPRESSOR) injection 5 mg     • Respiratory Therapy Consult     • ondansetron (ZOFRAN) syringe/vial injection 4 mg     • ondansetron (ZOFRAN ODT) dispertab 4 mg     • guaiFENesin dextromethorphan (ROBITUSSIN DM) 100-10 MG/5ML syrup 10 mL     • escitalopram (Lexapro) tablet 5 mg 5 mg at 05/08/21 0110   • carboxymethylcellulose (REFRESH TEARS) 0.5 % ophthalmic drops 1 Drop     • prednisoLONE acetate (PRED FORTE) 1 % ophthalmic suspension 1 Drop 1 Drop at 05/08/21 0924   • metoprolol SR (TOPROL XL) tablet 37.5 mg 37.5 mg at 05/08/21 0522   • Pharmacy Consult Request ...Pain Management Review 1 Each     • oxyCODONE immediate-release (ROXICODONE) tablet 2.5 mg 2.5 mg at 05/08/21 0109    Or   • oxyCODONE immediate-release (ROXICODONE) tablet 5 mg     • methocarbamol (ROBAXIN) tablet 500 mg 500 mg at 05/08/21 0924   • lidocaine (LIDODERM) 5 % 1 Patch 1 Patch at 05/08/21 0111         ASSESSMENT & PLAN:  This is a pleasant 87-year-old female with a past history of PSVT, HTN, mitral valve regurgitation, hyperdynamic left ventricular systolic dysfunction, and CKD stage 3 who was admitted for observation following an episode of syncope. She has had this issue before in the past and follows with cardiology,   Eva. The cause of her near syncopal events are likely related to vasovagal syncope, and cardiology recommended we continue with the plan that was established at the last office visit.    #Syncope  -Patient had reported feeling palpitations, lightheaded, and slightly dizzy prior to the fall  -She was tachycardic on arrival with a normal EKG  -History of PSVT, mitral regurg  -Will check her orthostatic vital signs  -Continue her 37.5 mg per day metoprolol  -Discontinue statin due to her age    #PSVT  -Continue to monitor the patient with serial EKGs  -Continue her Toprolol XL 37.5 mg daily  -PRN IV metoprolol 5 mg for heart rate > 150 bpm    #Multiple rib fractures of the left sided chest with associated traumatic rhabdomyolysis  -Rib x-ray at urgent care showed acute fracture of left posterior lateral ribs 5-8 with no evidence of pneumothorax  -Pain contro (mild to severe scale)l: lidoderm, tylenol, Robaxin, oxycodone, morphine  -CPK is elevated at 158, will give IVF gently  -Encourage IS usage    Rory James, MS3  R Med

## 2021-05-08 NOTE — CARE PLAN
Problem: Safety  Goal: Will remain free from injury  Outcome: PROGRESSING AS EXPECTED  Goal: Will remain free from falls  Outcome: PROGRESSING AS EXPECTED     Problem: Infection  Goal: Will remain free from infection  Outcome: PROGRESSING AS EXPECTED     Problem: Mobility  Goal: Risk for activity intolerance will decrease  Outcome: PROGRESSING AS EXPECTED

## 2021-05-08 NOTE — ASSESSMENT & PLAN NOTE
Recent d/c of ARB and increased Toprolol XL to 37.5mg by outpt cardiology  Cont Toprolol XL 37.5mg  PRN hydralazine

## 2021-05-08 NOTE — ASSESSMENT & PLAN NOTE
Minimize nephrotoxic agents, renally dose meds  Creatinine is stable at new baseline (1.5-1.8)  Unlikely BRIAN   BUN to creatinine ratio 17.9  Creatinine 1.61 on admission.  Improving  GFR 30-34  DVT prophylaxis with heparin

## 2021-05-09 ENCOUNTER — HOME HEALTH ADMISSION (OUTPATIENT)
Dept: HOME HEALTH SERVICES | Facility: HOME HEALTHCARE | Age: 86
End: 2021-05-09
Payer: MEDICARE

## 2021-05-09 PROBLEM — I45.9 HEART BLOCK: Status: ACTIVE | Noted: 2021-05-09

## 2021-05-09 PROBLEM — I44.1 SECOND DEGREE ATRIOVENTRICULAR BLOCK, MOBITZ (TYPE) I: Status: ACTIVE | Noted: 2021-05-09

## 2021-05-09 PROBLEM — I45.9 HEART BLOCK: Status: RESOLVED | Noted: 2021-05-09 | Resolved: 2021-05-09

## 2021-05-09 LAB
ALBUMIN SERPL BCP-MCNC: 3 G/DL (ref 3.2–4.9)
ALBUMIN/GLOB SERPL: 1.1 G/DL
ALP SERPL-CCNC: 52 U/L (ref 30–99)
ALT SERPL-CCNC: 16 U/L (ref 2–50)
ANION GAP SERPL CALC-SCNC: 6 MMOL/L (ref 7–16)
AST SERPL-CCNC: 22 U/L (ref 12–45)
BASOPHILS # BLD AUTO: 0.7 % (ref 0–1.8)
BASOPHILS # BLD: 0.06 K/UL (ref 0–0.12)
BILIRUB SERPL-MCNC: 0.3 MG/DL (ref 0.1–1.5)
BUN SERPL-MCNC: 25 MG/DL (ref 8–22)
CALCIUM SERPL-MCNC: 8.7 MG/DL (ref 8.5–10.5)
CHLORIDE SERPL-SCNC: 112 MMOL/L (ref 96–112)
CHOLEST SERPL-MCNC: 132 MG/DL (ref 100–199)
CK SERPL-CCNC: 116 U/L (ref 0–154)
CO2 SERPL-SCNC: 19 MMOL/L (ref 20–33)
CREAT SERPL-MCNC: 1.62 MG/DL (ref 0.5–1.4)
EOSINOPHIL # BLD AUTO: 0.42 K/UL (ref 0–0.51)
EOSINOPHIL NFR BLD: 4.6 % (ref 0–6.9)
ERYTHROCYTE [DISTWIDTH] IN BLOOD BY AUTOMATED COUNT: 50.7 FL (ref 35.9–50)
GLOBULIN SER CALC-MCNC: 2.8 G/DL (ref 1.9–3.5)
GLUCOSE SERPL-MCNC: 106 MG/DL (ref 65–99)
HCT VFR BLD AUTO: 30.3 % (ref 37–47)
HDLC SERPL-MCNC: 49 MG/DL
HGB BLD-MCNC: 9.4 G/DL (ref 12–16)
IMM GRANULOCYTES # BLD AUTO: 0.09 K/UL (ref 0–0.11)
IMM GRANULOCYTES NFR BLD AUTO: 1 % (ref 0–0.9)
LDLC SERPL CALC-MCNC: 67 MG/DL
LYMPHOCYTES # BLD AUTO: 2.16 K/UL (ref 1–4.8)
LYMPHOCYTES NFR BLD: 23.9 % (ref 22–41)
MAGNESIUM SERPL-MCNC: 2.5 MG/DL (ref 1.5–2.5)
MCH RBC QN AUTO: 28.1 PG (ref 27–33)
MCHC RBC AUTO-ENTMCNC: 31 G/DL (ref 33.6–35)
MCV RBC AUTO: 90.7 FL (ref 81.4–97.8)
MONOCYTES # BLD AUTO: 1.18 K/UL (ref 0–0.85)
MONOCYTES NFR BLD AUTO: 13 % (ref 0–13.4)
NEUTROPHILS # BLD AUTO: 5.14 K/UL (ref 2–7.15)
NEUTROPHILS NFR BLD: 56.8 % (ref 44–72)
NRBC # BLD AUTO: 0 K/UL
NRBC BLD-RTO: 0 /100 WBC
PLATELET # BLD AUTO: 186 K/UL (ref 164–446)
PMV BLD AUTO: 11.2 FL (ref 9–12.9)
POTASSIUM SERPL-SCNC: 4.1 MMOL/L (ref 3.6–5.5)
PROT SERPL-MCNC: 5.8 G/DL (ref 6–8.2)
RBC # BLD AUTO: 3.34 M/UL (ref 4.2–5.4)
SODIUM SERPL-SCNC: 137 MMOL/L (ref 135–145)
TRIGL SERPL-MCNC: 80 MG/DL (ref 0–149)
WBC # BLD AUTO: 9.1 K/UL (ref 4.8–10.8)

## 2021-05-09 PROCEDURE — 36415 COLL VENOUS BLD VENIPUNCTURE: CPT

## 2021-05-09 PROCEDURE — 80061 LIPID PANEL: CPT

## 2021-05-09 PROCEDURE — A9270 NON-COVERED ITEM OR SERVICE: HCPCS | Performed by: STUDENT IN AN ORGANIZED HEALTH CARE EDUCATION/TRAINING PROGRAM

## 2021-05-09 PROCEDURE — 94669 MECHANICAL CHEST WALL OSCILL: CPT

## 2021-05-09 PROCEDURE — 99226 PR SUBSEQUENT OBSERVATION CARE,LEVEL III: CPT | Mod: GC | Performed by: INTERNAL MEDICINE

## 2021-05-09 PROCEDURE — 700102 HCHG RX REV CODE 250 W/ 637 OVERRIDE(OP): Performed by: STUDENT IN AN ORGANIZED HEALTH CARE EDUCATION/TRAINING PROGRAM

## 2021-05-09 PROCEDURE — 700111 HCHG RX REV CODE 636 W/ 250 OVERRIDE (IP): Performed by: STUDENT IN AN ORGANIZED HEALTH CARE EDUCATION/TRAINING PROGRAM

## 2021-05-09 PROCEDURE — G0378 HOSPITAL OBSERVATION PER HR: HCPCS

## 2021-05-09 PROCEDURE — 96372 THER/PROPH/DIAG INJ SC/IM: CPT

## 2021-05-09 PROCEDURE — 82550 ASSAY OF CK (CPK): CPT

## 2021-05-09 PROCEDURE — 85025 COMPLETE CBC W/AUTO DIFF WBC: CPT

## 2021-05-09 PROCEDURE — 83735 ASSAY OF MAGNESIUM: CPT

## 2021-05-09 PROCEDURE — 94760 N-INVAS EAR/PLS OXIMETRY 1: CPT

## 2021-05-09 PROCEDURE — 99215 OFFICE O/P EST HI 40 MIN: CPT | Performed by: INTERNAL MEDICINE

## 2021-05-09 PROCEDURE — 80053 COMPREHEN METABOLIC PANEL: CPT

## 2021-05-09 PROCEDURE — 700101 HCHG RX REV CODE 250: Performed by: STUDENT IN AN ORGANIZED HEALTH CARE EDUCATION/TRAINING PROGRAM

## 2021-05-09 RX ORDER — TRAMADOL HYDROCHLORIDE 50 MG/1
25 TABLET ORAL EVERY 6 HOURS PRN
Status: DISCONTINUED | OUTPATIENT
Start: 2021-05-09 | End: 2021-05-13 | Stop reason: HOSPADM

## 2021-05-09 RX ORDER — ATORVASTATIN CALCIUM 40 MG/1
40 TABLET, FILM COATED ORAL EVERY EVENING
Status: DISCONTINUED | OUTPATIENT
Start: 2021-05-09 | End: 2021-05-13 | Stop reason: HOSPADM

## 2021-05-09 RX ADMIN — ACETAMINOPHEN 650 MG: 325 TABLET, FILM COATED ORAL at 06:16

## 2021-05-09 RX ADMIN — PREDNISOLONE ACETATE 1 DROP: 10 SUSPENSION/ DROPS OPHTHALMIC at 22:00

## 2021-05-09 RX ADMIN — ESCITALOPRAM OXALATE 5 MG: 10 TABLET ORAL at 18:35

## 2021-05-09 RX ADMIN — METHOCARBAMOL 500 MG: 500 TABLET ORAL at 08:52

## 2021-05-09 RX ADMIN — HEPARIN SODIUM 5000 UNITS: 5000 INJECTION, SOLUTION INTRAVENOUS; SUBCUTANEOUS at 06:15

## 2021-05-09 RX ADMIN — PREDNISOLONE ACETATE 1 DROP: 10 SUSPENSION/ DROPS OPHTHALMIC at 18:39

## 2021-05-09 RX ADMIN — PREDNISOLONE ACETATE 1 DROP: 10 SUSPENSION/ DROPS OPHTHALMIC at 18:38

## 2021-05-09 RX ADMIN — ACETAMINOPHEN 650 MG: 325 TABLET, FILM COATED ORAL at 18:35

## 2021-05-09 RX ADMIN — ACETAMINOPHEN 650 MG: 325 TABLET, FILM COATED ORAL at 21:24

## 2021-05-09 RX ADMIN — OXYCODONE 2.5 MG: 5 TABLET ORAL at 09:11

## 2021-05-09 RX ADMIN — ACETAMINOPHEN 650 MG: 325 TABLET, FILM COATED ORAL at 13:58

## 2021-05-09 RX ADMIN — PREDNISOLONE ACETATE 1 DROP: 10 SUSPENSION/ DROPS OPHTHALMIC at 06:14

## 2021-05-09 RX ADMIN — PREDNISOLONE ACETATE 1 DROP: 10 SUSPENSION/ DROPS OPHTHALMIC at 13:58

## 2021-05-09 RX ADMIN — ATORVASTATIN CALCIUM 40 MG: 40 TABLET, FILM COATED ORAL at 18:35

## 2021-05-09 RX ADMIN — DOCUSATE SODIUM 50 MG AND SENNOSIDES 8.6 MG 2 TABLET: 8.6; 5 TABLET, FILM COATED ORAL at 18:36

## 2021-05-09 RX ADMIN — ACETAMINOPHEN 650 MG: 325 TABLET, FILM COATED ORAL at 08:52

## 2021-05-09 RX ADMIN — HEPARIN SODIUM 5000 UNITS: 5000 INJECTION, SOLUTION INTRAVENOUS; SUBCUTANEOUS at 21:23

## 2021-05-09 RX ADMIN — PREDNISOLONE ACETATE 1 DROP: 10 SUSPENSION/ DROPS OPHTHALMIC at 08:52

## 2021-05-09 RX ADMIN — HEPARIN SODIUM 5000 UNITS: 5000 INJECTION, SOLUTION INTRAVENOUS; SUBCUTANEOUS at 13:58

## 2021-05-09 RX ADMIN — METHOCARBAMOL 500 MG: 500 TABLET ORAL at 18:35

## 2021-05-09 RX ADMIN — LIDOCAINE 1 PATCH: 50 PATCH TOPICAL at 23:25

## 2021-05-09 RX ADMIN — PREDNISOLONE ACETATE 1 DROP: 10 SUSPENSION/ DROPS OPHTHALMIC at 21:24

## 2021-05-09 RX ADMIN — METHOCARBAMOL 500 MG: 500 TABLET ORAL at 23:25

## 2021-05-09 ASSESSMENT — FIBROSIS 4 INDEX: FIB4 SCORE: 2.57

## 2021-05-09 ASSESSMENT — ENCOUNTER SYMPTOMS
PALPITATIONS: 1
LOSS OF CONSCIOUSNESS: 0
FALLS: 1
DIZZINESS: 1
BACK PAIN: 1
MYALGIAS: 1

## 2021-05-09 ASSESSMENT — PAIN DESCRIPTION - PAIN TYPE
TYPE: ACUTE PAIN

## 2021-05-09 NOTE — DISCHARGE SUMMARY
"Discharge Summary    Date of Admission: 5/7/2021  Date of Discharge: 5/13/2021  Discharging Attending: Dr. Hector Tidwell  Discharging Senior Resident: Dr. Villarreal  Discharging Intern: Dr. bAraham    CHIEF COMPLAINT ON ADMISSION  Chief Complaint   Patient presents with   • Syncope     Pt states she was \"getting ready to go out in the bathroom and I had one of my spells where I pass out. I fell back onto the bath tub and hit my left side, it knocked the wind out of me and I couldnt breath.\"   • T-5000 GLF     Pt c/o L sided rib pain and swelling s/p GLF in bathroom. -blood thinners, denies hitting her head.       Reason for Admission  Syncope    Admission Date  5/7/2021    CODE STATUS  Full Code    HPI & HOSPITAL COURSE  87-year-old woman with past medical history of syncope, paroxysmal supraventricular tachycardia, hyperdynamic left ventricular systolic function with associated mid-cavity obstruction, right internal carotid stenosis (50 to 69% on April 2021 US bilateral carotid), impaired renal function presented with syncope.  Of note, the patient reported that the current episode of syncope was consistent with her prior presentations of syncope, in which the episodes are preceded by a sense of facial flushing, lightheadedness, palpitations, hypotension and are not followed by postictal symptoms when patient regains consciousness.  She had just seen cardiology in clinic on 5/3/2021, when metoprolol succinate 25 mg was increased to 37.5 mg daily due to her continued presyncopal symptoms.  She did have 21 days of outpatient cardiac monitoring done in April 2021, which showed that her episodes correlated with episodes of paroxysmal supraventricular tachycardia.    On this admission, patient noted to have mild leukocytosis 10.9, likely reactive, no evidence of infection), stable anemia hemoglobin 10.6, no hypoglycemia or significant hyperglycemia, no electrolyte derangement, stable renal function creatinine 1.45, " normal liver function, TSH 3.13.  There was slight elevation in  (which later trended down to normal range), as well as acute left ribs 5-8 fractures as seen on x-ray.  CT head negative for acute process.  EKG showed sinus rhythm.    Hospitalization uneventful.  Telemetry monitoring showed first-degree AV block and second-degree type I AV block, but patient remained asymptomatic.  Orthostatic vitals initially negative, but repeat set of orthostatic vitals later positive.  Echo was not repeated, as previous echo was just done in April 2021, which had shown left ventricular ejection fraction 75%, hyperdynamic left ventricular systolic function with mid cavity obstruction, mild mitral regurgitation, RVSP 35 mmHg.  She was also noted to have had US bilateral carotid done in April 2021, which had shown right internal carotid stenosis 50 to 69%, though she did not appear to have symptomatic carotid stenosis given her lack of neurological deficits.  High intensity atorvastatin and baby aspirin were started given her known right carotid stenosis.    Her case was discussed with cardiology, who felt that her presentation was consistent with symptomatic paroxysmal supraventricular tachycardia, as well as likely components of orthostatic and vasovagal syncope, left ventricular outflow tract obstruction.  She was evaluated by electrophysiology, who discussed ablation and permanent pacemaker implantation with patient, which patient was agreeable to.  Procedure was performed on 5/12/2021, pacemaker device was found to function normally the next day on interrogation, she was cleared subsequently for discharge by cardiology.  Cardiology recommended decreasing metoprolol succinate from 37.5 mg to 25 mg daily upon discharge, patient was also advised to monitor heart rate and blood pressure at home.  Given likely multifactorial nature of patient's syncopal episodes, discussed with patient avoidance of driving for at least 3  "months prior to reevaluation by healthcare provider to determine whether resumption of driving is appropriate, [facility] confidential physician's report also filed upon discharge.    Of note, chest x-ray done on day of discharge (routine exam following pacemaker implantation) was suggestive of some left lower lobe opacities, but patient clinically without any respiratory or ill symptoms or evidence of fluid overload, maintaining good saturation on room air.  Findings likely representative of atelectasis given hospital stay and left-sided rib fractures, as well as radiographic technique (2 view chest x-ray unable to be performed due to restrictions on movement following permanent pacemaker implantation).  No indication for antibiotics at this time.  Advised patient to monitor for any respiratory or ill symptoms at home and to contact primary care for evaluation should these symptoms develop.    Patient was evaluated by physical and occupational therapy, who recommended home health, which was arranged for patient prior to discharge.       Therefore, she is discharged in good and stable condition to home with close outpatient follow-up.    The patient met 2-midnight criteria for an inpatient stay at the time of discharge.    Discharge Date  5/13/2021    FOLLOW UP ITEMS POST DISCHARGE  Follow-up with cardiology and electrophysiology  Resolution of left rib fractures  Anemia and pulmonary hypertension work-up   Reevaluation for resumption of driving privileges when appropriate  Surveillance imaging of right internal carotid stenosis    DISCHARGE DIAGNOSES  Principal Problem:    Near syncope likely vasovagal versus secondary to symptomatic SVT versus HOCM-like cardiomyopathy (Chronic) POA: Unknown  Active Problems:    Stage 3b chronic kidney disease POA: Yes      Overview: [facility]. \"Need to include - controlled with current medications or give       update on current work up/treatment\"            Consider Low gFR , check " recent KFT --            4/8/15 --------not chronic the previous were with in normal limits twice       possible secondary to dehydration appropriate diagnosis mild renal       insufficiency which would hope fully resolve     Essential hypertension, benign POA: Yes    Depression POA: Unknown    SVT (supraventricular tachycardia) (HCC) POA: Yes    Fall POA: Yes    Closed fracture of multiple ribs of left side with delayed healing POA: Yes    Advance care planning POA: Yes    Traumatic rhabdomyolysis (HCC) POA: Yes    Second degree atrioventricular block, Mobitz (type) I POA: No  Resolved Problems:    Dry eyes, bilateral POA: Yes    Osteopenia of the elderly POA: Yes    Dyslipidemia POA: Yes    Right thyroid nodule POA: Yes    HOCM (hypertrophic obstructive cardiomyopathy) (HCC) POA: Yes    Vitamin D deficiency POA: Unknown    Renal insufficiency POA: Unknown    Heart block POA: Unknown      FOLLOW UP  Future Appointments   Date Time Provider Department Center   5/10/2021  8:30 AM STEPHANIE Crenshaw [facility] None   5/12/2021  1:30 PM Juan Santana M.D. [facility]   6/2/2021  2:45 PM Cris Patel, PT, DPT [facility] [place]   6/25/2021 10:30 AM Juan Santana M.D. [facility]     5/20/2021, 1:30 PM-[facility] pacemaker clinic  5/25/2021, 3:15 PM-[facility] cardiology, LUCAS Islas    MEDICATIONS ON DISCHARGE     Medication List      START taking these medications      Instructions   aspirin 81 MG EC tablet  Start taking on: May 14, 2021   Take 1 tablet by mouth every day.  Dose: 81 mg     atorvastatin 40 MG Tabs  Commonly known as: LIPITOR   Take 1 tablet by mouth every evening.  Dose: 40 mg     capsaicin 0.025 % cream  Commonly known as: Zostrix   Apply a thin layer to left ribs three times daily as needed.  Avoid open wounds.     lidocaine 5 % Ptch  Start taking on: May 14, 2021  Commonly known as: LIDODERM   Place 1 Patch on the skin every 24 hours.  Dose: 1 Patch        CHANGE how you take these medications       Instructions   metoprolol SR 25 MG Tb24  What changed: how much to take  Commonly known as: TOPROL XL   Take 1 tablet by mouth every day.  Dose: 25 mg        CONTINUE taking these medications      Instructions   COQ10 PO   Take 1 capsule by mouth two times a week. Sunday & Wednesday  Dose: 1 capsule     escitalopram 5 MG tablet  Commonly known as: Lexapro   Take 1 Tab by mouth every day.  Dose: 5 mg     ICAPS AREDS 2 PO   Take 1 tablet by mouth every day.  Dose: 1 tablet     prednisoLONE acetate 1 % Susp  Commonly known as: PRED FORTE   Administer 1 Drop into both eyes see administration instructions. 1 drop in left eye four times a day   1 drop in right eye six times a day  Dose: 1 Drop     Refresh Dry Eye Therapy 1-1 % Soln  Generic drug: Glycerin-Polysorbate 80   Administer 1 Drop into affected eye(s) 4 times a day as needed (dry eye).  Dose: 1 Drop     SUPER B COMPLEX PO   Take 1 tablet by mouth every morning.  Dose: 1 tablet     SYSTANE BALANCE OP   Administer 1 Drop into both eyes 4 times a day as needed (dry eyes).  Dose: 1 Drop     * Vitamin D-3 125 MCG (5000 UT) Tabs   Take 5,000 Units by mouth every day. 5000 unit tablet + 1000 unit tablet for a total of 6000 units daily  Dose: 5,000 Units     * vitamin D 1000 Unit (25 mcg) Tabs  Commonly known as: cholecalciferol   Take 1,000 Units by mouth every day. 1000 unit tablet + 5000 unit tablet for a total of 6000 units daily  Dose: 1,000 Units     Zaditor 0.025 % ophthalmic solution  Generic drug: ketotifen   Administer 1 Drop into both eyes 1 time a day as needed. Indications: Allergic Conjunctivitis  Dose: 1 Drop         * This list has 2 medication(s) that are the same as other medications prescribed for you. Read the directions carefully, and ask your doctor or other care provider to review them with you.                Allergies  Allergies   Allergen Reactions   • Other Environmental      Seasonal allergies       DIET  Orders Placed This Encounter    Procedures   • Diet Order Diet: Cardiac     Standing Status:   Standing     Number of Occurrences:   1     Order Specific Question:   Diet:     Answer:   Cardiac [6]       ACTIVITY  As tolerated.  Weight bearing as tolerated    CONSULTATIONS  Cardiology and electrophysiology consulted.  Treatment options were discussed and plan of care agreed upon.    PROCEDURES  5/12/2021 -supraventricular tachycardia ablation, permanent pacemaker implantation    IMAGING  DX-CHEST-PORTABLE (1 VIEW)   Final Result         1.  Hazy left midlung and lower lobe infiltrates, new/increased since prior.   2.  Small left pleural effusion.      DX-CHEST-PORTABLE (1 VIEW)   Final Result      No pneumothorax identified status post pacemaker placement.      CL-PERMANENT PACEMAKER INSERTION    (Results Pending)   CL-EP ABLATION SUPRAVENTRICULAR TACHYCARDIA    (Results Pending)     Interval history  No acute events overnight.  Patient denies chest pain, shortness of breath, fever, chills, other ill symptoms, syncopal episodes.  Patient eager to go home.    Physical Exam  Constitutional:       In no acute distress.  Sitting comfortably in chair.  Head:  Normocephalic, atraumatic.  Eyes:  Extraocular movements intact.  Conjunctive are normal.  Cardiovascular:      Rate and Rhythm: Normal rate, regular rhythm.  Systolic murmur present.  Pulmonary:     Pulmonary effort is normal.  No respiratory distress.  Breath sounds were more diminished in left basilar region, but no significant adventitious lung sounds heard.  Abdominal:      General: Abdomen is soft, nontender.  Musculoskeletal:         Pacemaker site in left upper anterior chest covered with clean gauze, left upper extremity in sling.  Other extremities moving freely.  Skin:     General: Skin is warm, dry.  Neurological:      Mental Status: She is alert, oriented.  No focal neurological deficits apparent.  Psychiatric:       Normal affect, mood.

## 2021-05-09 NOTE — THERAPY
Physical Therapy   Initial Evaluation     Patient Name: Yoli Carmichael  Age:  87 y.o., Sex:  female  Medical Record #: [id number]  Today's Date: 5/8/2021     Precautions: Fall Risk(falls PTA 2' to pre-syncope/syncope (vasovagal/cardiac))    Assessment  After initial evaluation and pt education, pt has no further acute PT needs. She was able to demonstrate hallway ambulation with FWW with Spv. She had most difficulty with bed mobility 2' to L rib pain though did perform without physical assist. Would recommend continued use of FWW at this time and HH PT at time of dc for formal home assessment and progression of balance/reduced reliance on ADs as able. She should continue to mobilize with floor staff while here.  See below for details/recs.       Plan    Recommend Physical Therapy for Evaluation only     DC Equipment Recommendations: Front-Wheel Walker  Discharge Recommendations: Recommend home health for continued physical therapy services        05/08/21 1704   Prior Living Situation   Prior Services Intermittent Physical Support for ADL Per Family   Housing / Facility 1 Story House   Steps Into Home 0   Steps In Home 0   Bathroom Set up Walk In Shower   Equipment Owned None   Lives with - Patient's Self Care Capacity Alone and Unable to Care For Self  (family has been assisting 24/7 as needed)   Comments per family via OT, family able to assist as needed    Prior Level of Functional Mobility   Bed Mobility Independent   Transfer Status Independent   Ambulation Independent   Distance Ambulation (Feet)   (community)   Assistive Devices Used None   Stairs Independent   Comments I with ADls; family assisting with IADls as needed; pt reports was able to walk around [employer] prior without issue   History of Falls   History of Falls Yes   Date of Last Fall   (fall 2' to syncope/pre-syncope)   Cognition    Cognition / Consciousness WDL   Level of Consciousness Alert   Comments very pleasant and cooperative   Passive ROM  Lower Body   Passive ROM Lower Body WDL   Active ROM Lower Body    Active ROM Lower Body  WDL   Strength Lower Body   Lower Body Strength  WDL   Sensation Lower Body   Lower Extremity Sensation   WDL   Balance Assessment   Sitting Balance (Static) Fair +   Sitting Balance (Dynamic) Fair +   Standing Balance (Static) Fair +   Standing Balance (Dynamic) Fair   Weight Shift Sitting Good   Weight Shift Standing Fair   Comments no deacon LOB during ambulation with FWW; noted without FWW, pt utilized furniture walking/HHA; would rec use of FWW at this time   Gait Analysis   Gait Level Of Assist Supervised   Assistive Device Front Wheel Walker   Distance (Feet) 140   # of Times Distance was Traveled 1   Deviation Other (Comment)  (reciprocal gait; steady with use of FWW)   # of Stairs Climbed 0   Weight Bearing Status no restrictions   Comments see balance   Bed Mobility    Supine to Sit Modified Independent   Sit to Supine Modified Independent   Comments pt enters/exits bed without physical assist; effortful 2' to L rib pain   Functional Mobility   Sit to Stand Supervised   Bed, Chair, Wheelchair Transfer Supervised   Transfer Method Stand Step   Mobility with FWW   How much difficulty does the patient currently have...   Turning over in bed (including adjusting bedclothes, sheets and blankets)? 3   Sitting down on and standing up from a chair with arms (e.g., wheelchair, bedside commode, etc.) 4   Moving from lying on back to sitting on the side of the bed? 2   How much help from another person does the patient currently need...   Moving to and from a bed to a chair (including a wheelchair)? 4   Need to walk in a hospital room? 4   Climbing 3-5 steps with a railing? 4   6 clicks Mobility Score 21   Activity Tolerance   Sitting in Chair NT   Sitting Edge of Bed functional   Standing at lest 6 mins   Comments no overt/acute fatigue; noted HR at rest sittign at 77 -> elevated to 87 during ambulation; appears appropriate HR  responses to exertional levels   Edema / Skin Assessment   Edema / Skin  Not Assessed   Education Group   Education Provided Role of Physical Therapist;Use of Assistive Device   Role of Physical Therapist Patient Response Patient;Acceptance;Explanation;Verbal Demonstration;Action Demonstration   Use of Assistive Device Patient Response Patient;Acceptance;Explanation;Verbal Demonstration;Action Demonstration

## 2021-05-09 NOTE — NON-PROVIDER
"[facility] INTERNAL MEDICINE PROGRESS NOTE     Attending: Hector Tidwell MD  Senior Resident: Kate Villarreal MD  Eduardo Resident: Kristine Abraham MD    PATIENT: Yoli Carmichael; [id number]; 8/21/1933    SUBJECTIVE: Yoli Carmichael is a pleasant 87-year-old female with a past history of PSVT, HTN, mitral valve regurgitation, hyperdynamic left ventricular systolic dysfunction, and CKD stage 3 who was admitted for an episode of near syncope. The patient has a history of syncopal/near syncopal events in the past and states that she gets these \"spells\" where her face gets flushed and she feels her heart rate start to race and get palpitations. Yesterday, the patient had been standing for a while when she felt the event come on and fell, hitting her left sided chest on the bathtub edge. She did not hit her head in the fall. After this, she went to the urgent care and was diagnosed with multiple rib fractures on the left side. She states that she did not have any chest pain or shortness of breath prior to the event and did not fully lose consciousness. No complaints of hemiparesthesias or other neurologic symptoms. The patient was admitted for a syncopal event last month where she states she did lose consciousness. She was evaluated afterwards with a 3 week Holter monitor that showed no signs of arrhythmia but heart rate did extend up into the 140s. Further work up showed an EF of 75% with little hyperdynamic cardiac contractility as well as PSVT. She recently had her Toprolol XL dose increased from 25 mg to 37.5 mg and her ARB was discontinued about 1 week ago     Today, she is having pain in her back and ribs secondary to the fall. She is feeling like she is having a harder time with taking deep breaths. Still using the incentive spirometer. Patient did have pain earlier today which was treated with oxycodone, however, she reports that this made her feel loopy and somewhat nauseated. Advised her that we will use " something much less strong in future. She did have some signs of sinus pause/First degree AV block type I. Cardiology (Dr. Sanz) was consulted and she will see the patient later.      OBJECTIVE:  Vitals:    05/08/21 2100 05/08/21 2339 05/09/21 0350 05/09/21 0653   BP:  139/62 153/67    Pulse: 70 68 68 67   Resp: 16 16 16 16   Temp:  36.6 °C (97.9 °F) 36.1 °C (96.9 °F)    TempSrc:  Temporal Temporal    SpO2: 96% 90% 90% 90%   Weight:       Height:         No intake or output data in the 24 hours ending 05/08/21 0728    PE:  General: No acute distress, resting comfortably in bed.  HEENT: normocephalic, atraumatic, PERRLA, EOMI, mucous membranes are pink and moist. Posterior oropharynx without edema or erythema.  Neck: No palpable neck/thyroid mass. No carotid bruit.  Cardiovascular: Heart RRR with a holosystolic murmur. No rubs or gallops. Distal Pulses 2+ with testing of radial and DP/PT pulses  Respiratory: poor respiratory effort secondary to pain. symmetric chest expansion, lungs CTA bilaterally with no wheezes rales or rhonci  Abdomen: soft, nontender, nondistended, no masses palpated, +BS  Musculoskeletal: strength 5/5 in four extremities. Tender to palpation in the left lateral T-spine. No bony vertebral point tenderness. No overlying ecchymosis over the are of the posterolateral left chest wall.  Neuro: non focal with no numbness, tingling or changes in sensation. A&Ox3      LABS:  Recent Labs     05/08/21  0000 05/09/21  0336   WBC 10.9* 9.1   RBC 3.70* 3.34*   HEMOGLOBIN 10.6* 9.4*   HEMATOCRIT 33.0* 30.3*   MCV 89.2 90.7   MCH 28.6 28.1   RDW 49.6 50.7*   PLATELETCT 221 186   MPV 10.3 11.2   NEUTSPOLYS 69.00 56.80   LYMPHOCYTES 18.30* 23.90   MONOCYTES 11.00 13.00   EOSINOPHILS 0.50 4.60   BASOPHILS 0.60 0.70     Recent Labs     05/08/21  0000 05/09/21  0336   SODIUM 137 137   POTASSIUM 3.9 4.1   CHLORIDE 107 112   CO2 20 19*   BUN 26* 25*   CREATININE 1.45* 1.62*   CALCIUM 9.2 8.7   MAGNESIUM 2.2 2.5    PHOSPHORUS 3.4  --    ALBUMIN 3.8 3.0*     Estimated GFR/CRCL = Estimated Creatinine Clearance: 20.2 mL/min (A) (by C-G formula based on SCr of 1.62 mg/dL (H)).  Recent Labs     05/08/21  0000 05/09/21  0336   GLUCOSE 114* 106*     Recent Labs     05/08/21  0000 05/09/21  0336   ASTSGOT 23 22   ALTSGPT 14 16   TBILIRUBIN 0.5 0.3   ALKPHOSPHAT 61 52   GLOBULIN 3.1 2.8     Recent Labs     05/08/21  0000 05/09/21  0336   CPKTOTAL 158* 116       IMAGING:        Imaging:  Rib x-ray 5/7/2021  IMPRESSION:     1.  Acute fractures of left posterolateral ribs 5-8. No pneumothorax.  2.  Left basilar atelectasis.      ASSESSMENT & PLAN:  This is a pleasant 87-year-old female with a past history of PSVT, HTN, mitral valve regurgitation, hyperdynamic left ventricular systolic dysfunction, and CKD stage 3 who was admitted for observation following an episode of syncope. She has had this issue before in the past and follows with cardiology, Dr. Velasquez. The cause of her near syncopal events are likely related to vasovagal syncope, and cardiology recommended we continue with the plan that was established at the last office visit.    #Syncope  -Patient had reported feeling palpitations, lightheaded, and slightly dizzy prior to the fall  -She was tachycardic on arrival with a normal EKG  -History of PSVT, mitral regurg  -Will check her orthostatic vital signs  -Continue her 37.5 mg per day metoprolol  -Discontinue statin due to her age    #PSVT  #First Degree AVB Type I (transiet at night only)  -Continue to monitor the patient on telemetry  -Cardiology was consulted and will see the patient later for further recommendations  -Her Toprolol XL 37.5 mg was held this morning due to signs of AVB on telemetry.  -PRN IV metoprolol 5 mg for heart rate > 150 bpm    #Multiple rib fractures of the left sided chest with associated traumatic rhabdomyolysis  -Rib x-ray at urgent care showed acute fracture of left posterior lateral ribs 5-8 with no  evidence of pneumothorax  -Pain contro (mild to severe scale)l: lidoderm, tylenol, Robaxin, oxycodone, morphine  -Patient received oxycodone and felt it was too strong, will advise use of less potent analgesia in future  -CPK is elevated at 158, will give IVF gently  -Encourage IS usage  -PT/OT saw the patient yesterday and are recommending a front wheel walker and 3xper week therapy  -Patient to receive home health for this    #CKD/BRIAN  -Cr has risen to 1.62 from 1.45 yesterday  -Likely secondary to rhabdo and clearance through the kidneys  -F/U with primary care for continued management.     Rory James, MS3  [facility]

## 2021-05-09 NOTE — CONSULTS
Consults   Cardiology Initial Consultation    Date of Service  5/9/2021    Referring Physician  DAYDAY Moody*    Reason for Consultation  SVT, near syncope    History of Presenting Illness  Duong Anca Carmichael is a 87 y.o. female admitted 5/7/2021 with sycnope, standing in front of the mirror, doing her hair/make-up, noted her typical flushing feeling coming across her head, then noted her heart began to pound, turned around to find a place to sit, fell but not complete black out. Has happened many times before.  Suffered broken ribs.     Follows Dr. Velasquez in clinic, last seen by AVELINO Bo.  Event monitor 4/6/2021 reviewed by me, patient appears to have symptomatic runs of SVT, reporting frequent lightheadedness.  She states her heart racing is typically involved in the events where she falls down are nearly falls down.    Has been on metoprolol and doses have gone up and down.  Has some wenckebach in the hospital overnight during sleep.    Known to have orthostatic hypotension, documented as greater than 10 mmHg drop in clinic 4/23/2021.  Hyperdynamic LV cavity with intracavitary gradient which is low.  Possible prior vasovagal syncope.  There is mention of systolic anterior mitral valve motion which I did not appreciate and there is only mild mitral regurgitation, there is a prominent septal knuckle and further LVOT gradient.  Has a known systolic murmur.    No chest pain, no significant dyspnea on exertion, no lower extremity edema.  Currently having chest wall pain after rib fractures, particularly with respiration.    Carotid study April 2021 without significant subclavian disease.    Her 2 daughters are at the bedside.       Review of Systems  Review of Systems    All systems reviewed and negative.    Past Medical History   has a past medical history of Arrhythmia, Arthritis, Bronchitis, CATARACT, Dry eyes, bilateral (3/27/2014), Osteopenia of the elderly (3/27/2014), and Pneumonia.    Surgical  History   has a past surgical history that includes knee arthroplasty total (9/3/2013) and tonsillectomy.    Family History  family history includes Cancer in her brother, daughter, daughter, and maternal aunt; Heart Attack in her father; Heart Disease (age of onset: 54) in her father; Hypertension in her maternal aunt; Lung Disease in her father and mother; Seizures in her daughter.      Social History   reports that she has never smoked. She has never used smokeless tobacco. She reports current alcohol use. She reports that she does not use drugs.    Medications  Prior to Admission Medications   Prescriptions Last Dose Informant Patient Reported? Taking?   B Complex-C (SUPER B COMPLEX PO) 5/6/2021 at AM Patient Yes No   Sig: Take 1 tablet by mouth every morning.   Cholecalciferol (VITAMIN D-3) 125 MCG (5000 UT) Tab 5/6/2021 at AM Patient Yes No   Sig: Take 5,000 Units by mouth every day. 5000 unit tablet + 1000 unit tablet for a total of 6000 units daily   Coenzyme Q10 (COQ10 PO) 5/5/2021 at [facility] Patient Yes No   Sig: Take 1 capsule by mouth two times a week. Sunday & Wednesday   Glycerin-Polysorbate 80 (REFRESH DRY EYE THERAPY) 1-1 % SOLN PRN at PRN   Patient Yes No   Sig: Administer 1 Drop into affected eye(s) 4 times a day as needed (dry eye).   Multiple Vitamins-Minerals (ICAPS AREDS 2 PO) 5/6/2021 at [facility] Patient Yes Yes   Sig: Take 1 tablet by mouth every day.   Propylene Glycol (SYSTANE BALANCE OP) PRN at PRN Patient Yes Yes   Sig: Administer 1 Drop into both eyes 4 times a day as needed (dry eyes).   escitalopram (LEXAPRO) 5 MG tablet 5/6/2021 at [facility] Patient No No   Sig: Take 1 Tab by mouth every day.   ketotifen (ZADITOR) 0.025 % ophthalmic solution PRN at PRN Patient Yes Yes   Sig: Administer 1 Drop into both eyes 1 time a day as needed. Indications: Allergic Conjunctivitis   metoprolol SR (TOPROL XL) 25 MG TABLET SR 24 HR 5/6/2021 at PM Patient No No   Sig: Take 1 tablet by mouth every day.   Patient  taking differently: Take 37.5 mg by mouth every day.   prednisoLONE acetate (PRED FORTE) 1 % Suspension 5/6/2021 at [facility] Patient Yes No   Sig: Administer 1 Drop into both eyes see administration instructions. 1 drop in left eye four times a day   1 drop in right eye six times a day   vitamin D (CHOLECALCIFEROL) 1000 Unit (25 mcg) Tab 5/6/2021 at AM Patient Yes No   Sig: Take 1,000 Units by mouth every day. 1000 unit tablet + 5000 unit tablet for a total of 6000 units daily      Facility-Administered Medications: None       Allergies  Allergies   Allergen Reactions   • Other Environmental      Seasonal allergies       Vital signs in last 24 hours  Temp:  [35.8 °C (96.5 °F)-36.8 °C (98.3 °F)] 36.1 °C (96.9 °F)  Pulse:  [64-75] 74  Resp:  [15-18] 15  BP: (102-153)/(48-67) 142/55  SpO2:  [90 %-96 %] 91 %    Physical Exam  Physical Exam      General: No acute distress. Well nourished.  HEENT: EOM grossly intact, no scleral icterus, no pharyngeal erythema.   Neck:  No JVD, no bruits, trachea midline  CVS: RRR.  3 out of 6 systolic murmur no LE edema.  2+ radial pulses, 2+ PT pulses  Resp: CTAB. No wheezing or crackles/rhonchi. Normal respiratory effort.  Abdomen: Soft, NT, no deacon hepatomegaly.  MSK/Ext: No clubbing or cyanosis.  Skin: Warm and dry, no rashes.  Neurological: CN III-XII grossly intact. No focal deficits.   Psych: A&O x 3, appropriate affect, good judgement      Lab Review  Lab Results   Component Value Date/Time    WBC 9.1 05/09/2021 03:36 AM    RBC 3.34 (L) 05/09/2021 03:36 AM    HEMOGLOBIN 9.4 (L) 05/09/2021 03:36 AM    HEMATOCRIT 30.3 (L) 05/09/2021 03:36 AM    MCV 90.7 05/09/2021 03:36 AM    MCH 28.1 05/09/2021 03:36 AM    MCHC 31.0 (L) 05/09/2021 03:36 AM    MPV 11.2 05/09/2021 03:36 AM      Lab Results   Component Value Date/Time    SODIUM 137 05/09/2021 03:36 AM    POTASSIUM 4.1 05/09/2021 03:36 AM    CHLORIDE 112 05/09/2021 03:36 AM    CO2 19 (L) 05/09/2021 03:36 AM    GLUCOSE 106 (H) 05/09/2021  03:36 AM    BUN 25 (H) 05/09/2021 03:36 AM    CREATININE 1.62 (H) 05/09/2021 03:36 AM      Lab Results   Component Value Date/Time    ASTSGOT 22 05/09/2021 03:36 AM    ALTSGPT 16 05/09/2021 03:36 AM     Lab Results   Component Value Date/Time    CHOLSTRLTOT 132 05/09/2021 03:36 AM    LDL 67 05/09/2021 03:36 AM    HDL 49 05/09/2021 03:36 AM    TRIGLYCERIDE 80 05/09/2021 03:36 AM    TROPONINT 26 (H) 05/08/2021 07:06 AM       No results for input(s): NTPROBNP in the last 72 hours.    Cardiac Imaging and Procedures Review  EKG:  My personal interpretation of the EKG dated 5-9-21 is Sinus 66, first degree AV delay    Event monitor 4/6/2021  Symptomatic runs of paroxysmal SVT associated with lightheadedness.  Up to 41 seconds.  2.1-second pause during heart block.    Echocardiogram:  4-3-21  Reviewed by me, hyperdynamic LV function, mid intracavitary gradient, worse at the LVOT, mild sigmoid septum, some systolic anterior motion of mitral chordae but I do not believe it is a cause of obstruction, no significant Francis, only mild MR    Compared to the images of the prior study done 11/2/2018, intracavitary   gradient is present.  Hyperdynamic left ventricular systolic function.  Left ventricular ejection fraction is visually estimated to be greater   than 75%.  Evidence of increased intracavity gradient, peak velocity is 2.6 m/sec.  Systolic anterior motion of the anterior mitral valve leaflet.    Stress Test:  Stress echo 2016  Negative stress echocardiogram for ischemia with adequate stress achieved by   heart rate criteria.       Imaging  No orders to display     Carotid Duplex  4/3/2021   Greater than 50% narrowing right ICA   Right thyroid 2.9 cm mass for which dedicated thyroid US is advised to    characterize further.  Biopsy may be warranted.     US renal   4/4/2021  1.  No hydronephrosis.  2.  Bilateral renal cysts, which do not require imaging follow-up.  3.  Findings a medical renal  disease.    Assessment/Plan  -Syncope and collapse most typically associated with paroxysmal SVT  -Symptomatic PSVT  -Orthostatic hypotension, documented in cardiology clinic 4/23/2021  -HTN  -Intracavitary LV gradient due to hyperdynamic LV function despite attempts at hydration, prior mention of Francis but I did not appreciate this on echo  -CKD 3b  -traumatic rhabdo, mild  -Rib fractures  -Wenckebach during sleep    Plan:  -It is unclear if her syncope and collapse is related to SVT but she appears to be greatly symptomatic from SVT  -She has an increased intracavitary gradient, prominent sigmoid septum, increased LVOT gradient, perhaps systolic anterior motion of mitral valve cord but not the leaflet, no significant mitral regurgitation  -I am not certain if beta-blockers are helping with her type of LVOT flow acceleration and could be making her orthostasis worse  -I think EP evaluation for consideration of ablation is prudent to see if we get her off of the beta-blockers which may be worsening her orthostasis  -Or consideration of antiarrhythmic therapy  -No concerns regarding heart block during sleep  -Hydration with increased salt intake is key  -Consider compression socks but they can be difficult to wear  -Allow permissive hypertension    Discussed with Dr. Abraham    Cardiology will continue to follow along.    Thank you for allowing me to participate in the care of this patient.    Please contact me with any questions.    Sendy Sanz M.D.   Cardiologist, [facility]  [phone]

## 2021-05-09 NOTE — PROGRESS NOTES
Notified [facility] Resident of patient's occasional Second Degree Type I AVB during the shift. Requested and received permission to hold beta-blocker tis AM and re-evaluate this AM.

## 2021-05-09 NOTE — PROGRESS NOTES
MS:    SR (69 - 71) w/ a First Degree AVB, rare PACs/PVCs  And I/O 2nd degree Type I  0.30 / 0.08 / 0.40

## 2021-05-09 NOTE — CARE PLAN
Problem: Safety  Goal: Will remain free from injury  Outcome: PROGRESSING AS EXPECTED  Note: Pt remains free from falls at this time. Safety precautions in place. Pt educated on calling for assistance when needed.       Problem: Pain Management  Goal: Pain level will decrease to patient's comfort goal  Outcome: PROGRESSING AS EXPECTED  Intervention: Follow pain managment plan developed in collaboration with patient and Interdisciplinary Team  Note: Pt is able to verbalize pain on a scale of 1-10 and is able to verbalize comfort goal. Pain management plan followed and pt educated on nonpharmacologic and pharmacological comfort measures.

## 2021-05-09 NOTE — PROGRESS NOTES
Daily Progress Note:     Date of Service: 5/9/2021  Primary Team: [facility] Team   Attending: Dr. Tidwell  Senior Resident: Dr. Villarreal  Intern: Dr. Abraham  Contact:  [phone]    Chief Complaint: Near syncope    Subjective:  Patient is a 87-year-old female who presents with near syncope.  Has past medical history of paroxysmal SVT, hypertension, CKD that developed over the last 1 year, and 3 tooth abscesses and December 2020.  Of note recently her blood pressure medications metoprolol 25 increased to 37.5 and ARB stopped by cardiology.    Overnight events include secondary type I block on telemetry while the patient was asleep.  For this reason morning dose of metoprolol was held.    Patient reports feeling well this morning.  Overnight got oxycodone for rib pain.  She felt a bit dizzy with this medication.  No other complaints.      Review of Systems:   Review of Systems   Cardiovascular: Positive for palpitations.   Musculoskeletal: Positive for back pain, falls and myalgias.   Neurological: Positive for dizziness. Negative for loss of consciousness.   All other systems reviewed and are negative.    Objective Data:   Vitals:   Temp:  [35.8 °C (96.5 °F)-36.8 °C (98.3 °F)] 36.1 °C (96.9 °F)  Pulse:  [64-75] 74  Resp:  [15-18] 15  BP: (102-153)/(48-67) 142/55  SpO2:  [90 %-96 %] 91 %    Physical Exam:  Physical Exam  Constitutional:       Appearance: Normal appearance.   Cardiovascular:      Rate and Rhythm: Normal rate.      Heart sounds: Murmur (Systolic) present.   Pulmonary:      Effort: Pulmonary effort is normal.      Breath sounds: No wheezing or rales.   Abdominal:      General: Abdomen is flat. There is no distension.      Palpations: Abdomen is soft.      Tenderness: There is no abdominal tenderness. There is no guarding or rebound.   Musculoskeletal:         General: Tenderness present.      Cervical back: Normal range of motion.      Right lower leg: Edema (Pitting edema of lower extremities  bilaterally) present.      Left lower leg: Edema present.      Comments: Left posterior chest/ribs tender to palpation.  No bruising or swelling.  Lidocaine patch noted above area of tenderness   Skin:     General: Skin is warm.      Capillary Refill: Capillary refill takes less than 2 seconds.      Findings: No bruising.   Neurological:      Mental Status: She is alert.     Labs:  Recent Labs     05/08/21  0000 05/09/21  0336   WBC 10.9* 9.1   RBC 3.70* 3.34*   HEMOGLOBIN 10.6* 9.4*   HEMATOCRIT 33.0* 30.3*   MCV 89.2 90.7   MCH 28.6 28.1   RDW 49.6 50.7*   PLATELETCT 221 186   MPV 10.3 11.2   NEUTSPOLYS 69.00 56.80   LYMPHOCYTES 18.30* 23.90   MONOCYTES 11.00 13.00   EOSINOPHILS 0.50 4.60   BASOPHILS 0.60 0.70     Recent Labs     05/08/21  0000 05/09/21  0336   SODIUM 137 137   POTASSIUM 3.9 4.1   CHLORIDE 107 112   CO2 20 19*   GLUCOSE 114* 106*   BUN 26* 25*   CPKTOTAL 158* 116     Micro:  None    EKG:  Normal sinus rhythm    Imaging:   XR ribs:  1.  Acute fractures of left posterolateral ribs 5-8. No pneumothorax.  2.  Left basilar atelectasis.    Meds:  Current Outpatient Medications   Medication Instructions   • B Complex-C (SUPER B COMPLEX PO) 1 tablet, Oral, EVERY MORNING   • Coenzyme Q10 (COQ10 PO) 1 capsule, Oral, 2X A WEEK, Sunday & Wednesday   • escitalopram (LEXAPRO) 5 mg, Oral, DAILY   • Glycerin-Polysorbate 80 (REFRESH DRY EYE THERAPY) 1-1 % SOLN 1 Drop, Ophthalmic, 4 TIMES DAILY PRN   • ketotifen (ZADITOR) 0.025 % ophthalmic solution 1 Drop, Both Eyes, 1 TIME DAILY PRN   • metoprolol SR (TOPROL XL) 25 mg, Oral, DAILY   • Multiple Vitamins-Minerals (ICAPS AREDS 2 PO) 1 tablet, Oral, DAILY   • prednisoLONE acetate (PRED FORTE) 1 % Suspension 1 Drop, Both Eyes, SEE ADMIN INSTRUCTIONS, 1 drop in left eye four times a day <BR>1 drop in right eye six times a day   • Propylene Glycol (SYSTANE BALANCE OP) 1 Drop, Both Eyes, 4 TIMES DAILY PRN   • vitamin D (CHOLECALCIFEROL) 1,000 Units, Oral, DAILY, 1000  "unit tablet + 5000 unit tablet for a total of 6000 units daily   • Vitamin D-3 5,000 Units, Oral, DAILY, 5000 unit tablet + 1000 unit tablet for a total of 6000 units daily      Scheduled Medications   Medication Dose Frequency   • heparin  5,000 Units Q8HRS   • polyethylene glycol/lytes  1 Packet DAILY    And   • senna-docusate  2 tablet BID   • acetaminophen  650 mg Q4HRS   • capsaicin   TID   • prednisoLONE acetate  1 Drop 6X/DAY   • escitalopram  5 mg DAILY   • prednisoLONE acetate  1 Drop 4X/DAY   • [Held by provider] metoprolol SR  37.5 mg DAILY   • Pharmacy Consult Request  1 Each PHARMACY TO DOSE   • methocarbamol  500 mg TID   • lidocaine  1 Patch Q24HR      Consultants/Specialty:  Cardiology    Problem Representation:   * Near syncope likely vasovagal versus secondary to symptomatic SVT  Assessment & Plan  No loss of consciousness   History of hyperdynamic not hypertrophic left ventricular systolic function  History of paroxysmal SVT  Of note, recent event recorder last month was negative for arrhythmia/heart block, however heart rate up to 140s and patient reports \"spells\" during this timeframe  Of note, carotid ultrasound 4/2021 with right carotid stenosis 50 to 70% and left carotid stenosis less than 50%  Also recent increase of metoprolol dose to 37.5  Patient reported tachycardia, lightheadedness, dizziness prior to fall  Orthostatic hypotension and autonomic dysfunction possibly contributory  EKG on admission sinus rhythm without heart block or ST changes.  Heart rate 73  Troponin 21 -> 26.  Not significantly elevated  Orthostatic vitals negative  As patient had echo done recently in April 2021, will not repeat this admission given no significant change in presentation  Continue to monitor on telemetry  Continue to avoid antihypertensives and diuretics  Goal heart rate less than 120  Cardiology consulted. Dr. Sanz following     SVT (supraventricular tachycardia) ([facility])- (present on " admission)  Assessment & Plan  History of paroxysmal SVT  No episodes noted this admission  Cont recently increased home Toprolol XL 37.5mg daily.  Patient reports increasing this medication 2 days ago, with symptoms occurring yesterday  Patient received metoprolol 12.5mg PO x1 in ER for tachycardia  PRN IV metoprolol 5mg for HR > 150  Tachycardia possibly secondary to rib fracture pain  Monitor and replete potassium and magnesium as needed    Stage 3b chronic kidney disease  Assessment & Plan  Minimize nephrotoxic agents, renally dose meds  Creatinine is stable at new baseline (1.5-1.8)  Unlikely BRIAN   BUN to creatinine ratio 17.9  Creatinine 1.61 on admission.  Improving  GFR 30-34  DVT prophylaxis with heparin    Traumatic rhabdomyolysis (HCC)  Assessment & Plan  Patient reports falling and being down for 15 to 20 minutes  Witnessed fall  No loss of consciousness      Closed fracture of multiple ribs of left side with delayed healing  Assessment & Plan  Fall with rib fractures prior to presentation   CT head on 5/4 negative for intracranial hemorrhage  X-ray shows left posterior lateral ribs 5-8   No evidence of pneumothorax  Continue pain control as needed to prevent atelectasis, however avoid oxycodone as it gave her dizziness  Incentive spirometer  PT OT  Fall precautions    Essential hypertension, benign- (present on admission)  Assessment & Plan  Recent d/c of ARB and increased Toprolol XL to 37.5mg by outpt cardiology  Cont Toprolol XL 37.5mg  PRN hydralazine    Second degree atrioventricular block, Mobitz (type) I  Assessment & Plan  Overnight, while patient was asleep, some new episodes of AV block noted  Held morning metoprolol   Cardiology recommendations appreciated regarding metoprolol/medications    Fall  Assessment & Plan  -see closed fracture of multiple ribs of left side with delayed hearing plan    Depression  Assessment & Plan  Continue Escitalopram, to treat underlying depression as well  as vasovagal symptoms    Advance care planning  Assessment & Plan  Goal of care discussed with patient and patient's daughters -- supportive care, tele monitoring and adjust meds as needed   FULL code status confirmed  Advance are planning: 15mins    Code status: Full code  Diet: Cardiac  Lines/ tubes/ drains:  IV fluids:  Abx (dates):  GI ppx:  DVT ppx: Heparin  Dispo: Home health PT OT.  Patient agreeable.  Orders placed

## 2021-05-09 NOTE — PROGRESS NOTES
Updated Dr. Abraham regarding patient's episodes of 2nd degree type I beats on telemetry last night and this morning.

## 2021-05-09 NOTE — DISCHARGE PLANNING
Received Choice form at [phone]  Agency/Facility Name: [facility]  Referral sent per Choice form @ [phone]

## 2021-05-09 NOTE — CARE PLAN
Problem: Communication  Goal: The ability to communicate needs accurately and effectively will improve  Outcome: PROGRESSING AS EXPECTED     Problem: Venous Thromboembolism (VTW)/Deep Vein Thrombosis (DVT) Prevention:  Goal: Patient will participate in Venous Thrombosis (VTE)/Deep Vein Thrombosis (DVT)Prevention Measures  Outcome: PROGRESSING AS EXPECTED     Problem: Discharge Barriers/Planning  Goal: Patient's continuum of care needs will be met  Outcome: PROGRESSING AS EXPECTED     Problem: Pain Management  Goal: Pain level will decrease to patient's comfort goal  Outcome: PROGRESSING AS EXPECTED     Problem: Mobility  Goal: Risk for activity intolerance will decrease  Outcome: PROGRESSING AS EXPECTED

## 2021-05-09 NOTE — DISCHARGE PLANNING
ATTN: Case Management  RE: Referral for Home Health    As of 05/09/21, we have accepted the Home Health referral for the patient listed above.    A [facility] clinician will be out to see the patient within 48 hours. If you have any questions or concerns regarding the patient's transition to Home Health, please do not hesitate to contact us at [phone].      We look forward to collaborating with you,  [facility] Team

## 2021-05-09 NOTE — ASSESSMENT & PLAN NOTE
Overnight, while patient was asleep, some new episodes of AV block noted  Held morning metoprolol   Cardiology recommendations appreciated regarding metoprolol/medications

## 2021-05-09 NOTE — PROGRESS NOTES
Received bedside report from RN, pt care assumed. Pt AAOx4, with no complaint of pain at this time. No signs of acute distress noted at this time. Plan of care discussed with pt and verbalizes no questions. Pt denies any additional needs at this time. Bed locked/in lowest position, pt educated on fall risk and verbalized understanding, call light within reach, hourly rounding initiated.

## 2021-05-09 NOTE — FACE TO FACE
Face to Face Supporting Documentation - Home Health    The encounter with this patient was in whole or in part the primary reason for home health admission.    Date of encounter:   Patient:                    MRN:                       YOB: 2021  Yoli Carmichael  [id number]  8/21/1933     Home health to see patient for:  Skilled Nursing care for assessment, interventions & education, Physical Therapy evaluation and treatment and Occupational therapy evaluation and treatment    Skilled need for:  Exacerbation of Chronic Disease State Vasovagal syncope    Skilled nursing interventions to include:  Comment: PT/OT    Homebound status evidenced by:  Have a condition such that leaving his or her home is medically contraindicated. Leaving home requires a considerable and taxing effort. There is a normal inability to leave the home.    Community Physician to provide follow up care: Juan Santana M.D.     Optional Interventions? No      I certify the face to face encounter for this home health care referral meets the CMS requirements and the encounter/clinical assessment with the patient was, in whole, or in part, for the medical condition(s) listed above, which is the primary reason for home health care. Based on my clinical findings: the service(s) are medically necessary, support the need for home health care, and the homebound criteria are met.  I certify that this patient has had a face to face encounter by myself.  Kristine Abraham M.D. - NPI: [id number]

## 2021-05-09 NOTE — PROGRESS NOTES
Received bedside report from RN James, pt care assumed, VS WDL, pt assessment complete. Pt AAOx4, no c/o pain when at rest, but claims 5/10 sharp pain to Left flank when ambulating. No signs of acute distress noted at this time. POC discussed with pt and verbalizes no questions. Pt denies any additional needs at this time. Bed in lowest position, bed alarm off as client is a  low fall risk and calls appropriately. Pt educated on fall risk and verbalized understanding, call light within reach, hourly rounding initiated.

## 2021-05-09 NOTE — DISCHARGE PLANNING
Anticipated Discharge Disposition: Home with Home Health    Action: Choice received [facility]     Barriers to Discharge: Acceptance and Insurance approval     Plan: Will continue to monitor

## 2021-05-10 ENCOUNTER — APPOINTMENT (OUTPATIENT)
Dept: CARDIOLOGY | Facility: MEDICAL CENTER | Age: 86
End: 2021-05-10
Payer: MEDICARE

## 2021-05-10 LAB
ALBUMIN SERPL BCP-MCNC: 3.5 G/DL (ref 3.2–4.9)
ALBUMIN/GLOB SERPL: 1.3 G/DL
ALP SERPL-CCNC: 60 U/L (ref 30–99)
ALT SERPL-CCNC: 19 U/L (ref 2–50)
ANION GAP SERPL CALC-SCNC: 8 MMOL/L (ref 7–16)
AST SERPL-CCNC: 29 U/L (ref 12–45)
BASOPHILS # BLD AUTO: 0.9 % (ref 0–1.8)
BASOPHILS # BLD: 0.1 K/UL (ref 0–0.12)
BILIRUB SERPL-MCNC: 0.4 MG/DL (ref 0.1–1.5)
BUN SERPL-MCNC: 18 MG/DL (ref 8–22)
CALCIUM SERPL-MCNC: 9 MG/DL (ref 8.5–10.5)
CHLORIDE SERPL-SCNC: 111 MMOL/L (ref 96–112)
CO2 SERPL-SCNC: 20 MMOL/L (ref 20–33)
CREAT SERPL-MCNC: 1.48 MG/DL (ref 0.5–1.4)
EOSINOPHIL # BLD AUTO: 0.39 K/UL (ref 0–0.51)
EOSINOPHIL NFR BLD: 3.7 % (ref 0–6.9)
ERYTHROCYTE [DISTWIDTH] IN BLOOD BY AUTOMATED COUNT: 51.8 FL (ref 35.9–50)
GLOBULIN SER CALC-MCNC: 2.7 G/DL (ref 1.9–3.5)
GLUCOSE SERPL-MCNC: 113 MG/DL (ref 65–99)
HCT VFR BLD AUTO: 31.1 % (ref 37–47)
HGB BLD-MCNC: 9.9 G/DL (ref 12–16)
IMM GRANULOCYTES # BLD AUTO: 0.12 K/UL (ref 0–0.11)
IMM GRANULOCYTES NFR BLD AUTO: 1.1 % (ref 0–0.9)
LYMPHOCYTES # BLD AUTO: 2.94 K/UL (ref 1–4.8)
LYMPHOCYTES NFR BLD: 27.7 % (ref 22–41)
MAGNESIUM SERPL-MCNC: 2.3 MG/DL (ref 1.5–2.5)
MCH RBC QN AUTO: 28.7 PG (ref 27–33)
MCHC RBC AUTO-ENTMCNC: 31.8 G/DL (ref 33.6–35)
MCV RBC AUTO: 90.1 FL (ref 81.4–97.8)
MONOCYTES # BLD AUTO: 1.29 K/UL (ref 0–0.85)
MONOCYTES NFR BLD AUTO: 12.2 % (ref 0–13.4)
NEUTROPHILS # BLD AUTO: 5.76 K/UL (ref 2–7.15)
NEUTROPHILS NFR BLD: 54.4 % (ref 44–72)
NRBC # BLD AUTO: 0 K/UL
NRBC BLD-RTO: 0 /100 WBC
PLATELET # BLD AUTO: 203 K/UL (ref 164–446)
PMV BLD AUTO: 10.4 FL (ref 9–12.9)
POTASSIUM SERPL-SCNC: 4.3 MMOL/L (ref 3.6–5.5)
PROT SERPL-MCNC: 6.2 G/DL (ref 6–8.2)
RBC # BLD AUTO: 3.45 M/UL (ref 4.2–5.4)
SODIUM SERPL-SCNC: 139 MMOL/L (ref 135–145)
WBC # BLD AUTO: 10.6 K/UL (ref 4.8–10.8)

## 2021-05-10 PROCEDURE — A9270 NON-COVERED ITEM OR SERVICE: HCPCS | Performed by: STUDENT IN AN ORGANIZED HEALTH CARE EDUCATION/TRAINING PROGRAM

## 2021-05-10 PROCEDURE — 700102 HCHG RX REV CODE 250 W/ 637 OVERRIDE(OP): Performed by: STUDENT IN AN ORGANIZED HEALTH CARE EDUCATION/TRAINING PROGRAM

## 2021-05-10 PROCEDURE — 700102 HCHG RX REV CODE 250 W/ 637 OVERRIDE(OP): Performed by: PHYSICIAN ASSISTANT

## 2021-05-10 PROCEDURE — 36415 COLL VENOUS BLD VENIPUNCTURE: CPT

## 2021-05-10 PROCEDURE — 700111 HCHG RX REV CODE 636 W/ 250 OVERRIDE (IP): Performed by: STUDENT IN AN ORGANIZED HEALTH CARE EDUCATION/TRAINING PROGRAM

## 2021-05-10 PROCEDURE — 80053 COMPREHEN METABOLIC PANEL: CPT

## 2021-05-10 PROCEDURE — 99215 OFFICE O/P EST HI 40 MIN: CPT | Performed by: INTERNAL MEDICINE

## 2021-05-10 PROCEDURE — G0378 HOSPITAL OBSERVATION PER HR: HCPCS

## 2021-05-10 PROCEDURE — 99204 OFFICE O/P NEW MOD 45 MIN: CPT | Performed by: INTERNAL MEDICINE

## 2021-05-10 PROCEDURE — A9270 NON-COVERED ITEM OR SERVICE: HCPCS | Performed by: PHYSICIAN ASSISTANT

## 2021-05-10 PROCEDURE — 96372 THER/PROPH/DIAG INJ SC/IM: CPT

## 2021-05-10 PROCEDURE — 99225 PR SUBSEQUENT OBSERVATION CARE,LEVEL II: CPT | Mod: GC | Performed by: INTERNAL MEDICINE

## 2021-05-10 PROCEDURE — 83735 ASSAY OF MAGNESIUM: CPT

## 2021-05-10 PROCEDURE — 94669 MECHANICAL CHEST WALL OSCILL: CPT

## 2021-05-10 PROCEDURE — 700101 HCHG RX REV CODE 250: Performed by: STUDENT IN AN ORGANIZED HEALTH CARE EDUCATION/TRAINING PROGRAM

## 2021-05-10 PROCEDURE — 85025 COMPLETE CBC W/AUTO DIFF WBC: CPT

## 2021-05-10 PROCEDURE — 94760 N-INVAS EAR/PLS OXIMETRY 1: CPT

## 2021-05-10 PROCEDURE — 700105 HCHG RX REV CODE 258: Performed by: PHYSICIAN ASSISTANT

## 2021-05-10 RX ORDER — METOPROLOL SUCCINATE 25 MG/1
37.5 TABLET, EXTENDED RELEASE ORAL DAILY
Status: DISCONTINUED | OUTPATIENT
Start: 2021-05-10 | End: 2021-05-10

## 2021-05-10 RX ORDER — PREDNISOLONE ACETATE 10 MG/ML
1 SUSPENSION/ DROPS OPHTHALMIC
Status: DISCONTINUED | OUTPATIENT
Start: 2021-05-10 | End: 2021-05-13 | Stop reason: HOSPADM

## 2021-05-10 RX ORDER — METOPROLOL SUCCINATE 25 MG/1
25 TABLET, EXTENDED RELEASE ORAL DAILY
Status: DISCONTINUED | OUTPATIENT
Start: 2021-05-10 | End: 2021-05-13 | Stop reason: HOSPADM

## 2021-05-10 RX ORDER — SODIUM CHLORIDE 9 MG/ML
500 INJECTION, SOLUTION INTRAVENOUS ONCE
Status: COMPLETED | OUTPATIENT
Start: 2021-05-10 | End: 2021-05-10

## 2021-05-10 RX ADMIN — ACETAMINOPHEN 650 MG: 325 TABLET, FILM COATED ORAL at 18:24

## 2021-05-10 RX ADMIN — METHOCARBAMOL 500 MG: 500 TABLET ORAL at 16:07

## 2021-05-10 RX ADMIN — ACETAMINOPHEN 650 MG: 325 TABLET, FILM COATED ORAL at 11:03

## 2021-05-10 RX ADMIN — CAPSAICIN: 0.25 CREAM TOPICAL at 04:42

## 2021-05-10 RX ADMIN — METHOCARBAMOL 500 MG: 500 TABLET ORAL at 08:36

## 2021-05-10 RX ADMIN — PREDNISOLONE ACETATE 1 DROP: 10 SUSPENSION/ DROPS OPHTHALMIC at 16:07

## 2021-05-10 RX ADMIN — HEPARIN SODIUM 5000 UNITS: 5000 INJECTION, SOLUTION INTRAVENOUS; SUBCUTANEOUS at 14:37

## 2021-05-10 RX ADMIN — ESCITALOPRAM OXALATE 5 MG: 10 TABLET ORAL at 18:24

## 2021-05-10 RX ADMIN — ACETAMINOPHEN 650 MG: 325 TABLET, FILM COATED ORAL at 02:35

## 2021-05-10 RX ADMIN — ACETAMINOPHEN 650 MG: 325 TABLET, FILM COATED ORAL at 14:37

## 2021-05-10 RX ADMIN — METOPROLOL SUCCINATE 25 MG: 25 TABLET, EXTENDED RELEASE ORAL at 12:04

## 2021-05-10 RX ADMIN — ACETAMINOPHEN 650 MG: 325 TABLET, FILM COATED ORAL at 06:08

## 2021-05-10 RX ADMIN — PREDNISOLONE ACETATE 1 DROP: 10 SUSPENSION/ DROPS OPHTHALMIC at 07:00

## 2021-05-10 RX ADMIN — SODIUM CHLORIDE 500 ML: 9 INJECTION, SOLUTION INTRAVENOUS at 12:03

## 2021-05-10 RX ADMIN — PREDNISOLONE ACETATE 1 DROP: 10 SUSPENSION/ DROPS OPHTHALMIC at 08:36

## 2021-05-10 RX ADMIN — PREDNISOLONE ACETATE 1 DROP: 10 SUSPENSION/ DROPS OPHTHALMIC at 13:00

## 2021-05-10 RX ADMIN — ATORVASTATIN CALCIUM 40 MG: 40 TABLET, FILM COATED ORAL at 18:24

## 2021-05-10 RX ADMIN — HEPARIN SODIUM 5000 UNITS: 5000 INJECTION, SOLUTION INTRAVENOUS; SUBCUTANEOUS at 21:13

## 2021-05-10 RX ADMIN — DOCUSATE SODIUM 50 MG AND SENNOSIDES 8.6 MG 2 TABLET: 8.6; 5 TABLET, FILM COATED ORAL at 04:42

## 2021-05-10 RX ADMIN — CAPSAICIN: 0.25 CREAM TOPICAL at 12:03

## 2021-05-10 RX ADMIN — ACETAMINOPHEN 650 MG: 325 TABLET, FILM COATED ORAL at 21:13

## 2021-05-10 RX ADMIN — HEPARIN SODIUM 5000 UNITS: 5000 INJECTION, SOLUTION INTRAVENOUS; SUBCUTANEOUS at 04:42

## 2021-05-10 RX ADMIN — POLYETHYLENE GLYCOL 3350 1 PACKET: 17 POWDER, FOR SOLUTION ORAL at 04:42

## 2021-05-10 ASSESSMENT — PAIN DESCRIPTION - PAIN TYPE
TYPE: ACUTE PAIN

## 2021-05-10 ASSESSMENT — ENCOUNTER SYMPTOMS
MYALGIAS: 1
LIGHT-HEADEDNESS: 0
BRUISES/BLEEDS EASILY: 0
ABDOMINAL PAIN: 0
FALLS: 1
FATIGUE: 0
DIZZINESS: 0
WEAKNESS: 0
BACK PAIN: 1
LOSS OF CONSCIOUSNESS: 0
NUMBNESS: 0
DIZZINESS: 1
PALPITATIONS: 1
VOMITING: 0
EYE PAIN: 0
EYE DISCHARGE: 0
PALPITATIONS: 0
NERVOUS/ANXIOUS: 0
WHEEZING: 0
FEVER: 0
MYALGIAS: 0
SHORTNESS OF BREATH: 0
TROUBLE SWALLOWING: 0
SPEECH DIFFICULTY: 0
BLOOD IN STOOL: 0
COUGH: 0
CHILLS: 0
CHEST TIGHTNESS: 0
NAUSEA: 0

## 2021-05-10 ASSESSMENT — COPD QUESTIONNAIRES
DURING THE PAST 4 WEEKS HOW MUCH DID YOU FEEL SHORT OF BREATH: SOME OF THE TIME
COPD SCREENING SCORE: 3
HAVE YOU SMOKED AT LEAST 100 CIGARETTES IN YOUR ENTIRE LIFE: NO/DON'T KNOW
DO YOU EVER COUGH UP ANY MUCUS OR PHLEGM?: NO/ONLY WITH OCCASIONAL COLDS OR INFECTIONS

## 2021-05-10 ASSESSMENT — FIBROSIS 4 INDEX: FIB4 SCORE: 2.85

## 2021-05-10 NOTE — CARE PLAN
Problem: Safety  Goal: Will remain free from injury  Outcome: PROGRESSING AS EXPECTED  Goal: Will remain free from falls  Outcome: PROGRESSING AS EXPECTED     Problem: Knowledge Deficit  Goal: Knowledge of disease process/condition, treatment plan, diagnostic tests, and medications will improve  Outcome: PROGRESSING AS EXPECTED  Goal: Knowledge of the prescribed therapeutic regimen will improve  Outcome: PROGRESSING AS EXPECTED     Problem: Pain Management  Goal: Pain level will decrease to patient's comfort goal  Outcome: PROGRESSING AS EXPECTED     Problem: Mobility  Goal: Risk for activity intolerance will decrease  Outcome: PROGRESSING AS EXPECTED

## 2021-05-10 NOTE — PROGRESS NOTES
Cardiology Follow Up Progress Note    Date of Service  5/10/2021    Attending Physician  DAYDAY Moody*    Chief Complaint/Reason for EP consult:   Presyncope, SVT    HPI  Duong Anca Carmichael is a 87 y.o. female admitted 5/7/2021 with a historuy of paroxysmal SVT, hypertrophic cardiomyopathy, recurrent presyncope, one previous syncopal event, and orthostatic hypotension.  She was admitted after experiencing presyncope with a fall in her bathroom for which she states she believes that she did not lose consciousness this time.  She does report increasing symptoms of a strange feeling coming over her face, lightheadedness and rapid heart breat at home.  She states that this has been present for years but had increased rein severity recently.  She states that this usually lasts approximately a couple of minutes at most and then symptoms terminate.  She has had one previous syncopal episode last month for which she was admitted to [facility].  Recalls no prodrome, palpitations, nausea, vomiting, flushing prior to event, was in garage with grandson.  Regained consciousness and was reported to be neurologically intact.    Interim Events  Monitored rhythm: Currently sinus rhythm.  Has had some brief intermittent asymptomatic [place] I noted.    Denies chest pain, dizziness dyspnea, other complaints currently.   Labs reviewed  EKG: Sinus with long first deg AVB.     Review of Systems  Review of Systems   Constitutional: Negative for chills, fatigue and fever.   HENT: Negative for congestion, nosebleeds and trouble swallowing.    Respiratory: Negative for cough, chest tightness, shortness of breath and wheezing.    Cardiovascular: Positive for palpitations (PTA). Negative for chest pain and leg swelling.   Gastrointestinal: Negative for abdominal pain, blood in stool, nausea and vomiting.   Genitourinary: Negative for hematuria.   Neurological: Negative for dizziness, syncope (Previous syncopal event last month ), speech  difficulty, weakness, light-headedness and numbness.        Reports presyncope prior to admission with fall. NOne since admission.           Vital signs in last 24 hours  Temp:  [36.1 °C (97 °F)-36.8 °C (98.2 °F)] 36.8 °C (98.2 °F)  Pulse:  [67-74] 74  Resp:  [16-18] 16  BP: ()/(55-67) 97/59  SpO2:  [91 %-97 %] 91 %    Physical Exam  Physical Exam  Vitals and nursing note reviewed.   Constitutional:       Appearance: Normal appearance.   HENT:      Head: Normocephalic and atraumatic.   Eyes:      Conjunctiva/sclera: Conjunctivae normal.      Pupils: Pupils are equal, round, and reactive to light.   Cardiovascular:      Rate and Rhythm: Normal rate and regular rhythm.      Pulses: Normal pulses.      Heart sounds: Murmur present. No friction rub. No gallop.    Pulmonary:      Effort: Pulmonary effort is normal.      Breath sounds: Normal breath sounds. No wheezing, rhonchi or rales.   Abdominal:      General: Bowel sounds are normal.   Musculoskeletal:      Cervical back: Normal range of motion.      Right lower leg: No edema.      Left lower leg: No edema.   Skin:     General: Skin is warm and dry.   Neurological:      Mental Status: She is alert and oriented to person, place, and time.   Psychiatric:         Mood and Affect: Mood normal.         Behavior: Behavior normal.         Thought Content: Thought content normal.         Judgment: Judgment normal.         Lab Review  Lab Results   Component Value Date/Time    WBC 10.6 05/10/2021 06:47 AM    RBC 3.45 (L) 05/10/2021 06:47 AM    HEMOGLOBIN 9.9 (L) 05/10/2021 06:47 AM    HEMATOCRIT 31.1 (L) 05/10/2021 06:47 AM    MCV 90.1 05/10/2021 06:47 AM    MCH 28.7 05/10/2021 06:47 AM    MCHC 31.8 (L) 05/10/2021 06:47 AM    MPV 10.4 05/10/2021 06:47 AM      Lab Results   Component Value Date/Time    SODIUM 139 05/10/2021 06:47 AM    POTASSIUM 4.3 05/10/2021 06:47 AM    CHLORIDE 111 05/10/2021 06:47 AM    CO2 20 05/10/2021 06:47 AM    GLUCOSE 113 (H) 05/10/2021 06:47  AM    BUN 18 05/10/2021 06:47 AM    CREATININE 1.48 (H) 05/10/2021 06:47 AM      Lab Results   Component Value Date/Time    ASTSGOT 29 05/10/2021 06:47 AM    ALTSGPT 19 05/10/2021 06:47 AM     Lab Results   Component Value Date/Time    CHOLSTRLTOT 132 05/09/2021 03:36 AM    LDL 67 05/09/2021 03:36 AM    HDL 49 05/09/2021 03:36 AM    TRIGLYCERIDE 80 05/09/2021 03:36 AM    TROPONINT 26 (H) 05/08/2021 07:06 AM       No results for input(s): NTPROBNP in the last 72 hours.    Cardiac Imaging and Procedures Review  EKG:  My personal interpretation of the EKG dated 5/8/21 is Sinus rhythm with first deg AVB    Echocardiogram:  4/3/21  CONCLUSIONS  Compared to the images of the prior study done 11/2/2018, intracavitary   gradient is present.  Hyperdynamic left ventricular systolic function.  Left ventricular ejection fraction is visually estimated to be greater   than 75%.  Evidence of increased intracavity gradient, peak velocity is 2.6 m/sec.  Systolic anterior motion of the anterior mitral valve leaflet.      Imaging  Chest X-Ray:  5/7/21   1.  Acute fractures of left posterolateral ribs 5-8. No pneumothorax.  2.  Left basilar atelectasis.      Assessment/Plan  1. SVT:  - EP asked to evaluate for consideration of ablation vs possible use of antiarrhythmic medications.  - Presyncope after increasing dose of Metoprolol for a few doses.  Unclear if presyncope secondary to potential SVT vs known hx orthostasis, potentially worsened by increase dose of BB.   - Discussed with Ms Carmichael, she would like to have her arrhythmia and symptoms controled long term.  Discussed use of possible antiarrhythmic medications (? Low dose amiodarone) for improved control, with potentially reducing betablocker secondary to long first deg AVB, hx of orthostasis  vs consideration for ablation, of which Dr. Hollis will need to review.  - I am somewhat concerned of etiology of syncope last month without prodrome and and history of  her history HOCM.     - Echo with prominent sigmoid septum, EF 75%, possible VIDAL of MV.    - Ok to continue low dose Betablocker's for now, orthostatic evaluation ordered for this admission per cardiology.        Full consult to follow by Dr. Hollis.     JAREK Langley.   [facility]  [phone]

## 2021-05-10 NOTE — PROGRESS NOTES
"         [facility] Cardiology Progress Note    Name:   Yoli Carmichael   YOB: 1933  Age:   87 y.o.  female   MRN:   [id number]    Consulting Cardiologist: Dr. Montiel   Primary Cardiologist: Shree Albert APRN    Chief Complaint: Presyncope     Past Medical History:  87-year-old female with a history of paroxysmal SVT, orthostatic hypotension, recurrent syncope/presyncope episodes.  Admitted 5/7/2021 after she syncopized while standing doing her make-up. She always has the same sensations when these \"spells\" occur, her face feels flushed, then she becomes dizzy and feels her heart race.  She measures her blood pressure during these episodes and it is usually low.  She denies any diaphoresis nausea or vomiting during these episodes.    History of Present Illness:  No heart racing this admission, no SVT on telemetry.  She does not feel dizzy while sitting up eating her breakfast.    Her daughter is sitting at her bedside and notes that she has had a steady increase in these \"spells\" in the last several months, previously she is was quite active and lively.      ROS  Constitutional:  No fatigue.  Weight stable.  Respiratory:  Painful inspiration (broken ribs). No shortness of breath, no cough.  Cardiovascular:  No chest pain.  No lower extremity edema, No orthopnea or PND. Denies palpitations.  : No polyuria, no dysuria. No hematuria.  GI:  Denies nausea/vomiting.  No abdominal distention. No melena/bloody stools.  Neuro:  Denies dizziness, syncope.  Hem/lymph: Denies easy bleeding/bruising.    MS: no arthralgias or myalgias.    All other review of systems reviewed and negative.    Past Surgical History:   Procedure Laterality Date   • KNEE ARTHROPLASTY TOTAL  9/3/2013    Performed by Ramesh Styles M.D. at SURGERY [facility]   • TONSILLECTOMY         Family History   Problem Relation Age of Onset   • Lung Disease Mother         frequent pneumonia   • Heart Disease Father 54        MI "   • Lung Disease Father         Emphysema   • Heart Attack Father    • Cancer Maternal Aunt         Great Aunt and Cousin Ovarian CA   • Hypertension Maternal Aunt    • Cancer Daughter         Lung and Brain CA   • Seizures Daughter    • Cancer Daughter         Pituitary tumor   • Cancer Brother         leukemia       Social History     Socioeconomic History   • Marital status:      Spouse name: Not on file   • Number of children: Not on file   • Years of education: Not on file   • Highest education level: Not on file   Occupational History   • Not on file   Tobacco Use   • Smoking status: Never Smoker   • Smokeless tobacco: Never Used   • Tobacco comment: Pt exposed to second hand smoker   Substance and Sexual Activity   • Alcohol use: Yes     Comment: Occasionally   • Drug use: No   • Sexual activity: Never   Other Topics Concern   • Not on file   Social History Narrative   • Not on file     Social Determinants of Health     Financial Resource Strain:    • Difficulty of Paying Living Expenses:    Food Insecurity:    • Worried About Running Out of Food in the Last Year:    • Ran Out of Food in the Last Year:    Transportation Needs:    • Lack of Transportation (Medical):    • Lack of Transportation (Non-Medical):    Physical Activity:    • Days of Exercise per Week:    • Minutes of Exercise per Session:    Stress:    • Feeling of Stress :    Social Connections:    • Frequency of Communication with Friends and Family:    • Frequency of Social Gatherings with Friends and Family:    • Attends Christian Services:    • Active Member of Clubs or Organizations:    • Attends Club or Organization Meetings:    • Marital Status:    Intimate Partner Violence:    • Fear of Current or Ex-Partner:    • Emotionally Abused:    • Physically Abused:    • Sexually Abused:        Medications / Drug list prior to admission:  No current facility-administered medications on file prior to encounter.     Current Outpatient  "Medications on File Prior to Encounter   Medication Sig Dispense Refill   • ketotifen (ZADITOR) 0.025 % ophthalmic solution Administer 1 Drop into both eyes 1 time a day as needed. Indications: Allergic Conjunctivitis     • Multiple Vitamins-Minerals (ICAPS AREDS 2 PO) Take 1 tablet by mouth every day.     • Propylene Glycol (SYSTANE BALANCE OP) Administer 1 Drop into both eyes 4 times a day as needed (dry eyes).     • metoprolol SR (TOPROL XL) 25 MG TABLET SR 24 HR Take 1 tablet by mouth every day. (Patient taking differently: Take 37.5 mg by mouth every day.) 90 tablet 3   • prednisoLONE acetate (PRED FORTE) 1 % Suspension Administer 1 Drop into both eyes see administration instructions. 1 drop in left eye four times a day   1 drop in right eye six times a day     • Cholecalciferol (VITAMIN D-3) 125 MCG (5000 UT) Tab Take 5,000 Units by mouth every day. 5000 unit tablet + 1000 unit tablet for a total of 6000 units daily     • Coenzyme Q10 (COQ10 PO) Take 1 capsule by mouth two times a week. Sunday & Wednesday     • escitalopram (LEXAPRO) 5 MG tablet Take 1 Tab by mouth every day. 90 Tab 3   • B Complex-C (SUPER B COMPLEX PO) Take 1 tablet by mouth every morning.     • Glycerin-Polysorbate 80 (REFRESH DRY EYE THERAPY) 1-1 % SOLN Administer 1 Drop into affected eye(s) 4 times a day as needed (dry eye).     • vitamin D (CHOLECALCIFEROL) 1000 Unit (25 mcg) Tab Take 1,000 Units by mouth every day. 1000 unit tablet + 5000 unit tablet for a total of 6000 units daily         Allergies   Allergen Reactions   • Other Environmental      Seasonal allergies       Physical Exam  Body mass index is 23.32 kg/m². /57   Pulse 74   Temp 36.8 °C (98.2 °F) (Temporal)   Resp 16   Ht 1.6 m (5' 2.99\")   Wt 59.7 kg (131 lb 9.8 oz)   SpO2 91%    Vitals:    05/09/21 1554 05/09/21 1932 05/09/21 2320 05/10/21 0710   BP: 123/55 137/67 152/60 128/57   Pulse: 73 72 67 74   Resp: 17 18 16 16   Temp: 36.7 °C (98.1 °F) 36.1 °C (97 °F) " 36.1 °C (97 °F) 36.8 °C (98.2 °F)   TempSrc: Temporal Temporal Temporal Temporal   SpO2: 97% 93% 91% 91%   Weight:       Height:        Oxygen Therapy:  Pulse Oximetry: 91 %(Simultaneous filing. User may not have seen previous data.), O2 (LPM): 0(Simultaneous filing. User may not have seen previous data.), O2 Delivery Device: None - Room Air(Simultaneous filing. User may not have seen previous data.)    General: no apparent distress, appears younger than stated age  Eyes: normal conjunctiva  ENT: OP clear  Neck: No JVD   Lungs: reduced respiratory effort, No rales, wheezing or rhonchi.  Heart: normal rate, regular rhythm, + systolic murmur, no rubs or gallops,   EXT: No edema bilateral lower extremities. + bilateral pedal pulses. no cyanosis  Abdomen: soft, non tender, non distended,  Neurological: No focal deficits, no facial asymmetry.  Normal speech.  Psychiatric: Appropriate affect, alert and oriented x 3.   Skin: Warm extremities, no rash.    Labs (personally reviewed):     Lab Results   Component Value Date/Time    SODIUM 137 05/09/2021 03:36 AM    POTASSIUM 4.1 05/09/2021 03:36 AM    CHLORIDE 112 05/09/2021 03:36 AM    CO2 19 (L) 05/09/2021 03:36 AM    GLUCOSE 106 (H) 05/09/2021 03:36 AM    BUN 25 (H) 05/09/2021 03:36 AM    CREATININE 1.62 (H) 05/09/2021 03:36 AM     Lab Results   Component Value Date/Time    ALKPHOSPHAT 52 05/09/2021 03:36 AM    ASTSGOT 22 05/09/2021 03:36 AM    ALTSGPT 16 05/09/2021 03:36 AM    TBILIRUBIN 0.3 05/09/2021 03:36 AM      Lab Results   Component Value Date/Time    CHOLSTRLTOT 132 05/09/2021 03:36 AM    LDL 67 05/09/2021 03:36 AM    HDL 49 05/09/2021 03:36 AM    TRIGLYCERIDE 80 05/09/2021 03:36 AM     Lab Results   Component Value Date/Time    BNPBTYPENAT 158 (H) 10/26/2018 12:38 PM         Cardiac Imaging and Procedures Review:      Personal Telemetry Review:    Personal EKG Interpretation:    Echo 4/3/21:  CONCLUSIONS  Compared to the images of the prior study done 11/2/2018,  intracavitary gradient is present.  Hyperdynamic left ventricular systolic function.  Left ventricular ejection fraction is visually estimated to be greater   than 75%.  Evidence of increased intracavity gradient, peak velocity is 2.6 m/sec.  Systolic anterior motion of the anterior mitral valve leaflet.    Carotid U/S 4/3/21  Vascular Laboratory   CONCLUSIONS   Greater than 50% narrowing right ICA   Right thyroid 2.9 cm mass for which dedicated thyroid US is advised to    characterize further.  Biopsy may be warranted.  Right carotid.    Plaque of the bifurcation extending into the internal carotid. Velocities    are consistent with 50-69% stenosis of the internal carotid artery.    Plaque is irregular on the surface and heterogeneous with mixed acoustic    densities.       Left carotid.    Plaque of the bifurcation extending into the internal carotid. Velocities    are consistent with < 50% stenosis of the internal carotid artery.    Plaque is irregular on the surface and heterogeneous with mixed acoustic    densities.       Bilateral external carotid, subclavian and vertebral arteries waveforms are    antegrade and waveforms are normal in character and velocity.       Assessment and Medical Decision Making:  Syncope  HOCM  - several possible causes including vasovagal syndrome, dehydration in the setting of LVOT obstruction vs pSVT causing symptoms.   - appreciate EP consult for eval for ablation  - Orthostatics today, if hypotensive will give another 500cc bolus  - nonpharmacologic measures including compression stockings please  - for now keep beta blocker on board unless severely hypotensive but not evidence of that here this hospital stay     Paroxysmal SVT  - Metop 25  - EP consult     Carotid Stenosis  - 50-69% stenosis R ICA  - no TIAs, monocular blindness, unilateral symptoms. Syncope is usually not a symptom of unilateral carotid disease  - continue lipitor 40mg  - discuss aspirin, may be more harmful  given her frequent falls    Hypertension  - allow some permissive HTN given her symptoms  - previously on amlodipine as outpatient, not indicated at this time    Please see Dr. Montiel's attestation for additions and further recommendations.    Tarah Rose PA-C  [facility]

## 2021-05-10 NOTE — CARE PLAN
Problem: Communication  Goal: The ability to communicate needs accurately and effectively will improve  Outcome: PROGRESSING AS EXPECTED  Note: Pt actively participates in POC. Pt and board updated, all questions and concerns answered. Pt encouraged to voice all questions and concerns.      Problem: Safety  Goal: Will remain free from injury  Outcome: PROGRESSING AS EXPECTED  Goal: Will remain free from falls  Outcome: PROGRESSING AS EXPECTED  Note: Safety and fall education given. All fall precautions are in place with belongings at bedside table. Needs are tended to. Educated to use call light for assistance. Pt verbalized understanding.       Problem: Venous Thromboembolism (VTW)/Deep Vein Thrombosis (DVT) Prevention:  Goal: Patient will participate in Venous Thrombosis (VTE)/Deep Vein Thrombosis (DVT)Prevention Measures  Outcome: PROGRESSING AS EXPECTED     Problem: Mobility  Goal: Risk for activity intolerance will decrease  Outcome: PROGRESSING AS EXPECTED  Note: Pt steady. X1 assist with a FWW. Ambulates well, no dyspnea, some pain in ribs. WCTM.

## 2021-05-10 NOTE — PROGRESS NOTES
Daily Progress Note:     Date of Service: 5/10/2021  Primary Team: [facility] Blue Team   Attending: Dr. Tidwell  Senior Resident: Dr. Villarreal  Intern: Dr. Abraham  Contact:  [phone]    Chief Complaint: Near syncope    Subjective:  Patient is a 87-year-old female who presents with near syncope.  Has past medical history of paroxysmal SVT, hypertension, CKD that developed over the last 1 year, and 3 tooth abscesses and December 2020.  Of note recently her blood pressure medications metoprolol 25 increased to 37.5 and ARB stopped by cardiology.    Patient reports ongoing pain overnight, however did not require tramadol.  No other complaints.    Review of Systems:   Review of Systems   Cardiovascular: Positive for palpitations.   Musculoskeletal: Positive for back pain, falls and myalgias.   Neurological: Positive for dizziness. Negative for loss of consciousness.   All other systems reviewed and are negative.    Objective Data:   Vitals:   Temp:  [36.1 °C (97 °F)-36.8 °C (98.2 °F)] 36.8 °C (98.2 °F)  Pulse:  [67-74] 74  Resp:  [16-18] 16  BP: ()/(55-67) 97/59  SpO2:  [91 %-97 %] 91 %    Physical Exam:  Physical Exam  Constitutional:       Appearance: Normal appearance.   Cardiovascular:      Rate and Rhythm: Normal rate.      Heart sounds: Murmur (Systolic) present.   Pulmonary:      Effort: Pulmonary effort is normal.      Breath sounds: No wheezing or rales.   Abdominal:      General: Abdomen is flat. There is no distension.      Palpations: Abdomen is soft.      Tenderness: There is no abdominal tenderness. There is no guarding or rebound.   Musculoskeletal:         General: Tenderness present.      Cervical back: Normal range of motion.      Right lower leg: Edema (Pitting edema of lower extremities bilaterally) present.      Left lower leg: Edema present.      Comments: Left posterior chest/ribs tender to palpation.  No bruising or swelling.  Lidocaine patch noted above area of tenderness   Skin:      General: Skin is warm.      Capillary Refill: Capillary refill takes less than 2 seconds.      Findings: No bruising.   Neurological:      Mental Status: She is alert.   Psychiatric:      Comments: Patient appears to have poor insight into medical condition, as she seems to have similar questions about her condition every day.     Labs:  Recent Labs     05/08/21  0000 05/09/21  0336 05/10/21  0647   WBC 10.9* 9.1 10.6   RBC 3.70* 3.34* 3.45*   HEMOGLOBIN 10.6* 9.4* 9.9*   HEMATOCRIT 33.0* 30.3* 31.1*   MCV 89.2 90.7 90.1   MCH 28.6 28.1 28.7   RDW 49.6 50.7* 51.8*   PLATELETCT 221 186 203   MPV 10.3 11.2 10.4   NEUTSPOLYS 69.00 56.80 54.40   LYMPHOCYTES 18.30* 23.90 27.70   MONOCYTES 11.00 13.00 12.20   EOSINOPHILS 0.50 4.60 3.70   BASOPHILS 0.60 0.70 0.90     Recent Labs     05/08/21  0000 05/09/21  0336 05/10/21  0647   SODIUM 137 137 139   POTASSIUM 3.9 4.1 4.3   CHLORIDE 107 112 111   CO2 20 19* 20   GLUCOSE 114* 106* 113*   BUN 26* 25* 18   CPKTOTAL 158* 116  --      Micro:  None    EKG:  Normal sinus rhythm    Imaging:   XR ribs:  1.  Acute fractures of left posterolateral ribs 5-8. No pneumothorax.  2.  Left basilar atelectasis.    Meds:  Current Outpatient Medications   Medication Instructions   • B Complex-C (SUPER B COMPLEX PO) 1 tablet, Oral, EVERY MORNING   • Coenzyme Q10 (COQ10 PO) 1 capsule, Oral, 2X A WEEK, Sunday & Wednesday   • escitalopram (LEXAPRO) 5 mg, Oral, DAILY   • Glycerin-Polysorbate 80 (REFRESH DRY EYE THERAPY) 1-1 % SOLN 1 Drop, Ophthalmic, 4 TIMES DAILY PRN   • ketotifen (ZADITOR) 0.025 % ophthalmic solution 1 Drop, Both Eyes, 1 TIME DAILY PRN   • metoprolol SR (TOPROL XL) 25 mg, Oral, DAILY   • Multiple Vitamins-Minerals (ICAPS AREDS 2 PO) 1 tablet, Oral, DAILY   • prednisoLONE acetate (PRED FORTE) 1 % Suspension 1 Drop, Both Eyes, SEE ADMIN INSTRUCTIONS, 1 drop in left eye four times a day <BR>1 drop in right eye six times a day   • Propylene Glycol (SYSTANE BALANCE OP) 1 Drop, Both  "Eyes, 4 TIMES DAILY PRN   • vitamin D (CHOLECALCIFEROL) 1,000 Units, Oral, DAILY, 1000 unit tablet + 5000 unit tablet for a total of 6000 units daily   • Vitamin D-3 5,000 Units, Oral, DAILY, 5000 unit tablet + 1000 unit tablet for a total of 6000 units daily      Scheduled Medications   Medication Dose Frequency   • metoprolol SR  25 mg DAILY   • atorvastatin  40 mg Q EVENING   • heparin  5,000 Units Q8HRS   • polyethylene glycol/lytes  1 Packet DAILY    And   • senna-docusate  2 tablet BID   • acetaminophen  650 mg Q4HRS   • capsaicin   TID   • escitalopram  5 mg DAILY   • prednisoLONE acetate  1 Drop 4X/DAY   • Pharmacy Consult Request  1 Each PHARMACY TO DOSE   • methocarbamol  500 mg TID   • lidocaine  1 Patch Q24HR      Consultants/Specialty:  Cardiology    Problem Representation:   * Near syncope likely vasovagal versus secondary to symptomatic SVT versus HOCM-like cardiomyopathy  Assessment & Plan  No loss of consciousness   History of hyperdynamic not hypertrophic left ventricular systolic function  History of paroxysmal SVT  Of note, recent event recorder last month showed SVT episodes, with heart rate up to 140s, and patient reports \"spells\" during this timeframe  Of note, carotid ultrasound 4/2021 with right carotid stenosis 50 to 70% and left carotid stenosis less than 50%  Also recent increase of metoprolol dose to 37.5  Patient reported tachycardia, lightheadedness, dizziness prior to fall  EKG on admission sinus rhythm without heart block or ST changes.  Heart rate 73  Troponin 21 -> 26.  Not significantly elevated  Orthostatic vitals negative  As patient had echo done recently in April 2021, will not repeat this admission given no significant change in presentation  Continue to monitor on telemetry  Continue to avoid antihypertensives and diuretics  Cardiology consulted. Dr. Montiel following   EP consulted given history of paroxysmal SVT    SVT (supraventricular tachycardia) (HCC)- (present on " admission)  Assessment & Plan  History of paroxysmal SVT  No episodes noted this admission  Cont recently increased home Toprolol XL 37.5mg daily.  Patient reports increasing this medication 2 days ago, with symptoms occurring yesterday  Patient received metoprolol 12.5mg PO x1 in ER for tachycardia  PRN IV metoprolol 5mg for HR > 150  Tachycardia possibly secondary to rib fracture pain  Monitor and replete potassium and magnesium as needed    Stage 3b chronic kidney disease- (present on admission)  Assessment & Plan  Minimize nephrotoxic agents, renally dose meds  Creatinine is stable at new baseline (1.5-1.8)  Unlikely BRIAN   BUN to creatinine ratio 17.9  Creatinine 1.61 on admission.  Improving  GFR 30-34  DVT prophylaxis with heparin    Traumatic rhabdomyolysis (HCC)- (present on admission)  Assessment & Plan  Patient reports falling and being down for 15 to 20 minutes  Witnessed fall  No loss of consciousness      Closed fracture of multiple ribs of left side with delayed healing- (present on admission)  Assessment & Plan  Fall with rib fractures prior to presentation   CT head on 5/4 negative for intracranial hemorrhage  X-ray shows left posterior lateral ribs 5-8   No evidence of pneumothorax  Continue pain control as needed to prevent atelectasis, however avoid oxycodone as it gave her dizziness  Incentive spirometer  PT OT  Fall precautions    Essential hypertension, benign- (present on admission)  Assessment & Plan  Recent d/c of ARB and increased Toprolol XL to 37.5mg by outpt cardiology  Cont Toprolol XL 37.5mg  PRN hydralazine    Second degree atrioventricular block, Mobitz (type) I  Assessment & Plan  Overnight, while patient was asleep, some new episodes of AV block noted  Held morning metoprolol   Cardiology recommendations appreciated regarding metoprolol/medications    Fall- (present on admission)  Assessment & Plan  -see closed fracture of multiple ribs of left side with delayed hearing  plan    Depression  Assessment & Plan  Continue Escitalopram, to treat underlying depression as well as vasovagal symptoms    Advance care planning- (present on admission)  Assessment & Plan  Goal of care discussed with patient and patient's daughters -- supportive care, tele monitoring and adjust meds as needed   FULL code status confirmed  Advance are planning: 15mins    Code status: Full code  Diet: Cardiac  Lines/ tubes/ drains:  IV fluids:  Abx (dates):  GI ppx:  DVT ppx: Heparin  Dispo: Home health PT OT.  Patient agreeable.  Orders placed.  Follow-up appointments scheduled

## 2021-05-10 NOTE — NON-PROVIDER
"[facility] INTERNAL MEDICINE PROGRESS NOTE     Attending: Hector Tidwell MD  Senior Resident: Kate Villarreal MD  Eduardo Resident: Kristine Abraham MD    PATIENT: Yoli Carmichael; [id number]; 8/21/1933    SUBJECTIVE: Yoli Carmichael is a pleasant 87-year-old female with a past history of PSVT, HTN, mitral valve regurgitation, hyperdynamic left ventricular systolic dysfunction, and CKD stage 3 who was admitted for an episode of near syncope. The patient has a history of syncopal/near syncopal events in the past and states that she gets these \"spells\" where her face gets flushed and she feels her heart rate start to race and get palpitations. Yesterday, the patient had been standing for a while when she felt the event come on and fell, hitting her left sided chest on the bathtub edge. She did not hit her head in the fall. After this, she went to the urgent care and was diagnosed with multiple rib fractures on the left side. She states that she did not have any chest pain or shortness of breath prior to the event and did not fully lose consciousness. No complaints of hemiparesthesias or other neurologic symptoms. The patient was admitted for a syncopal event last month where she states she did lose consciousness. She was evaluated afterwards with a 3 week Holter monitor that showed no signs of arrhythmia but heart rate did extend up into the 140s. Further work up showed an EF of 75% with little hyperdynamic cardiac contractility as well as PSVT. She recently had her Toprolol XL dose increased from 25 mg to 37.5 mg and her ARB was discontinued about 1 week ago     Today, she is having pain in her back and ribs secondary to the fall. Still using the incentive spirometer. Dr. Sanz saw this patient yesterday and recommended that she be evaluated from EP to see if ablation may be warranted. Overnight on tele, patient had 2nd degree type I beats and showed a first degree AVB.      OBJECTIVE:  Vitals:    05/09/21 1554 " 05/09/21 1932 05/09/21 2320 05/10/21 0710   BP: 123/55 137/67 152/60 128/57   Pulse: 73 72 67 74   Resp: 17 18 16 16   Temp: 36.7 °C (98.1 °F) 36.1 °C (97 °F) 36.1 °C (97 °F) 36.8 °C (98.2 °F)   TempSrc: Temporal Temporal Temporal Temporal   SpO2: 97% 93% 91% 91%   Weight:       Height:         No intake or output data in the 24 hours ending 05/08/21 0728    PE:  General: No acute distress, resting comfortably in bed.  HEENT: normocephalic, atraumatic, PERRLA, EOMI, mucous membranes are pink and moist. Posterior oropharynx without edema or erythema.  Neck: No palpable neck/thyroid mass. No carotid bruit.  Cardiovascular: Heart RRR with a holosystolic murmur. No rubs or gallops. Distal Pulses 2+ with testing of radial and DP/PT pulses  Respiratory: poor respiratory effort secondary to pain. symmetric chest expansion, lungs CTA bilaterally with no wheezes rales or rhonci  Abdomen: soft, nontender, nondistended, no masses palpated, +BS  Musculoskeletal: strength 5/5 in four extremities. No bony vertebral point tenderness. No overlying ecchymosis over the are of the posterolateral left chest wall.  Neuro: No focal neurologic findings. A&Ox3. Normal sensation.      LABS:  Recent Labs     05/08/21  0000 05/09/21  0336 05/10/21  0647   WBC 10.9* 9.1 10.6   RBC 3.70* 3.34* 3.45*   HEMOGLOBIN 10.6* 9.4* 9.9*   HEMATOCRIT 33.0* 30.3* 31.1*   MCV 89.2 90.7 90.1   MCH 28.6 28.1 28.7   RDW 49.6 50.7* 51.8*   PLATELETCT 221 186 203   MPV 10.3 11.2 10.4   NEUTSPOLYS 69.00 56.80 54.40   LYMPHOCYTES 18.30* 23.90 27.70   MONOCYTES 11.00 13.00 12.20   EOSINOPHILS 0.50 4.60 3.70   BASOPHILS 0.60 0.70 0.90     Recent Labs     05/08/21  0000 05/09/21  0336 05/10/21  0647   SODIUM 137 137 139   POTASSIUM 3.9 4.1 4.3   CHLORIDE 107 112 111   CO2 20 19* 20   BUN 26* 25* 18   CREATININE 1.45* 1.62* 1.48*   CALCIUM 9.2 8.7 9.0   MAGNESIUM 2.2 2.5 2.3   PHOSPHORUS 3.4  --   --    ALBUMIN 3.8 3.0* 3.5     Estimated GFR/CRCL = Estimated  Creatinine Clearance: 20.2 mL/min (A) (by C-G formula based on SCr of 1.62 mg/dL (H)).  Recent Labs     05/08/21  0000 05/09/21  0336 05/10/21  0647   GLUCOSE 114* 106* 113*     Recent Labs     05/08/21  0000 05/09/21  0336 05/10/21  0647   ASTSGOT 23 22 29   ALTSGPT 14 16 19   TBILIRUBIN 0.5 0.3 0.4   ALKPHOSPHAT 61 52 60   GLOBULIN 3.1 2.8 2.7     Recent Labs     05/08/21  0000 05/09/21  0336   CPKTOTAL 158* 116       Imaging:  Rib x-ray 5/7/2021  IMPRESSION:     1.  Acute fractures of left posterolateral ribs 5-8. No pneumothorax.  2.  Left basilar atelectasis.      ASSESSMENT & PLAN:  This is a pleasant 87-year-old female with a past history of PSVT, HTN, mitral valve regurgitation, hyperdynamic left ventricular systolic dysfunction, and CKD stage 3 who was admitted for observation following an episode of syncope. She has had this issue before in the past and follows with cardiology, Dr. Velasquez. The cause of her near syncopal events are likely related to vasovagal syncope, and cardiology recommended we continue with the plan that was established at the last office visit.    #Near Syncope; Likely Vasovagal Syncope  #PSVT  #First Degree AVB Type I (transiet at night only)  -Patient had reported feeling palpitations, lightheaded, and slightly dizzy prior to the fall  -She was tachycardic on arrival with a normal EKG  -History of PSVT, mitral regurg  -Cardiology saw and evaluated the patient yesterday recommended having EP evaluate the patient for consideration of ablation, consider antiarrhythmics, discontinue beta-blockers, and allow for permissive hypertension.  -Continue to monitor the patient on telemetry  -Cardiology did not feel the heart block during sleep was concerning  -PRN IV metoprolol 5 mg for heart rate > 150 bpm    #Multiple rib fractures of the left sided chest with associated traumatic rhabdomyolysis  -Rib x-ray at urgent care showed acute fracture of left posterior lateral ribs 5-8 with no evidence  of pneumothorax  -Pain contro (mild to severe scale)l: lidoderm, tylenol, Robaxin, oxycodone, morphine  -Patient received oxycodone and felt it was too strong, will advise use of less potent analgesia in future  -CPK is back down to normal levels  -Encourage IS usage  -PT/OT saw the patient yesterday and are recommending a front wheel walker and 3xper week therapy  -Patient to receive home health for this    #CKD/BRIAN  -Cr has improved to 1.48 from 1.62 yesterday  -F/U with primary care for continued management.     Rory James, MS3  [facility]

## 2021-05-10 NOTE — THERAPY
Missed Therapy     Patient Name: Yoli Carmichael  Age:  87 y.o., Sex:  female  Medical Record #: [id number]  Today's Date: 5/10/2021    Discussed missed therapy with RN       05/10/21 0922   Treatment Variance   Reason For Missed Therapy Non-Medical - Other (Please Comment)   Interdisciplinary Plan of Care Collaboration   Collaboration Comments Order received and acknowledged for a clinical swallow evaluation.  Per RN, patient on a regular/thin liquid diet and eating without difficutly or s/sx of aspiration.  CSE not warranted.  SLP will complete the order.  Please reconsult with a change in status.

## 2021-05-11 LAB
ALBUMIN SERPL BCP-MCNC: 2.9 G/DL (ref 3.2–4.9)
ALBUMIN/GLOB SERPL: 1.1 G/DL
ALP SERPL-CCNC: 56 U/L (ref 30–99)
ALT SERPL-CCNC: 20 U/L (ref 2–50)
ANION GAP SERPL CALC-SCNC: 10 MMOL/L (ref 7–16)
AST SERPL-CCNC: 28 U/L (ref 12–45)
BASOPHILS # BLD AUTO: 0.9 % (ref 0–1.8)
BASOPHILS # BLD: 0.09 K/UL (ref 0–0.12)
BILIRUB SERPL-MCNC: 0.5 MG/DL (ref 0.1–1.5)
BUN SERPL-MCNC: 19 MG/DL (ref 8–22)
CALCIUM SERPL-MCNC: 8.4 MG/DL (ref 8.5–10.5)
CHLORIDE SERPL-SCNC: 111 MMOL/L (ref 96–112)
CO2 SERPL-SCNC: 18 MMOL/L (ref 20–33)
CREAT SERPL-MCNC: 1.32 MG/DL (ref 0.5–1.4)
EKG IMPRESSION: NORMAL
EOSINOPHIL # BLD AUTO: 0.39 K/UL (ref 0–0.51)
EOSINOPHIL NFR BLD: 4 % (ref 0–6.9)
ERYTHROCYTE [DISTWIDTH] IN BLOOD BY AUTOMATED COUNT: 52.7 FL (ref 35.9–50)
GLOBULIN SER CALC-MCNC: 2.6 G/DL (ref 1.9–3.5)
GLUCOSE SERPL-MCNC: 104 MG/DL (ref 65–99)
HCT VFR BLD AUTO: 28.4 % (ref 37–47)
HGB BLD-MCNC: 8.8 G/DL (ref 12–16)
IMM GRANULOCYTES # BLD AUTO: 0.12 K/UL (ref 0–0.11)
IMM GRANULOCYTES NFR BLD AUTO: 1.2 % (ref 0–0.9)
LYMPHOCYTES # BLD AUTO: 3.1 K/UL (ref 1–4.8)
LYMPHOCYTES NFR BLD: 31.5 % (ref 22–41)
MAGNESIUM SERPL-MCNC: 2.2 MG/DL (ref 1.5–2.5)
MCH RBC QN AUTO: 28.6 PG (ref 27–33)
MCHC RBC AUTO-ENTMCNC: 31 G/DL (ref 33.6–35)
MCV RBC AUTO: 92.2 FL (ref 81.4–97.8)
MONOCYTES # BLD AUTO: 1.24 K/UL (ref 0–0.85)
MONOCYTES NFR BLD AUTO: 12.6 % (ref 0–13.4)
NEUTROPHILS # BLD AUTO: 4.89 K/UL (ref 2–7.15)
NEUTROPHILS NFR BLD: 49.8 % (ref 44–72)
NRBC # BLD AUTO: 0 K/UL
NRBC BLD-RTO: 0 /100 WBC
PLATELET # BLD AUTO: 196 K/UL (ref 164–446)
PMV BLD AUTO: 10.6 FL (ref 9–12.9)
POTASSIUM SERPL-SCNC: 3.6 MMOL/L (ref 3.6–5.5)
PROT SERPL-MCNC: 5.5 G/DL (ref 6–8.2)
RBC # BLD AUTO: 3.08 M/UL (ref 4.2–5.4)
SODIUM SERPL-SCNC: 139 MMOL/L (ref 135–145)
WBC # BLD AUTO: 9.8 K/UL (ref 4.8–10.8)

## 2021-05-11 PROCEDURE — 80053 COMPREHEN METABOLIC PANEL: CPT

## 2021-05-11 PROCEDURE — A9270 NON-COVERED ITEM OR SERVICE: HCPCS | Performed by: PHYSICIAN ASSISTANT

## 2021-05-11 PROCEDURE — 96372 THER/PROPH/DIAG INJ SC/IM: CPT

## 2021-05-11 PROCEDURE — 700101 HCHG RX REV CODE 250: Performed by: STUDENT IN AN ORGANIZED HEALTH CARE EDUCATION/TRAINING PROGRAM

## 2021-05-11 PROCEDURE — 700102 HCHG RX REV CODE 250 W/ 637 OVERRIDE(OP): Performed by: STUDENT IN AN ORGANIZED HEALTH CARE EDUCATION/TRAINING PROGRAM

## 2021-05-11 PROCEDURE — 93005 ELECTROCARDIOGRAM TRACING: CPT | Performed by: NURSE PRACTITIONER

## 2021-05-11 PROCEDURE — A9270 NON-COVERED ITEM OR SERVICE: HCPCS | Performed by: INTERNAL MEDICINE

## 2021-05-11 PROCEDURE — 93010 ELECTROCARDIOGRAM REPORT: CPT | Performed by: INTERNAL MEDICINE

## 2021-05-11 PROCEDURE — 700102 HCHG RX REV CODE 250 W/ 637 OVERRIDE(OP): Performed by: PHYSICIAN ASSISTANT

## 2021-05-11 PROCEDURE — 97535 SELF CARE MNGMENT TRAINING: CPT

## 2021-05-11 PROCEDURE — 700102 HCHG RX REV CODE 250 W/ 637 OVERRIDE(OP): Performed by: INTERNAL MEDICINE

## 2021-05-11 PROCEDURE — 99215 OFFICE O/P EST HI 40 MIN: CPT | Performed by: INTERNAL MEDICINE

## 2021-05-11 PROCEDURE — A9270 NON-COVERED ITEM OR SERVICE: HCPCS | Performed by: STUDENT IN AN ORGANIZED HEALTH CARE EDUCATION/TRAINING PROGRAM

## 2021-05-11 PROCEDURE — 36415 COLL VENOUS BLD VENIPUNCTURE: CPT

## 2021-05-11 PROCEDURE — 85025 COMPLETE CBC W/AUTO DIFF WBC: CPT

## 2021-05-11 PROCEDURE — 83735 ASSAY OF MAGNESIUM: CPT

## 2021-05-11 PROCEDURE — 700111 HCHG RX REV CODE 636 W/ 250 OVERRIDE (IP): Performed by: STUDENT IN AN ORGANIZED HEALTH CARE EDUCATION/TRAINING PROGRAM

## 2021-05-11 PROCEDURE — G0378 HOSPITAL OBSERVATION PER HR: HCPCS

## 2021-05-11 PROCEDURE — 99225 PR SUBSEQUENT OBSERVATION CARE,LEVEL II: CPT | Mod: GC | Performed by: INTERNAL MEDICINE

## 2021-05-11 RX ORDER — POTASSIUM CHLORIDE 20 MEQ/1
20 TABLET, EXTENDED RELEASE ORAL ONCE
Status: COMPLETED | OUTPATIENT
Start: 2021-05-11 | End: 2021-05-11

## 2021-05-11 RX ADMIN — PREDNISOLONE ACETATE 1 DROP: 10 SUSPENSION/ DROPS OPHTHALMIC at 18:35

## 2021-05-11 RX ADMIN — PREDNISOLONE ACETATE 1 DROP: 10 SUSPENSION/ DROPS OPHTHALMIC at 16:51

## 2021-05-11 RX ADMIN — POTASSIUM CHLORIDE 20 MEQ: 1500 TABLET, EXTENDED RELEASE ORAL at 08:37

## 2021-05-11 RX ADMIN — HEPARIN SODIUM 5000 UNITS: 5000 INJECTION, SOLUTION INTRAVENOUS; SUBCUTANEOUS at 15:12

## 2021-05-11 RX ADMIN — ACETAMINOPHEN 650 MG: 325 TABLET, FILM COATED ORAL at 18:33

## 2021-05-11 RX ADMIN — ACETAMINOPHEN 650 MG: 325 TABLET, FILM COATED ORAL at 04:27

## 2021-05-11 RX ADMIN — ASPIRIN 81 MG: 81 TABLET, COATED ORAL at 05:47

## 2021-05-11 RX ADMIN — ACETAMINOPHEN 650 MG: 325 TABLET, FILM COATED ORAL at 15:12

## 2021-05-11 RX ADMIN — METOPROLOL SUCCINATE 25 MG: 25 TABLET, EXTENDED RELEASE ORAL at 04:27

## 2021-05-11 RX ADMIN — ATORVASTATIN CALCIUM 40 MG: 40 TABLET, FILM COATED ORAL at 18:34

## 2021-05-11 RX ADMIN — PREDNISOLONE ACETATE 1 DROP: 10 SUSPENSION/ DROPS OPHTHALMIC at 04:41

## 2021-05-11 RX ADMIN — PREDNISOLONE ACETATE 1 DROP: 10 SUSPENSION/ DROPS OPHTHALMIC at 14:17

## 2021-05-11 RX ADMIN — HEPARIN SODIUM 5000 UNITS: 5000 INJECTION, SOLUTION INTRAVENOUS; SUBCUTANEOUS at 04:27

## 2021-05-11 RX ADMIN — PREDNISOLONE ACETATE 1 DROP: 10 SUSPENSION/ DROPS OPHTHALMIC at 14:16

## 2021-05-11 RX ADMIN — ACETAMINOPHEN 650 MG: 325 TABLET, FILM COATED ORAL at 11:08

## 2021-05-11 RX ADMIN — CAPSAICIN: 0.25 CREAM TOPICAL at 21:14

## 2021-05-11 RX ADMIN — PREDNISOLONE ACETATE 1 DROP: 10 SUSPENSION/ DROPS OPHTHALMIC at 04:28

## 2021-05-11 RX ADMIN — ACETAMINOPHEN 650 MG: 325 TABLET, FILM COATED ORAL at 21:13

## 2021-05-11 RX ADMIN — ESCITALOPRAM OXALATE 5 MG: 10 TABLET ORAL at 18:33

## 2021-05-11 RX ADMIN — CAPSAICIN: 0.25 CREAM TOPICAL at 04:27

## 2021-05-11 RX ADMIN — PREDNISOLONE ACETATE 1 DROP: 10 SUSPENSION/ DROPS OPHTHALMIC at 11:09

## 2021-05-11 RX ADMIN — PREDNISOLONE ACETATE 1 DROP: 10 SUSPENSION/ DROPS OPHTHALMIC at 22:00

## 2021-05-11 RX ADMIN — PREDNISOLONE ACETATE 1 DROP: 10 SUSPENSION/ DROPS OPHTHALMIC at 21:13

## 2021-05-11 RX ADMIN — LIDOCAINE 1 PATCH: 50 PATCH TOPICAL at 04:26

## 2021-05-11 RX ADMIN — LIDOCAINE 1 PATCH: 50 PATCH TOPICAL at 23:28

## 2021-05-11 ASSESSMENT — PAIN DESCRIPTION - PAIN TYPE
TYPE: ACUTE PAIN
TYPE: ACUTE PAIN

## 2021-05-11 ASSESSMENT — COGNITIVE AND FUNCTIONAL STATUS - GENERAL
DAILY ACTIVITIY SCORE: 24
SUGGESTED CMS G CODE MODIFIER DAILY ACTIVITY: CH

## 2021-05-11 ASSESSMENT — ENCOUNTER SYMPTOMS
MYALGIAS: 1
DIZZINESS: 1
LIGHT-HEADEDNESS: 0
COUGH: 0
COLOR CHANGE: 0
LOSS OF CONSCIOUSNESS: 0
BACK PAIN: 1
ABDOMINAL PAIN: 0
SHORTNESS OF BREATH: 0
FATIGUE: 0
PALPITATIONS: 1
CHILLS: 0
FALLS: 1
WHEEZING: 0
CHEST TIGHTNESS: 0
FEVER: 0
DIAPHORESIS: 0
PALPITATIONS: 0
STRIDOR: 0

## 2021-05-11 ASSESSMENT — FIBROSIS 4 INDEX: FIB4 SCORE: 2.72

## 2021-05-11 NOTE — PROGRESS NOTES
"         [facility] Cardiology Progress Note    Name:   Yoli Carmichael   YOB: 1933  Age:   87 y.o.  female   MRN:   [id number]    Consulting Cardiologist: Dr. Montiel   Primary Cardiologist: Shree Albert APRN    Chief Complaint: Presyncope     Past Medical History:  87-year-old female with a history of paroxysmal SVT, orthostatic hypotension, recurrent syncope/presyncope episodes.  Admitted 5/7/2021 after she syncopized while standing doing her make-up. She always has the same sensations when these \"spells\" occur, her face feels flushed, then she becomes dizzy and feels her heart race.  She measures her blood pressure during these episodes and it is usually low.  She denies any diaphoresis nausea or vomiting during these episodes.    History of Present Illness:  No events overnight. At 12:17 today she felt her usual symptoms of flushing, lightheadedness, this corresponded with 4min of SVT on her telemetry.   Otherwise has been able to walk without symptoms.   Plans for EP ablation and ppm tomorrow.       ROS  Constitutional:  No fatigue.  Weight stable.  Respiratory:  Painful inspiration (broken ribs). No shortness of breath, no cough.  Cardiovascular:  No chest pain.  No lower extremity edema, No orthopnea or PND. + palpitations.  : No polyuria, no dysuria. No hematuria.  GI:  Denies nausea/vomiting.  No abdominal distention. No melena/bloody stools.  Neuro:  +presycnope, dizziness.  Hem/lymph: Denies easy bleeding/bruising.    MS: no arthralgias or myalgias.    All other review of systems reviewed and negative.    Past Surgical History:   Procedure Laterality Date   • KNEE ARTHROPLASTY TOTAL  9/3/2013    Performed by Ramesh Styles M.D. at [facility] ORS   • TONSILLECTOMY         Family History   Problem Relation Age of Onset   • Lung Disease Mother         frequent pneumonia   • Heart Disease Father 54        MI   • Lung Disease Father         Emphysema   • Heart Attack Father "    • Cancer Maternal Aunt         Great Aunt and Cousin Ovarian CA   • Hypertension Maternal Aunt    • Cancer Daughter         Lung and Brain CA   • Seizures Daughter    • Cancer Daughter         Pituitary tumor   • Cancer Brother         leukemia       Social History     Socioeconomic History   • Marital status:      Spouse name: Not on file   • Number of children: Not on file   • Years of education: Not on file   • Highest education level: Not on file   Occupational History   • Not on file   Tobacco Use   • Smoking status: Never Smoker   • Smokeless tobacco: Never Used   • Tobacco comment: Pt exposed to second hand smoker   Substance and Sexual Activity   • Alcohol use: Yes     Comment: Occasionally   • Drug use: No   • Sexual activity: Never   Other Topics Concern   • Not on file   Social History Narrative   • Not on file     Social Determinants of Health     Financial Resource Strain:    • Difficulty of Paying Living Expenses:    Food Insecurity:    • Worried About Running Out of Food in the Last Year:    • Ran Out of Food in the Last Year:    Transportation Needs:    • Lack of Transportation (Medical):    • Lack of Transportation (Non-Medical):    Physical Activity:    • Days of Exercise per Week:    • Minutes of Exercise per Session:    Stress:    • Feeling of Stress :    Social Connections:    • Frequency of Communication with Friends and Family:    • Frequency of Social Gatherings with Friends and Family:    • Attends Restoration Services:    • Active Member of Clubs or Organizations:    • Attends Club or Organization Meetings:    • Marital Status:    Intimate Partner Violence:    • Fear of Current or Ex-Partner:    • Emotionally Abused:    • Physically Abused:    • Sexually Abused:        Medications / Drug list prior to admission:  No current facility-administered medications on file prior to encounter.     Current Outpatient Medications on File Prior to Encounter   Medication Sig Dispense Refill   •  "ketotifen (ZADITOR) 0.025 % ophthalmic solution Administer 1 Drop into both eyes 1 time a day as needed. Indications: Allergic Conjunctivitis     • Multiple Vitamins-Minerals (ICAPS AREDS 2 PO) Take 1 tablet by mouth every day.     • Propylene Glycol (SYSTANE BALANCE OP) Administer 1 Drop into both eyes 4 times a day as needed (dry eyes).     • metoprolol SR (TOPROL XL) 25 MG TABLET SR 24 HR Take 1 tablet by mouth every day. (Patient taking differently: Take 37.5 mg by mouth every day.) 90 tablet 3   • prednisoLONE acetate (PRED FORTE) 1 % Suspension Administer 1 Drop into both eyes see administration instructions. 1 drop in left eye four times a day   1 drop in right eye six times a day     • Cholecalciferol (VITAMIN D-3) 125 MCG (5000 UT) Tab Take 5,000 Units by mouth every day. 5000 unit tablet + 1000 unit tablet for a total of 6000 units daily     • Coenzyme Q10 (COQ10 PO) Take 1 capsule by mouth two times a week. Sunday & Wednesday     • escitalopram (LEXAPRO) 5 MG tablet Take 1 Tab by mouth every day. 90 Tab 3   • B Complex-C (SUPER B COMPLEX PO) Take 1 tablet by mouth every morning.     • Glycerin-Polysorbate 80 (REFRESH DRY EYE THERAPY) 1-1 % SOLN Administer 1 Drop into affected eye(s) 4 times a day as needed (dry eye).     • vitamin D (CHOLECALCIFEROL) 1000 Unit (25 mcg) Tab Take 1,000 Units by mouth every day. 1000 unit tablet + 5000 unit tablet for a total of 6000 units daily         Allergies   Allergen Reactions   • Other Environmental      Seasonal allergies       Physical Exam  Body mass index is 23.13 kg/m². /51   Pulse 83   Temp 36.7 °C (98.1 °F) (Temporal)   Resp 16   Ht 1.6 m (5' 2.99\")   Wt 59.2 kg (130 lb 8.2 oz)   SpO2 97%    Vitals:    05/11/21 0316 05/11/21 0707 05/11/21 1116 05/11/21 1235   BP: 148/58 135/63 156/53 128/51   Pulse: 66 68 73 83   Resp: 16 16 16    Temp: 36.7 °C (98.1 °F) 36.6 °C (97.9 °F) 36.7 °C (98.1 °F)    TempSrc: Temporal Temporal Temporal    SpO2: 92% 93% 97% "    Weight:       Height:        Oxygen Therapy:  Pulse Oximetry: 97 %, O2 (LPM): 0, O2 Delivery Device: None - Room Air    Exam unchanged from note 5/10/21  General: no apparent distress, appears younger than stated age  Eyes: normal conjunctiva  ENT: OP clear  Neck: No JVD   Lungs: reduced respiratory effort, bilateral basilar rales, wheezing or rhonchi.  Heart: normal rate, regular rhythm, + systolic murmur, no rubs or gallops,   EXT: No edema bilateral lower extremities. + bilateral pedal pulses. no cyanosis  Abdomen: soft, non tender, non distended,  Neurological: No focal deficits, no facial asymmetry.  Normal speech.  Psychiatric: Appropriate affect, alert and oriented x 3.   Skin: Warm extremities, no rash.    Labs (personally reviewed):     Lab Results   Component Value Date/Time    SODIUM 139 05/11/2021 02:46 AM    POTASSIUM 3.6 05/11/2021 02:46 AM    CHLORIDE 111 05/11/2021 02:46 AM    CO2 18 (L) 05/11/2021 02:46 AM    GLUCOSE 104 (H) 05/11/2021 02:46 AM    BUN 19 05/11/2021 02:46 AM    CREATININE 1.32 05/11/2021 02:46 AM     Lab Results   Component Value Date/Time    ALKPHOSPHAT 56 05/11/2021 02:46 AM    ASTSGOT 28 05/11/2021 02:46 AM    ALTSGPT 20 05/11/2021 02:46 AM    TBILIRUBIN 0.5 05/11/2021 02:46 AM      Lab Results   Component Value Date/Time    CHOLSTRLTOT 132 05/09/2021 03:36 AM    LDL 67 05/09/2021 03:36 AM    HDL 49 05/09/2021 03:36 AM    TRIGLYCERIDE 80 05/09/2021 03:36 AM     Lab Results   Component Value Date/Time    BNPBTYPENAT 158 (H) 10/26/2018 12:38 PM         Cardiac Imaging and Procedures Review:      Personal Telemetry Review: 4min of SVT at 12:17    Personal EKG Interpretation: sinus rhythm with 1st degree block    Echo 4/3/21:  CONCLUSIONS  Compared to the images of the prior study done 11/2/2018, intracavitary gradient is present.  Hyperdynamic left ventricular systolic function.  Left ventricular ejection fraction is visually estimated to be greater   than 75%.  Evidence of  increased intracavity gradient, peak velocity is 2.6 m/sec.  Systolic anterior motion of the anterior mitral valve leaflet.    Carotid U/S 4/3/21  Vascular Laboratory   CONCLUSIONS   Greater than 50% narrowing right ICA   Right thyroid 2.9 cm mass for which dedicated thyroid US is advised to    characterize further.  Biopsy may be warranted.  Right carotid.    Plaque of the bifurcation extending into the internal carotid. Velocities    are consistent with 50-69% stenosis of the internal carotid artery.    Plaque is irregular on the surface and heterogeneous with mixed acoustic    densities.       Left carotid.    Plaque of the bifurcation extending into the internal carotid. Velocities    are consistent with < 50% stenosis of the internal carotid artery.    Plaque is irregular on the surface and heterogeneous with mixed acoustic    densities.       Bilateral external carotid, subclavian and vertebral arteries waveforms are    antegrade and waveforms are normal in character and velocity.       Assessment and Medical Decision Making:  Syncope  Paroxysmal SVT, Tachybrady syndrome  - Metop 25 for now  - EP planning for ablation/PPM 5/12, appreciate EP consult   - nonpharmacologic measures including compression stockings     LVOT Obstruction  - maintain hydration, negative orthostatics done today  - Metop    Asymptomatic carotid stenosis  - 50-69% stenosis R ICA  - continue lipitor 40mg  - aspirin 81mg        Please see Dr. Montiel's attestation for additions and further recommendations.    Tarah Rose PA-C  [facility]

## 2021-05-11 NOTE — NON-PROVIDER
"[facility] INTERNAL MEDICINE PROGRESS NOTE     Attending: Hector Tidwell MD  Senior Resident: Kate Villarreal MD  Eduardo Resident: Kristine Abraham MD    PATIENT: Yoli Carmichael; [id number]; 8/21/1933    SUBJECTIVE: Yoli Carmichael is a pleasant 87-year-old female with a past history of PSVT, HTN, mitral valve regurgitation, hyperdynamic left ventricular systolic dysfunction, and CKD stage 3 who was admitted for an episode of near syncope. The patient has a history of syncopal/near syncopal events in the past and states that she gets these \"spells\" where her face gets flushed and she feels her heart rate start to race and get palpitations. Yesterday, the patient had been standing for a while when she felt the event come on and fell, hitting her left sided chest on the bathtub edge. She did not hit her head in the fall. After this, she went to the urgent care and was diagnosed with multiple rib fractures on the left side. She states that she did not have any chest pain or shortness of breath prior to the event and did not fully lose consciousness. No complaints of hemiparesthesias or other neurologic symptoms. The patient was admitted for a syncopal event last month where she states she did lose consciousness. She was evaluated afterwards with a 3 week Holter monitor that showed no signs of arrhythmia but heart rate did extend up into the 140s. Further work up showed an EF of 75% with little hyperdynamic cardiac contractility as well as PSVT. She recently had her Toprolol XL dose increased from 25 mg to 37.5 mg and her ARB was discontinued about 1 week ago     Today, she is having pain in her back and ribs secondary to the fall. Still using the incentive spirometer. Cardiology saw the patient yesterday and recommended EP consult for potential of ablation vs. medical management. Overnight on tele, NSR 1st degree AVB with HR 67-75 and rare PACs and PVCs.       OBJECTIVE:  Vitals:    05/11/21 0254 05/11/21 0316 05/11/21 " 0707 05/11/21 1116   BP: 143/62 148/58 135/63 156/53   Pulse: 68 66 68 73   Resp: 16 16 16 16   Temp: 36.8 °C (98.2 °F) 36.7 °C (98.1 °F) 36.6 °C (97.9 °F) 36.7 °C (98.1 °F)   TempSrc: Temporal Temporal Temporal Temporal   SpO2: 95% 92% 93% 97%   Weight:       Height:           PE:  General: No acute distress, resting comfortably in bed.  HEENT: normocephalic, atraumatic, PERRLA, EOMI, mucous membranes are pink and moist. Posterior oropharynx without edema or erythema.  Neck: No palpable neck/thyroid mass. No carotid bruit.  Cardiovascular: Heart RRR with a holosystolic murmur. No rubs or gallops. Distal Pulses 2+ with testing of radial and DP/PT pulses  Respiratory: poor respiratory effort secondary to pain. symmetric chest expansion, lungs CTA bilaterally with no wheezes rales or rhonci  Abdomen: soft, nontender, nondistended, no masses palpated, +BS  Musculoskeletal: strength 5/5 in four extremities. No bony vertebral point tenderness. No overlying ecchymosis over the are of the posterolateral left chest wall.  Neuro: No focal neurologic findings. A&Ox3. Normal sensation.      LABS:  Recent Labs     05/09/21  0336 05/10/21  0647 05/11/21  0246   WBC 9.1 10.6 9.8   RBC 3.34* 3.45* 3.08*   HEMOGLOBIN 9.4* 9.9* 8.8*   HEMATOCRIT 30.3* 31.1* 28.4*   MCV 90.7 90.1 92.2   MCH 28.1 28.7 28.6   RDW 50.7* 51.8* 52.7*   PLATELETCT 186 203 196   MPV 11.2 10.4 10.6   NEUTSPOLYS 56.80 54.40 49.80   LYMPHOCYTES 23.90 27.70 31.50   MONOCYTES 13.00 12.20 12.60   EOSINOPHILS 4.60 3.70 4.00   BASOPHILS 0.70 0.90 0.90     Recent Labs     05/09/21  0336 05/10/21  0647 05/11/21  0246   SODIUM 137 139 139   POTASSIUM 4.1 4.3 3.6   CHLORIDE 112 111 111   CO2 19* 20 18*   BUN 25* 18 19   CREATININE 1.62* 1.48* 1.32   CALCIUM 8.7 9.0 8.4*   MAGNESIUM 2.5 2.3 2.2   ALBUMIN 3.0* 3.5 2.9*     Estimated GFR/CRCL = Estimated Creatinine Clearance: 24.8 mL/min (by C-G formula based on SCr of 1.32 mg/dL).  Recent Labs     05/09/21  0332  05/10/21  0647 05/11/21  0246   GLUCOSE 106* 113* 104*     Recent Labs     05/09/21  0336 05/10/21  0647 05/11/21  0246   ASTSGOT 22 29 28   ALTSGPT 16 19 20   TBILIRUBIN 0.3 0.4 0.5   ALKPHOSPHAT 52 60 56   GLOBULIN 2.8 2.7 2.6     Recent Labs     05/09/21  0336   CPKTOTAL 116       Imaging:  Rib x-ray 5/7/2021  IMPRESSION:     1.  Acute fractures of left posterolateral ribs 5-8. No pneumothorax.  2.  Left basilar atelectasis.      ASSESSMENT & PLAN:  This is a pleasant 87-year-old female with a past history of PSVT, HTN, mitral valve regurgitation, hyperdynamic left ventricular systolic dysfunction, and CKD stage 3 who was admitted for observation following an episode of syncope. She has had this issue before in the past and follows with cardiology, Dr. Velasquez. The cause of her near syncopal events are likely related to vasovagal syncope, and cardiology recommended we continue with the plan that was established at the last office visit, however, they later suggested EP evaluation. 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 EP study with possible slow pathway ablation and placement of permanent pacemaker. They gave the patient the option of either doing the pacemaker with or without the ablation but the patient reported that she would like to move forward with the ablation and pacemaker. Will keep her NPO after dinner tonight so that she can likely go to surgery tomorrow.    #Near Syncope; Likely Vasovagal Syncope  #PSVT  #First Degree AVB Type I (transiet at night only)  -Patient had reported feeling palpitations, lightheaded, and slightly dizzy prior to the fall  -She was tachycardic on arrival with a normal EKG  -History of PSVT, mitral regurg  -Cardiology saw and evaluated the patient and recommended having EP evaluate the patient for consideration of ablation, consider antiarrhythmics, discontinue beta-blockers, and allow for  permissive hypertension.  -EP was consulted see above  -Continue to monitor the patient on telemetry  -Cardiology did not feel the heart block during sleep was concerning  -Hold the patients Metoprolol for surgery tomorrow    #Multiple rib fractures of the left sided chest with associated traumatic rhabdomyolysis  -Rib x-ray at urgent care showed acute fracture of left posterior lateral ribs 5-8 with no evidence of pneumothorax  -Pain contro (mild to severe scale)l: lidoderm, tylenol, Robaxin, oxycodone, morphine  -Patient received oxycodone and felt it was too strong, will advise use of less potent analgesia in future  -CPK is back down to normal levels  -Encourage IS usage  -PT/OT saw the patient yesterday and are recommending a front wheel walker and 3xper week therapy  -Patient to receive home health for this    #CKD/BRIAN  -Cr has improved back to baseline to 1.32 from 1.48 yesterday  -F/U with primary care for continued management.       Rory James, MS3  [employer] 2022

## 2021-05-11 NOTE — PROGRESS NOTES
Cardiology Follow Up Progress Note    Date of Service  5/11/2021    Attending Physician  SRAVANI Moody    Chief Complaint/Reason for EP consult: Recurrent syncope and probable tachybradycardia syndrome    HPI  Duong Carmichael is a 87 y.o. female admitted 5/7/2021 with a history of paroxysmal SVT, hypertrophic cardiomyopathy, recurrent presyncope, one previous syncopal event, and orthostatic hypotension.  She was admitted after experiencing presyncope with a fall in her bathroom for which she states she believes that she did not lose consciousness this time.  She does report increasing symptoms of a strange feeling coming over her face, lightheadedness and rapid heart breat at home.  She states that this has been present for years but had increased rein severity recently.  She states that this usually lasts approximately a couple of minutes at most and then symptoms terminate.  She has had one previous syncopal episode last month for which she was admitted to [facility].  Recalls no prodrome, palpitations, nausea, vomiting, flushing prior to event, was in garage with grandson.  Regained consciousness and was reported to be neurologically intact.    Interim Events  05/11/2021: Seen in EP Rounds  No cardiac complaints. Daughter at bedside.   Vital Signs/labs: Reviewed. BP stable/mildly elevated 148/58. TSH 3.130 (5/8/21)  SCr  1.32, K+ 3.6, Mg+ 2.2  Telemetry: Sinus rhythm, Hrs 60-70s bpm; PAC/PVCs; brief SVT episodes this AM  Compression stockings.     Review of Systems  Review of Systems   Constitutional: Negative for chills, diaphoresis, fatigue and fever.   Respiratory: Negative for cough, chest tightness, shortness of breath, wheezing and stridor.    Cardiovascular: Negative for chest pain, palpitations and leg swelling.   Gastrointestinal: Negative for abdominal pain.   Genitourinary: Negative for difficulty urinating and hematuria.   Skin: Negative for color change.   Neurological: Positive for dizziness.  Negative for light-headedness.     Vital signs in last 24 hours  Temp:  [35.9 °C (96.6 °F)-36.8 °C (98.2 °F)] 36.7 °C (98.1 °F)  Pulse:  [66-74] 66  Resp:  [16-18] 16  BP: ()/(50-62) 148/58  SpO2:  [91 %-95 %] 92 %    Physical Exam  Physical Exam  Vitals and nursing note reviewed.   Constitutional:       Appearance: Normal appearance.   HENT:      Head: Normocephalic and atraumatic.   Eyes:      Conjunctiva/sclera: Conjunctivae normal.      Pupils: Pupils are equal, round, and reactive to light.   Cardiovascular:      Rate and Rhythm: Normal rate and regular rhythm.      Pulses: Normal pulses.      Heart sounds: Murmur present. No friction rub. No gallop.    Pulmonary:      Effort: Pulmonary effort is normal.      Breath sounds: Normal breath sounds. No wheezing, rhonchi or rales.   Abdominal:      General: Bowel sounds are normal.   Musculoskeletal:      Cervical back: Normal range of motion.      Right lower leg: No edema.      Left lower leg: No edema.   Skin:     General: Skin is warm and dry.   Neurological:      Mental Status: She is alert and oriented to person, place, and time.   Psychiatric:         Mood and Affect: Mood normal.         Behavior: Behavior normal.         Thought Content: Thought content normal.         Judgment: Judgment normal.     Lab Review  Lab Results   Component Value Date/Time    WBC 9.8 05/11/2021 02:46 AM    RBC 3.08 (L) 05/11/2021 02:46 AM    HEMOGLOBIN 8.8 (L) 05/11/2021 02:46 AM    HEMATOCRIT 28.4 (L) 05/11/2021 02:46 AM    MCV 92.2 05/11/2021 02:46 AM    MCH 28.6 05/11/2021 02:46 AM    MCHC 31.0 (L) 05/11/2021 02:46 AM    MPV 10.6 05/11/2021 02:46 AM      Lab Results   Component Value Date/Time    SODIUM 139 05/11/2021 02:46 AM    POTASSIUM 3.6 05/11/2021 02:46 AM    CHLORIDE 111 05/11/2021 02:46 AM    CO2 18 (L) 05/11/2021 02:46 AM    GLUCOSE 104 (H) 05/11/2021 02:46 AM    BUN 19 05/11/2021 02:46 AM    CREATININE 1.32 05/11/2021 02:46 AM      Lab Results   Component  Value Date/Time    ASTSGOT 28 05/11/2021 02:46 AM    ALTSGPT 20 05/11/2021 02:46 AM     Lab Results   Component Value Date/Time    CHOLSTRLTOT 132 05/09/2021 03:36 AM    LDL 67 05/09/2021 03:36 AM    HDL 49 05/09/2021 03:36 AM    TRIGLYCERIDE 80 05/09/2021 03:36 AM    TROPONINT 26 (H) 05/08/2021 07:06 AM       No results for input(s): NTPROBNP in the last 72 hours.    Cardiac Imaging and Procedures Review  EKG 05/11/2021: sinus rhythm, 1st degree AV block    Echocardiogram:  4/3/21  CONCLUSIONS  Compared to the images of the prior study done 11/2/2018, intracavitary gradient is present.  Hyperdynamic left ventricular systolic function.  Left ventricular ejection fraction is visually estimated to be greater than 75%.  Evidence of increased intracavity gradient, peak velocity is 2.6 m/sec. Systolic anterior motion of the anterior mitral valve leaflet.    Imaging  Chest X-Ray:  5/7/21   1.  Acute fractures of left posterolateral ribs 5-8. No pneumothorax.  2.  Left basilar atelectasis.    Carotid US (04/03/2021):   CONCLUSIONS   Greater than 50% narrowing right ICA   Right thyroid 2.9 cm mass for which dedicated thyroid US is advised to characterize further.  Biopsy may be warranted.    Assessment/Plan  1. 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  Long first-degree AV block.  - Hx Hypertrophic cardiomyopathy, orthostatic hypotension.   - Echo with prominent sigmoid septum, LVEF 75%, possible VIDAL of MV.    - On low dose Toprol XL 25 mg QD.  - Disucssed treatment options with patient including risks/benefits/alternatives of permanent pacemaker with escalation of beta-blockers and antiarrhythmics vs EP study with possible slow pathway ablation and placement of permanent pacemaker.  All pt questions/concerns were answered. Pt verbalized understanding and wishes to proceed with PPM and EPS and/or ablation. Reviewed with Dr. Hollis.   -  Plan for NPO at MN, EPS +/- ablation, and PPM in AM. Orders placed. EKG and Labs in AM.   - Avoid lovenox/heparin products in setting of PPM. HOLD.   - Discussed plan with Primary team, Primary Cardiology, and RN.     2.  Hypertrophic Cardiomyopathy  - Managed per Primary Cardiology.     JAREK Roca.   [facility]  [phone]

## 2021-05-11 NOTE — PROGRESS NOTES
Assumed care at 1900, bedside report received from Consuelo CANO. Pt. Is NSR on the monitor. Initial assessment completed, orders reviewed, call light within reach,  and hourly rounding in place. POC addressed with patient POC addressed with patient and daughter at bedside, plan for ablation in the morning. Waiting for cardiology in the am to confirm procedure and consent forms. Updated on plan as best possible will inform when new info is available on what time for the procedure. , no additional questions at this time.

## 2021-05-11 NOTE — CARE PLAN
Problem: Safety  Goal: Will remain free from injury  Outcome: PROGRESSING AS EXPECTED  Goal: Will remain free from falls  Outcome: PROGRESSING AS EXPECTED     Problem: Pain Management  Goal: Pain level will decrease to patient's comfort goal  Outcome: PROGRESSING AS EXPECTED     Problem: Mobility  Goal: Risk for activity intolerance will decrease  Outcome: PROGRESSING AS EXPECTED

## 2021-05-11 NOTE — PROGRESS NOTES
Daily Progress Note:     Date of Service: 5/11/2021  Primary Team: [facility] IM Blue Team   Attending: Dr. Tidwell  Senior Resident: Dr. Villarreal  Intern: Dr. Abraham  Contact:  [phone]    Chief Complaint: Near syncope    Subjective:  Patient is a 87-year-old female who presents with near syncope.  Has past medical history of paroxysmal SVT, hypertension, CKD that developed over the last 1 year, and 3 tooth abscesses and December 2020.  Of note recently her blood pressure medications metoprolol 25 increased to 37.5 and ARB stopped by cardiology.    Patient reports ongoing pain secondary to rib fx. Patient reports talking to Dr. Hollis yesterday about possibly getting ablation and pacemaker. She is eager to fix her symptoms, so patient is agreeable to both. Patient understands that she needs to stop driving for about 3 months.     Review of Systems:   Review of Systems   Cardiovascular: Positive for palpitations.   Musculoskeletal: Positive for back pain, falls and myalgias.   Neurological: Positive for dizziness. Negative for loss of consciousness.   All other systems reviewed and are negative.    Objective Data:   Vitals:   Temp:  [35.9 °C (96.6 °F)-36.8 °C (98.2 °F)] 36.7 °C (98.1 °F)  Pulse:  [66-83] 83  Resp:  [16-18] 16  BP: (122-156)/(50-63) 128/51  SpO2:  [92 %-97 %] 97 %    Physical Exam:  Physical Exam  Constitutional:       Appearance: Normal appearance.   Cardiovascular:      Rate and Rhythm: Normal rate.      Heart sounds: Murmur (Systolic) present.   Pulmonary:      Effort: Pulmonary effort is normal.      Breath sounds: No wheezing or rales.   Abdominal:      General: Abdomen is flat. There is no distension.      Palpations: Abdomen is soft.      Tenderness: There is no abdominal tenderness. There is no guarding or rebound.   Musculoskeletal:         General: Tenderness present.      Cervical back: Normal range of motion.      Right lower leg: Edema (Pitting edema of lower extremities bilaterally)  present.      Left lower leg: Edema present.      Comments: Left posterior chest/ribs tender to palpation.  No bruising or swelling.  Lidocaine patch noted above area of tenderness   Skin:     General: Skin is warm.      Capillary Refill: Capillary refill takes less than 2 seconds.      Findings: No bruising.   Neurological:      Mental Status: She is alert.   Psychiatric:      Comments: Patient appears to have poor insight into medical condition, as she seems to have similar questions about her condition every day.     Labs:  Recent Labs     05/09/21  0336 05/10/21  0647 05/11/21  0246   WBC 9.1 10.6 9.8   RBC 3.34* 3.45* 3.08*   HEMOGLOBIN 9.4* 9.9* 8.8*   HEMATOCRIT 30.3* 31.1* 28.4*   MCV 90.7 90.1 92.2   MCH 28.1 28.7 28.6   RDW 50.7* 51.8* 52.7*   PLATELETCT 186 203 196   MPV 11.2 10.4 10.6   NEUTSPOLYS 56.80 54.40 49.80   LYMPHOCYTES 23.90 27.70 31.50   MONOCYTES 13.00 12.20 12.60   EOSINOPHILS 4.60 3.70 4.00   BASOPHILS 0.70 0.90 0.90     Recent Labs     05/09/21 0336 05/10/21  0647 05/11/21  0246   SODIUM 137 139 139   POTASSIUM 4.1 4.3 3.6   CHLORIDE 112 111 111   CO2 19* 20 18*   GLUCOSE 106* 113* 104*   BUN 25* 18 19   CPKTOTAL 116  --   --      Micro:  None    EKG:  Normal sinus rhythm    Imaging:   XR ribs:  1.  Acute fractures of left posterolateral ribs 5-8. No pneumothorax.  2.  Left basilar atelectasis.    Meds:  Current Outpatient Medications   Medication Instructions   • B Complex-C (SUPER B COMPLEX PO) 1 tablet, Oral, EVERY MORNING   • Coenzyme Q10 (COQ10 PO) 1 capsule, Oral, 2X A WEEK, Sunday & Wednesday   • escitalopram (LEXAPRO) 5 mg, Oral, DAILY   • Glycerin-Polysorbate 80 (REFRESH DRY EYE THERAPY) 1-1 % SOLN 1 Drop, Ophthalmic, 4 TIMES DAILY PRN   • ketotifen (ZADITOR) 0.025 % ophthalmic solution 1 Drop, Both Eyes, 1 TIME DAILY PRN   • metoprolol SR (TOPROL XL) 25 mg, Oral, DAILY   • Multiple Vitamins-Minerals (ICAPS AREDS 2 PO) 1 tablet, Oral, DAILY   • prednisoLONE acetate (PRED FORTE) 1  "% Suspension 1 Drop, Both Eyes, SEE ADMIN INSTRUCTIONS, 1 drop in left eye four times a day <BR>1 drop in right eye six times a day   • Propylene Glycol (SYSTANE BALANCE OP) 1 Drop, Both Eyes, 4 TIMES DAILY PRN   • vitamin D (CHOLECALCIFEROL) 1,000 Units, Oral, DAILY, 1000 unit tablet + 5000 unit tablet for a total of 6000 units daily   • Vitamin D-3 5,000 Units, Oral, DAILY, 5000 unit tablet + 1000 unit tablet for a total of 6000 units daily      Scheduled Medications   Medication Dose Frequency   • metoprolol SR  25 mg DAILY   • prednisoLONE acetate  1 Drop 6X/DAY   • aspirin EC  81 mg DAILY   • atorvastatin  40 mg Q EVENING   • heparin  5,000 Units Q8HRS   • polyethylene glycol/lytes  1 Packet DAILY    And   • senna-docusate  2 tablet BID   • acetaminophen  650 mg Q4HRS   • capsaicin   TID   • escitalopram  5 mg DAILY   • prednisoLONE acetate  1 Drop 4X/DAY   • Pharmacy Consult Request  1 Each PHARMACY TO DOSE   • lidocaine  1 Patch Q24HR      Consultants/Specialty:  Cardiology    Problem Representation:   * Near syncope likely vasovagal versus secondary to symptomatic SVT versus HOCM-like cardiomyopathy  Assessment & Plan  No loss of consciousness   History of hyperdynamic not hypertrophic left ventricular systolic function  History of paroxysmal SVT  Of note, recent event recorder last month showed SVT episodes, with heart rate up to 140s, and patient reports \"spells\" during this timeframe  Of note, carotid ultrasound 4/2021 with right carotid stenosis 50 to 70% and left carotid stenosis less than 50%  Also recent increase of metoprolol dose to 37.5  Patient reported tachycardia, lightheadedness, dizziness prior to fall  EKG on admission sinus rhythm without heart block or ST changes.  Heart rate 73  Troponin 21 -> 26.  Not significantly elevated  Orthostatic vitals negative  As patient had echo done recently in April 2021, will not repeat this admission given no significant change in presentation  Continue to " monitor on telemetry  Continue to avoid antihypertensives and diuretics  Cardiology consulted. Dr. Montiel following   EP consulted.  Dr. Hollis recommends EP study with ablation and pacer.  Patient agreeable.  Scheduled for Wednesday    SVT (supraventricular tachycardia) (HCC)- (present on admission)  Assessment & Plan  History of paroxysmal SVT  No episodes noted this admission  Cont recently increased home Toprolol XL 37.5mg daily.  Patient reports increasing this medication 2 days ago, with symptoms occurring yesterday  Patient received metoprolol 12.5mg PO x1 in ER for tachycardia  PRN IV metoprolol 5mg for HR > 150  Tachycardia possibly secondary to rib fracture pain  Monitor and replete potassium and magnesium as needed  Plan for ablation Wednesday.  Patient agreeable    Stage 3b chronic kidney disease- (present on admission)  Assessment & Plan  Minimize nephrotoxic agents, renally dose meds  Creatinine is stable at new baseline (1.5-1.8)  Unlikely BRIAN   BUN to creatinine ratio 17.9  Creatinine 1.61 on admission.  Improving  GFR 30-34  DVT prophylaxis with heparin    Traumatic rhabdomyolysis (HCC)- (present on admission)  Assessment & Plan  Patient reports falling and being down for 15 to 20 minutes  Witnessed fall  No loss of consciousness      Closed fracture of multiple ribs of left side with delayed healing- (present on admission)  Assessment & Plan  Fall with rib fractures prior to presentation   CT head on 5/4 negative for intracranial hemorrhage  X-ray shows left posterior lateral ribs 5-8   No evidence of pneumothorax  Continue pain control as needed to prevent atelectasis, however avoid oxycodone as it gave her dizziness  Incentive spirometer  PT OT  Fall precautions    Essential hypertension, benign- (present on admission)  Assessment & Plan  Recent d/c of ARB and increased Toprolol XL to 37.5mg by outpt cardiology  Cont Toprolol XL 37.5mg  PRN hydralazine    Second degree atrioventricular  Jeannette edouard (type) I  Assessment & Plan  Overnight, while patient was asleep, some new episodes of AV block noted  Held morning metoprolol   Cardiology recommendations appreciated regarding metoprolol/medications    Fall- (present on admission)  Assessment & Plan  -see closed fracture of multiple ribs of left side with delayed hearing plan    Depression  Assessment & Plan  Continue Escitalopram, to treat underlying depression as well as vasovagal symptoms    Advance care planning- (present on admission)  Assessment & Plan  Goal of care discussed with patient and patient's daughters -- supportive care, tele monitoring and adjust meds as needed   FULL code status confirmed  Advance are planning: 15mins    Code status: Full code  Diet: Cardiac  Lines/ tubes/ drains:  IV fluids:  Abx (dates):  GI ppx:  DVT ppx: Heparin  Dispo: Home health PT OT.  Patient agreeable.  Orders placed.  Follow-up appointments scheduled.  Patient agreeable to not driving for 3 months.  DMV form filled out.   Yes

## 2021-05-11 NOTE — CONSULTS
Electrophysiology Initial Consultation    Date of Service  5/10/2021    Referring Physician  DAYDAY Moody*    Reason for Consultation  Recurrent syncope and probable tachybradycardia syndrome    History of Presenting Illness  Duong Carmichael is a 87 y.o. female with a past medical history of palpitations with documented SVT on event recorder who presented 5/7/2021 with syncope.  Second episode in the past few months.  Does get palpitations.  Overall healthy lives alone.  Has a daughter in town.  No previous history of MI.  No history of CVA no history of congestive heart failure.  Has a fairly long first-degree AV block on beta-blockers.  Denies smoking minimal alcohol use.  .  Has 2 children that are alive.  SVT documented on event recorder this year and on Zio patch in 2017.    Review of Systems  Review of Systems   Constitutional: Negative for chills and fever.   HENT: Negative for congestion.    Eyes: Negative for pain and discharge.   Respiratory: Negative for cough and wheezing.    Cardiovascular: Negative for chest pain and palpitations.   Gastrointestinal: Negative for abdominal pain, nausea and vomiting.   Musculoskeletal: Negative for myalgias.   Skin: Negative for rash.   Neurological: Positive for dizziness and syncope.   Hematological: Does not bruise/bleed easily.   Psychiatric/Behavioral: The patient is not nervous/anxious.    All other systems reviewed and are negative.      Past Medical History   has a past medical history of Arrhythmia, Arthritis, Bronchitis, CATARACT, Dry eyes, bilateral (3/27/2014), Osteopenia of the elderly (3/27/2014), and Pneumonia.    Surgical History   has a past surgical history that includes knee arthroplasty total (9/3/2013) and tonsillectomy.    Family History  family history includes Cancer in her brother, daughter, daughter, and maternal aunt; Heart Attack in her father; Heart Disease (age of onset: 54) in her father; Hypertension in her maternal aunt;  Lung Disease in her father and mother; Seizures in her daughter.    Social History   reports that she has never smoked. She has never used smokeless tobacco. She reports current alcohol use. She reports that she does not use drugs.    Medications  Prior to Admission Medications   Prescriptions Last Dose Informant Patient Reported? Taking?   B Complex-C (SUPER B COMPLEX PO) 5/6/2021 at AM Patient Yes No   Sig: Take 1 tablet by mouth every morning.   Cholecalciferol (VITAMIN D-3) 125 MCG (5000 UT) Tab 5/6/2021 at AM Patient Yes No   Sig: Take 5,000 Units by mouth every day. 5000 unit tablet + 1000 unit tablet for a total of 6000 units daily   Coenzyme Q10 (COQ10 PO) 5/5/2021 at [facility] Patient Yes No   Sig: Take 1 capsule by mouth two times a week. Sunday & Wednesday   Glycerin-Polysorbate 80 (REFRESH DRY EYE THERAPY) 1-1 % SOLN PRN at PRN   Patient Yes No   Sig: Administer 1 Drop into affected eye(s) 4 times a day as needed (dry eye).   Multiple Vitamins-Minerals (ICAPS AREDS 2 PO) 5/6/2021 at [facility] Patient Yes Yes   Sig: Take 1 tablet by mouth every day.   Propylene Glycol (SYSTANE BALANCE OP) PRN at PRN Patient Yes Yes   Sig: Administer 1 Drop into both eyes 4 times a day as needed (dry eyes).   escitalopram (LEXAPRO) 5 MG tablet 5/6/2021 at [facility] Patient No No   Sig: Take 1 Tab by mouth every day.   ketotifen (ZADITOR) 0.025 % ophthalmic solution PRN at PRN Patient Yes Yes   Sig: Administer 1 Drop into both eyes 1 time a day as needed. Indications: Allergic Conjunctivitis   metoprolol SR (TOPROL XL) 25 MG TABLET SR 24 HR 5/6/2021 at PM Patient No No   Sig: Take 1 tablet by mouth every day.   Patient taking differently: Take 37.5 mg by mouth every day.   prednisoLONE acetate (PRED FORTE) 1 % Suspension 5/6/2021 at [facility] Patient Yes No   Sig: Administer 1 Drop into both eyes see administration instructions. 1 drop in left eye four times a day   1 drop in right eye six times a day   vitamin D (CHOLECALCIFEROL) 1000 Unit (25  mcg) Tab 5/6/2021 at AM Patient Yes No   Sig: Take 1,000 Units by mouth every day. 1000 unit tablet + 5000 unit tablet for a total of 6000 units daily      Facility-Administered Medications: None       Allergies  Allergies   Allergen Reactions    Other Environmental      Seasonal allergies       Vital signs in last 24 hours  Temp:  [36.1 °C (97 °F)-36.8 °C (98.2 °F)] 36.2 °C (97.2 °F)  Pulse:  [67-74] 71  Resp:  [16-18] 18  BP: ()/(54-67) 122/54  SpO2:  [91 %-94 %] 94 %    Physical Exam  Physical Exam  Vitals reviewed.   Constitutional:       General: She is not in acute distress.     Appearance: She is well-developed. She is not diaphoretic.   Eyes:      Conjunctiva/sclera: Conjunctivae normal.   Neck:      Thyroid: No thyroid mass or thyromegaly.      Vascular: No JVD.      Trachea: No tracheal deviation.   Cardiovascular:      Rate and Rhythm: Normal rate and regular rhythm.      Heart sounds: Murmur present.   Pulmonary:      Effort: Pulmonary effort is normal. No respiratory distress.      Breath sounds: Normal breath sounds.   Chest:      Chest wall: No tenderness.   Abdominal:      Palpations: Abdomen is soft.      Tenderness: There is no abdominal tenderness.   Musculoskeletal:         General: Normal range of motion.   Skin:     General: Skin is warm and dry.   Neurological:      Mental Status: She is alert and oriented to person, place, and time.      Motor: No tremor.   Psychiatric:         Behavior: Behavior normal.         Lab Review  Lab Results   Component Value Date/Time    WBC 10.6 05/10/2021 06:47 AM    RBC 3.45 (L) 05/10/2021 06:47 AM    HEMOGLOBIN 9.9 (L) 05/10/2021 06:47 AM    HEMATOCRIT 31.1 (L) 05/10/2021 06:47 AM    MCV 90.1 05/10/2021 06:47 AM    MCH 28.7 05/10/2021 06:47 AM    MCHC 31.8 (L) 05/10/2021 06:47 AM    MPV 10.4 05/10/2021 06:47 AM      Lab Results   Component Value Date/Time    SODIUM 139 05/10/2021 06:47 AM    POTASSIUM 4.3 05/10/2021 06:47 AM    CHLORIDE 111 05/10/2021  06:47 AM    CO2 20 05/10/2021 06:47 AM    GLUCOSE 113 (H) 05/10/2021 06:47 AM    BUN 18 05/10/2021 06:47 AM    CREATININE 1.48 (H) 05/10/2021 06:47 AM      Lab Results   Component Value Date/Time    ASTSGOT 29 05/10/2021 06:47 AM    ALTSGPT 19 05/10/2021 06:47 AM     Lab Results   Component Value Date/Time    CHOLSTRLTOT 132 05/09/2021 03:36 AM    LDL 67 05/09/2021 03:36 AM    HDL 49 05/09/2021 03:36 AM    TRIGLYCERIDE 80 05/09/2021 03:36 AM    TROPONINT 26 (H) 05/08/2021 07:06 AM       No results for input(s): NTPROBNP in the last 72 hours.    Cardiac Imaging and Procedures Review  EKG:  My personal interpretation of the EKG dated 5/8/2021 is sinus rhythm with fairly long first-degree AV block    Echocardiogram:    CONCLUSIONS  Compared to the images of the prior study done 11/2/2018, intracavitary   gradient is present.  Hyperdynamic left ventricular systolic function.  Left ventricular ejection fraction is visually estimated to be greater   than 75%.  Evidence of increased intracavity gradient, peak velocity is 2.6 m/sec.  Systolic anterior motion of the anterior mitral valve leaflet.     Event recorder reviewed from March frequent episodes of SVT consistent with AVNRT.  Although cannot definitively rule out atrial tachycardia.        Assessment/Plan  No new Assessment & Plan notes have been filed under this hospital service since the last note was generated.  Service: Cardiac Electrophysiology  1.  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  Long first-degree AV block.  Options include permanent pacemaker with escalation of beta-blockers and antiarrhythmics versus EP study with possible slow pathway ablation and placement of permanent pacemaker.  Patient wishes to consider options.  Can be done by me on Wednesday if she decides to proceed.  2.  Intracavitary gradient on echocardiogram.  Unlikely cause of syncope in this elderly female.    Thank you for allowing me to participate in the care of  this patient.        Please contact me with any questions.    Pato Hollis M.D.   Cardiologist, [facility]  [phone]

## 2021-05-11 NOTE — CARE PLAN
Problem: Safety  Goal: Will remain free from injury  Outcome: PROGRESSING AS EXPECTED  Goal: Will remain free from falls  Outcome: PROGRESSING AS EXPECTED     Problem: Bowel/Gastric:  Goal: Normal bowel function is maintained or improved  Outcome: PROGRESSING AS EXPECTED  Goal: Will not experience complications related to bowel motility  Outcome: PROGRESSING AS EXPECTED     Problem: Knowledge Deficit  Goal: Knowledge of disease process/condition, treatment plan, diagnostic tests, and medications will improve  Outcome: PROGRESSING AS EXPECTED  Goal: Knowledge of the prescribed therapeutic regimen will improve  Outcome: PROGRESSING AS EXPECTED     Problem: Pain Management  Goal: Pain level will decrease to patient's comfort goal  Outcome: PROGRESSING AS EXPECTED     Problem: Mobility  Goal: Risk for activity intolerance will decrease  Outcome: PROGRESSING AS EXPECTED

## 2021-05-11 NOTE — PROGRESS NOTES
"Patient c/o the \"spells\" she normally has before passing out and \"heatr racing\". BP stable, HR 80's. Per monitor room HR went up to 120 for 30 seconds but is not in 80's, no extra ectopy.  notified. Patient was given option of having ablasion and pacemaker today at around 1100 by Mari NARVAEZ from electrophysiology, patient requested to wait until tomorrow.  "

## 2021-05-11 NOTE — THERAPY
"Occupational Therapy  Daily Treatment     Patient Name: Yoli Carmichael  Age:  87 y.o., Sex:  female  Medical Record #: [id number]  Today's Date: 5/11/2021       Precautions: Fall Risk  Comments: pt planning for pacemaker 5/12/21    Assessment    Pt seen for OT tx.  Pt pleasant & motivated, limited by left side flank, rib cage pain.  Pt currently able to perform basic ADL's with supervision.  Pt stated she will be getting pacemaker 5/12.  Pt advised to have pacemaker placed on her right side as she has limited use of her RUE from a previous injury.  Pt stated her family will be staying with her once D/C home.  Pt will benefit from OT follow up after pacemaker placement prior to D/C home.  Pt should be able to D/C with HH therapy & family assist.    Plan    Continue current treatment plan.    DC Equipment Recommendations: Grab Bar(s) in Tub / Shower  Discharge Recommendations: Recommend home health for continued occupational therapy services    Subjective    \"It feel so good to freshen up\"     Objective       05/11/21 1208   Cognition    Comments CASA yee   Active ROM Upper Body   Active ROM Upper Body  X   Dominant Hand Right   Rt Shoulder Flexion Degrees 70   Rt Shoulder Abduction Degrees 30   Rt Shoulder Ext Rotation Degrees 20   Comments Pt advised to see if MD can place pacemaker on her right side as this arm doesn't have good AROM now   Other Treatments   Other Treatments Provided Pt educated on pacemaker precautions she will have following her pacemaker tomorrow.  Pt & family educated on homemaking tasks that she will need help with following D/C home   Balance   Sitting Balance (Static) Good   Sitting Balance (Dynamic) Fair +   Standing Balance (Static) Fair +   Standing Balance (Dynamic) Fair   Weight Shift Sitting Good   Weight Shift Standing Good   Bed Mobility    Supine to Sit Supervised   Sit to Supine   (pt left p in chair)   Scooting Supervised   Rolling Supervised   Comments pt limited by pain in " her left flank/rib cage area   Activities of Daily Living   Eating Modified Independent   Grooming Supervision;Standing   Upper Body Dressing Supervision   Lower Body Dressing Supervision   Toileting Supervision   Functional Mobility   Sit to Stand Supervised   Bed, Chair, Wheelchair Transfer Supervised   Toilet Transfers Supervised   Patient / Family Goals   Patient / Family Goal #1 Go home   Goal #1 Outcome Goal not met   Short Term Goals   Short Term Goal # 1 LB dressing with AE if needed at supervised level of assist   Goal Outcome # 1 Goal met   Short Term Goal # 2 seated bathing at supervised level of assist.    Goal Outcome # 2 Goal not met

## 2021-05-12 ENCOUNTER — APPOINTMENT (OUTPATIENT)
Dept: RADIOLOGY | Facility: MEDICAL CENTER | Age: 86
End: 2021-05-12
Attending: INTERNAL MEDICINE
Payer: MEDICARE

## 2021-05-12 ENCOUNTER — APPOINTMENT (OUTPATIENT)
Dept: CARDIOLOGY | Facility: MEDICAL CENTER | Age: 86
End: 2021-05-12
Attending: NURSE PRACTITIONER
Payer: MEDICARE

## 2021-05-12 LAB
ANION GAP SERPL CALC-SCNC: 9 MMOL/L (ref 7–16)
BASOPHILS # BLD AUTO: 1 % (ref 0–1.8)
BASOPHILS # BLD: 0.1 K/UL (ref 0–0.12)
BUN SERPL-MCNC: 19 MG/DL (ref 8–22)
CALCIUM SERPL-MCNC: 8.7 MG/DL (ref 8.5–10.5)
CHLORIDE SERPL-SCNC: 111 MMOL/L (ref 96–112)
CO2 SERPL-SCNC: 19 MMOL/L (ref 20–33)
CREAT SERPL-MCNC: 1.48 MG/DL (ref 0.5–1.4)
EKG IMPRESSION: NORMAL
EKG IMPRESSION: NORMAL
EOSINOPHIL # BLD AUTO: 0.39 K/UL (ref 0–0.51)
EOSINOPHIL NFR BLD: 3.8 % (ref 0–6.9)
ERYTHROCYTE [DISTWIDTH] IN BLOOD BY AUTOMATED COUNT: 52.3 FL (ref 35.9–50)
GLUCOSE SERPL-MCNC: 108 MG/DL (ref 65–99)
HCT VFR BLD AUTO: 28.6 % (ref 37–47)
HGB BLD-MCNC: 9.2 G/DL (ref 12–16)
IMM GRANULOCYTES # BLD AUTO: 0.14 K/UL (ref 0–0.11)
IMM GRANULOCYTES NFR BLD AUTO: 1.4 % (ref 0–0.9)
INR PPP: 1.08 (ref 0.87–1.13)
LYMPHOCYTES # BLD AUTO: 2.94 K/UL (ref 1–4.8)
LYMPHOCYTES NFR BLD: 28.9 % (ref 22–41)
MAGNESIUM SERPL-MCNC: 2.1 MG/DL (ref 1.5–2.5)
MCH RBC QN AUTO: 28.8 PG (ref 27–33)
MCHC RBC AUTO-ENTMCNC: 32.2 G/DL (ref 33.6–35)
MCV RBC AUTO: 89.7 FL (ref 81.4–97.8)
MONOCYTES # BLD AUTO: 1.2 K/UL (ref 0–0.85)
MONOCYTES NFR BLD AUTO: 11.8 % (ref 0–13.4)
NEUTROPHILS # BLD AUTO: 5.4 K/UL (ref 2–7.15)
NEUTROPHILS NFR BLD: 53.1 % (ref 44–72)
NRBC # BLD AUTO: 0 K/UL
NRBC BLD-RTO: 0 /100 WBC
PLATELET # BLD AUTO: 200 K/UL (ref 164–446)
PMV BLD AUTO: 10.1 FL (ref 9–12.9)
POTASSIUM SERPL-SCNC: 3.8 MMOL/L (ref 3.6–5.5)
PROTHROMBIN TIME: 14.3 SEC (ref 12–14.6)
RBC # BLD AUTO: 3.19 M/UL (ref 4.2–5.4)
SODIUM SERPL-SCNC: 139 MMOL/L (ref 135–145)
WBC # BLD AUTO: 10.2 K/UL (ref 4.8–10.8)

## 2021-05-12 PROCEDURE — 93005 ELECTROCARDIOGRAM TRACING: CPT | Performed by: NURSE PRACTITIONER

## 2021-05-12 PROCEDURE — 93621 COMP EP EVL L PAC&REC C SINS: CPT | Mod: 26 | Performed by: INTERNAL MEDICINE

## 2021-05-12 PROCEDURE — A9270 NON-COVERED ITEM OR SERVICE: HCPCS | Performed by: STUDENT IN AN ORGANIZED HEALTH CARE EDUCATION/TRAINING PROGRAM

## 2021-05-12 PROCEDURE — 93653 COMPRE EP EVAL TX SVT: CPT

## 2021-05-12 PROCEDURE — 700102 HCHG RX REV CODE 250 W/ 637 OVERRIDE(OP): Performed by: STUDENT IN AN ORGANIZED HEALTH CARE EDUCATION/TRAINING PROGRAM

## 2021-05-12 PROCEDURE — 36415 COLL VENOUS BLD VENIPUNCTURE: CPT

## 2021-05-12 PROCEDURE — 700102 HCHG RX REV CODE 250 W/ 637 OVERRIDE(OP): Performed by: INTERNAL MEDICINE

## 2021-05-12 PROCEDURE — 93653 COMPRE EP EVAL TX SVT: CPT | Performed by: INTERNAL MEDICINE

## 2021-05-12 PROCEDURE — 99152 MOD SED SAME PHYS/QHP 5/>YRS: CPT | Mod: 59 | Performed by: INTERNAL MEDICINE

## 2021-05-12 PROCEDURE — 93010 ELECTROCARDIOGRAM REPORT: CPT | Mod: 59,76 | Performed by: INTERNAL MEDICINE

## 2021-05-12 PROCEDURE — 93613 INTRACARDIAC EPHYS 3D MAPG: CPT | Performed by: INTERNAL MEDICINE

## 2021-05-12 PROCEDURE — 700111 HCHG RX REV CODE 636 W/ 250 OVERRIDE (IP)

## 2021-05-12 PROCEDURE — 93621 COMP EP EVL L PAC&REC C SINS: CPT

## 2021-05-12 PROCEDURE — 93005 ELECTROCARDIOGRAM TRACING: CPT | Performed by: INTERNAL MEDICINE

## 2021-05-12 PROCEDURE — 33208 INSRT HEART PM ATRIAL & VENT: CPT | Mod: KX | Performed by: INTERNAL MEDICINE

## 2021-05-12 PROCEDURE — A9270 NON-COVERED ITEM OR SERVICE: HCPCS | Performed by: INTERNAL MEDICINE

## 2021-05-12 PROCEDURE — 96367 TX/PROPH/DG ADDL SEQ IV INF: CPT | Mod: XU

## 2021-05-12 PROCEDURE — 700111 HCHG RX REV CODE 636 W/ 250 OVERRIDE (IP): Performed by: INTERNAL MEDICINE

## 2021-05-12 PROCEDURE — 83735 ASSAY OF MAGNESIUM: CPT

## 2021-05-12 PROCEDURE — 80048 BASIC METABOLIC PNL TOTAL CA: CPT

## 2021-05-12 PROCEDURE — 93010 ELECTROCARDIOGRAM REPORT: CPT | Mod: 59 | Performed by: INTERNAL MEDICINE

## 2021-05-12 PROCEDURE — 99225 PR SUBSEQUENT OBSERVATION CARE,LEVEL II: CPT | Mod: GC | Performed by: INTERNAL MEDICINE

## 2021-05-12 PROCEDURE — G0378 HOSPITAL OBSERVATION PER HR: HCPCS

## 2021-05-12 PROCEDURE — 85610 PROTHROMBIN TIME: CPT

## 2021-05-12 PROCEDURE — 85025 COMPLETE CBC W/AUTO DIFF WBC: CPT

## 2021-05-12 PROCEDURE — 700101 HCHG RX REV CODE 250

## 2021-05-12 PROCEDURE — C1785 PMKR, DUAL, RATE-RESP: HCPCS

## 2021-05-12 PROCEDURE — 71045 X-RAY EXAM CHEST 1 VIEW: CPT

## 2021-05-12 RX ORDER — CEFAZOLIN SODIUM 1 G/3ML
INJECTION, POWDER, FOR SOLUTION INTRAMUSCULAR; INTRAVENOUS
Status: COMPLETED
Start: 2021-05-12 | End: 2021-05-12

## 2021-05-12 RX ORDER — ACETAMINOPHEN 325 MG/1
650 TABLET ORAL EVERY 4 HOURS PRN
Status: DISCONTINUED | OUTPATIENT
Start: 2021-05-12 | End: 2021-05-13 | Stop reason: HOSPADM

## 2021-05-12 RX ORDER — TRAMADOL HYDROCHLORIDE 50 MG/1
50 TABLET ORAL EVERY 6 HOURS PRN
Status: DISCONTINUED | OUTPATIENT
Start: 2021-05-12 | End: 2021-05-12

## 2021-05-12 RX ORDER — CEFAZOLIN SODIUM 2 G/100ML
2 INJECTION, SOLUTION INTRAVENOUS ONCE
Status: COMPLETED | OUTPATIENT
Start: 2021-05-12 | End: 2021-05-12

## 2021-05-12 RX ORDER — POTASSIUM CHLORIDE 20 MEQ/1
10 TABLET, EXTENDED RELEASE ORAL ONCE
Status: COMPLETED | OUTPATIENT
Start: 2021-05-12 | End: 2021-05-12

## 2021-05-12 RX ORDER — LIDOCAINE HYDROCHLORIDE 10 MG/ML
INJECTION, SOLUTION INFILTRATION; PERINEURAL
Status: COMPLETED
Start: 2021-05-12 | End: 2021-05-12

## 2021-05-12 RX ORDER — MIDAZOLAM HYDROCHLORIDE 1 MG/ML
INJECTION INTRAMUSCULAR; INTRAVENOUS
Status: COMPLETED
Start: 2021-05-12 | End: 2021-05-12

## 2021-05-12 RX ORDER — HEPARIN SODIUM 200 [USP'U]/100ML
INJECTION, SOLUTION INTRAVENOUS
Status: COMPLETED
Start: 2021-05-12 | End: 2021-05-12

## 2021-05-12 RX ORDER — HEPARIN SODIUM 1000 [USP'U]/ML
INJECTION, SOLUTION INTRAVENOUS; SUBCUTANEOUS
Status: COMPLETED
Start: 2021-05-12 | End: 2021-05-12

## 2021-05-12 RX ORDER — POTASSIUM CHLORIDE 20 MEQ/1
20 TABLET, EXTENDED RELEASE ORAL DAILY
Status: DISCONTINUED | OUTPATIENT
Start: 2021-05-12 | End: 2021-05-12

## 2021-05-12 RX ADMIN — ACETAMINOPHEN 650 MG: 325 TABLET, FILM COATED ORAL at 06:03

## 2021-05-12 RX ADMIN — FENTANYL CITRATE 100 MCG: 50 INJECTION, SOLUTION INTRAMUSCULAR; INTRAVENOUS at 14:09

## 2021-05-12 RX ADMIN — PREDNISOLONE ACETATE 1 DROP: 10 SUSPENSION/ DROPS OPHTHALMIC at 06:04

## 2021-05-12 RX ADMIN — CEFAZOLIN SODIUM 2 G: 2 INJECTION, SOLUTION INTRAVENOUS at 22:56

## 2021-05-12 RX ADMIN — ATORVASTATIN CALCIUM 40 MG: 40 TABLET, FILM COATED ORAL at 18:07

## 2021-05-12 RX ADMIN — CEFAZOLIN 2000 MG: 330 INJECTION, POWDER, FOR SOLUTION INTRAMUSCULAR; INTRAVENOUS at 14:09

## 2021-05-12 RX ADMIN — PREDNISOLONE ACETATE 1 DROP: 10 SUSPENSION/ DROPS OPHTHALMIC at 20:53

## 2021-05-12 RX ADMIN — POTASSIUM CHLORIDE 10 MEQ: 1500 TABLET, EXTENDED RELEASE ORAL at 08:02

## 2021-05-12 RX ADMIN — PREDNISOLONE ACETATE 1 DROP: 10 SUSPENSION/ DROPS OPHTHALMIC at 18:07

## 2021-05-12 RX ADMIN — MIDAZOLAM HYDROCHLORIDE 1 MG: 1 INJECTION, SOLUTION INTRAMUSCULAR; INTRAVENOUS at 14:36

## 2021-05-12 RX ADMIN — ACETAMINOPHEN 650 MG: 325 TABLET, FILM COATED ORAL at 21:00

## 2021-05-12 RX ADMIN — HEPARIN SODIUM 4000 UNITS: 1000 INJECTION, SOLUTION INTRAVENOUS; SUBCUTANEOUS at 15:02

## 2021-05-12 RX ADMIN — CAPSAICIN: 0.25 CREAM TOPICAL at 15:29

## 2021-05-12 RX ADMIN — PREDNISOLONE ACETATE 1 DROP: 10 SUSPENSION/ DROPS OPHTHALMIC at 22:56

## 2021-05-12 RX ADMIN — LIDOCAINE HYDROCHLORIDE: 10 INJECTION, SOLUTION INFILTRATION; PERINEURAL at 12:49

## 2021-05-12 RX ADMIN — ASPIRIN 81 MG: 81 TABLET, COATED ORAL at 06:03

## 2021-05-12 RX ADMIN — CAPSAICIN: 0.25 CREAM TOPICAL at 06:04

## 2021-05-12 RX ADMIN — PREDNISOLONE ACETATE 1 DROP: 10 SUSPENSION/ DROPS OPHTHALMIC at 15:30

## 2021-05-12 RX ADMIN — MIDAZOLAM HYDROCHLORIDE 2 MG: 1 INJECTION, SOLUTION INTRAMUSCULAR; INTRAVENOUS at 12:49

## 2021-05-12 RX ADMIN — PREDNISOLONE ACETATE 1 DROP: 10 SUSPENSION/ DROPS OPHTHALMIC at 09:54

## 2021-05-12 RX ADMIN — FENTANYL CITRATE 100 MCG: 50 INJECTION, SOLUTION INTRAMUSCULAR; INTRAVENOUS at 13:11

## 2021-05-12 RX ADMIN — ACETAMINOPHEN 650 MG: 325 TABLET, FILM COATED ORAL at 18:07

## 2021-05-12 RX ADMIN — ESCITALOPRAM OXALATE 5 MG: 10 TABLET ORAL at 18:07

## 2021-05-12 RX ADMIN — HEPARIN SODIUM: 200 INJECTION, SOLUTION INTRAVENOUS at 12:00

## 2021-05-12 ASSESSMENT — ENCOUNTER SYMPTOMS
WHEEZING: 0
PALPITATIONS: 1
CHEST TIGHTNESS: 0
MYALGIAS: 1
COUGH: 0
BACK PAIN: 1
CHILLS: 0
FALLS: 1
PALPITATIONS: 0
COLOR CHANGE: 0
DIAPHORESIS: 0
FATIGUE: 0
SHORTNESS OF BREATH: 0
DIZZINESS: 1
LOSS OF CONSCIOUSNESS: 0
FEVER: 0
STRIDOR: 0
LIGHT-HEADEDNESS: 0
ABDOMINAL PAIN: 0

## 2021-05-12 ASSESSMENT — PAIN DESCRIPTION - PAIN TYPE
TYPE: ACUTE PAIN

## 2021-05-12 NOTE — PROGRESS NOTES
Cardiology Follow Up Progress Note    Date of Service  5/12/2021    Attending Physician  DAYDAY Moody*    Chief Complaint/Reason for EP consult: Recurrent syncope and probable tachybradycardia syndrome    HPI  Duong Carmichael is a 87 y.o. female admitted 5/7/2021 with a history of paroxysmal SVT, hypertrophic cardiomyopathy, recurrent presyncope, one previous syncopal event, and orthostatic hypotension.  She was admitted after experiencing presyncope with a fall in her bathroom for which she states she believes that she did not lose consciousness this time.  She does report increasing symptoms of a strange feeling coming over her face, lightheadedness and rapid heart breat at home.  She states that this has been present for years but had increased rein severity recently.  She states that this usually lasts approximately a couple of minutes at most and then symptoms terminate.  She has had one previous syncopal episode last month for which she was admitted to [facility].  Recalls no prodrome, palpitations, nausea, vomiting, flushing prior to event, was in garage with grandson.  Regained consciousness and was reported to be neurologically intact.    Interim Events  05/12/2021: Seen in EP Rounds  NPO since MN. Daughter/family at bedside.   No cardiac complaints. Some orthostatics with positional changes, mild.   Vital signs/Labs: reviewed. BP stable. sCr 1.48  INR today 1.08  Telemetry: EN27-90a, PVCs. No arrhythmias.    05/11/2021: Seen in EP Rounds  No cardiac complaints. Daughter at bedside.   Vital Signs/labs: Reviewed. BP stable/mildly elevated 148/58. TSH 3.130 (5/8/21)  SCr  1.32, K+ 3.6, Mg+ 2.2  Telemetry: Sinus rhythm, Hrs 60-70s bpm; PAC/PVCs; brief SVT episodes this AM  Compression stockings.     Review of Systems  Review of Systems   Constitutional: Negative for chills, diaphoresis, fatigue and fever.   Respiratory: Negative for cough, chest tightness, shortness of breath, wheezing and stridor.     Cardiovascular: Negative for chest pain, palpitations and leg swelling.   Gastrointestinal: Negative for abdominal pain.   Genitourinary: Negative for difficulty urinating and hematuria.   Skin: Negative for color change.   Neurological: Positive for dizziness (with positional changes). Negative for light-headedness.     Vital signs in last 24 hours  Temp:  [36.6 °C (97.8 °F)-37.1 °C (98.7 °F)] 36.6 °C (97.9 °F)  Pulse:  [67-88] 67  Resp:  [16] 16  BP: (111-156)/(49-60) 133/59  SpO2:  [91 %-98 %] 94 %    Physical Exam  Physical Exam  Vitals and nursing note reviewed.   Constitutional:       Appearance: Normal appearance.   HENT:      Head: Normocephalic and atraumatic.   Eyes:      Conjunctiva/sclera: Conjunctivae normal.      Pupils: Pupils are equal, round, and reactive to light.   Cardiovascular:      Rate and Rhythm: Normal rate and regular rhythm.      Pulses: Normal pulses.      Heart sounds: Murmur heard.   No friction rub. No gallop.    Pulmonary:      Effort: Pulmonary effort is normal.      Breath sounds: Normal breath sounds. No wheezing, rhonchi or rales.   Abdominal:      General: Bowel sounds are normal.   Musculoskeletal:      Cervical back: Normal range of motion.      Right lower leg: No edema.      Left lower leg: No edema.   Skin:     General: Skin is warm and dry.   Neurological:      Mental Status: She is alert and oriented to person, place, and time.   Psychiatric:         Mood and Affect: Mood normal.         Behavior: Behavior normal.         Thought Content: Thought content normal.         Judgment: Judgment normal.     Lab Review  Lab Results   Component Value Date/Time    WBC 10.2 05/12/2021 03:22 AM    RBC 3.19 (L) 05/12/2021 03:22 AM    HEMOGLOBIN 9.2 (L) 05/12/2021 03:22 AM    HEMATOCRIT 28.6 (L) 05/12/2021 03:22 AM    MCV 89.7 05/12/2021 03:22 AM    MCH 28.8 05/12/2021 03:22 AM    MCHC 32.2 (L) 05/12/2021 03:22 AM    MPV 10.1 05/12/2021 03:22 AM      Lab Results   Component Value  Date/Time    SODIUM 139 05/12/2021 03:22 AM    POTASSIUM 3.8 05/12/2021 03:22 AM    CHLORIDE 111 05/12/2021 03:22 AM    CO2 19 (L) 05/12/2021 03:22 AM    GLUCOSE 108 (H) 05/12/2021 03:22 AM    BUN 19 05/12/2021 03:22 AM    CREATININE 1.48 (H) 05/12/2021 03:22 AM      Lab Results   Component Value Date/Time    ASTSGOT 28 05/11/2021 02:46 AM    ALTSGPT 20 05/11/2021 02:46 AM     Lab Results   Component Value Date/Time    CHOLSTRLTOT 132 05/09/2021 03:36 AM    LDL 67 05/09/2021 03:36 AM    HDL 49 05/09/2021 03:36 AM    TRIGLYCERIDE 80 05/09/2021 03:36 AM    TROPONINT 26 (H) 05/08/2021 07:06 AM       No results for input(s): NTPROBNP in the last 72 hours.    Cardiac Imaging and Procedures Review  EKG 05/12/2021: sinus rhythm, 1st degree AV block    Echocardiogram:  4/3/21  CONCLUSIONS  Compared to the images of the prior study done 11/2/2018, intracavitary gradient is present.  Hyperdynamic left ventricular systolic function.  Left ventricular ejection fraction is visually estimated to be greater than 75%.  Evidence of increased intracavity gradient, peak velocity is 2.6 m/sec. Systolic anterior motion of the anterior mitral valve leaflet.    Imaging  Chest X-Ray:  5/7/21   1.  Acute fractures of left posterolateral ribs 5-8. No pneumothorax.  2.  Left basilar atelectasis.    Carotid US (04/03/2021):   CONCLUSIONS   Greater than 50% narrowing right ICA   Right thyroid 2.9 cm mass for which dedicated thyroid US is advised to characterize further.  Biopsy may be warranted.    Assessment/Plan  1. 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  Long first-degree AV block.  - Hx Hypertrophic cardiomyopathy, orthostatic hypotension.   - Echo with prominent sigmoid septum, LVEF 75%, possible VIDAL of MV.    - On low dose Toprol XL 25 mg QD- held per procedure.  - Remains in sinus rhythm. No arrhythmias overnight.   - sCr 1.48, Primary team  following.   - Avoid lovenox/heparin products in setting of PPM. HOLD. INR today 1.08.  - Disucssed treatment options with patient including risks/benefits/alternatives of permanent pacemaker with escalation of beta-blockers and antiarrhythmics vs EP study with possible slow pathway ablation and placement of permanent pacemaker.  All pt questions/concerns were answered. Pt verbalized understanding and wishes to proceed with PPM and EPS and/or ablation.   - NPO at MN, Plan for EPS +/- ablation and PPM today.  - Incentive spirometer encouraged post procedure.   - Discussed plan with Primary team, Primary Cardiology, and RN.   - PT/OT, discussed help with ADLs/ambulation post procedure.     2.  Hypertrophic Cardiomyopathy  - Managed per Primary Cardiology.     JAREK Roca.   [facility]  [phone]

## 2021-05-12 NOTE — CARE PLAN
Problem: Safety  Goal: Will remain free from injury  Outcome: Progressing  Pt educated on safety precautions and precautions in place. Treaded socks on, bed locked and in lowest position, belongings and call light within reach.      Problem: Knowledge Deficit  Goal: Knowledge of disease process/condition, treatment plan, diagnostic tests, and medications will improve  Outcome: Progressing   Patient educated on disease process, treatment plan, medications, and plan of care for today. Patient verbalized understanding and no additional questions at this time.

## 2021-05-12 NOTE — PROGRESS NOTES
Report received at bedside from NOC RN, pt care assumed, tele box on. Pt aaox4, no signs of distress noted at this time. Patient resting comfortably in bed. POC discussed with pt and verbalizes no questions. Pt c/o of no pain. Pt denies any additional needs at this time. Bed in lowest position, pt educated on fall risk and verbalized understanding, call light within reach, bed alarm plugged in and on.

## 2021-05-12 NOTE — PROGRESS NOTES
Monitor summary:  SR 66-87  (r)PVC, PAC  1.3 second pause  Episode of sinus tach up to 120's  .22/.08/.36

## 2021-05-12 NOTE — PROGRESS NOTES
Daily Progress Note:     Date of Service: 5/12/2021  Primary Team: [facility] IM Blue Team   Attending: Dr. Tidwell  Senior Resident: Dr. Villarreal  Intern: Dr. Abraham  Contact:  [phone]    Chief Complaint: Near syncope    Subjective:  Patient is a 87-year-old female who presents with near syncope.  Has past medical history of paroxysmal SVT, hypertension, CKD that developed over the last 1 year, and 3 tooth abscesses and December 2020.  Of note recently her blood pressure medications metoprolol 25 increased to 37.5 and ARB stopped by cardiology.    EP procedure scheduled for today.  Patient agreeable to signing DMV paperwork stating that she should not drive for 3 months.  Metoprolol and DVT prophylaxis held this morning.  Follow-up appointment scheduled.    Review of Systems:   Review of Systems   Cardiovascular: Positive for palpitations.   Musculoskeletal: Positive for back pain, falls and myalgias.   Neurological: Positive for dizziness. Negative for loss of consciousness.   All other systems reviewed and are negative.    Objective Data:   Vitals:   Temp:  [36.6 °C (97.8 °F)-37.1 °C (98.7 °F)] 36.6 °C (97.9 °F)  Pulse:  [67-88] 67  Resp:  [16] 16  BP: (111-156)/(49-60) 133/59  SpO2:  [91 %-98 %] 94 %    Physical Exam:  Physical Exam  Constitutional:       Appearance: Normal appearance.   Cardiovascular:      Rate and Rhythm: Normal rate.      Heart sounds: Murmur (Systolic) heard.     Pulmonary:      Effort: Pulmonary effort is normal.      Breath sounds: No wheezing or rales.   Abdominal:      General: Abdomen is flat. There is no distension.      Palpations: Abdomen is soft.      Tenderness: There is no abdominal tenderness. There is no guarding or rebound.   Musculoskeletal:         General: Tenderness present.      Cervical back: Normal range of motion.      Right lower leg: Edema (Pitting edema of lower extremities bilaterally) present.      Left lower leg: Edema present.      Comments: Left posterior  chest/ribs tender to palpation.  No bruising or swelling.  Lidocaine patch noted above area of tenderness   Skin:     General: Skin is warm.      Capillary Refill: Capillary refill takes less than 2 seconds.      Findings: No bruising.   Neurological:      Mental Status: She is alert.   Psychiatric:      Comments: Patient appears to have poor insight into medical condition, as she seems to have similar questions about her condition every day.     Labs:  Recent Labs     05/10/21  0647 05/11/21  0246 05/12/21  0322   WBC 10.6 9.8 10.2   RBC 3.45* 3.08* 3.19*   HEMOGLOBIN 9.9* 8.8* 9.2*   HEMATOCRIT 31.1* 28.4* 28.6*   MCV 90.1 92.2 89.7   MCH 28.7 28.6 28.8   RDW 51.8* 52.7* 52.3*   PLATELETCT 203 196 200   MPV 10.4 10.6 10.1   NEUTSPOLYS 54.40 49.80 53.10   LYMPHOCYTES 27.70 31.50 28.90   MONOCYTES 12.20 12.60 11.80   EOSINOPHILS 3.70 4.00 3.80   BASOPHILS 0.90 0.90 1.00     Recent Labs     05/10/21  0647 05/11/21  0246 05/12/21  0322   SODIUM 139 139 139   POTASSIUM 4.3 3.6 3.8   CHLORIDE 111 111 111   CO2 20 18* 19*   GLUCOSE 113* 104* 108*   BUN 18 19 19     Micro:  None    EKG:  Normal sinus rhythm    Imaging:   XR ribs:  1.  Acute fractures of left posterolateral ribs 5-8. No pneumothorax.  2.  Left basilar atelectasis.    Meds:  Current Outpatient Medications   Medication Instructions   • B Complex-C (SUPER B COMPLEX PO) 1 tablet, Oral, EVERY MORNING   • Coenzyme Q10 (COQ10 PO) 1 capsule, Oral, 2X A WEEK, Sunday & Wednesday   • escitalopram (LEXAPRO) 5 mg, Oral, DAILY   • Glycerin-Polysorbate 80 (REFRESH DRY EYE THERAPY) 1-1 % SOLN 1 Drop, Ophthalmic, 4 TIMES DAILY PRN   • ketotifen (ZADITOR) 0.025 % ophthalmic solution 1 Drop, Both Eyes, 1 TIME DAILY PRN   • metoprolol SR (TOPROL XL) 25 mg, Oral, DAILY   • Multiple Vitamins-Minerals (ICAPS AREDS 2 PO) 1 tablet, Oral, DAILY   • prednisoLONE acetate (PRED FORTE) 1 % Suspension 1 Drop, Both Eyes, SEE ADMIN INSTRUCTIONS, 1 drop in left eye four times a day <BR>1  "drop in right eye six times a day   • Propylene Glycol (SYSTANE BALANCE OP) 1 Drop, Both Eyes, 4 TIMES DAILY PRN   • vitamin D (CHOLECALCIFEROL) 1,000 Units, Oral, DAILY, 1000 unit tablet + 5000 unit tablet for a total of 6000 units daily   • Vitamin D-3 5,000 Units, Oral, DAILY, 5000 unit tablet + 1000 unit tablet for a total of 6000 units daily      Scheduled Medications   Medication Dose Frequency   • [Held by provider] metoprolol SR  25 mg DAILY   • prednisoLONE acetate  1 Drop 6X/DAY   • aspirin EC  81 mg DAILY   • atorvastatin  40 mg Q EVENING   • [Held by provider] heparin  5,000 Units Q8HRS   • polyethylene glycol/lytes  1 Packet DAILY    And   • senna-docusate  2 tablet BID   • acetaminophen  650 mg Q4HRS   • capsaicin   TID   • escitalopram  5 mg DAILY   • prednisoLONE acetate  1 Drop 4X/DAY   • Pharmacy Consult Request  1 Each PHARMACY TO DOSE   • lidocaine  1 Patch Q24HR      Consultants/Specialty:  Cardiology  Electrophysiology    Assessment and plan:   * Near syncope likely vasovagal versus secondary to symptomatic SVT versus HOCM-like cardiomyopathy  Assessment & Plan  No loss of consciousness   History of hyperdynamic not hypertrophic left ventricular systolic function  History of paroxysmal SVT  Of note, recent event recorder last month showed SVT episodes, with heart rate up to 140s, and patient reports \"spells\" during this timeframe  Of note, carotid ultrasound 4/2021 with right carotid stenosis 50 to 70% and left carotid stenosis less than 50%  Also recent increase of metoprolol dose to 37.5  Patient reported tachycardia, lightheadedness, dizziness prior to fall  EKG on admission sinus rhythm without heart block or ST changes.  Heart rate 73  Troponin 21 -> 26.  Not significantly elevated  Orthostatic vitals negative  As patient had echo done recently in April 2021, will not repeat this admission given no significant change in presentation  Continue to monitor on telemetry  Continue to avoid " antihypertensives and diuretics  Cardiology consulted. Yvon Morrison Agarwal saw this patient  EP consulted.  Dr. Hollis recommends EP study with ablation and pacer.  Patient agreeable.  Planned for 5/12    Closed fracture of multiple ribs of left side with delayed healing- (present on admission)  Assessment & Plan  Fall with rib fractures prior to presentation   CT head on 5/4 negative for intracranial hemorrhage  X-ray shows left posterior lateral ribs 5-8   No evidence of pneumothorax  Continue pain control as needed to prevent atelectasis, however avoid oxycodone as it gave her dizziness  Incentive spirometer  PT OT  Fall precautions    Second degree atrioventricular block, Mobitz (type) I  Assessment & Plan  Overnight, while patient was asleep, some new episodes of AV block noted  Held morning metoprolol   Cardiology recommendations appreciated regarding metoprolol/medications    Advance care planning- (present on admission)  Assessment & Plan  Goal of care discussed with patient and patient's daughters -- supportive care, tele monitoring and adjust meds as needed   FULL code status confirmed  Advance are planning: 15mins    Fall- (present on admission)  Assessment & Plan  -see closed fracture of multiple ribs of left side with delayed hearing plan    SVT (supraventricular tachycardia) (HCC)- (present on admission)  Assessment & Plan  History of paroxysmal SVT  No episodes noted this admission  Cont recently increased home Toprolol XL 37.5mg daily.  Patient reports increasing this medication 2 days ago, with symptoms occurring yesterday  Patient received metoprolol 12.5mg PO x1 in ER for tachycardia  PRN IV metoprolol 5mg for HR > 150  Tachycardia possibly secondary to rib fracture pain  Monitor and replete potassium and magnesium as needed  Plan for ablation Wednesday.  Patient agreeable    Depression  Assessment & Plan  Continue Escitalopram, to treat underlying depression as well as vasovagal  symptoms    Essential hypertension, benign- (present on admission)  Assessment & Plan  Recent d/c of ARB and increased Toprolol XL to 37.5mg by outpt cardiology  Cont Toprolol XL 37.5mg  PRN hydralazine    Stage 3b chronic kidney disease- (present on admission)  Assessment & Plan  Minimize nephrotoxic agents, renally dose meds  Creatinine is stable at new baseline (1.5-1.8)  Unlikely BRIAN   BUN to creatinine ratio 17.9  Creatinine 1.61 on admission.  Improving  GFR 30-34  DVT prophylaxis with heparin    Traumatic rhabdomyolysis (HCC)- (present on admission)  Assessment & Plan  Resolved  Patient reports falling and being down for 15 to 20 minutes  Witnessed fall  No loss of consciousness      Code status: Full code  Diet: Cardiac  Lines/ tubes/ drains: Peripheral IV  IV fluids: None  Abx (dates): None  GI ppx:  DVT ppx: Heparin  Dispo: Home health PT OT.  Patient agreeable.  Orders placed.  Follow-up appointments scheduled.  Patient agreeable to not driving for 3 months.  DMV form filled out.

## 2021-05-12 NOTE — OP REPORT
Electrophysiology Procedure Note  [facility]    PROCEDURE PERFORMED: Dual-chamber pacemaker, moderate sedation administered by RN and supervised by physician    : Pato Hollis MD    ASSISTANT: none    ANESTHESIA: Moderate sedation,  start time 2:00, stop time 230  the moderate sedation document has been reviewed, signed and scanned into media     EBL: 30 cc    SPECIMENS: None    INDICATION: Tachybradycardia syndrome    PRE PROCEDURE ECG: Sinus rhythm    POST PROCEDURE ECG: Sinus rhythm     COMPLICATIONS: None    DESCRIPTION OF PROCEDURE:  After informed written consent, the patient was brought to the electrophysiology lab in the fasting, unsedated state. The patient was prepped and draped in the usual sterile fashion. The procedure was performed under moderate sedation with local anesthetic.   A left infraclavicular incision was made with a scalpel and the pectoral device pocket was created using a combination of blunt dissection and electrocautery. The modified Seldinger technique was used to gain access to the left axillary vein times two. Two peel-away hemostasis sheaths were placed in the vein. Under fluoroscopic guidance, the pacemaker leads were introduced into the heart. The ventricular lead was advanced to the RVOT and then lowered into position at the RV apex. The atrial lead was positioned in the right atrial appendage. The leads were fixated in normal mechanism. The leads were tested and had satisfactory sensing and pacing parameters. High output ventricular pacing did not produce extracardiac stimulation. The leads were sutured to the underlying pectoral muscle with interrupted non absorbable suture over a silastic suture sleeve. The device pocket was irrigated with antibiotic solution, inspected, and no bleeding was seen. The leads were connected to the dual chamber pacemaker pulse generator and the device was inserted into the pocket The wound was closed with three layers of  absorbable sutures.  I personally supervised the administration of moderate sedation by the RN and observed the level of consciousness and physiologic status throughout the procedure.   Following recovery from sedation, the patient was transferred to a monitored bed in good condition.     IMPLANTED DEVICE INFORMATION:  Pulse generator is a Medtronic model W3DR01  Serial # [id number]    LEAD INFORMATION:  1)Right atrial lead is a Medtronic  model #5076-45, serial # [id number],P wave 3 millivolts, threshold 1 volts, pacing impedance 798 ohms.    2)Right ventricular lead is a Medtronic model #5076-52, serial # [id number],R wave 20 millivolts, threshold 0.5 volts, pacing impedance 703 ohms.    DEVICE PROGRAMMING:  AAIR to DDDR    FLUOROSCOPY TIME: 51 mGy     IMPRESSIONS:  1. Successful dual-chamber pacemaker implantation    RECOMMENDATIONS:  1. Transfer to monitored bed  2. PA and lateral chest x-ray  3. Device interrogation prior to hospital discharge  4. Followup in device clinic

## 2021-05-12 NOTE — CARE PLAN
Problem: Communication  Goal: The ability to communicate needs accurately and effectively will improve  Outcome: PROGRESSING AS EXPECTED     Problem: Mobility  Goal: Risk for activity intolerance will decrease  Outcome: PROGRESSING AS EXPECTED  Note: Pt up to bathroom with assistance. Walks steady. Some pain and discomfort in ribs.

## 2021-05-12 NOTE — PROGRESS NOTES
Patient back from cath lab, tele monitor on and monitor room notified. Report received from Nesha CANO. Bilateral groin sites assessed. Post op vitals in place and family at bedside.

## 2021-05-12 NOTE — CATH LAB
Electrophysiology Procedure Note  [facility]    Pre-Operative Diagnosis:  Paroxysmal Supraventricular Tachycardia (PSVT)    Post-Operative Diagnosis:  Successful slow pathway modification     Indications: PSVT recurrent with syncope     Procedure(s) Performed: Supraventricular tachycardia ablation with Electrophysiology Study, Electroanatomical 3D mapping, CS/LA pace and record,  moderate sedation administered by RN and supervised by physician    Physician(s): MAYI Hollis M.D.     Anesthesia: Moderate sedation,  start time 1233, stop time 145  the moderate sedation document has been reviewed, signed and scanned into media     Specimen(s) Removed: None     Estimated Blood Loss:  20cc    Pre-procedure ECG: Sinus rhythm with first-degree AV block    Post-procedure ECG: Sinus rhythm first-degree AV block    Complications:  None     Procedure:     After informed written consent, the patient was brought to the EP lab in the fasting, non-sedated state. The patient was prepped and draped in the usual sterile fashion. Femoral venous access was obtained using the modified Seldinger technique. In the left femoral vein, 2 sheath (6, 6  Fr) were inserted over 0.35” guidewires. In the right femoral vein, 2 sheaths (6, 8 Fr) were inserted over 0.35” guidewires. A quadrapolar catheter was advanced to the His position, A quadrapolar catheter was advanced to the RAA position, A quadrapolar catheter was advanced to the RVA position. A deflectable decapolar catheter was advanced to the CS position. Programmed stimulation of the right atrium, coronary sinus, and right ventricle was performed to induce arrhythmia.  Easily inducible with straight atrial pacing, and A1 A2 stimuli.  Maneuvers confirmed AVNRT one 8Fr Biosense ST/SF D/F was inserted into an 8Fr sheath for electroanatomical 3D mapping (CARTO) and radiofrequency ablation at 30-40 neff with slow conducted junctional beats. Post ablation,  the patient was non  inducible for AVNRT with programmed stimulation during isuprel infusion and washout. At the end of the procedure, the catheter and sheaths were removed, and hemostasis was achieved by Vascade.  I personally supervised the administration of moderate sedation by the RN and observed the level of consciousness and physiologic status throughout the procedure.  Following recovery from anesthesia, the patient was transferred to a monitored bed.     Baseline Rhythm: sinus  ms,  ms, HV 55 ms. AL interval 233 ms, QRS duration 85 ms, QT interval 360 ms, RR interval 791,  No pre-excitation.     AV Rachid Function:  AVNBCL 380 ms (dual physiology)  AVNERP 600/280 ms  VABCL 430 ms (retrograde fast AV rachid pathway)     Arrhythmias:  1. During baseline stimulation, atrial pacing induced typical (slow/fast) AV rachid reentrant tachycardia:     ms, VA less than 50 ms  Entrainment from the RVA  produced termination.  PVCs on the His without advancement     Mapping:  Electroanatomical 3D (CARTO) map was created of right atrium and coronary sinus created during sinus rhythm to honey the position of the His, CS ostium, and the posteroseptal tricuspid annulus.      Ablation:  Radiofrequency energy was applied using [employer] ST/SF power 30-40 W, total 8 RF applications, RF time 302 seconds at the anatomic site of the slow pathway anterior to the CS ostium and just posterior to the septal tricuspid annulus, targeting ASP potential. Automaticity (accelerated junctional rhythm) with persistent retrograde fast pathway conduction to the atrium was seen during ablation.    Post Ablation Testin. Not inducible AVNRT despite aggressive stimuli  2.  ms,  ms, HV 55 ms. AVNBCL 450 ms, VABCL 420 ms.    Fluroscopy time: 236 milligray     Impressions:    1. Typical (slow/fast) AV rachid reentrant tachycardia.  2. Successful catheter mediated ablation of slow AV rachid pathway with elimination of AVNRT.     Plan:   1.  Admit to monitored bed  2. Bedrest x 2 hours

## 2021-05-13 ENCOUNTER — PHARMACY VISIT (OUTPATIENT)
Dept: PHARMACY | Facility: MEDICAL CENTER | Age: 86
End: 2021-05-13
Payer: COMMERCIAL

## 2021-05-13 ENCOUNTER — APPOINTMENT (OUTPATIENT)
Dept: RADIOLOGY | Facility: MEDICAL CENTER | Age: 86
End: 2021-05-13
Attending: INTERNAL MEDICINE
Payer: MEDICARE

## 2021-05-13 VITALS
DIASTOLIC BLOOD PRESSURE: 56 MMHG | HEART RATE: 87 BPM | HEIGHT: 63 IN | WEIGHT: 125 LBS | SYSTOLIC BLOOD PRESSURE: 135 MMHG | TEMPERATURE: 98.8 F | OXYGEN SATURATION: 90 % | BODY MASS INDEX: 22.15 KG/M2 | RESPIRATION RATE: 18 BRPM

## 2021-05-13 LAB
ALBUMIN SERPL BCP-MCNC: 3 G/DL (ref 3.2–4.9)
ALBUMIN/GLOB SERPL: 1.2 G/DL
ALP SERPL-CCNC: 72 U/L (ref 30–99)
ALT SERPL-CCNC: 27 U/L (ref 2–50)
ANION GAP SERPL CALC-SCNC: 7 MMOL/L (ref 7–16)
AST SERPL-CCNC: 50 U/L (ref 12–45)
BASOPHILS # BLD AUTO: 0.7 % (ref 0–1.8)
BASOPHILS # BLD: 0.07 K/UL (ref 0–0.12)
BILIRUB SERPL-MCNC: 0.3 MG/DL (ref 0.1–1.5)
BUN SERPL-MCNC: 20 MG/DL (ref 8–22)
CALCIUM SERPL-MCNC: 9 MG/DL (ref 8.5–10.5)
CHLORIDE SERPL-SCNC: 112 MMOL/L (ref 96–112)
CO2 SERPL-SCNC: 19 MMOL/L (ref 20–33)
CREAT SERPL-MCNC: 1.42 MG/DL (ref 0.5–1.4)
EKG IMPRESSION: NORMAL
EKG IMPRESSION: NORMAL
EOSINOPHIL # BLD AUTO: 0.4 K/UL (ref 0–0.51)
EOSINOPHIL NFR BLD: 3.9 % (ref 0–6.9)
ERYTHROCYTE [DISTWIDTH] IN BLOOD BY AUTOMATED COUNT: 54.4 FL (ref 35.9–50)
GLOBULIN SER CALC-MCNC: 2.6 G/DL (ref 1.9–3.5)
GLUCOSE SERPL-MCNC: 113 MG/DL (ref 65–99)
HCT VFR BLD AUTO: 29.5 % (ref 37–47)
HGB BLD-MCNC: 9.2 G/DL (ref 12–16)
IMM GRANULOCYTES # BLD AUTO: 0.12 K/UL (ref 0–0.11)
IMM GRANULOCYTES NFR BLD AUTO: 1.2 % (ref 0–0.9)
LYMPHOCYTES # BLD AUTO: 2.17 K/UL (ref 1–4.8)
LYMPHOCYTES NFR BLD: 21 % (ref 22–41)
MAGNESIUM SERPL-MCNC: 2 MG/DL (ref 1.5–2.5)
MCH RBC QN AUTO: 28.8 PG (ref 27–33)
MCHC RBC AUTO-ENTMCNC: 31.2 G/DL (ref 33.6–35)
MCV RBC AUTO: 92.5 FL (ref 81.4–97.8)
MONOCYTES # BLD AUTO: 1.15 K/UL (ref 0–0.85)
MONOCYTES NFR BLD AUTO: 11.1 % (ref 0–13.4)
NEUTROPHILS # BLD AUTO: 6.42 K/UL (ref 2–7.15)
NEUTROPHILS NFR BLD: 62.1 % (ref 44–72)
NRBC # BLD AUTO: 0 K/UL
NRBC BLD-RTO: 0 /100 WBC
PLATELET # BLD AUTO: 193 K/UL (ref 164–446)
PMV BLD AUTO: 9.9 FL (ref 9–12.9)
POTASSIUM SERPL-SCNC: 4.1 MMOL/L (ref 3.6–5.5)
PROT SERPL-MCNC: 5.6 G/DL (ref 6–8.2)
RBC # BLD AUTO: 3.19 M/UL (ref 4.2–5.4)
SODIUM SERPL-SCNC: 138 MMOL/L (ref 135–145)
WBC # BLD AUTO: 10.3 K/UL (ref 4.8–10.8)

## 2021-05-13 PROCEDURE — A9270 NON-COVERED ITEM OR SERVICE: HCPCS | Performed by: INTERNAL MEDICINE

## 2021-05-13 PROCEDURE — 99024 POSTOP FOLLOW-UP VISIT: CPT | Performed by: NURSE PRACTITIONER

## 2021-05-13 PROCEDURE — A9270 NON-COVERED ITEM OR SERVICE: HCPCS | Performed by: STUDENT IN AN ORGANIZED HEALTH CARE EDUCATION/TRAINING PROGRAM

## 2021-05-13 PROCEDURE — 700102 HCHG RX REV CODE 250 W/ 637 OVERRIDE(OP): Performed by: PHYSICIAN ASSISTANT

## 2021-05-13 PROCEDURE — 93005 ELECTROCARDIOGRAM TRACING: CPT | Performed by: INTERNAL MEDICINE

## 2021-05-13 PROCEDURE — RXMED WILLOW AMBULATORY MEDICATION CHARGE: Performed by: STUDENT IN AN ORGANIZED HEALTH CARE EDUCATION/TRAINING PROGRAM

## 2021-05-13 PROCEDURE — 85025 COMPLETE CBC W/AUTO DIFF WBC: CPT

## 2021-05-13 PROCEDURE — 97535 SELF CARE MNGMENT TRAINING: CPT

## 2021-05-13 PROCEDURE — 700102 HCHG RX REV CODE 250 W/ 637 OVERRIDE(OP): Performed by: INTERNAL MEDICINE

## 2021-05-13 PROCEDURE — G0378 HOSPITAL OBSERVATION PER HR: HCPCS

## 2021-05-13 PROCEDURE — 700102 HCHG RX REV CODE 250 W/ 637 OVERRIDE(OP): Performed by: STUDENT IN AN ORGANIZED HEALTH CARE EDUCATION/TRAINING PROGRAM

## 2021-05-13 PROCEDURE — 99217 PR OBSERVATION CARE DISCHARGE: CPT | Mod: GC | Performed by: INTERNAL MEDICINE

## 2021-05-13 PROCEDURE — A9270 NON-COVERED ITEM OR SERVICE: HCPCS | Performed by: PHYSICIAN ASSISTANT

## 2021-05-13 PROCEDURE — 83735 ASSAY OF MAGNESIUM: CPT

## 2021-05-13 PROCEDURE — 93010 ELECTROCARDIOGRAM REPORT: CPT | Mod: 76 | Performed by: INTERNAL MEDICINE

## 2021-05-13 PROCEDURE — 71045 X-RAY EXAM CHEST 1 VIEW: CPT

## 2021-05-13 PROCEDURE — 700101 HCHG RX REV CODE 250: Performed by: STUDENT IN AN ORGANIZED HEALTH CARE EDUCATION/TRAINING PROGRAM

## 2021-05-13 PROCEDURE — 700111 HCHG RX REV CODE 636 W/ 250 OVERRIDE (IP): Performed by: NURSE PRACTITIONER

## 2021-05-13 PROCEDURE — 36415 COLL VENOUS BLD VENIPUNCTURE: CPT

## 2021-05-13 PROCEDURE — 93010 ELECTROCARDIOGRAM REPORT: CPT | Performed by: INTERNAL MEDICINE

## 2021-05-13 PROCEDURE — 80053 COMPREHEN METABOLIC PANEL: CPT

## 2021-05-13 RX ORDER — LIDOCAINE 50 MG/G
1 PATCH TOPICAL EVERY 24 HOURS
Qty: 30 PATCH | Refills: 0 | Status: SHIPPED | OUTPATIENT
Start: 2021-05-14 | End: 2021-05-19

## 2021-05-13 RX ORDER — CAPSAICIN 0.025 %
CREAM (GRAM) TOPICAL
Qty: 60 G | Refills: 0 | Status: SHIPPED | OUTPATIENT
Start: 2021-05-13 | End: 2021-05-19

## 2021-05-13 RX ORDER — ATORVASTATIN CALCIUM 40 MG/1
40 TABLET, FILM COATED ORAL EVERY EVENING
Qty: 30 TABLET | Refills: 0 | Status: SHIPPED | OUTPATIENT
Start: 2021-05-13 | End: 2021-05-17

## 2021-05-13 RX ORDER — CEFAZOLIN SODIUM 2 G/100ML
2 INJECTION, SOLUTION INTRAVENOUS EVERY 8 HOURS
Status: COMPLETED | OUTPATIENT
Start: 2021-05-13 | End: 2021-05-13

## 2021-05-13 RX ORDER — ASPIRIN 81 MG/1
81 TABLET ORAL DAILY
Qty: 30 TABLET | Refills: 0 | Status: SHIPPED | OUTPATIENT
Start: 2021-05-14 | End: 2021-07-06

## 2021-05-13 RX ORDER — CAPSAICIN 0.025 %
CREAM (GRAM) TOPICAL
Qty: 60 G | Refills: 0 | Status: SHIPPED | OUTPATIENT
Start: 2021-05-13 | End: 2021-05-13

## 2021-05-13 RX ORDER — METOPROLOL SUCCINATE 25 MG/1
25 TABLET, EXTENDED RELEASE ORAL DAILY
Qty: 90 TABLET | Refills: 3 | Status: SHIPPED | OUTPATIENT
Start: 2021-05-13 | End: 2022-01-04 | Stop reason: SDUPTHER

## 2021-05-13 RX ORDER — ASPIRIN 81 MG/1
81 TABLET ORAL DAILY
Qty: 30 TABLET | Refills: 0 | Status: SHIPPED | OUTPATIENT
Start: 2021-05-14 | End: 2021-05-17

## 2021-05-13 RX ORDER — ATORVASTATIN CALCIUM 40 MG/1
40 TABLET, FILM COATED ORAL EVERY EVENING
Qty: 30 TABLET | Refills: 0 | Status: SHIPPED | OUTPATIENT
Start: 2021-05-13 | End: 2021-06-15 | Stop reason: SDUPTHER

## 2021-05-13 RX ADMIN — ACETAMINOPHEN 650 MG: 325 TABLET, FILM COATED ORAL at 14:12

## 2021-05-13 RX ADMIN — PREDNISOLONE ACETATE 1 DROP: 10 SUSPENSION/ DROPS OPHTHALMIC at 05:57

## 2021-05-13 RX ADMIN — LIDOCAINE 1 PATCH: 50 PATCH TOPICAL at 00:15

## 2021-05-13 RX ADMIN — ACETAMINOPHEN 650 MG: 325 TABLET, FILM COATED ORAL at 10:00

## 2021-05-13 RX ADMIN — ESCITALOPRAM OXALATE 5 MG: 10 TABLET ORAL at 17:17

## 2021-05-13 RX ADMIN — METOPROLOL SUCCINATE 25 MG: 25 TABLET, EXTENDED RELEASE ORAL at 05:56

## 2021-05-13 RX ADMIN — PREDNISOLONE ACETATE 1 DROP: 10 SUSPENSION/ DROPS OPHTHALMIC at 09:55

## 2021-05-13 RX ADMIN — PREDNISOLONE ACETATE 1 DROP: 10 SUSPENSION/ DROPS OPHTHALMIC at 12:37

## 2021-05-13 RX ADMIN — PREDNISOLONE ACETATE 1 DROP: 10 SUSPENSION/ DROPS OPHTHALMIC at 16:54

## 2021-05-13 RX ADMIN — ACETAMINOPHEN 650 MG: 325 TABLET, FILM COATED ORAL at 17:17

## 2021-05-13 RX ADMIN — ASPIRIN 81 MG: 81 TABLET, COATED ORAL at 05:56

## 2021-05-13 RX ADMIN — ACETAMINOPHEN 650 MG: 325 TABLET, FILM COATED ORAL at 05:56

## 2021-05-13 RX ADMIN — CAPSAICIN: 0.25 CREAM TOPICAL at 06:00

## 2021-05-13 RX ADMIN — CEFAZOLIN SODIUM 2 G: 2 INJECTION, SOLUTION INTRAVENOUS at 09:54

## 2021-05-13 RX ADMIN — ATORVASTATIN CALCIUM 40 MG: 40 TABLET, FILM COATED ORAL at 17:17

## 2021-05-13 ASSESSMENT — ENCOUNTER SYMPTOMS
PALPITATIONS: 0
DIZZINESS: 1
FATIGUE: 0
COLOR CHANGE: 0
CHEST TIGHTNESS: 0
CHILLS: 0
LIGHT-HEADEDNESS: 0
SHORTNESS OF BREATH: 0
FEVER: 0
DIAPHORESIS: 0
ABDOMINAL PAIN: 0
COUGH: 0
WHEEZING: 0
STRIDOR: 0

## 2021-05-13 ASSESSMENT — COGNITIVE AND FUNCTIONAL STATUS - GENERAL
DAILY ACTIVITIY SCORE: 24
SUGGESTED CMS G CODE MODIFIER DAILY ACTIVITY: CH

## 2021-05-13 ASSESSMENT — PAIN DESCRIPTION - PAIN TYPE
TYPE: ACUTE PAIN

## 2021-05-13 ASSESSMENT — FIBROSIS 4 INDEX: FIB4 SCORE: 4.34

## 2021-05-13 NOTE — CARE PLAN
Problem: Communication  Goal: The ability to communicate needs accurately and effectively will improve  Outcome: Progressing     Problem: Safety  Goal: Will remain free from injury  Outcome: Progressing  Note: Pt calling appropriately for assistance.    Goal: Will remain free from falls  Outcome: Progressing  Note: Fall precautions in place: treaded slipper socks on, mobility signs posted, hourly rounding, bed in lowest position, belongings and call light are within reach, and near nurses station.      Problem: Mobility  Goal: Risk for activity intolerance will decrease  Outcome: Progressing  Note: Pt was able to get up and ambulate to the bathroom with a x1 assist and her FWW. Pt was able to independently brush her teeth and walk to bed. Pt stated she had some pain with ambulation but overall it was minimal.

## 2021-05-13 NOTE — THERAPY
"Occupational Therapy  Daily Treatment     Patient Name: Yoli Carmichael  Age:  87 y.o., Sex:  female  Medical Record #: [id number]  Today's Date: 5/13/2021     Precautions  Precautions: Fall Risk, Other (See Comments)  Comments: PPM prec     Assessment  Pt is now s/p PPM and doing well. Reviewed precautions for no lifting, pushing, pulling and hand over head. Pt able to complete self cares at a spv/mod I level. Pt reports family will being staying w/her at d/c to assist w/IADLs. Acute OT goals have been met.     Plan  Discharge secondary to goals met.    DC Equipment Recommendations: Grab Bar(s) in Tub / Shower  Discharge Recommendations: Recommend home health for continued occupational therapy services    Subjective  \"I have some family to help me at home\"      Objective     05/13/21 0925   Pain 0 - 10 Group   Therapist Pain Assessment 0;Nurse Notified   Cognition    Cognition / Consciousness WDL   Level of Consciousness Alert   Passive ROM Upper Body   Passive ROM Upper Body WDL   Active ROM Upper Body   Active ROM Upper Body  X   Dominant Hand Right   Rt Shoulder Flexion Degrees 70   Rt Shoulder Abduction Degrees 30   Rt Shoulder Ext Rotation Degrees 20   Comments LLE now limited by PPM restriction    Strength Upper Body   Upper Body Strength  WDL   Other Treatments   Other Treatments Provided Reviewed PPM prec and modficiations to ADL and IADL tasks    Balance   Sitting Balance (Static) Good   Sitting Balance (Dynamic) Fair +   Standing Balance (Static) Fair +   Standing Balance (Dynamic) Fair   Weight Shift Sitting Good   Weight Shift Standing Good   Skilled Intervention Verbal Cuing;Tactile Cuing   Comments w/fww    Bed Mobility    Supine to Sit Supervised   Skilled Intervention Verbal Cuing   Activities of Daily Living   Grooming Supervision;Standing   Upper Body Dressing Supervision   Lower Body Dressing Supervision   Toileting Supervision   Skilled Intervention Verbal Cuing;Tactile Cuing;Facilitation   How " much help from another person does the patient currently need...   6 Clicks Daily Activity Score 24   Functional Mobility   Sit to Stand Supervised   Bed, Chair, Wheelchair Transfer Supervised   Toilet Transfers Supervised   Mobility walking in room w/wfw    Activity Tolerance   Comments no overt c/o pain or fatigue    Patient / Family Goals   Patient / Family Goal #1 Go home   Goal #1 Outcome Goal not met   Short Term Goals   Short Term Goal # 1 LB dressing with AE if needed at supervised level of assist   Goal Outcome # 1 Goal met   Short Term Goal # 2 seated bathing at supervised level of assist.    Goal Outcome # 2 Goal met   Education Group   Role of Occupational Therapist Patient Response Patient;Acceptance;Explanation;Verbal Demonstration   Cardiac Precautions Patient Response Patient;Acceptance;Explanation;Verbal Demonstration;Reinforcement Needed   ADL Patient Response Patient;Acceptance;Demonstration;Explanation;Verbal Demonstration;Action Demonstration   Anticipated Discharge Equipment and Recommendations   DC Equipment Recommendations Grab Bar(s) in Tub / Shower   Discharge Recommendations Recommend home health for continued occupational therapy services   Interdisciplinary Plan of Care Collaboration   IDT Collaboration with  Nursing   Patient Position at End of Therapy Seated;Call Light within Reach;Tray Table within Reach;Phone within Reach   Collaboration Comments RN aware of OT tx, and ok no alarm in place    Session Information   Date / Session Number  5/12 #3 goals met    Priority 0

## 2021-05-13 NOTE — DISCHARGE PLANNING
Received update from EP Cardio that pt will need consults for PT/OT.     Will notify md in IDT rounds.     Called pt's pharmacy help line to get prior auth for pt's Lidocaine prescription.     PA # [id number]     Per help line, PA request will take 72 hours to be approved or denied and will notify pt via mail of decision. Confirmed address on file with pharmacy help line.     Per md Villarreal, pt okay with OTC IcyHot.     Notified pt and pt daughter of PA request.     Pt provided her insurance cards. Made copies and gave original cards back to pt's daughter. Uploaded cards to chart.

## 2021-05-13 NOTE — PROGRESS NOTES
Assumed care of pt. Bedside report received from night shift RNDeedee.  Pt aaox3, disoriented to date , on RA no signs of distress noted at this time. Tele box is on and rhythm verifed. POC discussed with pt and pt verbalized no questions at this time. Bed low and locked. Call light and personal belongings within reach. All needs met at this time. Will continue POC

## 2021-05-13 NOTE — DISCHARGE PLANNING
Meds-to-Beds: Discharge prescription orders listed below delivered to patient's bedside. RACHAEL Walsh notified. Patient and family members counseled. Patient is aware metoprolol is too soon to fill per insurance. Patient is also aware lidocaine patches are pending prior authorization. Patient elected to have co-payment billed to patient account.      Duong Carmichael Anca   Home Medication Instructions NEHA:[id number]    Printed on:05/13/21 3408   Medication Information                      aspirin 81 MG EC tablet  Take 1 tablet by mouth every day.             atorvastatin (LIPITOR) 40 MG Tab  Take 1 tablet by mouth every evening.                 Fabi Yen, PharmD

## 2021-05-13 NOTE — PROGRESS NOTES
Cardiology Follow Up Progress Note    Date of Service  5/13/2021    Attending Physician  DAYDAY Moody*    Chief Complaint/Reason for EP consult: Recurrent syncope and probable tachybradycardia syndrome    HPI  Duong Carmichael is a 87 y.o. female admitted 5/7/2021 with a history of paroxysmal SVT, hypertrophic cardiomyopathy, recurrent presyncope, one previous syncopal event, and orthostatic hypotension.  She was admitted after experiencing presyncope with a fall in her bathroom for which she states she believes that she did not lose consciousness this time.  She does report increasing symptoms of a strange feeling coming over her face, lightheadedness and rapid heart breat at home.  She states that this has been present for years but had increased rein severity recently.  She states that this usually lasts approximately a couple of minutes at most and then symptoms terminate.  She has had one previous syncopal episode last month for which she was admitted to [facility].  Recalls no prodrome, palpitations, nausea, vomiting, flushing prior to event, was in garage with grandson.  Regained consciousness and was reported to be neurologically intact.    Interim Events  05/13/2021: Seen in EP Rounds  S/P ablation/PPM 5/12/21.  No cardiac complaints. Some weakness- PT/OT  - occassional vision issues secondary to cateracts- NO new s/sx, changes from prior.  Vital signs/labs: reviewed. BP stable. H/H stable. Weight-pending.  Post PPM IV ABX x 2 doses  Telemetry: SR/ST 70-100s bpm, PVCs; no arrhythmias    05/12/2021: Seen in EP Rounds  NPO since MN. Daughter/family at bedside.   No cardiac complaints. Some orthostatics with positional changes, mild.   Vital signs/Labs: reviewed. BP stable. sCr 1.48  INR today 1.08  Telemetry: [id number], PVCs. No arrhythmias.    05/11/2021: Seen in EP Rounds  No cardiac complaints. Daughter at bedside.   Vital Signs/labs: Reviewed. BP stable/mildly elevated 148/58. TSH 3.130 (5/8/21)  SCr   1.32, K+ 3.6, Mg+ 2.2  Telemetry: Sinus rhythm, Hrs 60-70s bpm; PAC/PVCs; brief SVT episodes this AM  Compression stockings.     Review of Systems  Review of Systems   Constitutional: Negative for chills, diaphoresis, fatigue and fever.   Respiratory: Negative for cough, chest tightness, shortness of breath, wheezing and stridor.    Cardiovascular: Negative for chest pain, palpitations and leg swelling.   Gastrointestinal: Negative for abdominal pain.   Genitourinary: Negative for difficulty urinating and hematuria.   Skin: Negative for color change.   Neurological: Positive for dizziness (with positional changes). Negative for light-headedness.     Vital signs in last 24 hours  Temp:  [36.4 °C (97.5 °F)-37.1 °C (98.8 °F)] 36.6 °C (97.9 °F)  Pulse:  [67-94] 81  Resp:  [16-18] 17  BP: (102-167)/(56-80) 160/56  SpO2:  [93 %-98 %] 96 %    Physical Exam  Physical Exam  Vitals and nursing note reviewed.   Constitutional:       Appearance: Normal appearance.   Cardiovascular:      Rate and Rhythm: Normal rate and regular rhythm.      Pulses: Normal pulses.      Heart sounds: Murmur heard.   No friction rub. No gallop.       Comments: Left upper chest pressure dressing removed. New dressing placed. Site is CDI w/tegaderm/gauze. Mild bruising around device site. No evidence of active bleeding, edema, erythema, or hematoma.   Left groin site dressing changed. B/L groin site CDI w/tegaderm/gauze.   Pulmonary:      Effort: Pulmonary effort is normal.      Breath sounds: Examination of the right-lower field reveals decreased breath sounds. Examination of the left-lower field reveals decreased breath sounds. Decreased breath sounds present. No wheezing, rhonchi or rales.   Musculoskeletal:      Cervical back: Normal range of motion.      Right lower leg: No edema.      Left lower leg: No edema.   Skin:     General: Skin is warm and dry.   Neurological:      Mental Status: She is alert and oriented to person, place, and time.    Psychiatric:         Mood and Affect: Mood normal.         Behavior: Behavior normal.         Thought Content: Thought content normal.         Judgment: Judgment normal.     Lab Review  Lab Results   Component Value Date/Time    WBC 10.3 05/13/2021 03:05 AM    RBC 3.19 (L) 05/13/2021 03:05 AM    HEMOGLOBIN 9.2 (L) 05/13/2021 03:05 AM    HEMATOCRIT 29.5 (L) 05/13/2021 03:05 AM    MCV 92.5 05/13/2021 03:05 AM    MCH 28.8 05/13/2021 03:05 AM    MCHC 31.2 (L) 05/13/2021 03:05 AM    MPV 9.9 05/13/2021 03:05 AM      Lab Results   Component Value Date/Time    SODIUM 138 05/13/2021 03:05 AM    POTASSIUM 4.1 05/13/2021 03:05 AM    CHLORIDE 112 05/13/2021 03:05 AM    CO2 19 (L) 05/13/2021 03:05 AM    GLUCOSE 113 (H) 05/13/2021 03:05 AM    BUN 20 05/13/2021 03:05 AM    CREATININE 1.42 (H) 05/13/2021 03:05 AM      Lab Results   Component Value Date/Time    ASTSGOT 50 (H) 05/13/2021 03:05 AM    ALTSGPT 27 05/13/2021 03:05 AM     Lab Results   Component Value Date/Time    CHOLSTRLTOT 132 05/09/2021 03:36 AM    LDL 67 05/09/2021 03:36 AM    HDL 49 05/09/2021 03:36 AM    TRIGLYCERIDE 80 05/09/2021 03:36 AM    TROPONINT 26 (H) 05/08/2021 07:06 AM       No results for input(s): NTPROBNP in the last 72 hours.    Cardiac Imaging and Procedures Review  EKG 05/13/2021: Sinus rhythm, LVH    Echocardiogram:  4/3/21  CONCLUSIONS  Compared to the images of the prior study done 11/2/2018, intracavitary gradient is present.  Hyperdynamic left ventricular systolic function.  Left ventricular ejection fraction is visually estimated to be greater than 75%.  Evidence of increased intracavity gradient, peak velocity is 2.6 m/sec. Systolic anterior motion of the anterior mitral valve leaflet.    Imaging  Chest X-Ray 05/13/2021:  FINDINGS:  Position of medical devices appears stable. Cardiomegaly is observed.  Atherosclerotic calcification of the aorta is noted.  The central pulmonary vasculature appears normal.  The lungs appear well expanded  bilaterally.  Hazy left midlung and lung base opacity is seen.  Veil-like opacities are seen in the left lung base.  The bony structures appear age-appropriate.  IMPRESSION:  1.  Hazy left midlung and lower lobe infiltrates, new/increased since prior.  2.  Small left pleural effusion.    Chest X-Ray:  5/7/21   1.  Acute fractures of left posterolateral ribs 5-8. No pneumothorax.  2.  Left basilar atelectasis.    Carotid US (04/03/2021):   CONCLUSIONS   Greater than 50% narrowing right ICA   Right thyroid 2.9 cm mass for which dedicated thyroid US is advised to characterize further.  Biopsy may be warranted.    PPM- Procedural Conclusions per Dr. Hollis's Op Note (05/12/2021):  Electrophysiology Procedure Note [facility]  PROCEDURE PERFORMED: Dual-chamber pacemaker, moderate sedation administered by RN and supervised by physician  : Pato Hollis MDINDICATION: Tachybradycardia syndrome  PRE PROCEDURE ECG: Sinus rhythm  POST PROCEDURE ECG: Sinus rhythm  COMPLICATIONS: None  IMPLANTED DEVICE INFORMATION:  Pulse generator is a MedWikiWand model W3DR01  Serial # [id number]  LEAD INFORMATION:  1)Right atrial lead is a Medtronic  model #5076-45, serial # [id number],P wave 3 millivolts, threshold 1 volts, pacing impedance 798 ohms.  2)Right ventricular lead is a Medtronic model #5076-52, serial # [id number],R wave 20 millivolts, threshold 0.5 volts, pacing impedance 703 ohms.  DEVICE PROGRAMMING:  AAIR to DDDR  FLUOROSCOPY TIME: 51 mGy   IMPRESSIONS:  1. Successful dual-chamber pacemaker implantation    ABLATION- Procedural Conclusions per Dr. Hollis's Op Note (05/12/2021):  Electrophysiology Procedure Note [facility]  Pre-Operative Diagnosis:  Paroxysmal Supraventricular Tachycardia (PSVT)  Post-Operative Diagnosis:  Successful slow pathway modification  Indications: PSVT recurrent with syncope  Procedure(s) Performed: Supraventricular tachycardia ablation with  Electrophysiology Study, Electroanatomical 3D mapping, CS/LA pace and record,  moderate sedation administered by RN and supervised by physician  Physician(s): MAYI Hollis M.D.  Pre-procedure ECG: Sinus rhythm with first-degree AV block  Post-procedure ECG: Sinus rhythm first-degree AV block  Complications:  None   Baseline Rhythm: sinus  ms,  ms, HV 55 ms. CT interval 233 ms, QRS duration 85 ms, QT interval 360 ms, RR interval 791,  No pre-excitation.  AV Rachid Function:  AVNBCL 380 ms (dual physiology)  AVNERP 600/280 ms  VABCL 430 ms (retrograde fast AV rachid pathway)  Arrhythmias:  1. During baseline stimulation, atrial pacing induced typical (slow/fast) AV rachid reentrant tachycardia:   ms, VA less than 50 ms  Entrainment from the RVA  produced termination.  PVCs on the His without advancement  Mapping:  Electroanatomical 3D (CARTO) map was created of right atrium and coronary sinus created during sinus rhythm to honey the position of the His, CS ostium, and the posteroseptal tricuspid annulus.   Ablation:  Radiofrequency energy was applied using AC [employer] ST/SF power 30-40 W, total 8 RF applications, RF time 302 seconds at the anatomic site of the slow pathway anterior to the CS ostium and just posterior to the septal tricuspid annulus, targeting ASP potential. Automaticity (accelerated junctional rhythm) with persistent retrograde fast pathway conduction to the atrium was seen during ablation.  Post Ablation Testin. Not inducible AVNRT despite aggressive stimuli  2.  ms,  ms, HV 55 ms. AVNBCL 450 ms, VABCL 420 ms.  Fluroscopy time: 236 milligray  Impressions:    1. Typical (slow/fast) AV rachid reentrant tachycardia.  2. Successful catheter mediated ablation of slow AV rachid pathway with elimination of AVNRT.    Assessment/Plan  1. Syncope; pSVT  - Recurrent syncope in setting of pSVT with evidence of tachybradycardia syndrome, long first-degree AV block.  - S/P successful AVNRT  ablation and Medtronic dual-PPM implantation by Dr. Hollis on 05/12/21. Toprol XL restarted.   - Hx Hypertrophic cardiomyopathy, orthostatic hypotension.   - Echo with prominent sigmoid septum, LVEF 75%.  - CXR shows no late signs of PTX, leads appear to be in correct placement. Small L plural effusion- [facility] team notified.   - Device interrogation today shows normal sensing and function.   - Remains sinus/paced rhythm, no arrhythmia recurrence.   - Left upper chest  Device site and b/l groin site CDI with tegaderm/gauze, uncomplicated.  - Discussed pacemaker and ablation discharge education and care with patient at bedside per below. All pt questions/concerns were answered, patient verbalized understanding. Will review with daughter.   - On low dose Toprol XL.  - Continued intermittent dizziness, likely multifactorial. Followed by Primary team.   - Patient will follow up with device clinic in 1 week for pacemaker check.   - sCr 1.48, Primary team following.   - Avoid lovenox/heparin products in setting of PPM.   - Ordered upper arm immobilizer PRN (apply upper arm strap only) to prevent lead dislodgement. Ice pack and device booklet/card provided.   - Encourage incentive spirometer.  - PT/OT, discussed help with ADLs/ambulation post procedure.   - Discussed plan with Primary team, Primary Cardiology, and RN.     2.  Hypertrophic Cardiomyopathy  - On BB.   - Managed per Primary Cardiology.     Pacemaker Discharge Instructions/[facility]  1.  No showers for one week; may take sponge bath.  Keep dressing dry & in place until seen at for you follow up visit at the cardiology office.   Report s/s of infection such as warmth/redness/drainage/swelling at site or fever/chills.  2.  No lifting over 10 lbs for six weeks.  3.  Do not raise affected arm above shoulder level or behind head for six weeks.  4.  Avoid excessive pushing, pulling, or twisting for six weeks.  5.  No driving until cleared by Provider.   6.  Call  our office ([phone]) if you notice any increased swelling, redness, warmth, or drainage at the implant site.  7.  Call our office if you develop fever > 101F or uncontrolled pain.  8.  No MRI's for 6 weeks if you have a MRI compatible device.  9.  No dental work or cleanings for 3 months.  10.  May remove arm sling after one day, but please wear if you have trouble remembering to keep your arm down.  11. Do not place cell phones or mobile devices directly over implanted device.   12. Your follow-up appointments will be done at approximately 1 week, 6 weeks, then every 3-4 months.    Post Ablation Patient Instructions:  No lifting > 10 lbs x 1 week.  No baths or hot tubs until seen in device clinic.  May take off groin dressings on 05/14/2021.  Continue to monitor sites daily for warmth, redness, discolored drainage    Please walk and take deep breaths after discharge.  After discharge, if  experiences chest pain, shortness of breath, hemoptysis, neurological changes, high fever, pre syncope/syncope, trouble with catheter sites needs to be seen in the emergency dept.     FOLLOW UP   Patient will follow up in 1 week at our pacemaker clinic for pacemaker check or sooner if needed. Patient instructed to contact the office with any questions or concerns. Thank you for allowing me to participate in the care of this patient. EP will sign off on this patient.     Future Appointments   Date Time Provider Department Center   5/20/2021  1:30 PM PACER CHECK-[facility] None   5/25/2021  3:15 PM Tarah Rose P.A.-C. [facility] None   6/2/2021  2:45 PM Cris Patel, PT, DPT [facility]   6/22/2021 10:30 AM CHILANGO Langley [facility] None   6/25/2021 10:30 AM Juan Santana M.D. [facility]     Please contact me with any questions.    JAREK Roca.   [facility]  [phone]

## 2021-05-13 NOTE — CARE PLAN
Problem: Communication  Goal: The ability to communicate needs accurately and effectively will improve  Outcome: Progressing   The patient is Stable - Low risk of patient condition declining or worsening      Progress made toward(s) clinical / shift goals:  Pt encouraged to ask questions about plan of care. CRISTOFER Guerra at bedside and spoke with pt about pacemaker and how to care for it.        Problem: Safety  Goal: Will remain free from injury  Outcome: Progressing  Goal: Will remain free from falls  Outcome: Progressing  Pt remains free from falls at this time. Safety precautions in place. Pt educated on calling for assistance when needed.        Problem: Pain Management  Goal: Pain level will decrease to patient's comfort goal  Outcome: Progressing  Patient educated to pain scale system. Patient encouraged to verbalize discomfort. Patient taught about non-pharmacological pain management. Patient is comfortable at this time without statements of discomfort or pain.

## 2021-05-14 NOTE — DISCHARGE INSTRUCTIONS
Discharge Instructions    Discharged to home by car with relative. Discharged via wheelchair, hospital escort: Yes.  Special equipment needed: Not Applicable    Be sure to schedule a follow-up appointment with your primary care doctor or any specialists as instructed.     Discharge Plan:   Diet Plan: Discussed  Activity Level: Discussed  Confirmed Follow up Appointment: Appointment Scheduled  Confirmed Symptoms Management: Discussed  Medication Reconciliation Updated: Yes    I understand that a diet low in cholesterol, fat, and sodium is recommended for good health. Unless I have been given specific instructions below for another diet, I accept this instruction as my diet prescription.   Other diet: Cardiac    Special Instructions: None    · Is patient discharged on Warfarin / Coumadin?   No     Depression / Suicide Risk    As you are discharged from this [facility] facility, it is important to learn how to keep safe from harming yourself.    Recognize the warning signs:  · Abrupt changes in personality, positive or negative- including increase in energy   · Giving away possessions  · Change in eating patterns- significant weight changes-  positive or negative  · Change in sleeping patterns- unable to sleep or sleeping all the time   · Unwillingness or inability to communicate  · Depression  · Unusual sadness, discouragement and loneliness  · Talk of wanting to die  · Neglect of personal appearance   · Rebelliousness- reckless behavior  · Withdrawal from people/activities they love  · Confusion- inability to concentrate     If you or a loved one observes any of these behaviors or has concerns about self-harm, here's what you can do:  · Talk about it- your feelings and reasons for harming yourself  · Remove any means that you might use to hurt yourself (examples: pills, rope, extension cords, firearm)  · Get professional help from the community (Mental Health, Substance Abuse, psychological counseling)  · Do not  be alone:Call your Safe Contact- someone whom you trust who will be there for you.  · Call your local CRISIS HOTLINE 531-5708 or 479-842-0038  · Call your local Children's Mobile Crisis Response Team [place] (331) 644-3977 or www.MOVL  · Call the toll free National Suicide Prevention Hotlines   · National Suicide Prevention Lifeline 091-450-XHWW (6562)  · [employer] Line Network 800-SUICIDE (192-7421)          -Decrease metoprolol succinate from 37.5 mg to 25 mg daily.  Monitor heart rate and blood pressure - if heart rate decreases below 55 beats per minute or if blood pressure decreases below systolic 100 (top number) consistently, call primary care or cardiology to adjust medications.  -Avoid NSAIDs (ibuprofen, Aleve, Motrin, etc.) other than prescribed baby aspirin.  -Start daily baby aspirin and daily atorvastatin, follow up with primary care to monitor right internal carotid artery stenosis.  -Conservative management for left rib fractures - as needed: Tylenol, capsaicin cream, lidocaine patch, ice / hot pack.  Symptoms should continue to improve over new few weeks.  -Do not drive for at least 3 months until cleared by primary care or cardiology.  -Call primary care if develop respiratory / pneumonia / ill symptoms.  -Follow up with primary care and cardiology as scheduled.    Pacemaker Discharge Instructions/[facility]  1.  No showers for one week; may take sponge bath.  Keep dressing dry & in place until seen at for you follow up visit at the cardiology office.   Report s/s of infection such as warmth/redness/drainage/swelling at site or fever/chills.  2.  No lifting over 10 lbs for six weeks.  3.  Do not raise affected arm above shoulder level or behind head for six weeks.  4.  Avoid excessive pushing, pulling, or twisting for six weeks.  5.  No driving until cleared by Provider.   6.  Call our office ([phone]) if you notice any increased swelling, redness, warmth, or  drainage at the implant site.  7.  Call our office if you develop fever > 101F or uncontrolled pain.  8.  No MRI's for 6 weeks if you have a MRI compatible device.  9.  No dental work or cleanings for 3 months.  10.  May remove arm sling after one day, but please wear if you have trouble remembering to keep your arm down.  11. Do not place cell phones or mobile devices directly over implanted device.   12. Your follow-up appointments will be done at approximately 1 week, 6 weeks, then every 3-4 months.    Post Ablation Patient Instructions:  No lifting > 10 lbs x 1 week.  No baths or hot tubs until seen in device clinic.  May take off groin dressings on 05/14/2021.  Continue to monitor sites daily for warmth, redness, discolored drainage    Please walk and take deep breaths after discharge.  After discharge, if  experiences chest pain, shortness of breath, hemoptysis, neurological changes, high fever, pre syncope/syncope, trouble with catheter sites needs to be seen in the emergency dept.     FOLLOW UP   Patient will follow up in 1 week at our pacemaker clinic for pacemaker check or sooner if needed. Patient instructed to contact the office with any questions or concerns.      Cardiac Ablation, Care After  This sheet gives you information about how to care for yourself after your procedure. Your health care provider may also give you more specific instructions. If you have problems or questions, contact your health care provider.  What can I expect after the procedure?  After the procedure, it is common to have:  · Bruising around your puncture site.  · Tenderness around your puncture site.  · Skipped heartbeats.  · Tiredness (fatigue).  Follow these instructions at home:  Puncture site care    · Follow instructions from your health care provider about how to take care of your puncture site. Make sure you:  ? Wash your hands with soap and water before you change your bandage (dressing). If soap and water are not  available, use hand .  ? Change your dressing as told by your health care provider.  ? Leave stitches (sutures), skin glue, or adhesive strips in place. These skin closures may need to stay in place for up to 2 weeks. If adhesive strip edges start to loosen and curl up, you may trim the loose edges. Do not remove adhesive strips completely unless your health care provider tells you to do that.  · Check your puncture site every day for signs of infection. Check for:  ? Redness, swelling, or pain.  ? Fluid or blood. If your puncture site starts to bleed, lie down on your back, apply firm pressure to the area, and contact your health care provider.  ? Warmth.  ? Pus or a bad smell.  Driving  · Ask your health care provider when it is safe for you to drive again after the procedure.  · Do not drive or use heavy machinery while taking prescription pain medicine.  · Do not drive for 24 hours if you were given a medicine to help you relax (sedative) during your procedure.  Activity  · Avoid activities that take a lot of effort for at least 3 days after your procedure.  · Do not lift anything that is heavier than 10 lb (4.5 kg), or the limit that you are told, until your health care provider says that it is safe.  · Return to your normal activities as told by your health care provider. Ask your health care provider what activities are safe for you.  General instructions  · Take over-the-counter and prescription medicines only as told by your health care provider.  · Do not use any products that contain nicotine or tobacco, such as cigarettes and e-cigarettes. If you need help quitting, ask your health care provider.  · Do not take baths, swim, or use a hot tub until your health care provider approves.  · Do not drink alcohol for 24 hours after your procedure.  · Keep all follow-up visits as told by your health care provider. This is important.  Contact a health care provider if:  · You have redness, mild swelling,  or pain around your puncture site.  · You have fluid or blood coming from your puncture site that stops after applying firm pressure to the area.  · Your puncture site feels warm to the touch.  · You have pus or a bad smell coming from your puncture site.  · You have a fever.  · You have chest pain or discomfort that spreads to your neck, jaw, or arm.  · You are sweating a lot.  · You feel nauseous.  · You have a fast or irregular heartbeat.  · You have shortness of breath.  · You are dizzy or light-headed and feel the need to lie down.  · You have pain or numbness in the arm or leg closest to your puncture site.  Get help right away if:  · Your puncture site suddenly swells.  · Your puncture site is bleeding and the bleeding does not stop after applying firm pressure to the area.  These symptoms may represent a serious problem that is an emergency. Do not wait to see if the symptoms will go away. Get medical help right away. Call your local emergency services (911 in the [place] Do not drive yourself to the hospital.  Summary  · After the procedure, it is normal to have bruising and tenderness at the puncture site in your groin, neck, or forearm.  · Check your puncture site every day for signs of infection.  · Get help right away if your puncture site is bleeding and the bleeding does not stop after applying firm pressure to the area. This is a medical emergency.  This information is not intended to replace advice given to you by your health care provider. Make sure you discuss any questions you have with your health care provider.  Document Released: 03/29/2018 Document Revised: 11/30/2018 Document Reviewed: 03/29/2018  Elsevier Patient Education © 2020 [employer]

## 2021-05-14 NOTE — DISCHARGE PLANNING
note:  Received a call from Unit RACHAEL Rodríguez stating that pt is being discharged but needs HH set up.     Chart review done and noted that [facility] has accepted pt.    notified RACHAEL Armenta.

## 2021-05-14 NOTE — PROGRESS NOTES
IV Dc'd. Discharge instructions provided to pt. Pt verbalized understanding. Pt states all questions have been answered. Copy of discharge paperwork provided to pt. Pt states all personal belongings are in possession. Pt escorted off of the unit via wheelchair without incident. Transportation provided by Daughter.

## 2021-05-15 ENCOUNTER — HOME CARE VISIT (OUTPATIENT)
Dept: HOME HEALTH SERVICES | Facility: HOME HEALTHCARE | Age: 86
End: 2021-05-15
Payer: MEDICARE

## 2021-05-15 VITALS
TEMPERATURE: 97.5 F | DIASTOLIC BLOOD PRESSURE: 55 MMHG | WEIGHT: 129 LBS | RESPIRATION RATE: 16 BRPM | HEART RATE: 84 BPM | OXYGEN SATURATION: 97 % | SYSTOLIC BLOOD PRESSURE: 138 MMHG | BODY MASS INDEX: 22.86 KG/M2

## 2021-05-15 PROCEDURE — G0493 RN CARE EA 15 MIN HH/HOSPICE: HCPCS

## 2021-05-15 PROCEDURE — 665001 SOC-HOME HEALTH

## 2021-05-15 ASSESSMENT — FIBROSIS 4 INDEX: FIB4 SCORE: 4.34

## 2021-05-15 ASSESSMENT — ENCOUNTER SYMPTOMS
SHORTNESS OF BREATH: T
NAUSEA: PT DENIES ANY NAUSEA AT THIS TIME
VOMITING: PT DENIES ANY EMESIS AT THIS TIME

## 2021-05-15 ASSESSMENT — PATIENT HEALTH QUESTIONNAIRE - PHQ9
CLINICAL INTERPRETATION OF PHQ2 SCORE: 0
1. LITTLE INTEREST OR PLEASURE IN DOING THINGS: 00
2. FEELING DOWN, DEPRESSED, IRRITABLE, OR HOPELESS: 00

## 2021-05-17 ENCOUNTER — DOCUMENTATION (OUTPATIENT)
Dept: MEDICAL GROUP | Facility: PHYSICIAN GROUP | Age: 86
End: 2021-05-17

## 2021-05-17 NOTE — PROGRESS NOTES
Medication chart review for [facility] services    PCP:  Juan Santana M.D.  [address] [facility] [address]  Fax: [phone]    Current medication list     Current Outpatient Medications:   •  prednisoLONE acetate, Administer 3 Drops into both eyes. 3 drops in left eye, 6 drops in right eye  •  Methylcobalamin, 1 tablet, Oral, QDAY PRN  •  Multiple Vitamins-Minerals (ICAPS AREDS 2 PO), 2 Capsule, Oral, DAILY  •  metoprolol SR, 25 mg, Oral, DAILY  •  aspirin EC, 81 mg, Oral, DAILY  •  atorvastatin, 40 mg, Oral, Q EVENING  •  lidocaine, 1 Patch, Transdermal, Q24HRS  •  aspirin, 81 mg, Oral, DAILY  •  atorvastatin, 40 mg, Oral, Q EVENING  •  capsaicin, Apply a thin layer to left ribs three times daily as needed.  Avoid open wounds.  •  lidocaine, 1 Patch, Transdermal, Q24HRS  •  ketotifen, 1 Drop, Both Eyes, QDAY PRN  •  Multiple Vitamins-Minerals (ICAPS AREDS 2 PO), 1 tablet, Oral, DAILY  •  Propylene Glycol (SYSTANE BALANCE OP), 1 Drop, Both Eyes, 4X/DAY PRN  •  prednisoLONE acetate, 1 Drop, Both Eyes, See Admin Instructions  •  Vitamin D-3, 5,000 Units, Oral, DAILY  •  Coenzyme Q10 (COQ10 PO), 1 capsule, Oral, 2X A WEEK  •  escitalopram, 5 mg, Oral, DAILY  •  B Complex-C (SUPER B COMPLEX PO), 1 tablet, Oral, QAM  •  Glycerin-Polysorbate 80, 1 Drop, Ophthalmic, 4X/DAY PRN  •  vitamin D, 1,000 Units, Oral, DAILY    Allergies   Allergen Reactions   • Other Environmental      Seasonal allergies       Labs / Images Reviewed:     Lab Results   Component Value Date/Time    SODIUM 138 05/13/2021 03:05 AM    POTASSIUM 4.1 05/13/2021 03:05 AM    CHLORIDE 112 05/13/2021 03:05 AM    CO2 19 (L) 05/13/2021 03:05 AM    GLUCOSE 113 (H) 05/13/2021 03:05 AM    BUN 20 05/13/2021 03:05 AM    CREATININE 1.42 (H) 05/13/2021 03:05 AM      Lab Results   Component Value Date/Time    ALKPHOSPHAT 72 05/13/2021 03:05 AM    ASTSGOT 50 (H) 05/13/2021 03:05 AM    ALTSGPT 27 05/13/2021 03:05 AM    TBILIRUBIN 0.3 05/13/2021 03:05  AM    ALBUMIN 3.0 (L) 05/13/2021 03:05 AM    INR 1.08 05/12/2021 03:22 AM            Assessment and Plan:   • Received referral from [facility]. Medications reviewed. No clinically significant interactions noted.             Jax Freedman, PharmD, MS, BCACP, [facility]  Phone [phone] fax [phone]    This note was created using voice recognition software (Dragon). The accuracy of the dictation is limited by the abilities of the software. I have reviewed the note prior to signing, however some errors in grammar and context are still possible. If you have any questions related to this note please do not hesitate to contact our office.

## 2021-05-18 ENCOUNTER — HOME CARE VISIT (OUTPATIENT)
Dept: HOME HEALTH SERVICES | Facility: HOME HEALTHCARE | Age: 86
End: 2021-05-18
Payer: MEDICARE

## 2021-05-18 VITALS
TEMPERATURE: 99.2 F | HEART RATE: 86 BPM | RESPIRATION RATE: 16 BRPM | OXYGEN SATURATION: 94 % | SYSTOLIC BLOOD PRESSURE: 122 MMHG | DIASTOLIC BLOOD PRESSURE: 61 MMHG

## 2021-05-18 PROCEDURE — G0495 RN CARE TRAIN/EDU IN HH: HCPCS

## 2021-05-18 ASSESSMENT — ENCOUNTER SYMPTOMS
VOMITING: DENIES
NAUSEA: DENIES

## 2021-05-19 ENCOUNTER — HOME CARE VISIT (OUTPATIENT)
Dept: HOME HEALTH SERVICES | Facility: HOME HEALTHCARE | Age: 86
End: 2021-05-19
Payer: MEDICARE

## 2021-05-19 VITALS
DIASTOLIC BLOOD PRESSURE: 62 MMHG | RESPIRATION RATE: 16 BRPM | TEMPERATURE: 98.6 F | OXYGEN SATURATION: 96 % | SYSTOLIC BLOOD PRESSURE: 130 MMHG | HEART RATE: 82 BPM

## 2021-05-19 PROCEDURE — G0151 HHCP-SERV OF PT,EA 15 MIN: HCPCS

## 2021-05-20 ENCOUNTER — NON-PROVIDER VISIT (OUTPATIENT)
Dept: CARDIOLOGY | Facility: MEDICAL CENTER | Age: 86
End: 2021-05-20
Payer: MEDICARE

## 2021-05-20 DIAGNOSIS — I44.1 SECOND DEGREE ATRIOVENTRICULAR BLOCK, MOBITZ (TYPE) I: ICD-10-CM

## 2021-05-20 DIAGNOSIS — Z95.0 CARDIAC PACEMAKER IN SITU: ICD-10-CM

## 2021-05-20 PROCEDURE — 93280 PM DEVICE PROGR EVAL DUAL: CPT | Performed by: INTERNAL MEDICINE

## 2021-05-21 ENCOUNTER — HOME CARE VISIT (OUTPATIENT)
Dept: HOME HEALTH SERVICES | Facility: HOME HEALTHCARE | Age: 86
End: 2021-05-21
Payer: MEDICARE

## 2021-05-21 VITALS
TEMPERATURE: 98.8 F | OXYGEN SATURATION: 95 % | HEART RATE: 86 BPM | SYSTOLIC BLOOD PRESSURE: 119 MMHG | DIASTOLIC BLOOD PRESSURE: 62 MMHG | RESPIRATION RATE: 16 BRPM

## 2021-05-21 PROCEDURE — G0493 RN CARE EA 15 MIN HH/HOSPICE: HCPCS

## 2021-05-21 ASSESSMENT — ENCOUNTER SYMPTOMS
VOMITING: DENIES
NAUSEA: DENIES

## 2021-05-21 NOTE — NON-PROVIDER
Wound site is healing well.  Patient advised to watch for increased redness, swelling, oozing or fever--verbalized understanding. She will return in 5-6 weeks for final threshold testing.

## 2021-05-23 ASSESSMENT — ACTIVITIES OF DAILY LIVING (ADL)
TRANSPORTATION: DEPENDENT
AMBULATION_DISTANCE/DURATION_TOLERATED: 100 FT
CURRENT_FUNCTION: SUPERVISION
PHYSICAL TRANSFERS ASSESSED: 1
AMBULATION ASSISTANCE: SUPERVISION
AMBULATION ASSISTANCE: 1
AMBULATION ASSISTANCE ON FLAT SURFACES: 1
TRANSPORTATION ASSESSED: 1

## 2021-05-23 ASSESSMENT — ENCOUNTER SYMPTOMS: ARTHRALGIAS: 1

## 2021-05-24 ENCOUNTER — HOME CARE VISIT (OUTPATIENT)
Dept: HOME HEALTH SERVICES | Facility: HOME HEALTHCARE | Age: 86
End: 2021-05-24
Payer: MEDICARE

## 2021-05-24 VITALS
HEART RATE: 83 BPM | DIASTOLIC BLOOD PRESSURE: 66 MMHG | OXYGEN SATURATION: 94 % | SYSTOLIC BLOOD PRESSURE: 126 MMHG | TEMPERATURE: 98.9 F | RESPIRATION RATE: 16 BRPM

## 2021-05-24 PROCEDURE — G0151 HHCP-SERV OF PT,EA 15 MIN: HCPCS

## 2021-05-24 ASSESSMENT — ACTIVITIES OF DAILY LIVING (ADL): OASIS_M1830: 05

## 2021-05-24 NOTE — CASE COMMUNICATION
Quality Review for 5.15.21 SOC OASIS performed on by RONA De La Rosa RN on 5.24, 2021:    Edits completed by RONA De La Rosa RN:  1. Changed  to 5.13.21 date of valid referral  2. Changed  to na, pt was observation  3. Changed  per MD orders dressing to remain on, wound tab assessment reports dressing was CDI  4. Changed  to 5, per dc orders pt to sponge bathe. Changed  to 2 per narrative pt needs supervision and DME. Changed  to 2 and [id number] C to 3 per narrative pt needs min assist with dressing  5. Changed  to 0 per narrative no med issues were identified.   6. Changed  to 8   7. Added post pacemaker precautions. Added incontinence to functional limitations. Added correct use of support devices, fall risk to safety measures  8.  Added F2F data  9. Resolved depression and re added with depression care assessment for each visit selected  10. Added daily to wound care instructions for the pacemaker site. Added hermes groin sites to wound man. Added wound monitoring interventions to care plan. Added HTN to care plan. Added post op pace maker and ablation instructions to care plan

## 2021-05-25 ENCOUNTER — OFFICE VISIT (OUTPATIENT)
Dept: CARDIOLOGY | Facility: MEDICAL CENTER | Age: 86
End: 2021-05-25
Payer: MEDICARE

## 2021-05-25 VITALS
BODY MASS INDEX: 23.04 KG/M2 | WEIGHT: 130 LBS | DIASTOLIC BLOOD PRESSURE: 56 MMHG | SYSTOLIC BLOOD PRESSURE: 138 MMHG | HEART RATE: 92 BPM | OXYGEN SATURATION: 96 % | HEIGHT: 63 IN

## 2021-05-25 DIAGNOSIS — Z95.0 CARDIAC PACEMAKER IN SITU: ICD-10-CM

## 2021-05-25 DIAGNOSIS — I10 ESSENTIAL HYPERTENSION, BENIGN: ICD-10-CM

## 2021-05-25 DIAGNOSIS — E88.09 HYPOALBUMINEMIA: ICD-10-CM

## 2021-05-25 DIAGNOSIS — I10 ESSENTIAL HYPERTENSION: ICD-10-CM

## 2021-05-25 DIAGNOSIS — I47.10 SVT (SUPRAVENTRICULAR TACHYCARDIA) (HCC): ICD-10-CM

## 2021-05-25 DIAGNOSIS — I42.1 HOCM (HYPERTROPHIC OBSTRUCTIVE CARDIOMYOPATHY) (HCC): ICD-10-CM

## 2021-05-25 DIAGNOSIS — R53.83 FATIGUE, UNSPECIFIED TYPE: ICD-10-CM

## 2021-05-25 DIAGNOSIS — R53.81 PHYSICAL DECONDITIONING: ICD-10-CM

## 2021-05-25 PROCEDURE — 99214 OFFICE O/P EST MOD 30 MIN: CPT | Performed by: PHYSICIAN ASSISTANT

## 2021-05-25 ASSESSMENT — ENCOUNTER SYMPTOMS
DEPRESSION: 0
BLOOD IN STOOL: 0
PALPITATIONS: 0
SHORTNESS OF BREATH: 1
COUGH: 0
FEVER: 0
NAUSEA: 0
CHILLS: 0
WEIGHT LOSS: 1
ORTHOPNEA: 0
PND: 0
DIZZINESS: 0

## 2021-05-25 ASSESSMENT — FIBROSIS 4 INDEX: FIB4 SCORE: 4.34

## 2021-05-25 NOTE — LETTER
[facility]   [address], [address]  [place] [address]  Phone: [phone]  Fax: [phone]              Yoli Carmichael  8/21/1933    Encounter Date: 5/25/2021    Tarah Rose P.A.-C.          PROGRESS NOTE:  Chief Complaint   Patient presents with   • HTN (Controlled)     hospital follow up          Subjective:   Yoli Carmichael is a 87 y.o. female who presents today for follow-up after her hospital stay with her granddaughter.    Patient of Dr. Velasquez and SOLANGE Swann.  Current medical problems include paroxysmal SVT, orthostatic hypotension, recurrent syncopal episodes.  Admitted 5/7/2021 after she syncopized while standing doing her make-up. Seen by EP for tachybrady syndrome causing her symptoms who performed ablation of slow pathway for symptomatic PSVT and [employer] PPM for symptoms.     She has had no recurrence of her presyncopal symptoms. Her pacer is healing well. She is struggling with feeling very weak and easily fatigued. She is using a walker and doing PT to help regain her strength. She does get short of breath with exertion, denies orthopnea. She has some leg swelling, none today. Usually occurs in the afternoons.    She struggles to drink 40oz of water a day. Her urine is clear and urinates frequently.      Past Medical History:   Diagnosis Date   • Arrhythmia    • Arthritis     knees   • Bronchitis     long time ago   • CATARACT     no surgery yet   • Dry eyes, bilateral 3/27/2014   • Osteopenia of the elderly 3/27/2014   • Pneumonia     long time ago         Past Surgical History:   Procedure Laterality Date   • KNEE ARTHROPLASTY TOTAL  9/3/2013    Performed by Ramesh Styles M.D. at SURGERY [facility]   • TONSILLECTOMY           Family History   Problem Relation Age of Onset   • Lung Disease Mother         frequent pneumonia   • Heart Disease Father 54        MI   • Lung Disease Father         Emphysema   • Heart Attack Father     • Cancer Maternal Aunt         Great Aunt and Cousin Ovarian CA   • Hypertension Maternal Aunt    • Cancer Daughter         Lung and Brain CA   • Seizures Daughter    • Cancer Daughter         Pituitary tumor   • Cancer Brother         leukemia         Social History     Tobacco Use   Smoking Status Never Smoker   Smokeless Tobacco Never Used   Tobacco Comment    Pt exposed to second hand smoker          Social History     Substance and Sexual Activity   Alcohol Use Yes    Comment: Occasionally         Allergies   Allergen Reactions   • Other Environmental      Seasonal allergies         Outpatient Encounter Medications as of 5/25/2021   Medication Sig Dispense Refill   • acetaminophen (TYLENOL) 500 MG Tab Take 1,000 mg by mouth every 6 hours as needed for Moderate Pain.     • prednisoLONE acetate (PRED FORTE) 1 % Suspension Administer 3 Drops into both eyes. 3 drops in left eye, 6 drops in right eye     • Methylcobalamin 2500 MCG SL Tab Take 1 tablet by mouth 1 time a day as needed (supplement).     • Multiple Vitamins-Minerals (ICAPS AREDS 2 PO) Take 2 Capsule by mouth every day.     • metoprolol SR (TOPROL XL) 25 MG TABLET SR 24 HR Take 1 tablet by mouth every day. 90 tablet 3   • atorvastatin (LIPITOR) 40 MG Tab Take 1 tablet by mouth every evening. 30 tablet 0   • aspirin 81 MG EC tablet Take 1 tablet by mouth every day. 30 tablet 0   • Cholecalciferol (VITAMIN D-3) 125 MCG (5000 UT) Tab Take 5,000 Units by mouth every day. 5000 unit tablet + 1000 unit tablet for a total of 6000 units daily     • Coenzyme Q10 (COQ10 PO) Take 1 capsule by mouth two times a week. Sunday & Wednesday     • escitalopram (LEXAPRO) 5 MG tablet Take 1 Tab by mouth every day. 90 Tab 3     No facility-administered encounter medications on file as of 5/25/2021.         Review of Systems   Constitutional: Positive for malaise/fatigue and weight loss. Negative for chills and fever.   HENT: Negative for nosebleeds.    Respiratory:  "Positive for shortness of breath. Negative for cough.    Cardiovascular: Positive for leg swelling. Negative for chest pain, palpitations, orthopnea and PND.   Gastrointestinal: Negative for blood in stool, melena and nausea.   Genitourinary: Negative for hematuria.   Neurological: Negative for dizziness.   Psychiatric/Behavioral: Negative for depression.          Objective:   /56 (BP Location: Left arm, Patient Position: Sitting)   Pulse 92   Ht 1.6 m (5' 3\")   Wt 59 kg (130 lb)   SpO2 96%   BMI 23.03 kg/m²        Physical Exam  Vitals and nursing note reviewed.   Constitutional:       General: She is not in acute distress.     Appearance: Normal appearance.      Comments: Ambulating with walker   HENT:      Head: Normocephalic and atraumatic.      Mouth/Throat:      Mouth: Mucous membranes are moist.   Eyes:      General: No scleral icterus.  Neck:      Comments: JVP is 5-6cm  Cardiovascular:      Rate and Rhythm: Normal rate and regular rhythm.      Pulses: Normal pulses.      Heart sounds: Murmur heard.        Comments: Pacemaker present in left upper chest without evidence   of erosion, drainage,or erythema. Steri strips in place    Pulmonary:      Effort: Pulmonary effort is normal. No respiratory distress.      Breath sounds: Normal breath sounds. No rales.   Musculoskeletal:      Right lower leg: No edema.      Left lower leg: No edema.      Comments: No pitting edema, obvious varicosities in bilateral ankles   Skin:     General: Skin is warm and dry.      Capillary Refill: Capillary refill takes less than 2 seconds.   Neurological:      General: No focal deficit present.      Mental Status: She is alert and oriented to person, place, and time.   Psychiatric:         Judgment: Judgment normal.         TTE 4/3/21  CONCLUSIONS  Compared to the images of the prior study done 11/2/2018, intracavitary   gradient is present.  Hyperdynamic left ventricular systolic function.  Left ventricular ejection " fraction is visually estimated to be greater   than 75%.  Evidence of increased intracavity gradient, peak velocity is 2.6 m/sec.  Systolic anterior motion of the anterior mitral valve leaflet.     Op Note 5/12/21 Dr. Hollis  Impressions:    1. Typical (slow/fast) AV satya reentrant tachycardia.  2. Successful catheter mediated ablation of slow AV satya pathway with elimination of AVNRT.  Assessment:     1. Cardiac pacemaker in situ     2. Essential hypertension     3. SVT (supraventricular tachycardia) (HCC)     4. Essential hypertension, benign     5. HOCM (hypertrophic obstructive cardiomyopathy) (HCC)     6. Fatigue, unspecified type     7. Physical deconditioning     8. Hypoalbuminemia          Medical Decision Making:  Today's Assessment / Plan:   Syncope, resolved  Paroxysmal SVT, Tachybrady syndrome  S/P AVNRT ablation  S/P PPM [facility]  - pacer checked last week, v-paced 87% of the time. Device site is clean and healing well  - has follow up with EP in a month     LVOT Obstruction  - loud murmur on exam   - BPs check at home with PT and no sign of orthostasis  - Continue Metop, if her fatigue isn't any better at next visit consider discontinuing this     Asymptomatic carotid stenosis  - 50-69% stenosis R ICA  - continue lipitor 40mg  - aspirin 81mg     Fatigue  Generalized weakness  Hypoalbuminemia  - had almost 10lb weight loss in hospital, cont working with PT. If her stamina isn't back after PT has discharged her will check labs, can stop BB  - encouraged adequate calorie intake    Preoperative Eval  - planning for a cataract removal procedure. RCRI is 0. She is low risk for low risk procedures. She is not currently taking anticoagulants or antiplatelets.  She may proceed with planned procedure without any further cardiac testing.    RTC in with Redhitesh in 6 weeks, EP in June          Shant So M.D.  [address] [place] [id number]  Via Fax: [phone]

## 2021-05-25 NOTE — PROGRESS NOTES
Chief Complaint   Patient presents with   • HTN (Controlled)     hospital follow up          Subjective:   Yoli Carmichael is a 87 y.o. female who presents today for follow-up after her hospital stay with her granddaughter.    Patient of Dr. Velasquez and SOLANGE Swann.  Current medical problems include paroxysmal SVT, orthostatic hypotension, recurrent syncopal episodes.  Admitted 5/7/2021 after she syncopized while standing doing her make-up. Seen by EP for tachybrady syndrome causing her symptoms who performed ablation of slow pathway for symptomatic PSVT and [employer] PPM for symptoms.     She has had no recurrence of her presyncopal symptoms. Her pacer is healing well. She is struggling with feeling very weak and easily fatigued. She is using a walker and doing PT to help regain her strength. She does get short of breath with exertion, denies orthopnea. She has some leg swelling, none today. Usually occurs in the afternoons.    She struggles to drink 40oz of water a day. Her urine is clear and urinates frequently.      Past Medical History:   Diagnosis Date   • Arrhythmia    • Arthritis     knees   • Bronchitis     long time ago   • CATARACT     no surgery yet   • Dry eyes, bilateral 3/27/2014   • Osteopenia of the elderly 3/27/2014   • Pneumonia     long time ago         Past Surgical History:   Procedure Laterality Date   • KNEE ARTHROPLASTY TOTAL  9/3/2013    Performed by Ramesh Styles M.D. at SURGERY [facility] ORS   • TONSILLECTOMY           Family History   Problem Relation Age of Onset   • Lung Disease Mother         frequent pneumonia   • Heart Disease Father 54        MI   • Lung Disease Father         Emphysema   • Heart Attack Father    • Cancer Maternal Aunt         Great Aunt and Cousin Ovarian CA   • Hypertension Maternal Aunt    • Cancer Daughter         Lung and Brain CA   • Seizures Daughter    • Cancer Daughter         Pituitary tumor   • Cancer Brother         leukemia         Social History      Tobacco Use   Smoking Status Never Smoker   Smokeless Tobacco Never Used   Tobacco Comment    Pt exposed to second hand smoker          Social History     Substance and Sexual Activity   Alcohol Use Yes    Comment: Occasionally         Allergies   Allergen Reactions   • Other Environmental      Seasonal allergies         Outpatient Encounter Medications as of 5/25/2021   Medication Sig Dispense Refill   • acetaminophen (TYLENOL) 500 MG Tab Take 1,000 mg by mouth every 6 hours as needed for Moderate Pain.     • prednisoLONE acetate (PRED FORTE) 1 % Suspension Administer 3 Drops into both eyes. 3 drops in left eye, 6 drops in right eye     • Methylcobalamin 2500 MCG SL Tab Take 1 tablet by mouth 1 time a day as needed (supplement).     • Multiple Vitamins-Minerals (ICAPS AREDS 2 PO) Take 2 Capsule by mouth every day.     • metoprolol SR (TOPROL XL) 25 MG TABLET SR 24 HR Take 1 tablet by mouth every day. 90 tablet 3   • atorvastatin (LIPITOR) 40 MG Tab Take 1 tablet by mouth every evening. 30 tablet 0   • aspirin 81 MG EC tablet Take 1 tablet by mouth every day. 30 tablet 0   • Cholecalciferol (VITAMIN D-3) 125 MCG (5000 UT) Tab Take 5,000 Units by mouth every day. 5000 unit tablet + 1000 unit tablet for a total of 6000 units daily     • Coenzyme Q10 (COQ10 PO) Take 1 capsule by mouth two times a week. Sunday & Wednesday     • escitalopram (LEXAPRO) 5 MG tablet Take 1 Tab by mouth every day. 90 Tab 3     No facility-administered encounter medications on file as of 5/25/2021.         Review of Systems   Constitutional: Positive for malaise/fatigue and weight loss. Negative for chills and fever.   HENT: Negative for nosebleeds.    Respiratory: Positive for shortness of breath. Negative for cough.    Cardiovascular: Positive for leg swelling. Negative for chest pain, palpitations, orthopnea and PND.   Gastrointestinal: Negative for blood in stool, melena and nausea.   Genitourinary: Negative for hematuria.  "  Neurological: Negative for dizziness.   Psychiatric/Behavioral: Negative for depression.          Objective:   /56 (BP Location: Left arm, Patient Position: Sitting)   Pulse 92   Ht 1.6 m (5' 3\")   Wt 59 kg (130 lb)   SpO2 96%   BMI 23.03 kg/m²        Physical Exam  Vitals and nursing note reviewed.   Constitutional:       General: She is not in acute distress.     Appearance: Normal appearance.      Comments: Ambulating with walker   HENT:      Head: Normocephalic and atraumatic.      Mouth/Throat:      Mouth: Mucous membranes are moist.   Eyes:      General: No scleral icterus.  Neck:      Comments: JVP is 5-6cm  Cardiovascular:      Rate and Rhythm: Normal rate and regular rhythm.      Pulses: Normal pulses.      Heart sounds: Murmur heard.        Comments: Pacemaker present in left upper chest without evidence   of erosion, drainage,or erythema. Steri strips in place    Pulmonary:      Effort: Pulmonary effort is normal. No respiratory distress.      Breath sounds: Normal breath sounds. No rales.   Musculoskeletal:      Right lower leg: No edema.      Left lower leg: No edema.      Comments: No pitting edema, obvious varicosities in bilateral ankles   Skin:     General: Skin is warm and dry.      Capillary Refill: Capillary refill takes less than 2 seconds.   Neurological:      General: No focal deficit present.      Mental Status: She is alert and oriented to person, place, and time.   Psychiatric:         Judgment: Judgment normal.         TTE 4/3/21  CONCLUSIONS  Compared to the images of the prior study done 11/2/2018, intracavitary   gradient is present.  Hyperdynamic left ventricular systolic function.  Left ventricular ejection fraction is visually estimated to be greater   than 75%.  Evidence of increased intracavity gradient, peak velocity is 2.6 m/sec.  Systolic anterior motion of the anterior mitral valve leaflet.     Op Note 5/12/21 Dr. Hollis  Impressions:    1. 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  2. Successful catheter mediated ablation of slow AV satya pathway with elimination of AVNRT.  Assessment:     1. Cardiac pacemaker in situ     2. Essential hypertension     3. SVT (supraventricular tachycardia) (HCC)     4. Essential hypertension, benign     5. HOCM (hypertrophic obstructive cardiomyopathy) (HCC)     6. Fatigue, unspecified type     7. Physical deconditioning     8. Hypoalbuminemia          Medical Decision Making:  Today's Assessment / Plan:   Syncope, resolved  Paroxysmal SVT, Tachybrady syndrome  S/P AVNRT ablation  S/P PPM [facility]  - pacer checked last week, v-paced 87% of the time. Device site is clean and healing well  - has follow up with EP in a month     LVOT Obstruction  - loud murmur on exam   - BPs check at home with PT and no sign of orthostasis  - Continue Metop, if her fatigue isn't any better at next visit consider discontinuing this     Asymptomatic carotid stenosis  - 50-69% stenosis R ICA  - continue lipitor 40mg  - aspirin 81mg     Fatigue  Generalized weakness  Hypoalbuminemia  - had almost 10lb weight loss in hospital, cont working with PT. If her stamina isn't back after PT has discharged her will check labs, can stop BB  - encouraged adequate calorie intake    Preoperative Eval  - planning for a cataract removal procedure. RCRI is 0. She is low risk for low risk procedures. She is not currently taking anticoagulants or antiplatelets.  She may proceed with planned procedure without any further cardiac testing.    RTC in with [facility] in 6 weeks, EP in June

## 2021-05-25 NOTE — CASE COMMUNICATION
I agree with these changes  Sarah Blanton RN  ----- Message -----  From: Claudia De La Rosa R.N.  Sent: 5/24/2021   2:21 PM PDT  To: Sarah Blanton R.N.      Quality Review for 5.15.21 SOC OASIS performed on by RONA De La Rosa RN on 5.24, 2021:    Edits completed by RONA De La Rosa RN:  1. Changed  to 5.13.21 date of valid referral  2. Changed  to na, pt was observation  3. Changed  per MD orders dressing to remain on, wound tab assessment reports dressing was CDI  4. Changed  to 5, per dc orders pt to sponge bathe. Changed  to 2 per narrative pt needs supervision and DME. Changed  to 2 and [id number] C to 3 per narrative pt needs min assist with dressing  5. Changed  to 0 per narrative no med issues were identified.   6. Changed  to 8   7. Added post pacemaker precautions. Added incontinence to functional limitations. Added correct use of support devices, fall risk to safety measures  8.  Added F2F data  9. Resolved depression and re added with depression care assessment for each visit selected  10. Added daily to wound care instructions for the pacemaker site. Added hermes groin sites to wound man. Added wound monitoring interventions to care plan. Added HTN to care plan. Added post op pace maker and ablation instructions to care plan

## 2021-05-26 ENCOUNTER — HOME CARE VISIT (OUTPATIENT)
Dept: HOME HEALTH SERVICES | Facility: HOME HEALTHCARE | Age: 86
End: 2021-05-26
Payer: MEDICARE

## 2021-05-26 VITALS
DIASTOLIC BLOOD PRESSURE: 64 MMHG | RESPIRATION RATE: 17 BRPM | SYSTOLIC BLOOD PRESSURE: 122 MMHG | OXYGEN SATURATION: 96 % | HEART RATE: 84 BPM | TEMPERATURE: 98.8 F

## 2021-05-26 PROCEDURE — G0495 RN CARE TRAIN/EDU IN HH: HCPCS

## 2021-05-26 PROCEDURE — G0151 HHCP-SERV OF PT,EA 15 MIN: HCPCS

## 2021-05-26 ASSESSMENT — ENCOUNTER SYMPTOMS
VOMITING: DENIES
NAUSEA: DENIES

## 2021-05-27 PROCEDURE — G0180 MD CERTIFICATION HHA PATIENT: HCPCS | Performed by: FAMILY MEDICINE

## 2021-05-28 ASSESSMENT — ACTIVITIES OF DAILY LIVING (ADL): AMBULATION ASSISTANCE ON FLAT SURFACES: 1

## 2021-05-31 ENCOUNTER — HOME CARE VISIT (OUTPATIENT)
Dept: HOME HEALTH SERVICES | Facility: HOME HEALTHCARE | Age: 86
End: 2021-05-31
Payer: MEDICARE

## 2021-05-31 VITALS
HEART RATE: 82 BPM | SYSTOLIC BLOOD PRESSURE: 118 MMHG | OXYGEN SATURATION: 93 % | RESPIRATION RATE: 18 BRPM | TEMPERATURE: 98.7 F | DIASTOLIC BLOOD PRESSURE: 62 MMHG

## 2021-05-31 PROCEDURE — G0151 HHCP-SERV OF PT,EA 15 MIN: HCPCS

## 2021-06-02 ENCOUNTER — HOME CARE VISIT (OUTPATIENT)
Dept: HOME HEALTH SERVICES | Facility: HOME HEALTHCARE | Age: 86
End: 2021-06-02
Payer: MEDICARE

## 2021-06-02 VITALS
RESPIRATION RATE: 16 BRPM | OXYGEN SATURATION: 95 % | HEART RATE: 83 BPM | TEMPERATURE: 99.1 F | SYSTOLIC BLOOD PRESSURE: 122 MMHG | DIASTOLIC BLOOD PRESSURE: 70 MMHG

## 2021-06-02 VITALS
TEMPERATURE: 99.1 F | RESPIRATION RATE: 16 BRPM | OXYGEN SATURATION: 95 % | HEART RATE: 83 BPM | SYSTOLIC BLOOD PRESSURE: 122 MMHG | DIASTOLIC BLOOD PRESSURE: 70 MMHG

## 2021-06-02 PROCEDURE — G0151 HHCP-SERV OF PT,EA 15 MIN: HCPCS

## 2021-06-02 PROCEDURE — G0152 HHCP-SERV OF OT,EA 15 MIN: HCPCS

## 2021-06-03 ASSESSMENT — ACTIVITIES OF DAILY LIVING (ADL)
DRESSING_LB_CURRENT_FUNCTION: INDEPENDENT
CURRENT_FUNCTION: STAND BY ASSIST
GROOMING_CURRENT_FUNCTION: INDEPENDENT
CURRENT_FUNCTION: SUPERVISION
DRESSING_UB_CURRENT_FUNCTION: INDEPENDENT
GROOMING ASSESSED: 1
PHYSICAL TRANSFERS ASSESSED: 1
PREPARING MEALS: NEEDS ASSISTANCE

## 2021-06-04 ENCOUNTER — APPOINTMENT (RX ONLY)
Dept: URBAN - METROPOLITAN AREA CLINIC 22 | Facility: CLINIC | Age: 86
Setting detail: DERMATOLOGY
End: 2021-06-04

## 2021-06-04 DIAGNOSIS — L82.1 OTHER SEBORRHEIC KERATOSIS: ICD-10-CM

## 2021-06-04 DIAGNOSIS — L81.4 OTHER MELANIN HYPERPIGMENTATION: ICD-10-CM

## 2021-06-04 DIAGNOSIS — D22 MELANOCYTIC NEVI: ICD-10-CM

## 2021-06-04 DIAGNOSIS — Z85.828 PERSONAL HISTORY OF OTHER MALIGNANT NEOPLASM OF SKIN: ICD-10-CM

## 2021-06-04 DIAGNOSIS — L57.0 ACTINIC KERATOSIS: ICD-10-CM

## 2021-06-04 PROBLEM — D23.72 OTHER BENIGN NEOPLASM OF SKIN OF LEFT LOWER LIMB, INCLUDING HIP: Status: ACTIVE | Noted: 2021-06-04

## 2021-06-04 PROBLEM — D22.5 MELANOCYTIC NEVI OF TRUNK: Status: ACTIVE | Noted: 2021-06-04

## 2021-06-04 PROCEDURE — ? COUNSELING

## 2021-06-04 PROCEDURE — 99213 OFFICE O/P EST LOW 20 MIN: CPT | Mod: 25

## 2021-06-04 PROCEDURE — ? LIQUID NITROGEN

## 2021-06-04 PROCEDURE — ? SUNSCREEN TREATMENT REGIMEN

## 2021-06-04 PROCEDURE — 17000 DESTRUCT PREMALG LESION: CPT

## 2021-06-04 ASSESSMENT — LOCATION SIMPLE DESCRIPTION DERM
LOCATION SIMPLE: INFERIOR FOREHEAD
LOCATION SIMPLE: UPPER BACK
LOCATION SIMPLE: NOSE
LOCATION SIMPLE: LEFT BREAST
LOCATION SIMPLE: RIGHT TEMPLE
LOCATION SIMPLE: LEFT FOREARM
LOCATION SIMPLE: RIGHT FOREARM

## 2021-06-04 ASSESSMENT — LOCATION DETAILED DESCRIPTION DERM
LOCATION DETAILED: SUPERIOR THORACIC SPINE
LOCATION DETAILED: RIGHT VENTRAL PROXIMAL FOREARM
LOCATION DETAILED: LEFT NASAL DORSUM
LOCATION DETAILED: RIGHT CENTRAL TEMPLE
LOCATION DETAILED: INFERIOR THORACIC SPINE
LOCATION DETAILED: LEFT MEDIAL BREAST 11-12:00 REGION
LOCATION DETAILED: LEFT VENTRAL PROXIMAL FOREARM
LOCATION DETAILED: INFERIOR MID FOREHEAD

## 2021-06-04 ASSESSMENT — LOCATION ZONE DERM
LOCATION ZONE: TRUNK
LOCATION ZONE: FACE
LOCATION ZONE: ARM
LOCATION ZONE: NOSE

## 2021-06-04 NOTE — HPI: SKIN LESION
Is This A New Presentation, Or A Follow-Up?: Skin Lesion
What Type Of Note Output Would You Prefer (Optional)?: Standard Output
How Severe Is Your Skin Lesion?: moderate
Has Your Skin Lesion Been Treated?: not been treated
Is This A New Presentation, Or A Follow-Up?: Growth
How Severe Is Your Skin Lesion?: mild

## 2021-06-07 ENCOUNTER — HOME CARE VISIT (OUTPATIENT)
Dept: HOME HEALTH SERVICES | Facility: HOME HEALTHCARE | Age: 86
End: 2021-06-07
Payer: MEDICARE

## 2021-06-07 VITALS
DIASTOLIC BLOOD PRESSURE: 62 MMHG | RESPIRATION RATE: 17 BRPM | HEART RATE: 82 BPM | SYSTOLIC BLOOD PRESSURE: 122 MMHG | TEMPERATURE: 98.3 F | OXYGEN SATURATION: 94 %

## 2021-06-07 PROCEDURE — G0151 HHCP-SERV OF PT,EA 15 MIN: HCPCS

## 2021-06-07 ASSESSMENT — ENCOUNTER SYMPTOMS: DEBILITATING PAIN: 1

## 2021-06-08 ENCOUNTER — HOME CARE VISIT (OUTPATIENT)
Dept: HOME HEALTH SERVICES | Facility: HOME HEALTHCARE | Age: 86
End: 2021-06-08
Payer: MEDICARE

## 2021-06-08 VITALS
RESPIRATION RATE: 17 BRPM | OXYGEN SATURATION: 96 % | HEART RATE: 81 BPM | SYSTOLIC BLOOD PRESSURE: 122 MMHG | TEMPERATURE: 98 F | DIASTOLIC BLOOD PRESSURE: 60 MMHG

## 2021-06-08 PROCEDURE — G0152 HHCP-SERV OF OT,EA 15 MIN: HCPCS

## 2021-06-09 ENCOUNTER — HOME CARE VISIT (OUTPATIENT)
Dept: HOME HEALTH SERVICES | Facility: HOME HEALTHCARE | Age: 86
End: 2021-06-09
Payer: MEDICARE

## 2021-06-09 VITALS
RESPIRATION RATE: 16 BRPM | SYSTOLIC BLOOD PRESSURE: 122 MMHG | OXYGEN SATURATION: 95 % | HEART RATE: 84 BPM | TEMPERATURE: 98.6 F | DIASTOLIC BLOOD PRESSURE: 70 MMHG

## 2021-06-09 PROCEDURE — G0151 HHCP-SERV OF PT,EA 15 MIN: HCPCS

## 2021-06-09 ASSESSMENT — ACTIVITIES OF DAILY LIVING (ADL)
AMBULATION ASSISTANCE ON FLAT SURFACES: 1
AMBULATION_DISTANCE/DURATION_TOLERATED: 2 X 40 FT

## 2021-06-14 ENCOUNTER — HOME CARE VISIT (OUTPATIENT)
Dept: HOME HEALTH SERVICES | Facility: HOME HEALTHCARE | Age: 86
End: 2021-06-14
Payer: MEDICARE

## 2021-06-14 ENCOUNTER — TELEPHONE (OUTPATIENT)
Dept: CARDIOLOGY | Facility: MEDICAL CENTER | Age: 86
End: 2021-06-14

## 2021-06-14 ENCOUNTER — PATIENT MESSAGE (OUTPATIENT)
Dept: MEDICAL GROUP | Facility: PHYSICIAN GROUP | Age: 86
End: 2021-06-14

## 2021-06-14 VITALS
SYSTOLIC BLOOD PRESSURE: 138 MMHG | OXYGEN SATURATION: 98 % | TEMPERATURE: 99.3 F | DIASTOLIC BLOOD PRESSURE: 72 MMHG | HEART RATE: 82 BPM

## 2021-06-14 DIAGNOSIS — E78.5 DYSLIPIDEMIA: ICD-10-CM

## 2021-06-14 PROCEDURE — G0151 HHCP-SERV OF PT,EA 15 MIN: HCPCS

## 2021-06-14 PROCEDURE — 665001 SOC-HOME HEALTH

## 2021-06-14 NOTE — TELEPHONE ENCOUNTER
JA    Patient called and stated that she was discharged from the hospital with Losartan. She wants to know is she to continue this medications or not. Please send refill to pharmacy on file.    Thank you,    Lyudmila SEE

## 2021-06-15 RX ORDER — ATORVASTATIN CALCIUM 40 MG/1
40 TABLET, FILM COATED ORAL EVERY EVENING
Qty: 90 TABLET | Refills: 3 | Status: ON HOLD
Start: 2021-06-15 | End: 2021-06-23

## 2021-06-15 RX ORDER — ATORVASTATIN CALCIUM 40 MG/1
40 TABLET, FILM COATED ORAL EVERY EVENING
Qty: 100 TABLET | Refills: 3 | Status: SHIPPED
Start: 2021-06-15 | End: 2021-12-07

## 2021-06-15 NOTE — TELEPHONE ENCOUNTER
Called and spoke to patient daughter Clarita and she stated patient is out of Lipitor and they want to know if she is supposed to continue taking it.  Based on last OV note, she is to continue medication. Answered all questions and concerns, appreciative of call.     RX reordered and sent to preferred pharmacy.

## 2021-06-17 ENCOUNTER — HOME CARE VISIT (OUTPATIENT)
Dept: HOME HEALTH SERVICES | Facility: HOME HEALTHCARE | Age: 86
End: 2021-06-17
Payer: MEDICARE

## 2021-06-17 PROCEDURE — G0152 HHCP-SERV OF OT,EA 15 MIN: HCPCS

## 2021-06-20 ENCOUNTER — APPOINTMENT (OUTPATIENT)
Dept: RADIOLOGY | Facility: MEDICAL CENTER | Age: 86
DRG: 378 | End: 2021-06-20
Attending: EMERGENCY MEDICINE
Payer: MEDICARE

## 2021-06-20 ENCOUNTER — HOSPITAL ENCOUNTER (INPATIENT)
Facility: MEDICAL CENTER | Age: 86
LOS: 3 days | DRG: 378 | End: 2021-06-23
Attending: EMERGENCY MEDICINE | Admitting: INTERNAL MEDICINE
Payer: MEDICARE

## 2021-06-20 VITALS
OXYGEN SATURATION: 95 % | TEMPERATURE: 98.2 F | DIASTOLIC BLOOD PRESSURE: 60 MMHG | SYSTOLIC BLOOD PRESSURE: 142 MMHG | HEART RATE: 68 BPM | RESPIRATION RATE: 18 BRPM

## 2021-06-20 DIAGNOSIS — R55 SYNCOPE, UNSPECIFIED SYNCOPE TYPE: ICD-10-CM

## 2021-06-20 DIAGNOSIS — D64.9 ANEMIA, UNSPECIFIED TYPE: ICD-10-CM

## 2021-06-20 DIAGNOSIS — K92.2 UPPER GI BLEED: ICD-10-CM

## 2021-06-20 PROBLEM — I95.1 ORTHOSTATIC HYPOTENSION: Status: ACTIVE | Noted: 2021-06-20

## 2021-06-20 PROBLEM — D72.829 LEUKOCYTOSIS: Status: ACTIVE | Noted: 2021-06-20

## 2021-06-20 PROBLEM — R79.89 ELEVATED TROPONIN: Status: ACTIVE | Noted: 2021-06-20

## 2021-06-20 LAB
ABO + RH BLD: NORMAL
ABO GROUP BLD: ABNORMAL
ALBUMIN SERPL BCP-MCNC: 2.7 G/DL (ref 3.2–4.9)
ALBUMIN/GLOB SERPL: 1 G/DL
ALP SERPL-CCNC: 81 U/L (ref 30–99)
ALT SERPL-CCNC: 13 U/L (ref 2–50)
ANION GAP SERPL CALC-SCNC: 11 MMOL/L (ref 7–16)
APTT PPP: 25.4 SEC (ref 24.7–36)
AST SERPL-CCNC: 22 U/L (ref 12–45)
BARCODED ABORH UBTYP: 600
BARCODED PRD CODE UBPRD: ABNORMAL
BARCODED UNIT NUM UBUNT: ABNORMAL
BASOPHILS # BLD AUTO: 0.7 % (ref 0–1.8)
BASOPHILS # BLD: 0.09 K/UL (ref 0–0.12)
BILIRUB SERPL-MCNC: 0.4 MG/DL (ref 0.1–1.5)
BLD GP AB INVEST PLASRBC-IMP: ABNORMAL
BLD GP AB SCN SERPL QL: ABNORMAL
BLD GP AB SCN SERPL QL: ABNORMAL
BUN SERPL-MCNC: 31 MG/DL (ref 8–22)
CALCIUM SERPL-MCNC: 8.3 MG/DL (ref 8.5–10.5)
CHLORIDE SERPL-SCNC: 111 MMOL/L (ref 96–112)
CK SERPL-CCNC: 44 U/L (ref 0–154)
CO2 SERPL-SCNC: 19 MMOL/L (ref 20–33)
COMMENT 1642: NORMAL
COMPONENT R 8504R: ABNORMAL
CORTIS SERPL-MCNC: 24.8 UG/DL (ref 0–23)
CREAT SERPL-MCNC: 1.48 MG/DL (ref 0.5–1.4)
EKG IMPRESSION: NORMAL
EOSINOPHIL # BLD AUTO: 0.07 K/UL (ref 0–0.51)
EOSINOPHIL NFR BLD: 0.5 % (ref 0–6.9)
ERYTHROCYTE [DISTWIDTH] IN BLOOD BY AUTOMATED COUNT: 51.7 FL (ref 35.9–50)
FERRITIN SERPL-MCNC: 125 NG/ML (ref 10–291)
FOLATE SERPL-MCNC: 10.1 NG/ML
GLOBULIN SER CALC-MCNC: 2.7 G/DL (ref 1.9–3.5)
GLUCOSE SERPL-MCNC: 117 MG/DL (ref 65–99)
HCT VFR BLD AUTO: 27.2 % (ref 37–47)
HEMOCCULT STL QL: POSITIVE
HGB BLD-MCNC: 7.8 G/DL (ref 12–16)
HGB BLD-MCNC: 8 G/DL (ref 12–16)
HGB RETIC QN AUTO: 31.6 PG/CELL (ref 29–35)
IMM GRANULOCYTES # BLD AUTO: 0.21 K/UL (ref 0–0.11)
IMM GRANULOCYTES NFR BLD AUTO: 1.6 % (ref 0–0.9)
IMM RETICS NFR: 22.5 % (ref 9.3–17.4)
INR PPP: 1.34 (ref 0.87–1.13)
IRON SATN MFR SERPL: 25 % (ref 15–55)
IRON SERPL-MCNC: 45 UG/DL (ref 40–170)
LIPASE SERPL-CCNC: 61 U/L (ref 11–82)
LYMPHOCYTES # BLD AUTO: 1.97 K/UL (ref 1–4.8)
LYMPHOCYTES NFR BLD: 15 % (ref 22–41)
MCH RBC QN AUTO: 27.1 PG (ref 27–33)
MCHC RBC AUTO-ENTMCNC: 28.7 G/DL (ref 33.6–35)
MCV RBC AUTO: 94.4 FL (ref 81.4–97.8)
MONOCYTES # BLD AUTO: 1 K/UL (ref 0–0.85)
MONOCYTES NFR BLD AUTO: 7.6 % (ref 0–13.4)
MORPHOLOGY BLD-IMP: NORMAL
NEUTROPHILS # BLD AUTO: 9.81 K/UL (ref 2–7.15)
NEUTROPHILS NFR BLD: 74.6 % (ref 44–72)
NRBC # BLD AUTO: 0 K/UL
NRBC BLD-RTO: 0 /100 WBC
OVALOCYTES BLD QL SMEAR: NORMAL
PLATELET # BLD AUTO: 249 K/UL (ref 164–446)
PLATELET BLD QL SMEAR: NORMAL
PMV BLD AUTO: 10.2 FL (ref 9–12.9)
POIKILOCYTOSIS BLD QL SMEAR: NORMAL
POTASSIUM SERPL-SCNC: 4.4 MMOL/L (ref 3.6–5.5)
PROCALCITONIN SERPL-MCNC: <0.05 NG/ML
PRODUCT TYPE UPROD: ABNORMAL
PROT SERPL-MCNC: 5.4 G/DL (ref 6–8.2)
PROTHROMBIN TIME: 16.2 SEC (ref 12–14.6)
RBC # BLD AUTO: 2.88 M/UL (ref 4.2–5.4)
RBC BLD AUTO: PRESENT
RETICS # AUTO: 0.07 M/UL (ref 0.04–0.06)
RETICS/RBC NFR: 2.4 % (ref 0.8–2.1)
RH BLD: ABNORMAL
SARS-COV+SARS-COV-2 AG RESP QL IA.RAPID: NOTDETECTED
SARS-COV-2 RNA RESP QL NAA+PROBE: NOTDETECTED
SODIUM SERPL-SCNC: 141 MMOL/L (ref 135–145)
SPECIMEN SOURCE: NORMAL
SPECIMEN SOURCE: NORMAL
TIBC SERPL-MCNC: 178 UG/DL (ref 250–450)
TROPONIN T SERPL-MCNC: 32 NG/L (ref 6–19)
TSH SERPL DL<=0.005 MIU/L-ACNC: 2.92 UIU/ML (ref 0.38–5.33)
UIBC SERPL-MCNC: 133 UG/DL (ref 110–370)
UNIT STATUS USTAT: ABNORMAL
VIT B12 SERPL-MCNC: 693 PG/ML (ref 211–911)
WBC # BLD AUTO: 13.2 K/UL (ref 4.8–10.8)

## 2021-06-20 PROCEDURE — 80053 COMPREHEN METABOLIC PANEL: CPT

## 2021-06-20 PROCEDURE — 84484 ASSAY OF TROPONIN QUANT: CPT

## 2021-06-20 PROCEDURE — C9113 INJ PANTOPRAZOLE SODIUM, VIA: HCPCS | Performed by: INTERNAL MEDICINE

## 2021-06-20 PROCEDURE — 85018 HEMOGLOBIN: CPT

## 2021-06-20 PROCEDURE — 82728 ASSAY OF FERRITIN: CPT

## 2021-06-20 PROCEDURE — C9803 HOPD COVID-19 SPEC COLLECT: HCPCS | Performed by: EMERGENCY MEDICINE

## 2021-06-20 PROCEDURE — 84145 PROCALCITONIN (PCT): CPT

## 2021-06-20 PROCEDURE — 85610 PROTHROMBIN TIME: CPT

## 2021-06-20 PROCEDURE — 71045 X-RAY EXAM CHEST 1 VIEW: CPT

## 2021-06-20 PROCEDURE — 700117 HCHG RX CONTRAST REV CODE 255: Performed by: EMERGENCY MEDICINE

## 2021-06-20 PROCEDURE — 84443 ASSAY THYROID STIM HORMONE: CPT

## 2021-06-20 PROCEDURE — 86922 COMPATIBILITY TEST ANTIGLOB: CPT

## 2021-06-20 PROCEDURE — 700111 HCHG RX REV CODE 636 W/ 250 OVERRIDE (IP): Performed by: EMERGENCY MEDICINE

## 2021-06-20 PROCEDURE — 85025 COMPLETE CBC W/AUTO DIFF WBC: CPT

## 2021-06-20 PROCEDURE — 86901 BLOOD TYPING SEROLOGIC RH(D): CPT

## 2021-06-20 PROCEDURE — 700111 HCHG RX REV CODE 636 W/ 250 OVERRIDE (IP): Performed by: INTERNAL MEDICINE

## 2021-06-20 PROCEDURE — U0005 INFEC AGEN DETEC AMPLI PROBE: HCPCS

## 2021-06-20 PROCEDURE — U0003 INFECTIOUS AGENT DETECTION BY NUCLEIC ACID (DNA OR RNA); SEVERE ACUTE RESPIRATORY SYNDROME CORONAVIRUS 2 (SARS-COV-2) (CORONAVIRUS DISEASE [COVID-19]), AMPLIFIED PROBE TECHNIQUE, MAKING USE OF HIGH THROUGHPUT TECHNOLOGIES AS DESCRIBED BY CMS-2020-01-R: HCPCS

## 2021-06-20 PROCEDURE — 82607 VITAMIN B-12: CPT

## 2021-06-20 PROCEDURE — 74177 CT ABD & PELVIS W/CONTRAST: CPT | Mod: MG

## 2021-06-20 PROCEDURE — 93005 ELECTROCARDIOGRAM TRACING: CPT | Performed by: EMERGENCY MEDICINE

## 2021-06-20 PROCEDURE — 87040 BLOOD CULTURE FOR BACTERIA: CPT

## 2021-06-20 PROCEDURE — 86850 RBC ANTIBODY SCREEN: CPT

## 2021-06-20 PROCEDURE — 86900 BLOOD TYPING SEROLOGIC ABO: CPT

## 2021-06-20 PROCEDURE — 86905 BLOOD TYPING RBC ANTIGENS: CPT | Mod: 91

## 2021-06-20 PROCEDURE — 83550 IRON BINDING TEST: CPT

## 2021-06-20 PROCEDURE — A9270 NON-COVERED ITEM OR SERVICE: HCPCS | Performed by: INTERNAL MEDICINE

## 2021-06-20 PROCEDURE — 99285 EMERGENCY DEPT VISIT HI MDM: CPT

## 2021-06-20 PROCEDURE — 83540 ASSAY OF IRON: CPT

## 2021-06-20 PROCEDURE — 82550 ASSAY OF CK (CPK): CPT

## 2021-06-20 PROCEDURE — 87426 SARSCOV CORONAVIRUS AG IA: CPT

## 2021-06-20 PROCEDURE — 85046 RETICYTE/HGB CONCENTRATE: CPT

## 2021-06-20 PROCEDURE — 82272 OCCULT BLD FECES 1-3 TESTS: CPT

## 2021-06-20 PROCEDURE — 96374 THER/PROPH/DIAG INJ IV PUSH: CPT

## 2021-06-20 PROCEDURE — 36430 TRANSFUSION BLD/BLD COMPNT: CPT

## 2021-06-20 PROCEDURE — 30233N1 TRANSFUSION OF NONAUTOLOGOUS RED BLOOD CELLS INTO PERIPHERAL VEIN, PERCUTANEOUS APPROACH: ICD-10-PCS | Performed by: INTERNAL MEDICINE

## 2021-06-20 PROCEDURE — 85730 THROMBOPLASTIN TIME PARTIAL: CPT

## 2021-06-20 PROCEDURE — C9113 INJ PANTOPRAZOLE SODIUM, VIA: HCPCS | Performed by: EMERGENCY MEDICINE

## 2021-06-20 PROCEDURE — 82746 ASSAY OF FOLIC ACID SERUM: CPT

## 2021-06-20 PROCEDURE — 82533 TOTAL CORTISOL: CPT

## 2021-06-20 PROCEDURE — 700102 HCHG RX REV CODE 250 W/ 637 OVERRIDE(OP): Performed by: INTERNAL MEDICINE

## 2021-06-20 PROCEDURE — 99223 1ST HOSP IP/OBS HIGH 75: CPT | Mod: AI | Performed by: INTERNAL MEDICINE

## 2021-06-20 PROCEDURE — 700101 HCHG RX REV CODE 250: Performed by: INTERNAL MEDICINE

## 2021-06-20 PROCEDURE — 700105 HCHG RX REV CODE 258: Performed by: INTERNAL MEDICINE

## 2021-06-20 PROCEDURE — 36415 COLL VENOUS BLD VENIPUNCTURE: CPT

## 2021-06-20 PROCEDURE — 770020 HCHG ROOM/CARE - TELE (206)

## 2021-06-20 PROCEDURE — 86902 BLOOD TYPE ANTIGEN DONOR EA: CPT | Mod: 91

## 2021-06-20 PROCEDURE — 83690 ASSAY OF LIPASE: CPT

## 2021-06-20 PROCEDURE — 700105 HCHG RX REV CODE 258: Performed by: EMERGENCY MEDICINE

## 2021-06-20 PROCEDURE — 70450 CT HEAD/BRAIN W/O DYE: CPT | Mod: MG

## 2021-06-20 PROCEDURE — P9016 RBC LEUKOCYTES REDUCED: HCPCS

## 2021-06-20 PROCEDURE — 86870 RBC ANTIBODY IDENTIFICATION: CPT

## 2021-06-20 RX ORDER — ATORVASTATIN CALCIUM 40 MG/1
40 TABLET, FILM COATED ORAL EVERY EVENING
Status: DISCONTINUED | OUTPATIENT
Start: 2021-06-20 | End: 2021-06-23 | Stop reason: HOSPADM

## 2021-06-20 RX ORDER — ONDANSETRON 2 MG/ML
4 INJECTION INTRAMUSCULAR; INTRAVENOUS EVERY 4 HOURS PRN
Status: DISCONTINUED | OUTPATIENT
Start: 2021-06-20 | End: 2021-06-23 | Stop reason: HOSPADM

## 2021-06-20 RX ORDER — MOXIFLOXACIN 5 MG/ML
1 SOLUTION OPHTHALMIC 4 TIMES DAILY
Status: ON HOLD | COMMUNITY
End: 2021-06-23 | Stop reason: SDUPTHER

## 2021-06-20 RX ORDER — ONDANSETRON 4 MG/1
4 TABLET, ORALLY DISINTEGRATING ORAL EVERY 4 HOURS PRN
Status: DISCONTINUED | OUTPATIENT
Start: 2021-06-20 | End: 2021-06-23 | Stop reason: HOSPADM

## 2021-06-20 RX ORDER — POLYETHYLENE GLYCOL 3350 17 G/17G
1 POWDER, FOR SOLUTION ORAL
Status: DISCONTINUED | OUTPATIENT
Start: 2021-06-20 | End: 2021-06-23 | Stop reason: HOSPADM

## 2021-06-20 RX ORDER — METOPROLOL SUCCINATE 25 MG/1
25 TABLET, EXTENDED RELEASE ORAL EVERY EVENING
Status: DISCONTINUED | OUTPATIENT
Start: 2021-06-21 | End: 2021-06-23 | Stop reason: HOSPADM

## 2021-06-20 RX ORDER — AMOXICILLIN 250 MG
2 CAPSULE ORAL 2 TIMES DAILY
Status: DISCONTINUED | OUTPATIENT
Start: 2021-06-20 | End: 2021-06-23 | Stop reason: HOSPADM

## 2021-06-20 RX ORDER — ESCITALOPRAM OXALATE 10 MG/1
5 TABLET ORAL EVERY EVENING
Status: DISCONTINUED | OUTPATIENT
Start: 2021-06-20 | End: 2021-06-23 | Stop reason: HOSPADM

## 2021-06-20 RX ORDER — SODIUM CHLORIDE 9 MG/ML
INJECTION, SOLUTION INTRAVENOUS CONTINUOUS
Status: DISCONTINUED | OUTPATIENT
Start: 2021-06-20 | End: 2021-06-23

## 2021-06-20 RX ORDER — OXYCODONE HYDROCHLORIDE 5 MG/1
2.5 TABLET ORAL
Status: DISCONTINUED | OUTPATIENT
Start: 2021-06-20 | End: 2021-06-23 | Stop reason: HOSPADM

## 2021-06-20 RX ORDER — BISACODYL 10 MG
10 SUPPOSITORY, RECTAL RECTAL
Status: DISCONTINUED | OUTPATIENT
Start: 2021-06-20 | End: 2021-06-23 | Stop reason: HOSPADM

## 2021-06-20 RX ORDER — LABETALOL HYDROCHLORIDE 5 MG/ML
10 INJECTION, SOLUTION INTRAVENOUS EVERY 4 HOURS PRN
Status: DISCONTINUED | OUTPATIENT
Start: 2021-06-20 | End: 2021-06-23 | Stop reason: HOSPADM

## 2021-06-20 RX ORDER — MORPHINE SULFATE 4 MG/ML
2 INJECTION, SOLUTION INTRAMUSCULAR; INTRAVENOUS
Status: DISCONTINUED | OUTPATIENT
Start: 2021-06-20 | End: 2021-06-23 | Stop reason: HOSPADM

## 2021-06-20 RX ORDER — ACETAMINOPHEN 325 MG/1
650 TABLET ORAL EVERY 6 HOURS PRN
Status: DISCONTINUED | OUTPATIENT
Start: 2021-06-20 | End: 2021-06-23 | Stop reason: HOSPADM

## 2021-06-20 RX ORDER — METOPROLOL TARTRATE 1 MG/ML
5 INJECTION, SOLUTION INTRAVENOUS
Status: CANCELLED | OUTPATIENT
Start: 2021-06-20

## 2021-06-20 RX ORDER — METOPROLOL SUCCINATE 25 MG/1
25 TABLET, EXTENDED RELEASE ORAL DAILY
Status: CANCELLED | OUTPATIENT
Start: 2021-06-20

## 2021-06-20 RX ORDER — ENALAPRILAT 1.25 MG/ML
1.25 INJECTION INTRAVENOUS EVERY 6 HOURS PRN
Status: DISCONTINUED | OUTPATIENT
Start: 2021-06-20 | End: 2021-06-23 | Stop reason: HOSPADM

## 2021-06-20 RX ORDER — SODIUM CHLORIDE, SODIUM LACTATE, POTASSIUM CHLORIDE, CALCIUM CHLORIDE 600; 310; 30; 20 MG/100ML; MG/100ML; MG/100ML; MG/100ML
1000 INJECTION, SOLUTION INTRAVENOUS ONCE
Status: COMPLETED | OUTPATIENT
Start: 2021-06-20 | End: 2021-06-20

## 2021-06-20 RX ORDER — PREDNISOLONE ACETATE 10 MG/ML
1 SUSPENSION/ DROPS OPHTHALMIC 4 TIMES DAILY
Status: DISCONTINUED | OUTPATIENT
Start: 2021-06-20 | End: 2021-06-23

## 2021-06-20 RX ORDER — ATORVASTATIN CALCIUM 20 MG/1
40 TABLET, FILM COATED ORAL EVERY EVENING
Status: CANCELLED | OUTPATIENT
Start: 2021-06-20

## 2021-06-20 RX ORDER — MOXIFLOXACIN 5 MG/ML
1 SOLUTION/ DROPS OPHTHALMIC 4 TIMES DAILY
Status: DISCONTINUED | OUTPATIENT
Start: 2021-06-20 | End: 2021-06-23

## 2021-06-20 RX ORDER — OXYCODONE HYDROCHLORIDE 5 MG/1
5 TABLET ORAL
Status: DISCONTINUED | OUTPATIENT
Start: 2021-06-20 | End: 2021-06-23 | Stop reason: HOSPADM

## 2021-06-20 RX ORDER — ESCITALOPRAM OXALATE 10 MG/1
5 TABLET ORAL DAILY
Status: CANCELLED | OUTPATIENT
Start: 2021-06-20

## 2021-06-20 RX ADMIN — MOXIFLOXACIN HYDROCHLORIDE 1 DROP: 5 SOLUTION/ DROPS OPHTHALMIC at 22:09

## 2021-06-20 RX ADMIN — ATORVASTATIN CALCIUM 40 MG: 40 TABLET, FILM COATED ORAL at 17:52

## 2021-06-20 RX ADMIN — IOHEXOL 100 ML: 350 INJECTION, SOLUTION INTRAVENOUS at 13:53

## 2021-06-20 RX ADMIN — SODIUM CHLORIDE: 9 INJECTION, SOLUTION INTRAVENOUS at 16:38

## 2021-06-20 RX ADMIN — PANTOPRAZOLE SODIUM 8 MG/HR: 40 INJECTION, POWDER, FOR SOLUTION INTRAVENOUS at 13:15

## 2021-06-20 RX ADMIN — PANTOPRAZOLE SODIUM 80 MG: 40 INJECTION, POWDER, FOR SOLUTION INTRAVENOUS at 12:53

## 2021-06-20 RX ADMIN — ESCITALOPRAM OXALATE 5 MG: 10 TABLET ORAL at 17:51

## 2021-06-20 RX ADMIN — PREDNISOLONE ACETATE 1 DROP: 10 SUSPENSION/ DROPS OPHTHALMIC at 22:09

## 2021-06-20 RX ADMIN — SODIUM CHLORIDE 3 G: 900 INJECTION INTRAVENOUS at 17:50

## 2021-06-20 RX ADMIN — SODIUM CHLORIDE, POTASSIUM CHLORIDE, SODIUM LACTATE AND CALCIUM CHLORIDE 1000 ML: 600; 310; 30; 20 INJECTION, SOLUTION INTRAVENOUS at 11:16

## 2021-06-20 ASSESSMENT — ACTIVITIES OF DAILY LIVING (ADL)
AMBULATION ASSISTANCE ON FLAT SURFACES: 1
GROOMING ASSESSED: 1
PHYSICAL TRANSFERS ASSESSED: 1
AMBULATION ASSISTANCE: SUPERVISION
PHYSICAL TRANSFERS ASSESSED: 1
GROOMING_CURRENT_FUNCTION: INDEPENDENT
CURRENT_FUNCTION: SUPERVISION
DRESSING_UB_CURRENT_FUNCTION: INDEPENDENT
AMBULATION ASSISTANCE: 1
DRESSING_LB_CURRENT_FUNCTION: INDEPENDENT
CURRENT_FUNCTION: INDEPENDENT

## 2021-06-20 ASSESSMENT — FIBROSIS 4 INDEX: FIB4 SCORE: 4.34

## 2021-06-20 ASSESSMENT — ENCOUNTER SYMPTOMS
NAUSEA: 1
ABDOMINAL PAIN: 0
HEADACHES: 0
BRUISES/BLEEDS EASILY: 0
POLYDIPSIA: 0
DIARRHEA: 0
WEIGHT LOSS: 0
CONSTIPATION: 0
DIZZINESS: 1
FLANK PAIN: 0
ORTHOPNEA: 0
DOUBLE VISION: 0
SPEECH CHANGE: 0
HALLUCINATIONS: 0
BACK PAIN: 0
FEVER: 0
VOMITING: 1
BLURRED VISION: 0
TREMORS: 0
NECK PAIN: 0
PHOTOPHOBIA: 0
BLOOD IN STOOL: 0
HEARTBURN: 0
COUGH: 0
SPUTUM PRODUCTION: 0
NERVOUS/ANXIOUS: 0
CHILLS: 1
PALPITATIONS: 0
FOCAL WEAKNESS: 0
HEMOPTYSIS: 0

## 2021-06-20 ASSESSMENT — COGNITIVE AND FUNCTIONAL STATUS - GENERAL
WALKING IN HOSPITAL ROOM: A LOT
STANDING UP FROM CHAIR USING ARMS: A LOT
CLIMB 3 TO 5 STEPS WITH RAILING: A LOT
TURNING FROM BACK TO SIDE WHILE IN FLAT BAD: A LITTLE
MOVING TO AND FROM BED TO CHAIR: A LOT
DRESSING REGULAR LOWER BODY CLOTHING: A LITTLE
TOILETING: A LITTLE
SUGGESTED CMS G CODE MODIFIER MOBILITY: CL
DRESSING REGULAR UPPER BODY CLOTHING: A LITTLE
MOVING FROM LYING ON BACK TO SITTING ON SIDE OF FLAT BED: A LOT
MOBILITY SCORE: 13
HELP NEEDED FOR BATHING: A LITTLE

## 2021-06-20 ASSESSMENT — LIFESTYLE VARIABLES
ON A TYPICAL DAY WHEN YOU DRINK ALCOHOL HOW MANY DRINKS DO YOU HAVE: 0
HOW MANY TIMES IN THE PAST YEAR HAVE YOU HAD 5 OR MORE DRINKS IN A DAY: 0
AVERAGE NUMBER OF DAYS PER WEEK YOU HAVE A DRINK CONTAINING ALCOHOL: 0
SUBSTANCE_ABUSE: 0
HAVE YOU EVER FELT YOU SHOULD CUT DOWN ON YOUR DRINKING: NO
DOES PATIENT WANT TO STOP DRINKING: NO
EVER FELT BAD OR GUILTY ABOUT YOUR DRINKING: NO
ALCOHOL_USE: NO
CONSUMPTION TOTAL: INCOMPLETE
HAVE PEOPLE ANNOYED YOU BY CRITICIZING YOUR DRINKING: NO

## 2021-06-20 ASSESSMENT — PATIENT HEALTH QUESTIONNAIRE - PHQ9
SUM OF ALL RESPONSES TO PHQ9 QUESTIONS 1 AND 2: 0
1. LITTLE INTEREST OR PLEASURE IN DOING THINGS: NOT AT ALL
2. FEELING DOWN, DEPRESSED, IRRITABLE, OR HOPELESS: NOT AT ALL

## 2021-06-20 NOTE — ED NOTES
Med rec complete per pt and daughter at bedside. Permission given by pt to review meds with daughter at bedside. DESTINEE

## 2021-06-20 NOTE — ED TRIAGE NOTES
"Pt presents to ED via EMS with complaint of dizziness. EMS reports witnessed syncopal episode without fall. 1 episode of emesis that EMS reports \"appeared GI bleed like\". Pt currently denies pain or discomfort. States she feels quite dizzy with movement from sitting to standing. Alert and oriented x4. Skin color appears pale according to family members.   "

## 2021-06-20 NOTE — ED NOTES
Pt states need to have bowel movement. Pt has been incont bowel in pants. Attempt to sit pt up to remove and clean pt. States quite dizzy. Blood pressure noted to be decreased. Pt placed back in semi reclined position. Physician notified. Ordered to open fluids to wide and give as bolus dose.

## 2021-06-20 NOTE — ASSESSMENT & PLAN NOTE
Anemia secondary to acute blood loss  Ordered 1 unit of red blood cells for symptomatic anemia, hemoglobin 7.8  Repeat H&H daily  We will check iron studies, folate and vitamin B12 level

## 2021-06-20 NOTE — ASSESSMENT & PLAN NOTE
Likely demand ischemia secondary to anemia  EKG negative for MI  Denies chest pain  Troponin only mildly elevated  Continue to monitor

## 2021-06-20 NOTE — ASSESSMENT & PLAN NOTE
CXR: Probable increase in consolidative atelectasis in the left lung base.  UA negative  Blood Culture negative   Procalcitonin negative  Patient is afebrile and leukocytosis likely reactive  Antibiotics discontinued

## 2021-06-20 NOTE — ASSESSMENT & PLAN NOTE
Dr. Donaldson/ESTEE is consulting with [facility] for endoscopy  RBC transfusion 1 unit for symptomatic anemia with hemoglobin 7.8  Avoid antiplatelets and anticoagulants  IV fluid support  On PPI  Patient underwent endoscopy with evidence of gastric ulcers esophagitis.  Current GI recommendations are for repeat EGD in 8 weeks along with 8 weeks of PPI therapy.

## 2021-06-20 NOTE — H&P
"Hospital Medicine History & Physical Note    Date of Service  6/20/2021    Primary Care Physician  Juan Santana M.D.    Consultants  Gastroenterology, [facility]    Code Status  Full Code    Chief Complaint  Chief Complaint   Patient presents with   • Dizziness     Onset this am.    • N/V     one episode. EMS reports appeared very dark and \"GI bleed like\"       History of Presenting Illness  87 y.o. female tachybradycardia syndrome, SVT, status post ablation, pacemaker placement, CKD stage III, osteoarthritis, recurrent syncopal episodes, hypertension, depression, who presented 6/20/2021 with complaints of generalized weakness, orthostatic dizziness, nausea and one episode of vomiting earlier this morning.  EMS apparently found the patient unresponsive the floor, she woke up.  Apparently patient felt dizzy and passed out on the way to the bathroom.  Additionally, according to EMS, patient had vomiting which appeared to be coffee-ground.  In the emergency department reportedly patient had brief syncopal episode without fall in the position upright.  She had bowel movement which appeared to be melanotic and vomiting with coffee-ground.  Currently denies nausea or dizziness while lying flat.  She continues having orthostatic hypotension 77/48, mild tachycardia 102.  Her hemoglobin 7.8 down from 9.2   1-month ago..  Pacemaker interrogated in ER and working appropriately.  Troponin slightly elevated, EKG negative for ischemia or MI.  Patient states she was taking ibuprofen for chronic joint pain, but not recently, last time a few weeks ago.  She denies chills, fever, chest pain.  Occasionally has dry cough in the last week or 2.    Review of Systems  Review of Systems   Constitutional: Positive for chills and malaise/fatigue. Negative for fever and weight loss.   HENT: Negative for ear pain, hearing loss and tinnitus.    Eyes: Negative for blurred vision, double vision and photophobia.   Respiratory: Negative for cough, " hemoptysis and sputum production.    Cardiovascular: Negative for chest pain, palpitations and orthopnea.   Gastrointestinal: Positive for nausea and vomiting. Negative for abdominal pain, blood in stool, constipation, diarrhea and heartburn.   Genitourinary: Negative for dysuria, flank pain, frequency and hematuria.   Musculoskeletal: Negative for back pain, joint pain and neck pain.   Skin: Negative for itching and rash.   Neurological: Positive for dizziness. Negative for tremors, speech change, focal weakness and headaches.   Endo/Heme/Allergies: Negative for environmental allergies and polydipsia. Does not bruise/bleed easily.   Psychiatric/Behavioral: Negative for hallucinations and substance abuse. The patient is not nervous/anxious.        Past Medical History   has a past medical history of Arrhythmia, Arthritis, Bronchitis, CATARACT, Dry eyes, bilateral (3/27/2014), Osteopenia of the elderly (3/27/2014), and Pneumonia.    Surgical History   has a past surgical history that includes knee arthroplasty total (9/3/2013) and tonsillectomy.     Family History  family history includes Cancer in her brother, daughter, daughter, and maternal aunt; Heart Attack in her father; Heart Disease (age of onset: 54) in her father; Hypertension in her maternal aunt; Lung Disease in her father and mother; Seizures in her daughter.     Social History   reports that she has never smoked. She has never used smokeless tobacco. She reports current alcohol use. She reports that she does not use drugs.    Allergies  Allergies   Allergen Reactions   • Other Environmental      Seasonal allergies       Medications  Prior to Admission Medications   Prescriptions Last Dose Informant Patient Reported? Taking?   Moxifloxacin HCl 0.5 % Solution 6/19/2021 at hs  Yes Yes   Sig: Administer 1 Drop into the right eye 4 times a day.   aspirin 81 MG EC tablet 6/19/2021 at hs  No No   Sig: Take 1 tablet by mouth every day.   atorvastatin (LIPITOR)  40 MG Tab 6/19/2021 at hs  No No   Sig: Take 1 tablet by mouth every evening.   atorvastatin (LIPITOR) 40 MG Tab Not Taking at Unknown time  No No   Sig: Take 1 tablet by mouth every evening.   Patient not taking: Reported on 6/20/2021   escitalopram (LEXAPRO) 5 MG tablet 6/19/2021 at hs Patient No No   Sig: Take 1 Tab by mouth every day.   metoprolol SR (TOPROL XL) 25 MG TABLET SR 24 HR 6/19/2021 at hs  No No   Sig: Take 1 tablet by mouth every day.   prednisoLONE acetate (PRED FORTE) 1 % Suspension 6/19/2021 at hs  Yes No   Sig: Administer 1 Drop into both eyes 4 times a day. 3 drops in left eye, 6 drops in right eye      Facility-Administered Medications: None       Physical Exam  Pulse:  [] 102  Resp:  [18-24] 18  BP: ()/(48-57) 77/48  SpO2:  [92 %-94 %] 93 %    Physical Exam  Vitals and nursing note reviewed.   Constitutional:       General: She is not in acute distress.     Appearance: Normal appearance.   HENT:      Head: Normocephalic and atraumatic.      Nose: Nose normal.      Mouth/Throat:      Mouth: Mucous membranes are dry.      Comments: Particles of dry dark blood around the mouth and nose  Eyes:      Extraocular Movements: Extraocular movements intact.      Pupils: Pupils are equal, round, and reactive to light.   Cardiovascular:      Rate and Rhythm: Normal rate and regular rhythm.      Heart sounds: Murmur heard.   Systolic murmur is present.     Pulmonary:      Effort: Pulmonary effort is normal.      Breath sounds: Normal breath sounds.   Abdominal:      General: Abdomen is flat. There is no distension.      Tenderness: There is no abdominal tenderness.   Musculoskeletal:         General: No swelling or deformity. Normal range of motion.      Cervical back: Normal range of motion and neck supple.   Skin:     General: Skin is warm and dry.      Coloration: Skin is pale.   Neurological:      General: No focal deficit present.      Mental Status: She is alert and oriented to person,  place, and time.   Psychiatric:         Mood and Affect: Mood normal.         Behavior: Behavior normal.         Laboratory:  Recent Labs     06/20/21  1035   WBC 13.2*   RBC 2.88*   HEMOGLOBIN 7.8*   HEMATOCRIT 27.2*   MCV 94.4   MCH 27.1   MCHC 28.7*   RDW 51.7*   PLATELETCT 249   MPV 10.2     Recent Labs     06/20/21  1035   SODIUM 141   POTASSIUM 4.4   CHLORIDE 111   CO2 19*   GLUCOSE 117*   BUN 31*   CREATININE 1.48*   CALCIUM 8.3*     Recent Labs     06/20/21  1035   ALTSGPT 13   ASTSGOT 22   ALKPHOSPHAT 81   TBILIRUBIN 0.4   GLUCOSE 117*     Recent Labs     06/20/21  1110   APTT 25.4     No results for input(s): NTPROBNP in the last 72 hours.      Recent Labs     06/20/21  1035   TROPONINT 32*       Imaging:  CT-HEAD W/O   Final Result         NO ACUTE ABNORMALITIES ARE NOTED ON CT SCAN OF THE HEAD.      Findings are consistent with atrophy.  Decreased attenuation in the periventricular white matter likely indicates microvascular ischemic disease.      DX-CHEST-PORTABLE (1 VIEW)   Final Result         1.  Some increase in size of left pleural effusion.      2.  Probable increase in consolidative atelectasis in the left lung base.      CT-ABDOMEN-PELVIS WITH    (Results Pending)         Assessment/Plan:  I anticipate this patient will require at least two midnights for appropriate medical management, necessitating inpatient admission.    Upper GI bleed  Assessment & Plan  Dr. Donaldson/ESTEE is consulting with [facility] for endoscopy  Started on IV Protonix, will continue Protonix drip  H&H every 8 hours  RBC transfusion 1 unit for symptomatic anemia with hemoglobin 7.8  Avoid antiplatelets and anticoagulants  IV fluid support    Leukocytosis  Assessment & Plan  CXR: Probable increase in consolidative atelectasis in the left lung base.  We will start empiric antibiotic for possible aspiration pneumonia  Check blood culture, procalcitonin, UA    Elevated troponin  Assessment & Plan  Likely demand ischemia secondary to  anemia  EKG negative for MI  Denies chest pain  Monitor on telemetry, replace RBC, repeat troponin    Orthostatic hypotension  Assessment & Plan  Secondary to hypovolemia  Continue IV hydration  Monitor blood pressure    Anemia- (present on admission)  Assessment & Plan  Anemia secondary to acute blood loss  Ordered 1 unit of red blood cells for symptomatic anemia, hemoglobin 7.8  Repeat H&H every 8 hours  We will check iron studies, folate and vitamin B12 level    Stage 3b chronic kidney disease (HCC)- (present on admission)  Assessment & Plan  Stable  Avoid nephrotoxins  Monitor kidney function    Discussed with patient the risks, benefits and alternatives to blood product administration per hospital guidelines and procedures, patient understands and is in agreement with receiving treatment via blood product administration.

## 2021-06-20 NOTE — PROGRESS NOTES
Pt arrived to unit via gurney at 1530. Pt oriented to room, unit, and plan of care. Tele-monitor placed and monitor room notified. All questions answered at this time. Call light within reach; fall precautions in place.

## 2021-06-20 NOTE — PROGRESS NOTES
4 Eyes Skin Assessment Completed by Shonda RN and RACHAEL Jeffries.    Head WDL  Ears WDL  Nose WDL  Mouth WDL  Neck WDL  Breast/Chest WDL  Shoulder Blades WDL  Spine WDL  (R) Arm/Elbow/Hand WDL  (L) Arm/Elbow/Hand WDL  Abdomen WDL  Groin WDL  Scrotum/Coccyx/Buttocks Redness and Blanching  (R) Leg WDL  (L) Leg WDL  (R) Heel/Foot/Toe Redness, Blanching and Boggy  (L) Heel/Foot/Toe Redness, Blanching and Boggy          Devices In Places Tele Box and Nasal Cannula      Interventions In Place Awan Ear Foams, Heel Mepilex and Pillows, waffle bed overlay ordered    Possible Skin Injury No    Pictures Uploaded Into Epic N/A  Wound Consult Placed N/A  RN Wound Prevention Protocol Ordered No  RN

## 2021-06-20 NOTE — ED NOTES
Pt assisted at bedside to commode, when pt tried to stand she reported dizziness.     At that time BP= 77/48, RN and MD aware.

## 2021-06-21 ENCOUNTER — HOME CARE VISIT (OUTPATIENT)
Dept: HOME HEALTH SERVICES | Facility: HOME HEALTHCARE | Age: 86
End: 2021-06-21
Payer: MEDICARE

## 2021-06-21 ENCOUNTER — APPOINTMENT (OUTPATIENT)
Dept: CARDIOLOGY | Facility: MEDICAL CENTER | Age: 86
End: 2021-06-21
Payer: MEDICARE

## 2021-06-21 ENCOUNTER — ANESTHESIA EVENT (OUTPATIENT)
Dept: SURGERY | Facility: MEDICAL CENTER | Age: 86
DRG: 378 | End: 2021-06-21
Payer: MEDICARE

## 2021-06-21 ENCOUNTER — ANESTHESIA (OUTPATIENT)
Dept: SURGERY | Facility: MEDICAL CENTER | Age: 86
DRG: 378 | End: 2021-06-21
Payer: MEDICARE

## 2021-06-21 LAB
ALBUMIN SERPL BCP-MCNC: 2.7 G/DL (ref 3.2–4.9)
ALBUMIN/GLOB SERPL: 1.2 G/DL
ALP SERPL-CCNC: 68 U/L (ref 30–99)
ALT SERPL-CCNC: 13 U/L (ref 2–50)
ANION GAP SERPL CALC-SCNC: 9 MMOL/L (ref 7–16)
AST SERPL-CCNC: 23 U/L (ref 12–45)
BASOPHILS # BLD AUTO: 0.6 % (ref 0–1.8)
BASOPHILS # BLD: 0.07 K/UL (ref 0–0.12)
BILIRUB SERPL-MCNC: 0.7 MG/DL (ref 0.1–1.5)
BUN SERPL-MCNC: 38 MG/DL (ref 8–22)
CALCIUM SERPL-MCNC: 7.8 MG/DL (ref 8.5–10.5)
CHLORIDE SERPL-SCNC: 115 MMOL/L (ref 96–112)
CO2 SERPL-SCNC: 19 MMOL/L (ref 20–33)
CREAT SERPL-MCNC: 1.14 MG/DL (ref 0.5–1.4)
EOSINOPHIL # BLD AUTO: 0.13 K/UL (ref 0–0.51)
EOSINOPHIL NFR BLD: 1.1 % (ref 0–6.9)
ERYTHROCYTE [DISTWIDTH] IN BLOOD BY AUTOMATED COUNT: 49.3 FL (ref 35.9–50)
GLOBULIN SER CALC-MCNC: 2.2 G/DL (ref 1.9–3.5)
GLUCOSE SERPL-MCNC: 95 MG/DL (ref 65–99)
HCT VFR BLD AUTO: 25.9 % (ref 37–47)
HGB BLD-MCNC: 7.6 G/DL (ref 12–16)
HGB BLD-MCNC: 7.9 G/DL (ref 12–16)
HGB BLD-MCNC: 8 G/DL (ref 12–16)
IMM GRANULOCYTES # BLD AUTO: 0.07 K/UL (ref 0–0.11)
IMM GRANULOCYTES NFR BLD AUTO: 0.6 % (ref 0–0.9)
LYMPHOCYTES # BLD AUTO: 2.74 K/UL (ref 1–4.8)
LYMPHOCYTES NFR BLD: 23.3 % (ref 22–41)
MCH RBC QN AUTO: 28.4 PG (ref 27–33)
MCHC RBC AUTO-ENTMCNC: 30.9 G/DL (ref 33.6–35)
MCV RBC AUTO: 91.8 FL (ref 81.4–97.8)
MONOCYTES # BLD AUTO: 1.2 K/UL (ref 0–0.85)
MONOCYTES NFR BLD AUTO: 10.2 % (ref 0–13.4)
NEUTROPHILS # BLD AUTO: 7.53 K/UL (ref 2–7.15)
NEUTROPHILS NFR BLD: 64.2 % (ref 44–72)
NRBC # BLD AUTO: 0 K/UL
NRBC BLD-RTO: 0 /100 WBC
PLATELET # BLD AUTO: 195 K/UL (ref 164–446)
PMV BLD AUTO: 10.3 FL (ref 9–12.9)
POTASSIUM SERPL-SCNC: 3.7 MMOL/L (ref 3.6–5.5)
PROT SERPL-MCNC: 4.9 G/DL (ref 6–8.2)
RBC # BLD AUTO: 2.82 M/UL (ref 4.2–5.4)
SODIUM SERPL-SCNC: 143 MMOL/L (ref 135–145)
WBC # BLD AUTO: 11.7 K/UL (ref 4.8–10.8)

## 2021-06-21 PROCEDURE — 700101 HCHG RX REV CODE 250: Performed by: ANESTHESIOLOGY

## 2021-06-21 PROCEDURE — A9270 NON-COVERED ITEM OR SERVICE: HCPCS | Performed by: INTERNAL MEDICINE

## 2021-06-21 PROCEDURE — 700111 HCHG RX REV CODE 636 W/ 250 OVERRIDE (IP): Performed by: INTERNAL MEDICINE

## 2021-06-21 PROCEDURE — 0DJ08ZZ INSPECTION OF UPPER INTESTINAL TRACT, VIA NATURAL OR ARTIFICIAL OPENING ENDOSCOPIC: ICD-10-PCS | Performed by: INTERNAL MEDICINE

## 2021-06-21 PROCEDURE — 160002 HCHG RECOVERY MINUTES (STAT): Performed by: INTERNAL MEDICINE

## 2021-06-21 PROCEDURE — 99232 SBSQ HOSP IP/OBS MODERATE 35: CPT | Performed by: INTERNAL MEDICINE

## 2021-06-21 PROCEDURE — 160035 HCHG PACU - 1ST 60 MINS PHASE I: Performed by: INTERNAL MEDICINE

## 2021-06-21 PROCEDURE — 700105 HCHG RX REV CODE 258: Performed by: INTERNAL MEDICINE

## 2021-06-21 PROCEDURE — 80053 COMPREHEN METABOLIC PANEL: CPT

## 2021-06-21 PROCEDURE — 160048 HCHG OR STATISTICAL LEVEL 1-5: Performed by: INTERNAL MEDICINE

## 2021-06-21 PROCEDURE — 85025 COMPLETE CBC W/AUTO DIFF WBC: CPT

## 2021-06-21 PROCEDURE — 160202 HCHG ENDO MINUTES - 1ST 30 MINS LEVEL 3: Performed by: INTERNAL MEDICINE

## 2021-06-21 PROCEDURE — 87040 BLOOD CULTURE FOR BACTERIA: CPT

## 2021-06-21 PROCEDURE — 700105 HCHG RX REV CODE 258: Performed by: ANESTHESIOLOGY

## 2021-06-21 PROCEDURE — 85018 HEMOGLOBIN: CPT

## 2021-06-21 PROCEDURE — 700102 HCHG RX REV CODE 250 W/ 637 OVERRIDE(OP): Performed by: INTERNAL MEDICINE

## 2021-06-21 PROCEDURE — 770020 HCHG ROOM/CARE - TELE (206)

## 2021-06-21 PROCEDURE — 160009 HCHG ANES TIME/MIN: Performed by: INTERNAL MEDICINE

## 2021-06-21 PROCEDURE — 700111 HCHG RX REV CODE 636 W/ 250 OVERRIDE (IP): Performed by: ANESTHESIOLOGY

## 2021-06-21 PROCEDURE — 36415 COLL VENOUS BLD VENIPUNCTURE: CPT

## 2021-06-21 RX ORDER — OMEPRAZOLE 20 MG/1
20 CAPSULE, DELAYED RELEASE ORAL 2 TIMES DAILY
Status: DISCONTINUED | OUTPATIENT
Start: 2021-06-21 | End: 2021-06-23 | Stop reason: HOSPADM

## 2021-06-21 RX ORDER — DIPHENHYDRAMINE HYDROCHLORIDE 50 MG/ML
12.5 INJECTION INTRAMUSCULAR; INTRAVENOUS
Status: DISCONTINUED | OUTPATIENT
Start: 2021-06-21 | End: 2021-06-21 | Stop reason: HOSPADM

## 2021-06-21 RX ORDER — HYDROMORPHONE HYDROCHLORIDE 1 MG/ML
0.6 INJECTION, SOLUTION INTRAMUSCULAR; INTRAVENOUS; SUBCUTANEOUS
Status: DISCONTINUED | OUTPATIENT
Start: 2021-06-21 | End: 2021-06-21 | Stop reason: HOSPADM

## 2021-06-21 RX ORDER — SODIUM CHLORIDE, SODIUM LACTATE, POTASSIUM CHLORIDE, CALCIUM CHLORIDE 600; 310; 30; 20 MG/100ML; MG/100ML; MG/100ML; MG/100ML
INJECTION, SOLUTION INTRAVENOUS CONTINUOUS
Status: DISCONTINUED | OUTPATIENT
Start: 2021-06-21 | End: 2021-06-21 | Stop reason: HOSPADM

## 2021-06-21 RX ORDER — ONDANSETRON 2 MG/ML
4 INJECTION INTRAMUSCULAR; INTRAVENOUS
Status: DISCONTINUED | OUTPATIENT
Start: 2021-06-21 | End: 2021-06-21 | Stop reason: HOSPADM

## 2021-06-21 RX ORDER — SUCCINYLCHOLINE CHLORIDE 20 MG/ML
INJECTION INTRAMUSCULAR; INTRAVENOUS PRN
Status: DISCONTINUED | OUTPATIENT
Start: 2021-06-21 | End: 2021-06-21 | Stop reason: SURG

## 2021-06-21 RX ORDER — HALOPERIDOL 5 MG/ML
1 INJECTION INTRAMUSCULAR
Status: DISCONTINUED | OUTPATIENT
Start: 2021-06-21 | End: 2021-06-21 | Stop reason: HOSPADM

## 2021-06-21 RX ORDER — OXYCODONE HCL 5 MG/5 ML
5 SOLUTION, ORAL ORAL
Status: DISCONTINUED | OUTPATIENT
Start: 2021-06-21 | End: 2021-06-21 | Stop reason: HOSPADM

## 2021-06-21 RX ORDER — PROPOFOL 10 MG/ML
INJECTION, EMULSION INTRAVENOUS PRN
Status: DISCONTINUED | OUTPATIENT
Start: 2021-06-21 | End: 2021-06-21 | Stop reason: SURG

## 2021-06-21 RX ORDER — MEPERIDINE HYDROCHLORIDE 25 MG/ML
12.5 INJECTION INTRAMUSCULAR; INTRAVENOUS; SUBCUTANEOUS
Status: DISCONTINUED | OUTPATIENT
Start: 2021-06-21 | End: 2021-06-21 | Stop reason: HOSPADM

## 2021-06-21 RX ORDER — SODIUM CHLORIDE, SODIUM LACTATE, POTASSIUM CHLORIDE, CALCIUM CHLORIDE 600; 310; 30; 20 MG/100ML; MG/100ML; MG/100ML; MG/100ML
INJECTION, SOLUTION INTRAVENOUS
Status: DISCONTINUED | OUTPATIENT
Start: 2021-06-21 | End: 2021-06-21 | Stop reason: SURG

## 2021-06-21 RX ORDER — HYDROMORPHONE HYDROCHLORIDE 1 MG/ML
0.2 INJECTION, SOLUTION INTRAMUSCULAR; INTRAVENOUS; SUBCUTANEOUS
Status: DISCONTINUED | OUTPATIENT
Start: 2021-06-21 | End: 2021-06-21 | Stop reason: HOSPADM

## 2021-06-21 RX ORDER — ROCURONIUM BROMIDE 10 MG/ML
INJECTION, SOLUTION INTRAVENOUS PRN
Status: DISCONTINUED | OUTPATIENT
Start: 2021-06-21 | End: 2021-06-21 | Stop reason: SURG

## 2021-06-21 RX ORDER — HYDROMORPHONE HYDROCHLORIDE 1 MG/ML
0.4 INJECTION, SOLUTION INTRAMUSCULAR; INTRAVENOUS; SUBCUTANEOUS
Status: DISCONTINUED | OUTPATIENT
Start: 2021-06-21 | End: 2021-06-21 | Stop reason: HOSPADM

## 2021-06-21 RX ORDER — OXYCODONE HCL 5 MG/5 ML
10 SOLUTION, ORAL ORAL
Status: DISCONTINUED | OUTPATIENT
Start: 2021-06-21 | End: 2021-06-21 | Stop reason: HOSPADM

## 2021-06-21 RX ADMIN — PROPOFOL 150 MG: 10 INJECTION, EMULSION INTRAVENOUS at 13:06

## 2021-06-21 RX ADMIN — OMEPRAZOLE 20 MG: 20 CAPSULE, DELAYED RELEASE ORAL at 17:38

## 2021-06-21 RX ADMIN — METOPROLOL SUCCINATE 25 MG: 25 TABLET, EXTENDED RELEASE ORAL at 17:39

## 2021-06-21 RX ADMIN — SODIUM CHLORIDE 3 G: 900 INJECTION INTRAVENOUS at 05:32

## 2021-06-21 RX ADMIN — FENTANYL CITRATE 50 MCG: 50 INJECTION, SOLUTION INTRAMUSCULAR; INTRAVENOUS at 13:06

## 2021-06-21 RX ADMIN — SODIUM CHLORIDE, POTASSIUM CHLORIDE, SODIUM LACTATE AND CALCIUM CHLORIDE: 600; 310; 30; 20 INJECTION, SOLUTION INTRAVENOUS at 13:13

## 2021-06-21 RX ADMIN — PREDNISOLONE ACETATE 1 DROP: 10 SUSPENSION/ DROPS OPHTHALMIC at 21:00

## 2021-06-21 RX ADMIN — PREDNISOLONE ACETATE 1 DROP: 10 SUSPENSION/ DROPS OPHTHALMIC at 17:00

## 2021-06-21 RX ADMIN — ROCURONIUM BROMIDE 5 MG: 10 INJECTION, SOLUTION INTRAVENOUS at 13:06

## 2021-06-21 RX ADMIN — ESCITALOPRAM OXALATE 5 MG: 10 TABLET ORAL at 17:39

## 2021-06-21 RX ADMIN — MOXIFLOXACIN HYDROCHLORIDE 1 DROP: 5 SOLUTION/ DROPS OPHTHALMIC at 09:00

## 2021-06-21 RX ADMIN — SUCCINYLCHOLINE CHLORIDE 100 MG: 20 INJECTION, SOLUTION INTRAMUSCULAR; INTRAVENOUS; PARENTERAL at 13:09

## 2021-06-21 RX ADMIN — ATORVASTATIN CALCIUM 40 MG: 40 TABLET, FILM COATED ORAL at 17:38

## 2021-06-21 RX ADMIN — MOXIFLOXACIN HYDROCHLORIDE 1 DROP: 5 SOLUTION/ DROPS OPHTHALMIC at 17:00

## 2021-06-21 RX ADMIN — MOXIFLOXACIN HYDROCHLORIDE 1 DROP: 5 SOLUTION/ DROPS OPHTHALMIC at 21:00

## 2021-06-21 RX ADMIN — PREDNISOLONE ACETATE 1 DROP: 10 SUSPENSION/ DROPS OPHTHALMIC at 08:30

## 2021-06-21 ASSESSMENT — ENCOUNTER SYMPTOMS
DOUBLE VISION: 0
HEMOPTYSIS: 0
SORE THROAT: 0
DEPRESSION: 0
CHILLS: 0
ORTHOPNEA: 0
COUGH: 0
VOMITING: 1
ABDOMINAL PAIN: 0
SPUTUM PRODUCTION: 0
SPEECH CHANGE: 0
FEVER: 0
DIZZINESS: 0
DIARRHEA: 0
HEADACHES: 0
BACK PAIN: 0
EYE DISCHARGE: 0
CLAUDICATION: 0
PALPITATIONS: 0
BLOOD IN STOOL: 0
BRUISES/BLEEDS EASILY: 0
WHEEZING: 0
SHORTNESS OF BREATH: 0
HEARTBURN: 0
PHOTOPHOBIA: 0
SENSORY CHANGE: 0
FLANK PAIN: 0
NAUSEA: 1
BLURRED VISION: 0
WEAKNESS: 0
MYALGIAS: 0

## 2021-06-21 ASSESSMENT — FIBROSIS 4 INDEX: FIB4 SCORE: 2.85

## 2021-06-21 ASSESSMENT — PAIN SCALES - GENERAL: PAIN_LEVEL: 0

## 2021-06-21 ASSESSMENT — PAIN DESCRIPTION - PAIN TYPE
TYPE: ACUTE PAIN
TYPE: ACUTE PAIN

## 2021-06-21 NOTE — ANESTHESIA TIME REPORT
Anesthesia Start and Stop Event Times     Date Time Event    6/21/2021 1240 Ready for Procedure     1306 Anesthesia Start     1330 Anesthesia Stop        Responsible Staff  06/21/21    Name Role Begin End    Rafita Vasquez M.D. Anesth 1306 1330        Preop Diagnosis (Free Text):  Pre-op Diagnosis     GI bleed        Preop Diagnosis (Codes):    Post op Diagnosis  Abnormal findings on esophagogastroduodenoscopy (EGD)  Gastric ULCER    Premium Reason  Non-Premium    Comments: TERA

## 2021-06-21 NOTE — ANESTHESIA PROCEDURE NOTES
Airway    Date/Time: 6/21/2021 1:09 PM  Performed by: Rafita Vasquez M.D.  Authorized by: Rafita Vasquez M.D.     Location:  OR  Urgency:  Elective  Indications for Airway Management:  Anesthesia      Spontaneous Ventilation: absent    Sedation Level:  Deep  Preoxygenated: Yes    Patient Position:  Sniffing  Final Airway Type:  Endotracheal airway  Final Endotracheal Airway:  ETT  Cuffed: Yes    Technique Used for Successful ETT Placement:  Direct laryngoscopy    Insertion Site:  Oral  Blade Type:  Josee  Laryngoscope Blade/Videolaryngoscope Blade Size:  3  ETT Size (mm):  6.5  Measured from:  Teeth  ETT to Teeth (cm):  22  Placement Verified by: auscultation and capnometry    Cormack-Lehane Classification:  Grade I - full view of glottis  Number of Attempts at Approach:  1

## 2021-06-21 NOTE — OR NURSING
1324: Pt arrived from OR, handoff received from anesthesiologist and RN. Breathing even and unlabored.     1345: Call made to patient's daughter to update patient status.     1355: Handoff to RACHAEL Peoples on T8. Pt transported on zoll with oxygen. O2 tank level above half full, pt transported on 2 L 02. Tubing connected to device and to patient and stored safely in salgado prior to transport.

## 2021-06-21 NOTE — ANESTHESIA POSTPROCEDURE EVALUATION
Patient: Yoli Carmichael    Procedure Summary     Date: 06/21/21 Room / Location: [place] [id number] / SURGERY [facility]    Anesthesia Start: 1306 Anesthesia Stop:     Procedure: GASTROSCOPY (N/A Esophagus) Diagnosis: (gastric ulcer, hiatal hernia)    Surgeons: Rafita Plaza D.O. Responsible Provider: Rafita Vasquez M.D.    Anesthesia Type: general ASA Status: 3 - Emergent          Final Anesthesia Type: general  Last vitals  BP   Blood Pressure : 112/49    Temp   36.7 °C (98 °F)    Pulse   94   Resp   20    SpO2   98 %      Anesthesia Post Evaluation    Patient location during evaluation: PACU  Patient participation: complete - patient participated  Level of consciousness: awake and alert  Pain score: 0    Airway patency: patent  Anesthetic complications: no  Cardiovascular status: hemodynamically stable  Respiratory status: acceptable  Hydration status: euvolemic    PONV: none          No complications documented.     Nurse Pain Score: 0 (NPRS)

## 2021-06-21 NOTE — PROGRESS NOTES
Report received by day shift RN. Pt awake alert and oriented x 4 with no c/o pain. PRBC infusing. Discussed POC with family and patient. Both verbalized understanding. Bed locked in low position with fall precautions in place and call light in reach.

## 2021-06-21 NOTE — ANESTHESIA PREPROCEDURE EVALUATION
Relevant Problems   CARDIAC   (positive) Essential hypertension, benign   (positive) Non-rheumatic mitral regurgitation   (positive) SVT (supraventricular tachycardia) ([facility])   (positive) Second degree atrioventricular block, Mobitz (type) I         (positive) BRIAN (acute kidney injury) ([facility])   (positive) Stage 3b chronic kidney disease ([facility])       Physical Exam    Airway   Mallampati: II  TM distance: >3 FB  Neck ROM: full       Cardiovascular - normal exam  Rhythm: regular  Rate: normal  (-) murmur     Dental - normal exam           Pulmonary - normal exam  Breath sounds clear to auscultation     Abdominal    Neurological - normal exam                 Anesthesia Plan    ASA 3- EMERGENT   ASA physical status 3 criteria: other (comment) and moderate reduction of ejection fractionASA physical status emergent criteria: acute hemorrhage    Plan - general       Airway plan will be ETT          Induction: intravenous    Postoperative Plan: Postoperative administration of opioids is intended.    Pertinent diagnostic labs and testing reviewed    Informed Consent:    Anesthetic plan and risks discussed with patient.    Use of blood products discussed with: patient whom consented to blood products.

## 2021-06-21 NOTE — CONSULTS
DATE OF SERVICE:  06/20/2021     GASTROINTESTINAL CONSULTATION     REFERRING PHYSICIAN:  ____ from the emergency department.     HISTORY OF PRESENT ILLNESS:  The patient is an 87-year-old female, who   underwent an ablation with pacemaker placement about a month ago for   tachy-fany syndrome and SVT.  She is on 81 mg of aspirin daily.  The patient   was brought in with history of vomiting some coffee-ground material as well as   melanotic type stools.  The patient herself denies this and is unaware of any   bleeding whatsoever, however, her family member who was present certainly   verifies this information.  On presentation, the patient was found to have a   hemoglobin of 7.8, which is decreased from 9.2 on her last stay in mid May.    Her BUN also is elevated at 31 with the creatinine of 1.48.  INR is not   current, but more recently was 1.08 on 05/12.  The patient denies any chronic   symptoms of nausea, vomiting, heartburn, dysphagia, odynophagia, early   satiety.  She is not on any kind of anti-acid type medications such as   Prilosec.  The patient reports having had a colonoscopy about 10 or so years   ago, which was normal.  She is not aware of having a previous upper endoscopy.     PAST MEDICAL HISTORY:  Significant for cardiac dysrhythmia with recent   pacemaker placement/ablation.  She has a history of osteopenia as well as   previous pneumonia.     PAST SURGICAL HISTORY:  Includes the recent ablation and pacemaker placement   as above.  She also had a recent cataract surgery.  She denies any abdominal   surgeries.     MEDICATIONS:  Include aspirin 81 mg, Lipitor, Lexapro, metoprolol.     ALLERGIES:  No known drug allergies.     SOCIAL HISTORY:  Negative for tobacco.  She does not drink alcohol currently.     REVIEW OF SYSTEMS:  Per the patient is completely unremarkable, with a   complete review of systems per AMA/CMS criteria.     PHYSICAL EXAMINATION:  VITAL SIGNS:  Pulse is 102, blood pressure  initially was 48, respirations 18.  GENERAL:  This is a pale-appearing female who appears alert and oriented, in   no apparent distress.  HEENT:  Sclerae are anicteric.  LUNGS:  Clear.  HEART:  Has a regular rhythm.  ABDOMEN:  Soft, nontender, nondistended.  EXTREMITIES:  No rashes or edema present.  Pulses are present in the distal   extremities bilaterally.     LABORATORY DATA:  As above in HPI.     ASSESSMENT AND PLAN:  1.  Hematemesis and melena.  2.  Acute on chronic anemia.  3.  History of dysrhythmia with recent ablation and pacemaker placement 1   month ago.  4.  History of osteopenia.  5.  History of dyslipidemia.     DISCUSSION:  The patient is an 87-year-old female who appears to be having a   GI bleed, which is currently clinically inactive.  The patient will need to be   optimized with IV fluids, as well as recommended transfusion of PRBCs.  She   will be placed on IV Protonix.  We will get her scheduled for an upper   endoscopy electively, most likely to be done tomorrow.  The patient does not   require antibiotics from the GI standpoint.  Please follow her hemoglobin   carefully and transfuse additionally should the hemoglobin fall below 7.     Thank you for allowing us to participate in the care of your patient.        ______________________________  MD ASHELY PETERS/CARRIE/NABIL    DD:  06/20/2021 13:23  DT:  06/20/2021 19:10    Job#:  [id number]

## 2021-06-21 NOTE — PROGRESS NOTES
Bedside report received from RACHAEL Leyva. Call light and belongings within reach. Bed locked and in lowest position. Alarm and fall precautions in place.

## 2021-06-21 NOTE — OP REPORT
DATE OF SERVICE:  06/21/2021     PREOPERATIVE DIAGNOSIS:  ____.     POSTOPERATIVE DIAGNOSES:  Esophagitis, hiatal hernia, gastric ulcer.     ANESTHESIA:  General anesthesia per anesthesiologist.     PROCEDURE:  Esophagogastroduodenoscopy.     DESCRIPTION OF PROCEDURE:  After informed consent and appropriate sedation,   the patient was placed in left lateral position and the gastroscope was   advanced through the oropharynx into the esophagus, through to the second   portion of the duodenum.  The mucosa was then examined carefully as the scope   was gently withdrawn ____ unremarkable as was the gastric antrum.  The distal   gastric body had a clean based 1 cm gastric ulcer and on retroflexion, the   gastric cardia and fundus were unremarkable other than the presence of a small   hiatal hernia.  The stomach was decompressed.  The scope was withdrawn.  The   GE junction showed LA grade C esophagitis and the remainder of the esophagus   was unremarkable.  The bowel was decompressed. The scope was gently withdrawn.     RECOMMENDATIONS:    1.  Recommend PPI b.i.d. for 8 weeks.  2.  Avoid all NSAIDs and aspirin.  3.  Repeat EGD as an outpatient in 8 weeks at [facility] to   ensure healing of ulcer.  4.  Okay to advance to GI soft diet.  5.  Avoid any blood thinners if possible.  6.  We will sign off for now.  Please call with any questions or concerns.        ______________________________  DO CORINA Mcgee/ASHKAN/ADELA    DD:  06/21/2021 13:21  DT:  06/21/2021 13:38    Job#:  [id number]

## 2021-06-21 NOTE — CARE PLAN
Problem: Knowledge Deficit - Standard  Goal: Patient and family/care givers will demonstrate understanding of plan of care, disease process/condition, diagnostic tests and medications  Outcome: Progressing     Problem: Fall Risk  Goal: Patient will remain free from falls  Outcome: Progressing     Problem: Fall Risk  Goal: Patient will remain free from falls  Outcome: Progressing             Patient is not progressing towards the following goals:

## 2021-06-21 NOTE — CARE PLAN
Problem: Knowledge Deficit - Standard  Goal: Patient and family/care givers will demonstrate understanding of plan of care, disease process/condition, diagnostic tests and medications  Outcome: Progressing     Problem: Fall Risk  Goal: Patient will remain free from falls  Outcome: Progressing   The patient is Watcher - Medium risk of patient condition declining or worsening         Progress made toward(s) clinical / shift goals: NPO at midnight for procedure in am.    Patient is not progressing towards the following goals:

## 2021-06-21 NOTE — DISCHARGE PLANNING
Anticipated Discharge Disposition: TBD    Action: Per IDT rounds, pt pending medical clearance and updated 6 clicks score to determine discharge disposition.    Barriers to Discharge: medical clearance, 6 clicks update    Plan: f/u with medical team, pt

## 2021-06-21 NOTE — PROGRESS NOTES
[facility] Medicine Daily Progress Note    Date of Service  6/21/2021    Chief Complaint  87 y.o. female admitted 6/20/2021 with UGIB    Hospital Course    87 y.o. female tachybradycardia syndrome, SVT, status post ablation, pacemaker placement, CKD stage III, osteoarthritis, recurrent syncopal episodes, hypertension, depression, who presented 6/20/2021 with complaints of generalized weakness, orthostatic dizziness, nausea and one episode of vomiting earlier this morning.  EMS apparently found the patient unresponsive the floor, she woke up.  Apparently patient felt dizzy and passed out on the way to the bathroom.  Additionally, according to EMS, patient had vomiting which appeared to be coffee-ground.  In the emergency department reportedly patient had brief syncopal episode without fall in the position upright.  She had bowel movement which appeared to be melanotic and vomiting with coffee-ground.  Currently denies nausea or dizziness while lying flat.  She continues having orthostatic hypotension 77/48, mild tachycardia 102.  Her hemoglobin 7.8 down from 9.2   1-month ago..  Pacemaker interrogated in ER and working appropriately.  Troponin slightly elevated, EKG negative for ischemia or MI.  Patient states she was taking ibuprofen for chronic joint pain, but not recently, last time a few weeks ago.  She denies chills, fever, chest pain.  Occasionally has dry cough in the last week or 2.    Interval Problem Update    6/21/2021: The patient was admitted for further evaluation and management of coffee-ground emesis and melena.  Patient was incidentally found to have significant decrease in her hemoglobin.  She underwent EGD with gastroenterology which revealed esophagitis and gastric ulcer.  Current recommendations are to continue twice daily PPI dosing for 8 weeks with outpatient EGD thereafter.  At this time patient denies any chest pains, palpitations, shortness of breath.  We are pending PT/OT evaluation for  discharge planning.  No other overnight events reported overnight    Consultants/Specialty  GI    Code Status  Full Code    Disposition  Pending PT/OT evaluation    Review of Systems  Review of Systems   Constitutional: Negative for chills, fever and malaise/fatigue.   HENT: Negative for congestion, hearing loss, sore throat and tinnitus.    Eyes: Negative for blurred vision, double vision, photophobia and discharge.   Respiratory: Negative for cough, hemoptysis, sputum production, shortness of breath and wheezing.    Cardiovascular: Negative for chest pain, palpitations, orthopnea, claudication and leg swelling.   Gastrointestinal: Positive for melena, nausea and vomiting. Negative for abdominal pain, blood in stool, diarrhea and heartburn.   Genitourinary: Negative for dysuria, flank pain, hematuria and urgency.   Musculoskeletal: Negative for back pain, joint pain and myalgias.   Skin: Negative for itching and rash.   Neurological: Negative for dizziness, sensory change, speech change, weakness and headaches.   Endo/Heme/Allergies: Does not bruise/bleed easily.   Psychiatric/Behavioral: Negative for depression and suicidal ideas.        Physical Exam  Temp:  [36.1 °C (97 °F)-37.4 °C (99.4 °F)] 36.9 °C (98.5 °F)  Pulse:  [] 86  Resp:  [16-21] 21  BP: (111-159)/(45-71) 128/49  SpO2:  [91 %-98 %] 93 %    Physical Exam  Vitals and nursing note reviewed.   Constitutional:       General: She is not in acute distress.     Appearance: Normal appearance. She is obese.   HENT:      Head: Normocephalic and atraumatic.      Mouth/Throat:      Mouth: Mucous membranes are moist.   Eyes:      Extraocular Movements: Extraocular movements intact.      Conjunctiva/sclera: Conjunctivae normal.      Pupils: Pupils are equal, round, and reactive to light.   Cardiovascular:      Rate and Rhythm: Normal rate and regular rhythm.      Heart sounds: No murmur heard.   No friction rub.   Pulmonary:      Effort: Pulmonary effort is  normal. No respiratory distress.      Breath sounds: Normal breath sounds. No wheezing.   Abdominal:      General: Abdomen is flat. Bowel sounds are normal.      Palpations: Abdomen is soft.      Tenderness: There is no abdominal tenderness. There is no guarding.   Musculoskeletal:         General: No swelling or tenderness. Normal range of motion.      Cervical back: Normal range of motion and neck supple.   Skin:     General: Skin is warm and dry.      Coloration: Pallor:       Findings: No rash.   Neurological:      General: No focal deficit present.      Mental Status: She is alert and oriented to person, place, and time.      Cranial Nerves: No cranial nerve deficit.      Motor: No weakness.   Psychiatric:         Mood and Affect: Mood normal.         Behavior: Behavior normal.         Fluids    Intake/Output Summary (Last 24 hours) at 6/21/2021 1429  Last data filed at 6/21/2021 1330  Gross per 24 hour   Intake 780 ml   Output 2 ml   Net 778 ml       Laboratory  Recent Labs     06/20/21  1035 06/20/21  2054 06/21/21  0506   WBC 13.2*  --  11.7*   RBC 2.88*  --  2.82*   HEMOGLOBIN 7.8* 8.0* 8.0*   HEMATOCRIT 27.2*  --  25.9*   MCV 94.4  --  91.8   MCH 27.1  --  28.4   MCHC 28.7*  --  30.9*   RDW 51.7*  --  49.3   PLATELETCT 249  --  195   MPV 10.2  --  10.3     Recent Labs     06/20/21  1035 06/21/21  0506   SODIUM 141 143   POTASSIUM 4.4 3.7   CHLORIDE 111 115*   CO2 19* 19*   GLUCOSE 117* 95   BUN 31* 38*   CREATININE 1.48* 1.14   CALCIUM 8.3* 7.8*     Recent Labs     06/20/21  1110   APTT 25.4   INR 1.34*               Imaging  CT-ABDOMEN-PELVIS WITH   Final Result      1.  Left pleural effusion is identified which occupies the left lower hemithorax.      2.  There is cholelithiasis.      3.  There is calcific atherosclerosis.         CT-HEAD W/O   Final Result         NO ACUTE ABNORMALITIES ARE NOTED ON CT SCAN OF THE HEAD.      Findings are consistent with atrophy.  Decreased attenuation in the  periventricular white matter likely indicates microvascular ischemic disease.      DX-CHEST-PORTABLE (1 VIEW)   Final Result         1.  Some increase in size of left pleural effusion.      2.  Probable increase in consolidative atelectasis in the left lung base.           Assessment/Plan  Leukocytosis  Assessment & Plan  CXR: Probable increase in consolidative atelectasis in the left lung base.  UA negative  Blood Culture negative   Procalcitonin negative  Patient is afebrile and leukocytosis likely reactive  Antibiotics discontinued     Elevated troponin  Assessment & Plan  Likely demand ischemia secondary to anemia  EKG negative for MI  Denies chest pain  Monitor on telemetry, replace RBC, repeat troponin    Orthostatic hypotension  Assessment & Plan  Secondary to hypovolemia  Continue IV hydration  Monitor blood pressure    Upper GI bleed  Assessment & Plan  Dr. Donaldson/ESTEE is consulting with [facility] for [facility]oscopy  Started on IV Protonix, will continue Protonix drip  H&H every 8 hours  RBC transfusion 1 unit for symptomatic anemia with hemoglobin 7.8  Avoid antiplatelets and anticoagulants  IV fluid support    Patient underwent endoscopy with evidence of gastric ulcers esophagitis.  Current GI recommendations are for repeat EGD in 8 weeks along with 8 weeks of PPI therapy.    Anemia- (present on admission)  Assessment & Plan  Anemia secondary to acute blood loss  Ordered 1 unit of red blood cells for symptomatic anemia, hemoglobin 7.8  Repeat H&H every 8 hours  We will check iron studies, folate and vitamin B12 level    Stage 3b chronic kidney disease (HCC)- (present on admission)  Assessment & Plan  Stable  Avoid nephrotoxins  Monitor kidney function         VTE prophylaxis: SCDs

## 2021-06-22 LAB
ANION GAP SERPL CALC-SCNC: 8 MMOL/L (ref 7–16)
BUN SERPL-MCNC: 28 MG/DL (ref 8–22)
CALCIUM SERPL-MCNC: 7.8 MG/DL (ref 8.5–10.5)
CHLORIDE SERPL-SCNC: 117 MMOL/L (ref 96–112)
CO2 SERPL-SCNC: 18 MMOL/L (ref 20–33)
CREAT SERPL-MCNC: 1.22 MG/DL (ref 0.5–1.4)
ERYTHROCYTE [DISTWIDTH] IN BLOOD BY AUTOMATED COUNT: 50.7 FL (ref 35.9–50)
GLUCOSE SERPL-MCNC: 95 MG/DL (ref 65–99)
HCT VFR BLD AUTO: 23 % (ref 37–47)
HCT VFR BLD AUTO: 27.7 % (ref 37–47)
HGB BLD-MCNC: 7.2 G/DL (ref 12–16)
HGB BLD-MCNC: 7.4 G/DL (ref 12–16)
HGB BLD-MCNC: 8.5 G/DL (ref 12–16)
MAGNESIUM SERPL-MCNC: 2 MG/DL (ref 1.5–2.5)
MCH RBC QN AUTO: 28.7 PG (ref 27–33)
MCHC RBC AUTO-ENTMCNC: 31.3 G/DL (ref 33.6–35)
MCV RBC AUTO: 91.6 FL (ref 81.4–97.8)
PHOSPHATE SERPL-MCNC: 3.2 MG/DL (ref 2.5–4.5)
PLATELET # BLD AUTO: 171 K/UL (ref 164–446)
PMV BLD AUTO: 10.1 FL (ref 9–12.9)
POTASSIUM SERPL-SCNC: 3.4 MMOL/L (ref 3.6–5.5)
RBC # BLD AUTO: 2.51 M/UL (ref 4.2–5.4)
SODIUM SERPL-SCNC: 143 MMOL/L (ref 135–145)
WBC # BLD AUTO: 10.7 K/UL (ref 4.8–10.8)

## 2021-06-22 PROCEDURE — 99232 SBSQ HOSP IP/OBS MODERATE 35: CPT | Performed by: STUDENT IN AN ORGANIZED HEALTH CARE EDUCATION/TRAINING PROGRAM

## 2021-06-22 PROCEDURE — 85018 HEMOGLOBIN: CPT

## 2021-06-22 PROCEDURE — 97162 PT EVAL MOD COMPLEX 30 MIN: CPT

## 2021-06-22 PROCEDURE — A9270 NON-COVERED ITEM OR SERVICE: HCPCS | Performed by: INTERNAL MEDICINE

## 2021-06-22 PROCEDURE — 85027 COMPLETE CBC AUTOMATED: CPT

## 2021-06-22 PROCEDURE — 700102 HCHG RX REV CODE 250 W/ 637 OVERRIDE(OP): Performed by: INTERNAL MEDICINE

## 2021-06-22 PROCEDURE — 97166 OT EVAL MOD COMPLEX 45 MIN: CPT

## 2021-06-22 PROCEDURE — 84100 ASSAY OF PHOSPHORUS: CPT

## 2021-06-22 PROCEDURE — 80048 BASIC METABOLIC PNL TOTAL CA: CPT

## 2021-06-22 PROCEDURE — 770006 HCHG ROOM/CARE - MED/SURG/GYN SEMI*

## 2021-06-22 PROCEDURE — 36415 COLL VENOUS BLD VENIPUNCTURE: CPT

## 2021-06-22 PROCEDURE — 700105 HCHG RX REV CODE 258: Performed by: INTERNAL MEDICINE

## 2021-06-22 PROCEDURE — 83735 ASSAY OF MAGNESIUM: CPT

## 2021-06-22 PROCEDURE — 85014 HEMATOCRIT: CPT

## 2021-06-22 RX ADMIN — MOXIFLOXACIN HYDROCHLORIDE 1 DROP: 5 SOLUTION/ DROPS OPHTHALMIC at 10:11

## 2021-06-22 RX ADMIN — ATORVASTATIN CALCIUM 40 MG: 40 TABLET, FILM COATED ORAL at 17:30

## 2021-06-22 RX ADMIN — SODIUM CHLORIDE: 9 INJECTION, SOLUTION INTRAVENOUS at 01:34

## 2021-06-22 RX ADMIN — METOPROLOL SUCCINATE 25 MG: 25 TABLET, EXTENDED RELEASE ORAL at 17:30

## 2021-06-22 RX ADMIN — MOXIFLOXACIN HYDROCHLORIDE 1 DROP: 5 SOLUTION/ DROPS OPHTHALMIC at 13:37

## 2021-06-22 RX ADMIN — ESCITALOPRAM OXALATE 5 MG: 10 TABLET ORAL at 17:30

## 2021-06-22 RX ADMIN — SODIUM CHLORIDE: 9 INJECTION, SOLUTION INTRAVENOUS at 21:11

## 2021-06-22 RX ADMIN — PREDNISOLONE ACETATE 1 DROP: 10 SUSPENSION/ DROPS OPHTHALMIC at 10:11

## 2021-06-22 RX ADMIN — SODIUM CHLORIDE: 9 INJECTION, SOLUTION INTRAVENOUS at 11:39

## 2021-06-22 RX ADMIN — PREDNISOLONE ACETATE 1 DROP: 10 SUSPENSION/ DROPS OPHTHALMIC at 13:37

## 2021-06-22 RX ADMIN — PREDNISOLONE ACETATE 1 DROP: 10 SUSPENSION/ DROPS OPHTHALMIC at 17:30

## 2021-06-22 RX ADMIN — MOXIFLOXACIN HYDROCHLORIDE 1 DROP: 5 SOLUTION/ DROPS OPHTHALMIC at 20:59

## 2021-06-22 RX ADMIN — PREDNISOLONE ACETATE 1 DROP: 10 SUSPENSION/ DROPS OPHTHALMIC at 20:59

## 2021-06-22 RX ADMIN — OMEPRAZOLE 20 MG: 20 CAPSULE, DELAYED RELEASE ORAL at 17:30

## 2021-06-22 RX ADMIN — OMEPRAZOLE 20 MG: 20 CAPSULE, DELAYED RELEASE ORAL at 05:37

## 2021-06-22 RX ADMIN — MOXIFLOXACIN HYDROCHLORIDE 1 DROP: 5 SOLUTION/ DROPS OPHTHALMIC at 17:30

## 2021-06-22 ASSESSMENT — ENCOUNTER SYMPTOMS
HEADACHES: 0
HEARTBURN: 0
WEAKNESS: 0
DIARRHEA: 0
BLOOD IN STOOL: 0
SPEECH CHANGE: 0
NAUSEA: 1
SENSORY CHANGE: 0
WHEEZING: 0
BACK PAIN: 0
PHOTOPHOBIA: 0
HEMOPTYSIS: 0
BRUISES/BLEEDS EASILY: 0
FLANK PAIN: 0
MYALGIAS: 0
ABDOMINAL PAIN: 0
SPUTUM PRODUCTION: 0
SHORTNESS OF BREATH: 0
CLAUDICATION: 0
EYE DISCHARGE: 0
DIZZINESS: 0
PALPITATIONS: 0
DOUBLE VISION: 0
ORTHOPNEA: 0
VOMITING: 1
COUGH: 0
FEVER: 0
SORE THROAT: 0
DEPRESSION: 0
BLURRED VISION: 0
CHILLS: 0

## 2021-06-22 ASSESSMENT — GAIT ASSESSMENTS
DISTANCE (FEET): 10
ASSISTIVE DEVICE: FRONT WHEEL WALKER
GAIT LEVEL OF ASSIST: SUPERVISED
DISTANCE (FEET): 5
DEVIATION: SHUFFLED GAIT;BRADYKINETIC;DECREASED BASE OF SUPPORT

## 2021-06-22 ASSESSMENT — COGNITIVE AND FUNCTIONAL STATUS - GENERAL
STANDING UP FROM CHAIR USING ARMS: A LOT
TURNING FROM BACK TO SIDE WHILE IN FLAT BAD: A LITTLE
MOVING TO AND FROM BED TO CHAIR: A LOT
TOILETING: A LITTLE
DAILY ACTIVITIY SCORE: 20
SUGGESTED CMS G CODE MODIFIER MOBILITY: CL
TOILETING: A LITTLE
TURNING FROM BACK TO SIDE WHILE IN FLAT BAD: A LITTLE
DAILY ACTIVITIY SCORE: 18
HELP NEEDED FOR BATHING: A LOT
MOBILITY SCORE: 13
DRESSING REGULAR LOWER BODY CLOTHING: A LITTLE
TURNING FROM BACK TO SIDE WHILE IN FLAT BAD: A LITTLE
CLIMB 3 TO 5 STEPS WITH RAILING: A LOT
MOVING TO AND FROM BED TO CHAIR: A LOT
DRESSING REGULAR LOWER BODY CLOTHING: A LOT
TOILETING: A LITTLE
DRESSING REGULAR UPPER BODY CLOTHING: A LITTLE
STANDING UP FROM CHAIR USING ARMS: A LOT
WALKING IN HOSPITAL ROOM: A LOT
MOBILITY SCORE: 13
MOVING FROM LYING ON BACK TO SITTING ON SIDE OF FLAT BED: A LOT
MOVING FROM LYING ON BACK TO SITTING ON SIDE OF FLAT BED: A LOT
SUGGESTED CMS G CODE MODIFIER DAILY ACTIVITY: CK
HELP NEEDED FOR BATHING: A LITTLE
CLIMB 3 TO 5 STEPS WITH RAILING: A LOT
SUGGESTED CMS G CODE MODIFIER MOBILITY: CL
MOVING TO AND FROM BED TO CHAIR: A LOT
WALKING IN HOSPITAL ROOM: A LOT
WALKING IN HOSPITAL ROOM: A LOT
DRESSING REGULAR LOWER BODY CLOTHING: A LOT
STANDING UP FROM CHAIR USING ARMS: A LOT
SUGGESTED CMS G CODE MODIFIER MOBILITY: CL
HELP NEEDED FOR BATHING: A LOT
DRESSING REGULAR UPPER BODY CLOTHING: A LITTLE
CLIMB 3 TO 5 STEPS WITH RAILING: A LOT
SUGGESTED CMS G CODE MODIFIER DAILY ACTIVITY: CJ
MOBILITY SCORE: 13
MOVING FROM LYING ON BACK TO SITTING ON SIDE OF FLAT BED: A LOT

## 2021-06-22 ASSESSMENT — ACTIVITIES OF DAILY LIVING (ADL): TOILETING: INDEPENDENT

## 2021-06-22 ASSESSMENT — PAIN DESCRIPTION - PAIN TYPE: TYPE: ACUTE PAIN

## 2021-06-22 ASSESSMENT — FIBROSIS 4 INDEX: FIB4 SCORE: 3.25

## 2021-06-22 NOTE — THERAPY
Physical Therapy   Initial Evaluation     Patient Name: Yoli Carmichael  Age:  87 y.o., Sex:  female  Medical Record #: [id number]  Today's Date: 6/22/2021     Precautions: (P) Fall Risk    Assessment  Patient is 87 y.o. female admitted with UGIB. PMHx includes tachybradycardia syndrome, SVT, status post ablation, pacemaker placement, CKD stage III, osteoarthritis, recurrent syncopal episodes, hypertension, and depression. Patient presents to PT evaluation with generalized weakness, decreased B knee ROM, impaired balance, poor activity tolerance and high fall risk. Patient is below baseline and will benefit from continued skilled IP PT in house to address impairments.    Plan    Recommend Physical Therapy 3 times per week until therapy goals are met for the following treatments:  Bed Mobility, Gait Training and Therapeutic Activities    DC Equipment Recommendations: (P) Front-Wheel Walker, Unable to determine at this time  Discharge Recommendations: (P) Recommend post-acute placement for additional physical therapy services prior to discharge home        Objective       06/22/21 1124   Prior Living Situation   Prior Services Intermittent Physical Support for ADL Per Family   Housing / Facility 1 Story House   Steps Into Home 0   Steps In Home 0   Bathroom Set up Walk In Shower   Equipment Owned Front-Wheel Walker   Lives with - Patient's Self Care Capacity Alone and Able to Care For Self   Comments sister and daughter live nearby and can assist PRN   Prior Level of Functional Mobility   Bed Mobility Independent   Transfer Status Independent   Ambulation Independent   Distance Ambulation (Feet)   (limited community ambulation )   Assistive Devices Used Front-Wheel Walker   Comments recently use of FWW; family assist with IADLs   Gait Analysis   Gait Level Of Assist Supervised   Assistive Device Front Wheel Walker   Distance (Feet) 10   # of Times Distance was Traveled 1   Deviation Shuffled  Gait;Bradykinetic;Decreased Base Of Support   Weight Bearing Status no restrictions   Comments heavy reliance on FWW   Bed Mobility    Supine to Sit Supervised   Scooting Supervised   Rolling Supervised   Comments use of bed features   Functional Mobility   Sit to Stand Supervised   Bed, Chair, Wheelchair Transfer Supervised   Transfer Method Stand Step   Mobility w/ FWW   Patient / Family Goals    Patient / Family Goal #1 to go to rehab   Short Term Goals    Short Term Goal # [facility] use [facility] bed feature in order to incrs fxnal IND   Short Term Goal # 2 within 6 visits pt to demo ambualtion x150' SPV in order to increase fxnal IND   Anticipated Discharge Equipment and Recommendations   DC Equipment Recommendations Front-Wheel Walker;Unable to determine at this time   Discharge Recommendations Recommend post-acute placement for additional physical therapy services prior to discharge home

## 2021-06-22 NOTE — CARE PLAN
The patient is Watcher - Medium risk of patient condition declining or worsening    Shift Goals  Clinical Goals: stable Hgb  Patient Goals: Rest  Family Goals: N/A    Progress made toward(s) clinical / shift goals:  Pt remains free from falls    Problem: Fall Risk  Goal: Patient will remain free from falls  Outcome: Progressing       Patient is not progressing towards the following goals:    Pt hemoglobin slowly trending down.

## 2021-06-22 NOTE — DISCHARGE PLANNING
Received Choice form at 1453   Agency/Facility Name: (1) [facility] ([facility] (3) [facility]  Referral sent per Choice form @ 3322    RNCM informed

## 2021-06-22 NOTE — PROGRESS NOTES
Received report from day shift RNRichelle. Assumed care of pt. Pt reports no needs at this time. Updated pt on plan of care. Pt resting comfortably in bed. Fall and skin precautions in place. Educated on use of call light. Hourly rounding and continuous monitoring in place.

## 2021-06-22 NOTE — CARE PLAN
Problem: Knowledge Deficit - Standard  Goal: Patient and family/care givers will demonstrate understanding of plan of care, disease process/condition, diagnostic tests and medications  Outcome: Progressing     Problem: Fall Risk  Goal: Patient will remain free from falls  Outcome: Progressing     The patient is Stable - Low risk of patient condition declining or worsening    Shift Goals  Clinical Goals: stable Hgb  Patient Goals: Rest  Family Goals: N/A    Progress made toward(s) clinical / shift goals:  Up with PT, tolerating treatment, plan of care reviewed

## 2021-06-22 NOTE — THERAPY
Occupational Therapy   Initial Evaluation     Patient Name: Yoli Carmichael  Age:  87 y.o., Sex:  female  Medical Record #: [id number]  Today's Date: 6/22/2021     Precautions  Precautions: (P) Fall Risk    Assessment  Patient is 87 y.o. female with a diagnosis of UGI bleed.  Additional factors influencing patient status / progress: good family support at home, motivated to improve/ participate. Will benefit from TO to focus on ADLS, transfers, functional endurance.      Plan    Recommend Occupational Therapy 3 times per week until therapy goals are met for the following treatments:  Adaptive Equipment, Self Care/Activities of Daily Living, Therapeutic Activities and Therapeutic Exercises.    DC Equipment Recommendations: (P) None  Discharge Recommendations: (P) Recommend post-acute placement for additional occupational therapy services prior to discharge home     Subjective    Pleasant and cooperative     Objective       06/22/21 1110   Total Time Spent   Total Time Spent (Mins)    Charge Group   OT Evaluation OT Evaluation Mod   Initial Contact Note    Initial Contact Note Order Received and Verified, Occupational Therapy Evaluation in Progress with Full Report to Follow.   Prior Living Situation   Prior Services Intermittent Physical Support for ADL Per Family   Housing / Facility 1 Story House   Steps Into Home 0   Steps In Home 0   Bathroom Set up Walk In Shower;Grab Bars;Shower Chair   Equipment Owned Front-Wheel Walker   Lives with - Patient's Self Care Capacity Alone and Able to Care For Self   Comments family assist as needed   Prior Level of ADL Function   Self Feeding Independent   Grooming / Hygiene Independent   Bathing Independent   Dressing Independent   Toileting Independent   Prior Level of IADL Function   Medication Management Independent   Laundry Requires Assist   Kitchen Mobility Requires Assist   Finances Independent   Home Management Requires Assist   Shopping Requires Assist   Prior Level Of  Mobility Supervision With Device in Home   Driving / Transportation Relatives / Others Provide Transportation   History of Falls   History of Falls Yes   Date of Last Fall   (reason for admit)   Precautions   Precautions Fall Risk   Vitals   Pulse Oximetry 93 %   O2 Delivery Device Room air w/o2 available   Vitals Comments O2 sats stable at 92% or above throughout activity   Pain 0 - 10 Group   Therapist Pain Assessment During Activity;Post Activity Pain Same as Prior to Activity;Nurse Notified;0   Cognition    Cognition / Consciousness WDL   Level of Consciousness Alert   Comments [place]   Passive ROM Upper Body   Passive ROM Upper Body WDL   Active ROM Upper Body   Active ROM Upper Body  WDL   Dominant Hand Right   Strength Upper Body   Upper Body Strength  WDL   Sensation Upper Body   Upper Extremity Sensation  WDL   Upper Body Muscle Tone   Upper Body Muscle Tone  WDL   Coordination Upper Body   Coordination WDL   Balance Assessment   Sitting Balance (Static) Fair +   Sitting Balance (Dynamic) Fair +   Standing Balance (Static) Fair   Standing Balance (Dynamic) Fair -   Weight Shift Sitting Fair   Weight Shift Standing Fair   Bed Mobility    Supine to Sit Supervised   Sit to Supine   (left seated in bedside chair)   Scooting Supervised   Rolling Supervised   Comments limited activity tolerance, fatigues quickly   ADL Assessment   Eating Modified Independent   Grooming Supervision;Seated   Bathing   (NT)   Upper Body Dressing Supervision   Lower Body Dressing Minimal Assist   Toileting Minimal Assist   How much help from another person does the patient currently need...   Putting on and taking off regular lower body clothing? 3   Bathing (including washing, rinsing, and drying)? 3   Toileting, which includes using a toilet, bedpan, or urinal? 3   Putting on and taking off regular upper body clothing? 3   Taking care of personal grooming such as brushing teeth? 4   Eating meals? 4   6 Clicks Daily Activity Score 20    Functional Mobility   Sit to Stand   (CG)   Bed, Chair, Wheelchair Transfer   (CG)   Toilet Transfers   (CG)   Transfer Method Stand Pivot   Distance (Feet) 5   # of Times Distance was Traveled 2   Comments using FWW   Visual Perception   Visual Perception  WDL   Activity Tolerance   Sitting Edge of Bed 10 mins   Standing 5 mins   Patient / Family Goals   Patient / Family Goal #1 get stronger   Short Term Goals   Short Term Goal # 1 tolerate 10 minutes standing at sink for ADL completion, wiht SBA   Short Term Goal # 2 set up for dressing tasks, appropriate use of AE   Short Term Goal # 3 supervised level for necessary functional transfers   Education Group   Role of Occupational Therapist Patient Response Patient;Acceptance;Family;Explanation;Demonstration;Verbal Demonstration;Action Demonstration   ADL Patient Response Patient;Family;Acceptance;Explanation;Demonstration;Verbal Demonstration;Reinforcement Needed   Problem List   Problem List Decreased Active Daily Living Skills;Decreased Functional Mobility;Decreased Activity Tolerance;Impaired Postural Control / Balance   Anticipated Discharge Equipment and Recommendations   DC Equipment Recommendations None   Discharge Recommendations Recommend post-acute placement for additional occupational therapy services prior to discharge home   Interdisciplinary Plan of Care Collaboration   IDT Collaboration with  Family / Caregiver;Nursing;Physical Therapist   Patient Position at End of Therapy Seated;Chair Alarm On;Call Light within Reach;Tray Table within Reach;Phone within Reach   Collaboration Comments report given   Session Information   Date / Session Number  6/22,1 ( 1/3,6/28)   Priority 2

## 2021-06-22 NOTE — PROGRESS NOTES
[facility] Medicine Daily Progress Note    Date of Service  6/22/2021    Chief Complaint  87 y.o. female admitted 6/20/2021 with UGIB    Hospital Course    87 y.o. female tachybradycardia syndrome, SVT, status post ablation, pacemaker placement, CKD stage III, osteoarthritis, recurrent syncopal episodes, hypertension, depression, who presented 6/20/2021 with complaints of generalized weakness, orthostatic dizziness, nausea and one episode of vomiting earlier this morning.  EMS apparently found the patient unresponsive the floor, she woke up.  Apparently patient felt dizzy and passed out on the way to the bathroom.  Additionally, according to EMS, patient had vomiting which appeared to be coffee-ground.  In the emergency department reportedly patient had brief syncopal episode without fall in the position upright.  She had bowel movement which appeared to be melanotic and vomiting with coffee-ground.  Currently denies nausea or dizziness while lying flat.  She continues having orthostatic hypotension 77/48, mild tachycardia 102.  Her hemoglobin 7.8 down from 9.2   1-month ago..  Pacemaker interrogated in ER and working appropriately.  Troponin slightly elevated, EKG negative for ischemia or MI.  Patient states she was taking ibuprofen for chronic joint pain, but not recently, last time a few weeks ago.  She denies chills, fever, chest pain.  Occasionally has dry cough in the last week or 2.    Interval Problem Update  No acute events overnight.  On PPI for gastric ulcer.  Monitor Hgb, transfuse for Hgb<7. Check H/H 4pm today.  PT/OT evaluation, possible SNF placement.  Continue IV fluids given loose stools which are improving.    Consultants/Specialty  GI    Code Status  Full Code    Disposition  Pending PT/OT evaluation for possible SNF.    Review of Systems  Review of Systems   Constitutional: Negative for chills, fever and malaise/fatigue.   HENT: Negative for congestion, hearing loss, sore throat and tinnitus.     Eyes: Negative for blurred vision, double vision, photophobia and discharge.   Respiratory: Negative for cough, hemoptysis, sputum production, shortness of breath and wheezing.    Cardiovascular: Negative for chest pain, palpitations, orthopnea, claudication and leg swelling.   Gastrointestinal: Positive for melena, nausea and vomiting. Negative for abdominal pain, blood in stool, diarrhea and heartburn.   Genitourinary: Negative for dysuria, flank pain, hematuria and urgency.   Musculoskeletal: Negative for back pain, joint pain and myalgias.   Skin: Negative for itching and rash.   Neurological: Negative for dizziness, sensory change, speech change, weakness and headaches.   Endo/Heme/Allergies: Does not bruise/bleed easily.   Psychiatric/Behavioral: Negative for depression and suicidal ideas.        Physical Exam  Temp:  [36.4 °C (97.5 °F)-37.1 °C (98.7 °F)] 36.8 °C (98.2 °F)  Pulse:  [82-92] 83  Resp:  [16-21] 16  BP: (125-149)/(46-70) 127/68  SpO2:  [89 %-96 %] 94 %    Physical Exam  Vitals and nursing note reviewed.   Constitutional:       General: She is not in acute distress.     Appearance: Normal appearance. She is obese.   HENT:      Head: Normocephalic and atraumatic.      Mouth/Throat:      Mouth: Mucous membranes are moist.   Eyes:      Extraocular Movements: Extraocular movements intact.      Conjunctiva/sclera: Conjunctivae normal.      Pupils: Pupils are equal, round, and reactive to light.   Cardiovascular:      Rate and Rhythm: Normal rate and regular rhythm.      Heart sounds: No murmur heard.   No friction rub.   Pulmonary:      Effort: Pulmonary effort is normal. No respiratory distress.      Breath sounds: Normal breath sounds. No wheezing.   Abdominal:      General: Abdomen is flat. Bowel sounds are normal.      Palpations: Abdomen is soft.      Tenderness: There is no abdominal tenderness. There is no guarding.   Musculoskeletal:         General: No swelling or tenderness. Normal range of  motion.      Cervical back: Normal range of motion and neck supple.   Skin:     General: Skin is warm and dry.      Coloration: Pallor:       Findings: No rash.   Neurological:      General: No focal deficit present.      Mental Status: She is alert and oriented to person, place, and time.      Cranial Nerves: No cranial nerve deficit.      Motor: No weakness.   Psychiatric:         Mood and Affect: Mood normal.         Behavior: Behavior normal.         Fluids    Intake/Output Summary (Last 24 hours) at 6/22/2021 1343  Last data filed at 6/22/2021 0930  Gross per 24 hour   Intake 220 ml   Output 300 ml   Net -80 ml       Laboratory  Recent Labs     06/20/21  1035 06/20/21  2054 06/21/21  0506 06/21/21  1438 06/21/21 2015 06/22/21  0044 06/22/21  0448   WBC 13.2*  --  11.7*  --   --  10.7  --    RBC 2.88*  --  2.82*  --   --  2.51*  --    HEMOGLOBIN 7.8*   < > 8.0*   < > 7.6* 7.2* 7.4*   HEMATOCRIT 27.2*  --  25.9*  --   --  23.0*  --    MCV 94.4  --  91.8  --   --  91.6  --    MCH 27.1  --  28.4  --   --  28.7  --    MCHC 28.7*  --  30.9*  --   --  31.3*  --    RDW 51.7*  --  49.3  --   --  50.7*  --    PLATELETCT 249  --  195  --   --  171  --    MPV 10.2  --  10.3  --   --  10.1  --     < > = values in this interval not displayed.     Recent Labs     06/20/21  1035 06/21/21  0506 06/22/21  0044   SODIUM 141 143 143   POTASSIUM 4.4 3.7 3.4*   CHLORIDE 111 115* 117*   CO2 19* 19* 18*   GLUCOSE 117* 95 95   BUN 31* 38* 28*   CREATININE 1.48* 1.14 1.22   CALCIUM 8.3* 7.8* 7.8*     Recent Labs     06/20/21  1110   APTT 25.4   INR 1.34*               Imaging  CT-ABDOMEN-PELVIS WITH   Final Result      1.  Left pleural effusion is identified which occupies the left lower hemithorax.      2.  There is cholelithiasis.      3.  There is calcific atherosclerosis.         CT-HEAD W/O   Final Result         NO ACUTE ABNORMALITIES ARE NOTED ON CT SCAN OF THE HEAD.      Findings are consistent with atrophy.  Decreased  attenuation in the periventricular white matter likely indicates microvascular ischemic disease.      DX-CHEST-PORTABLE (1 VIEW)   Final Result         1.  Some increase in size of left pleural effusion.      2.  Probable increase in consolidative atelectasis in the left lung base.           Assessment/Plan  * Upper GI bleed  Assessment & Plan  Dr. Donaldson/ERP is consulting with [facility] for endoscopy  RBC transfusion 1 unit for symptomatic anemia with hemoglobin 7.8  Avoid antiplatelets and anticoagulants  IV fluid support  On PPI  Patient underwent endoscopy with evidence of gastric ulcers esophagitis.  Current GI recommendations are for repeat EGD in 8 weeks along with 8 weeks of PPI therapy.    Leukocytosis  Assessment & Plan  CXR: Probable increase in consolidative atelectasis in the left lung base.  UA negative  Blood Culture negative   Procalcitonin negative  Patient is afebrile and leukocytosis likely reactive  Antibiotics discontinued     Elevated troponin  Assessment & Plan  Likely demand ischemia secondary to anemia  EKG negative for MI  Denies chest pain  Troponin only mildly elevated  Continue to monitor     Orthostatic hypotension  Assessment & Plan  Secondary to hypovolemia  Continue IV hydration  Monitor blood pressure    Anemia- (present on admission)  Assessment & Plan  Anemia secondary to acute blood loss  Ordered 1 unit of red blood cells for symptomatic anemia, hemoglobin 7.8  Repeat H&H daily  We will check iron studies, folate and vitamin B12 level    Stage 3b chronic kidney disease (HCC)- (present on admission)  Assessment & Plan  Stable  Avoid nephrotoxins  Monitor kidney function       VTE prophylaxis: SCDs

## 2021-06-22 NOTE — DISCHARGE PLANNING
Care Transition Team Assessment    Emergency Contact - Clarita Gutierrez (Daughter)    Spoke to pt at bedside, pt states she lives alone in a single story home in [place], [place]. Pt states that her daughter basically lives across the street. Pt was on service with [facility]  prior to admission. Pt states she uses a FWW at baseline and drives herself. Pt has been to SNF in the past. Pt has a PCP she saw about 11 months ago. Pt uses [facility] in [place]. Pt amenable to SNF, gave verbal choice, choice form faxed to Christa VALENTIN.    Information Source  Orientation Level: Oriented X4  Information Given By: Patient  Who is responsible for making decisions for patient? : Patient    Elopement Risk  Legal Hold: No  Ambulatory or Self Mobile in Wheelchair: Yes  Disoriented: No  Psychiatric Symptoms: None  History of Wandering: No  Elopement this Admit: No  Vocalizing Wanting to Leave: No  Displays Behaviors, Body Language Wanting to Leave: No-Not at Risk for Elopement  Elopement Risk: Not at Risk for Elopement    Interdisciplinary Discharge Planning  Lives with - Patient's Self Care Capacity: Unable To Determine At This Time  Patient or legal guardian wants to designate a caregiver: No  Support Systems: Family Member(s)  Housing / Facility: [address]  Durable Medical Equipment: Walker    Discharge Preparedness  What is your plan after discharge?: Skilled nursing facility  What are your discharge supports?: Child  Prior Functional Level: Independent with Activities of Daily Living    Functional Assesment  Prior Functional Level: Independent with Activities of Daily Living    Vision / Hearing Impairment  Vision Impairment : Yes  Right Eye Vision: Impaired, Wears Glasses  Left Eye Vision: Impaired, Wears Glasses  Hearing Impairment : Yes  Hearing Impairment: Both Ears, Hearing Device Not Available         Advance Directive  Advance Directive?: None  Advance Directive offered?: AD Booklet refused    Domestic Abuse  Have you ever been the  victim of abuse or violence?: No  Physical Abuse or Sexual Abuse: No  Verbal Abuse or Emotional Abuse: No  Possible Abuse/Neglect Reported to:: Not Applicable

## 2021-06-23 ENCOUNTER — PATIENT MESSAGE (OUTPATIENT)
Dept: HEALTH INFORMATION MANAGEMENT | Facility: OTHER | Age: 86
End: 2021-06-23

## 2021-06-23 VITALS
DIASTOLIC BLOOD PRESSURE: 63 MMHG | HEIGHT: 63 IN | OXYGEN SATURATION: 92 % | TEMPERATURE: 97.3 F | HEART RATE: 76 BPM | RESPIRATION RATE: 16 BRPM | WEIGHT: 182.76 LBS | SYSTOLIC BLOOD PRESSURE: 143 MMHG | BODY MASS INDEX: 32.38 KG/M2

## 2021-06-23 LAB
ANION GAP SERPL CALC-SCNC: 8 MMOL/L (ref 7–16)
BUN SERPL-MCNC: 20 MG/DL (ref 8–22)
CALCIUM SERPL-MCNC: 7.8 MG/DL (ref 8.5–10.5)
CHLORIDE SERPL-SCNC: 116 MMOL/L (ref 96–112)
CO2 SERPL-SCNC: 18 MMOL/L (ref 20–33)
CREAT SERPL-MCNC: 1.04 MG/DL (ref 0.5–1.4)
ERYTHROCYTE [DISTWIDTH] IN BLOOD BY AUTOMATED COUNT: 51 FL (ref 35.9–50)
GLUCOSE SERPL-MCNC: 118 MG/DL (ref 65–99)
HCT VFR BLD AUTO: 22.8 % (ref 37–47)
HCT VFR BLD AUTO: 25.5 % (ref 37–47)
HGB BLD-MCNC: 7 G/DL (ref 12–16)
HGB BLD-MCNC: 7.9 G/DL (ref 12–16)
MCH RBC QN AUTO: 28.5 PG (ref 27–33)
MCHC RBC AUTO-ENTMCNC: 30.7 G/DL (ref 33.6–35)
MCV RBC AUTO: 92.7 FL (ref 81.4–97.8)
PLATELET # BLD AUTO: 170 K/UL (ref 164–446)
PMV BLD AUTO: 10.5 FL (ref 9–12.9)
POTASSIUM SERPL-SCNC: 3.4 MMOL/L (ref 3.6–5.5)
RBC # BLD AUTO: 2.46 M/UL (ref 4.2–5.4)
SODIUM SERPL-SCNC: 142 MMOL/L (ref 135–145)
WBC # BLD AUTO: 9 K/UL (ref 4.8–10.8)

## 2021-06-23 PROCEDURE — A9270 NON-COVERED ITEM OR SERVICE: HCPCS | Performed by: STUDENT IN AN ORGANIZED HEALTH CARE EDUCATION/TRAINING PROGRAM

## 2021-06-23 PROCEDURE — 80048 BASIC METABOLIC PNL TOTAL CA: CPT

## 2021-06-23 PROCEDURE — 85027 COMPLETE CBC AUTOMATED: CPT

## 2021-06-23 PROCEDURE — 36415 COLL VENOUS BLD VENIPUNCTURE: CPT

## 2021-06-23 PROCEDURE — 700101 HCHG RX REV CODE 250: Performed by: STUDENT IN AN ORGANIZED HEALTH CARE EDUCATION/TRAINING PROGRAM

## 2021-06-23 PROCEDURE — 99239 HOSP IP/OBS DSCHRG MGMT >30: CPT | Performed by: STUDENT IN AN ORGANIZED HEALTH CARE EDUCATION/TRAINING PROGRAM

## 2021-06-23 PROCEDURE — A9270 NON-COVERED ITEM OR SERVICE: HCPCS | Performed by: INTERNAL MEDICINE

## 2021-06-23 PROCEDURE — 700102 HCHG RX REV CODE 250 W/ 637 OVERRIDE(OP): Performed by: INTERNAL MEDICINE

## 2021-06-23 PROCEDURE — 85014 HEMATOCRIT: CPT

## 2021-06-23 PROCEDURE — 700102 HCHG RX REV CODE 250 W/ 637 OVERRIDE(OP): Performed by: STUDENT IN AN ORGANIZED HEALTH CARE EDUCATION/TRAINING PROGRAM

## 2021-06-23 PROCEDURE — 85018 HEMOGLOBIN: CPT

## 2021-06-23 RX ORDER — PREDNISOLONE ACETATE 10 MG/ML
1 SUSPENSION/ DROPS OPHTHALMIC 3 TIMES DAILY
Status: DISCONTINUED | OUTPATIENT
Start: 2021-06-23 | End: 2021-06-23 | Stop reason: HOSPADM

## 2021-06-23 RX ORDER — POTASSIUM CHLORIDE 20 MEQ/1
40 TABLET, EXTENDED RELEASE ORAL ONCE
Status: COMPLETED | OUTPATIENT
Start: 2021-06-23 | End: 2021-06-23

## 2021-06-23 RX ORDER — OMEPRAZOLE 20 MG/1
20 CAPSULE, DELAYED RELEASE ORAL 2 TIMES DAILY
Qty: 30 CAPSULE | Status: SHIPPED
Start: 2021-06-23 | End: 2021-07-15 | Stop reason: SDUPTHER

## 2021-06-23 RX ORDER — MOXIFLOXACIN 5 MG/ML
SOLUTION OPHTHALMIC
Qty: 3 ML | Refills: 0
Start: 2021-06-23 | End: 2021-06-23 | Stop reason: SDUPTHER

## 2021-06-23 RX ORDER — MOXIFLOXACIN 5 MG/ML
SOLUTION OPHTHALMIC
Qty: 3 ML | Refills: 0 | Status: SHIPPED
Start: 2021-06-23 | End: 2021-07-14

## 2021-06-23 RX ADMIN — PREDNISOLONE ACETATE 1 DROP: 10 SUSPENSION/ DROPS OPHTHALMIC at 11:23

## 2021-06-23 RX ADMIN — OMEPRAZOLE 20 MG: 20 CAPSULE, DELAYED RELEASE ORAL at 05:52

## 2021-06-23 RX ADMIN — POTASSIUM CHLORIDE 40 MEQ: 1500 TABLET, EXTENDED RELEASE ORAL at 09:48

## 2021-06-23 ASSESSMENT — ENCOUNTER SYMPTOMS
FEVER: 0
SORE THROAT: 0
EYE DISCHARGE: 0
COUGH: 0
BLOOD IN STOOL: 0
HEARTBURN: 0
DEPRESSION: 0
HEADACHES: 0
SPEECH CHANGE: 0
SPUTUM PRODUCTION: 0
ABDOMINAL PAIN: 0
SENSORY CHANGE: 0
MYALGIAS: 0
DIARRHEA: 0
DIZZINESS: 0
WEAKNESS: 0
BLURRED VISION: 0
ORTHOPNEA: 0
DOUBLE VISION: 0
PHOTOPHOBIA: 0
BACK PAIN: 0
CHILLS: 0
NAUSEA: 1
WHEEZING: 0
VOMITING: 1
CLAUDICATION: 0
FLANK PAIN: 0
SHORTNESS OF BREATH: 0
PALPITATIONS: 0
HEMOPTYSIS: 0
BRUISES/BLEEDS EASILY: 0

## 2021-06-23 NOTE — CARE PLAN
Problem: Knowledge Deficit - Standard  Goal: Patient and family/care givers will demonstrate understanding of plan of care, disease process/condition, diagnostic tests and medications  Outcome: Progressing     The patient is Stable - Low risk of patient condition declining or worsening    Shift Goals  Clinical Goals: stable Hgb  Patient Goals: Rest  Family Goals: N/A    Progress made toward(s) clinical / shift goals:  Patient ready for discharge

## 2021-06-23 NOTE — DISCHARGE PLANNING
Agency/Facility Name: [facility]  Spoke To: Manuela  Outcome: Pt accepted. Bed and transport available 6/23 at 1700.    RNCM informed

## 2021-06-23 NOTE — PROGRESS NOTES
[facility] Medicine Daily Progress Note    Date of Service  6/23/2021    Chief Complaint  87 y.o. female admitted 6/20/2021 with UGIB    Hospital Course    87 y.o. female tachybradycardia syndrome, SVT, status post ablation, pacemaker placement, CKD stage III, osteoarthritis, recurrent syncopal episodes, hypertension, depression, who presented 6/20/2021 with complaints of generalized weakness, orthostatic dizziness, nausea and one episode of vomiting earlier this morning.  EMS apparently found the patient unresponsive the floor, she woke up.  Apparently patient felt dizzy and passed out on the way to the bathroom.  Additionally, according to EMS, patient had vomiting which appeared to be coffee-ground.  In the emergency department reportedly patient had brief syncopal episode without fall in the position upright.  She had bowel movement which appeared to be melanotic and vomiting with coffee-ground.  Currently denies nausea or dizziness while lying flat.  She continues having orthostatic hypotension 77/48, mild tachycardia 102.  Her hemoglobin 7.8 down from 9.2   1-month ago..  Pacemaker interrogated in ER and working appropriately.  Troponin slightly elevated, EKG negative for ischemia or MI.  Patient states she was taking ibuprofen for chronic joint pain, but not recently, last time a few weeks ago.  She denies chills, fever, chest pain.  Occasionally has dry cough in the last week or 2.    Interval Problem Update  No acute events overnight.  On PPI for gastric ulcer.  Patient reports stool more formed, remains dark. Stop IV fluids.  Hgb 7 this morning, check again at noon. Transfuse for Hgb<7.  PT/OT recommend SNF, referral placed.    Consultants/Specialty  GI    Code Status  Full Code    Disposition  Pending SNF placement. Patient is medically cleared for discharge to SNF when available.    Review of Systems  Review of Systems   Constitutional: Negative for chills, fever and malaise/fatigue.   HENT: Negative for  congestion, hearing loss, sore throat and tinnitus.    Eyes: Negative for blurred vision, double vision, photophobia and discharge.   Respiratory: Negative for cough, hemoptysis, sputum production, shortness of breath and wheezing.    Cardiovascular: Negative for chest pain, palpitations, orthopnea, claudication and leg swelling.   Gastrointestinal: Positive for melena, nausea and vomiting. Negative for abdominal pain, blood in stool, diarrhea and heartburn.   Genitourinary: Negative for dysuria, flank pain, hematuria and urgency.   Musculoskeletal: Negative for back pain, joint pain and myalgias.   Skin: Negative for itching and rash.   Neurological: Negative for dizziness, sensory change, speech change, weakness and headaches.   Endo/Heme/Allergies: Does not bruise/bleed easily.   Psychiatric/Behavioral: Negative for depression and suicidal ideas.        Physical Exam  Temp:  [36.2 °C (97.2 °F)-37.2 °C (98.9 °F)] 37.2 °C (98.9 °F)  Pulse:  [78-84] 80  Resp:  [16-22] 19  BP: (127-150)/(67-73) 149/67  SpO2:  [88 %-95 %] 95 %    Physical Exam  Vitals and nursing note reviewed.   Constitutional:       General: She is not in acute distress.     Appearance: Normal appearance. She is obese.   HENT:      Head: Normocephalic and atraumatic.      Mouth/Throat:      Mouth: Mucous membranes are moist.   Eyes:      Extraocular Movements: Extraocular movements intact.      Conjunctiva/sclera: Conjunctivae normal.      Pupils: Pupils are equal, round, and reactive to light.   Cardiovascular:      Rate and Rhythm: Normal rate and regular rhythm.      Heart sounds: No murmur heard.   No friction rub.   Pulmonary:      Effort: Pulmonary effort is normal. No respiratory distress.      Breath sounds: Normal breath sounds. No wheezing.   Abdominal:      General: Abdomen is flat. Bowel sounds are normal.      Palpations: Abdomen is soft.      Tenderness: There is no abdominal tenderness. There is no guarding.   Musculoskeletal:          General: No swelling or tenderness. Normal range of motion.      Cervical back: Normal range of motion and neck supple.   Skin:     General: Skin is warm and dry.      Coloration: Pallor:       Findings: No rash.   Neurological:      General: No focal deficit present.      Mental Status: She is alert and oriented to person, place, and time.      Cranial Nerves: No cranial nerve deficit.      Motor: No weakness.   Psychiatric:         Mood and Affect: Mood normal.         Behavior: Behavior normal.         Fluids  No intake or output data in the 24 hours ending 06/23/21 1011    Laboratory  Recent Labs     06/21/21  0506 06/21/21  1438 06/22/21  0044 06/22/21  0044 06/22/21  0448 06/22/21  1552 06/23/21  0232   WBC 11.7*  --  10.7  --   --   --  9.0   RBC 2.82*  --  2.51*  --   --   --  2.46*   HEMOGLOBIN 8.0*   < > 7.2*   < > 7.4* 8.5* 7.0*   HEMATOCRIT 25.9*  --  23.0*  --   --  27.7* 22.8*   MCV 91.8  --  91.6  --   --   --  92.7   MCH 28.4  --  28.7  --   --   --  28.5   MCHC 30.9*  --  31.3*  --   --   --  30.7*   RDW 49.3  --  50.7*  --   --   --  51.0*   PLATELETCT 195  --  171  --   --   --  170   MPV 10.3  --  10.1  --   --   --  10.5    < > = values in this interval not displayed.     Recent Labs     06/21/21  0506 06/22/21  0044 06/23/21  0232   SODIUM 143 143 142   POTASSIUM 3.7 3.4* 3.4*   CHLORIDE 115* 117* 116*   CO2 19* 18* 18*   GLUCOSE 95 95 118*   BUN 38* 28* 20   CREATININE 1.14 1.22 1.04   CALCIUM 7.8* 7.8* 7.8*     Recent Labs     06/20/21  1110   APTT 25.4   INR 1.34*               Imaging  CT-ABDOMEN-PELVIS WITH   Final Result      1.  Left pleural effusion is identified which occupies the left lower hemithorax.      2.  There is cholelithiasis.      3.  There is calcific atherosclerosis.         CT-HEAD W/O   Final Result         NO ACUTE ABNORMALITIES ARE NOTED ON CT SCAN OF THE HEAD.      Findings are consistent with atrophy.  Decreased attenuation in the periventricular white matter likely  indicates microvascular ischemic disease.      DX-CHEST-PORTABLE (1 VIEW)   Final Result         1.  Some increase in size of left pleural effusion.      2.  Probable increase in consolidative atelectasis in the left lung base.           Assessment/Plan  * Upper GI bleed  Assessment & Plan  [facility] Donaldson[facility]  RBC transfusion 1 unit for symptomatic anemia with hemoglobin 7.8  Avoid antiplatelets and anticoagulants  IV fluid support  On PPI  Patient underwent endoscopy with evidence of gastric ulcers esophagitis.  Current GI recommendations are for repeat EGD in 8 weeks along with 8 weeks of PPI therapy.    Leukocytosis  Assessment & Plan  CXR: Probable increase in consolidative atelectasis in the left lung base.  UA negative  Blood Culture negative   Procalcitonin negative  Patient is afebrile and leukocytosis likely reactive  Antibiotics discontinued     Elevated troponin  Assessment & Plan  Likely demand ischemia secondary to anemia  EKG negative for MI  Denies chest pain  Troponin only mildly elevated  Continue to monitor     Orthostatic hypotension  Assessment & Plan  Secondary to hypovolemia  Continue IV hydration  Monitor blood pressure    Anemia- (present on admission)  Assessment & Plan  Anemia secondary to acute blood loss  Ordered 1 unit of red blood cells for symptomatic anemia, hemoglobin 7.8  Repeat H&H daily  We will check iron studies, folate and vitamin B12 level    Stage 3b chronic kidney disease (HCC)- (present on admission)  Assessment & Plan  Stable  Avoid nephrotoxins  Monitor kidney function       VTE prophylaxis: SCDs

## 2021-06-23 NOTE — PROGRESS NOTES
Spiritual Care Note    Patient Information     Patient's Name: Yoli Carmichael   MRN: [id number]    YOB: 1933   Age and Gender: 87 y.o. female   Service Area: [facility]   Room (and Bed): T8[facility]   Ethnicity or Nationality:     Primary Language: English   Congregation/Spiritual preference: Quaker   Place of Residence: [place]   Family/Friends/Others Present: Yes   Clinical Team Present: No   Medical Diagnosis(-es)/Procedure(s): Gi Bleed   Code Status: Full Code    Date of Admission: 6/20/2021   Length of Stay: 3 days        Spiritual Care Provider Information:  Name of Spiritual Care Provider: Rafita Awad  Title of Spiritual Care Provider: Associate   Phone Number: [phone]  E-mail: [email or web address]  Total time : 15 minutes    Spiritual Screen Results:    Gen Nursing  Spiritual Screen  Was spiritual care education provided to the patient?: Yes     Palliative Care  PC Congregation/Spiritual Screening  Was spiritual care education provided to the patient?: Yes      Encounter/Request Information    Encounter/Request Type   Visited With: Patient    Nature of the Visit: Initial, On shift    General Visit: Yes    Referral From/ Origin of Request: Epic nursing    Religous Needs/Values    Congregation Needs Visit  Congregation Needs: Prayer    Spiritual Assessment     Spiritual Care Encounters  Observations/Symptoms: Accepting, Thankfulness    Interacton/Conversation: The Pt thanked the  for coming and told him a little bit about her condition, sharing that she will be moving to a Rehab facilitiy soon. The pt inquired into the 's spiritual background and shared about her own. The Pt shared that she is looking forward to going back to Religious when she is done with her rehab. The Pt asked if the  could say a prayer for her, which the  did. Before leaving the  spoke words of encouragement to the Pt.     Assessment: Need    Need: Seeking Spiritual  Assistance and Support    Intended Effects: Demonstrate Caring and Concern, Shara Affirmation    Interventions: Compassionate Presence, Reflective Listening, Prayer    Outcomes: Ability to Communicate with Truth and Honesty, Acceptance, Hope/Peace/Melany/Trust/Gratitude/Beauty, Spiritual Comfort    Plan: Visit Upon Request    Notes:

## 2021-06-23 NOTE — CARE PLAN
The patient is Stable - Low risk of patient condition declining or worsening    Shift Goals  Clinical Goals: stable Hgb  Patient Goals: Rest  Family Goals: N/A    Progress made toward(s) clinical / shift goals:  Pt whiteboard updated on plan of care. Pt encouraged to call for assistance. Pt encouraged to voice any concerns or questions regarding care plan. Pt updated on plan of care as it develops and changes. Fall precautions in place. Bed in lowest position. Non-skid socks in place. Personal possessions within reach. Mobility sign on door. Bed-alarm on. Call light within reach. Pt educated regarding fall prevention and states understanding.       Problem: Knowledge Deficit - Standard  Goal: Patient and family/care givers will demonstrate understanding of plan of care, disease process/condition, diagnostic tests and medications  Outcome: Progressing     Problem: Fall Risk  Goal: Patient will remain free from falls  Outcome: Progressing

## 2021-06-23 NOTE — DISCHARGE PLANNING
Anticipated Discharge Disposition: [facility]    Action: pt medically cleared for transfer to SNF, pt and family amenable to [facility] who can take pt at 1700 today. No beds available at [facility] per [place] with admissions. Pt gave verbal auth for transfer via [facility] transport at 1700.     Barriers to Discharge: none    Plan: Transfer to [facility] at 1700 by wheelchair van, when dc summary available, will complete transfer packet and provide to RACHAEL Myers    Update: Transfer packet complete, given to RACHAEL Myers

## 2021-06-23 NOTE — DISCHARGE SUMMARY
"Discharge Summary    CHIEF COMPLAINT ON ADMISSION  Chief Complaint   Patient presents with   • Dizziness     Onset this am.    • N/V     one episode. EMS reports appeared very dark and \"GI bleed like\"       Reason for Admission  ems     CODE STATUS  Full Code    HPI & HOSPITAL COURSE  This is a 87 y.o. female here with nausea, vomiting, melena. Patient with history of sick sinus syndrome s/p PPM, CKD, who presented for nausea, vomiting, melena. Patient also with brief syncopal episode at home. She was found to have anemia Hgb 7.8 with coffee ground emesis. She had upper endoscopy which showed gastric ulcer and esophagitis. Findings likely related to recent NSAID use by patient for past two weeks for broken rib pain. Patient treated with proton pump inhibitor. She was given 1 unit red blood cell transfusion for symptomatic anemia with stable hemoglobin thereafter. Patient is to follow up with [facility] in 8 weeks for repeat upper endoscopy procedure. Patient will be discharged to skilled nursing facility for continued post acute physical and occupational therapies.    Therefore, she is discharged in fair and stable condition to skilled nursing facility.    The patient met 2-midnight criteria for an inpatient stay at the time of discharge.      FOLLOW UP ITEMS POST DISCHARGE  Take medications as prescribed.  Follow up with [facility].    DISCHARGE DIAGNOSES  Principal Problem:    Upper GI bleed POA: Unknown  Active Problems:    Stage 3b chronic kidney disease (HCC) POA: Yes      Overview: [facility]. \"Need to include - controlled with current medications or give       update on current work up/treatment\"            Consider Low gFR , check recent KFT --            4/8/15 --------not chronic the previous were with in normal limits twice       possible secondary to dehydration appropriate diagnosis mild renal       insufficiency which would hope fully resolve     Anemia POA: Yes    Orthostatic hypotension POA: " Unknown    Elevated troponin POA: Unknown    Leukocytosis POA: Unknown  Resolved Problems:    * No resolved hospital problems. *      FOLLOW UP  Future Appointments   Date Time Provider Department Center   6/25/2021 10:30 AM ALICIA Catalan   7/6/2021 11:15 AM CHILANGO Bo [facility] None     [facility]  [address] [address]  [phone]        [facility]  [address] [id number] [phone]  In 4 weeks        MEDICATIONS ON DISCHARGE     Medication List      START taking these medications      Instructions   omeprazole 20 MG delayed-release capsule  Commonly known as: PRILOSEC   Take 1 capsule by mouth 2 times a day.  Dose: 20 mg        CHANGE how you take these medications      Instructions   atorvastatin 40 MG Tabs  What changed: Another medication with the same name was removed. Continue taking this medication, and follow the directions you see here.  Commonly known as: LIPITOR   Take 1 tablet by mouth every evening.  Dose: 40 mg        CONTINUE taking these medications      Instructions   aspirin 81 MG EC tablet   Take 1 tablet by mouth every day.  Dose: 81 mg     escitalopram 5 MG tablet  Commonly known as: Lexapro   Take 1 Tab by mouth every day.  Dose: 5 mg     metoprolol SR 25 MG Tb24  Commonly known as: TOPROL XL   Take 1 tablet by mouth every day.  Dose: 25 mg     Moxifloxacin HCl 0.5 % Soln   Administer 1 Drop into the right eye 4 times a day.  Dose: 1 Drop     prednisoLONE acetate 1 % Susp  Commonly known as: PRED FORTE   Administer 1 Drop into both eyes 4 times a day. 3 drops in left eye, 6 drops in right eye  Dose: 1 Drop            Allergies  Allergies   Allergen Reactions   • Other Environmental      Seasonal allergies       DIET  Orders Placed This Encounter   Procedures   • Diet Order Diet: Low Fiber(GI Soft)     Standing Status:   Standing     Number of Occurrences:   1     Order Specific  Question:   Diet:     Answer:   Low Fiber(GI Soft) [2]       ACTIVITY  As tolerated.  Weight bearing as tolerated    LINES, DRAINS, AND WOUNDS  This is an automated list. Peripheral IVs will be removed prior to discharge.  Peripheral IV 06/20/21 20 G Right Antecubital (Active)   Site Assessment Clean;Dry;Intact 06/23/21 1300   Dressing Type Transparent 06/23/21 1300   Line Status Infusing;Flushed;Scrubbed the hub prior to access 06/23/21 1300   Dressing Status Dry;Intact;Clean 06/23/21 1300   Dressing Intervention N/A 06/22/21 1915   Dressing Change Due 06/26/21 06/22/21 1100   Date Primary Tubing Changed 06/22/21 06/21/21 2100   NEXT Primary Tubing Change  06/22/21 06/20/21 2200   Infiltration Grading ([facility]) 0 06/23/21 1300   Phlebitis Scale ([facility] Only) 0 06/23/21 1300       Peripheral IV 20 G Posterior;Right Wrist (Active)   Site Assessment Clean;Dry;Intact 06/23/21 1300   Dressing Type Transparent 06/23/21 1300   Line Status Saline locked;Scrubbed the hub prior to access;Flushed 06/23/21 1300   Dressing Status Clean;Dry;Intact 06/23/21 1300   Dressing Intervention N/A 06/22/21 1915   Infiltration Grading ([facility]) 0 06/23/21 1300   Phlebitis Scale ([facility] Only) 0 06/23/21 1300       Wound 05/24/21 Groin Left Left groin puncture site (Active)       Wound 06/21/21  Sacrum Posterior;Lower slow blanching fragile redness, moisture fissure (Active)   Wound Image   06/22/21 0800   Site Assessment Red;Fragile 06/23/21 0800   Periwound Assessment Dry;Clean;Intact;Pink;Blanchable erythema 06/23/21 0800   Margins Attached edges 06/23/21 0800   Closure Open to air 06/23/21 0800   Drainage Amount None 06/23/21 0800   Treatments Cleansed;Moisturizing cream 06/22/21 0800   Wound Cleansing Foam Cleanser/Washcloth 06/22/21 0800       Wound 05/15/21 Incision Chest (Active)       Wound 05/24/21 Groin Right groin puncture site (Active)       Peripheral IV 06/20/21 20 G Right Antecubital (Active)   Site Assessment  Clean;Dry;Intact 06/23/21 1300   Dressing Type Transparent 06/23/21 1300   Line Status Infusing;Flushed;Scrubbed the hub prior to access 06/23/21 1300   Dressing Status Dry;Intact;Clean 06/23/21 1300   Dressing Intervention N/A 06/22/21 1915   Dressing Change Due 06/26/21 06/22/21 1100   Date Primary Tubing Changed 06/22/21 06/21/21 2100   NEXT Primary Tubing Change  06/22/21 06/20/21 2200   Infiltration Grading ([facility]) 0 06/23/21 1300   Phlebitis Scale ([facility] Only) 0 06/23/21 1300       Peripheral IV 20 G Posterior;Right Wrist (Active)   Site Assessment Clean;Dry;Intact 06/23/21 1300   Dressing Type Transparent 06/23/21 1300   Line Status Saline locked;Scrubbed the hub prior to access;Flushed 06/23/21 1300   Dressing Status Clean;Dry;Intact 06/23/21 1300   Dressing Intervention N/A 06/22/21 1915   Infiltration Grading ([facility]) 0 06/23/21 1300   Phlebitis Scale ([facility] Only) 0 06/23/21 1300               MENTAL STATUS ON TRANSFER      alert and oriented x4, baseline mentation       CONSULTATIONS  Gastroenterology [facility]    PROCEDURES  EGD    LABORATORY  Lab Results   Component Value Date    SODIUM 142 06/23/2021    POTASSIUM 3.4 (L) 06/23/2021    CHLORIDE 116 (H) 06/23/2021    CO2 18 (L) 06/23/2021    GLUCOSE 118 (H) 06/23/2021    BUN 20 06/23/2021    CREATININE 1.04 06/23/2021        Lab Results   Component Value Date    WBC 9.0 06/23/2021    HEMOGLOBIN 7.9 (L) 06/23/2021    HEMATOCRIT 25.5 (L) 06/23/2021    PLATELETCT 170 06/23/2021        Total time of the discharge process exceeds 35 minutes.

## 2021-06-23 NOTE — PROGRESS NOTES
Received report from day shift RNLuis Miguel. Assumed care of pt @ 1900. Pt reports no pain at this time. Updated pt on plan of care. Pt resting comfortably in bed. Fall precautions in place. Educated on use of call light. Hourly rounding and continuous monitoring in place.

## 2021-06-23 NOTE — DISCHARGE INSTRUCTIONS
Discharge Instructions    Discharged to other by medical transportation with escort. Discharged via wheelchair, hospital escort: Yes.  Special equipment needed: Not Applicable    Be sure to schedule a follow-up appointment with your primary care doctor or any specialists as instructed.     Discharge Plan:   Diet Plan: Discussed  Activity Level: Discussed  Confirmed Follow up Appointment: Appointment Scheduled  Confirmed Symptoms Management: Discussed  Medication Reconciliation Updated: Yes    I understand that a diet low in cholesterol, fat, and sodium is recommended for good health. Unless I have been given specific instructions below for another diet, I accept this instruction as my diet prescription.   Other diet: Regular    Special Instructions: None    · Is patient discharged on Warfarin / Coumadin?   No     Depression / Suicide Risk    As you are discharged from this [facility] facility, it is important to learn how to keep safe from harming yourself.    Recognize the warning signs:  · Abrupt changes in personality, positive or negative- including increase in energy   · Giving away possessions  · Change in eating patterns- significant weight changes-  positive or negative  · Change in sleeping patterns- unable to sleep or sleeping all the time   · Unwillingness or inability to communicate  · Depression  · Unusual sadness, discouragement and loneliness  · Talk of wanting to die  · Neglect of personal appearance   · Rebelliousness- reckless behavior  · Withdrawal from people/activities they love  · Confusion- inability to concentrate     If you or a loved one observes any of these behaviors or has concerns about self-harm, here's what you can do:  · Talk about it- your feelings and reasons for harming yourself  · Remove any means that you might use to hurt yourself (examples: pills, rope, extension cords, firearm)  · Get professional help from the community (Mental Health, Substance Abuse, psychological  counseling)  · Do not be alone:Call your Safe Contact- someone whom you trust who will be there for you.  · Call your local CRISIS HOTLINE 853-4280 or 617-177-0966  · Call your local Children's Mobile Crisis Response Team [place] (754) 320-3281 or www.[employer]  · Call the toll free National Suicide Prevention Hotlines   · National Suicide Prevention Lifeline 690-797-IQGF (7162)  · National Hope Line Network 800-SUICIDE (010-9936)

## 2021-06-24 NOTE — PROGRESS NOTES
Pt dc'd in stable condition by medical transport. IV and monitor removed; monitor room notified. Pt left unit via wheelchair with transport. Personal belongings with pt when leaving unit. Pt given discharge instructions prior to leaving unit including where to  prescriptions and when to follow-up; verbalizes understanding. Copy of discharge instructions with pt and in the chart.

## 2021-06-25 PROBLEM — Z95.0 PRESENCE OF CARDIAC PACEMAKER: Status: ACTIVE | Noted: 2021-06-25

## 2021-06-25 PROBLEM — H25.013 CATARACT CORTICAL, SENILE, BILATERAL: Status: ACTIVE | Noted: 2021-06-25

## 2021-06-25 LAB
BACTERIA BLD CULT: NORMAL
SIGNIFICANT IND 70042: NORMAL
SITE SITE: NORMAL
SOURCE SOURCE: NORMAL

## 2021-06-26 LAB
BACTERIA BLD CULT: NORMAL
SIGNIFICANT IND 70042: NORMAL
SITE SITE: NORMAL
SOURCE SOURCE: NORMAL

## 2021-06-27 PROCEDURE — 86902 BLOOD TYPE ANTIGEN DONOR EA: CPT

## 2021-06-28 PROBLEM — J90 PLEURAL EFFUSION: Status: ACTIVE | Noted: 2021-06-28

## 2021-07-06 ENCOUNTER — OFFICE VISIT (OUTPATIENT)
Dept: CARDIOLOGY | Facility: MEDICAL CENTER | Age: 86
End: 2021-07-06
Payer: MEDICARE

## 2021-07-06 VITALS
HEART RATE: 68 BPM | WEIGHT: 133 LBS | SYSTOLIC BLOOD PRESSURE: 150 MMHG | DIASTOLIC BLOOD PRESSURE: 80 MMHG | HEIGHT: 63 IN | OXYGEN SATURATION: 95 % | RESPIRATION RATE: 12 BRPM | BODY MASS INDEX: 23.57 KG/M2

## 2021-07-06 DIAGNOSIS — I34.0 NON-RHEUMATIC MITRAL REGURGITATION: ICD-10-CM

## 2021-07-06 DIAGNOSIS — I10 ESSENTIAL HYPERTENSION: ICD-10-CM

## 2021-07-06 DIAGNOSIS — Z95.0 PRESENCE OF CARDIAC PACEMAKER: ICD-10-CM

## 2021-07-06 DIAGNOSIS — I10 ESSENTIAL HYPERTENSION, BENIGN: ICD-10-CM

## 2021-07-06 DIAGNOSIS — I47.10 SVT (SUPRAVENTRICULAR TACHYCARDIA) (HCC): ICD-10-CM

## 2021-07-06 PROCEDURE — 99213 OFFICE O/P EST LOW 20 MIN: CPT | Performed by: NURSE PRACTITIONER

## 2021-07-06 RX ORDER — POTASSIUM CHLORIDE 1.5 G/1.58G
20 POWDER, FOR SOLUTION ORAL
Status: ON HOLD | COMMUNITY
End: 2021-11-03

## 2021-07-06 RX ORDER — VITAMIN B COMPLEX
1000 TABLET ORAL DAILY
COMMUNITY
End: 2021-07-15 | Stop reason: DRUGHIGH

## 2021-07-06 RX ORDER — FUROSEMIDE 20 MG/1
20 TABLET ORAL
Status: ON HOLD | COMMUNITY
End: 2021-11-03

## 2021-07-06 ASSESSMENT — FIBROSIS 4 INDEX: FIB4 SCORE: 3.26

## 2021-07-06 ASSESSMENT — ENCOUNTER SYMPTOMS
SHORTNESS OF BREATH: 1
COUGH: 0
WHEEZING: 0
DIZZINESS: 0
CLAUDICATION: 0
VOMITING: 0
WEAKNESS: 0
ORTHOPNEA: 0
BLURRED VISION: 1
NAUSEA: 0
HEMOPTYSIS: 0
SPUTUM PRODUCTION: 0
PND: 0
PALPITATIONS: 0

## 2021-07-06 NOTE — PROGRESS NOTES
Chief Complaint   Patient presents with   • Supraventricular Tachycardia (SVT)   • Hypertension     F/V Dx: Essential hypertension, benign   • Syncope     F/V Dx: Near syncope likely vasovagal versus secondary to symptomatic SVT versus HOCM-like cardiomyopathy       Subjective:   Duong Carmichael is a 87 y.o. female who presents today for follow up PSVT.     She is followed by Dr. Velasquez in our clinic, history of hypertension, SVT, SSS s/p PPM 5/2021 and dizziness.     Hospitalized for presyncope 4/4/2021.  biotel monitor showed no significant arrhythmias with the longest pause being 2 seconds.  Echocardiogram showed LVOTpeak velocity is 2.6 m/sec. Systolic anterior motion of the anterior mitral valve leaflet..  Creatinine was elevated.  Ultrasound demonstrated renal disease without any hydronephrosis. Noted to be dehydrated and iv fluids was given. Carotid duplex showed greater than 50% narrowing right ICA, unable to do ct due to BRIAN. incidental finding right thyroid 2.9 cm mass. biospy showed cyst.    Hospitalized 6/20/21 with dizziness and n/v. hgb was found to be 7.8. endoscopy showed gastric ulcer and esophagitis. Treated with PPI and 1 unit RBC.     [facility], denies any chest pain, dizziness or palpitations.     Mild dyspnea on exertion. Noted pleural effusion on CT. Currently on 1L of oxygen   Denies any chest pain, sob, or palpitations. Noted LE edema pretibial.      Past Medical History:   Diagnosis Date   • Arrhythmia    • Arthritis     knees   • Bronchitis     long time ago   • CATARACT     no surgery yet   • Dry eyes, bilateral 3/27/2014   • Osteopenia of the elderly 3/27/2014   • Pneumonia     long time ago     Past Surgical History:   Procedure Laterality Date   • PB UPPER GI ENDOSCOPY,DIAGNOSIS N/A 6/21/2021    Procedure: GASTROSCOPY;  Surgeon: Rafita Plaza D.O.;  Location: SURGERY [facility];  Service: Gastroenterology   • KNEE ARTHROPLASTY TOTAL  9/3/2013     Performed by Ramesh Styles M.D. at SURGERY [facility]   • TONSILLECTOMY       Family History   Problem Relation Age of Onset   • Lung Disease Mother         frequent pneumonia   • Heart Disease Father 54        MI   • Lung Disease Father         Emphysema   • Heart Attack Father    • Cancer Maternal Aunt         Great Aunt and Cousin Ovarian CA   • Hypertension Maternal Aunt    • Cancer Daughter         Lung and Brain CA   • Seizures Daughter    • Cancer Daughter         Pituitary tumor   • Cancer Brother         leukemia     Social History     Socioeconomic History   • Marital status:      Spouse name: Not on file   • Number of children: Not on file   • Years of education: Not on file   • Highest education level: Not on file   Occupational History   • Not on file   Tobacco Use   • Smoking status: Never Smoker   • Smokeless tobacco: Never Used   • Tobacco comment: Pt exposed to second hand smoker   Vaping Use   • Vaping Use: Never used   Substance and Sexual Activity   • Alcohol use: Yes     Comment: Occasionally   • Drug use: No   • Sexual activity: Never   Other Topics Concern   • Not on file   Social History Narrative   • Not on file     Social Determinants of Health     Financial Resource Strain:    • Difficulty of Paying Living Expenses:    Food Insecurity:    • Worried About Running Out of Food in the Last Year:    • Ran Out of Food in the Last Year:    Transportation Needs:    • Lack of Transportation (Medical):    • Lack of Transportation (Non-Medical):    Physical Activity:    • Days of Exercise per Week:    • Minutes of Exercise per Session:    Stress:    • Feeling of Stress :    Social Connections:    • Frequency of Communication with Friends and Family:    • Frequency of Social Gatherings with Friends and Family:    • Attends [employer] Services:    • Active Member of Clubs or Organizations:    • Attends Club or Organization Meetings:    • Marital Status:    Intimate Partner Violence:    •  Fear of Current or Ex-Partner:    • Emotionally Abused:    • Physically Abused:    • Sexually Abused:      Allergies   Allergen Reactions   • Other Environmental      Seasonal allergies     Outpatient Encounter Medications as of 7/6/2021   Medication Sig Dispense Refill   • vitamin D (CHOLECALCIFEROL) 1000 Unit (25 mcg) Tab Take 1,000 Units by mouth every day.     • furosemide (LASIX) 20 MG Tab Take 20 mg by mouth every day.     • Multiple Vitamins-Minerals (DAILY MULTI PO) Take  by mouth.     • potassium chloride (KLOR-CON) 20 MEQ Pack Take 20 mEq by mouth every day.     • omeprazole (PRILOSEC) 20 MG delayed-release capsule Take 1 capsule by mouth 2 times a day. 30 capsule    • Moxifloxacin HCl 0.5 % Solution Administer 1 Drop into the right eye in the morning, at noon, and at bedtime for 7 days, THEN 1 Drop 2 times a day for 7 days, THEN 1 Drop every day for 7 days. 3 mL 0   • atorvastatin (LIPITOR) 40 MG Tab Take 1 tablet by mouth every evening. 100 tablet 3   • prednisoLONE acetate (PRED FORTE) 1 % Suspension Administer 1 Drop into both eyes 4 times a day. 3 drops in left eye, 6 drops in right eye     • metoprolol SR (TOPROL XL) 25 MG TABLET SR 24 HR Take 1 tablet by mouth every day. 90 tablet 3   • escitalopram (LEXAPRO) 5 MG tablet Take 1 Tab by mouth every day. 90 Tab 3   • [DISCONTINUED] aspirin 81 MG EC tablet Take 1 tablet by mouth every day. (Patient not taking: Reported on 7/6/2021) 30 tablet 0     No facility-administered encounter medications on file as of 7/6/2021.     Review of Systems   Constitutional: Negative for malaise/fatigue.   Eyes: Positive for blurred vision.   Respiratory: Positive for shortness of breath. Negative for cough, hemoptysis, sputum production and wheezing.    Cardiovascular: Positive for leg swelling. Negative for chest pain, palpitations, orthopnea, claudication and PND.   Gastrointestinal: Negative for nausea and vomiting.   Neurological: Negative for dizziness and weakness.  "       Objective:   /80 (BP Location: Right arm, Patient Position: Sitting, BP Cuff Size: Adult)   Pulse 68   Resp 12   Ht 1.6 m (5' 3\")   Wt 60.3 kg (133 lb)   SpO2 95%   BMI 23.56 kg/m²     Physical Exam   Constitutional: She is oriented to person, place, and time. She appears well-developed. No distress.   HENT:   Head: Normocephalic and atraumatic.   Eyes: Pupils are equal, round, and reactive to light.   Neck: No JVD present.   Cardiovascular: Normal rate, regular rhythm and normal heart sounds.   No murmur heard.  Pulmonary/Chest: Effort normal. She has decreased breath sounds in the left upper field, the left middle field and the left lower field.   Abdominal: Soft. Bowel sounds are normal. She exhibits no distension.   Musculoskeletal:      Right lower leg: Edema present.      Left lower leg: Edema present.   Neurological: She is alert and oriented to person, place, and time.   Skin: Skin is warm and dry. She is not diaphoretic.   Psychiatric: Her behavior is normal. Judgment and thought content normal.         Echocardiography Laboratory   4/3/2021  Compared to the images of the prior study done 11/2/2018, intracavitary   gradient is present.  Hyperdynamic left ventricular systolic function.  Left ventricular ejection fraction is visually estimated to be greater than 75%.  Evidence of increased intracavity gradient, peak velocity is 2.6 m/sec.  Systolic anterior motion of the anterior mitral valve leaflet.    Carotid Duplex  4/3/2021   Greater than 50% narrowing right ICA   Right thyroid 2.9 cm mass for which dedicated thyroid US is advised to    characterize further.  Biopsy may be warranted.    US renal   4/4/2021  1.  No hydronephrosis.  2.  Bilateral renal cysts, which do not require imaging follow-up.  3.  Findings a medical renal disease.      CT abdomen and pelvis   6/20/2021  1.  Left pleural effusion is identified which occupies the left lower hemithorax.     2.  There is " cholelithiasis.     3.  There is calcific atherosclerosis.    Lab Results   Component Value Date/Time    CHOLSTRLTOT 132 05/09/2021 03:36 AM    LDL 67 05/09/2021 03:36 AM    HDL 49 05/09/2021 03:36 AM    TRIGLYCERIDE 80 05/09/2021 03:36 AM       Lab Results   Component Value Date/Time    SODIUM 142 06/23/2021 02:32 AM    POTASSIUM 3.4 (L) 06/23/2021 02:32 AM    CHLORIDE 116 (H) 06/23/2021 02:32 AM    CO2 18 (L) 06/23/2021 02:32 AM    GLUCOSE 118 (H) 06/23/2021 02:32 AM    BUN 20 06/23/2021 02:32 AM    CREATININE 1.04 06/23/2021 02:32 AM     Lab Results   Component Value Date/Time    ALKPHOSPHAT 68 06/21/2021 05:06 AM    ASTSGOT 23 06/21/2021 05:06 AM    ALTSGPT 13 06/21/2021 05:06 AM    TBILIRUBIN 0.7 06/21/2021 05:06 AM        Assessment:     1. SVT (supraventricular tachycardia) (HCC)     2. Essential hypertension, benign     3. Presence of cardiac pacemaker     4. Non-rheumatic mitral regurgitation     5. Essential hypertension         Medical Decision Making:  Today's Assessment / Status / Plan:     1. PSVT:  - history of SVT , biotel monitor showed 140s bpm. Having brief episodes of palpitations   - continue metoprolol XL 25mg qd.     2. Hypertension:  - slightly elevated today     3. LVOT:  - recommend patient stay hydrated   - Evidence of increased intracavity gradient, peak velocity is 2.6 m/sec.  - continue bb therapy     4. Pleural effusion  - recommend low dose of diuretics furosemide 20mg qd   - repeat CXR if pleural effusion is large recommend thoracentesis        Follow up in 3 months, sooner as needed.

## 2021-07-13 ENCOUNTER — HOME HEALTH ADMISSION (OUTPATIENT)
Dept: HOME HEALTH SERVICES | Facility: HOME HEALTHCARE | Age: 86
End: 2021-07-13
Payer: MEDICARE

## 2021-07-15 ENCOUNTER — DOCUMENTATION (OUTPATIENT)
Dept: MEDICAL GROUP | Facility: PHYSICIAN GROUP | Age: 86
End: 2021-07-15

## 2021-07-15 ENCOUNTER — HOME CARE VISIT (OUTPATIENT)
Dept: HOME HEALTH SERVICES | Facility: HOME HEALTHCARE | Age: 86
End: 2021-07-15
Payer: MEDICARE

## 2021-07-15 VITALS
HEART RATE: 75 BPM | TEMPERATURE: 98 F | SYSTOLIC BLOOD PRESSURE: 130 MMHG | OXYGEN SATURATION: 94 % | RESPIRATION RATE: 16 BRPM | HEIGHT: 63 IN | DIASTOLIC BLOOD PRESSURE: 66 MMHG | WEIGHT: 133 LBS | BODY MASS INDEX: 23.57 KG/M2

## 2021-07-15 PROCEDURE — G0493 RN CARE EA 15 MIN HH/HOSPICE: HCPCS

## 2021-07-15 PROCEDURE — 665001 SOC-HOME HEALTH

## 2021-07-15 SDOH — ECONOMIC STABILITY: HOUSING INSECURITY: HOME SAFETY: GHTER, EDUCATED PT/DTR ON FALLS RISKS R/T UNEVEN FLOORING, CONTINUE TO WEAR NON-SKID SHOES

## 2021-07-15 SDOH — ECONOMIC STABILITY: HOUSING INSECURITY
HOME SAFETY: ON EACH LEVEL OF THE HOME. PATIENT DOES HAVE A FIRE ESCAPE PLAN DEVELOPED. PATIENT DOES NOT HAVE FLAMMABLE MATERIALS PRESENT IN THE HOME PRESENTING A FIRE HAZARD. NO EVIDENCE FOUND OF SMOKING MATERIALS PRESENT IN THE HOME.  PT IS STAYING WITH HER DAU

## 2021-07-15 SDOH — ECONOMIC STABILITY: HOUSING INSECURITY: EVIDENCE OF SMOKING MATERIAL: 0

## 2021-07-15 SDOH — ECONOMIC STABILITY: HOUSING INSECURITY
HOME SAFETY: OXYGEN SAFETY RISK ASSESSMENT PERFORMED. PATIENT PT WAS GIVEN A NO SMOKING SIGN AND PROVIDED EDUCATION ABOUT WHY IT IS IMPORTANT TO PLACE ONE. PATIENT DOES HAVE A WORKING FIRE EXTINGUISHER PRESENT IN THE HOME. SMOKE ALARMS ARE PRESENT AND FUNCTIONAL

## 2021-07-15 ASSESSMENT — ENCOUNTER SYMPTOMS
NAUSEA: DENIES
POOR JUDGMENT: 1
DEPRESSED MOOD: 1
SHORTNESS OF BREATH: T
SEVERE DYSPNEA: 1
VOMITING: DENIES

## 2021-07-15 ASSESSMENT — PATIENT HEALTH QUESTIONNAIRE - PHQ9
CLINICAL INTERPRETATION OF PHQ2 SCORE: 0
2. FEELING DOWN, DEPRESSED, IRRITABLE, OR HOPELESS: 00
1. LITTLE INTEREST OR PLEASURE IN DOING THINGS: 00

## 2021-07-15 ASSESSMENT — FIBROSIS 4 INDEX: FIB4 SCORE: 3.26

## 2021-07-15 NOTE — PROGRESS NOTES
Medication chart review for [facility] services    PCP:  Juan Santana M.D.  [address] [address]  Fax: [phone]    Current medication list     Current Outpatient Medications:   •  VITAMIN D, CHOLECALCIFEROL, PO, 5,000 Units, Oral, DAILY AT 1800  •  omeprazole, 20 mg, Oral, BID  •  ferrous sulfate, 325 mg, Oral, DAILY  •  Home Care Oxygen, 1 L/min, Inhalation, Continuous  •  furosemide, 20 mg, Oral, DAILY  •  Multiple Vitamins-Minerals (DAILY MULTI PO), Take  by mouth.  •  potassium chloride, 20 mEq, Oral, DAILY  •  atorvastatin, 40 mg, Oral, Q EVENING  •  prednisoLONE acetate, 1 Drop, Both Eyes, 4X/DAY  •  metoprolol SR, 25 mg, Oral, DAILY  •  escitalopram, 5 mg, Oral, DAILY    Allergies   Allergen Reactions   • Other Environmental      Seasonal allergies       Labs     Lab Results   Component Value Date/Time    SODIUM 142 06/23/2021 02:32 AM    POTASSIUM 3.4 (L) 06/23/2021 02:32 AM    CHLORIDE 116 (H) 06/23/2021 02:32 AM    CO2 18 (L) 06/23/2021 02:32 AM    GLUCOSE 118 (H) 06/23/2021 02:32 AM    BUN 20 06/23/2021 02:32 AM    CREATININE 1.04 06/23/2021 02:32 AM      Lab Results   Component Value Date/Time    ALKPHOSPHAT 68 06/21/2021 05:06 AM    ASTSGOT 23 06/21/2021 05:06 AM    ALTSGPT 13 06/21/2021 05:06 AM    TBILIRUBIN 0.7 06/21/2021 05:06 AM    ALBUMIN 2.7 (L) 06/21/2021 05:06 AM    INR 1.34 (H) 06/20/2021 11:10 AM          Assessment and Plan:   • Received referral from [facility]. Medications reviewed.           Jax Freedman, PharmD, MS, BCACP, C  [facility]  Phone [phone] fax [phone]    This note was created using voice recognition software (Dragon). The accuracy of the dictation is limited by the abilities of the software. I have reviewed the note prior to signing, however some errors in grammar and context are still possible. If you have any questions related to this note please do not hesitate to contact our office.

## 2021-07-16 PROBLEM — R26.81 GAIT INSTABILITY: Status: ACTIVE | Noted: 2021-07-16

## 2021-07-17 ENCOUNTER — HOME CARE VISIT (OUTPATIENT)
Dept: HOME HEALTH SERVICES | Facility: HOME HEALTHCARE | Age: 86
End: 2021-07-17
Payer: MEDICARE

## 2021-07-17 VITALS
HEART RATE: 72 BPM | RESPIRATION RATE: 16 BRPM | OXYGEN SATURATION: 93 % | TEMPERATURE: 99 F | SYSTOLIC BLOOD PRESSURE: 138 MMHG | DIASTOLIC BLOOD PRESSURE: 70 MMHG

## 2021-07-17 PROCEDURE — G0299 HHS/HOSPICE OF RN EA 15 MIN: HCPCS

## 2021-07-17 SDOH — ECONOMIC STABILITY: HOUSING INSECURITY: EVIDENCE OF SMOKING MATERIAL: 0

## 2021-07-17 ASSESSMENT — ENCOUNTER SYMPTOMS
SEVERE DYSPNEA: 1
SHORTNESS OF BREATH: T
VOMITING: DENIES
MUSCLE WEAKNESS: 1
NAUSEA: DENIES
LIMITED RANGE OF MOTION: 1

## 2021-07-19 ENCOUNTER — TELEPHONE (OUTPATIENT)
Dept: MEDICAL GROUP | Facility: PHYSICIAN GROUP | Age: 86
End: 2021-07-19

## 2021-07-19 ENCOUNTER — HOME CARE VISIT (OUTPATIENT)
Dept: HOME HEALTH SERVICES | Facility: HOME HEALTHCARE | Age: 86
End: 2021-07-19
Payer: MEDICARE

## 2021-07-19 VITALS
OXYGEN SATURATION: 96 % | SYSTOLIC BLOOD PRESSURE: 134 MMHG | DIASTOLIC BLOOD PRESSURE: 66 MMHG | RESPIRATION RATE: 15 BRPM | TEMPERATURE: 98.2 F | HEART RATE: 99 BPM

## 2021-07-19 VITALS
TEMPERATURE: 98.2 F | RESPIRATION RATE: 16 BRPM | DIASTOLIC BLOOD PRESSURE: 66 MMHG | OXYGEN SATURATION: 96 % | HEART RATE: 99 BPM | SYSTOLIC BLOOD PRESSURE: 134 MMHG

## 2021-07-19 PROCEDURE — G0151 HHCP-SERV OF PT,EA 15 MIN: HCPCS

## 2021-07-19 PROCEDURE — G0493 RN CARE EA 15 MIN HH/HOSPICE: HCPCS

## 2021-07-19 ASSESSMENT — ENCOUNTER SYMPTOMS
VOMITING: DENIES
NAUSEA: DENIES

## 2021-07-19 NOTE — TELEPHONE ENCOUNTER
Gisell called requesting Appointment with her PC after she was in rehabilitation last week Wednesday.    Spoke with MA and she will call patient this week Thursday to make appointment.

## 2021-07-20 ENCOUNTER — HOME CARE VISIT (OUTPATIENT)
Dept: HOME HEALTH SERVICES | Facility: HOME HEALTHCARE | Age: 86
End: 2021-07-20
Payer: MEDICARE

## 2021-07-20 PROCEDURE — G0155 HHCP-SVS OF CSW,EA 15 MIN: HCPCS

## 2021-07-21 ENCOUNTER — HOME CARE VISIT (OUTPATIENT)
Dept: HOME HEALTH SERVICES | Facility: HOME HEALTHCARE | Age: 86
End: 2021-07-21
Payer: MEDICARE

## 2021-07-21 VITALS
SYSTOLIC BLOOD PRESSURE: 122 MMHG | RESPIRATION RATE: 18 BRPM | OXYGEN SATURATION: 96 % | HEART RATE: 90 BPM | TEMPERATURE: 98.7 F | DIASTOLIC BLOOD PRESSURE: 56 MMHG

## 2021-07-21 PROCEDURE — G0152 HHCP-SERV OF OT,EA 15 MIN: HCPCS

## 2021-07-21 ASSESSMENT — ACTIVITIES OF DAILY LIVING (ADL)
CURRENT_FUNCTION: STAND BY ASSIST
TOILETING: INDEPENDENT
PHYSICAL TRANSFERS ASSESSED: 1
DRESSING_LB_CURRENT_FUNCTION: INDEPENDENT
TOILETING: 1
DRESSING_UB_CURRENT_FUNCTION: INDEPENDENT
OASIS_M1830: 03
CURRENT_FUNCTION: SUPERVISION

## 2021-07-21 NOTE — CASE COMMUNICATION
Quality Review for 7.15.21 SOC OASIS performed on by RONA De La Rosa RN on 7.21, 2021:    Edits completed by RONA De La Rosa RN:  1. Adde #1 to , pt fell and broke ribs in May 2021, added #6 per narrative pt is not wanting to use walker 100% of the time  2. Changed  to 3 per resp tab reporting dyspnea with exertion and HB status reporting extreme SOB.   3. Changed , , ,  to 2,  to 3,  to 3, [id number] C,E,F,G,H to 4, [id number] A-F, I,J,K,M, P to 4 per narrative pt needs supervision with ambulation, transfers and dressing   4. Changed  to na per MAR. Changed  to 3 per ambulation score   5. Added F2F data  6. Changed  to 2 per pt's recent pertain past med hx pt had broken ribs and was taking NSAIDs which led to the GI bleed

## 2021-07-22 ENCOUNTER — HOME CARE VISIT (OUTPATIENT)
Dept: HOME HEALTH SERVICES | Facility: HOME HEALTHCARE | Age: 86
End: 2021-07-22
Payer: MEDICARE

## 2021-07-22 VITALS
DIASTOLIC BLOOD PRESSURE: 60 MMHG | HEART RATE: 78 BPM | OXYGEN SATURATION: 96 % | TEMPERATURE: 98 F | RESPIRATION RATE: 16 BRPM | SYSTOLIC BLOOD PRESSURE: 128 MMHG

## 2021-07-22 PROCEDURE — G0299 HHS/HOSPICE OF RN EA 15 MIN: HCPCS

## 2021-07-22 SDOH — ECONOMIC STABILITY: HOUSING INSECURITY: EVIDENCE OF SMOKING MATERIAL: 0

## 2021-07-22 ASSESSMENT — ENCOUNTER SYMPTOMS
NAUSEA: PT DENIES ANY NAUSEA AT THIS TIME
SHORTNESS OF BREATH: T
VOMITING: PT DENIES ANY EMESIS AT THIS TIME

## 2021-07-22 NOTE — CASE COMMUNICATION
I agree with these changes  ----- Message -----  From: Claudia De La Rosa R.N.  Sent: 7/21/2021  10:59 AM PDT  To: Radha Deluca R.N.      Quality Review for 7.15.21 SOC OASIS performed on by RONA De La Rosa RN on 7.21, 2021:    Edits completed by RONA De La Rosa RN:  1. Adde #1 to , pt fell and broke ribs in May 2021, added #6 per narrative pt is not wanting to use walker 100% of the time  2. Changed  to 3 per resp tab reporting dyspnea with exertion and HB status reporting extreme SOB.   3. Changed , , ,  to 2,  to 3,  to 3, [id number] C,E,F,G,H to 4, [id number] A-F, I,J,K,M, P to 4 per narrative pt needs supervision with ambulation, transfers and dressing   4. Changed  to na per MAR. Changed  to 3 per ambulation score   5. Added F2F data  6. Changed  to 2 per pt's recent pertain past med hx pt had broken ribs and was taking NSAIDs which led to the GI bleed

## 2021-07-25 ASSESSMENT — ACTIVITIES OF DAILY LIVING (ADL)
PHYSICAL TRANSFERS ASSESSED: 1
AMBULATION ASSISTANCE: SUPERVISION
TRANSPORTATION ASSESSED: 1
TRANSPORTATION: DEPENDENT
AMBULATION ASSISTANCE: 1
AMBULATION ASSISTANCE ON FLAT SURFACES: 1
TOILETING: SUPERVISION
AMBULATION_DISTANCE/DURATION_TOLERATED: 60 FT
TOILETING: 1
CURRENT_FUNCTION: SUPERVISION

## 2021-07-25 ASSESSMENT — ENCOUNTER SYMPTOMS: MUSCLE WEAKNESS: 1

## 2021-07-26 ENCOUNTER — HOME CARE VISIT (OUTPATIENT)
Dept: HOME HEALTH SERVICES | Facility: HOME HEALTHCARE | Age: 86
End: 2021-07-26
Payer: MEDICARE

## 2021-07-26 VITALS
OXYGEN SATURATION: 97 % | DIASTOLIC BLOOD PRESSURE: 60 MMHG | TEMPERATURE: 98 F | SYSTOLIC BLOOD PRESSURE: 128 MMHG | HEART RATE: 76 BPM | RESPIRATION RATE: 16 BRPM

## 2021-07-26 VITALS
OXYGEN SATURATION: 97 % | TEMPERATURE: 98 F | HEART RATE: 76 BPM | RESPIRATION RATE: 16 BRPM | SYSTOLIC BLOOD PRESSURE: 128 MMHG | DIASTOLIC BLOOD PRESSURE: 60 MMHG

## 2021-07-26 PROCEDURE — G0151 HHCP-SERV OF PT,EA 15 MIN: HCPCS

## 2021-07-26 PROCEDURE — G0299 HHS/HOSPICE OF RN EA 15 MIN: HCPCS

## 2021-07-26 SDOH — ECONOMIC STABILITY: HOUSING INSECURITY: EVIDENCE OF SMOKING MATERIAL: 0

## 2021-07-26 ASSESSMENT — ENCOUNTER SYMPTOMS
SHORTNESS OF BREATH: T
VOMITING: PT DENIES ANY EMESIS AT THIS TIME
NAUSEA: PT DENIES ANY NAUSEA AT THIS TIME

## 2021-07-27 ENCOUNTER — NON-PROVIDER VISIT (OUTPATIENT)
Dept: CARDIOLOGY | Facility: MEDICAL CENTER | Age: 86
End: 2021-07-27

## 2021-07-27 VITALS
RESPIRATION RATE: 14 BRPM | BODY MASS INDEX: 20.91 KG/M2 | WEIGHT: 118 LBS | HEART RATE: 89 BPM | HEIGHT: 63 IN | DIASTOLIC BLOOD PRESSURE: 48 MMHG | OXYGEN SATURATION: 95 % | SYSTOLIC BLOOD PRESSURE: 110 MMHG

## 2021-07-27 DIAGNOSIS — I44.1 SECOND DEGREE ATRIOVENTRICULAR BLOCK, MOBITZ (TYPE) I: ICD-10-CM

## 2021-07-27 DIAGNOSIS — I47.10 SVT (SUPRAVENTRICULAR TACHYCARDIA) (HCC): ICD-10-CM

## 2021-07-27 DIAGNOSIS — R55 SYNCOPE, UNSPECIFIED SYNCOPE TYPE: Chronic | ICD-10-CM

## 2021-07-27 DIAGNOSIS — Z95.0 PRESENCE OF CARDIAC PACEMAKER: ICD-10-CM

## 2021-07-27 PROBLEM — I95.1 ORTHOSTATIC HYPOTENSION: Status: RESOLVED | Noted: 2021-06-20 | Resolved: 2021-07-27

## 2021-07-27 PROBLEM — H92.01 EAR PAIN, RIGHT: Status: RESOLVED | Noted: 2019-01-11 | Resolved: 2021-07-27

## 2021-07-27 PROBLEM — S42.309A CLOSED FRACTURE OF SHAFT OF HUMERUS: Status: RESOLVED | Noted: 2018-06-19 | Resolved: 2021-07-27

## 2021-07-27 PROBLEM — R79.89 ELEVATED TROPONIN: Status: RESOLVED | Noted: 2021-06-20 | Resolved: 2021-07-27

## 2021-07-27 PROCEDURE — 93280 PM DEVICE PROGR EVAL DUAL: CPT | Performed by: NURSE PRACTITIONER

## 2021-07-27 PROCEDURE — G0180 MD CERTIFICATION HHA PATIENT: HCPCS | Performed by: FAMILY MEDICINE

## 2021-07-27 ASSESSMENT — FIBROSIS 4 INDEX: FIB4 SCORE: 3.26

## 2021-07-27 NOTE — PROGRESS NOTES
Patient was hospitalized in May 2021 for syncope secondary to SVT. She had SVT ablation on 5/12/2021 by Dr. Hollis, followed by PM implantation.    She is here today for final threshold checks.    Device is working normally. No mode switching episodes.  Normal sensing and capture of RA and RV leads; stable impedances. Battery longevity is 14.9 years.    Changes today include decreasing RA and RV outputs.    She does have FU with Dr. Hastings on Friday to establish care.    FU in 6 months for next PM check at [facility] office.

## 2021-07-28 ENCOUNTER — HOME CARE VISIT (OUTPATIENT)
Dept: HOME HEALTH SERVICES | Facility: HOME HEALTHCARE | Age: 86
End: 2021-07-28
Payer: MEDICARE

## 2021-07-28 VITALS
SYSTOLIC BLOOD PRESSURE: 125 MMHG | OXYGEN SATURATION: 98 % | DIASTOLIC BLOOD PRESSURE: 64 MMHG | TEMPERATURE: 97.6 F | HEART RATE: 73 BPM | RESPIRATION RATE: 16 BRPM

## 2021-07-28 PROCEDURE — G0151 HHCP-SERV OF PT,EA 15 MIN: HCPCS

## 2021-07-29 ENCOUNTER — HOME CARE VISIT (OUTPATIENT)
Dept: HOME HEALTH SERVICES | Facility: HOME HEALTHCARE | Age: 86
End: 2021-07-29
Payer: MEDICARE

## 2021-07-29 ENCOUNTER — HOSPITAL ENCOUNTER (OUTPATIENT)
Facility: MEDICAL CENTER | Age: 86
End: 2021-07-29
Attending: PHYSICIAN ASSISTANT
Payer: MEDICARE

## 2021-07-29 VITALS
DIASTOLIC BLOOD PRESSURE: 56 MMHG | OXYGEN SATURATION: 94 % | RESPIRATION RATE: 16 BRPM | HEART RATE: 74 BPM | SYSTOLIC BLOOD PRESSURE: 128 MMHG | TEMPERATURE: 98.8 F

## 2021-07-29 VITALS
OXYGEN SATURATION: 94 % | SYSTOLIC BLOOD PRESSURE: 128 MMHG | HEART RATE: 74 BPM | RESPIRATION RATE: 16 BRPM | DIASTOLIC BLOOD PRESSURE: 56 MMHG | TEMPERATURE: 98.8 F

## 2021-07-29 LAB
ALBUMIN SERPL BCP-MCNC: 3.7 G/DL (ref 3.2–4.9)
ALBUMIN/GLOB SERPL: 1.2 G/DL
ALP SERPL-CCNC: 249 U/L (ref 30–99)
ALT SERPL-CCNC: 32 U/L (ref 2–50)
ANION GAP SERPL CALC-SCNC: 13 MMOL/L (ref 7–16)
ANISOCYTOSIS BLD QL SMEAR: ABNORMAL
AST SERPL-CCNC: 47 U/L (ref 12–45)
BASOPHILS # BLD AUTO: 1.2 % (ref 0–1.8)
BASOPHILS # BLD: 0.09 K/UL (ref 0–0.12)
BILIRUB SERPL-MCNC: 0.4 MG/DL (ref 0.1–1.5)
BUN SERPL-MCNC: 27 MG/DL (ref 8–22)
BURR CELLS BLD QL SMEAR: NORMAL
CALCIUM SERPL-MCNC: 9.3 MG/DL (ref 8.5–10.5)
CHLORIDE SERPL-SCNC: 105 MMOL/L (ref 96–112)
CO2 SERPL-SCNC: 21 MMOL/L (ref 20–33)
COMMENT 1642: NORMAL
CREAT SERPL-MCNC: 1.57 MG/DL (ref 0.5–1.4)
EOSINOPHIL # BLD AUTO: 0.2 K/UL (ref 0–0.51)
EOSINOPHIL NFR BLD: 2.6 % (ref 0–6.9)
ERYTHROCYTE [DISTWIDTH] IN BLOOD BY AUTOMATED COUNT: 50.9 FL (ref 35.9–50)
FASTING STATUS PATIENT QL REPORTED: NORMAL
GLOBULIN SER CALC-MCNC: 3.1 G/DL (ref 1.9–3.5)
GLUCOSE SERPL-MCNC: 99 MG/DL (ref 65–99)
HCT VFR BLD AUTO: 35.6 % (ref 37–47)
HGB BLD-MCNC: 10.6 G/DL (ref 12–16)
IMM GRANULOCYTES # BLD AUTO: 0.04 K/UL (ref 0–0.11)
IMM GRANULOCYTES NFR BLD AUTO: 0.5 % (ref 0–0.9)
LYMPHOCYTES # BLD AUTO: 2.04 K/UL (ref 1–4.8)
LYMPHOCYTES NFR BLD: 26.2 % (ref 22–41)
MCH RBC QN AUTO: 26.7 PG (ref 27–33)
MCHC RBC AUTO-ENTMCNC: 29.8 G/DL (ref 33.6–35)
MCV RBC AUTO: 89.7 FL (ref 81.4–97.8)
MICROCYTES BLD QL SMEAR: ABNORMAL
MONOCYTES # BLD AUTO: 0.79 K/UL (ref 0–0.85)
MONOCYTES NFR BLD AUTO: 10.1 % (ref 0–13.4)
MORPHOLOGY BLD-IMP: NORMAL
NEUTROPHILS # BLD AUTO: 4.64 K/UL (ref 2–7.15)
NEUTROPHILS NFR BLD: 59.4 % (ref 44–72)
NRBC # BLD AUTO: 0 K/UL
NRBC BLD-RTO: 0 /100 WBC
PLATELET # BLD AUTO: 235 K/UL (ref 164–446)
PLATELET BLD QL SMEAR: NORMAL
PMV BLD AUTO: 11.7 FL (ref 9–12.9)
POIKILOCYTOSIS BLD QL SMEAR: NORMAL
POTASSIUM SERPL-SCNC: 4.9 MMOL/L (ref 3.6–5.5)
PROT SERPL-MCNC: 6.8 G/DL (ref 6–8.2)
RBC # BLD AUTO: 3.97 M/UL (ref 4.2–5.4)
RBC BLD AUTO: PRESENT
SODIUM SERPL-SCNC: 139 MMOL/L (ref 135–145)
WBC # BLD AUTO: 7.8 K/UL (ref 4.8–10.8)

## 2021-07-29 PROCEDURE — 80053 COMPREHEN METABOLIC PANEL: CPT

## 2021-07-29 PROCEDURE — G0299 HHS/HOSPICE OF RN EA 15 MIN: HCPCS

## 2021-07-29 PROCEDURE — G0152 HHCP-SERV OF OT,EA 15 MIN: HCPCS

## 2021-07-29 PROCEDURE — 85025 COMPLETE CBC W/AUTO DIFF WBC: CPT

## 2021-07-29 SDOH — ECONOMIC STABILITY: HOUSING INSECURITY: EVIDENCE OF SMOKING MATERIAL: 0

## 2021-07-29 ASSESSMENT — ENCOUNTER SYMPTOMS
VOMITING: PT DENIES ANY EMESIS AT THIS TIME
DENIES PAIN: 1
PAIN SEVERITY GOAL: 0/10
LOWEST PAIN SEVERITY IN PAST 24 HOURS: 0/10
DENIES PAIN: 1
NAUSEA: PT DENIES ANY NAUSEA AT THIS TIME
HIGHEST PAIN SEVERITY IN PAST 24 HOURS: 0/10
PERSON REPORTING PAIN: PATIENT

## 2021-07-30 ENCOUNTER — OFFICE VISIT (OUTPATIENT)
Dept: CARDIOLOGY | Facility: MEDICAL CENTER | Age: 86
End: 2021-07-30
Payer: MEDICARE

## 2021-07-30 VITALS
OXYGEN SATURATION: 96 % | RESPIRATION RATE: 14 BRPM | DIASTOLIC BLOOD PRESSURE: 60 MMHG | SYSTOLIC BLOOD PRESSURE: 124 MMHG | BODY MASS INDEX: 21.33 KG/M2 | HEART RATE: 88 BPM | WEIGHT: 120.4 LBS | HEIGHT: 63 IN

## 2021-07-30 DIAGNOSIS — R26.89 IMBALANCE: ICD-10-CM

## 2021-07-30 DIAGNOSIS — J90 PLEURAL EFFUSION: ICD-10-CM

## 2021-07-30 DIAGNOSIS — R60.0 LOCALIZED EDEMA: ICD-10-CM

## 2021-07-30 DIAGNOSIS — S22.42XG CLOSED FRACTURE OF MULTIPLE RIBS OF LEFT SIDE WITH DELAYED HEALING: ICD-10-CM

## 2021-07-30 DIAGNOSIS — Z96.659 STATUS POST TOTAL KNEE REPLACEMENT, UNSPECIFIED LATERALITY: ICD-10-CM

## 2021-07-30 DIAGNOSIS — I44.1 SECOND DEGREE ATRIOVENTRICULAR BLOCK, MOBITZ (TYPE) I: ICD-10-CM

## 2021-07-30 DIAGNOSIS — R55 SYNCOPE, UNSPECIFIED SYNCOPE TYPE: Chronic | ICD-10-CM

## 2021-07-30 DIAGNOSIS — N17.9 AKI (ACUTE KIDNEY INJURY) (HCC): ICD-10-CM

## 2021-07-30 DIAGNOSIS — N18.32 STAGE 3B CHRONIC KIDNEY DISEASE: ICD-10-CM

## 2021-07-30 DIAGNOSIS — I10 ESSENTIAL HYPERTENSION, BENIGN: ICD-10-CM

## 2021-07-30 DIAGNOSIS — F33.42 RECURRENT MAJOR DEPRESSIVE DISORDER, IN FULL REMISSION (HCC): ICD-10-CM

## 2021-07-30 DIAGNOSIS — I73.9 PVD (PERIPHERAL VASCULAR DISEASE) (HCC): ICD-10-CM

## 2021-07-30 DIAGNOSIS — I34.0 NON-RHEUMATIC MITRAL REGURGITATION: ICD-10-CM

## 2021-07-30 DIAGNOSIS — I47.10 SVT (SUPRAVENTRICULAR TACHYCARDIA) (HCC): ICD-10-CM

## 2021-07-30 DIAGNOSIS — Z95.0 PRESENCE OF CARDIAC PACEMAKER: ICD-10-CM

## 2021-07-30 DIAGNOSIS — D50.9 IRON DEFICIENCY ANEMIA, UNSPECIFIED IRON DEFICIENCY ANEMIA TYPE: ICD-10-CM

## 2021-07-30 PROCEDURE — 99214 OFFICE O/P EST MOD 30 MIN: CPT | Performed by: INTERNAL MEDICINE

## 2021-07-30 ASSESSMENT — FIBROSIS 4 INDEX: FIB4 SCORE: 3.08

## 2021-07-30 ASSESSMENT — ENCOUNTER SYMPTOMS
DIZZINESS: 0
CLAUDICATION: 0
SPUTUM PRODUCTION: 0
WEAKNESS: 0
MUSCULOSKELETAL NEGATIVE: 1
PND: 0
SORE THROAT: 0
CONSTITUTIONAL NEGATIVE: 1
CHILLS: 0
GASTROINTESTINAL NEGATIVE: 1
PALPITATIONS: 0
EYES NEGATIVE: 1
LOSS OF CONSCIOUSNESS: 0
SHORTNESS OF BREATH: 0
ORTHOPNEA: 0
RESPIRATORY NEGATIVE: 1
COUGH: 0
BRUISES/BLEEDS EASILY: 0
NEUROLOGICAL NEGATIVE: 1
FEVER: 0
WHEEZING: 0
CARDIOVASCULAR NEGATIVE: 1
HEMOPTYSIS: 0
STRIDOR: 0

## 2021-07-30 NOTE — PROGRESS NOTES
Chief Complaint   Patient presents with   • Supraventricular Tachycardia (SVT)       Subjective:   Duong Carmichael is a 87 y.o. female who presents today as a follow-up from prior provider for hypertension SVT hyperlipidemia.  Since she was last seen she was in the hospital for lower extremity edema.  She is on Lasix..  Her creatinine increased to 1.7.  Her creatinines over the last year they have varied anywhere from normal to 1.7.  Today she is diuresed and somewhat orthostatic.  Her echocardiogram performed while she had a GI bleed showed antegrade.  Gradient as well as hyperdynamic state.  She is concerned about what this means.  She is concerned that she has a fatal cardiac situation.    Past Medical History:   Diagnosis Date   • Arrhythmia    • Arthritis     knees   • Bronchitis     long time ago   • CATARACT     no surgery yet   • Dry eyes, bilateral 3/27/2014   • Osteopenia of the elderly 3/27/2014   • Pneumonia     long time ago     Past Surgical History:   Procedure Laterality Date   • PB UPPER GI ENDOSCOPY,DIAGNOSIS N/A 6/21/2021    Procedure: GASTROSCOPY;  Surgeon: Rafita Plaza D.O.;  Location: [facility];  Service: Gastroenterology   • KNEE ARTHROPLASTY TOTAL  9/3/2013    Performed by Ramesh Styles M.D. at SURGERY [facility] ORS   • TONSILLECTOMY       Family History   Problem Relation Age of Onset   • Lung Disease Mother         frequent pneumonia   • Heart Disease Father 54        MI   • Lung Disease Father         Emphysema   • Heart Attack Father    • Cancer Maternal Aunt         Great Aunt and Cousin Ovarian CA   • Hypertension Maternal Aunt    • Cancer Daughter         Lung and Brain CA   • Seizures Daughter    • Cancer Daughter         Pituitary tumor   • Cancer Brother         leukemia     Social History     Socioeconomic History   • Marital status:      Spouse name: Not on file   • Number of children: Not on file   • Years of education: Not on file   • Highest education level:  Not on file   Occupational History   • Not on file   Tobacco Use   • Smoking status: Never Smoker   • Smokeless tobacco: Never Used   • Tobacco comment: Pt exposed to second hand smoker   Vaping Use   • Vaping Use: Never used   Substance and Sexual Activity   • Alcohol use: Yes     Comment: Occasionally   • Drug use: No   • Sexual activity: Never   Other Topics Concern   • Not on file   Social History Narrative   • Not on file     Social Determinants of Health     Financial Resource Strain:    • Difficulty of Paying Living Expenses:    Food Insecurity:    • Worried About Running Out of Food in the Last Year:    • Ran Out of Food in the Last Year:    Transportation Needs:    • Lack of Transportation (Medical):    • Lack of Transportation (Non-Medical):    Physical Activity:    • Days of Exercise per Week:    • Minutes of Exercise per Session:    Stress:    • Feeling of Stress :    Social Connections:    • Frequency of Communication with Friends and Family:    • Frequency of Social Gatherings with Friends and Family:    • Attends Congregation Services:    • Active Member of Clubs or Organizations:    • Attends Club or Organization Meetings:    • Marital Status:    Intimate Partner Violence:    • Fear of Current or Ex-Partner:    • Emotionally Abused:    • Physically Abused:    • Sexually Abused:      Allergies   Allergen Reactions   • Other Environmental      Seasonal allergies     Outpatient Encounter Medications as of 7/30/2021   Medication Sig Dispense Refill   • VITAMIN D, CHOLECALCIFEROL, PO Take 5,000 Units by mouth every day at 6 PM. Indications: suppliment     • omeprazole (PRILOSEC) 20 MG delayed-release capsule Take 1 capsule by mouth 2 times a day for 30 days. 60 capsule 0   • ferrous sulfate 325 (65 Fe) MG tablet Take 1 tablet by mouth every day for 30 days. 30 tablet 0   • Home Care Oxygen Inhale 1 L/min continuous. Oxygen dose range: 1 L/min only wears at night  Respiratory route via: Nasal Cannula   Oxygen  "supplier: Preferred      Indications: Dyspnea     • furosemide (LASIX) 20 MG Tab Take 20 mg by mouth every day.     • Multiple Vitamins-Minerals (DAILY MULTI PO) Take  by mouth.     • potassium chloride (KLOR-CON) 20 MEQ Pack Take 20 mEq by mouth every day.     • atorvastatin (LIPITOR) 40 MG Tab Take 1 tablet by mouth every evening. 100 tablet 3   • metoprolol SR (TOPROL XL) 25 MG TABLET SR 24 HR Take 1 tablet by mouth every day. 90 tablet 3   • escitalopram (LEXAPRO) 5 MG tablet Take 1 Tab by mouth every day. 90 Tab 3     No facility-administered encounter medications on file as of 7/30/2021.     Review of Systems   Constitutional: Negative.  Negative for chills, fever and malaise/fatigue.   HENT: Negative.  Negative for sore throat.    Eyes: Negative.    Respiratory: Negative.  Negative for cough, hemoptysis, sputum production, shortness of breath, wheezing and stridor.    Cardiovascular: Negative.  Negative for chest pain, palpitations, orthopnea, claudication, leg swelling and PND.   Gastrointestinal: Negative.    Genitourinary: Negative.    Musculoskeletal: Negative.    Skin: Negative.    Neurological: Negative.  Negative for dizziness, loss of consciousness and weakness.   Endo/Heme/Allergies: Negative.  Does not bruise/bleed easily.   All other systems reviewed and are negative.       Objective:   /60 (BP Location: Right arm, Patient Position: Sitting, BP Cuff Size: Adult)   Pulse 88   Resp 14   Ht 1.6 m (5' 3\")   Wt 54.6 kg (120 lb 6.4 oz)   SpO2 96%   BMI 21.33 kg/m²     Physical Exam   Constitutional: She appears well-developed. No distress.   HENT:   Head: Normocephalic and atraumatic.   Right Ear: External ear normal.   Left Ear: External ear normal.   Nose: Nose normal.   Mouth/Throat: No oropharyngeal exudate.   Eyes: Pupils are equal, round, and reactive to light. Conjunctivae are normal. Right eye exhibits no discharge. Left eye exhibits no discharge. No scleral icterus.   Neck: No JVD " present.   Cardiovascular: Normal rate and regular rhythm. Exam reveals no gallop and no friction rub.   No murmur heard.  Pulmonary/Chest: Effort normal. No stridor. No respiratory distress. She has no wheezes. She has no rales. She exhibits no tenderness.   Abdominal: Soft. She exhibits no distension. There is no guarding.   Musculoskeletal:         General: No tenderness or deformity. Normal range of motion.      Cervical back: Neck supple.   Neurological: She is alert. She has normal reflexes. She displays normal reflexes. No cranial nerve deficit. She exhibits normal muscle tone. Coordination normal.   Skin: Skin is warm and dry. No rash noted. She is not diaphoretic. No erythema. No pallor.   Psychiatric: Her behavior is normal. Judgment and thought content normal.   Nursing note and vitals reviewed.  Echocardiogram: Dated 4/3/2021 personally viewed interpret myself showing normal LV systolic function with hyperdynamic state.    Carotid duplex: Dated 4/3/2021 personally viewed inter myself showing left less than 50% bilateral.    Lab Results   Component Value Date/Time    CHOLSTRLTOT 132 05/09/2021 03:36 AM    LDL 67 05/09/2021 03:36 AM    HDL 49 05/09/2021 03:36 AM    TRIGLYCERIDE 80 05/09/2021 03:36 AM       Lab Results   Component Value Date/Time    SODIUM 139 07/29/2021 11:50 AM    POTASSIUM 4.9 07/29/2021 11:50 AM    CHLORIDE 105 07/29/2021 11:50 AM    CO2 21 07/29/2021 11:50 AM    GLUCOSE 99 07/29/2021 11:50 AM    BUN 27 (H) 07/29/2021 11:50 AM    CREATININE 1.57 (H) 07/29/2021 11:50 AM     Lab Results   Component Value Date/Time    ALKPHOSPHAT 249 (H) 07/29/2021 11:50 AM    ASTSGOT 47 (H) 07/29/2021 11:50 AM    ALTSGPT 32 07/29/2021 11:50 AM    TBILIRUBIN 0.4 07/29/2021 11:50 AM          Assessment:     1. Essential hypertension, benign     2. Closed fracture of multiple ribs of left side with delayed healing     3. BRIAN (acute kidney injury) (HCC)     4. Iron deficiency anemia, unspecified iron  deficiency anemia type     5. Imbalance     6. Syncope, unspecified syncope type     7. Non-rheumatic mitral regurgitation     8. Pleural effusion     9. Presence of cardiac pacemaker     10. Recurrent major depressive disorder, in full remission ([facility])     11. Status post total knee replacement, unspecified laterality     12. Second degree atrioventricular block, Mobitz (type) I     13. Stage 3b chronic kidney disease ([facility])     14. SVT (supraventricular tachycardia) ([facility])     15. Localized edema     16. PVD (peripheral vascular disease) ([facility])         Medical Decision Making:  Today's Assessment / Status / Plan:     87-year-old female with mild diastolic dysfunction with fluid retention.  We discussed the use of Lasix and how she should adjust this based on her symptoms and her swelling.  She will continue with Lasix 20 mg as needed.  Optically concerned about her heart condition we went over this in gross detail.  We will recheck labs and see her back in 3 months.

## 2021-08-01 SDOH — ECONOMIC STABILITY: HOUSING INSECURITY: EVIDENCE OF SMOKING MATERIAL: 0

## 2021-08-01 ASSESSMENT — ACTIVITIES OF DAILY LIVING (ADL)
AMBULATION_DISTANCE/DURATION_TOLERATED: 5 MIN
AMBULATION ASSISTANCE ON FLAT SURFACES: 1

## 2021-08-01 ASSESSMENT — ENCOUNTER SYMPTOMS
DENIES PAIN: 1
DENIES PAIN: 1
PERSON REPORTING PAIN: PATIENT

## 2021-08-02 ENCOUNTER — HOME CARE VISIT (OUTPATIENT)
Dept: HOME HEALTH SERVICES | Facility: HOME HEALTHCARE | Age: 86
End: 2021-08-02
Payer: MEDICARE

## 2021-08-02 VITALS
SYSTOLIC BLOOD PRESSURE: 124 MMHG | RESPIRATION RATE: 15 BRPM | OXYGEN SATURATION: 97 % | HEART RATE: 70 BPM | TEMPERATURE: 97.1 F | DIASTOLIC BLOOD PRESSURE: 62 MMHG

## 2021-08-02 PROCEDURE — G0151 HHCP-SERV OF PT,EA 15 MIN: HCPCS

## 2021-08-02 SDOH — ECONOMIC STABILITY: HOUSING INSECURITY: EVIDENCE OF SMOKING MATERIAL: 0

## 2021-08-02 ASSESSMENT — ENCOUNTER SYMPTOMS: DENIES PAIN: 1

## 2021-08-03 ENCOUNTER — HOME CARE VISIT (OUTPATIENT)
Dept: HOME HEALTH SERVICES | Facility: HOME HEALTHCARE | Age: 86
End: 2021-08-03
Payer: MEDICARE

## 2021-08-03 PROCEDURE — G0493 RN CARE EA 15 MIN HH/HOSPICE: HCPCS

## 2021-08-04 ENCOUNTER — HOME CARE VISIT (OUTPATIENT)
Dept: HOME HEALTH SERVICES | Facility: HOME HEALTHCARE | Age: 86
End: 2021-08-04
Payer: MEDICARE

## 2021-08-04 VITALS
SYSTOLIC BLOOD PRESSURE: 136 MMHG | OXYGEN SATURATION: 95 % | TEMPERATURE: 99 F | HEART RATE: 83 BPM | DIASTOLIC BLOOD PRESSURE: 68 MMHG | RESPIRATION RATE: 16 BRPM

## 2021-08-04 VITALS
OXYGEN SATURATION: 95 % | RESPIRATION RATE: 18 BRPM | DIASTOLIC BLOOD PRESSURE: 65 MMHG | SYSTOLIC BLOOD PRESSURE: 105 MMHG | TEMPERATURE: 98.9 F | HEART RATE: 78 BPM

## 2021-08-04 PROCEDURE — G0151 HHCP-SERV OF PT,EA 15 MIN: HCPCS

## 2021-08-04 ASSESSMENT — ENCOUNTER SYMPTOMS
DENIES PAIN: 1
NAUSEA: DENIES
VOMITING: DENIES

## 2021-08-05 ENCOUNTER — OFFICE VISIT (OUTPATIENT)
Dept: MEDICAL GROUP | Facility: PHYSICIAN GROUP | Age: 86
End: 2021-08-05
Payer: MEDICARE

## 2021-08-05 ENCOUNTER — HOSPITAL ENCOUNTER (OUTPATIENT)
Facility: MEDICAL CENTER | Age: 86
End: 2021-08-05
Attending: FAMILY MEDICINE
Payer: MEDICARE

## 2021-08-05 ENCOUNTER — HOSPITAL ENCOUNTER (OUTPATIENT)
Dept: RADIOLOGY | Facility: MEDICAL CENTER | Age: 86
End: 2021-08-05
Attending: FAMILY MEDICINE
Payer: MEDICARE

## 2021-08-05 VITALS — WEIGHT: 133 LBS | BODY MASS INDEX: 23.57 KG/M2 | TEMPERATURE: 98 F | HEIGHT: 63 IN

## 2021-08-05 DIAGNOSIS — J90 PLEURAL EFFUSION ON LEFT: ICD-10-CM

## 2021-08-05 DIAGNOSIS — K92.2 UPPER GI BLEED: ICD-10-CM

## 2021-08-05 DIAGNOSIS — R55 SYNCOPE, UNSPECIFIED SYNCOPE TYPE: Chronic | ICD-10-CM

## 2021-08-05 DIAGNOSIS — Z09 HOSPITAL DISCHARGE FOLLOW-UP: ICD-10-CM

## 2021-08-05 DIAGNOSIS — Z95.0 PRESENCE OF CARDIAC PACEMAKER: ICD-10-CM

## 2021-08-05 DIAGNOSIS — D48.5 NEOPLASM OF UNCERTAIN BEHAVIOR OF SKIN: ICD-10-CM

## 2021-08-05 LAB — PATHOLOGY CONSULT NOTE: NORMAL

## 2021-08-05 PROCEDURE — 99215 OFFICE O/P EST HI 40 MIN: CPT | Mod: 25 | Performed by: FAMILY MEDICINE

## 2021-08-05 PROCEDURE — 88305 TISSUE EXAM BY PATHOLOGIST: CPT

## 2021-08-05 PROCEDURE — 71046 X-RAY EXAM CHEST 2 VIEWS: CPT

## 2021-08-05 PROCEDURE — 11622 EXC S/N/H/F/G MAL+MRG 1.1-2: CPT | Performed by: FAMILY MEDICINE

## 2021-08-05 ASSESSMENT — PATIENT HEALTH QUESTIONNAIRE - PHQ9
1. LITTLE INTEREST OR PLEASURE IN DOING THINGS: SEVERAL DAYS
7. TROUBLE CONCENTRATING ON THINGS, SUCH AS READING THE NEWSPAPER OR WATCHING TELEVISION: NOT AT ALL
8. MOVING OR SPEAKING SO SLOWLY THAT OTHER PEOPLE COULD HAVE NOTICED. OR THE OPPOSITE, BEING SO FIGETY OR RESTLESS THAT YOU HAVE BEEN MOVING AROUND A LOT MORE THAN USUAL: NOT AT ALL
9. THOUGHTS THAT YOU WOULD BE BETTER OFF DEAD, OR OF HURTING YOURSELF: NOT AT ALL
4. FEELING TIRED OR HAVING LITTLE ENERGY: NEARLY EVERY DAY
6. FEELING BAD ABOUT YOURSELF - OR THAT YOU ARE A FAILURE OR HAVE LET YOURSELF OR YOUR FAMILY DOWN: NOT AL ALL
3. TROUBLE FALLING OR STAYING ASLEEP OR SLEEPING TOO MUCH: NOT AT ALL
SUM OF ALL RESPONSES TO PHQ QUESTIONS 1-9: 4
2. FEELING DOWN, DEPRESSED, IRRITABLE, OR HOPELESS: NOT AT ALL
5. POOR APPETITE OR OVEREATING: NOT AT ALL
SUM OF ALL RESPONSES TO PHQ9 QUESTIONS 1 AND 2: 1

## 2021-08-05 ASSESSMENT — FIBROSIS 4 INDEX: FIB4 SCORE: 3.08

## 2021-08-05 NOTE — PROGRESS NOTES
CC:Diagnoses of Neoplasm of uncertain behavior of skin, Hospital discharge follow-up, Upper GI bleed, Syncope, unspecified syncope type, Presence of cardiac pacemaker, and Pleural effusion on left were pertinent to this visit.      HISTORY OF PRESENT ILLNESS: Patient is a 87 y.o. female established patient who presents today to follow-up on hospitalizations from 5/7/2021 through 5/13/2021, and 6/20/2021 through 6/23/2021.  Patient was subsequently placed in rehab facility after discharge from hospital on 6/23/2021.  Patient was initially admitted for recurrent syncopal episodes with associated bradycardia tachy syndrome and placement of pacemaker.  Recurrent syncopal episodes have resolved since placement of pacemaker.  Patient on following admission had upper GI bleed from peptic ulcer disease.  Patient is present with her daughter, Clarita, today.      Patient Active Problem List    Diagnosis Date Noted   • PVD (peripheral vascular disease) ([facility]) 07/30/2021   • Gait instability 07/16/2021   • Pleural effusion 06/28/2021   • Cataract cortical, senile, bilateral 06/25/2021   • Presence of cardiac pacemaker 06/25/2021   • Upper GI bleed 06/20/2021   • Leukocytosis 06/20/2021   • Second degree atrioventricular block, Mobitz (type) I 05/09/2021   • Fall 05/08/2021   • Closed fracture of multiple ribs of left side with delayed healing 05/08/2021   • Advance care planning 05/08/2021   • Traumatic rhabdomyolysis ([facility]) 05/08/2021   • BRIAN (acute kidney injury) ([facility]) 04/06/2021   • Anemia 04/04/2021   • Near syncope likely vasovagal versus secondary to symptomatic SVT versus HOCM-like cardiomyopathy 04/02/2021   • Recurrent major depressive disorder, in full remission ([facility]) 06/26/2019   • Imbalance 03/19/2019   • SVT (supraventricular tachycardia) ([facility]) 11/06/2018   • Localized edema 11/01/2018   • Depression 09/27/2018   • Low serum vitamin D 06/19/2018   • Essential hypertension, benign 11/10/2017   • Degenerative disc  disease, lumbar 07/17/2017   • Non-rheumatic mitral regurgitation 05/07/2015   • Stage 3b chronic kidney disease (HCC) 04/07/2015   • S/P total knee replacement right 09/03/2013      Allergies:Other environmental    Current Outpatient Medications   Medication Sig Dispense Refill   • VITAMIN D, CHOLECALCIFEROL, PO Take 5,000 Units by mouth every day at 6 PM. Indications: suppliment     • omeprazole (PRILOSEC) 20 MG delayed-release capsule Take 1 capsule by mouth 2 times a day for 30 days. 60 capsule 0   • ferrous sulfate 325 (65 Fe) MG tablet Take 1 tablet by mouth every day for 30 days. 30 tablet 0   • Home Care Oxygen Inhale 1 L/min continuous. Oxygen dose range: 1 L/min only wears at night  Respiratory route via: Nasal Cannula   Oxygen supplier: Preferred      Indications: Dyspnea     • furosemide (LASIX) 20 MG Tab Take 20 mg by mouth every day.     • Multiple Vitamins-Minerals (DAILY MULTI PO) Take  by mouth.     • potassium chloride (KLOR-CON) 20 MEQ Pack Take 20 mEq by mouth every day.     • atorvastatin (LIPITOR) 40 MG Tab Take 1 tablet by mouth every evening. 100 tablet 3   • metoprolol SR (TOPROL XL) 25 MG TABLET SR 24 HR Take 1 tablet by mouth every day. 90 tablet 3   • escitalopram (LEXAPRO) 5 MG tablet Take 1 Tab by mouth every day. 90 Tab 3     No current facility-administered medications for this visit.       Social History     Tobacco Use   • Smoking status: Never Smoker   • Smokeless tobacco: Never Used   • Tobacco comment: Pt exposed to second hand smoker   Vaping Use   • Vaping Use: Never used   Substance Use Topics   • Alcohol use: Yes     Comment: Occasionally   • Drug use: No     Social History     Social History Narrative   • Not on file       Family History   Problem Relation Age of Onset   • Lung Disease Mother         frequent pneumonia   • Heart Disease Father 54        MI   • Lung Disease Father         Emphysema   • Heart Attack Father    • Cancer Maternal Aunt         Great Aunt and  "Cousin Ovarian CA   • Hypertension Maternal Aunt    • Cancer Daughter         Lung and Brain CA   • Seizures Daughter    • Cancer Daughter         Pituitary tumor   • Cancer Brother         leukemia         Exam:    Temp 36.7 °C (98 °F) (Temporal)   Ht 1.6 m (5' 3\")   Wt 60.3 kg (133 lb)  Body mass index is 23.56 kg/m².  GENERAL: No acute distress  HENT: Atraumatic, normocephalic, external auditory canals clear bilaterally, TMs translucent and pearly gray, nares clear without discharge, posterior pharynx clear without erythema or exudates.  EYES: Extraocular movements intact, pupils equal and reactive to light.  NECK: Supple, FROM  CHEST: No deformities, Equal chest expansion, no murmurs, gallops, or rubs.  RESP: Unlabored, no stridor or audible wheeze.  No wheezes, crackles, nor rhonchi  ABD: Soft, Nontender, Non-Distended.  Normal bowel sounds.  No hepatosplenomegaly  Extremities: No Clubbing, Cyanosis, or Edema.  Skin: Warm/dry, without rashes.  1.2 x 1.2 cm skin growth consistent with squamous cell cancer.  Neuro: A/O x 4, CN 2-12 Grossly intact, Motor/sensory grossly intact  Psych: Normal behavior, normal affect      LABS: 7/29/2021, 6/20/2021 through 6/23/2021, 5/7/2021 through 5/13/2021: Results reviewed and discussed with the patient, questions answered.    ED MD, hospitalist, cardiologist notes, op reports, and discharge summary was reviewed from 5/7/2021 and 6/20/2021 hospitalizations    5/7/2021, 5/12/2021, 6/20/2021 imaging reviewed and discussed with patient and daughter.    Assessment/Plan:  1. Neoplasm of uncertain behavior of skin  New problem to examiner.  Skin Biopsy Procedure Note    PRE-OP DIAGNOSIS: Neoplasm of skin left hand  POST-OP DIAGNOSIS: Same   PROCEDURE: Excisional skin biopsy  Performing Physician: Juan Santana MD       PROCEDURE:    Excisional Biopsy        The area surrounding the skin lesion was prepared in the usual sterile manner.  A 4 cm x 1.5 cm elliptical incision using a " #15 blade scalpel was made on the left hand after 2 cc of 1% lidocaine with epinephrine applied in and around the lesion.  Excision was carried down to the depth of the subcutaneous fat and no evidence of neoplasm extension into the subcu fat was noted.  3-0 Vicryl suture was used to close the skin in simple interrupted fashion times 5 suture.. Hemostasis was assured. The patient tolerated the procedure well.     Closure: 3-0 Vicryl x5 simple interrupted suture     Followup: The patient tolerated the procedure well without complications.  Standard post-procedure care is explained and return precautions are given.         2. Hospital discharge follow-up  3. Upper GI bleed  Problem to examiner.  Follow-up with [facility].  Continue omeprazole.  Follow-up for upper endoscopy.    4. Syncope, unspecified syncope type  5. Presence of cardiac pacemaker  New problem to examiner.  Currently resolved status post pacemaker placement.    6. Pleural effusion on left  New problem to examiner.  No physical exam evidence of pleural effusion today however follow-up chest x-ray to confirm resolution ordered today.  - DX-CHEST-2 VIEWS; Future      My total time spent caring for the patient on the day of the encounter was 45 minutes, excluding procedure time.  This does not include time spent on separately billable procedures/tests.      No follow-ups on file.      Please note that this dictation was created using voice recognition software. I have worked with consultants from the vendor as well as technical experts from [employer] to optimize the interface. I have made every reasonable attempt to correct obvious errors, but I expect that there are errors of grammar and possibly content that I did not discover before finalizing the note.

## 2021-08-06 ENCOUNTER — HOSPITAL ENCOUNTER (OUTPATIENT)
Dept: RADIOLOGY | Facility: MEDICAL CENTER | Age: 86
End: 2021-08-06
Attending: FAMILY MEDICINE
Payer: MEDICARE

## 2021-08-06 ENCOUNTER — HOME CARE VISIT (OUTPATIENT)
Dept: HOME HEALTH SERVICES | Facility: HOME HEALTHCARE | Age: 86
End: 2021-08-06
Payer: MEDICARE

## 2021-08-06 DIAGNOSIS — J90 PLEURAL EFFUSION ON LEFT: ICD-10-CM

## 2021-08-06 PROCEDURE — 76604 US EXAM CHEST: CPT

## 2021-08-06 PROCEDURE — G0152 HHCP-SERV OF OT,EA 15 MIN: HCPCS

## 2021-08-07 SDOH — ECONOMIC STABILITY: HOUSING INSECURITY: EVIDENCE OF SMOKING MATERIAL: 0

## 2021-08-07 ASSESSMENT — ENCOUNTER SYMPTOMS: DENIES PAIN: 1

## 2021-08-08 ASSESSMENT — ENCOUNTER SYMPTOMS
DENIES PAIN: 1
PERSON REPORTING PAIN: PATIENT

## 2021-08-09 ENCOUNTER — HOME CARE VISIT (OUTPATIENT)
Dept: HOME HEALTH SERVICES | Facility: HOME HEALTHCARE | Age: 86
End: 2021-08-09
Payer: MEDICARE

## 2021-08-09 VITALS
RESPIRATION RATE: 15 BRPM | TEMPERATURE: 99.6 F | DIASTOLIC BLOOD PRESSURE: 66 MMHG | HEART RATE: 82 BPM | OXYGEN SATURATION: 94 % | SYSTOLIC BLOOD PRESSURE: 122 MMHG

## 2021-08-09 PROCEDURE — G0151 HHCP-SERV OF PT,EA 15 MIN: HCPCS

## 2021-08-10 ENCOUNTER — HOME CARE VISIT (OUTPATIENT)
Dept: HOME HEALTH SERVICES | Facility: HOME HEALTHCARE | Age: 86
End: 2021-08-10
Payer: MEDICARE

## 2021-08-10 VITALS
HEART RATE: 77 BPM | DIASTOLIC BLOOD PRESSURE: 60 MMHG | RESPIRATION RATE: 16 BRPM | TEMPERATURE: 99.4 F | OXYGEN SATURATION: 94 % | SYSTOLIC BLOOD PRESSURE: 120 MMHG

## 2021-08-10 PROCEDURE — G0299 HHS/HOSPICE OF RN EA 15 MIN: HCPCS

## 2021-08-10 RX ORDER — OMEPRAZOLE 20 MG/1
20 CAPSULE, DELAYED RELEASE ORAL DAILY
Qty: 60 CAPSULE | Refills: 0 | Status: SHIPPED | OUTPATIENT
Start: 2021-08-10 | End: 2021-09-09

## 2021-08-10 SDOH — ECONOMIC STABILITY: HOUSING INSECURITY: EVIDENCE OF SMOKING MATERIAL: 0

## 2021-08-10 ASSESSMENT — ENCOUNTER SYMPTOMS
LOWEST PAIN SEVERITY IN PAST 24 HOURS: 0/10
HIGHEST PAIN SEVERITY IN PAST 24 HOURS: 0/10
PAIN SEVERITY GOAL: 0/10
NAUSEA: PT DENIES ANY NAUSEA AT THIS TIME
VOMITING: PT DENIES ANY EMESIS AT THIS TIME
SHORTNESS OF BREATH: T
DENIES PAIN: 1

## 2021-08-11 ENCOUNTER — HOME CARE VISIT (OUTPATIENT)
Dept: HOME HEALTH SERVICES | Facility: HOME HEALTHCARE | Age: 86
End: 2021-08-11
Payer: MEDICARE

## 2021-08-11 VITALS
TEMPERATURE: 99.4 F | SYSTOLIC BLOOD PRESSURE: 124 MMHG | RESPIRATION RATE: 16 BRPM | HEART RATE: 72 BPM | OXYGEN SATURATION: 94 % | DIASTOLIC BLOOD PRESSURE: 64 MMHG

## 2021-08-11 PROCEDURE — G0152 HHCP-SERV OF OT,EA 15 MIN: HCPCS

## 2021-08-11 PROCEDURE — G0151 HHCP-SERV OF PT,EA 15 MIN: HCPCS

## 2021-08-11 ASSESSMENT — ENCOUNTER SYMPTOMS
DENIES PAIN: 1
PERSON REPORTING PAIN: PATIENT

## 2021-08-11 ASSESSMENT — ACTIVITIES OF DAILY LIVING (ADL)
DRESSING_LB_CURRENT_FUNCTION: INDEPENDENT
HOUSEKEEPING ASSESSED: 1
CURRENT_FUNCTION: SUPERVISION
DRESSING_UB_CURRENT_FUNCTION: INDEPENDENT
PHYSICAL TRANSFERS ASSESSED: 1
LIGHT HOUSEKEEPING: NEEDS ASSISTANCE

## 2021-08-15 ASSESSMENT — ENCOUNTER SYMPTOMS: DENIES PAIN: 1

## 2021-08-16 ENCOUNTER — HOME CARE VISIT (OUTPATIENT)
Dept: HOME HEALTH SERVICES | Facility: HOME HEALTHCARE | Age: 86
End: 2021-08-16
Payer: MEDICARE

## 2021-08-16 VITALS
HEART RATE: 70 BPM | DIASTOLIC BLOOD PRESSURE: 64 MMHG | TEMPERATURE: 99.3 F | RESPIRATION RATE: 17 BRPM | OXYGEN SATURATION: 94 % | SYSTOLIC BLOOD PRESSURE: 118 MMHG

## 2021-08-16 PROCEDURE — 665001 SOC-HOME HEALTH

## 2021-08-16 PROCEDURE — G0151 HHCP-SERV OF PT,EA 15 MIN: HCPCS

## 2021-08-16 SDOH — ECONOMIC STABILITY: HOUSING INSECURITY: EVIDENCE OF SMOKING MATERIAL: 0

## 2021-08-16 ASSESSMENT — ACTIVITIES OF DAILY LIVING (ADL)
AMBULATION ASSISTANCE ON UNEVEN SURFACES: 1
AMBULATION ASSISTANCE ON FLAT SURFACES: 1

## 2021-08-16 ASSESSMENT — ENCOUNTER SYMPTOMS: DENIES PAIN: 1

## 2021-08-17 ENCOUNTER — HOME CARE VISIT (OUTPATIENT)
Dept: HOME HEALTH SERVICES | Facility: HOME HEALTHCARE | Age: 86
End: 2021-08-17
Payer: MEDICARE

## 2021-08-17 VITALS
DIASTOLIC BLOOD PRESSURE: 60 MMHG | RESPIRATION RATE: 18 BRPM | OXYGEN SATURATION: 95 % | SYSTOLIC BLOOD PRESSURE: 110 MMHG | TEMPERATURE: 98.8 F | HEART RATE: 68 BPM

## 2021-08-17 PROCEDURE — G0495 RN CARE TRAIN/EDU IN HH: HCPCS

## 2021-08-17 SDOH — ECONOMIC STABILITY: HOUSING INSECURITY: EVIDENCE OF SMOKING MATERIAL: 0

## 2021-08-17 ASSESSMENT — ENCOUNTER SYMPTOMS
VOMITING: DENIED
NAUSEA: DENIED

## 2021-08-18 ENCOUNTER — HOME CARE VISIT (OUTPATIENT)
Dept: HOME HEALTH SERVICES | Facility: HOME HEALTHCARE | Age: 86
End: 2021-08-18
Payer: MEDICARE

## 2021-08-18 VITALS
TEMPERATURE: 98.6 F | HEART RATE: 72 BPM | OXYGEN SATURATION: 95 % | DIASTOLIC BLOOD PRESSURE: 74 MMHG | SYSTOLIC BLOOD PRESSURE: 116 MMHG | RESPIRATION RATE: 16 BRPM

## 2021-08-18 VITALS
SYSTOLIC BLOOD PRESSURE: 116 MMHG | DIASTOLIC BLOOD PRESSURE: 74 MMHG | RESPIRATION RATE: 16 BRPM | HEART RATE: 72 BPM | OXYGEN SATURATION: 95 % | TEMPERATURE: 98.6 F

## 2021-08-18 PROCEDURE — G0151 HHCP-SERV OF PT,EA 15 MIN: HCPCS

## 2021-08-18 PROCEDURE — G0152 HHCP-SERV OF OT,EA 15 MIN: HCPCS

## 2021-08-18 ASSESSMENT — ENCOUNTER SYMPTOMS
PERSON REPORTING PAIN: PATIENT
DENIES PAIN: 1

## 2021-08-18 NOTE — CASE COMMUNICATION
PT. A&OX4. DENIED ANY PAIN. DENIED ANY DEPRESSIVE FEELINGS OR SI. LUNGS CLEAR T/O. SKIN TEAR TO RT. ARM CLOSED, HEALED. RT. HAND BIOPSY SITE WITH SUTURES INTACT. NO S/SX INFECTION NOTED. PT. STATED SHE WAS TOLD BY DR. CHOWDHURY'S NURSE TO LEAVE SITE DANDY. PT. WITH 1-2+ EDEMA TO RT. PEDAL. TEACHING DONE ON NEED TO ELEVATE RLE AS MUCH AS POSSIBLE, NO TIGHT SHOES/SOX, DECREASE SODIUM IN DIET-AVOID HAM, CASTRO, SAUSAGE, PIZZA, CHINESE FOOD, USE HEALTHY CHOICE/LEAN CUISINE FOR TV DINNERS, USE FRESH/FROZEN VEGETABLES WHEN POSSIBLE.MED RECONCILIATION DONE WITH PT. DENIED ANY NEW/CHANGED MEDS.

## 2021-08-19 ENCOUNTER — NON-PROVIDER VISIT (OUTPATIENT)
Dept: MEDICAL GROUP | Facility: PHYSICIAN GROUP | Age: 86
End: 2021-08-19
Payer: MEDICARE

## 2021-08-19 PROCEDURE — 99999 PR NO CHARGE: CPT | Performed by: FAMILY MEDICINE

## 2021-08-19 RX ORDER — FERROUS SULFATE 325(65) MG
325 TABLET ORAL DAILY
Qty: 30 TABLET | Refills: 0 | Status: SHIPPED | OUTPATIENT
Start: 2021-08-19 | End: 2021-09-18

## 2021-08-19 NOTE — NON-PROVIDER
Duong Carmichael is a 87 y.o. female here for a Non-Provider Visit for Suture Removal.    Sutures were placed by Dr. Santana on date: 8/5/2021  Skin is healed: Yes  Provider notified if skin is not healed, or if there is redness, heat, pain, or drainage from incision: yes  Sutures removed.   Mastisol and steristips are placed: Yes    Advised to use emollient (vaseline, aquaphor, etc.) as needed, avoid peroxide and antibiotic ointment to reduce irritation.     Path report has been reviewed by provider.  Path report has reviewed with patient.     Patient's site is healing as expected. Informed patient to continue applying Vaseline to help with the healing process. Also informed patient to keep it wrapped for today to help with the irradiation that was causing when removing the sutures. Patient understands that if any swelling or infection occurs, she needs to notify us. Patient had no further questions or concerns.

## 2021-08-21 ASSESSMENT — ENCOUNTER SYMPTOMS: DENIES PAIN: 1

## 2021-08-23 ENCOUNTER — HOME CARE VISIT (OUTPATIENT)
Dept: HOME HEALTH SERVICES | Facility: HOME HEALTHCARE | Age: 86
End: 2021-08-23
Payer: MEDICARE

## 2021-08-23 VITALS
TEMPERATURE: 98.9 F | RESPIRATION RATE: 15 BRPM | SYSTOLIC BLOOD PRESSURE: 120 MMHG | OXYGEN SATURATION: 95 % | DIASTOLIC BLOOD PRESSURE: 72 MMHG | HEART RATE: 81 BPM

## 2021-08-23 PROCEDURE — G0151 HHCP-SERV OF PT,EA 15 MIN: HCPCS

## 2021-08-23 SDOH — ECONOMIC STABILITY: HOUSING INSECURITY: EVIDENCE OF SMOKING MATERIAL: 0

## 2021-08-23 ASSESSMENT — ENCOUNTER SYMPTOMS: DENIES PAIN: 1

## 2021-08-24 ENCOUNTER — HOME CARE VISIT (OUTPATIENT)
Dept: HOME HEALTH SERVICES | Facility: HOME HEALTHCARE | Age: 86
End: 2021-08-24
Payer: MEDICARE

## 2021-08-24 VITALS
RESPIRATION RATE: 16 BRPM | DIASTOLIC BLOOD PRESSURE: 60 MMHG | TEMPERATURE: 98.4 F | OXYGEN SATURATION: 97 % | HEART RATE: 65 BPM | SYSTOLIC BLOOD PRESSURE: 120 MMHG

## 2021-08-24 PROCEDURE — G0299 HHS/HOSPICE OF RN EA 15 MIN: HCPCS

## 2021-08-24 SDOH — ECONOMIC STABILITY: HOUSING INSECURITY: EVIDENCE OF SMOKING MATERIAL: 0

## 2021-08-24 ASSESSMENT — ENCOUNTER SYMPTOMS
PAIN SEVERITY GOAL: 0/10
DENIES PAIN: 1
SHORTNESS OF BREATH: T
LOWEST PAIN SEVERITY IN PAST 24 HOURS: 0/10
NAUSEA: PT DENIES ANY NAUSEA AT THIS TIME
VOMITING: PT DENIES ANY EMESIS AT THIS TIME
HIGHEST PAIN SEVERITY IN PAST 24 HOURS: 0/10

## 2021-08-25 ENCOUNTER — HOME CARE VISIT (OUTPATIENT)
Dept: HOME HEALTH SERVICES | Facility: HOME HEALTHCARE | Age: 86
End: 2021-08-25
Payer: MEDICARE

## 2021-08-25 VITALS
OXYGEN SATURATION: 97 % | RESPIRATION RATE: 16 BRPM | DIASTOLIC BLOOD PRESSURE: 62 MMHG | HEART RATE: 68 BPM | SYSTOLIC BLOOD PRESSURE: 118 MMHG | TEMPERATURE: 98.5 F

## 2021-08-25 PROCEDURE — G0152 HHCP-SERV OF OT,EA 15 MIN: HCPCS

## 2021-08-25 ASSESSMENT — ACTIVITIES OF DAILY LIVING (ADL)
DRESSING_UB_CURRENT_FUNCTION: INDEPENDENT
CURRENT_FUNCTION: SUPERVISION
PHYSICAL TRANSFERS ASSESSED: 1
DRESSING_LB_CURRENT_FUNCTION: INDEPENDENT

## 2021-08-25 ASSESSMENT — ENCOUNTER SYMPTOMS
PERSON REPORTING PAIN: PATIENT
DENIES PAIN: 1

## 2021-08-30 ENCOUNTER — HOME CARE VISIT (OUTPATIENT)
Dept: HOME HEALTH SERVICES | Facility: HOME HEALTHCARE | Age: 86
End: 2021-08-30
Payer: MEDICARE

## 2021-08-30 VITALS
SYSTOLIC BLOOD PRESSURE: 124 MMHG | OXYGEN SATURATION: 97 % | RESPIRATION RATE: 17 BRPM | DIASTOLIC BLOOD PRESSURE: 66 MMHG | TEMPERATURE: 98.3 F | HEART RATE: 69 BPM

## 2021-08-30 PROCEDURE — G0151 HHCP-SERV OF PT,EA 15 MIN: HCPCS

## 2021-08-31 ENCOUNTER — HOME CARE VISIT (OUTPATIENT)
Dept: HOME HEALTH SERVICES | Facility: HOME HEALTHCARE | Age: 86
End: 2021-08-31
Payer: MEDICARE

## 2021-08-31 VITALS
RESPIRATION RATE: 17 BRPM | SYSTOLIC BLOOD PRESSURE: 103 MMHG | WEIGHT: 122.4 LBS | OXYGEN SATURATION: 97 % | DIASTOLIC BLOOD PRESSURE: 61 MMHG | HEART RATE: 77 BPM | BODY MASS INDEX: 21.68 KG/M2 | TEMPERATURE: 98.3 F

## 2021-08-31 PROCEDURE — G0299 HHS/HOSPICE OF RN EA 15 MIN: HCPCS

## 2021-08-31 ASSESSMENT — FIBROSIS 4 INDEX: FIB4 SCORE: 3.11

## 2021-09-01 ENCOUNTER — HOME CARE VISIT (OUTPATIENT)
Dept: HOME HEALTH SERVICES | Facility: HOME HEALTHCARE | Age: 86
End: 2021-09-01
Payer: MEDICARE

## 2021-09-01 ASSESSMENT — PAIN SCALES - PAIN ASSESSMENT IN ADVANCED DEMENTIA (PAINAD)
CONSOLABILITY: 0 - NO NEED TO CONSOLE.
TOTALSCORE: 0
FACIALEXPRESSION: 0 - SMILING OR INEXPRESSIVE.
BODYLANGUAGE: 0 - RELAXED.
NEGVOCALIZATION: 0 - NONE.

## 2021-09-01 ASSESSMENT — ENCOUNTER SYMPTOMS
LIMITED RANGE OF MOTION: 1
DENIES PAIN: 1
PERSON REPORTING PAIN: PATIENT

## 2021-09-01 NOTE — CASE COMMUNICATION
ACTION NEEDED PLEASE  PATIENT IS TAKING LASIX ON PRN BASIS BUT IS TAKING KCL DAILY ?. PLEASE ADVISE   CAN WE PLEASE CHECK HER LABS NEXT WEEK BEFORE HOME CARE DISCHARGE?  THANK YOU

## 2021-09-03 ENCOUNTER — TELEPHONE (OUTPATIENT)
Dept: CARDIOLOGY | Facility: MEDICAL CENTER | Age: 86
End: 2021-09-03

## 2021-09-03 NOTE — TELEPHONE ENCOUNTER
Received stratification request from Dental office. Fax [phone]    Procedure: cleaning and radiographs. Pacemaker placed 5/7/21.     To RT as member of care team in RO absence. Ok for patient to proceed? No indication for ABX, no OAC.

## 2021-09-06 ASSESSMENT — ENCOUNTER SYMPTOMS: DENIES PAIN: 1

## 2021-09-07 ENCOUNTER — HOME CARE VISIT (OUTPATIENT)
Dept: HOME HEALTH SERVICES | Facility: HOME HEALTHCARE | Age: 86
End: 2021-09-07
Payer: MEDICARE

## 2021-09-07 ENCOUNTER — HOSPITAL ENCOUNTER (OUTPATIENT)
Facility: MEDICAL CENTER | Age: 86
End: 2021-09-07
Attending: NURSE PRACTITIONER
Payer: MEDICARE

## 2021-09-07 VITALS
HEART RATE: 64 BPM | TEMPERATURE: 98 F | OXYGEN SATURATION: 95 % | RESPIRATION RATE: 16 BRPM | DIASTOLIC BLOOD PRESSURE: 60 MMHG | SYSTOLIC BLOOD PRESSURE: 120 MMHG

## 2021-09-07 PROCEDURE — 84080 ASSAY ALKALINE PHOSPHATASES: CPT

## 2021-09-07 PROCEDURE — 36415 COLL VENOUS BLD VENIPUNCTURE: CPT

## 2021-09-07 PROCEDURE — G0299 HHS/HOSPICE OF RN EA 15 MIN: HCPCS

## 2021-09-07 PROCEDURE — 84075 ASSAY ALKALINE PHOSPHATASE: CPT

## 2021-09-07 SDOH — ECONOMIC STABILITY: HOUSING INSECURITY: EVIDENCE OF SMOKING MATERIAL: 0

## 2021-09-07 ASSESSMENT — ENCOUNTER SYMPTOMS
NAUSEA: PT DENIES ANY NAUSEA AT THIS TIME
VOMITING: PT DENIES ANY EMESIS AT THIS TIME
PAIN SEVERITY GOAL: 0/10
PAIN PRESENCE EVALUATION: PT DENIES ANY PAIN
LOWEST PAIN SEVERITY IN PAST 24 HOURS: 0/10
DENIES PAIN: 1
HIGHEST PAIN SEVERITY IN PAST 24 HOURS: 0/10

## 2021-09-08 ENCOUNTER — HOME CARE VISIT (OUTPATIENT)
Dept: HOME HEALTH SERVICES | Facility: HOME HEALTHCARE | Age: 86
End: 2021-09-08
Payer: MEDICARE

## 2021-09-08 VITALS
DIASTOLIC BLOOD PRESSURE: 58 MMHG | HEART RATE: 75 BPM | TEMPERATURE: 98 F | OXYGEN SATURATION: 98 % | RESPIRATION RATE: 16 BRPM | SYSTOLIC BLOOD PRESSURE: 118 MMHG

## 2021-09-08 PROCEDURE — G0152 HHCP-SERV OF OT,EA 15 MIN: HCPCS

## 2021-09-08 ASSESSMENT — ENCOUNTER SYMPTOMS
DENIES PAIN: 1
PERSON REPORTING PAIN: PATIENT

## 2021-09-09 LAB
ALP BONE SERPL-CCNC: 49 U/L (ref 0–55)
ALP ISOS SERPL HS-CCNC: 0 U/L
ALP LIVER SERPL-CCNC: 154 U/L (ref 0–94)
ALP SERPL-CCNC: 203 U/L (ref 40–120)

## 2021-09-10 ENCOUNTER — HOME CARE VISIT (OUTPATIENT)
Dept: HOME HEALTH SERVICES | Facility: HOME HEALTHCARE | Age: 86
End: 2021-09-10
Payer: MEDICARE

## 2021-09-10 VITALS
SYSTOLIC BLOOD PRESSURE: 130 MMHG | DIASTOLIC BLOOD PRESSURE: 60 MMHG | RESPIRATION RATE: 16 BRPM | OXYGEN SATURATION: 98 % | HEART RATE: 81 BPM | TEMPERATURE: 98.7 F

## 2021-09-10 PROCEDURE — G0493 RN CARE EA 15 MIN HH/HOSPICE: HCPCS

## 2021-09-10 SDOH — ECONOMIC STABILITY: HOUSING INSECURITY: EVIDENCE OF SMOKING MATERIAL: 0

## 2021-09-10 SDOH — ECONOMIC STABILITY: HOUSING INSECURITY
HOME SAFETY: OXYGEN SAFETY RISK ASSESSMENT PERFORMED. PATIENT DOES HAVE A NO SMOKING SIGN POSTED IN THE HOME. PATIENT DOES NOT HAVE A WORKING FIRE EXTINGUISHER PRESENT IN THE HOME. SMOKE ALARMS ARE PRESENT AND FUNCTIONAL ON EACH LEVEL OF THE HOME. PATIENT DOES HA

## 2021-09-10 SDOH — ECONOMIC STABILITY: HOUSING INSECURITY
HOME SAFETY: VE A FIRE ESCAPE PLAN DEVELOPED. PATIENT DOES NOT HAVE FLAMMABLE MATERIALS PRESENT IN THE HOME PRESENTING A FIRE HAZARD. NO EVIDENCE FOUND OF SMOKING MATERIALS PRESENT IN THE HOME.

## 2021-09-10 ASSESSMENT — PATIENT HEALTH QUESTIONNAIRE - PHQ9: CLINICAL INTERPRETATION OF PHQ2 SCORE: 0

## 2021-09-10 ASSESSMENT — ENCOUNTER SYMPTOMS
LOWEST PAIN SEVERITY IN PAST 24 HOURS: 0/10
PAIN SEVERITY GOAL: 0/10
DENIES PAIN: 1
HIGHEST PAIN SEVERITY IN PAST 24 HOURS: 0/10

## 2021-09-10 ASSESSMENT — ACTIVITIES OF DAILY LIVING (ADL)
OASIS_M1830: 01
HOME_HEALTH_OASIS: 00

## 2021-09-30 ENCOUNTER — TELEPHONE (OUTPATIENT)
Dept: MEDICAL GROUP | Facility: PHYSICIAN GROUP | Age: 86
End: 2021-09-30

## 2021-10-15 ENCOUNTER — TELEPHONE (OUTPATIENT)
Dept: MEDICAL GROUP | Facility: PHYSICIAN GROUP | Age: 86
End: 2021-10-15

## 2021-10-15 NOTE — TELEPHONE ENCOUNTER
Future Appointments       Provider Department Center    10/20/2021 1:00 PM Juan Santana M.D. [facility] [place] [facility]    10/28/2021 1:30 PM CHILANGO [facility]     2/1/2022 12:45 PM PACER CHECK-[facility]       ESTABLISHED PATIENT PRE-VISIT PLANNING     Patient was NOT contacted to complete PVP.     Note: Patient will not be contacted if there is no indication to call.     1.  Reviewed notes from the last few office visits within the medical group: Yes, LOV 08/05/2021    2.  If any orders were placed at last visit or intended to be done for this visit (i.e. 6 mos follow-up), do we have Results/Consult Notes?         •  Labs - Labs ordered, but not to be completed until future.  Note: If patient appointment is for lab review and patient did not complete labs, check with provider if OK to reschedule patient until labs completed.       •  Imaging - Imaging ordered, completed and results are in chart.       •  Referrals - Referral ordered, patient was seen and consult notes are in chart. Care Teams updated  NO. The referral was for Radiology    3. Is this appointment scheduled as a Hospital Follow-Up? No    4.  Immunizations were updated in Epic using Reconcile Outside Information activity? Yes    5.  Patient is due for the following Health Maintenance Topics:   Health Maintenance Due   Topic Date Due   • IMM DTaP/Tdap/Td Vaccine (1 - Tdap) Never done   • Annual Wellness Visit  03/28/2015   • IMM INFLUENZA (1) 09/01/2021     6.  AHA (Pulse8) form printed for Provider? N/A

## 2021-10-20 ENCOUNTER — OFFICE VISIT (OUTPATIENT)
Dept: MEDICAL GROUP | Facility: PHYSICIAN GROUP | Age: 86
End: 2021-10-20
Payer: MEDICARE

## 2021-10-20 VITALS
TEMPERATURE: 98.1 F | OXYGEN SATURATION: 94 % | SYSTOLIC BLOOD PRESSURE: 138 MMHG | BODY MASS INDEX: 21.44 KG/M2 | WEIGHT: 121 LBS | HEIGHT: 63 IN | DIASTOLIC BLOOD PRESSURE: 80 MMHG | HEART RATE: 67 BPM

## 2021-10-20 DIAGNOSIS — J96.10 CHRONIC RESPIRATORY FAILURE, UNSPECIFIED WHETHER WITH HYPOXIA OR HYPERCAPNIA (HCC): ICD-10-CM

## 2021-10-20 DIAGNOSIS — J90 PLEURAL EFFUSION ON LEFT: ICD-10-CM

## 2021-10-20 DIAGNOSIS — N17.9 AKI (ACUTE KIDNEY INJURY) (HCC): ICD-10-CM

## 2021-10-20 DIAGNOSIS — R55 SYNCOPE, UNSPECIFIED SYNCOPE TYPE: ICD-10-CM

## 2021-10-20 DIAGNOSIS — R53.81 PHYSICAL DECONDITIONING: ICD-10-CM

## 2021-10-20 DIAGNOSIS — R26.81 GAIT INSTABILITY: ICD-10-CM

## 2021-10-20 DIAGNOSIS — Z23 NEED FOR VACCINATION: ICD-10-CM

## 2021-10-20 PROCEDURE — 99214 OFFICE O/P EST MOD 30 MIN: CPT | Mod: 25 | Performed by: FAMILY MEDICINE

## 2021-10-20 PROCEDURE — 90662 IIV NO PRSV INCREASED AG IM: CPT | Performed by: FAMILY MEDICINE

## 2021-10-20 PROCEDURE — G0008 ADMIN INFLUENZA VIRUS VAC: HCPCS | Performed by: FAMILY MEDICINE

## 2021-10-20 PROCEDURE — 90715 TDAP VACCINE 7 YRS/> IM: CPT | Performed by: FAMILY MEDICINE

## 2021-10-20 PROCEDURE — 90472 IMMUNIZATION ADMIN EACH ADD: CPT | Performed by: FAMILY MEDICINE

## 2021-10-20 RX ORDER — MOXIFLOXACIN 5 MG/ML
SOLUTION/ DROPS OPHTHALMIC
Status: ON HOLD | COMMUNITY
Start: 2021-08-25 | End: 2021-11-03

## 2021-10-20 ASSESSMENT — FIBROSIS 4 INDEX: FIB4 SCORE: 3.11

## 2021-10-20 NOTE — PROGRESS NOTES
CC:Diagnoses of Chronic respiratory failure, unspecified whether with hypoxia or hypercapnia ([facility]), Pleural effusion on left, Gait instability, Syncope, unspecified syncope type, Physical deconditioning, BRIAN (acute kidney injury) ([facility]), and Need for vaccination were pertinent to this visit.      HISTORY OF PRESENT ILLNESS: Patient is a 88 y.o. female established patient who presents today to follow-up on chronic ongoing medical conditions.  Patient states that she does not feel she needs oxygen overnight any longer.  Previously placed on oxygen due to sick sinus syndrome and syncopal events.  His events have since resolved since pacemaker placement.  Patient also suffering from physical deconditioning and frailty that leaves to her unsteady and with gait imbalance.  Referral to home health physical therapy due to revocation of her driving license.  Referral to Occupational Therapy for driving evaluation today.        Patient Active Problem List    Diagnosis Date Noted   • PVD (peripheral vascular disease) ([facility]) 07/30/2021   • Gait instability 07/16/2021   • Pleural effusion 06/28/2021   • Cataract cortical, senile, bilateral 06/25/2021   • Presence of cardiac pacemaker 06/25/2021   • Upper GI bleed 06/20/2021   • Leukocytosis 06/20/2021   • Second degree atrioventricular block, Mobitz (type) I 05/09/2021   • Fall 05/08/2021   • Closed fracture of multiple ribs of left side with delayed healing 05/08/2021   • Advance care planning 05/08/2021   • Traumatic rhabdomyolysis ([facility]) 05/08/2021   • BRIAN (acute kidney injury) ([facility]) 04/06/2021   • Anemia 04/04/2021   • Near syncope likely vasovagal versus secondary to symptomatic SVT versus HOCM-like cardiomyopathy 04/02/2021   • Recurrent major depressive disorder, in full remission ([facility]) 06/26/2019   • Imbalance 03/19/2019   • SVT (supraventricular tachycardia) ([facility]) 11/06/2018   • Localized edema 11/01/2018   • Depression 09/27/2018   • Low serum vitamin D 06/19/2018   •  Essential hypertension, benign 11/10/2017   • Degenerative disc disease, lumbar 07/17/2017   • Non-rheumatic mitral regurgitation 05/07/2015   • Stage 3b chronic kidney disease (HCC) 04/07/2015   • S/P total knee replacement right 09/03/2013      Allergies:Other environmental    Current Outpatient Medications   Medication Sig Dispense Refill   • moxifloxacin (VIGAMOX) 0.5 % Solution      • escitalopram (LEXAPRO) 5 MG tablet TAKE ONE TABLET BY MOUTH ONCE DAILY 90 Tablet 1   • VITAMIN D, CHOLECALCIFEROL, PO Take 5,000 Units by mouth every day at 6 PM. Indications: suppliment     • Home Care Oxygen Inhale 1 L/min continuous. Oxygen dose range: 1 L/min only wears at night  Respiratory route via: Nasal Cannula   Oxygen supplier: Preferred      Indications: Dyspnea     • furosemide (LASIX) 20 MG Tab Take 20 mg by mouth 1 time a day as needed (take for increased swelling).     • Multiple Vitamins-Minerals (DAILY MULTI PO) Take  by mouth.     • potassium chloride (KLOR-CON) 20 MEQ Pack Take 20 mEq by mouth 1 time a day as needed (take with PRN lasix).     • atorvastatin (LIPITOR) 40 MG Tab Take 1 tablet by mouth every evening. 100 tablet 3   • metoprolol SR (TOPROL XL) 25 MG TABLET SR 24 HR Take 1 tablet by mouth every day. 90 tablet 3     No current facility-administered medications for this visit.       Social History     Tobacco Use   • Smoking status: Never Smoker   • Smokeless tobacco: Never Used   • Tobacco comment: Pt exposed to second hand smoker   Vaping Use   • Vaping Use: Never used   Substance Use Topics   • Alcohol use: Yes     Comment: Occasionally   • Drug use: No     Social History     Social History Narrative   • Not on file       Family History   Problem Relation Age of Onset   • Lung Disease Mother         frequent pneumonia   • Heart Disease Father 54        MI   • Lung Disease Father         Emphysema   • Heart Attack Father    • Cancer Maternal Aunt         Great Aunt and Cousin Ovarian CA   •  "Hypertension Maternal Aunt    • Cancer Daughter         Lung and Brain CA   • Seizures Daughter    • Cancer Daughter         Pituitary tumor   • Cancer Brother         leukemia         Exam:    /80 (BP Location: Right arm, Patient Position: Sitting, BP Cuff Size: Adult)   Pulse 67   Temp 36.7 °C (98.1 °F) (Temporal)   Ht 1.6 m (5' 3\")   Wt 54.9 kg (121 lb)   SpO2 94%  Body mass index is 21.43 kg/m².    General:  Well nourished, well developed female in NAD  HENT: Atraumatic, normocephalic  EYES: Extraocular movements intact, pupils equal and reactive to light  NECK: Supple, FROM  CHEST: No deformities, Equal chest expansion, regular rate and rhythm with 3/6 systolic murmur throughout.  RESP: Clear to auscultation bilaterally, equal bilateral chest expansion.  No wheezes, rales, rhonchi  ABD: Non-Distended  Extremities: No Clubbing, Cyanosis, or Edema  Skin: Warm/dry, without rashes  Neuro: A/O x 4, CN 2-12 Grossly intact, Motor/sensory grossly intact  Psych: Normal behavior, normal affect      LABS: 1/29/2021: Results reviewed and discussed with the patient, questions answered.    April 2021 echocardiogram reviewed and follow-up in 1 year unless symptoms worsen.    Assessment/Plan:  1. Chronic respiratory failure, unspecified whether with hypoxia or hypercapnia (HCC)  2. Pleural effusion on left  Patient has been on 1 L overnight of oxygen due to history of respiratory failure secondary to pleural effusion and near syncope with falls.  Patient has since had pacemaker placed which corrected her SVT and sick sinus syndrome.  Currently asymptomatic.  Retesting to confirm need for oxygen.  - OVERNIGHT PULSE OXIMETRY    3. Gait instability  4. Syncope, unspecified syncope type  5. Physical deconditioning  Chronic ongoing medical condition likely secondary to physical deconditioning and frailty with age.  Referral to home health physical therapy.  Referral to Occupational Therapy for driving evaluation.  - " REFERRAL TO HOME HEALTH  - REFERRAL TO OCCUPATIONAL THERAPY    6. BRIAN (acute kidney injury) (HCC)  History of acute kidney injury.  Follow-up with labs as below.  - Basic Metabolic Panel; Future  - CBC WITH DIFFERENTIAL; Future    7. Need for vaccination  - INFLUENZA VACCINE, HIGH DOSE (65+ ONLY)  - Tdap Vaccine =>8YO IM    Follow-up pending lab results.    Please note that this dictation was created using voice recognition software. I have worked with consultants from the vendor as well as technical experts from [facility] [employer] to optimize the interface. I have made every reasonable attempt to correct obvious errors, but I expect that there are errors of grammar and possibly content that I did not discover before finalizing the note.

## 2021-10-28 ENCOUNTER — OFFICE VISIT (OUTPATIENT)
Dept: CARDIOLOGY | Facility: MEDICAL CENTER | Age: 86
End: 2021-10-28
Payer: MEDICARE

## 2021-10-28 VITALS
HEART RATE: 72 BPM | BODY MASS INDEX: 20.73 KG/M2 | DIASTOLIC BLOOD PRESSURE: 60 MMHG | OXYGEN SATURATION: 96 % | HEIGHT: 63 IN | SYSTOLIC BLOOD PRESSURE: 146 MMHG | WEIGHT: 117 LBS

## 2021-10-28 DIAGNOSIS — I47.10 SVT (SUPRAVENTRICULAR TACHYCARDIA) (HCC): ICD-10-CM

## 2021-10-28 DIAGNOSIS — I10 ESSENTIAL HYPERTENSION, BENIGN: ICD-10-CM

## 2021-10-28 PROCEDURE — 99213 OFFICE O/P EST LOW 20 MIN: CPT | Performed by: NURSE PRACTITIONER

## 2021-10-28 RX ORDER — PREDNISONE 5 MG/ML
5 SOLUTION ORAL
Status: ON HOLD | COMMUNITY
End: 2021-11-03

## 2021-10-28 ASSESSMENT — ENCOUNTER SYMPTOMS
CLAUDICATION: 0
NAUSEA: 0
BLURRED VISION: 1
DIZZINESS: 0
ORTHOPNEA: 0
WHEEZING: 0
PND: 0
COUGH: 0
SHORTNESS OF BREATH: 0
VOMITING: 0
HEMOPTYSIS: 0
SPUTUM PRODUCTION: 0
WEAKNESS: 0
PALPITATIONS: 0

## 2021-10-28 ASSESSMENT — FIBROSIS 4 INDEX: FIB4 SCORE: 3.11

## 2021-10-28 NOTE — PROGRESS NOTES
Chief Complaint   Patient presents with   • Supraventricular Tachycardia (SVT)     follow up       Subjective:   Duong Carmichael is a 87 y.o. female who presents today for follow up PSVT.     She is followed by Dr. Hatsings in our clinic, history of hypertension, SVT, SSS s/p PPM 5/2021 and dizziness.     Hospitalized for presyncope 4/4/2021.  biotel monitor showed no significant arrhythmias with the longest pause being 2 seconds.  Echocardiogram showed LVOT peak velocity is 2.6 m/sec. Systolic anterior motion of the anterior mitral valve leaflet..  Creatinine was elevated.  Ultrasound demonstrated renal disease without any hydronephrosis. Noted to be dehydrated and iv fluids was given. Carotid duplex showed greater than 50% narrowing right ICA, unable to do ct due to BRIAN. incidental finding right thyroid 2.9 cm mass. biospy showed cyst.    Hospitalized 6/20/21 with dizziness and n/v. hgb was found to be 7.8. endoscopy showed gastric ulcer and esophagitis. Treated with PPI and 1 unit RBC.     Last OV with Dr. Hastings 7/30/2021.     Patient is doing well. Denies any chest pain, sob, or palpitations. Noted LE edema pretibial.      Past Medical History:   Diagnosis Date   • Arrhythmia    • Arthritis     knees   • Bronchitis     long time ago   • CATARACT     no surgery yet   • Dry eyes, bilateral 3/27/2014   • Osteopenia of the elderly 3/27/2014   • Pneumonia     long time ago     Past Surgical History:   Procedure Laterality Date   • PB UPPER GI ENDOSCOPY,DIAGNOSIS N/A 6/21/2021    Procedure: GASTROSCOPY;  Surgeon: Rafita Plaza D.O.;  Location: [facility];  Service: Gastroenterology   • KNEE ARTHROPLASTY TOTAL  9/3/2013    Performed by Ramesh Styles M.D. at [facility] ORS   • TONSILLECTOMY       Family History   Problem Relation Age of Onset   • Lung Disease Mother         frequent pneumonia   • Heart Disease Father 54        MI   • Lung Disease Father         Emphysema   • Heart Attack Father    • Cancer  Maternal Aunt         Great Aunt and Cousin Ovarian CA   • Hypertension Maternal Aunt    • Cancer Daughter         Lung and Brain CA   • Seizures Daughter    • Cancer Daughter         Pituitary tumor   • Cancer Brother         leukemia     Social History     Socioeconomic History   • Marital status:      Spouse name: Not on file   • Number of children: Not on file   • Years of education: Not on file   • Highest education level: Not on file   Occupational History   • Not on file   Tobacco Use   • Smoking status: Never Smoker   • Smokeless tobacco: Never Used   • Tobacco comment: Pt exposed to second hand smoker   Vaping Use   • Vaping Use: Never used   Substance and Sexual Activity   • Alcohol use: Yes     Comment: Occasionally   • Drug use: No   • Sexual activity: Never   Other Topics Concern   • Not on file   Social History Narrative   • Not on file     Social Determinants of Health     Financial Resource Strain:    • Difficulty of Paying Living Expenses:    Food Insecurity:    • Worried About Running Out of Food in the Last Year:    • Ran Out of Food in the Last Year:    Transportation Needs:    • Lack of Transportation (Medical):    • Lack of Transportation (Non-Medical):    Physical Activity:    • Days of Exercise per Week:    • Minutes of Exercise per Session:    Stress:    • Feeling of Stress :    Social Connections:    • Frequency of Communication with Friends and Family:    • Frequency of Social Gatherings with Friends and Family:    • Attends Confucianist Services:    • Active Member of Clubs or Organizations:    • Attends Club or Organization Meetings:    • Marital Status:    Intimate Partner Violence:    • Fear of Current or Ex-Partner:    • Emotionally Abused:    • Physically Abused:    • Sexually Abused:      Allergies   Allergen Reactions   • Other Environmental      Seasonal allergies     Outpatient Encounter Medications as of 10/28/2021   Medication Sig Dispense Refill   • predniSONE (LIQUI PRED)  "5 MG/5ML Solution Take 5 mg by mouth. For eyes     • moxifloxacin (VIGAMOX) 0.5 % Solution      • escitalopram (LEXAPRO) 5 MG tablet TAKE ONE TABLET BY MOUTH ONCE DAILY 90 Tablet 1   • VITAMIN D, CHOLECALCIFEROL, PO Take 5,000 Units by mouth every day at 6 PM. Indications: suppliment     • furosemide (LASIX) 20 MG Tab Take 20 mg by mouth 1 time a day as needed (take for increased swelling).     • Multiple Vitamins-Minerals (DAILY MULTI PO) Take  by mouth.     • potassium chloride (KLOR-CON) 20 MEQ Pack Take 20 mEq by mouth 1 time a day as needed (take with PRN lasix).     • atorvastatin (LIPITOR) 40 MG Tab Take 1 tablet by mouth every evening. 100 tablet 3   • metoprolol SR (TOPROL XL) 25 MG TABLET SR 24 HR Take 1 tablet by mouth every day. 90 tablet 3   • Home Care Oxygen Inhale 1 L/min continuous. Oxygen dose range: 1 L/min only wears at night  Respiratory route via: Nasal Cannula   Oxygen supplier: Preferred      Indications: Dyspnea       No facility-administered encounter medications on file as of 10/28/2021.     Review of Systems   Constitutional: Negative for malaise/fatigue.   Eyes: Positive for blurred vision.   Respiratory: Negative for cough, hemoptysis, sputum production, shortness of breath and wheezing.    Cardiovascular: Positive for leg swelling. Negative for chest pain, palpitations, orthopnea, claudication and PND.   Gastrointestinal: Negative for nausea and vomiting.   Neurological: Negative for dizziness and weakness.        Objective:   /60 (BP Location: Left arm, Patient Position: Sitting)   Pulse 72   Ht 1.6 m (5' 3\")   Wt 53.1 kg (117 lb)   SpO2 96%   BMI 20.73 kg/m²     Physical Exam  Constitutional:       General: She is not in acute distress.     Appearance: She is well-developed. She is not diaphoretic.   HENT:      Head: Normocephalic and atraumatic.   Eyes:      Pupils: Pupils are equal, round, and reactive to light.   Neck:      Vascular: No JVD.   Cardiovascular:      Rate " and Rhythm: Normal rate and regular rhythm.      Heart sounds: Normal heart sounds. No murmur heard.     Pulmonary:      Effort: Pulmonary effort is normal.      Breath sounds: Examination of the left-upper field reveals decreased breath sounds. Examination of the left-middle field reveals decreased breath sounds. Examination of the left-lower field reveals decreased breath sounds. Decreased breath sounds present.   Abdominal:      General: Bowel sounds are normal. There is no distension.      Palpations: Abdomen is soft.   Musculoskeletal:      Right lower leg: Edema present.      Left lower leg: Edema present.   Skin:     General: Skin is warm and dry.   Neurological:      Mental Status: She is alert and oriented to person, place, and time.   Psychiatric:         Behavior: Behavior normal.         Thought Content: Thought content normal.         Judgment: Judgment normal.           Echocardiography Laboratory   4/3/2021  Compared to the images of the prior study done 11/2/2018, intracavitary   gradient is present.  Hyperdynamic left ventricular systolic function.  Left ventricular ejection fraction is visually estimated to be greater than 75%.  Evidence of increased intracavity gradient, peak velocity is 2.6 m/sec.  Systolic anterior motion of the anterior mitral valve leaflet.    Carotid Duplex  4/3/2021   Greater than 50% narrowing right ICA   Right thyroid 2.9 cm mass for which dedicated thyroid US is advised to    characterize further.  Biopsy may be warranted.    US renal   4/4/2021  1.  No hydronephrosis.  2.  Bilateral renal cysts, which do not require imaging follow-up.  3.  Findings a medical renal disease.      CT abdomen and pelvis   6/20/2021  1.  Left pleural effusion is identified which occupies the left lower hemithorax.     2.  There is cholelithiasis.     3.  There is calcific atherosclerosis.    Lab Results   Component Value Date/Time    CHOLSTRLTOT 132 05/09/2021 03:36 AM    LDL 67 05/09/2021  03:36 AM    HDL 49 05/09/2021 03:36 AM    TRIGLYCERIDE 80 05/09/2021 03:36 AM       Lab Results   Component Value Date/Time    SODIUM 139 07/29/2021 11:50 AM    POTASSIUM 4.9 07/29/2021 11:50 AM    CHLORIDE 105 07/29/2021 11:50 AM    CO2 21 07/29/2021 11:50 AM    GLUCOSE 99 07/29/2021 11:50 AM    BUN 27 (H) 07/29/2021 11:50 AM    CREATININE 1.57 (H) 07/29/2021 11:50 AM     Lab Results   Component Value Date/Time    ALKPHOSPHAT 249 (H) 07/29/2021 11:50 AM    ASTSGOT 47 (H) 07/29/2021 11:50 AM    ALTSGPT 32 07/29/2021 11:50 AM    TBILIRUBIN 0.4 07/29/2021 11:50 AM        Assessment:     1. SVT (supraventricular tachycardia) (HCC)     2. Essential hypertension, benign         Medical Decision Making:  Today's Assessment / Status / Plan:     1. PSVT:  - history of SVT , biotel monitor showed 140s bpm. Having brief episodes of palpitations   - continue metoprolol XL 25mg qd.     2. Hypertension:  - stable        3. LVOT:  - recommend patient stay hydrated   - Evidence of increased intracavity gradient, peak velocity is 2.6 m/sec.  - continue bb therapy     Follow up in 3 months, sooner as needed.

## 2021-11-01 ENCOUNTER — HOSPITAL ENCOUNTER (OUTPATIENT)
Dept: LAB | Facility: MEDICAL CENTER | Age: 86
End: 2021-11-01
Attending: NURSE PRACTITIONER
Payer: MEDICARE

## 2021-11-01 ENCOUNTER — HOSPITAL ENCOUNTER (OUTPATIENT)
Dept: LAB | Facility: MEDICAL CENTER | Age: 86
End: 2021-11-01
Attending: FAMILY MEDICINE
Payer: MEDICARE

## 2021-11-01 LAB
ALBUMIN SERPL BCP-MCNC: 4 G/DL (ref 3.2–4.9)
ALBUMIN/GLOB SERPL: 1.4 G/DL
ALP SERPL-CCNC: 94 U/L (ref 30–99)
ALT SERPL-CCNC: 13 U/L (ref 2–50)
ANION GAP SERPL CALC-SCNC: 10 MMOL/L (ref 7–16)
AST SERPL-CCNC: 24 U/L (ref 12–45)
BASOPHILS # BLD AUTO: 1 % (ref 0–1.8)
BASOPHILS # BLD: 0.08 K/UL (ref 0–0.12)
BILIRUB SERPL-MCNC: 0.6 MG/DL (ref 0.1–1.5)
BUN SERPL-MCNC: 18 MG/DL (ref 8–22)
CALCIUM SERPL-MCNC: 9.7 MG/DL (ref 8.5–10.5)
CHLORIDE SERPL-SCNC: 109 MMOL/L (ref 96–112)
CO2 SERPL-SCNC: 25 MMOL/L (ref 20–33)
CREAT SERPL-MCNC: 1.14 MG/DL (ref 0.5–1.4)
EOSINOPHIL # BLD AUTO: 0.19 K/UL (ref 0–0.51)
EOSINOPHIL NFR BLD: 2.5 % (ref 0–6.9)
ERYTHROCYTE [DISTWIDTH] IN BLOOD BY AUTOMATED COUNT: 51.8 FL (ref 35.9–50)
FASTING STATUS PATIENT QL REPORTED: NORMAL
FERRITIN SERPL-MCNC: 46.6 NG/ML (ref 10–291)
GLOBULIN SER CALC-MCNC: 2.8 G/DL (ref 1.9–3.5)
GLUCOSE SERPL-MCNC: 89 MG/DL (ref 65–99)
HCT VFR BLD AUTO: 39.6 % (ref 37–47)
HGB BLD-MCNC: 12.3 G/DL (ref 12–16)
IMM GRANULOCYTES # BLD AUTO: 0.03 K/UL (ref 0–0.11)
IMM GRANULOCYTES NFR BLD AUTO: 0.4 % (ref 0–0.9)
IRON SATN MFR SERPL: 31 % (ref 15–55)
IRON SERPL-MCNC: 73 UG/DL (ref 40–170)
LYMPHOCYTES # BLD AUTO: 2.43 K/UL (ref 1–4.8)
LYMPHOCYTES NFR BLD: 31.5 % (ref 22–41)
MCH RBC QN AUTO: 27.2 PG (ref 27–33)
MCHC RBC AUTO-ENTMCNC: 31.1 G/DL (ref 33.6–35)
MCV RBC AUTO: 87.6 FL (ref 81.4–97.8)
MONOCYTES # BLD AUTO: 0.77 K/UL (ref 0–0.85)
MONOCYTES NFR BLD AUTO: 10 % (ref 0–13.4)
NEUTROPHILS # BLD AUTO: 4.21 K/UL (ref 2–7.15)
NEUTROPHILS NFR BLD: 54.6 % (ref 44–72)
NRBC # BLD AUTO: 0 K/UL
NRBC BLD-RTO: 0 /100 WBC
PLATELET # BLD AUTO: 206 K/UL (ref 164–446)
PMV BLD AUTO: 11.4 FL (ref 9–12.9)
POTASSIUM SERPL-SCNC: 4.6 MMOL/L (ref 3.6–5.5)
PROT SERPL-MCNC: 6.8 G/DL (ref 6–8.2)
RBC # BLD AUTO: 4.52 M/UL (ref 4.2–5.4)
SODIUM SERPL-SCNC: 144 MMOL/L (ref 135–145)
TIBC SERPL-MCNC: 233 UG/DL (ref 250–450)
UIBC SERPL-MCNC: 160 UG/DL (ref 110–370)
WBC # BLD AUTO: 7.7 K/UL (ref 4.8–10.8)

## 2021-11-01 PROCEDURE — 80053 COMPREHEN METABOLIC PANEL: CPT

## 2021-11-01 PROCEDURE — 82103 ALPHA-1-ANTITRYPSIN TOTAL: CPT

## 2021-11-01 PROCEDURE — 36415 COLL VENOUS BLD VENIPUNCTURE: CPT

## 2021-11-01 PROCEDURE — 83516 IMMUNOASSAY NONANTIBODY: CPT | Mod: 91

## 2021-11-01 PROCEDURE — 82104 ALPHA-1-ANTITRYPSIN PHENO: CPT

## 2021-11-01 PROCEDURE — 82728 ASSAY OF FERRITIN: CPT

## 2021-11-01 PROCEDURE — 85025 COMPLETE CBC W/AUTO DIFF WBC: CPT

## 2021-11-01 PROCEDURE — 83540 ASSAY OF IRON: CPT

## 2021-11-01 PROCEDURE — 83550 IRON BINDING TEST: CPT

## 2021-11-02 ENCOUNTER — HOSPITAL ENCOUNTER (INPATIENT)
Facility: MEDICAL CENTER | Age: 86
LOS: 5 days | DRG: 466 | End: 2021-11-08
Attending: EMERGENCY MEDICINE | Admitting: HOSPITALIST
Payer: MEDICARE

## 2021-11-02 ENCOUNTER — APPOINTMENT (OUTPATIENT)
Dept: RADIOLOGY | Facility: MEDICAL CENTER | Age: 86
DRG: 466 | End: 2021-11-02
Attending: EMERGENCY MEDICINE
Payer: MEDICARE

## 2021-11-02 DIAGNOSIS — S72.491A OTHER CLOSED FRACTURE OF DISTAL END OF RIGHT FEMUR, INITIAL ENCOUNTER (HCC): ICD-10-CM

## 2021-11-02 DIAGNOSIS — J96.01 ACUTE RESPIRATORY FAILURE WITH HYPOXIA (HCC): ICD-10-CM

## 2021-11-02 PROCEDURE — 99285 EMERGENCY DEPT VISIT HI MDM: CPT

## 2021-11-02 PROCEDURE — 96374 THER/PROPH/DIAG INJ IV PUSH: CPT

## 2021-11-02 ASSESSMENT — FIBROSIS 4 INDEX: FIB4 SCORE: 2.84

## 2021-11-03 ENCOUNTER — APPOINTMENT (OUTPATIENT)
Dept: RADIOLOGY | Facility: MEDICAL CENTER | Age: 86
DRG: 466 | End: 2021-11-03
Attending: PHYSICIAN ASSISTANT
Payer: MEDICARE

## 2021-11-03 ENCOUNTER — APPOINTMENT (OUTPATIENT)
Dept: RADIOLOGY | Facility: MEDICAL CENTER | Age: 86
DRG: 466 | End: 2021-11-03
Attending: EMERGENCY MEDICINE
Payer: MEDICARE

## 2021-11-03 ENCOUNTER — ANESTHESIA (OUTPATIENT)
Dept: SURGERY | Facility: MEDICAL CENTER | Age: 86
DRG: 466 | End: 2021-11-03
Payer: MEDICARE

## 2021-11-03 ENCOUNTER — ANESTHESIA EVENT (OUTPATIENT)
Dept: SURGERY | Facility: MEDICAL CENTER | Age: 86
DRG: 466 | End: 2021-11-03
Payer: MEDICARE

## 2021-11-03 PROBLEM — Z01.810 PREOPERATIVE CARDIOVASCULAR EXAMINATION: Status: ACTIVE | Noted: 2021-05-08

## 2021-11-03 PROBLEM — S72.401A CLOSED FRACTURE OF RIGHT DISTAL FEMUR (HCC): Status: ACTIVE | Noted: 2021-11-03

## 2021-11-03 LAB
ABO GROUP BLD: ABNORMAL
ALBUMIN SERPL BCP-MCNC: 3.5 G/DL (ref 3.2–4.9)
ALBUMIN/GLOB SERPL: 1.5 G/DL
ALP SERPL-CCNC: 83 U/L (ref 30–99)
ALT SERPL-CCNC: 17 U/L (ref 2–50)
ANION GAP SERPL CALC-SCNC: 9 MMOL/L (ref 7–16)
AST SERPL-CCNC: 26 U/L (ref 12–45)
BARCODED ABORH UBTYP: 600
BARCODED PRD CODE UBPRD: ABNORMAL
BARCODED UNIT NUM UBUNT: ABNORMAL
BASOPHILS # BLD AUTO: 0.5 % (ref 0–1.8)
BASOPHILS # BLD: 0.06 K/UL (ref 0–0.12)
BILIRUB SERPL-MCNC: 0.6 MG/DL (ref 0.1–1.5)
BLD GP AB INVEST PLASRBC-IMP: ABNORMAL
BLD GP AB SCN SERPL QL: ABNORMAL
BUN SERPL-MCNC: 22 MG/DL (ref 8–22)
CALCIUM SERPL-MCNC: 8.9 MG/DL (ref 8.4–10.2)
CHLORIDE SERPL-SCNC: 106 MMOL/L (ref 96–112)
CO2 SERPL-SCNC: 23 MMOL/L (ref 20–33)
COMPONENT R 8504R: ABNORMAL
CREAT SERPL-MCNC: 1.02 MG/DL (ref 0.5–1.4)
EKG IMPRESSION: NORMAL
EOSINOPHIL # BLD AUTO: 0 K/UL (ref 0–0.51)
EOSINOPHIL NFR BLD: 0 % (ref 0–6.9)
ERYTHROCYTE [DISTWIDTH] IN BLOOD BY AUTOMATED COUNT: 50.1 FL (ref 35.9–50)
FERRITIN SERPL-MCNC: 62.9 NG/ML (ref 10–291)
GLOBULIN SER CALC-MCNC: 2.4 G/DL (ref 1.9–3.5)
GLUCOSE SERPL-MCNC: 162 MG/DL (ref 65–99)
HCT VFR BLD AUTO: 27 % (ref 37–47)
HCT VFR BLD AUTO: 28.7 % (ref 37–47)
HCT VFR BLD AUTO: 30.4 % (ref 37–47)
HGB BLD-MCNC: 8.6 G/DL (ref 12–16)
HGB BLD-MCNC: 8.9 G/DL (ref 12–16)
HGB BLD-MCNC: 9.6 G/DL (ref 12–16)
IMM GRANULOCYTES # BLD AUTO: 0.07 K/UL (ref 0–0.11)
IMM GRANULOCYTES NFR BLD AUTO: 0.6 % (ref 0–0.9)
INR PPP: 1.13 (ref 0.87–1.13)
IRON SATN MFR SERPL: 15 % (ref 15–55)
IRON SERPL-MCNC: 30 UG/DL (ref 40–170)
LYMPHOCYTES # BLD AUTO: 1.36 K/UL (ref 1–4.8)
LYMPHOCYTES NFR BLD: 11 % (ref 22–41)
MCH RBC QN AUTO: 27.4 PG (ref 27–33)
MCHC RBC AUTO-ENTMCNC: 31.6 G/DL (ref 33.6–35)
MCV RBC AUTO: 86.9 FL (ref 81.4–97.8)
MONOCYTES # BLD AUTO: 1.29 K/UL (ref 0–0.85)
MONOCYTES NFR BLD AUTO: 10.4 % (ref 0–13.4)
NEUTROPHILS # BLD AUTO: 9.64 K/UL (ref 2–7.15)
NEUTROPHILS NFR BLD: 77.5 % (ref 44–72)
NRBC # BLD AUTO: 0 K/UL
NRBC BLD-RTO: 0 /100 WBC
PLATELET # BLD AUTO: 152 K/UL (ref 164–446)
PMV BLD AUTO: 10.2 FL (ref 9–12.9)
POTASSIUM SERPL-SCNC: 4.2 MMOL/L (ref 3.6–5.5)
PRODUCT TYPE UPROD: ABNORMAL
PROT SERPL-MCNC: 5.9 G/DL (ref 6–8.2)
PROTHROMBIN TIME: 13.6 SEC (ref 12–14.6)
RBC # BLD AUTO: 3.5 M/UL (ref 4.2–5.4)
RH BLD: ABNORMAL
SODIUM SERPL-SCNC: 138 MMOL/L (ref 135–145)
TIBC SERPL-MCNC: 198 UG/DL (ref 250–450)
UIBC SERPL-MCNC: 168 UG/DL (ref 110–370)
UNIT STATUS USTAT: ABNORMAL
WBC # BLD AUTO: 12.4 K/UL (ref 4.8–10.8)

## 2021-11-03 PROCEDURE — 700105 HCHG RX REV CODE 258: Performed by: ANESTHESIOLOGY

## 2021-11-03 PROCEDURE — 502240 HCHG MISC OR SUPPLY RC 0272: Performed by: ORTHOPAEDIC SURGERY

## 2021-11-03 PROCEDURE — 73562 X-RAY EXAM OF KNEE 3: CPT | Mod: RT

## 2021-11-03 PROCEDURE — 99222 1ST HOSP IP/OBS MODERATE 55: CPT | Mod: 57 | Performed by: ORTHOPAEDIC SURGERY

## 2021-11-03 PROCEDURE — 700117 HCHG RX CONTRAST REV CODE 255: Performed by: EMERGENCY MEDICINE

## 2021-11-03 PROCEDURE — 700111 HCHG RX REV CODE 636 W/ 250 OVERRIDE (IP): Performed by: PHYSICIAN ASSISTANT

## 2021-11-03 PROCEDURE — 3E0T3BZ INTRODUCTION OF ANESTHETIC AGENT INTO PERIPHERAL NERVES AND PLEXI, PERCUTANEOUS APPROACH: ICD-10-PCS | Performed by: ORTHOPAEDIC SURGERY

## 2021-11-03 PROCEDURE — 83540 ASSAY OF IRON: CPT

## 2021-11-03 PROCEDURE — L8699 PROSTHETIC IMPLANT NOS: HCPCS | Performed by: ORTHOPAEDIC SURGERY

## 2021-11-03 PROCEDURE — 64447 NJX AA&/STRD FEMORAL NRV IMG: CPT | Performed by: ORTHOPAEDIC SURGERY

## 2021-11-03 PROCEDURE — 86901 BLOOD TYPING SEROLOGIC RH(D): CPT

## 2021-11-03 PROCEDURE — 502000 HCHG MISC OR IMPLANTS RC 0278: Performed by: ORTHOPAEDIC SURGERY

## 2021-11-03 PROCEDURE — 85025 COMPLETE CBC W/AUTO DIFF WBC: CPT

## 2021-11-03 PROCEDURE — 82728 ASSAY OF FERRITIN: CPT

## 2021-11-03 PROCEDURE — 94760 N-INVAS EAR/PLS OXIMETRY 1: CPT

## 2021-11-03 PROCEDURE — 700111 HCHG RX REV CODE 636 W/ 250 OVERRIDE (IP): Performed by: ORTHOPAEDIC SURGERY

## 2021-11-03 PROCEDURE — 85014 HEMATOCRIT: CPT | Mod: 91

## 2021-11-03 PROCEDURE — 86900 BLOOD TYPING SEROLOGIC ABO: CPT

## 2021-11-03 PROCEDURE — 85610 PROTHROMBIN TIME: CPT

## 2021-11-03 PROCEDURE — 83550 IRON BINDING TEST: CPT

## 2021-11-03 PROCEDURE — C1776 JOINT DEVICE (IMPLANTABLE): HCPCS | Performed by: ORTHOPAEDIC SURGERY

## 2021-11-03 PROCEDURE — 700105 HCHG RX REV CODE 258: Performed by: ORTHOPAEDIC SURGERY

## 2021-11-03 PROCEDURE — 501838 HCHG SUTURE GENERAL: Performed by: ORTHOPAEDIC SURGERY

## 2021-11-03 PROCEDURE — 85018 HEMOGLOBIN: CPT | Mod: 91

## 2021-11-03 PROCEDURE — 36415 COLL VENOUS BLD VENIPUNCTURE: CPT

## 2021-11-03 PROCEDURE — 86922 COMPATIBILITY TEST ANTIGLOB: CPT

## 2021-11-03 PROCEDURE — 700111 HCHG RX REV CODE 636 W/ 250 OVERRIDE (IP): Performed by: HOSPITALIST

## 2021-11-03 PROCEDURE — 160002 HCHG RECOVERY MINUTES (STAT): Performed by: ORTHOPAEDIC SURGERY

## 2021-11-03 PROCEDURE — 73706 CT ANGIO LWR EXTR W/O&W/DYE: CPT | Mod: LT,ME

## 2021-11-03 PROCEDURE — 73560 X-RAY EXAM OF KNEE 1 OR 2: CPT | Mod: RT

## 2021-11-03 PROCEDURE — 80053 COMPREHEN METABOLIC PANEL: CPT

## 2021-11-03 PROCEDURE — 160009 HCHG ANES TIME/MIN: Performed by: ORTHOPAEDIC SURGERY

## 2021-11-03 PROCEDURE — 160031 HCHG SURGERY MINUTES - 1ST 30 MINS LEVEL 5: Performed by: ORTHOPAEDIC SURGERY

## 2021-11-03 PROCEDURE — 86870 RBC ANTIBODY IDENTIFICATION: CPT

## 2021-11-03 PROCEDURE — 700105 HCHG RX REV CODE 258: Performed by: INTERNAL MEDICINE

## 2021-11-03 PROCEDURE — 502579 HCHG PACK, TOTAL KNEE: Performed by: ORTHOPAEDIC SURGERY

## 2021-11-03 PROCEDURE — 502578 HCHG PACK, TOTAL HIP: Performed by: ORTHOPAEDIC SURGERY

## 2021-11-03 PROCEDURE — 73600 X-RAY EXAM OF ANKLE: CPT | Mod: RT

## 2021-11-03 PROCEDURE — 700111 HCHG RX REV CODE 636 W/ 250 OVERRIDE (IP): Performed by: ANESTHESIOLOGY

## 2021-11-03 PROCEDURE — 770006 HCHG ROOM/CARE - MED/SURG/GYN SEMI*

## 2021-11-03 PROCEDURE — 27487 REVISE/REPLACE KNEE JOINT: CPT | Mod: RT | Performed by: ORTHOPAEDIC SURGERY

## 2021-11-03 PROCEDURE — 27487 REVISE/REPLACE KNEE JOINT: CPT | Mod: ASROC,RT | Performed by: PHYSICIAN ASSISTANT

## 2021-11-03 PROCEDURE — 0SRC0J9 REPLACEMENT OF RIGHT KNEE JOINT WITH SYNTHETIC SUBSTITUTE, CEMENTED, OPEN APPROACH: ICD-10-PCS | Performed by: ORTHOPAEDIC SURGERY

## 2021-11-03 PROCEDURE — 700101 HCHG RX REV CODE 250: Performed by: ORTHOPAEDIC SURGERY

## 2021-11-03 PROCEDURE — 86850 RBC ANTIBODY SCREEN: CPT

## 2021-11-03 PROCEDURE — 700101 HCHG RX REV CODE 250: Performed by: ANESTHESIOLOGY

## 2021-11-03 PROCEDURE — 160048 HCHG OR STATISTICAL LEVEL 1-5: Performed by: ORTHOPAEDIC SURGERY

## 2021-11-03 PROCEDURE — 99223 1ST HOSP IP/OBS HIGH 75: CPT | Mod: AI | Performed by: HOSPITALIST

## 2021-11-03 PROCEDURE — 700111 HCHG RX REV CODE 636 W/ 250 OVERRIDE (IP): Performed by: EMERGENCY MEDICINE

## 2021-11-03 PROCEDURE — 160035 HCHG PACU - 1ST 60 MINS PHASE I: Performed by: ORTHOPAEDIC SURGERY

## 2021-11-03 PROCEDURE — 93005 ELECTROCARDIOGRAM TRACING: CPT | Performed by: EMERGENCY MEDICINE

## 2021-11-03 PROCEDURE — 0SPC0JZ REMOVAL OF SYNTHETIC SUBSTITUTE FROM RIGHT KNEE JOINT, OPEN APPROACH: ICD-10-PCS | Performed by: ORTHOPAEDIC SURGERY

## 2021-11-03 PROCEDURE — 160042 HCHG SURGERY MINUTES - EA ADDL 1 MIN LEVEL 5: Performed by: ORTHOPAEDIC SURGERY

## 2021-11-03 DEVICE — BONE CEMENT SIMPLEX ANTIBIO - (10/PK): Type: IMPLANTABLE DEVICE | Site: KNEE | Status: FUNCTIONAL

## 2021-11-03 DEVICE — IMPLANTABLE DEVICE: Type: IMPLANTABLE DEVICE | Site: KNEE | Status: FUNCTIONAL

## 2021-11-03 RX ORDER — HYDROMORPHONE HYDROCHLORIDE 1 MG/ML
0.2 INJECTION, SOLUTION INTRAMUSCULAR; INTRAVENOUS; SUBCUTANEOUS
Status: DISCONTINUED | OUTPATIENT
Start: 2021-11-03 | End: 2021-11-03 | Stop reason: HOSPADM

## 2021-11-03 RX ORDER — POLYETHYLENE GLYCOL 3350 17 G/17G
1 POWDER, FOR SOLUTION ORAL
Status: DISCONTINUED | OUTPATIENT
Start: 2021-11-03 | End: 2021-11-08 | Stop reason: HOSPADM

## 2021-11-03 RX ORDER — PHENYLEPHRINE HCL IN 0.9% NACL 0.5 MG/5ML
SYRINGE (ML) INTRAVENOUS PRN
Status: DISCONTINUED | OUTPATIENT
Start: 2021-11-03 | End: 2021-11-03 | Stop reason: SURG

## 2021-11-03 RX ORDER — CHOLECALCIFEROL (VITAMIN D3) 125 MCG
5000 CAPSULE ORAL
COMMUNITY
End: 2022-01-04 | Stop reason: SDUPTHER

## 2021-11-03 RX ORDER — CEFAZOLIN SODIUM IN 0.9 % NACL 2 G/100 ML
2 PLASTIC BAG, INJECTION (ML) INTRAVENOUS EVERY 8 HOURS
Status: COMPLETED | OUTPATIENT
Start: 2021-11-03 | End: 2021-11-04

## 2021-11-03 RX ORDER — VANCOMYCIN HYDROCHLORIDE 1 G/20ML
INJECTION, POWDER, LYOPHILIZED, FOR SOLUTION INTRAVENOUS
Status: COMPLETED | OUTPATIENT
Start: 2021-11-03 | End: 2021-11-03

## 2021-11-03 RX ORDER — AMOXICILLIN 250 MG
2 CAPSULE ORAL 2 TIMES DAILY
Status: DISCONTINUED | OUTPATIENT
Start: 2021-11-03 | End: 2021-11-08 | Stop reason: HOSPADM

## 2021-11-03 RX ORDER — ESCITALOPRAM OXALATE 10 MG/1
5 TABLET ORAL DAILY
Status: DISCONTINUED | OUTPATIENT
Start: 2021-11-03 | End: 2021-11-08 | Stop reason: HOSPADM

## 2021-11-03 RX ORDER — HALOPERIDOL 5 MG/ML
1 INJECTION INTRAMUSCULAR
Status: DISCONTINUED | OUTPATIENT
Start: 2021-11-03 | End: 2021-11-03 | Stop reason: HOSPADM

## 2021-11-03 RX ORDER — HYDRALAZINE HYDROCHLORIDE 20 MG/ML
5 INJECTION INTRAMUSCULAR; INTRAVENOUS
Status: DISCONTINUED | OUTPATIENT
Start: 2021-11-03 | End: 2021-11-03 | Stop reason: HOSPADM

## 2021-11-03 RX ORDER — ATORVASTATIN CALCIUM 40 MG/1
40 TABLET, FILM COATED ORAL EVERY EVENING
Status: DISCONTINUED | OUTPATIENT
Start: 2021-11-03 | End: 2021-11-08 | Stop reason: HOSPADM

## 2021-11-03 RX ORDER — HYDROMORPHONE HYDROCHLORIDE 1 MG/ML
0.1 INJECTION, SOLUTION INTRAMUSCULAR; INTRAVENOUS; SUBCUTANEOUS
Status: DISCONTINUED | OUTPATIENT
Start: 2021-11-03 | End: 2021-11-03 | Stop reason: HOSPADM

## 2021-11-03 RX ORDER — LIDOCAINE HYDROCHLORIDE 20 MG/ML
INJECTION, SOLUTION EPIDURAL; INFILTRATION; INTRACAUDAL; PERINEURAL PRN
Status: DISCONTINUED | OUTPATIENT
Start: 2021-11-03 | End: 2021-11-03 | Stop reason: SURG

## 2021-11-03 RX ORDER — SODIUM CHLORIDE, SODIUM GLUCONATE, SODIUM ACETATE, POTASSIUM CHLORIDE AND MAGNESIUM CHLORIDE 526; 502; 368; 37; 30 MG/100ML; MG/100ML; MG/100ML; MG/100ML; MG/100ML
INJECTION, SOLUTION INTRAVENOUS
Status: DISCONTINUED | OUTPATIENT
Start: 2021-11-03 | End: 2021-11-03 | Stop reason: SURG

## 2021-11-03 RX ORDER — TRANEXAMIC ACID 100 MG/ML
INJECTION, SOLUTION INTRAVENOUS PRN
Status: DISCONTINUED | OUTPATIENT
Start: 2021-11-03 | End: 2021-11-03 | Stop reason: SURG

## 2021-11-03 RX ORDER — LIDOCAINE HYDROCHLORIDE 10 MG/ML
INJECTION, SOLUTION EPIDURAL; INFILTRATION; INTRACAUDAL; PERINEURAL
Status: ACTIVE
Start: 2021-11-03 | End: 2021-11-04

## 2021-11-03 RX ORDER — ONDANSETRON 2 MG/ML
4 INJECTION INTRAMUSCULAR; INTRAVENOUS EVERY 4 HOURS PRN
Status: DISCONTINUED | OUTPATIENT
Start: 2021-11-03 | End: 2021-11-08 | Stop reason: HOSPADM

## 2021-11-03 RX ORDER — ONDANSETRON 2 MG/ML
INJECTION INTRAMUSCULAR; INTRAVENOUS PRN
Status: DISCONTINUED | OUTPATIENT
Start: 2021-11-03 | End: 2021-11-03 | Stop reason: SURG

## 2021-11-03 RX ORDER — SODIUM CHLORIDE, SODIUM LACTATE, POTASSIUM CHLORIDE, CALCIUM CHLORIDE 600; 310; 30; 20 MG/100ML; MG/100ML; MG/100ML; MG/100ML
INJECTION, SOLUTION INTRAVENOUS CONTINUOUS
Status: ACTIVE | OUTPATIENT
Start: 2021-11-03 | End: 2021-11-03

## 2021-11-03 RX ORDER — BUPIVACAINE HYDROCHLORIDE AND EPINEPHRINE 2.5; 5 MG/ML; UG/ML
INJECTION, SOLUTION EPIDURAL; INFILTRATION; INTRACAUDAL; PERINEURAL
Status: DISCONTINUED | OUTPATIENT
Start: 2021-11-03 | End: 2021-11-03 | Stop reason: HOSPADM

## 2021-11-03 RX ORDER — SODIUM CHLORIDE 9 MG/ML
INJECTION, SOLUTION INTRAVENOUS ONCE
Status: COMPLETED | OUTPATIENT
Start: 2021-11-03 | End: 2021-11-03

## 2021-11-03 RX ORDER — PREDNISOLONE ACETATE 10 MG/ML
1 SUSPENSION/ DROPS OPHTHALMIC SEE ADMIN INSTRUCTIONS
COMMUNITY
End: 2022-07-29

## 2021-11-03 RX ORDER — CEFAZOLIN SODIUM 1 G/3ML
INJECTION, POWDER, FOR SOLUTION INTRAMUSCULAR; INTRAVENOUS PRN
Status: DISCONTINUED | OUTPATIENT
Start: 2021-11-03 | End: 2021-11-03 | Stop reason: SURG

## 2021-11-03 RX ORDER — METOPROLOL SUCCINATE 25 MG/1
25 TABLET, EXTENDED RELEASE ORAL DAILY
Status: DISCONTINUED | OUTPATIENT
Start: 2021-11-03 | End: 2021-11-08 | Stop reason: HOSPADM

## 2021-11-03 RX ORDER — OXYCODONE HYDROCHLORIDE 5 MG/1
5 TABLET ORAL EVERY 4 HOURS PRN
Status: DISCONTINUED | OUTPATIENT
Start: 2021-11-03 | End: 2021-11-04

## 2021-11-03 RX ORDER — ROPIVACAINE HYDROCHLORIDE 5 MG/ML
INJECTION, SOLUTION EPIDURAL; INFILTRATION; PERINEURAL
Status: COMPLETED | OUTPATIENT
Start: 2021-11-03 | End: 2021-11-03

## 2021-11-03 RX ORDER — DEXAMETHASONE SODIUM PHOSPHATE 4 MG/ML
INJECTION, SOLUTION INTRA-ARTICULAR; INTRALESIONAL; INTRAMUSCULAR; INTRAVENOUS; SOFT TISSUE PRN
Status: DISCONTINUED | OUTPATIENT
Start: 2021-11-03 | End: 2021-11-03 | Stop reason: SURG

## 2021-11-03 RX ORDER — ONDANSETRON 2 MG/ML
4 INJECTION INTRAMUSCULAR; INTRAVENOUS
Status: DISCONTINUED | OUTPATIENT
Start: 2021-11-03 | End: 2021-11-03 | Stop reason: HOSPADM

## 2021-11-03 RX ORDER — ROPIVACAINE HYDROCHLORIDE 5 MG/ML
INJECTION, SOLUTION EPIDURAL; INFILTRATION; PERINEURAL
Status: COMPLETED
Start: 2021-11-03 | End: 2021-11-03

## 2021-11-03 RX ORDER — ACETAMINOPHEN 325 MG/1
650 TABLET ORAL EVERY 6 HOURS PRN
Status: DISCONTINUED | OUTPATIENT
Start: 2021-11-03 | End: 2021-11-08 | Stop reason: HOSPADM

## 2021-11-03 RX ORDER — BISACODYL 10 MG
10 SUPPOSITORY, RECTAL RECTAL
Status: DISCONTINUED | OUTPATIENT
Start: 2021-11-03 | End: 2021-11-08 | Stop reason: HOSPADM

## 2021-11-03 RX ORDER — HYDROMORPHONE HYDROCHLORIDE 1 MG/ML
0.5 INJECTION, SOLUTION INTRAMUSCULAR; INTRAVENOUS; SUBCUTANEOUS
Status: DISCONTINUED | OUTPATIENT
Start: 2021-11-03 | End: 2021-11-08 | Stop reason: HOSPADM

## 2021-11-03 RX ORDER — ONDANSETRON 4 MG/1
4 TABLET, ORALLY DISINTEGRATING ORAL EVERY 4 HOURS PRN
Status: DISCONTINUED | OUTPATIENT
Start: 2021-11-03 | End: 2021-11-08 | Stop reason: HOSPADM

## 2021-11-03 RX ORDER — MORPHINE SULFATE 4 MG/ML
4 INJECTION, SOLUTION INTRAMUSCULAR; INTRAVENOUS
Status: DISCONTINUED | OUTPATIENT
Start: 2021-11-03 | End: 2021-11-03

## 2021-11-03 RX ORDER — SODIUM CHLORIDE 9 MG/ML
INJECTION, SOLUTION INTRAMUSCULAR; INTRAVENOUS; SUBCUTANEOUS
Status: DISCONTINUED | OUTPATIENT
Start: 2021-11-03 | End: 2021-11-03 | Stop reason: HOSPADM

## 2021-11-03 RX ADMIN — CEFAZOLIN 2 G: 330 INJECTION, POWDER, FOR SOLUTION INTRAMUSCULAR; INTRAVENOUS at 18:02

## 2021-11-03 RX ADMIN — Medication 100 MCG: at 18:28

## 2021-11-03 RX ADMIN — Medication 100 MCG: at 18:02

## 2021-11-03 RX ADMIN — DEXAMETHASONE SODIUM PHOSPHATE 6 MG: 4 INJECTION, SOLUTION INTRAMUSCULAR; INTRAVENOUS at 18:17

## 2021-11-03 RX ADMIN — SODIUM CHLORIDE, POTASSIUM CHLORIDE, SODIUM LACTATE AND CALCIUM CHLORIDE: 600; 310; 30; 20 INJECTION, SOLUTION INTRAVENOUS at 17:58

## 2021-11-03 RX ADMIN — Medication 100 MCG: at 19:18

## 2021-11-03 RX ADMIN — PROPOFOL 120 MG: 10 INJECTION, EMULSION INTRAVENOUS at 18:02

## 2021-11-03 RX ADMIN — Medication 100 MCG: at 19:01

## 2021-11-03 RX ADMIN — FENTANYL CITRATE 50 MCG: 50 INJECTION, SOLUTION INTRAMUSCULAR; INTRAVENOUS at 18:02

## 2021-11-03 RX ADMIN — SODIUM CHLORIDE: 9 INJECTION, SOLUTION INTRAVENOUS at 11:15

## 2021-11-03 RX ADMIN — CEFAZOLIN 2 G: 1 INJECTION, POWDER, FOR SOLUTION INTRAVENOUS at 21:56

## 2021-11-03 RX ADMIN — HYDROMORPHONE HYDROCHLORIDE 0.5 MG: 1 INJECTION, SOLUTION INTRAMUSCULAR; INTRAVENOUS; SUBCUTANEOUS at 08:44

## 2021-11-03 RX ADMIN — FENTANYL CITRATE 50 MCG: 50 INJECTION, SOLUTION INTRAMUSCULAR; INTRAVENOUS at 02:43

## 2021-11-03 RX ADMIN — LIDOCAINE HYDROCHLORIDE 50 MG: 20 INJECTION, SOLUTION EPIDURAL; INFILTRATION; INTRACAUDAL; PERINEURAL at 18:02

## 2021-11-03 RX ADMIN — VANCOMYCIN 1 G: 1 INJECTION, SOLUTION INTRAVENOUS at 18:11

## 2021-11-03 RX ADMIN — ONDANSETRON 4 MG: 2 INJECTION INTRAMUSCULAR; INTRAVENOUS at 18:17

## 2021-11-03 RX ADMIN — IOHEXOL 100 ML: 350 INJECTION, SOLUTION INTRAVENOUS at 01:48

## 2021-11-03 RX ADMIN — TRANEXAMIC ACID 1000 MG: 100 INJECTION, SOLUTION INTRAVENOUS at 18:00

## 2021-11-03 RX ADMIN — ROPIVACAINE HYDROCHLORIDE 25 ML: 5 INJECTION, SOLUTION EPIDURAL; INFILTRATION; PERINEURAL at 17:38

## 2021-11-03 RX ADMIN — HYDROMORPHONE HYDROCHLORIDE 0.5 MG: 1 INJECTION, SOLUTION INTRAMUSCULAR; INTRAVENOUS; SUBCUTANEOUS at 14:55

## 2021-11-03 RX ADMIN — SODIUM CHLORIDE, SODIUM GLUCONATE, SODIUM ACETATE, POTASSIUM CHLORIDE AND MAGNESIUM CHLORIDE: 526; 502; 368; 37; 30 INJECTION, SOLUTION INTRAVENOUS at 19:02

## 2021-11-03 RX ADMIN — TRANEXAMIC ACID 1000 MG: 100 INJECTION, SOLUTION INTRAVENOUS at 19:26

## 2021-11-03 ASSESSMENT — ENCOUNTER SYMPTOMS
HEADACHES: 0
CHILLS: 0
MYALGIAS: 1
VOMITING: 0
HEARTBURN: 0
FOCAL WEAKNESS: 0
FEVER: 0
ABDOMINAL PAIN: 0
NECK PAIN: 0
BLOOD IN STOOL: 0
DOUBLE VISION: 0
SORE THROAT: 0
BLURRED VISION: 0
SHORTNESS OF BREATH: 0
WEAKNESS: 0
HALLUCINATIONS: 0
MYALGIAS: 0
DIARRHEA: 0
NAUSEA: 0
DEPRESSION: 0
INSOMNIA: 0
FALLS: 1
DIZZINESS: 0
BACK PAIN: 0
BRUISES/BLEEDS EASILY: 0
PALPITATIONS: 0
COUGH: 0

## 2021-11-03 ASSESSMENT — PAIN DESCRIPTION - PAIN TYPE
TYPE: ACUTE PAIN
TYPE: ACUTE PAIN

## 2021-11-03 ASSESSMENT — PAIN SCALES - GENERAL: PAIN_LEVEL: 0

## 2021-11-03 NOTE — H&P
"Hospital Medicine History & Physical Note    Date of Service  11/3/2021    Primary Care Physician  Juan Santana M.D.    Consultants  orthopedics    Specialist Names: ERP discussed the case with Dr. Radford.    Code Status  Prior    Chief Complaint  Chief Complaint   Patient presents with   • Knee Pain     fell injuring R knee     • Knee Injury       History of Presenting Illness  Yoli Carmichael is a 88 y.o. female, h/o sick Sinus Syndrome s/p Pacemaker placement, CKD III and s/p Right Total knee replacement (Dr. Styles, 2013) who presented to the ER on 11/2/2021 after sustaining ground-level fall at home.      She tripped over her rug when standing up to go to the kitchen and fell twisting her right leg.  She reports that just prior to that felt some numbness and after moving her leg improved however when she stands up her leg simply \"did not respond well \".  Has severe pain and has been unable to ambulate ever since. She is not taking anticoagulants. No LOC, no head trauma.     I discussed the plan of care with patient.    Review of Systems  Review of Systems   Constitutional: Negative for fever.   HENT: Negative for congestion and sore throat.    Eyes: Negative for blurred vision and double vision.   Respiratory: Negative for cough and shortness of breath.    Cardiovascular: Negative for chest pain and palpitations.   Gastrointestinal: Negative for nausea and vomiting.   Genitourinary: Negative for dysuria and urgency.   Musculoskeletal: Positive for falls. Negative for myalgias and neck pain.   Skin: Negative for itching and rash.   Neurological: Negative for dizziness, weakness and headaches.   Endo/Heme/Allergies: Does not bruise/bleed easily.   Psychiatric/Behavioral: Negative for depression. The patient does not have insomnia.        Past Medical History   has a past medical history of Arrhythmia, Arthritis, Bronchitis, CATARACT, Dry eyes, bilateral (3/27/2014), Osteopenia of the elderly (3/27/2014), " and Pneumonia.    Surgical History   has a past surgical history that includes knee arthroplasty total (9/3/2013); tonsillectomy; and pr upper gi endoscopy,diagnosis (N/A, 6/21/2021).     Family History  family history includes Cancer in her brother, daughter, daughter, and maternal aunt; Heart Attack in her father; Heart Disease (age of onset: 54) in her father; Hypertension in her maternal aunt; Lung Disease in her father and mother; Seizures in her daughter.   Family history reviewed with patient. There is no family history that is pertinent to the chief complaint.     Social History   reports that she has never smoked. She has never used smokeless tobacco. She reports current alcohol use. She reports that she does not use drugs.    Allergies  Allergies   Allergen Reactions   • Other Environmental      Seasonal allergies       Medications  Prior to Admission Medications   Prescriptions Last Dose Informant Patient Reported? Taking?   Home Care Oxygen   Yes No   Sig: Inhale 1 L/min continuous. Oxygen dose range: 1 L/min only wears at night  Respiratory route via: Nasal Cannula   Oxygen supplier: Preferred      Indications: Dyspnea   Multiple Vitamins-Minerals (DAILY MULTI PO) 11/2/2021 at Unknown time  Yes No   Sig: Take  by mouth.   VITAMIN D, CHOLECALCIFEROL, PO 11/2/2021 at Unknown time  Yes No   Sig: Take 5,000 Units by mouth every day at 6 PM. Indications: suppliment   atorvastatin (LIPITOR) 40 MG Tab 11/2/2021 at Unknown time  No No   Sig: Take 1 tablet by mouth every evening.   escitalopram (LEXAPRO) 5 MG tablet 11/2/2021 at Unknown time  No No   Sig: TAKE ONE TABLET BY MOUTH ONCE DAILY   furosemide (LASIX) 20 MG Tab prn  Yes No   Sig: Take 20 mg by mouth 1 time a day as needed (take for increased swelling).   metoprolol SR (TOPROL XL) 25 MG TABLET SR 24 HR 11/2/2021 at Unknown time  No No   Sig: Take 1 tablet by mouth every day.   moxifloxacin (VIGAMOX) 0.5 % Solution Not Taking at Unknown time  Yes No    Patient not taking: Reported on 11/3/2021   potassium chloride (KLOR-CON) 20 MEQ Pack prn  Yes No   Sig: Take 20 mEq by mouth 1 time a day as needed (take with PRN lasix).   predniSONE (LIQUI PRED) 5 MG/5ML Solution Not Taking at Unknown time  Yes No   Sig: Take 5 mg by mouth. For eyes   Patient not taking: Reported on 11/3/2021   prednisoLONE acetate (PRED FORTE) 1 % Suspension   Yes Yes   Sig: Administer 1 Drop into both eyes every 4 hours. three times a day in left eye;  Six time a day right eye      Facility-Administered Medications: None       Physical Exam  Temp:  [36.7 °C (98 °F)] 36.7 °C (98 °F)  Pulse:  [69] 69  Resp:  [18] 18  BP: (130)/(56) 130/56  SpO2:  [97 %] 97 %  Blood Pressure : 130/56   Temperature: 36.7 °C (98 °F)   Pulse: 69   Respiration: 18   Pulse Oximetry: 97 %       Physical Exam  Constitutional:       Appearance: Normal appearance.   HENT:      Head: Normocephalic and atraumatic.      Mouth/Throat:      Mouth: Mucous membranes are moist.      Pharynx: Oropharynx is clear.   Eyes:      Extraocular Movements: Extraocular movements intact.      Pupils: Pupils are equal, round, and reactive to light.   Cardiovascular:      Rate and Rhythm: Normal rate and regular rhythm.      Heart sounds: Normal heart sounds.   Pulmonary:      Effort: Pulmonary effort is normal.      Breath sounds: Normal breath sounds.   Abdominal:      General: Abdomen is flat. Bowel sounds are normal.      Palpations: Abdomen is soft.   Musculoskeletal:      Cervical back: Normal range of motion and neck supple.      Comments: Patient has right knee immobilizer applied in the ED.  Physical exam is limited.  She has positive right pedal pulses.   Skin:     General: Skin is warm and dry.   Neurological:      General: No focal deficit present.      Mental Status: She is alert and oriented to person, place, and time.   Psychiatric:         Mood and Affect: Mood normal.         Behavior: Behavior normal.          Laboratory:  Recent Labs     11/01/21  1111   WBC 7.7   RBC 4.52   HEMOGLOBIN 12.3   HEMATOCRIT 39.6   MCV 87.6   MCH 27.2   MCHC 31.1*   RDW 51.8*   PLATELETCT 206   MPV 11.4     Recent Labs     11/01/21  1111   SODIUM 144   POTASSIUM 4.6   CHLORIDE 109   CO2 25   GLUCOSE 89   BUN 18   CREATININE 1.14   CALCIUM 9.7     Recent Labs     11/01/21  1111   ALTSGPT 13   ASTSGOT 24   ALKPHOSPHAT 94   TBILIRUBIN 0.6   GLUCOSE 89     Recent Labs     11/03/21  0118   INR 1.13     No results for input(s): NTPROBNP in the last 72 hours.      No results for input(s): TROPONINT in the last 72 hours.    Imaging:  CT-CTA LOWER EXT WITH & W/O-POST PROCESS LEFT   Final Result      Comminuted periprosthetic distal femoral fracture has dorsal displacement, anterior angulation and impaction      There is folding of the distal superficial femoral artery with posterior displacement resulting in nearly 180 degrees course change. 12 mm distal to this there is then reversal of the posterior displacement with resumption of normal popliteal course    where there is a 90 degree anterior bend. The proximal kink does result in some vessel narrowing but this is not secondary to atherosclerosis      Moderate distal atherosclerotic change without stenosis or occlusion      DX-ANKLE 2- VIEWS RIGHT   Final Result      Soft tissue swelling without acute displaced fracture identified      DX-KNEE 3 VIEWS RIGHT   Final Result      Acute femoral periprosthetic fracture with comminution, anterior angulation and impaction        EKG: Ventricular pace rhythm     Assessment/Plan:  I anticipate this patient will require at least two midnights for appropriate medical management, necessitating inpatient admission.    * Closed fracture of right distal femur (HCC)  Assessment & Plan  -Inpatient status to medical floor.  -Prior history of right total knee replacement done by Dr. Styles in 2013  -Patient has comminuted periprosthetic distal femoral fracture  with dorsal displacement and anterior angulation and impaction.  -There is also folding of the distal superficial femoral artery with posterior displacement resulting in nearly 180 degrees course change.  12 mm distal to this there is reversal of the posterior displacement with resumption of normal popliteal course where there is a 90 degree anterior band.  -NPO.  -Pain control with IV medication.  -Appreciate orthopedic surgery consult and recommendations: ERP discussed this case with Dr. Radford, who received report about CT angiogram of the lower extremity with findings.    Preoperative cardiovascular examination  Assessment & Plan  -Cardiovascular risk is mild to moderate.  At this time she is medically optimized for surgical procedure without additional work-up testing.    Pacemaker  Assessment & Plan  -Was implanted in May 2021 by Dr. Hollis.  Plan as above.    Stage 3b chronic kidney disease (HCC)- (present on admission)  Assessment & Plan  -Creatinine today is 1.14.  Monitor morning labs.    S/P total knee replacement right- (present on admission)  Assessment & Plan  -Dr. Styles, 2013.      VTE prophylaxis: SCDs/TEDs

## 2021-11-03 NOTE — HOSPITAL COURSE
History of sick sinus syndrome status post pacemaker, hyperlipidemia, hypertension, depression, right knee replacement in 2013 by Dr. Styles who was admitted after a ground-level fall complaining of right leg pain found to have a right periprosthetic distal femur fracture.  Orthopedic surgery was consulted and recommended operative repair, nonweightbearing.

## 2021-11-03 NOTE — DISCHARGE PLANNING
Anticipated Discharge Disposition: TBD, pending progress after surgery    Action: LSW completed chart review. Per chart review and ortho surgery consult note today, pt likely having surgery today.     Barriers to Discharge: likely pending PT/OT after surgery    Plan: LSW to follow up with recommendations after surgery.    1030: Discussed pt in IDT rounds. Per MD, pt having surgery at 1800. Per MD, will place SNF/PMR referrals.    1300: LSW met with pt at bedside to discuss possible SNF/rehab placement. LSW explained SNF vs rehab. Pt states she has been at [facility] in the past and would go back. Pt requested this LSW leave choice form for pt and her dtr to review. LSW provided SNF choice to pt.      Care Transition Team Assessment  From 6/21     Emergency Contact - Clarita Gutierrez (Daughter)     Pt states she lives alone in a single story home in [place], [place]. Pt states that her daughter basically lives across the street. Pt was on service with [facility]  prior to admission. Pt states she uses a FWW at baseline and drives herself. Pt has been to SNF in the past. Pt has a PCP she saw about 11 months ago. Pt uses [facility] in [place]. Pt amenable to SNF, gave verbal choice, choice form faxed to Christa VALENTIN.     Information Source  Orientation Level: Oriented X4  Information Given By: Patient  Who is responsible for making decisions for patient? : Patient     Elopement Risk  Legal Hold: No  Ambulatory or Self Mobile in Wheelchair: Yes  Disoriented: No  Psychiatric Symptoms: None  History of Wandering: No  Elopement this Admit: No  Vocalizing Wanting to Leave: No  Displays Behaviors, Body Language Wanting to Leave: No-Not at Risk for Elopement  Elopement Risk: Not at Risk for Elopement     Interdisciplinary Discharge Planning  Lives with - Patient's Self Care Capacity: Unable To Determine At This Time  Patient or legal guardian wants to designate a caregiver: No  Support Systems: Family Member(s)  Housing / Facility: 1 Story  House  Durable Medical Equipment: Walker     Discharge Preparedness  What is your plan after discharge?: Skilled nursing facility  What are your discharge supports?: Child  Prior Functional Level: Independent with Activities of Daily Living     Functional Assesment  Prior Functional Level: Independent with Activities of Daily Living     Vision / Hearing Impairment  Vision Impairment : Yes  Right Eye Vision: Impaired, Wears Glasses  Left Eye Vision: Impaired, Wears Glasses  Hearing Impairment : Yes  Hearing Impairment: Both Ears, Hearing Device Not Available           Advance Directive  Advance Directive?: None  Advance Directive offered?: AD Booklet refused     Domestic Abuse  Have you ever been the victim of abuse or violence?: No  Physical Abuse or Sexual Abuse: No  Verbal Abuse or Emotional Abuse: No  Possible Abuse/Neglect Reported to:: Not Applicable

## 2021-11-03 NOTE — ASSESSMENT & PLAN NOTE
Slight increase in creatinine since admission, still with good urine output  Avoid nephrotoxins, including contrast  Continue low-dose Lasix due to pulmonary edema and hypoxia  Repeat creatinine in a.m.

## 2021-11-03 NOTE — PROGRESS NOTES
Patient seen this AM.    Has periprosthetic right distal femur fracture that will require revision to a hinged implant for mobility.    Planned for this evening.    NPO after breakfast

## 2021-11-03 NOTE — ASSESSMENT & PLAN NOTE
Patient states she has had anemia since a GI bleed in the past, denies blood in stool currently (constipated).  She had a moderate drop preoperatively and again postoperatively, to 7.0 and was transfused 1 unit RBCs on 11/6  Iron studies more c/w chronic disease-likely some decreased production in the setting of a long bone fracture  No evidence of hematoma on operative extremity  Repeat Hgb in am  Ok for lovenox as DVT risk is high

## 2021-11-03 NOTE — ASSESSMENT & PLAN NOTE
-Prior history of right total knee replacement done by Dr. Styles in 2013  -On presentation found to have comminuted periprosthetic distal femoral fracture with dorsal displacement and anterior angulation and impaction.  -Superficial femoral AA with posterior displacement resulting in nearly 180 degrees course change.  12 mm distal to this there is reversal of the posterior displacement with resumption of normal popliteal course where there is a 90 degree anterior band.  -Status post repair on 11/3 with no complications  She is WBAT operative extremity however pain is limiting her mobility and ambulation  PT OT recommending SNF versus rehab  -Pain control with scheduled Tylenol, p.o. oxycodone and IV Dilaudid as needed for severe breakthrough pain

## 2021-11-03 NOTE — ED PROVIDER NOTES
ED Provider Note    CHIEF COMPLAINT  Chief Complaint   Patient presents with   • Knee Pain     fell injuring R knee     • Knee Injury       HPI  Yoli Carmichael is a 88 y.o. female who presents after falling on her left knee.  Patient reports that she has not been able to ambulate following this secondary to pain.  Patient reports considerable pain with putting any weight on her left leg.  Patient denies any use of anticoagulants.  Patient denies any associated head trauma or loss of consciousness.  She denies any associated neck or back pain.    REVIEW OF SYSTEMS  ROS  See HPI for further details. All other systems are negative.     PAST MEDICAL HISTORY   has a past medical history of Arrhythmia, Arthritis, Bronchitis, CATARACT, Dry eyes, bilateral (3/27/2014), Osteopenia of the elderly (3/27/2014), and Pneumonia.    SOCIAL HISTORY  Social History     Tobacco Use   • Smoking status: Never Smoker   • Smokeless tobacco: Never Used   • Tobacco comment: Pt exposed to second hand smoker   Vaping Use   • Vaping Use: Never used   Substance and Sexual Activity   • Alcohol use: Yes     Comment: Occasionally   • Drug use: No   • Sexual activity: Never       SURGICAL HISTORY   has a past surgical history that includes knee arthroplasty total (9/3/2013); tonsillectomy; and upper gi endoscopy,diagnosis (N/A, 6/21/2021).    CURRENT MEDICATIONS  Home Medications    **Home medications have not yet been reviewed for this encounter**         ALLERGIES  Allergies   Allergen Reactions   • Other Environmental      Seasonal allergies       PHYSICAL EXAM  Vitals:    11/02/21 2306   BP: 130/56   Pulse: 69   Resp: 18   Temp: 36.7 °C (98 °F)   SpO2: 97%       Physical Exam  Constitutional:       Appearance: She is well-developed.   HENT:      Head: Normocephalic and atraumatic.   Eyes:      Conjunctiva/sclera: Conjunctivae normal.   Pulmonary:      Effort: Pulmonary effort is normal.   Musculoskeletal:      Cervical back: Normal range of  motion and neck supple.      Comments: Cervical, thoracic, lumbar spine clear presents to the patient.  There is no tenderness on logroll of left hip.  There is considerable tenderness of the distal femur.  Left lower extremity is obviously shortened.  Considerable pain on any range of motion of the knee.  No major tenderness of the medial malleolus the patient does have some mild lateral malleolar pain.  Distal pulses are 2+ and equal bilaterally.  Sensations intact throughout.   Skin:     General: Skin is warm.   Neurological:      Mental Status: She is alert and oriented to person, place, and time.   Psychiatric:         Behavior: Behavior normal.       DX-ANKLE 2- VIEWS RIGHT   Final Result      Soft tissue swelling without acute displaced fracture identified      DX-KNEE 3 VIEWS RIGHT   Final Result      Acute femoral periprosthetic fracture with comminution, anterior angulation and impaction      CT-CTA LOWER EXT WITH & W/O-POST PROCESS LEFT    (Results Pending)         EKG is a ventricularly paced rhythm with occasional native beat.  No ST elevation or depression consistent with acute regional ischemia.    COURSE & MEDICAL DECISION MAKING  Pertinent Labs & Imaging studies reviewed. (See chart for details)    Patient here with questionable quadriceps tendon rupture versus distal femur fracture versus knee sprain.  Will check x-ray.  Patient is neurovascularly intact.  X-ray reveals significantly displaced distal femur fracture.  Will discuss the case with [facility] on call physician Dr. Rivas as patient underwent surgery on the affected knee by Dr. Styles one of his partners.  Given the extent of patient's fracture will check a CTA, unfortunately there is significant delay in receiving callback from orthopedics.  Patient will be signed out to my partner.  I discussed the case with hospitalist who will admit as long as patient does not have any vascular injury and orthopedics is comfortable with  patient staying at [facility].      FINAL IMPRESSION  1. Displaced acute distal femur fracture of R LE         Electronically signed by: Ruperto Higuera M.D., 11/2/2021 11:17 PM

## 2021-11-03 NOTE — PROGRESS NOTES
[facility] Medicine Daily Progress Note    Date of Service  11/3/2021    Chief Complaint  Yoli Carmichael is a 88 y.o. female admitted 11/2/2021 with R leg pain after fall    Hospital Course  History of sick sinus syndrome status post pacemaker, hyperlipidemia, hypertension, depression, right knee replacement in 2013 by Dr. Styles who was admitted after a ground-level fall complaining of right leg pain found to have a right periprosthetic distal femur fracture.  Orthopedic surgery was consulted and recommended operative repair, nonweightbearing.    Interval Problem Update  11/3: Hemoglobin down to 9.6 from 12.3, 48 hours ago.  No evidence of bleeding or hematoma on exam.  Repeat Hgb scheduled for 1400, plan for OR today at 1800.  Pain is controlled    I have personally seen and examined the patient at bedside. I discussed the plan of care with patient, family and bedside RN.    Consultants/Specialty  orthopedics- Stephani    Code Status  Full Code    Disposition  Patient is not medically cleared.   Anticipate discharge to to skilled nursing facility.vs Home w HH  I have placed the appropriate orders for post-discharge needs.    Review of Systems  Review of Systems   Constitutional: Negative for chills, fever and malaise/fatigue.   HENT: Negative for sore throat.    Respiratory: Negative for cough and shortness of breath.    Cardiovascular: Negative for chest pain and palpitations.   Gastrointestinal: Negative for abdominal pain, blood in stool, diarrhea, heartburn, nausea and vomiting.   Genitourinary: Negative for dysuria and frequency.   Musculoskeletal: Positive for falls and myalgias. Negative for back pain.   Neurological: Negative for dizziness, focal weakness, weakness and headaches.   Psychiatric/Behavioral: Negative for depression and hallucinations.   All other systems reviewed and are negative.       Physical Exam  Temp:  [36.4 °C (97.6 °F)-36.7 °C (98 °F)] 36.4 °C (97.6 °F)  Pulse:  [69-88] 88  Resp:   [18] 18  BP: (130-161)/(55-74) 142/55  SpO2:  [91 %-98 %] 94 %    Physical Exam  Constitutional:       General: She is not in acute distress.  HENT:      Nose: Nose normal.      Mouth/Throat:      Mouth: Mucous membranes are dry.   Cardiovascular:      Rate and Rhythm: Normal rate and regular rhythm.      Pulses: Normal pulses.   Pulmonary:      Effort: Pulmonary effort is normal.      Breath sounds: Normal breath sounds.   Abdominal:      General: There is no distension.      Palpations: Abdomen is soft.   Musculoskeletal:         General: Signs of injury present. No swelling.      Cervical back: No muscular tenderness.      Comments: Immobilizer in place, no obvious ecchymosis or hematoma of RLE   Lymphadenopathy:      Cervical: No cervical adenopathy.   Skin:     General: Skin is warm and dry.      Findings: No rash.   Neurological:      General: No focal deficit present.      Mental Status: She is alert and oriented to person, place, and time.      Motor: Weakness present.   Psychiatric:         Mood and Affect: Mood normal.         Thought Content: Thought content normal.         Fluids    Intake/Output Summary (Last 24 hours) at 11/3/2021 0956  Last data filed at 11/3/2021 0900  Gross per 24 hour   Intake 240 ml   Output --   Net 240 ml       Laboratory  Recent Labs     11/01/21  1111 11/03/21  0652   WBC 7.7 12.4*   RBC 4.52 3.50*   HEMOGLOBIN 12.3 9.6*   HEMATOCRIT 39.6 30.4*   MCV 87.6 86.9   MCH 27.2 27.4   MCHC 31.1* 31.6*   RDW 51.8* 50.1*   PLATELETCT 206 152*   MPV 11.4 10.2     Recent Labs     11/01/21  1111 11/03/21  0652   SODIUM 144 138   POTASSIUM 4.6 4.2   CHLORIDE 109 106   CO2 25 23   GLUCOSE 89 162*   BUN 18 22   CREATININE 1.14 1.02   CALCIUM 9.7 8.9     Recent Labs     11/03/21  0118   INR 1.13               Imaging  CT-CTA LOWER EXT WITH & W/O-POST PROCESS LEFT   Final Result      Comminuted periprosthetic distal femoral fracture has dorsal displacement, anterior angulation and impaction       There is folding of the distal superficial femoral artery with posterior displacement resulting in nearly 180 degrees course change. 12 mm distal to this there is then reversal of the posterior displacement with resumption of normal popliteal course    where there is a 90 degree anterior bend. The proximal kink does result in some vessel narrowing but this is not secondary to atherosclerosis      Moderate distal atherosclerotic change without stenosis or occlusion      DX-ANKLE 2- VIEWS RIGHT   Final Result      Soft tissue swelling without acute displaced fracture identified      DX-KNEE 3 VIEWS RIGHT   Final Result      Acute femoral periprosthetic fracture with comminution, anterior angulation and impaction           Assessment/Plan  * Closed fracture of right distal femur (HCC)  Assessment & Plan  -Inpatient status to medical floor.  -Prior history of right total knee replacement done by Dr. Styles in 2013  -Patient has comminuted periprosthetic distal femoral fracture with dorsal displacement and anterior angulation and impaction.  -There is also folding of the distal superficial femoral artery with posterior displacement resulting in nearly 180 degrees course change.  12 mm distal to this there is reversal of the posterior displacement with resumption of normal popliteal course where there is a 90 degree anterior band.  -NPO.  -Pain control with IV medication.  -Appreciate orthopedic surgery consult and recommendations: ERP discussed this case with Dr. Radford, who received report about CT angiogram of the lower extremity with findings.    PVD (peripheral vascular disease) ([facility])- (present on admission)  Assessment & Plan  Continue atorvastatin    Pacemaker  Assessment & Plan  -Was implanted in May 2021 by Dr. Hollis.  Plan as above.  -Continue metoprolol    Preoperative cardiovascular examination  Assessment & Plan  -Cardiovascular risk is mild to moderate.  At this time she is medically optimized for surgical  procedure without additional work-up testing.    Normocytic anemia  Assessment & Plan  Patient states she has had anemia since a GI bleed in the past.  Not on any anticoagulation or NSAIDs  Dropped from 12.3-9.6 -no evidence of hematoma but will monitor closely in the setting of tortuous vasculature seen on imaging in the proximity of the fracture  Check iron studies  Repeat hemoglobin at 1400    Depression- (present on admission)  Assessment & Plan  Continue lexapro    Stage 3b chronic kidney disease (HCC)- (present on admission)  Assessment & Plan  -Creatinine today is 1.14.    Stable  Monitor morning labs.    S/P total knee replacement right- (present on admission)  Assessment & Plan  -Dr. Styles, 2013.       VTE prophylaxis: enoxaparin ppx (to start tomorrow if hgb ok)    I have performed a physical exam and reviewed and updated ROS and Plan today (11/3/2021). In review of yesterday's note (11/2/2021), there are no changes except as documented above.

## 2021-11-03 NOTE — DISCHARGE PLANNING
[facility] Transitional Care Coordination    Referral from:  Dr. Jameson    Insurance Provider on Facesheet: [facility]    Potential Rehab Diagnosis: Right periprosthetic distal femur fracture, closed, displaced, comminuted    Chart review indicates patient may have on going medical management and may have therapy needs to possibly meet inpatient rehab facility criteria with the goal of returning to community.    D/C support: TBD     Physiatry consultation pended per protocol.     Right periprosthetic distal femur fracture, closed, displaced, comminuted.  Surgery planned for today.  Would appreciate TX mynor s/p surgical intervention.  Waiting on additional information to determine appropriateness for acute inpatient rehabilitation. Will continue to follow.      Thank you for the referral.

## 2021-11-03 NOTE — ED TRIAGE NOTES
"Pt was BIB daughter  Around 9:17 tonight pt attempted to stand up   Has Hx of her R leg going to sleep and tonight it did  When she stood up the R leg did not hold her and pt fell injuring her knee  Has had a complete knee replacement 10 yrs ago by Dr Styles  Pt unable to stand  Pt has swelling to ankles which is \"normal\" for her  No other injuries per pt   "

## 2021-11-03 NOTE — CONSULTS
DATE: 11/3/2021    REQUESTING PHYSICIAN: Ruperto Higuera MD    CHIEF COMPLAINT: Right knee pain     HISTORY OF PRESENT ILLNESS: The patient is 88 years old.  She had a right total knee arthroplasty in 2013 with Dr. Styles.  She was doing well until last night when she was getting up and her leg had fallen asleep.  She fell and injured her right knee.  She could not bear weight.  She was seen in the emergency room and found to have a distal femur periprosthetic fracture.  Admitted by the hospitalist service.  Orthopedic consultation was requested.    ALLERGIES: None    MEDICATIONS: Multivitamin, vitamin D, Lipitor, Lexapro, Lasix, metoprolol, moxifloxacin solution, potassium chloride, Liquid Pred solution    PAST MEDICAL HISTORY: Sick sinus syndrome with recent pacemaker placement, recent GI bleed, cataracts, osteopenia    PAST SURGICAL HISTORY: Right total knee arthroplasty 9/30/2013, tonsillectomy, cataract surgery, pacemaker placement    SOCIAL HISTORY: The patient lives in [place].  She does not use any drugs or smoke.  She does drink alcohol occasionally.    FAMILY HISTORY: Multiple relatives with cancer, heart disease in father, lung disease in both parents, seizures in daughter.    REVIEW OF SYSTEMS:  No headaches, nausea, vomiting, diarrhea, constipation, polyuria, dysuria, fevers, chills, weight loss, weight gain, abdominal pain, chest pain, shortness of breath.    EXAMINATION:    General: She is lying in her hospital bed in no acute distress.  Vitals: Blood pressure 142/55, heart rate 88, respirations 18, temperature 97.6  HEENT: Normocephalic, atraumatic  Neck: Supple, nontender  Chest: Nontender  Lungs: No labored breathing  Heart: Regular  Abdomen: Soft  Pelvis: Stable  Extremities: Left lower and bilateral upper extremities without tenderness or deformity.  Right lower extremity is in a knee immobilizer.  There is swelling at the right knee.    Vascular: Palpable dorsalis pedis pulse  Neurological: Able to  dorsiflex and plantarflex toes and ankle.  Skin: Intact over the right knee    LAB: White blood cell count 12,400, hematocrit 30.4%, platelet count 152,000.  Sodium 138, potassium 4.2, creatinine 1.02, AST and ALT are normal, albumin 3.5.    IMAGING: Radiographs of the right knee show comminuted supracondylar distal femur fracture above a well fixed total knee arthroplasty.  There is medial and lateral comminution.  CT scan shows the same.    ASSESSMENT: Right periprosthetic distal femur fracture, closed, displaced, comminuted    PLAN: Given patient's age as well as fracture comminution and bone quality, she probably will do better with a distal femoral replacement which will allow her to mobilize sooner.  I discussed the patient with Dr. Styles since he did the original surgery.  Between the 2 of us, we will try to arrange for surgery later today.

## 2021-11-03 NOTE — OR NURSING
1653- [facility] called to inform patient has multiple antibodies. RBC units will not be available until after blood verified at [facility]. Then unit can be located.   1708- Dr. Styles updated. No new orders.   1718- Dr. Young updated.   1720- Return call to [facility] that reports carrier has not arrived to  sample to transport to [facility].   1733- Dr. Young and Dr. Styles discussing case and both agree to proceed.

## 2021-11-03 NOTE — PROGRESS NOTES
Med rec updated and complete  Allergies reviewed  Interviewed pt with daughter at bedside with permission from pt.  Pt reports no antibiotics in the last 30 days.      No current facility-administered medications on file prior to encounter.     Current Outpatient Medications on File Prior to Encounter   Medication Sig Dispense Refill   • prednisoLONE acetate (PRED FORTE) 1 % Suspension Administer 1 Drop into both eyes see administration instructions. three times a day in left eye;  Six time a day right eye      • Cholecalciferol (VITAMIN D) 125 MCG (5000 UT) Cap Take 5,000 Units by mouth.     • Multiple Vitamins-Minerals (ICAPS AREDS 2 PO) Take 2 Capsules by mouth every evening.     • escitalopram (LEXAPRO) 5 MG tablet TAKE ONE TABLET BY MOUTH ONCE DAILY (Patient taking differently: Take 5 mg by mouth every day.) 90 Tablet 1   • Multiple Vitamins-Minerals (DAILY MULTI PO) Take 1 Tablet by mouth every day.     • atorvastatin (LIPITOR) 40 MG Tab Take 1 tablet by mouth every evening. (Patient taking differently: Take 40 mg by mouth every day.) 100 tablet 3   • metoprolol SR (TOPROL XL) 25 MG TABLET SR 24 HR Take 1 tablet by mouth every day. (Patient taking differently: Take 25 mg by mouth every evening.) 90 tablet 3

## 2021-11-04 ENCOUNTER — APPOINTMENT (OUTPATIENT)
Dept: RADIOLOGY | Facility: MEDICAL CENTER | Age: 86
DRG: 466 | End: 2021-11-04
Attending: INTERNAL MEDICINE
Payer: MEDICARE

## 2021-11-04 ENCOUNTER — HOSPITAL ENCOUNTER (INPATIENT)
Facility: REHABILITATION | Age: 86
End: 2021-11-04
Attending: PHYSICAL MEDICINE & REHABILITATION | Admitting: PHYSICAL MEDICINE & REHABILITATION
Payer: MEDICARE

## 2021-11-04 ENCOUNTER — APPOINTMENT (OUTPATIENT)
Dept: RADIOLOGY | Facility: MEDICAL CENTER | Age: 86
End: 2021-11-04
Attending: NURSE PRACTITIONER
Payer: MEDICARE

## 2021-11-04 PROBLEM — R74.01 TRANSAMINITIS: Status: ACTIVE | Noted: 2021-06-20

## 2021-11-04 PROBLEM — J96.01 ACUTE RESPIRATORY FAILURE WITH HYPOXIA (HCC): Status: ACTIVE | Noted: 2021-11-04

## 2021-11-04 LAB
ANION GAP SERPL CALC-SCNC: 9 MMOL/L (ref 7–16)
BUN SERPL-MCNC: 20 MG/DL (ref 8–22)
CALCIUM SERPL-MCNC: 8.1 MG/DL (ref 8.4–10.2)
CHLORIDE SERPL-SCNC: 105 MMOL/L (ref 96–112)
CO2 SERPL-SCNC: 22 MMOL/L (ref 20–33)
CREAT SERPL-MCNC: 1.23 MG/DL (ref 0.5–1.4)
ERYTHROCYTE [DISTWIDTH] IN BLOOD BY AUTOMATED COUNT: 50.1 FL (ref 35.9–50)
GLUCOSE SERPL-MCNC: 146 MG/DL (ref 65–99)
HCT VFR BLD AUTO: 26.8 % (ref 37–47)
HGB BLD-MCNC: 8.3 G/DL (ref 12–16)
MAGNESIUM SERPL-MCNC: 2 MG/DL (ref 1.5–2.5)
MCH RBC QN AUTO: 27.3 PG (ref 27–33)
MCHC RBC AUTO-ENTMCNC: 31 G/DL (ref 33.6–35)
MCV RBC AUTO: 88.2 FL (ref 81.4–97.8)
MITOCHONDRIA M2 IGG SER-ACNC: 4 UNITS (ref 0–24.9)
PLATELET # BLD AUTO: 124 K/UL (ref 164–446)
PMV BLD AUTO: 10.6 FL (ref 9–12.9)
POTASSIUM SERPL-SCNC: 4.7 MMOL/L (ref 3.6–5.5)
RBC # BLD AUTO: 3.04 M/UL (ref 4.2–5.4)
SMA IGG SER-ACNC: 15 UNITS (ref 0–19)
SODIUM SERPL-SCNC: 136 MMOL/L (ref 135–145)
WBC # BLD AUTO: 13.6 K/UL (ref 4.8–10.8)

## 2021-11-04 PROCEDURE — 86902 BLOOD TYPE ANTIGEN DONOR EA: CPT | Mod: 91

## 2021-11-04 PROCEDURE — 700111 HCHG RX REV CODE 636 W/ 250 OVERRIDE (IP): Performed by: INTERNAL MEDICINE

## 2021-11-04 PROCEDURE — 770006 HCHG ROOM/CARE - MED/SURG/GYN SEMI*

## 2021-11-04 PROCEDURE — 83735 ASSAY OF MAGNESIUM: CPT

## 2021-11-04 PROCEDURE — 71045 X-RAY EXAM CHEST 1 VIEW: CPT

## 2021-11-04 PROCEDURE — 36415 COLL VENOUS BLD VENIPUNCTURE: CPT

## 2021-11-04 PROCEDURE — 700111 HCHG RX REV CODE 636 W/ 250 OVERRIDE (IP): Performed by: PHYSICIAN ASSISTANT

## 2021-11-04 PROCEDURE — 76705 ECHO EXAM OF ABDOMEN: CPT

## 2021-11-04 PROCEDURE — 99233 SBSQ HOSP IP/OBS HIGH 50: CPT | Performed by: INTERNAL MEDICINE

## 2021-11-04 PROCEDURE — 85027 COMPLETE CBC AUTOMATED: CPT

## 2021-11-04 PROCEDURE — 94760 N-INVAS EAR/PLS OXIMETRY 1: CPT

## 2021-11-04 PROCEDURE — A9270 NON-COVERED ITEM OR SERVICE: HCPCS | Performed by: HOSPITALIST

## 2021-11-04 PROCEDURE — 700102 HCHG RX REV CODE 250 W/ 637 OVERRIDE(OP): Performed by: HOSPITALIST

## 2021-11-04 PROCEDURE — 97163 PT EVAL HIGH COMPLEX 45 MIN: CPT

## 2021-11-04 PROCEDURE — 700101 HCHG RX REV CODE 250: Performed by: INTERNAL MEDICINE

## 2021-11-04 PROCEDURE — 80048 BASIC METABOLIC PNL TOTAL CA: CPT

## 2021-11-04 RX ORDER — PREDNISOLONE ACETATE 10 MG/ML
1 SUSPENSION/ DROPS OPHTHALMIC 3 TIMES DAILY
Status: DISCONTINUED | OUTPATIENT
Start: 2021-11-04 | End: 2021-11-08 | Stop reason: HOSPADM

## 2021-11-04 RX ORDER — OXYCODONE HYDROCHLORIDE 5 MG/1
2.5 TABLET ORAL EVERY 4 HOURS PRN
Status: DISCONTINUED | OUTPATIENT
Start: 2021-11-04 | End: 2021-11-08 | Stop reason: HOSPADM

## 2021-11-04 RX ORDER — PREDNISOLONE ACETATE 10 MG/ML
1 SUSPENSION/ DROPS OPHTHALMIC 4 TIMES DAILY
Status: DISCONTINUED | OUTPATIENT
Start: 2021-11-04 | End: 2021-11-08 | Stop reason: HOSPADM

## 2021-11-04 RX ADMIN — PREDNISOLONE ACETATE 1 DROP: 10 SUSPENSION/ DROPS OPHTHALMIC at 18:00

## 2021-11-04 RX ADMIN — CEFAZOLIN 2 G: 1 INJECTION, POWDER, FOR SOLUTION INTRAVENOUS at 05:45

## 2021-11-04 RX ADMIN — PREDNISOLONE ACETATE 1 DROP: 10 SUSPENSION/ DROPS OPHTHALMIC at 12:34

## 2021-11-04 RX ADMIN — PREDNISOLONE ACETATE 1 DROP: 10 SUSPENSION/ DROPS OPHTHALMIC at 21:00

## 2021-11-04 RX ADMIN — ESCITALOPRAM OXALATE 5 MG: 10 TABLET ORAL at 04:41

## 2021-11-04 RX ADMIN — ATORVASTATIN CALCIUM 40 MG: 40 TABLET, FILM COATED ORAL at 17:03

## 2021-11-04 RX ADMIN — SENNOSIDES AND DOCUSATE SODIUM 2 TABLET: 50; 8.6 TABLET ORAL at 17:03

## 2021-11-04 RX ADMIN — PREDNISOLONE ACETATE 1 DROP: 10 SUSPENSION/ DROPS OPHTHALMIC at 17:04

## 2021-11-04 RX ADMIN — METOPROLOL SUCCINATE 25 MG: 25 TABLET, EXTENDED RELEASE ORAL at 04:41

## 2021-11-04 RX ADMIN — ENOXAPARIN SODIUM 40 MG: 40 INJECTION SUBCUTANEOUS at 10:02

## 2021-11-04 ASSESSMENT — COGNITIVE AND FUNCTIONAL STATUS - GENERAL
CLIMB 3 TO 5 STEPS WITH RAILING: TOTAL
SUGGESTED CMS G CODE MODIFIER MOBILITY: CM
SUGGESTED CMS G CODE MODIFIER MOBILITY: CL
TURNING FROM BACK TO SIDE WHILE IN FLAT BAD: A LOT
DRESSING REGULAR UPPER BODY CLOTHING: A LITTLE
DAILY ACTIVITIY SCORE: 17
TOILETING: A LOT
MOVING FROM LYING ON BACK TO SITTING ON SIDE OF FLAT BED: UNABLE
WALKING IN HOSPITAL ROOM: TOTAL
STANDING UP FROM CHAIR USING ARMS: TOTAL
SUGGESTED CMS G CODE MODIFIER DAILY ACTIVITY: CK
MOVING TO AND FROM BED TO CHAIR: UNABLE
STANDING UP FROM CHAIR USING ARMS: A LOT
MOBILITY SCORE: 7
MOVING TO AND FROM BED TO CHAIR: A LOT
DRESSING REGULAR LOWER BODY CLOTHING: A LOT
TURNING FROM BACK TO SIDE WHILE IN FLAT BAD: A LOT
HELP NEEDED FOR BATHING: A LOT
WALKING IN HOSPITAL ROOM: A LOT
CLIMB 3 TO 5 STEPS WITH RAILING: A LOT
MOVING FROM LYING ON BACK TO SITTING ON SIDE OF FLAT BED: A LOT
MOBILITY SCORE: 12

## 2021-11-04 ASSESSMENT — ENCOUNTER SYMPTOMS
HALLUCINATIONS: 0
ABDOMINAL PAIN: 0
FOCAL WEAKNESS: 0
VOMITING: 0
HEADACHES: 0
MYALGIAS: 1
CHILLS: 0
SHORTNESS OF BREATH: 0
DIARRHEA: 0
PALPITATIONS: 0
DEPRESSION: 0
FALLS: 1
HEARTBURN: 0
DIZZINESS: 0
SORE THROAT: 0
WEAKNESS: 0
BLOOD IN STOOL: 0
NAUSEA: 0
COUGH: 0
FEVER: 0
BACK PAIN: 0

## 2021-11-04 ASSESSMENT — GAIT ASSESSMENTS: GAIT LEVEL OF ASSIST: UNABLE TO PARTICIPATE

## 2021-11-04 ASSESSMENT — PATIENT HEALTH QUESTIONNAIRE - PHQ9
SUM OF ALL RESPONSES TO PHQ9 QUESTIONS 1 AND 2: 0
2. FEELING DOWN, DEPRESSED, IRRITABLE, OR HOPELESS: NOT AT ALL
1. LITTLE INTEREST OR PLEASURE IN DOING THINGS: NOT AT ALL

## 2021-11-04 ASSESSMENT — PAIN DESCRIPTION - PAIN TYPE
TYPE: SURGICAL PAIN
TYPE: ACUTE PAIN;SURGICAL PAIN

## 2021-11-04 NOTE — PROGRESS NOTES
COVID-19 surge in effect   Report received from PACU RN, assumed care of pt.   POC and medications reviewed with pt. Pt verbalized understanding.   AOx4  Denies pain, SOB, or dizziness at this time.   Safety measures in place.

## 2021-11-04 NOTE — ASSESSMENT & PLAN NOTE
RUQ US done inpatient as this has been planned as outpatient and she had a very mild bump in LFTs  RUQ US shows cholelithiasis however no evidence of cholecystitis or obstruction  Okay to continue statin for now however if LFTs increase further tomorrow, will discontinue

## 2021-11-04 NOTE — OP REPORT
Preop diagnosis: Right supracondylar periprosthetic distal femur fracture    Postop diagnosis: Same    Procedure: Revision right total knee arthroplasty with resection of the distal femur and placement of distal femoral replacement hinged implant    Surgeon: Ramesh Styles MD    Assistant: Joseph Renae PA-C    Estimated blood loss: 100 cc    Components removed: Distal femur with cemented femoral component and proximal tibial all polycomponent    Components replaced: Cemented [place] distal femoral replacement with cemented tibial stem 16mm hinged polyethylene hinged insert.    Findings: Comminuted right far distal femur fracture.  Intact patella and tibial components    Complications: None    Summary:    Patient was brought to the operating room and anesthesia was administered.  Antibiotics including Ancef and vancomycin were given IV along with tranexamic acid.  Right leg prepped and draped in usual sterile manner.  Leg was exsanguinated tourniquet inflated timeout was called.    Made an anterior incision just extending a little bit past where the previous total knee incision was.  Standard medial parapatellar arthrotomy was made the hematoma was evacuated.  The distal femur was subperiosteally resected with care to protect all surrounding neurovascular structures.  We then measured from the joint line proximally and made a transverse cut in the femur to allow for the shortest distal femoral replacement.    The knee was then flexed and we used a saw to remove the polyethylene tibial piece right at the cement bone interface.  The stem and cement were then removed.  We reamed the tibia just up to about 14-15 and made to freshen up cut.  The size as to had the best coverage we prepared that for the housing and the rotational flanges.    We then placed the smallest distal femoral replacement and with a 16mm insert we had nice tension.  We set rotation of the femur so that the patella tracked nicely on flexion.    The  canals were then plugged we irrigated the mouth thoroughly and then cemented the components with antibiotic cement including tobramycin plus a gram of vancomycin.  Cement was cleared once a cement was hardened the tourniquet was released and the trial polyethylene removed.  There was no pulsatile bleeding which was stopped a couple little losers.  We have soaked with dilute Betadine already.  We did the final reduction with a 16mm insert in the 1 degree buffer.  Final irrigation was done and vancomycin powder placed in the knee joint.  The knee was placed in flexion and tendon closed with running #2 Quill.  The local anesthetic was injected intra-articularly the skin closed with layers of Vicryl in a running 3-0 Monocryl.  A silver impregnated dressing was placed followed by compression wrap and a knee immobilizer.  Patient was stable during the procedure and at the time of this dictation.

## 2021-11-04 NOTE — CARE PLAN
The patient is Stable - Low risk of patient condition declining or worsening    Shift Goals  Clinical Goals: pain control, ambulation     Progress made toward(s) clinical / shift goals:    Problem: Pain - Standard  Goal: Alleviation of pain or a reduction in pain to the patient’s comfort goal  Outcome: Progressing  Note: Pain managed per MAR        Problem: Fall Risk  Goal: Patient will remain free from falls  Outcome: Progressing  Note: Pt remain free from injury, instructed to use call light when wanting to get up, pt verbalized understanding      Problem: Post-Operative Knee Replacement  Goal: Patient's neurovascular status will be maintained or improve  Outcome: Progressing       Patient is not progressing towards the following goals:

## 2021-11-04 NOTE — DISCHARGE PLANNING
Anticipated Discharge Disposition: [facility]     Action: LSW completed chart review. [facility] is following. Jeff requested PT/OT notes when appropriate.    Discussed pt in IDT rounds. Per MD, pt will be clear to d/c tomorrow. LSW notified team rehab is waiting for PT/OT evals.    Barriers to Discharge: rehab acceptance, OT eval    Plan: LSW to meet with pt to collect SNF choice as back up for placement options.

## 2021-11-04 NOTE — OR NURSING
1940 Pt arrived from OR post   REVISION, TOTAL ARTHROPLASTY, KNEE, ALL COMPONENTS Right   , report received. Pt breathing spontaneous and unlabored. Dressing CDI. CMS intact.     1945 Pt family updated by Anesthesiaologist on progress, POC and ETA for return to room.     2030 Pt states pain is controled and tolerable, denies nausea. Pt tolerating PO fluids. Report to Rafita CANO.    2034 Pt taken to room by RN in stable condition on 3lpm lpm O2 via mask with portable tank @ 493 liters psi

## 2021-11-04 NOTE — DISCHARGE PLANNING
POD 1 Revision right total knee arthroplasty with resection of the distal femur and placement of distal femoral replacement hinged implant.  Would appreciate TX evals once appropriate.

## 2021-11-04 NOTE — PROGRESS NOTES
"S: Patient doing well, mobilizing without difficulty.    O: Patient has good pain control, good mobility, and operated leg is neurovascularly intact.Staying in immobilizer for now.  Not out of bed yet    Dressing is clean, dry, and intact    Blood pressure 133/47, pulse 80, temperature 37.1 °C (98.8 °F), temperature source Temporal, resp. rate 18, height 1.6 m (5' 3\"), weight 53.1 kg (117 lb), SpO2 95 %.    Recent Labs     11/01/21  1111 11/01/21  1111 11/03/21  0652 11/03/21  0652 11/03/21  1314 11/03/21  1952 11/04/21  0149   WBC 7.7  --  12.4*  --   --   --  13.6*   RBC 4.52  --  3.50*  --   --   --  3.04*   HEMOGLOBIN 12.3   < > 9.6*   < > 8.6* 8.9* 8.3*   HEMATOCRIT 39.6   < > 30.4*   < > 27.0* 28.7* 26.8*   MCV 87.6  --  86.9  --   --   --  88.2   MCH 27.2  --  27.4  --   --   --  27.3   MCHC 31.1*  --  31.6*  --   --   --  31.0*   RDW 51.8*  --  50.1*  --   --   --  50.1*   PLATELETCT 206  --  152*  --   --   --  124*   MPV 11.4  --  10.2  --   --   --  10.6    < > = values in this interval not displayed.         Intake/Output Summary (Last 24 hours) at 11/4/2021 0737  Last data filed at 11/4/2021 0551  Gross per 24 hour   Intake 1640 ml   Output 700 ml   Net 940 ml             A:  Patient needs more time in hospital to address pain control and mobility  Patient Active Problem List    Diagnosis Date Noted   • Pacemaker 06/25/2021   • Second degree atrioventricular block, Mobitz (type) I 05/09/2021   • Near syncope likely vasovagal versus secondary to symptomatic SVT versus HOCM-like cardiomyopathy 04/02/2021   • SVT (supraventricular tachycardia) ([facility]) 11/06/2018   • Essential hypertension, benign 11/10/2017   • Stage 3b chronic kidney disease (HCC) 04/07/2015   • Closed fracture of multiple ribs of left side with delayed healing 05/08/2021   • Traumatic rhabdomyolysis (HCC) 05/08/2021   • Preoperative cardiovascular examination 05/08/2021   • Closed fracture of right distal femur (HCC) 11/03/2021   • PVD " (peripheral vascular disease) ([facility]) 07/30/2021   • Gait instability 07/16/2021   • Pleural effusion 06/28/2021   • Cataract cortical, senile, bilateral 06/25/2021   • Upper GI bleed 06/20/2021   • Leukocytosis 06/20/2021   • Fall 05/08/2021   • BRIAN (acute kidney injury) ([facility]) 04/06/2021   • Normocytic anemia 04/04/2021   • Recurrent major depressive disorder, in full remission ([facility]) 06/26/2019   • Imbalance 03/19/2019   • Localized edema 11/01/2018   • Depression 09/27/2018   • Low serum vitamin D 06/19/2018   • Degenerative disc disease, lumbar 07/17/2017   • Non-rheumatic mitral regurgitation 05/07/2015   • S/P total knee replacement right 09/03/2013         PLAN:Patient needs another night to work on pain control and mobility prior to discharge.  She is a candidate for Rehab since this chuy be a while until she is strong enough for living independently.

## 2021-11-04 NOTE — PROGRESS NOTES
Hospital Medicine Daily Progress Note    Date of Service  11/4/2021    Chief Complaint  Yoli Carmichael is a 88 y.o. female admitted 11/2/2021 with R leg pain after fall    Hospital Course  History of sick sinus syndrome status post pacemaker, hyperlipidemia, hypertension, depression, right knee replacement in 2013 by Dr. Styles who was admitted after a ground-level fall complaining of right leg pain found to have a right periprosthetic distal femur fracture.  Orthopedic surgery was consulted and recommended operative repair, nonweightbearing.    Interval Problem Update  11/3: Hemoglobin down to 9.6 from 12.3, 48 hours ago.  No evidence of bleeding or hematoma on exam.  Repeat Hgb scheduled for 1400, plan for OR today at 1800.  Pain is controlled  11/4: Hypoxic requiring 5 L, asymptomatic.  Using some narcotics for pain control.  Hemoglobin stable.  Unable to ambulate with PT due to pain.  Mild increase in LFTs, RUQ US ordered, CXR ordered    I have personally seen and examined the patient at bedside. I discussed the plan of care with patient, family and bedside RN.    Consultants/Specialty  orthopedics- Stephani    Code Status  Full Code    Disposition  Patient is not medically cleared.   Anticipate discharge to to skilled nursing facility.vs IP rehab  I have placed the appropriate orders for post-discharge needs.    Review of Systems  Review of Systems   Constitutional: Negative for chills, fever and malaise/fatigue.   HENT: Negative for sore throat.    Respiratory: Negative for cough and shortness of breath.    Cardiovascular: Negative for chest pain and palpitations.   Gastrointestinal: Negative for abdominal pain, blood in stool, diarrhea, heartburn, nausea and vomiting.   Genitourinary: Negative for dysuria and frequency.   Musculoskeletal: Positive for falls and myalgias. Negative for back pain.   Neurological: Negative for dizziness, focal weakness, weakness and headaches.   Psychiatric/Behavioral: Negative  for depression and hallucinations.   All other systems reviewed and are negative.       Physical Exam  Temp:  [36.6 °C (97.9 °F)-37.9 °C (100.3 °F)] 36.6 °C (97.9 °F)  Pulse:  [77-84] 81  Resp:  [15-18] 17  BP: (100-133)/(34-59) 112/44  SpO2:  [90 %-99 %] 92 %    Physical Exam  Constitutional:       General: She is not in acute distress.  HENT:      Nose: Nose normal.      Mouth/Throat:      Mouth: Mucous membranes are dry.   Cardiovascular:      Rate and Rhythm: Normal rate and regular rhythm.      Pulses: Normal pulses.   Pulmonary:      Effort: Pulmonary effort is normal.      Comments: Decreased breath sounds bilateral bases  Abdominal:      General: There is no distension.      Palpations: Abdomen is soft.   Musculoskeletal:         General: No swelling or signs of injury.      Cervical back: No muscular tenderness.      Right lower leg: Edema present.      Comments: Immobilizer in place, no obvious ecchymosis or hematoma of RLE   Lymphadenopathy:      Cervical: No cervical adenopathy.   Skin:     General: Skin is warm and dry.      Findings: No rash.   Neurological:      General: No focal deficit present.      Mental Status: She is alert and oriented to person, place, and time.      Motor: Weakness present.   Psychiatric:         Mood and Affect: Mood normal.         Thought Content: Thought content normal.         Fluids    Intake/Output Summary (Last 24 hours) at 11/4/2021 1125  Last data filed at 11/4/2021 0755  Gross per 24 hour   Intake 1560 ml   Output 700 ml   Net 860 ml       Laboratory  Recent Labs     11/03/21  0652 11/03/21  0652 11/03/21  1314 11/03/21  1952 11/04/21  0149   WBC 12.4*  --   --   --  13.6*   RBC 3.50*  --   --   --  3.04*   HEMOGLOBIN 9.6*   < > 8.6* 8.9* 8.3*   HEMATOCRIT 30.4*   < > 27.0* 28.7* 26.8*   MCV 86.9  --   --   --  88.2   MCH 27.4  --   --   --  27.3   MCHC 31.6*  --   --   --  31.0*   RDW 50.1*  --   --   --  50.1*   PLATELETCT 152*  --   --   --  124*   MPV 10.2  --    --   --  10.6    < > = values in this interval not displayed.     Recent Labs     11/03/21  0652 11/04/21  0149   SODIUM 138 136   POTASSIUM 4.2 4.7   CHLORIDE 106 105   CO2 23 22   GLUCOSE 162* 146*   BUN 22 20   CREATININE 1.02 1.23   CALCIUM 8.9 8.1*     Recent Labs     11/03/21  0118   INR 1.13               Imaging  DX-KNEE 2- RIGHT   Final Result      Interval revision of total knee arthroplasty without immediate postoperative hardware complication.      CT-CTA LOWER EXT WITH & W/O-POST PROCESS LEFT   Final Result      Comminuted periprosthetic distal femoral fracture has dorsal displacement, anterior angulation and impaction      There is folding of the distal superficial femoral artery with posterior displacement resulting in nearly 180 degrees course change. 12 mm distal to this there is then reversal of the posterior displacement with resumption of normal popliteal course    where there is a 90 degree anterior bend. The proximal kink does result in some vessel narrowing but this is not secondary to atherosclerosis      Moderate distal atherosclerotic change without stenosis or occlusion      DX-ANKLE 2- VIEWS RIGHT   Final Result      Soft tissue swelling without acute displaced fracture identified      DX-KNEE 3 VIEWS RIGHT   Final Result      Acute femoral periprosthetic fracture with comminution, anterior angulation and impaction      DX-CHEST-PORTABLE (1 VIEW)    (Results Pending)   US-RUQ    (Results Pending)        Assessment/Plan  * Closed fracture of right distal femur (HCC)  Assessment & Plan  -Prior history of right total knee replacement done by Dr. Styles in 2013  -Patient has comminuted periprosthetic distal femoral fracture with dorsal displacement and anterior angulation and impaction.  -There is also folding of the distal superficial femoral artery with posterior displacement resulting in nearly 180 degrees course change.  12 mm distal to this there is reversal of the posterior displacement  with resumption of normal popliteal course where there is a 90 degree anterior band.  -Status post repair on 11/3 with no complications  WBAT operative extremity however pain is limiting her mobility and ambulation  PT OT worked with her today and recommended SNF versus rehab, both referrals placed  -Pain control with scheduled Tylenol, p.o. oxycodone and IV Dilaudid as needed for severe breakthrough pain    Acute respiratory failure with hypoxia (HCC)  Assessment & Plan  Likely atelectasis post surgery, she is using IS  Decreased BS at bilateral bases  Check CXR  PE unlikely but will monitor saturations and continue dvt prophylaxis (lovenox)    PVD (peripheral vascular disease) ([facility])- (present on admission)  Assessment & Plan  Continue atorvastatin  Watch LFTs as she had a slight increase today, daughter says this has been an issue as an outpatient as well with waxing and waning LFTs    Pacemaker  Assessment & Plan  -Was implanted in May 2021 by Dr. Hollis.  Plan as above.  -Continue metoprolol    Cataract cortical, senile, bilateral- (present on admission)  Assessment & Plan  Resume home prednisolone    Transaminitis  Assessment & Plan  This has been ongoing issue and she was scheduled for an outpatient RUQ US today however unable to do this as she is hospitalized  She had a mild bump in LFTs today, painless  Order RUQ US while inpatient  Okay to continue statin for now however if LFTs increase further tomorrow, will discontinue    Normocytic anemia  Assessment & Plan  Patient states she has had anemia since a GI bleed in the past.  Not on any anticoagulation or NSAIDs  Dropped from 12.3-8.9 - pre-op with only very slight drop post op  Iron studies more c/w chronic disease  No evidence of large /increasing hematoma but will monitor closely in the setting of tortuous vasculature seen on imaging in the proximity of the fracture  Repeat Hgb daily  Ok for lovenox    Depression- (present on admission)  Assessment &  Plan  Continue lexapro    Stage 3b chronic kidney disease (HCC)- (present on admission)  Assessment & Plan  Slight jump in creatinine today, still with good urine output  Avoid nephrotoxins, including contrast  Repeat creatinine in a.m.    S/P total knee replacement right- (present on admission)  Assessment & Plan  -Dr. Styles, 2013.    Preoperative cardiovascular examination  Assessment & Plan  .       VTE prophylaxis: enoxaparin ppx     I have performed a physical exam and reviewed and updated ROS and Plan today (11/4/2021). In review of yesterday's note (11/3/2021), there are no changes except as documented above.    Total time:  40 minutes.  I spent greater than 50% of the time for patient care, counseling, and coordination on this date, including unit/floor time, and face-to-face time with the patient as per interval events and assessment and plan above

## 2021-11-04 NOTE — ANESTHESIA TIME REPORT
Anesthesia Start and Stop Event Times     Date Time Event    11/3/2021 05:29 PM Ready for Procedure    11/3/2021 05:58 PM Anesthesia Start    11/3/2021 07:56 PM Anesthesia Stop        Responsible Staff  11/03/21    Name Role Begin End    Isabel Young M.D. Anesthesiologist 11/03/21 05:58 PM 11/03/21 07:56 PM        Preop Diagnosis (Free Text):  Pre-op Diagnosis     periprosthetic right distal femur fracture         Preop Diagnosis (Codes):  Diagnosis Information     Diagnosis Code(s): Fracture of femur, distal (HCC) [S72.409A]        Premium Reason  A. 3PM - 7AM    Comments:

## 2021-11-04 NOTE — ASSESSMENT & PLAN NOTE
From atelectasis postoperatively and mild pulmonary edema after IV fluids have been given  BNP was elevated around 5000  Continue trial of Lasix 20 IV daily  She had an echo in April 2021 that showed EF of 75%, no evidence of heart failure  PE unlikely but will monitor saturations and continue dvt prophylaxis (lovenox)  Wean oxygen as able

## 2021-11-04 NOTE — ANESTHESIA PREPROCEDURE EVALUATION
88 y.o. female admitted 11/2/2021 with R leg pain after fall.  History of sick sinus syndrome status post pacemaker, hyperlipidemia, hypertension, depression, right knee replacement in 2013 by Dr. Styles who was admitted after a ground-level fall complaining of right leg pain found to have a right periprosthetic distal femur fracture.     Relevant Problems   CARDIAC   (positive) Essential hypertension, benign   (positive) Non-rheumatic mitral regurgitation   (positive) Pacemaker   (positive) Second degree atrioventricular block, Mobitz (type) I         (positive) BRIAN (acute kidney injury) ([facility])   (positive) Stage 3b chronic kidney disease ([facility])      Other   (positive) Closed fracture of right distal femur ([facility])   (positive) Depression   (positive) Normocytic anemia   (positive) PVD (peripheral vascular disease) ([facility])   (positive) Preoperative cardiovascular examination   (positive) S/P total knee replacement right       Physical Exam    Airway   Mallampati: III  TM distance: >3 FB  Neck ROM: full       Cardiovascular - normal exam  Rhythm: regular  Rate: normal  (-) murmur     Dental - normal exam           Pulmonary - normal exam  Breath sounds clear to auscultation     Abdominal    Neurological - normal exam                 Anesthesia Plan    ASA 3   ASA physical status 3 criteria: other (comment)    Plan - general and peripheral nerve block     Peripheral nerve block will be post-op pain control  Airway plan will be LMA        Plan Factors:   Patient was not previously instructed to abstain from smoking on day of procedure.  Patient did not smoke on day of procedure.      Induction: intravenous    Postoperative Plan: Postoperative administration of opioids is intended.    Pertinent diagnostic labs and testing reviewed    Informed Consent:    Anesthetic plan and risks discussed with patient.    Use of blood products discussed with: patient whom consented to blood products.

## 2021-11-04 NOTE — CARE PLAN
The patient is Watcher - Medium risk of patient condition declining or worsening     Goal: patient will have pain well controled, no signs and sympotms of bleeding, no signs and symptoms of infection.    Progress made toward(s) clinical / shift goals:  patient states pain is well controled, monitor hemoglobin, MD notified, temperature WDL, WBC WDL

## 2021-11-04 NOTE — CARE PLAN
The patient is Watcher - Medium risk of patient condition declining or worsening    Shift Goals  Clinical Goals: patient will ambulate safely, will tolerate pt, state pain is well controled     Progress made toward(s) clinical / shift goals:  ***    Patient is not progressing towards the following goals:

## 2021-11-04 NOTE — THERAPY
Physical Therapy   Initial Evaluation     Patient Name: Yoli Carmichael  Age:  88 y.o., Sex:  female  Medical Record #: [id number]  Today's Date: 11/4/2021     Precautions  Precautions: (P) Fall Risk;Weight Bearing As Tolerated Right Lower Extremity;Immobilizer Right Lower Extremity  Comments: (P) immobilizer at all times; assume NO ROM    Assessment  Patient is 88 y.o. female who was recently admitted for GLF and presented with R periprosthetic distal femur fracture and is now s/p Revision of R TKA. Pt is ordered to be WBAT for R LE with immobilizer on at all times. Pt presented to PT with impaired balance, impaired gait, weakness, pain, poor motor planning, and dec activity tolerance. Pt presented with significant dec in SpO2 with upright mobility and reported of dizziness throughout session. Pt required 6 L O2 during upright mobility to maintain SpO2 above 90%. Pt provided with encouragement to perform quads sets, SLR, and ankle pumps as able. Pt will continue to benefit from skilled PT while in house, with recommendation for post acute therapy prior to d/c home.     Plan    Recommend Physical Therapy 5 times per week until therapy goals are met for the following treatments:  Bed Mobility, Community Re-integration, Equipment, Gait Training, Manual Therapy, Neuro Re-Education / Balance, Self Care/Home Evaluation, Stair Training, Therapeutic Activities and Therapeutic Exercises    DC Equipment Recommendations: (P) Unable to determine at this time  Discharge Recommendations: (P) Recommend post-acute placement for additional physical therapy services prior to discharge home     Objective       11/04/21 0940   Initial Contact Note    Initial Contact Note Order Received and Verified, Physical Therapy Evaluation in Progress with Full Report to Follow.   Precautions   Precautions Fall Risk;Weight Bearing As Tolerated Right Lower Extremity;Immobilizer Right Lower Extremity   Comments immobilizer at all times; assume NO  ROM   Vitals   Pulse Oximetry 91 %   O2 (LPM) 6   O2 Delivery Device Nasal Cannula   Vitals Comments pt demonstrated poor ability to maintain 90% Spo2 with upright mobility    Pain 0 - 10 Group   Location Knee   Location Orientation Right   Description Aching   Therapist Pain Assessment Prior to Activity;Post Activity;During Activity;Nurse Notified;3   Prior Living Situation   Prior Services None   Housing / Facility 1 Story House   Steps Into Home 1   Steps In Home 0   Equipment Owned Front-Wheel Walker;Single Point Cane   Lives with - Patient's Self Care Capacity Alone and Able to Care For Self   Comments pt states she primarily lives alone and I with all mobility    Prior Level of Functional Mobility   Bed Mobility Independent   Transfer Status Independent   Ambulation Independent   Distance Ambulation (Feet)   (community)   Assistive Devices Used None   Stairs Independent   Comments reports of an IPLOF    History of Falls   History of Falls Yes   Date of Last Fall   (reason for admission)   Cognition    Cognition / Consciousness X   Speech/ Communication Delayed Responses   Attention Impaired   Comments poor insight into current deficits at times; unware of SOB and decreasing SpO2    Passive ROM Lower Body   Passive ROM Lower Body X   Comments immobilizer on R LE; assume no ROM; WNL for L LE    Active ROM Lower Body    Active ROM Lower Body  X   Comments same as above   Strength Lower Body   Lower Body Strength  X   Comments presents with 1+/5 strength at R LE due to pain; 4/5 for L LE    Sensation Lower Body   Lower Extremity Sensation   WDL   Lower Body Muscle Tone   Lower Body Muscle Tone  WDL   Strength Upper Body   Upper Body Strength  WDL   Upper Body Muscle Tone   Upper Body Muscle Tone  WDL   Neurological Concerns   Neurological Concerns No   Coordination Lower Body    Coordination Lower Body  Not Tested   Balance Assessment   Sitting Balance (Static) Fair   Sitting Balance (Dynamic) Fair -   Standing  Balance (Static) Poor   Standing Balance (Dynamic) Poor -   Weight Shift Sitting Fair   Weight Shift Standing Poor   Comments w/fww; presents with posterior lean upon standing, and requires manual faciliation to maintain COG over hips    Gait Analysis   Gait Level Of Assist Unable to Participate   Weight Bearing Status WBAT R LE   Comments attempted side steps, however unable to complete, able to slide L LE however not very functional    Bed Mobility    Supine to Sit Minimal Assist   Sit to Supine Moderate Assist   Scooting Minimal Assist   Comments HOB elevated and rails up    Functional Mobility   Sit to Stand Moderate Assist   Bed, Chair, Wheelchair Transfer Unable to Participate   Mobility EOB, sit<>stand x 2, weight shifts, back to bed    Comments cues and manual faciliation for appropriate mechanics for stanidng, requires cues for flexion of L LE prior to standing    How much difficulty does the patient currently have...   Turning over in bed (including adjusting bedclothes, sheets and blankets)? 2   Sitting down on and standing up from a chair with arms (e.g., wheelchair, bedside commode, etc.) 1   Moving from lying on back to sitting on the side of the bed? 1   How much help from another person does the patient currently need...   Moving to and from a bed to a chair (including a wheelchair)? 1   Need to walk in a hospital room? 1   Climbing 3-5 steps with a railing? 1   6 clicks Mobility Score 7   Activity Tolerance   Sitting in Chair NT   Sitting Edge of Bed 10 mins   Standing 2 mins x 2    Comments limited due to dizziness and dec SpO2   Edema / Skin Assessment   Edema / Skin  Not Assessed   Patient / Family Goals    Patient / Family Goal #1 to go back to PLOF    Short Term Goals    Short Term Goal # 1 pt will go supine<>sit w/hob flat and rails down w/spv in 6tx    Short Term Goal # 2 pt will go sit<>stand w/fww w/spv in 6tx    Short Term Goal # 3 pt will transfer bed<>chair w/Min A w/fww in 6tx    Short  Term Goal # 4 pt will ambulate for 50ft w/fww w/Min A in 6tx    Education Group   Education Provided Role of Physical Therapist;Use of Assistive Device;Weight Bearing Status;Weight Bearing Precautions   Role of Physical Therapist Patient Response Patient;Acceptance;Explanation;Demonstration;Verbal Demonstration;Action Demonstration   Use of Assistive Device Patient Response Patient;Acceptance;Demonstration;Explanation;Verbal Demonstration;Action Demonstration   Weight Bearing Status Patient Response Patient;Acceptance;Explanation;Demonstration;Action Demonstration;Reinforcement Needed;Verbal Demonstration   Problem List    Problems Pain;Impaired Bed Mobility;Impaired Transfers;Impaired Ambulation;Functional Strength Deficit;Impaired Balance;Decreased Activity Tolerance;Safety Awareness Deficits / Cognition   Anticipated Discharge Equipment and Recommendations   DC Equipment Recommendations Unable to determine at this time   Discharge Recommendations Recommend post-acute placement for additional physical therapy services prior to discharge home   Interdisciplinary Plan of Care Collaboration   IDT Collaboration with  Nursing   Patient Position at End of Therapy In Bed;Call Light within Reach;Tray Table within Reach;Phone within Reach;Bed Alarm On   Collaboration Comments aware of visit and recs; aware of concers with dropping SpO2    Session Information   Date / Session Number  11/4-1 (1/5, 11/10)

## 2021-11-04 NOTE — ANESTHESIA PROCEDURE NOTES
"1. Labs today in clinic.  Amy to Call with results in 7-10 days.  2.  Follow up with PCP in regards in \"sinus symptoms\".  3.  No need for clinic follow up (besides phone call_)  " Airway    Date/Time: 11/3/2021 6:02 PM  Performed by: Isabel Young M.D.  Authorized by: Isabel Young M.D.     Location:  OR  Urgency:  Elective  Difficult Airway: No    Indications for Airway Management:  Anesthesia      Spontaneous Ventilation: absent    Sedation Level:  Deep  Preoxygenated: Yes    Patient Position:  Sniffing  MILS Maintained Throughout: No    Mask Difficulty Assessment:  0 - not attempted  Final Airway Type:  Supraglottic airway  Final Supraglottic Airway:  Standard LMA    SGA Size:  4  Number of Attempts at Approach:  1  Number of Other Approaches Attempted:  0

## 2021-11-04 NOTE — ANESTHESIA PROCEDURE NOTES
Peripheral Block    Date/Time: 11/3/2021 5:38 PM  Performed by: Isabel Young M.D.  Authorized by: Isabel Young M.D.     Patient Location:  Pre-op  Start Time:  11/3/2021 5:38 PM  End Time:  11/3/2021 5:42 PM  Reason for Block: at surgeon's request and post-op pain management ONLY    patient identified, IV checked, site marked, risks and benefits discussed, surgical consent, monitors and equipment checked, pre-op evaluation and timeout performed    Patient Position:  Supine  Prep: ChloraPrep    Monitoring:  Heart rate, continuous pulse ox and cardiac monitor  Block Region:  Lower Extremity  Lower Extremity - Block Type:  Selective FEMORAL nerve block at the Adductor Canal    Laterality:  Right  Procedures: ultrasound guided  Image captured, interpreted and electronically stored.  Local Infiltration:  Lidocaine  Strength:  1 %  Dose:  3 ml  Block Type:  Single-shot  Needle Length:  100mm  Needle Gauge:  21 G  Needle Localization:  Ultrasound guidance  Injection Assessment:  Negative aspiration for heme, no paresthesia on injection, incremental injection and local visualized surrounding nerve on ultrasound  Evidence of intravascular injection: No     US Guided Right Selective Femoral Nerve Block at Adductor Canal:   US probe placed at mid-thigh level on externally rotated right leg and femur identified.  Probe directed medially until right Sartorius Muscle (SM), Femoral Artery (FA) and Saphenous Nerve (SN) identified in Adductor Canal (AC).  Needle inserted anterolateral to probe in an in plane approach into a subsartorial perivascular perineural position.  After negative aspiration, 25 ml of LA injected with ease and visualized spreading within the AC.

## 2021-11-04 NOTE — ANESTHESIA POSTPROCEDURE EVALUATION
Patient: Yoli Carmichael    Procedure Summary     Date: 11/03/21 Room / Location:  OR 03 / SURGERY [facility]    Anesthesia Start: 1758 Anesthesia Stop: 1956    Procedure: REVISION, TOTAL ARTHROPLASTY, KNEE, ALL COMPONENTS (Right Knee) Diagnosis:       Fracture of femur, distal (HCC)      (periprosthetic right distal femur fracture )    Surgeons: Ramesh Stlyes M.D. Responsible Provider: Isabel Young M.D.    Anesthesia Type: general, peripheral nerve block ASA Status: 3          Final Anesthesia Type: general, peripheral nerve block  Last vitals  BP   Blood Pressure : 118/41    Temp   37.1 °C (98.8 °F)    Pulse   78   Resp   18    SpO2   98 %      Anesthesia Post Evaluation    Patient location during evaluation: PACU  Patient participation: complete - patient participated  Level of consciousness: awake and alert  Pain score: 0    Airway patency: patent  Anesthetic complications: no  Cardiovascular status: hemodynamically stable  Respiratory status: acceptable  Hydration status: euvolemic    PONV: none    patient able to participate, but full recovery from regional anesthesia has not occurred and is not expected within the stipulated timeframe for the completion of the evaluation      No complications documented.     Nurse Pain Score: 0 (NPRS)

## 2021-11-05 ENCOUNTER — APPOINTMENT (OUTPATIENT)
Dept: RADIOLOGY | Facility: MEDICAL CENTER | Age: 86
DRG: 466 | End: 2021-11-05
Attending: INTERNAL MEDICINE
Payer: MEDICARE

## 2021-11-05 LAB
A1AT PHENOTYP SERPL-IMP: NORMAL
A1AT SERPL-MCNC: 128 MG/DL (ref 90–200)
ALBUMIN SERPL BCP-MCNC: 2.7 G/DL (ref 3.2–4.9)
ALBUMIN/GLOB SERPL: 1.2 G/DL
ALP SERPL-CCNC: 80 U/L (ref 30–99)
ALT SERPL-CCNC: 10 U/L (ref 2–50)
ANION GAP SERPL CALC-SCNC: 10 MMOL/L (ref 7–16)
AST SERPL-CCNC: 33 U/L (ref 12–45)
BASOPHILS # BLD AUTO: 0.3 % (ref 0–1.8)
BASOPHILS # BLD: 0.05 K/UL (ref 0–0.12)
BILIRUB SERPL-MCNC: 0.6 MG/DL (ref 0.1–1.5)
BUN SERPL-MCNC: 26 MG/DL (ref 8–22)
CALCIUM SERPL-MCNC: 7.9 MG/DL (ref 8.4–10.2)
CHLORIDE SERPL-SCNC: 100 MMOL/L (ref 96–112)
CO2 SERPL-SCNC: 21 MMOL/L (ref 20–33)
CREAT SERPL-MCNC: 1.35 MG/DL (ref 0.5–1.4)
EOSINOPHIL # BLD AUTO: 0.03 K/UL (ref 0–0.51)
EOSINOPHIL NFR BLD: 0.2 % (ref 0–6.9)
ERYTHROCYTE [DISTWIDTH] IN BLOOD BY AUTOMATED COUNT: 49.7 FL (ref 35.9–50)
GLOBULIN SER CALC-MCNC: 2.2 G/DL (ref 1.9–3.5)
GLUCOSE SERPL-MCNC: 133 MG/DL (ref 65–99)
HCT VFR BLD AUTO: 22.5 % (ref 37–47)
HGB BLD-MCNC: 7.2 G/DL (ref 12–16)
IMM GRANULOCYTES # BLD AUTO: 0.19 K/UL (ref 0–0.11)
IMM GRANULOCYTES NFR BLD AUTO: 1.1 % (ref 0–0.9)
LYMPHOCYTES # BLD AUTO: 2.68 K/UL (ref 1–4.8)
LYMPHOCYTES NFR BLD: 15.1 % (ref 22–41)
MAGNESIUM SERPL-MCNC: 2 MG/DL (ref 1.5–2.5)
MCH RBC QN AUTO: 28.2 PG (ref 27–33)
MCHC RBC AUTO-ENTMCNC: 32 G/DL (ref 33.6–35)
MCV RBC AUTO: 88.2 FL (ref 81.4–97.8)
MONOCYTES # BLD AUTO: 1.55 K/UL (ref 0–0.85)
MONOCYTES NFR BLD AUTO: 8.7 % (ref 0–13.4)
NEUTROPHILS # BLD AUTO: 13.28 K/UL (ref 2–7.15)
NEUTROPHILS NFR BLD: 74.6 % (ref 44–72)
NRBC # BLD AUTO: 0 K/UL
NRBC BLD-RTO: 0 /100 WBC
NT-PROBNP SERPL IA-MCNC: 5140 PG/ML (ref 0–125)
PLATELET # BLD AUTO: 113 K/UL (ref 164–446)
PMV BLD AUTO: 11.2 FL (ref 9–12.9)
POTASSIUM SERPL-SCNC: 4.6 MMOL/L (ref 3.6–5.5)
PROCALCITONIN SERPL-MCNC: 0.25 NG/ML
PROT SERPL-MCNC: 4.9 G/DL (ref 6–8.2)
RBC # BLD AUTO: 2.55 M/UL (ref 4.2–5.4)
SODIUM SERPL-SCNC: 131 MMOL/L (ref 135–145)
WBC # BLD AUTO: 17.8 K/UL (ref 4.8–10.8)

## 2021-11-05 PROCEDURE — 700111 HCHG RX REV CODE 636 W/ 250 OVERRIDE (IP): Performed by: INTERNAL MEDICINE

## 2021-11-05 PROCEDURE — 97110 THERAPEUTIC EXERCISES: CPT

## 2021-11-05 PROCEDURE — 97165 OT EVAL LOW COMPLEX 30 MIN: CPT

## 2021-11-05 PROCEDURE — 83880 ASSAY OF NATRIURETIC PEPTIDE: CPT

## 2021-11-05 PROCEDURE — 99233 SBSQ HOSP IP/OBS HIGH 50: CPT | Performed by: INTERNAL MEDICINE

## 2021-11-05 PROCEDURE — 83735 ASSAY OF MAGNESIUM: CPT

## 2021-11-05 PROCEDURE — 85025 COMPLETE CBC W/AUTO DIFF WBC: CPT

## 2021-11-05 PROCEDURE — 97530 THERAPEUTIC ACTIVITIES: CPT

## 2021-11-05 PROCEDURE — 770006 HCHG ROOM/CARE - MED/SURG/GYN SEMI*

## 2021-11-05 PROCEDURE — 84145 PROCALCITONIN (PCT): CPT

## 2021-11-05 PROCEDURE — 700102 HCHG RX REV CODE 250 W/ 637 OVERRIDE(OP): Performed by: HOSPITALIST

## 2021-11-05 PROCEDURE — A9270 NON-COVERED ITEM OR SERVICE: HCPCS | Performed by: HOSPITALIST

## 2021-11-05 PROCEDURE — 36415 COLL VENOUS BLD VENIPUNCTURE: CPT

## 2021-11-05 PROCEDURE — 71045 X-RAY EXAM CHEST 1 VIEW: CPT

## 2021-11-05 PROCEDURE — 99451 NTRPROF PH1/NTRNET/EHR 5/>: CPT | Performed by: PHYSICAL MEDICINE & REHABILITATION

## 2021-11-05 PROCEDURE — 94760 N-INVAS EAR/PLS OXIMETRY 1: CPT

## 2021-11-05 PROCEDURE — 80053 COMPREHEN METABOLIC PANEL: CPT

## 2021-11-05 RX ORDER — FUROSEMIDE 10 MG/ML
20 INJECTION INTRAMUSCULAR; INTRAVENOUS ONCE
Status: COMPLETED | OUTPATIENT
Start: 2021-11-05 | End: 2021-11-05

## 2021-11-05 RX ADMIN — PREDNISOLONE ACETATE 1 DROP: 10 SUSPENSION/ DROPS OPHTHALMIC at 17:05

## 2021-11-05 RX ADMIN — FUROSEMIDE 20 MG: 10 INJECTION, SOLUTION INTRAMUSCULAR; INTRAVENOUS at 10:19

## 2021-11-05 RX ADMIN — SENNOSIDES AND DOCUSATE SODIUM 2 TABLET: 50; 8.6 TABLET ORAL at 17:06

## 2021-11-05 RX ADMIN — ESCITALOPRAM OXALATE 5 MG: 10 TABLET ORAL at 04:37

## 2021-11-05 RX ADMIN — PREDNISOLONE ACETATE 1 DROP: 10 SUSPENSION/ DROPS OPHTHALMIC at 12:32

## 2021-11-05 RX ADMIN — PREDNISOLONE ACETATE 1 DROP: 10 SUSPENSION/ DROPS OPHTHALMIC at 08:41

## 2021-11-05 RX ADMIN — PREDNISOLONE ACETATE 1 DROP: 10 SUSPENSION/ DROPS OPHTHALMIC at 17:08

## 2021-11-05 RX ADMIN — ENOXAPARIN SODIUM 40 MG: 40 INJECTION SUBCUTANEOUS at 08:40

## 2021-11-05 RX ADMIN — ATORVASTATIN CALCIUM 40 MG: 40 TABLET, FILM COATED ORAL at 17:06

## 2021-11-05 RX ADMIN — METOPROLOL SUCCINATE 25 MG: 25 TABLET, EXTENDED RELEASE ORAL at 04:37

## 2021-11-05 RX ADMIN — PREDNISOLONE ACETATE 1 DROP: 10 SUSPENSION/ DROPS OPHTHALMIC at 04:41

## 2021-11-05 RX ADMIN — PREDNISOLONE ACETATE 1 DROP: 10 SUSPENSION/ DROPS OPHTHALMIC at 11:50

## 2021-11-05 RX ADMIN — ACETAMINOPHEN 650 MG: 325 TABLET ORAL at 04:37

## 2021-11-05 RX ADMIN — PREDNISOLONE ACETATE 1 DROP: 10 SUSPENSION/ DROPS OPHTHALMIC at 20:10

## 2021-11-05 ASSESSMENT — COGNITIVE AND FUNCTIONAL STATUS - GENERAL
DAILY ACTIVITIY SCORE: 17
HELP NEEDED FOR BATHING: A LOT
SUGGESTED CMS G CODE MODIFIER DAILY ACTIVITY: CK
STANDING UP FROM CHAIR USING ARMS: TOTAL
CLIMB 3 TO 5 STEPS WITH RAILING: TOTAL
WALKING IN HOSPITAL ROOM: TOTAL
TURNING FROM BACK TO SIDE WHILE IN FLAT BAD: A LOT
MOVING TO AND FROM BED TO CHAIR: UNABLE
MOVING FROM LYING ON BACK TO SITTING ON SIDE OF FLAT BED: UNABLE
TOILETING: A LOT
DRESSING REGULAR LOWER BODY CLOTHING: A LOT
DRESSING REGULAR UPPER BODY CLOTHING: A LITTLE
SUGGESTED CMS G CODE MODIFIER MOBILITY: CM
MOBILITY SCORE: 7

## 2021-11-05 ASSESSMENT — PAIN DESCRIPTION - PAIN TYPE
TYPE: ACUTE PAIN
TYPE: ACUTE PAIN;SURGICAL PAIN

## 2021-11-05 ASSESSMENT — ENCOUNTER SYMPTOMS
BLOOD IN STOOL: 0
WEAKNESS: 1
HEADACHES: 0
HALLUCINATIONS: 0
DEPRESSION: 0
FALLS: 1
SORE THROAT: 0
CHILLS: 0
BACK PAIN: 0
SHORTNESS OF BREATH: 1
MYALGIAS: 1
HEARTBURN: 0
FEVER: 0
ABDOMINAL PAIN: 0
DIZZINESS: 0
FOCAL WEAKNESS: 0
DIARRHEA: 0
COUGH: 0
PALPITATIONS: 0
NAUSEA: 0
VOMITING: 0

## 2021-11-05 ASSESSMENT — GAIT ASSESSMENTS: GAIT LEVEL OF ASSIST: UNABLE TO PARTICIPATE

## 2021-11-05 ASSESSMENT — ACTIVITIES OF DAILY LIVING (ADL): TOILETING: INDEPENDENT

## 2021-11-05 NOTE — DISCHARGE PLANNING
Following for post acute services. Ongoing medical management. Reached out to daughter for choice and discharge support. Will follow.

## 2021-11-05 NOTE — CARE PLAN
The patient is Stable - Low risk of patient condition declining or worsening    Shift Goals  Clinical Goals: pain control, pt will maintain     Progress made toward(s) clinical / shift goals:    Problem: Knowledge Deficit - Standard  Goal: Patient and family/care givers will demonstrate understanding of plan of care, disease process/condition, diagnostic tests and medications  Outcome: Progressing  Note: Reviewed plan of care with pt, pt verbalized understanding, no questions at this time.      Problem: Pain - Standard  Goal: Alleviation of pain or a reduction in pain to the patient’s comfort goal  Outcome: Progressing  Note: Pain managed per MAR        Problem: Post-Operative Knee Replacement  Goal: Patient's neurovascular status will be maintained or improve  Outcome: Progressing       Patient is not progressing towards the following goals:

## 2021-11-05 NOTE — PROGRESS NOTES
[facility] Medicine Daily Progress Note    Date of Service  11/5/2021    Chief Complaint  Yoli Carmichael is a 88 y.o. female admitted 11/2/2021 with R leg pain after fall    Hospital Course  History of sick sinus syndrome status post pacemaker, hyperlipidemia, hypertension, depression, right knee replacement in 2013 by Dr. Styles who was admitted after a ground-level fall complaining of right leg pain found to have a right periprosthetic distal femur fracture.  Orthopedic surgery was consulted and recommended operative repair, nonweightbearing.    Interval Problem Update  11/3: Hemoglobin down to 9.6 from 12.3, 48 hours ago.  No evidence of bleeding or hematoma on exam.  Repeat Hgb scheduled for 1400, plan for OR today at 1800.  Pain is controlled  11/4: Hypoxic requiring 5 L, asymptomatic.  Using some narcotics for pain control.  Hemoglobin stable.  Unable to ambulate with PT due to pain.  Mild increase in LFTs, RUQ US ordered, CXR ordered  11/5: Still on 3-5L, elevated BNP.  Lsix x1 given.  Hgb down to 7.2 no e/o bleeding, still on lovenox, monitoring closely    I have personally seen and examined the patient at bedside. I discussed the plan of care with patient, family and bedside RN.    Consultants/Specialty  orthopedics- Stephani    Code Status  Full Code    Disposition  Patient is not medically cleared.   Anticipate discharge to to skilled nursing facility.vs IP rehab  I have placed the appropriate orders for post-discharge needs.    Review of Systems  Review of Systems   Constitutional: Positive for malaise/fatigue. Negative for chills and fever.   HENT: Negative for sore throat.    Respiratory: Positive for shortness of breath. Negative for cough.    Cardiovascular: Negative for chest pain and palpitations.   Gastrointestinal: Negative for abdominal pain, blood in stool, diarrhea, heartburn, nausea and vomiting.   Genitourinary: Negative for dysuria and frequency.   Musculoskeletal: Positive for falls and  myalgias. Negative for back pain.   Neurological: Positive for weakness. Negative for dizziness, focal weakness and headaches.   Psychiatric/Behavioral: Negative for depression and hallucinations.   All other systems reviewed and are negative.       Physical Exam  Temp:  [36.8 °C (98.2 °F)-37.8 °C (100 °F)] 36.8 °C (98.2 °F)  Pulse:  [82-92] 85  Resp:  [17-18] 17  BP: (108-132)/(32-49) 108/32  SpO2:  [90 %-97 %] 97 %    Physical Exam  Constitutional:       General: She is not in acute distress.  HENT:      Nose: Nose normal.      Mouth/Throat:      Mouth: Mucous membranes are dry.   Cardiovascular:      Rate and Rhythm: Normal rate and regular rhythm.      Pulses: Normal pulses.   Pulmonary:      Effort: Pulmonary effort is normal.      Comments: Decreased breath sounds bilateral bases  Abdominal:      General: There is no distension.      Palpations: Abdomen is soft.   Musculoskeletal:         General: No swelling or signs of injury.      Cervical back: No muscular tenderness.      Right lower leg: Edema present.      Comments: Immobilizer in place, no e/o ecchymosis or hematoma of RLE   Lymphadenopathy:      Cervical: No cervical adenopathy.   Skin:     General: Skin is warm and dry.      Findings: No rash.   Neurological:      General: No focal deficit present.      Mental Status: She is alert and oriented to person, place, and time.      Motor: Weakness present.   Psychiatric:         Mood and Affect: Mood normal.         Thought Content: Thought content normal.         Fluids    Intake/Output Summary (Last 24 hours) at 11/5/2021 1459  Last data filed at 11/5/2021 0409  Gross per 24 hour   Intake --   Output 650 ml   Net -650 ml       Laboratory  Recent Labs     11/03/21  0652 11/03/21  1314 11/03/21  1952 11/04/21  0149 11/05/21  0107   WBC 12.4*  --   --  13.6* 17.8*   RBC 3.50*  --   --  3.04* 2.55*   HEMOGLOBIN 9.6*   < > 8.9* 8.3* 7.2*   HEMATOCRIT 30.4*   < > 28.7* 26.8* 22.5*   MCV 86.9  --   --  88.2  88.2   MCH 27.4  --   --  27.3 28.2   MCHC 31.6*  --   --  31.0* 32.0*   RDW 50.1*  --   --  50.1* 49.7   PLATELETCT 152*  --   --  124* 113*   MPV 10.2  --   --  10.6 11.2    < > = values in this interval not displayed.     Recent Labs     11/03/21  0652 11/04/21  0149 11/05/21  0107   SODIUM 138 136 131*   POTASSIUM 4.2 4.7 4.6   CHLORIDE 106 105 100   CO2 23 22 21   GLUCOSE 162* 146* 133*   BUN 22 20 26*   CREATININE 1.02 1.23 1.35   CALCIUM 8.9 8.1* 7.9*     Recent Labs     11/03/21  0118   INR 1.13               Imaging  DX-CHEST-PORTABLE (1 VIEW)   Final Result      Stable chest with cardiac silhouette enlargement, some reticular opacity which could be from edema and/or scarring      Hyperinflation      US-RUQ   Final Result      1.  Cholelithiasis without evidence for cholecystitis      2.  Mild biliary dilation with common bile duct measuring 9 mm. This finding is unchanged since CT 6/20/2021 and is probably not secondary to obstruction      3.  Incidental right renal cyst      DX-CHEST-PORTABLE (1 VIEW)   Final Result      1.  Chronic pulmonary interstitial densities which are likely fibrosis. Edema unlikely.      2.  Enlarged cardiac silhouette      3.  Presence of cardiac pacemaker      DX-KNEE 2- RIGHT   Final Result      Interval revision of total knee arthroplasty without immediate postoperative hardware complication.      CT-CTA LOWER EXT WITH & W/O-POST PROCESS LEFT   Final Result      Comminuted periprosthetic distal femoral fracture has dorsal displacement, anterior angulation and impaction      There is folding of the distal superficial femoral artery with posterior displacement resulting in nearly 180 degrees course change. 12 mm distal to this there is then reversal of the posterior displacement with resumption of normal popliteal course    where there is a 90 degree anterior bend. The proximal kink does result in some vessel narrowing but this is not secondary to atherosclerosis      Moderate  distal atherosclerotic change without stenosis or occlusion      DX-ANKLE 2- VIEWS RIGHT   Final Result      Soft tissue swelling without acute displaced fracture identified      DX-KNEE 3 VIEWS RIGHT   Final Result      Acute femoral periprosthetic fracture with comminution, anterior angulation and impaction           Assessment/Plan  * Closed fracture of right distal femur (HCC)  Assessment & Plan  -Prior history of right total knee replacement done by Dr. Styles in 2013  -On presentation found to have comminuted periprosthetic distal femoral fracture with dorsal displacement and anterior angulation and impaction.  -Superficial femoral AA with posterior displacement resulting in nearly 180 degrees course change.  12 mm distal to this there is reversal of the posterior displacement with resumption of normal popliteal course where there is a 90 degree anterior band.  -Status post repair on 11/3 with no complications  She is WBAT operative extremity however pain is limiting her mobility and ambulation  PT OT recommending SNF versus rehab  -Pain control with scheduled Tylenol, p.o. oxycodone and IV Dilaudid as needed for severe breakthrough pain    Acute respiratory failure with hypoxia ([facility])  Assessment & Plan  Possibly from atelectasis postoperatively versus mild pulmonary edema after IV fluids have been given  I ordered a BMP today and is elevated around 5000  Trial of Lasix 20 IV daily  She had an echo in April 2021 that showed EF of 35%, no evidence of heart failure  PE unlikely but will monitor saturations and continue dvt prophylaxis (lovenox)  Wean oxygen as able    PVD (peripheral vascular disease) ([facility])- (present on admission)  Assessment & Plan  Continue atorvastatin    Pacemaker  Assessment & Plan  -Was implanted in May 2021 by Dr. Hollis.  Plan as above.  -Continue metoprolol    Cataract cortical, senile, bilateral- (present on admission)  Assessment & Plan  Resume home  prednisolone    Transaminitis  Assessment & Plan  This has been ongoing issue and she was scheduled for an outpatient RUQ US today however unable to do this as she is hospitalized  She had a mild bump in LFTs but they remained stable today, painless  RUQ US shows cholelithiasis however no evidence of cholecystitis or obstruction  Okay to continue statin for now however if LFTs increase further tomorrow, will discontinue    Normocytic anemia  Assessment & Plan  Patient states she has had anemia since a GI bleed in the past.  She had a moderate drop preoperatively and again postoperatively, hemoglobin is now 7.2  Transfusion threshold 7.0 or symptoms  Iron studies more c/w chronic disease-likely some decreased production in the setting of a long bone fracture  No evidence of large /increasing hematoma  Repeat Hgb in am  Ok for lovenox as DVT risk is high    Depression- (present on admission)  Assessment & Plan  Continue lexapro    Stage 3b chronic kidney disease (HCC)- (present on admission)  Assessment & Plan  Slight jump in creatinine today, still with good urine output  Avoid nephrotoxins, including contrast  Trial of low-dose Lasix due to pulmonary edema and hypoxia  Repeat creatinine in a.m.    S/P total knee replacement right- (present on admission)  Assessment & Plan  -Dr. Styles, 2013.    Preoperative cardiovascular examination  Assessment & Plan  .       VTE prophylaxis: enoxaparin ppx     I have performed a physical exam and reviewed and updated ROS and Plan today (11/5/2021). In review of yesterday's note (11/4/2021), there are no changes except as documented above.    Total time:  40 minutes.  I spent greater than 50% of the time for patient care, counseling, and coordination on this date, including unit/floor time, and face-to-face time with the patient as per interval events and assessment and plan above

## 2021-11-05 NOTE — PROGRESS NOTES
Received patient from Night shift RN . Patient is awake and alert.No sign of distress. Patient denies any nausea.DEnies any pain .Dressing in place to Right leg with immobilizer in situ. Able to dorsiflex the toes. Pulse is positive. Fall precaution in placed, kept bed in lowest position and call light within reach.

## 2021-11-05 NOTE — DISCHARGE PLANNING
Received Choice form at 0988  Agency/Facility Name: [facility]  Referral sent per Choice form @ [id number]

## 2021-11-05 NOTE — CONSULTS
Physical Medicine and Rehabilitation Consultation              Date of initial consultation: 11/5/2021  Requested by: Mia Jameson DO  Consulting physician: Dandre Choudhury D.O.  Reason for consultation: assessment of rehabilitation needs  LOS: 2 Day(s)    Chief complaint: impaired ADLs and mobility    This history was prepared after reviewing the patient's chart at the [facility] system.      HPI: The patient is a 88 y.o. female with a past medical history of  right total knee arthroplasty in 2013, pacemaker 2021, hyperlipidemia, hypertension, depression;  who presented on 11/2/2021 11:13 PM after ground level fall and found to have distal femur periprosthetic fracture.     The patient underwent the following imaging and found to have suffered the following injuries:  CT lower limb showing Comminuted periprosthetic femoral fracture with dorsal prosthesis displacement, anterior impaction and anterior angulation is again seen. The superficial femoral artery is patent but distally there is a 180 degree kink which has apex anterior  Ultrasound RUQ showing Mild biliary dilation with common bile duct measuring 9 mm. This finding is unchanged since CT 6/20/2021 and is probably not secondary to obstruction. Incidental right renal cyst    The patient underwent the following procedures:   Revision right total knee arthroplasty with resection of the distal femur and placement of distal femoral replacement hinged implant on 11/3/2021 by Dr. Ramesh Styles MD.    Recovery was complicated by: anemia (today 7.2), hypoxia, tranaminitis. Not requiring significant amount of narcotics suggesting that pain is well controlled.    Physiatry was consulted to assess the patient's rehabilitation needs.    Social and functional history:  Prior to this hospitalization, patient was independent with ADLS and mobility without an assistive device. Patient lives alone in a one story home with 1 stairs to enter.     Current function  during therapy include:  Restrictions: immobilizer on R LE  PT: Functional mobility   Passive ROM Lower Body   Passive ROM Lower Body X   Comments immobilizer on R LE; assume no ROM; WNL for L LE    Active ROM Lower Body    Active ROM Lower Body  X   Comments same as above   Strength Lower Body   Lower Body Strength  X   Comments presents with 1+/5 strength at R LE due to pain; 4/5 for L LE    Sensation Lower Body   Lower Extremity Sensation   WDL   Lower Body Muscle Tone   Lower Body Muscle Tone  WDL   Strength Upper Body   Upper Body Strength  WDL   Upper Body Muscle Tone   Upper Body Muscle Tone  WDL   Neurological Concerns   Neurological Concerns No   Coordination Lower Body    Coordination Lower Body  Not Tested   Balance Assessment   Sitting Balance (Static) Fair   Sitting Balance (Dynamic) Fair -   Standing Balance (Static) Poor   Standing Balance (Dynamic) Poor -   Weight Shift Sitting Fair   Weight Shift Standing Poor   Comments w/fww; presents with posterior lean upon standing, and requires manual faciliation to maintain COG over hips    Gait Analysis   Gait Level Of Assist Unable to Participate   Weight Bearing Status WBAT R LE   Comments attempted side steps, however unable to complete, able to slide L LE however not very functional    Bed Mobility    Supine to Sit Minimal Assist   Sit to Supine Moderate Assist   Scooting Minimal Assist   Comments HOB elevated and rails up    Functional Mobility   Sit to Stand Moderate Assist   Bed, Chair, Wheelchair Transfer Unable to Participate   Mobility EOB, sit<>stand x 2, weight shifts, back to bed    Comments cues and manual faciliation for appropriate mechanics for stanidng, requires cues for flexion of L LE prior to standing          OT: Activities of daily living  none    PMH:  Past Medical History:   Diagnosis Date   • Arrhythmia    • Arthritis     knees   • Bronchitis     long time ago   • CATARACT     no surgery yet   • Dry eyes, bilateral 3/27/2014   •  Osteopenia of the elderly 3/27/2014   • Pneumonia     long time ago       PSH:  Past Surgical History:   Procedure Laterality Date   • PB REVISE KNEE JOINT REPLACE,ALL PARTS Right 11/3/2021    Procedure: REVISION, TOTAL ARTHROPLASTY, KNEE, ALL COMPONENTS;  Surgeon: Ramesh Styles M.D.;  Location: SURGERY [facility];  Service: Orthopedics   • PB UPPER GI ENDOSCOPY,DIAGNOSIS N/A 6/21/2021    Procedure: GASTROSCOPY;  Surgeon: Rafita Plaza D.O.;  Location: SURGERY [facility];  Service: Gastroenterology   • KNEE ARTHROPLASTY TOTAL  9/3/2013    Performed by Ramesh Styles M.D. at SURGERY [facility] ORS   • TONSILLECTOMY         FHX:  Family History   Problem Relation Age of Onset   • Lung Disease Mother         frequent pneumonia   • Heart Disease Father 54        MI   • Lung Disease Father         Emphysema   • Heart Attack Father    • Cancer Maternal Aunt         Great Aunt and Cousin Ovarian CA   • Hypertension Maternal Aunt    • Cancer Daughter         Lung and Brain CA   • Seizures Daughter    • Cancer Daughter         Pituitary tumor   • Cancer Brother         leukemia       Medications:  Current Facility-Administered Medications   Medication Dose   • oxyCODONE immediate-release (ROXICODONE) tablet 2.5 mg  2.5 mg   • prednisoLONE acetate (PRED FORTE) 1 % ophthalmic suspension 1 Drop  1 Drop   • prednisoLONE acetate (PRED FORTE) 1 % ophthalmic suspension 1 Drop  1 Drop   • atorvastatin (LIPITOR) tablet 40 mg  40 mg   • escitalopram (Lexapro) tablet 5 mg  5 mg   • metoprolol SR (TOPROL XL) tablet 25 mg  25 mg   • acetaminophen (Tylenol) tablet 650 mg  650 mg   • ondansetron (ZOFRAN) syringe/vial injection 4 mg  4 mg   • ondansetron (ZOFRAN ODT) dispertab 4 mg  4 mg   • senna-docusate (PERICOLACE or SENOKOT S) 8.6-50 MG per tablet 2 Tablet  2 Tablet    And   • polyethylene glycol/lytes (MIRALAX) PACKET 1 Packet  1 Packet    And   • magnesium hydroxide (MILK OF MAGNESIA) suspension 30 mL  30 mL    And   • bisacodyl  "(DULCOLAX) suppository 10 mg  10 mg   • HYDROmorphone (Dilaudid) injection 0.5 mg  0.5 mg   • enoxaparin (LOVENOX) inj 40 mg  40 mg       Allergies:  Allergies   Allergen Reactions   • Other Environmental Runny Nose and Itching     Seasonal allergies       Physical Exam:  Vitals: /49   Pulse 89   Temp 37.8 °C (100 °F) (Axillary)   Resp 18   Ht 1.6 m (5' 3\")   Wt 53.1 kg (117 lb)   SpO2 90%   Labs: Reviewed and significant for   Recent Labs     11/03/21 0118 11/03/21 0652 11/03/21  1314 11/03/21 1952 11/04/21 0149 11/05/21 0107   RBC  --  3.50*  --   --  3.04* 2.55*   HEMOGLOBIN  --  9.6*   < > 8.9* 8.3* 7.2*   HEMATOCRIT  --  30.4*   < > 28.7* 26.8* 22.5*   PLATELETCT  --  152*  --   --  124* 113*   PROTHROMBTM 13.6  --   --   --   --   --    INR 1.13  --   --   --   --   --    IRON  --  30*  --   --   --   --    FERRITIN  --  62.9  --   --   --   --    TOTIRONBC  --  198*  --   --   --   --     < > = values in this interval not displayed.     Recent Labs     11/03/21 0652 11/04/21 0149 11/05/21 0107   SODIUM 138 136 131*   POTASSIUM 4.2 4.7 4.6   CHLORIDE 106 105 100   CO2 23 22 21   GLUCOSE 162* 146* 133*   BUN 22 20 26*   CREATININE 1.02 1.23 1.35   CALCIUM 8.9 8.1* 7.9*     Recent Results (from the past 24 hour(s))   CBC WITH DIFFERENTIAL    Collection Time: 11/05/21  1:07 AM   Result Value Ref Range    WBC 17.8 (H) 4.8 - 10.8 K/uL    RBC 2.55 (L) 4.20 - 5.40 M/uL    Hemoglobin 7.2 (L) 12.0 - 16.0 g/dL    Hematocrit 22.5 (L) 37.0 - 47.0 %    MCV 88.2 81.4 - 97.8 fL    MCH 28.2 27.0 - 33.0 pg    MCHC 32.0 (L) 33.6 - 35.0 g/dL    RDW 49.7 35.9 - 50.0 fL    Platelet Count 113 (L) 164 - 446 K/uL    MPV 11.2 9.0 - 12.9 fL    Neutrophils-Polys 74.60 (H) 44.00 - 72.00 %    Lymphocytes 15.10 (L) 22.00 - 41.00 %    Monocytes 8.70 0.00 - 13.40 %    Eosinophils 0.20 0.00 - 6.90 %    Basophils 0.30 0.00 - 1.80 %    Immature Granulocytes 1.10 (H) 0.00 - 0.90 %    Nucleated RBC 0.00 /100 WBC    Neutrophils " (Absolute) 13.28 (H) 2.00 - 7.15 K/uL    Lymphs (Absolute) 2.68 1.00 - 4.80 K/uL    Monos (Absolute) 1.55 (H) 0.00 - 0.85 K/uL    Eos (Absolute) 0.03 0.00 - 0.51 K/uL    Baso (Absolute) 0.05 0.00 - 0.12 K/uL    Immature Granulocytes (abs) 0.19 (H) 0.00 - 0.11 K/uL    NRBC (Absolute) 0.00 K/uL   MAGNESIUM    Collection Time: 11/05/21  1:07 AM   Result Value Ref Range    Magnesium 2.0 1.5 - 2.5 mg/dL   Comp Metabolic Panel    Collection Time: 11/05/21  1:07 AM   Result Value Ref Range    Sodium 131 (L) 135 - 145 mmol/L    Potassium 4.6 3.6 - 5.5 mmol/L    Chloride 100 96 - 112 mmol/L    Co2 21 20 - 33 mmol/L    Anion Gap 10.0 7.0 - 16.0    Glucose 133 (H) 65 - 99 mg/dL    Bun 26 (H) 8 - 22 mg/dL    Creatinine 1.35 0.50 - 1.40 mg/dL    Calcium 7.9 (L) 8.4 - 10.2 mg/dL    AST(SGOT) 33 12 - 45 U/L    ALT(SGPT) 10 2 - 50 U/L    Alkaline Phosphatase 80 30 - 99 U/L    Total Bilirubin 0.6 0.1 - 1.5 mg/dL    Albumin 2.7 (L) 3.2 - 4.9 g/dL    Total Protein 4.9 (L) 6.0 - 8.2 g/dL    Globulin 2.2 1.9 - 3.5 g/dL    A-G Ratio 1.2 g/dL   ESTIMATED GFR    Collection Time: 11/05/21  1:07 AM   Result Value Ref Range    GFR If African American 45 (A) >60 mL/min/1.73 m 2    GFR If Non  37 (A) >60 mL/min/1.73 m 2         ASSESSMENT:  IMPRESSION: The patient is a 88 y.o. female with a past medical history of  right total knee arthroplasty in 2013, pacemaker 2021, hyperlipidemia, hypertension, depression;  who presented on 11/2/2021 11:13 PM after ground level fall and found to have distal femur periprosthetic fracture s/p revision right total knee arthroplasty with resection of the distal femur and placement of distal femoral replacement hinged implant on 11/3/2021 by Dr. Ramesh Styles MD.      [facility] Code: 0008.2 - Orthopaedic Disorders: Status Post Femur (Shaft) Fracture  -With acute secondary complications of: impaired ADLs and mobility,   -with chronic conditions of: pacemaker 2021, hyperlipidemia, hypertension,  depression, chronic kidney disease  -and:     Medical Complexity:  Anemia, worsening  Tranaminitis  Hyponatremia  Acute on chronic kidney disease GFR 45    RECOMMENDATIONS:  Occupational therapy evaluation ordered    Barriers to rehabilitation at this time is:  Severe anemia, uncontrolled  No assistance at home at discharge - please follow up for available assistance at home such as family/friends or paid caregivers and notify physiatry if any assistance is identified  Occupational therapy evaluation missing - please notify physiatry when OT evaluation is available  Code: full resuscitation      Thank you for allowing us to participate in the care of this patient. Physiatry will continue to follow and provide recommendations, as needed.    I was consulted to review this patient's records to provide medical recommendations for this patient's rehabilitation needs. In total, I spent  15 minutes reviewing the patient's records to provide the above medical recommendations.     Dandre Choudhury D.O.   Physical Medicine and Rehabilitation

## 2021-11-05 NOTE — THERAPY
Physical Therapy   Daily Treatment     Patient Name: Yoli Carmichael  Age:  88 y.o., Sex:  female  Medical Record #: [id number]  Today's Date: 11/5/2021     Precautions  Precautions: (P) Fall Risk;Weight Bearing As Tolerated Right Lower Extremity;Immobilizer Right Lower Extremity  Comments: (P) immobilizer on at all times; assume no ROM    Assessment    Pt is progressing slower than expected with functional mobility at this time. Pt is only able to tolerate standing for functional activities and unable to demonstrate appropriate WB on B LE for functional transfers or steps. Pt is primarily limited by poor learning, motor planning, and sequencing. Pt also appears to be confused at times and presents with poor memory retention. Pt was able tolerate increased activity today and supine therapeutic exercises with active assist. Pt was able to tolerate working on scooting up HOB with bed in trendelenburg position, however, requires frequent cues and demonstration for appropriate mechanics. Recommend post-acute placement for continued physical therapy services prior to discharge home.        Plan    Continue current treatment plan.    DC Equipment Recommendations: (P) Unable to determine at this time  Discharge Recommendations: (P) Recommend post-acute placement for additional physical therapy services prior to discharge home    Objective       11/05/21 1130   Precautions   Precautions Fall Risk;Weight Bearing As Tolerated Right Lower Extremity;Immobilizer Right Lower Extremity   Comments immobilizer on at all times; assume no ROM   Vitals   O2 (LPM) 3   O2 Delivery Device Nasal Cannula   Vitals Comments continues to demonstrate wavering SpO2    Pain 0 - 10 Group   Location Knee   Location Orientation Right   Description Aching   Therapist Pain Assessment Prior to Activity;Post Activity;During Activity;Nurse Notified;4   Cognition    Cognition / Consciousness X   Speech/ Communication Delayed Responses   Attention Impaired    Comments slightly confused at times; presents with poor memory retention    Supine Lower Body Exercise   Supine Lower Body Exercises Yes   Straight Leg Raises 1 set of 10;Right  (active assist )   Ankle Pumps Reciprocal;1 set of 10   Quadriceps Isometrics 1 set of 10;Right   Other Treatments   Other Treatments Provided pt provided with multiple compensatory strategies for standing mechanics ; however pt demonstrated with poor learning and sequencing throughout session; requires manual facilliation for flexion of L knee upon standing; provided with sequencing for scooting up HOB along with manual faciliation    Balance   Sitting Balance (Static) Fair   Sitting Balance (Dynamic) Fair -   Standing Balance (Static) Poor -   Standing Balance (Dynamic) Trace +   Weight Shift Sitting Fair   Weight Shift Standing Poor   Skilled Intervention Verbal Cuing;Postural Facilitation;Facilitation   Comments w/fww; partial standing only    Gait Analysis   Gait Level Of Assist Unable to Participate   Weight Bearing Status WBAT R LE   Bed Mobility    Supine to Sit Moderate Assist   Sit to Supine Moderate Assist   Scooting Moderate Assist   Skilled Intervention Verbal Cuing;Postural Facilitation;Facilitation;Compensatory Strategies   Comments HOB elevated and rails up    Functional Mobility   Sit to Stand Moderate Assist   Bed, Chair, Wheelchair Transfer Unable to Participate   Mobility EOB, sit<>stand x2, supine theraputic exercises, bed mobility, back to supine   Skilled Intervention Verbal Cuing;Facilitation;Compensatory Strategies   Comments frequent cues and compensatory strategies for standing mechanics   How much difficulty does the patient currently have...   Turning over in bed (including adjusting bedclothes, sheets and blankets)? 2   Sitting down on and standing up from a chair with arms (e.g., wheelchair, bedside commode, etc.) 1   Moving from lying on back to sitting on the side of the bed? 1   How much help from another  person does the patient currently need...   Moving to and from a bed to a chair (including a wheelchair)? 1   Need to walk in a hospital room? 1   Climbing 3-5 steps with a railing? 1   6 clicks Mobility Score 7   Activity Tolerance   Sitting in Chair NT   Sitting Edge of Bed 15 mins   Standing 1 min x 2    Comments limited due to fatigue and slight dizziness    Patient / Family Goals    Patient / Family Goal #1 to go back to PLOF    Short Term Goals    Short Term Goal # 1 pt will go supine<>sit w/hob flat and rails down w/spv in 6tx    Goal Outcome # 1 Progressing slower than expected   Short Term Goal # 2 pt will go sit<>stand w/fww w/spv in 6tx    Goal Outcome # 2 Progressing slower than expected   Short Term Goal # 3 pt will transfer bed<>chair w/Min A w/fww in 6tx    Goal Outcome # 3 Progressing slower than expected   Short Term Goal # 4 pt will ambulate for 50ft w/fww w/Min A in 6tx    Goal Outcome # 4 Progressing slower than expected   Education Group   Role of Physical Therapist Patient Response Patient;Acceptance;Explanation;Demonstration;Verbal Demonstration;Action Demonstration   Use of Assistive Device Patient Response Patient;Acceptance;Demonstration;Explanation;Verbal Demonstration;Action Demonstration   Weight Bearing Status Patient Response Patient;Acceptance;Explanation;Demonstration;Action Demonstration;Reinforcement Needed;Verbal Demonstration   Anticipated Discharge Equipment and Recommendations   DC Equipment Recommendations Unable to determine at this time   Discharge Recommendations Recommend post-acute placement for additional physical therapy services prior to discharge home   Interdisciplinary Plan of Care Collaboration   IDT Collaboration with  Nursing;Family / Caregiver   Patient Position at End of Therapy In Bed;Bed Alarm On;Tray Table within Reach;Phone within Reach;Call Light within Reach;Family / Friend in Room   Collaboration Comments aware of visit and recs    Session Information    Date / Session Number  11/5-2 (2/5, 11/10)

## 2021-11-05 NOTE — PROGRESS NOTES
"S: Patient having difficulty with pain control and mobilization.Reasonable comfortable but very weak in right leg, can't do SLR    O: Alert, Incision clean and dry.  uad weak.  NVI RLE.      Dressing is clean, dry, and intact    Blood pressure 122/49, pulse 89, temperature 37.8 °C (100 °F), temperature source Axillary, resp. rate 18, height 1.6 m (5' 3\"), weight 53.1 kg (117 lb), SpO2 90 %.    Recent Labs     11/03/21  0652 11/03/21  1314 11/03/21  1952 11/04/21  0149 11/05/21  0107   WBC 12.4*  --   --  13.6* 17.8*   RBC 3.50*  --   --  3.04* 2.55*   HEMOGLOBIN 9.6*   < > 8.9* 8.3* 7.2*   HEMATOCRIT 30.4*   < > 28.7* 26.8* 22.5*   MCV 86.9  --   --  88.2 88.2   MCH 27.4  --   --  27.3 28.2   MCHC 31.6*  --   --  31.0* 32.0*   RDW 50.1*  --   --  50.1* 49.7   PLATELETCT 152*  --   --  124* 113*   MPV 10.2  --   --  10.6 11.2    < > = values in this interval not displayed.         Intake/Output Summary (Last 24 hours) at 11/5/2021 1014  Last data filed at 11/5/2021 0409  Gross per 24 hour   Intake --   Output 650 ml   Net -650 ml             A:  Patient needs more time in hospital to address pain control and mobility  Patient Active Problem List    Diagnosis Date Noted   • Pacemaker 06/25/2021   • Second degree atrioventricular block, Mobitz (type) I 05/09/2021   • Near syncope likely vasovagal versus secondary to symptomatic SVT versus HOCM-like cardiomyopathy 04/02/2021   • SVT (supraventricular tachycardia) (HCC) 11/06/2018   • Essential hypertension, benign 11/10/2017   • Stage 3b chronic kidney disease (HCC) 04/07/2015   • Closed fracture of multiple ribs of left side with delayed healing 05/08/2021   • Traumatic rhabdomyolysis (HCC) 05/08/2021   • Preoperative cardiovascular examination 05/08/2021   • Acute respiratory failure with hypoxia ([facility]) 11/04/2021   • Closed fracture of right distal femur ([facility]) 11/03/2021   • PVD (peripheral vascular disease) ([facility]) 07/30/2021   • Gait instability 07/16/2021   • Pleural " effusion 06/28/2021   • Cataract cortical, senile, bilateral 06/25/2021   • Upper GI bleed 06/20/2021   • Transaminitis 06/20/2021   • Leukocytosis 06/20/2021   • Fall 05/08/2021   • BRIAN (acute kidney injury) ([facility]) 04/06/2021   • Normocytic anemia 04/04/2021   • Recurrent major depressive disorder, in full remission ([facility]) 06/26/2019   • Imbalance 03/19/2019   • Localized edema 11/01/2018   • Depression 09/27/2018   • Low serum vitamin D 06/19/2018   • Degenerative disc disease, lumbar 07/17/2017   • Non-rheumatic mitral regurgitation 05/07/2015   • S/P total knee replacement right 09/03/2013         PLAN:Patient ready for discharge to skilled nursingor Rehab from ortho standpoint. We will use immobilizer only for ambulation

## 2021-11-05 NOTE — DISCHARGE PLANNING
Anticipated Discharge Disposition: Rehab    Action: Discussed pt in morning rounds. Per MD, rehab is following and requesting OT note. Per MD, pt will be medically cleared tomorrow.    LSW met with pt at bedside to provide update and collect SNF choice as back up to rehab. Pt only signed consent to send referral to [facility]. LSW explained that if rehab and [facility] decline, we will have to send a blanket referral. Pt expressed understanding and asked that a referral not be sent to [place]. Pt states she has a dtr and friends who are able to take her to [facility]. Pt states her dtr works at [employer] and is able to provide some support at home.    LSW explained IMM. Pt expressed understanding and signed IMM 11/5/20212 at 0900. LSW provided pt with copy of IMM.     LSW faxed SNF choice form to [facility]. LSW faxed IMM to [facility].    Barriers to Discharge: rehab acceptance    Plan: LSW to follow up with rehab.

## 2021-11-05 NOTE — THERAPY
Occupational Therapy   Initial Evaluation     Patient Name: Yoli Carmichael  Age:  88 y.o., Sex:  female  Medical Record #: [id number]  Today's Date: 11/5/2021     Precautions: (P) Weight Bearing As Tolerated Right Lower Extremity, Immobilizer Right Lower Extremity, Fall Risk  Comments: immobilizer on with ambulation per Ortho note 11/5.     Assessment  Patient is 88 y.o. female admitted following GLF sustaining R periprosthetic distal femur fracture. S/p R Revision TKA. A&Ox3, motivated for OT. Shows impaired overall strength, activity tolerance, standing balance during ADL's and functional mobility; see below for current status. Lived alone prior; indep with all ADL's and most IADL's. Family helps with driving. Pt is not at her baseline; OT will follow while in house and will benefit from post-acute inpt rehab prior to return home.          Plan  Recommend Occupational Therapy 4 times per week until therapy goals are met for the following treatments:  Neuro Re-Education / Balance, Self Care/Activities of Daily Living and Therapeutic Activities.    DC Equipment Recommendations: (P) Unable to determine at this time  Discharge Recommendations: (P) Recommend post-acute placement for additional occupational therapy services prior to discharge home      11/05/21 1250   Prior Living Situation   Prior Services [facility]   Housing / Facility 1 Story House   Steps Into Home 1   Steps In Home 0   Bathroom Set up Walk In Shower;Shower Chair;Grab Bars   Equipment Owned Front-Wheel Walker;Single Point Cane;Tub / Shower Seat;Grab Bar(s) In Tub / Shower;Oxygen;Hand Held Shower   Lives with - Patient's Self Care Capacity Alone and Able to Care For Self   Comments family available to assist    Prior Level of ADL Function   Self Feeding Independent   Grooming / Hygiene Independent   Bathing Independent   Dressing Independent   Toileting Independent   Prior Level of IADL Function   Medication Management Independent    Laundry Independent   Kitchen Mobility Independent   Finances Unable To Determine At This Time   Home Management Independent   Shopping Requires Assist   Prior Level Of Mobility Independent With Device in Home   Driving / Transportation Relatives / Others Provide Transportation   Occupation (Pre-Hospital Vocational) Retired Due To Age   Leisure Interests Hobbies   History of Falls   History of Falls Yes   Precautions   Precautions Weight Bearing As Tolerated Right Lower Extremity;Immobilizer Right Lower Extremity;Fall Risk   Vitals   O2 (LPM) 3   O2 Delivery Device Nasal Cannula   Pain 0 - 10 Group   Location Knee   Location Orientation Right   Therapist Pain Assessment Post Activity Pain Same as Prior to Activity;Nurse Notified;8   Cognition    Cognition / Consciousness   (Ox3)   Speech/ Communication Delayed Responses;Hard of Hearing   Attention Impaired   Comments pleasant and coopertive. mild memory deficits   Passive ROM Upper Body   Passive ROM Upper Body WDL   Active ROM Upper Body   Active ROM Upper Body  WDL   Strength Upper Body   Upper Body Strength  X   Comments generalized weakness   Sensation Upper Body   Upper Extremity Sensation  WDL   Upper Body Muscle Tone   Upper Body Muscle Tone  WDL   Coordination Upper Body   Coordination WDL   Balance Assessment   Sitting Balance (Static) Fair   Sitting Balance (Dynamic) Fair -   Standing Balance (Static) Poor -   Standing Balance (Dynamic) Trace +   Weight Shift Sitting Fair   Weight Shift Standing Poor   Comments fww   Bed Mobility    Supine to Sit Moderate Assist   Sit to Supine Moderate Assist   Scooting Moderate Assist   ADL Assessment   Eating Supervision   Grooming Supervision;Seated   Upper Body Dressing Minimal Assist   Lower Body Dressing Maximal Assist   Toileting Total Assist   How much help from another person does the patient currently need...   Putting on and taking off regular lower body clothing? 2   Bathing (including washing, rinsing, and  "drying)? 2   Toileting, which includes using a toilet, bedpan, or urinal? 2   Putting on and taking off regular upper body clothing? 3   Taking care of personal grooming such as brushing teeth? 4   Eating meals? 4   6 Clicks Daily Activity Score 17   Functional Mobility   Sit to Stand Maximal Assist   Bed, Chair, Wheelchair Transfer Unable to Participate   Activity Tolerance   Sitting Edge of Bed 17\"   Standing 10 secs   Comments 3 attempts to STS from EOB. Pt seems quite fatigued.    Patient / Family Goals   Patient / Family Goal #1 home   Short Term Goals   Short Term Goal # 1 pt will perform sup to sit with Spv within 5 days   Short Term Goal # 2 pt will perform ADL transfer with modA within 5 days    Short Term Goal # 3 pt will perform [facility] [place] [facility] [place] within 5 days     "

## 2021-11-05 NOTE — CARE PLAN
The patient is Stable - Low risk of patient condition declining or worsening    Shift Goals  Clinical Goals: wean from Oxygen    Progress made toward(s) clinical / shift goals:  Patient remained in Oxygen therapy on NC titrated between 2 -3 liters O2 flow.Fall precaution observed, call bell within reach and bed in lowest position    Patient is not progressing towards the following goals:

## 2021-11-06 LAB
ALBUMIN SERPL BCP-MCNC: 2.4 G/DL (ref 3.2–4.9)
BASOPHILS # BLD AUTO: 0.3 % (ref 0–1.8)
BASOPHILS # BLD: 0.04 K/UL (ref 0–0.12)
BUN SERPL-MCNC: 27 MG/DL (ref 8–22)
CALCIUM SERPL-MCNC: 7.8 MG/DL (ref 8.4–10.2)
CHLORIDE SERPL-SCNC: 101 MMOL/L (ref 96–112)
CO2 SERPL-SCNC: 21 MMOL/L (ref 20–33)
CREAT SERPL-MCNC: 1.35 MG/DL (ref 0.5–1.4)
EOSINOPHIL # BLD AUTO: 0.03 K/UL (ref 0–0.51)
EOSINOPHIL NFR BLD: 0.2 % (ref 0–6.9)
ERYTHROCYTE [DISTWIDTH] IN BLOOD BY AUTOMATED COUNT: 48.2 FL (ref 35.9–50)
GLUCOSE SERPL-MCNC: 122 MG/DL (ref 65–99)
HCT VFR BLD AUTO: 21.9 % (ref 37–47)
HGB BLD-MCNC: 7 G/DL (ref 12–16)
IMM GRANULOCYTES # BLD AUTO: 0.13 K/UL (ref 0–0.11)
IMM GRANULOCYTES NFR BLD AUTO: 0.9 % (ref 0–0.9)
LYMPHOCYTES # BLD AUTO: 1.92 K/UL (ref 1–4.8)
LYMPHOCYTES NFR BLD: 12.9 % (ref 22–41)
MAGNESIUM SERPL-MCNC: 2.1 MG/DL (ref 1.5–2.5)
MCH RBC QN AUTO: 27.6 PG (ref 27–33)
MCHC RBC AUTO-ENTMCNC: 32 G/DL (ref 33.6–35)
MCV RBC AUTO: 86.2 FL (ref 81.4–97.8)
MONOCYTES # BLD AUTO: 1.54 K/UL (ref 0–0.85)
MONOCYTES NFR BLD AUTO: 10.4 % (ref 0–13.4)
NEUTROPHILS # BLD AUTO: 11.21 K/UL (ref 2–7.15)
NEUTROPHILS NFR BLD: 75.3 % (ref 44–72)
NRBC # BLD AUTO: 0 K/UL
NRBC BLD-RTO: 0 /100 WBC
PHOSPHATE SERPL-MCNC: 2.3 MG/DL (ref 2.5–4.5)
PLATELET # BLD AUTO: 138 K/UL (ref 164–446)
PMV BLD AUTO: 11.5 FL (ref 9–12.9)
POTASSIUM SERPL-SCNC: 4.3 MMOL/L (ref 3.6–5.5)
PROCALCITONIN SERPL-MCNC: 0.26 NG/ML
RBC # BLD AUTO: 2.54 M/UL (ref 4.2–5.4)
SODIUM SERPL-SCNC: 133 MMOL/L (ref 135–145)
WBC # BLD AUTO: 14.9 K/UL (ref 4.8–10.8)

## 2021-11-06 PROCEDURE — 80069 RENAL FUNCTION PANEL: CPT

## 2021-11-06 PROCEDURE — P9016 RBC LEUKOCYTES REDUCED: HCPCS

## 2021-11-06 PROCEDURE — 85025 COMPLETE CBC W/AUTO DIFF WBC: CPT

## 2021-11-06 PROCEDURE — 30233N1 TRANSFUSION OF NONAUTOLOGOUS RED BLOOD CELLS INTO PERIPHERAL VEIN, PERCUTANEOUS APPROACH: ICD-10-PCS | Performed by: INTERNAL MEDICINE

## 2021-11-06 PROCEDURE — 770006 HCHG ROOM/CARE - MED/SURG/GYN SEMI*

## 2021-11-06 PROCEDURE — 97535 SELF CARE MNGMENT TRAINING: CPT

## 2021-11-06 PROCEDURE — 83735 ASSAY OF MAGNESIUM: CPT

## 2021-11-06 PROCEDURE — 700102 HCHG RX REV CODE 250 W/ 637 OVERRIDE(OP): Performed by: HOSPITALIST

## 2021-11-06 PROCEDURE — 94760 N-INVAS EAR/PLS OXIMETRY 1: CPT

## 2021-11-06 PROCEDURE — 36415 COLL VENOUS BLD VENIPUNCTURE: CPT

## 2021-11-06 PROCEDURE — 99024 POSTOP FOLLOW-UP VISIT: CPT | Performed by: NURSE PRACTITIONER

## 2021-11-06 PROCEDURE — 700111 HCHG RX REV CODE 636 W/ 250 OVERRIDE (IP): Performed by: INTERNAL MEDICINE

## 2021-11-06 PROCEDURE — 36430 TRANSFUSION BLD/BLD COMPNT: CPT

## 2021-11-06 PROCEDURE — 99233 SBSQ HOSP IP/OBS HIGH 50: CPT | Performed by: INTERNAL MEDICINE

## 2021-11-06 PROCEDURE — 97112 NEUROMUSCULAR REEDUCATION: CPT

## 2021-11-06 PROCEDURE — A9270 NON-COVERED ITEM OR SERVICE: HCPCS | Performed by: HOSPITALIST

## 2021-11-06 PROCEDURE — 84145 PROCALCITONIN (PCT): CPT

## 2021-11-06 PROCEDURE — 97530 THERAPEUTIC ACTIVITIES: CPT

## 2021-11-06 RX ORDER — FUROSEMIDE 10 MG/ML
20 INJECTION INTRAMUSCULAR; INTRAVENOUS ONCE
Status: COMPLETED | OUTPATIENT
Start: 2021-11-06 | End: 2021-11-06

## 2021-11-06 RX ADMIN — ACETAMINOPHEN 650 MG: 325 TABLET ORAL at 11:52

## 2021-11-06 RX ADMIN — ESCITALOPRAM OXALATE 5 MG: 10 TABLET ORAL at 05:16

## 2021-11-06 RX ADMIN — SENNOSIDES AND DOCUSATE SODIUM 2 TABLET: 50; 8.6 TABLET ORAL at 17:12

## 2021-11-06 RX ADMIN — FUROSEMIDE 20 MG: 10 INJECTION, SOLUTION INTRAMUSCULAR; INTRAVENOUS at 08:24

## 2021-11-06 RX ADMIN — PREDNISOLONE ACETATE 1 DROP: 10 SUSPENSION/ DROPS OPHTHALMIC at 17:15

## 2021-11-06 RX ADMIN — SENNOSIDES AND DOCUSATE SODIUM 2 TABLET: 50; 8.6 TABLET ORAL at 05:16

## 2021-11-06 RX ADMIN — PREDNISOLONE ACETATE 1 DROP: 10 SUSPENSION/ DROPS OPHTHALMIC at 21:08

## 2021-11-06 RX ADMIN — ATORVASTATIN CALCIUM 40 MG: 40 TABLET, FILM COATED ORAL at 17:12

## 2021-11-06 RX ADMIN — PREDNISOLONE ACETATE 1 DROP: 10 SUSPENSION/ DROPS OPHTHALMIC at 11:53

## 2021-11-06 RX ADMIN — PREDNISOLONE ACETATE 1 DROP: 10 SUSPENSION/ DROPS OPHTHALMIC at 11:52

## 2021-11-06 RX ADMIN — PREDNISOLONE ACETATE 1 DROP: 10 SUSPENSION/ DROPS OPHTHALMIC at 08:24

## 2021-11-06 RX ADMIN — METOPROLOL SUCCINATE 25 MG: 25 TABLET, EXTENDED RELEASE ORAL at 05:16

## 2021-11-06 RX ADMIN — PREDNISOLONE ACETATE 1 DROP: 10 SUSPENSION/ DROPS OPHTHALMIC at 17:12

## 2021-11-06 RX ADMIN — ENOXAPARIN SODIUM 30 MG: 30 INJECTION SUBCUTANEOUS at 05:16

## 2021-11-06 RX ADMIN — PREDNISOLONE ACETATE 1 DROP: 10 SUSPENSION/ DROPS OPHTHALMIC at 05:18

## 2021-11-06 ASSESSMENT — PAIN DESCRIPTION - PAIN TYPE
TYPE: ACUTE PAIN
TYPE: ACUTE PAIN

## 2021-11-06 ASSESSMENT — ENCOUNTER SYMPTOMS
NERVOUS/ANXIOUS: 1
BACK PAIN: 0
NAUSEA: 0
HALLUCINATIONS: 0
VOMITING: 0
DEPRESSION: 0
FOCAL WEAKNESS: 0
ABDOMINAL PAIN: 0
WEAKNESS: 1
BLOOD IN STOOL: 0
PALPITATIONS: 0
HEARTBURN: 0
COUGH: 0
SHORTNESS OF BREATH: 1
DIZZINESS: 0
FEVER: 0
FALLS: 0
SORE THROAT: 0
CHILLS: 0
CONSTIPATION: 1
DIARRHEA: 0
MYALGIAS: 1
HEADACHES: 0

## 2021-11-06 ASSESSMENT — COGNITIVE AND FUNCTIONAL STATUS - GENERAL
TURNING FROM BACK TO SIDE WHILE IN FLAT BAD: A LOT
MOVING FROM LYING ON BACK TO SITTING ON SIDE OF FLAT BED: A LOT
MOVING TO AND FROM BED TO CHAIR: A LOT
MOBILITY SCORE: 11
WALKING IN HOSPITAL ROOM: A LOT
CLIMB 3 TO 5 STEPS WITH RAILING: TOTAL
SUGGESTED CMS G CODE MODIFIER MOBILITY: CL
STANDING UP FROM CHAIR USING ARMS: A LOT

## 2021-11-06 ASSESSMENT — GAIT ASSESSMENTS
ASSISTIVE DEVICE: FRONT WHEEL WALKER
GAIT LEVEL OF ASSIST: MODERATE ASSIST
DEVIATION: ANTALGIC
DISTANCE (FEET): 3

## 2021-11-06 NOTE — CARE PLAN
The patient is Stable - Low risk of patient condition declining or worsening    Shift Goals  Clinical Goals: wean from Oxygen    Progress made toward(s) clinical / shift goals:  Patient remained in O2 2-3 liters via NC. I.S encouraged to do more often. Fall precaution observed, call bell within reach and kept bed in lowest position.    Patient is not progressing towards the following goals:

## 2021-11-06 NOTE — DISCHARGE PLANNING
Following for post acute services yesterday daughter was going to talk with her mother about choice. Will follow.

## 2021-11-06 NOTE — PROGRESS NOTES
Received patient from Night shift RN . Patient is awake and alert.No sign of distress. Patient denies any nausea. Denies any pain to RLE.Patient is still using pure wick . Fall precaution in placed, kept bed in lowest position and call light within reach.

## 2021-11-06 NOTE — CARE PLAN
The patient is Stable - Low risk of patient condition declining or worsening    Shift Goals  Clinical Goals: wean from oxygen, remain free from falls    Progress made toward(s) clinical / shift goals:  Pt encouraged to take deep breaths and use incentive spirometer. Currently still on 2L O2. Pt free from falls overnight    Patient is not progressing towards the following goals:  N/a

## 2021-11-06 NOTE — PROGRESS NOTES
Vital signs taken and recorded for Blood transfusion.  1 unit PRBC given as ordered. Consent secured. B.T reaction closely monitored.( negative)

## 2021-11-06 NOTE — PROGRESS NOTES
Assumed care of pt at 1915. Received report from Renay CANO. Pt resting in bed on phone with family. Pt stated she has no pain at this time. Right leg dressing CDI. Right pedal pulse present. Pt able to dorsi and plantar flex. No complaints of numbness or tingling. Encouraged pt to take deep breaths and use incentive spirometer. Pt can reach 750. Purewick is in place draining clear yellow urine. Sacrum is red but blanchable. Encouraged pt to reposition, pt verbalized agreement. No other needs at this time. Call light in reach, bed in lowest position. COVID 19 surge in effect.

## 2021-11-06 NOTE — PROGRESS NOTES
"Orthopedic Physician Assistant Note    Subjective:  Doing well, still weak with ambulation, 1-2 person assist  POD 3 Revision right total knee arthroplasty with resection of the distal femur and placement of distal femoral replacement hinged implant  Objective:    Blood pressure 109/41, pulse 88, temperature 37.2 °C (99 °F), temperature source Oral, resp. rate 18, height 1.6 m (5' 3\"), weight 53.1 kg (117 lb), SpO2 92 %.    Labs:  Recent Labs     11/04/21 0149 11/05/21 0107 11/06/21 0344   WBC 13.6* 17.8* 14.9*   RBC 3.04* 2.55* 2.54*   HEMOGLOBIN 8.3* 7.2* 7.0*   HEMATOCRIT 26.8* 22.5* 21.9*   MCV 88.2 88.2 86.2   MCH 27.3 28.2 27.6   RDW 50.1* 49.7 48.2   PLATELETCT 124* 113* 138*   MPV 10.6 11.2 11.5   NEUTSPOLYS  --  74.60* 75.30*   LYMPHOCYTES  --  15.10* 12.90*   MONOCYTES  --  8.70 10.40   EOSINOPHILS  --  0.20 0.20   BASOPHILS  --  0.30 0.30         Recent Labs     11/04/21 0149 11/05/21 0107 11/06/21 0344   SODIUM 136 131* 133*   POTASSIUM 4.7 4.6 4.3   CHLORIDE 105 100 101   CO2 22 21 21   GLUCOSE 146* 133* 122*   BUN 20 26* 27*       Results     ** No results found for the last 168 hours. **          Assessment:  Dressing c/d/I, calf and thigh compartments soft, strong DP pulse    Plan:  Plan to discharge to rehab when available  WBAT RLE with a knee immobilizer        "

## 2021-11-06 NOTE — THERAPY
Physical Therapy   Daily Treatment     Patient Name: Yoli Carmichael  Age:  88 y.o., Sex:  female  Medical Record #: [id number]  Today's Date: 11/6/2021         Assessment  Pt to get blood today,improved standing and able to take a few steps,edema noted R LE.Immobiliser for weight bearing activity      Plan    Continue current treatment plan.      11/06/21 0900   Total Time Spent   Total Time Spent (Mins) 30   Charge Group   PT Therapeutic Activities 2   Pain 0 - 10 Group   Therapist Pain Assessment 2   Balance   Sitting Balance (Static) Fair +   Sitting Balance (Dynamic) Fair   Standing Balance (Static) Poor +   Standing Balance (Dynamic) Poor +   Weight Shift Sitting Fair   Weight Shift Standing Poor   Gait Analysis   Gait Level Of Assist Moderate Assist   Assistive Device Front Wheel Walker   Distance (Feet) 3  (side steps)   # of Times Distance was Traveled 1   Deviation Antalgic   Weight Bearing Status WBAT R   Bed Mobility    Supine to Sit Moderate Assist   Sit to Supine Moderate Assist   Scooting Moderate Assist   Functional Mobility   Sit to Stand Moderate Assist   How much difficulty does the patient currently have...   Turning over in bed (including adjusting bedclothes, sheets and blankets)? 2   Sitting down on and standing up from a chair with arms (e.g., wheelchair, bedside commode, etc.) 2   Moving from lying on back to sitting on the side of the bed? 2   How much help from another person does the patient currently need...   Moving to and from a bed to a chair (including a wheelchair)? 2   Need to walk in a hospital room? 2   Climbing 3-5 steps with a railing? 1   6 clicks Mobility Score 11   Activity Tolerance   Sitting Edge of Bed 15   Standing 5   Short Term Goals    Short Term Goal # 1 pt will go supine<>sit w/hob flat and rails down w/spv in 6tx    Goal Outcome # 1 Progressing slower than expected   Short Term Goal # 2 pt will go sit<>stand w/fww w/spv in 6tx    Goal Outcome # 2 Progressing slower  than expected   Short Term Goal # 3 pt will transfer bed<>chair w/Min A w/fww in 6tx    Goal Outcome # 3 Progressing slower than expected   Short Term Goal # 4 pt will ambulate for 50ft w/fww w/Min A in 6tx    Goal Outcome # 4 Progressing slower than expected   Anticipated Discharge Equipment and Recommendations   Discharge Recommendations Recommend post-acute placement for additional physical therapy services prior to discharge home   Interdisciplinary Plan of Care Collaboration   IDT Collaboration with  Nursing   Session Information   Date / Session Number  11/6-3 2/5 11/10       DC Equipment Recommendations: Unable to determine at this time  Discharge Recommendations: (P) Recommend post-acute placement for additional physical therapy services prior to discharge home

## 2021-11-06 NOTE — PROGRESS NOTES
[facility] Medicine Daily Progress Note    Date of Service  11/6/2021    Chief Complaint  Yoli Carmichael is a 88 y.o. female admitted 11/2/2021 with R leg pain after fall    Hospital Course  History of sick sinus syndrome status post pacemaker, hyperlipidemia, hypertension, depression, right knee replacement in 2013 by Dr. Styles who was admitted after a ground-level fall complaining of right leg pain found to have a right periprosthetic distal femur fracture.  Orthopedic surgery was consulted and recommended operative repair, nonweightbearing.    Interval Problem Update  11/3: Hemoglobin down to 9.6 from 12.3, 48 hours ago.  No evidence of bleeding or hematoma on exam.  Repeat Hgb scheduled for 1400, plan for OR today at 1800.  Pain is controlled  11/4: Hypoxic requiring 5 L, asymptomatic.  Using some narcotics for pain control.  Hemoglobin stable.  Unable to ambulate with PT due to pain.  Mild increase in LFTs, RUQ US ordered, CXR ordered  11/5: Still on 3-5L, elevated BNP.  Lsix x1 given.  Hgb down to 7.2 no e/o bleeding, still on lovenox, monitoring closely  11/6: Hemoglobin down to 7.0, 1 unit RBC transfused.  Creatinine stable, continue Lasix.  Still requiring 3 L oxygen.  Weak and listless.  I-S observed, pulling 750 mL    I have personally seen and examined the patient at bedside. I discussed the plan of care with patient, family and bedside RN.    Consultants/Specialty  orthopedics- [place]    Code Status  Full Code    Disposition  Patient is not medically cleared.   Anticipate discharge to to skilled nursing facility.vs IP rehab  I have placed the appropriate orders for post-discharge needs.    Review of Systems  Review of Systems   Constitutional: Positive for malaise/fatigue. Negative for chills and fever.   HENT: Negative for sore throat.    Respiratory: Positive for shortness of breath. Negative for cough.    Cardiovascular: Negative for chest pain and palpitations.   Gastrointestinal: Positive for  constipation. Negative for abdominal pain, blood in stool, diarrhea, heartburn, nausea and vomiting.   Genitourinary: Negative for dysuria and frequency.   Musculoskeletal: Positive for myalgias. Negative for back pain and falls.   Neurological: Positive for weakness. Negative for dizziness, focal weakness and headaches.   Psychiatric/Behavioral: Negative for depression and hallucinations. The patient is nervous/anxious.    All other systems reviewed and are negative.       Physical Exam  Temp:  [36.4 °C (97.5 °F)-37.8 °C (100.1 °F)] 37.2 °C (99 °F)  Pulse:  [] 90  Resp:  [17-18] 18  BP: ()/(37-47) 103/39  SpO2:  [90 %-97 %] 95 %    Physical Exam  Constitutional:       General: She is not in acute distress.  HENT:      Nose: Nose normal.      Mouth/Throat:      Mouth: Mucous membranes are dry.   Cardiovascular:      Rate and Rhythm: Normal rate and regular rhythm.      Pulses: Normal pulses.      Heart sounds: Murmur heard.     Pulmonary:      Effort: Pulmonary effort is normal.      Comments: Decreased breath sounds bilateral bases  Abdominal:      General: There is no distension.      Palpations: Abdomen is soft.      Tenderness: There is no abdominal tenderness. There is no guarding.   Musculoskeletal:         General: No swelling or signs of injury.      Cervical back: No muscular tenderness.      Right lower leg: Edema (Trace at ankle) present.      Comments: Immobilizer in place, no e/o ecchymosis or hematoma of RLE   Lymphadenopathy:      Cervical: No cervical adenopathy.   Skin:     General: Skin is warm and dry.      Coloration: Skin is pale.      Findings: No rash.   Neurological:      General: No focal deficit present.      Mental Status: She is alert and oriented to person, place, and time.      Motor: Weakness present.   Psychiatric:         Mood and Affect: Mood normal.         Thought Content: Thought content normal.         Fluids    Intake/Output Summary (Last 24 hours) at 11/6/2021  1400  Last data filed at 11/6/2021 0426  Gross per 24 hour   Intake --   Output 550 ml   Net -550 ml       Laboratory  Recent Labs     11/04/21 0149 11/05/21 0107 11/06/21  0344   WBC 13.6* 17.8* 14.9*   RBC 3.04* 2.55* 2.54*   HEMOGLOBIN 8.3* 7.2* 7.0*   HEMATOCRIT 26.8* 22.5* 21.9*   MCV 88.2 88.2 86.2   MCH 27.3 28.2 27.6   MCHC 31.0* 32.0* 32.0*   RDW 50.1* 49.7 48.2   PLATELETCT 124* 113* 138*   MPV 10.6 11.2 11.5     Recent Labs     11/04/21 0149 11/05/21 0107 11/06/21  0344   SODIUM 136 131* 133*   POTASSIUM 4.7 4.6 4.3   CHLORIDE 105 100 101   CO2 22 21 21   GLUCOSE 146* 133* 122*   BUN 20 26* 27*   CREATININE 1.23 1.35 1.35   CALCIUM 8.1* 7.9* 7.8*                   Imaging  DX-CHEST-PORTABLE (1 VIEW)   Final Result      Stable chest with cardiac silhouette enlargement, some reticular opacity which could be from edema and/or scarring      Hyperinflation      US-RUQ   Final Result      1.  Cholelithiasis without evidence for cholecystitis      2.  Mild biliary dilation with common bile duct measuring 9 mm. This finding is unchanged since CT 6/20/2021 and is probably not secondary to obstruction      3.  Incidental right renal cyst      DX-CHEST-PORTABLE (1 VIEW)   Final Result      1.  Chronic pulmonary interstitial densities which are likely fibrosis. Edema unlikely.      2.  Enlarged cardiac silhouette      3.  Presence of cardiac pacemaker      DX-KNEE 2- RIGHT   Final Result      Interval revision of total knee arthroplasty without immediate postoperative hardware complication.      CT-CTA LOWER EXT WITH & W/O-POST PROCESS LEFT   Final Result      Comminuted periprosthetic distal femoral fracture has dorsal displacement, anterior angulation and impaction      There is folding of the distal superficial femoral artery with posterior displacement resulting in nearly 180 degrees course change. 12 mm distal to this there is then reversal of the posterior displacement with resumption of normal popliteal  course    where there is a 90 degree anterior bend. The proximal kink does result in some vessel narrowing but this is not secondary to atherosclerosis      Moderate distal atherosclerotic change without stenosis or occlusion      DX-ANKLE 2- VIEWS RIGHT   Final Result      Soft tissue swelling without acute displaced fracture identified      DX-KNEE 3 VIEWS RIGHT   Final Result      Acute femoral periprosthetic fracture with comminution, anterior angulation and impaction           Assessment/Plan  * Closed fracture of right distal femur (HCC)  Assessment & Plan  -Prior history of right total knee replacement done by Dr. Styles in 2013  -On presentation found to have comminuted periprosthetic distal femoral fracture with dorsal displacement and anterior angulation and impaction.  -Superficial femoral AA with posterior displacement resulting in nearly 180 degrees course change.  12 mm distal to this there is reversal of the posterior displacement with resumption of normal popliteal course where there is a 90 degree anterior band.  -Status post repair on 11/3 with no complications  She is WBAT operative extremity however pain is limiting her mobility and ambulation  PT OT recommending SNF versus rehab  -Pain control with scheduled Tylenol, p.o. oxycodone and IV Dilaudid as needed for severe breakthrough pain    Acute respiratory failure with hypoxia ([facility])  Assessment & Plan  Possibly from atelectasis postoperatively versus mild pulmonary edema after IV fluids have been given  I ordered a BMP today and is elevated around 5000  Trial of Lasix 20 IV daily  She had an echo in April 2021 that showed EF of 35%, no evidence of heart failure  PE unlikely but will monitor saturations and continue dvt prophylaxis (lovenox)  Wean oxygen as able    PVD (peripheral vascular disease) ([facility])- (present on admission)  Assessment & Plan  Continue atorvastatin    Pacemaker  Assessment & Plan  -Was implanted in May 2021 by Dr. Hollis.   Plan as above.  -Continue metoprolol    Cataract cortical, senile, bilateral- (present on admission)  Assessment & Plan  Resume home prednisolone    Transaminitis  Assessment & Plan  RUQ US done inpatient as this has been planned as outpatient and she had a very mild bump in LFTs  RUQ US shows cholelithiasis however no evidence of cholecystitis or obstruction  Okay to continue statin for now however if LFTs increase further tomorrow, will discontinue    Normocytic anemia  Assessment & Plan  Patient states she has had anemia since a GI bleed in the past, denies blood in stool currently (constipated).  She had a moderate drop preoperatively and again postoperatively, hemoglobin is now 7.0  Transfuse 1U RBC, consent obtained  Iron studies more c/w chronic disease-likely some decreased production in the setting of a long bone fracture  No evidence of hematoma on operative extremity  Repeat Hgb in am  Ok for lovenox as DVT risk is high    Depression- (present on admission)  Assessment & Plan  Continue lexapro    Stage 3b chronic kidney disease (HCC)- (present on admission)  Assessment & Plan  Slight increase in creatinine since admission, still with good urine output  Avoid nephrotoxins, including contrast  Continue low-dose Lasix due to pulmonary edema and hypoxia  Repeat creatinine in a.m.    S/P total knee replacement right- (present on admission)  Assessment & Plan  -Dr. Styles, 2013.    Preoperative cardiovascular examination  Assessment & Plan  .       VTE prophylaxis: enoxaparin ppx     I have performed a physical exam and reviewed and updated ROS and Plan today (11/6/2021). In review of yesterday's note (11/5/2021), there are no changes except as documented above.    Total time:  40 minutes.  I spent greater than 50% of the time for patient care, counseling, and coordination on this date, including unit/floor time, and face-to-face time with the patient as per interval events and assessment and plan above

## 2021-11-06 NOTE — THERAPY
Occupational Therapy  Daily Treatment     Patient Name: Yoli Carmichael  Age:  88 y.o., Sex:  female  Medical Record #: [id number]  Today's Date: 11/6/2021     Precautions  Precautions: Weight Bearing As Tolerated Right Lower Extremity, Immobilizer Right Lower Extremity, Fall Risk  Comments: immobilizer on at all times; assume no ROM    Assessment  Pt is A&Ox3, motivated for OOB. Pt shows improved functional mobility, able to take ~5 side-steps with fww, [place]. Toileting,  Lb dressing with MaxA. OT will continue to follow while in house.      Plan  Continue current treatment plan.    DC Equipment Recommendations: (P) None  Discharge Recommendations: (P) Recommend post-acute placement for additional occupational therapy services prior to discharge home     11/06/21 0915   Cognition    Cognition / Consciousness WDL   Level of Consciousness Alert   Comments Improved alertness. Motivated for improved function.     Balance   Sitting Balance (Static) Good   Sitting Balance (Dynamic) Fair +   Standing Balance (Static) Fair -   Standing Balance (Dynamic) Fair -   Weight Shift Sitting Fair   Weight Shift Standing Fair   Skilled Intervention Verbal Cuing   Comments fww   Bed Mobility    Supine to Sit Moderate Assist   Sit to Supine Moderate Assist   Scooting Minimal Assist   Skilled Intervention Verbal Cuing   Activities of Daily Living   Eating Independent   Lower Body Dressing Maximal Assist   Toileting Maximal Assist   Functional Mobility   Sit to Stand Moderate Assist   Comments to receive blood later on; BTB post session.   Activity Tolerance   Sitting Edge of Bed 10   Standing 5   Comments improved standing tolerance,balance   Patient / Family Goals   Goal #1 Outcome Progressing slower than expected   Short Term Goals   Goal Outcome # 1 Progressing as expected   Goal Outcome # 2 Progressing as expected   Goal Outcome # 3 Progressing as expected

## 2021-11-07 LAB
ALBUMIN SERPL BCP-MCNC: 2.3 G/DL (ref 3.2–4.9)
BASOPHILS # BLD AUTO: 0.4 % (ref 0–1.8)
BASOPHILS # BLD: 0.05 K/UL (ref 0–0.12)
BUN SERPL-MCNC: 28 MG/DL (ref 8–22)
CALCIUM SERPL-MCNC: 7.9 MG/DL (ref 8.4–10.2)
CHLORIDE SERPL-SCNC: 100 MMOL/L (ref 96–112)
CO2 SERPL-SCNC: 21 MMOL/L (ref 20–33)
CREAT SERPL-MCNC: 1.24 MG/DL (ref 0.5–1.4)
EOSINOPHIL # BLD AUTO: 0.24 K/UL (ref 0–0.51)
EOSINOPHIL NFR BLD: 1.9 % (ref 0–6.9)
ERYTHROCYTE [DISTWIDTH] IN BLOOD BY AUTOMATED COUNT: 45.3 FL (ref 35.9–50)
GLUCOSE SERPL-MCNC: 111 MG/DL (ref 65–99)
HCT VFR BLD AUTO: 26.3 % (ref 37–47)
HGB BLD-MCNC: 8.6 G/DL (ref 12–16)
IMM GRANULOCYTES # BLD AUTO: 0.1 K/UL (ref 0–0.11)
IMM GRANULOCYTES NFR BLD AUTO: 0.8 % (ref 0–0.9)
LYMPHOCYTES # BLD AUTO: 1.67 K/UL (ref 1–4.8)
LYMPHOCYTES NFR BLD: 13.1 % (ref 22–41)
MAGNESIUM SERPL-MCNC: 1.9 MG/DL (ref 1.5–2.5)
MCH RBC QN AUTO: 28.1 PG (ref 27–33)
MCHC RBC AUTO-ENTMCNC: 32.7 G/DL (ref 33.6–35)
MCV RBC AUTO: 85.9 FL (ref 81.4–97.8)
MONOCYTES # BLD AUTO: 1.61 K/UL (ref 0–0.85)
MONOCYTES NFR BLD AUTO: 12.6 % (ref 0–13.4)
NEUTROPHILS # BLD AUTO: 9.07 K/UL (ref 2–7.15)
NEUTROPHILS NFR BLD: 71.2 % (ref 44–72)
NRBC # BLD AUTO: 0 K/UL
NRBC BLD-RTO: 0 /100 WBC
PHOSPHATE SERPL-MCNC: 2.4 MG/DL (ref 2.5–4.5)
PLATELET # BLD AUTO: 192 K/UL (ref 164–446)
PMV BLD AUTO: 10.8 FL (ref 9–12.9)
POTASSIUM SERPL-SCNC: 3.9 MMOL/L (ref 3.6–5.5)
RBC # BLD AUTO: 3.06 M/UL (ref 4.2–5.4)
SODIUM SERPL-SCNC: 132 MMOL/L (ref 135–145)
WBC # BLD AUTO: 12.7 K/UL (ref 4.8–10.8)

## 2021-11-07 PROCEDURE — 36415 COLL VENOUS BLD VENIPUNCTURE: CPT

## 2021-11-07 PROCEDURE — 83735 ASSAY OF MAGNESIUM: CPT

## 2021-11-07 PROCEDURE — 97110 THERAPEUTIC EXERCISES: CPT

## 2021-11-07 PROCEDURE — 99233 SBSQ HOSP IP/OBS HIGH 50: CPT | Performed by: INTERNAL MEDICINE

## 2021-11-07 PROCEDURE — 97530 THERAPEUTIC ACTIVITIES: CPT

## 2021-11-07 PROCEDURE — 700111 HCHG RX REV CODE 636 W/ 250 OVERRIDE (IP): Performed by: INTERNAL MEDICINE

## 2021-11-07 PROCEDURE — A9270 NON-COVERED ITEM OR SERVICE: HCPCS | Performed by: HOSPITALIST

## 2021-11-07 PROCEDURE — 85025 COMPLETE CBC W/AUTO DIFF WBC: CPT

## 2021-11-07 PROCEDURE — 700102 HCHG RX REV CODE 250 W/ 637 OVERRIDE(OP): Performed by: HOSPITALIST

## 2021-11-07 PROCEDURE — A9270 NON-COVERED ITEM OR SERVICE: HCPCS | Performed by: INTERNAL MEDICINE

## 2021-11-07 PROCEDURE — 700102 HCHG RX REV CODE 250 W/ 637 OVERRIDE(OP): Performed by: INTERNAL MEDICINE

## 2021-11-07 PROCEDURE — 80069 RENAL FUNCTION PANEL: CPT

## 2021-11-07 PROCEDURE — 770006 HCHG ROOM/CARE - MED/SURG/GYN SEMI*

## 2021-11-07 RX ORDER — SIMETHICONE 80 MG
80 TABLET,CHEWABLE ORAL 3 TIMES DAILY PRN
Status: DISCONTINUED | OUTPATIENT
Start: 2021-11-07 | End: 2021-11-08 | Stop reason: HOSPADM

## 2021-11-07 RX ORDER — FUROSEMIDE 10 MG/ML
20 INJECTION INTRAMUSCULAR; INTRAVENOUS ONCE
Status: COMPLETED | OUTPATIENT
Start: 2021-11-07 | End: 2021-11-07

## 2021-11-07 RX ADMIN — POLYETHYLENE GLYCOL 3350 1 PACKET: 17 POWDER, FOR SOLUTION ORAL at 11:21

## 2021-11-07 RX ADMIN — METOPROLOL SUCCINATE 25 MG: 25 TABLET, EXTENDED RELEASE ORAL at 05:50

## 2021-11-07 RX ADMIN — PREDNISOLONE ACETATE 1 DROP: 10 SUSPENSION/ DROPS OPHTHALMIC at 20:21

## 2021-11-07 RX ADMIN — PREDNISOLONE ACETATE 1 DROP: 10 SUSPENSION/ DROPS OPHTHALMIC at 17:01

## 2021-11-07 RX ADMIN — PREDNISOLONE ACETATE 1 DROP: 10 SUSPENSION/ DROPS OPHTHALMIC at 11:22

## 2021-11-07 RX ADMIN — ESCITALOPRAM OXALATE 5 MG: 10 TABLET ORAL at 05:50

## 2021-11-07 RX ADMIN — FUROSEMIDE 20 MG: 10 INJECTION, SOLUTION INTRAMUSCULAR; INTRAVENOUS at 07:56

## 2021-11-07 RX ADMIN — PREDNISOLONE ACETATE 1 DROP: 10 SUSPENSION/ DROPS OPHTHALMIC at 05:51

## 2021-11-07 RX ADMIN — ENOXAPARIN SODIUM 30 MG: 30 INJECTION SUBCUTANEOUS at 05:51

## 2021-11-07 RX ADMIN — SENNOSIDES AND DOCUSATE SODIUM 2 TABLET: 50; 8.6 TABLET ORAL at 05:50

## 2021-11-07 RX ADMIN — SENNOSIDES AND DOCUSATE SODIUM 2 TABLET: 50; 8.6 TABLET ORAL at 17:00

## 2021-11-07 RX ADMIN — PREDNISOLONE ACETATE 1 DROP: 10 SUSPENSION/ DROPS OPHTHALMIC at 07:56

## 2021-11-07 RX ADMIN — MAGNESIUM CITRATE 296 ML: 1.75 LIQUID ORAL at 08:56

## 2021-11-07 RX ADMIN — PREDNISOLONE ACETATE 1 DROP: 10 SUSPENSION/ DROPS OPHTHALMIC at 12:48

## 2021-11-07 RX ADMIN — ATORVASTATIN CALCIUM 40 MG: 40 TABLET, FILM COATED ORAL at 17:00

## 2021-11-07 ASSESSMENT — ENCOUNTER SYMPTOMS
HEARTBURN: 0
DEPRESSION: 0
NERVOUS/ANXIOUS: 0
FALLS: 0
SORE THROAT: 0
DIZZINESS: 0
ABDOMINAL PAIN: 0
COUGH: 0
HEADACHES: 0
FOCAL WEAKNESS: 0
WEAKNESS: 1
VOMITING: 0
CHILLS: 0
BLOOD IN STOOL: 0
BACK PAIN: 0
SHORTNESS OF BREATH: 0
MYALGIAS: 1
PALPITATIONS: 0
FEVER: 0
NAUSEA: 0
CONSTIPATION: 1
HALLUCINATIONS: 0
DIARRHEA: 0

## 2021-11-07 ASSESSMENT — GAIT ASSESSMENTS
DEVIATION: ANTALGIC
GAIT LEVEL OF ASSIST: MODERATE ASSIST
ASSISTIVE DEVICE: FRONT WHEEL WALKER

## 2021-11-07 ASSESSMENT — COGNITIVE AND FUNCTIONAL STATUS - GENERAL
MOBILITY SCORE: 11
MOVING TO AND FROM BED TO CHAIR: A LOT
CLIMB 3 TO 5 STEPS WITH RAILING: TOTAL
MOVING FROM LYING ON BACK TO SITTING ON SIDE OF FLAT BED: A LOT
SUGGESTED CMS G CODE MODIFIER MOBILITY: CL
WALKING IN HOSPITAL ROOM: A LOT
TURNING FROM BACK TO SIDE WHILE IN FLAT BAD: A LOT
STANDING UP FROM CHAIR USING ARMS: A LOT

## 2021-11-07 ASSESSMENT — PAIN DESCRIPTION - PAIN TYPE: TYPE: ACUTE PAIN

## 2021-11-07 NOTE — CARE PLAN
The patient is Stable - Low risk of patient condition declining or worsening    Shift Goals  Clinical Goals: wean from Oxygen    Progress made toward(s) clinical / shift goals:    Problem: Knowledge Deficit - Standard  Goal: Patient and family/care givers will demonstrate understanding of plan of care, disease process/condition, diagnostic tests and medications  Outcome: Progressing  Note: Patient updated on the plan of care. Encouraged to voice feelings and to ask questions. Answered all questions and concerns. Agrees with the plan of care.      Problem: Fall Risk  Goal: Patient will remain free from falls  Outcome: Progressing  Note: Safety precautions in place. Bed alarm ON. Will continue to monitor patient.        Patient is not progressing towards the following goals:

## 2021-11-07 NOTE — CARE PLAN
The patient is Stable - Low risk of patient condition declining or worsening    Shift Goals  Clinical Goals: able to pass BM today    Progress made toward(s) clinical / shift goals: Patient received Mag Citrate and Movicol for constipation as ordered.Passed small amount of hard stool. Dig manually, passed large amount of brownish stool.Patient tolerated the procedure    Patient is not progressing towards the following goals:

## 2021-11-07 NOTE — PROGRESS NOTES
"Patient seen and examined  Very pleasant, no complaints    /55   Pulse 93   Temp 37.2 °C (98.9 °F) (Oral)   Resp 18   Ht 1.6 m (5' 3\")   Wt 53.1 kg (117 lb)   SpO2 90%     Recent Labs     11/05/21  0107 11/06/21  0344 11/07/21  0325   WBC 17.8* 14.9* 12.7*   RBC 2.55* 2.54* 3.06*   HEMOGLOBIN 7.2* 7.0* 8.6*   HEMATOCRIT 22.5* 21.9* 26.3*   MCV 88.2 86.2 85.9   MCH 28.2 27.6 28.1   MCHC 32.0* 32.0* 32.7*   RDW 49.7 48.2 45.3   PLATELETCT 113* 138* 192   MPV 11.2 11.5 10.8       No acute distress  Dressing clean dry and intact  Neurovascularly intact    POD# 4 DFR       Plan:  DVT Prophylaxis- TEDS/SCDs, LMWH while in hospital, ASA 81 mg PO BID as outpatient  Weight Bearing Status- WBAT  PT/OT  Antibiotics: perioperative complete  Case Coordination          "

## 2021-11-07 NOTE — PROGRESS NOTES
Received patient from Night shift RN . Patient is awake and alert.No sign of distress. Patient denies any nausea,numbness or tingling. Able to dorsiflex RLE, still with edema. Fall precaution in placed, kept bed in lowest position and call light within reach.

## 2021-11-07 NOTE — PROGRESS NOTES
Received bedside patient report from RACHAEL Olvera and RACHAEL Niño. Patient resting comfortably in bed, no complaints at this time. Safety precautions in place. Will continue to monitor.

## 2021-11-07 NOTE — DISCHARGE PLANNING
Anticipated Discharge Disposition:   IRF vs SNF    Action:   Chart review complete.     Per MD, this patient is medically cleared to discharge to either SNF or IRF. [facility] is following. RN CM to reach out to rehab to see if a bed is available today.     1023: No one from clinical admissions available on voalte. RN CM called [facility] and left a voicemail for admissions with a call back number.     [facility]erral to [facility] is pending. CM to follow up with [facility] on Monday as they are anticipated to have beds.     Barriers to Discharge:   IRF/SNF acceptance and bed     Plan:   Hospital care management will continue to assist with discharge planning needs.

## 2021-11-07 NOTE — PROGRESS NOTES
[facility] Medicine Daily Progress Note    Date of Service  11/7/2021    Chief Complaint  Yoli Carmichael is a 88 y.o. female admitted 11/2/2021 with R leg pain after fall    Hospital Course  History of sick sinus syndrome status post pacemaker, hyperlipidemia, hypertension, depression, right knee replacement in 2013 by Dr. Styles who was admitted after a ground-level fall complaining of right leg pain found to have a right periprosthetic distal femur fracture.  Orthopedic surgery was consulted and recommended operative repair, nonweightbearing.    Interval Problem Update  11/3: Hemoglobin down to 9.6 from 12.3, 48 hours ago.  No evidence of bleeding or hematoma on exam.  Repeat Hgb scheduled for 1400, plan for OR today at 1800.  Pain is controlled  11/4: Hypoxic requiring 5 L, asymptomatic.  Using some narcotics for pain control.  Hemoglobin stable.  Unable to ambulate with PT due to pain.  Mild increase in LFTs, RUQ US ordered, CXR ordered  11/5: Still on 3-5L, elevated BNP.  Lsix x1 given.  Hgb down to 7.2 no e/o bleeding, still on lovenox, monitoring closely  11/6: Hemoglobin down to 7.0, 1 unit RBC transfused.  Creatinine stable, continue Lasix.  Still requiring 3 L oxygen.  Weak and listless.  I-S observed, pulling 750 mL  11/7: Hemoglobin improved, creatinine improved.  Weaned to 1 L oxygen.  Complaining of constipation, mag citrate and simethicone added.  Pending bed at rehab, medically clear    I have personally seen and examined the patient at bedside. I discussed the plan of care with patient, family and bedside RN.    Consultants/Specialty  orthopedics- Stephani    Code Status  Full Code    Disposition  Patient is medically cleared.   Anticipate discharge to to skilled nursing facility.vs IP rehab  I have placed the appropriate orders for post-discharge needs.    Review of Systems  Review of Systems   Constitutional: Positive for malaise/fatigue. Negative for chills and fever.   HENT: Negative for sore  throat.    Respiratory: Negative for cough and shortness of breath.    Cardiovascular: Negative for chest pain and palpitations.   Gastrointestinal: Positive for constipation (Distention and gas). Negative for abdominal pain, blood in stool, diarrhea, heartburn, nausea and vomiting.   Genitourinary: Negative for dysuria and frequency.   Musculoskeletal: Positive for myalgias (Controlled). Negative for back pain and falls.   Neurological: Positive for weakness. Negative for dizziness, focal weakness and headaches.   Psychiatric/Behavioral: Negative for depression and hallucinations. The patient is not nervous/anxious.    All other systems reviewed and are negative.       Physical Exam  Temp:  [36.4 °C (97.6 °F)-37.8 °C (100.1 °F)] 36.4 °C (97.6 °F)  Pulse:  [78-98] 92  Resp:  [16-18] 16  BP: ()/(36-55) 127/48  SpO2:  [90 %-96 %] 93 %    Physical Exam  Constitutional:       General: She is not in acute distress.  HENT:      Nose: Nose normal.      Mouth/Throat:      Mouth: Mucous membranes are dry.   Cardiovascular:      Rate and Rhythm: Normal rate and regular rhythm.      Pulses: Normal pulses.      Heart sounds: Murmur heard.       Pulmonary:      Effort: Pulmonary effort is normal.      Comments: Decreased breath sounds bilateral bases  Abdominal:      General: There is no distension.      Palpations: Abdomen is soft.      Tenderness: There is no abdominal tenderness. There is no guarding.   Musculoskeletal:         General: No swelling or signs of injury.      Cervical back: No muscular tenderness.      Right lower leg: Edema (1+) present.      Comments: Immobilizer in place, no e/o ecchymosis or hematoma of RLE   Lymphadenopathy:      Cervical: No cervical adenopathy.   Skin:     General: Skin is warm and dry.      Coloration: Skin is pale.      Findings: No rash.   Neurological:      General: No focal deficit present.      Mental Status: She is alert and oriented to person, place, and time.      Motor:  Weakness present.   Psychiatric:         Mood and Affect: Mood normal.         Thought Content: Thought content normal.         Fluids    Intake/Output Summary (Last 24 hours) at 11/7/2021 1133  Last data filed at 11/7/2021 0800  Gross per 24 hour   Intake 780 ml   Output 1300 ml   Net -520 ml       Laboratory  Recent Labs     11/05/21 0107 11/06/21 0344 11/07/21  0325   WBC 17.8* 14.9* 12.7*   RBC 2.55* 2.54* 3.06*   HEMOGLOBIN 7.2* 7.0* 8.6*   HEMATOCRIT 22.5* 21.9* 26.3*   MCV 88.2 86.2 85.9   MCH 28.2 27.6 28.1   MCHC 32.0* 32.0* 32.7*   RDW 49.7 48.2 45.3   PLATELETCT 113* 138* 192   MPV 11.2 11.5 10.8     Recent Labs     11/05/21 0107 11/06/21 0344 11/07/21  0325   SODIUM 131* 133* 132*   POTASSIUM 4.6 4.3 3.9   CHLORIDE 100 101 100   CO2 21 21 21   GLUCOSE 133* 122* 111*   BUN 26* 27* 28*   CREATININE 1.35 1.35 1.24   CALCIUM 7.9* 7.8* 7.9*                   Imaging  DX-CHEST-PORTABLE (1 VIEW)   Final Result      Stable chest with cardiac silhouette enlargement, some reticular opacity which could be from edema and/or scarring      Hyperinflation      US-RUQ   Final Result      1.  Cholelithiasis without evidence for cholecystitis      2.  Mild biliary dilation with common bile duct measuring 9 mm. This finding is unchanged since CT 6/20/2021 and is probably not secondary to obstruction      3.  Incidental right renal cyst      DX-CHEST-PORTABLE (1 VIEW)   Final Result      1.  Chronic pulmonary interstitial densities which are likely fibrosis. Edema unlikely.      2.  Enlarged cardiac silhouette      3.  Presence of cardiac pacemaker      DX-KNEE 2- RIGHT   Final Result      Interval revision of total knee arthroplasty without immediate postoperative hardware complication.      CT-CTA LOWER EXT WITH & W/O-POST PROCESS LEFT   Final Result      Comminuted periprosthetic distal femoral fracture has dorsal displacement, anterior angulation and impaction      There is folding of the distal superficial femoral  artery with posterior displacement resulting in nearly 180 degrees course change. 12 mm distal to this there is then reversal of the posterior displacement with resumption of normal popliteal course    where there is a 90 degree anterior bend. The proximal kink does result in some vessel narrowing but this is not secondary to atherosclerosis      Moderate distal atherosclerotic change without stenosis or occlusion      DX-ANKLE 2- VIEWS RIGHT   Final Result      Soft tissue swelling without acute displaced fracture identified      DX-KNEE 3 VIEWS RIGHT   Final Result      Acute femoral periprosthetic fracture with comminution, anterior angulation and impaction           Assessment/Plan  * Closed fracture of right distal femur (HCC)  Assessment & Plan  -Prior history of right total knee replacement done by Dr. Styles in 2013  -On presentation found to have comminuted periprosthetic distal femoral fracture with dorsal displacement and anterior angulation and impaction.  -Superficial femoral AA with posterior displacement resulting in nearly 180 degrees course change.  12 mm distal to this there is reversal of the posterior displacement with resumption of normal popliteal course where there is a 90 degree anterior band.  -Status post repair on 11/3 with no complications  She is WBAT operative extremity however pain is limiting her mobility and ambulation  PT OT recommending SNF versus rehab  -Pain control with scheduled Tylenol, p.o. oxycodone and IV Dilaudid as needed for severe breakthrough pain    Acute respiratory failure with hypoxia ([facility])  Assessment & Plan  From atelectasis postoperatively and mild pulmonary edema after IV fluids have been given  BNP was elevated around 5000  Continue trial of Lasix 20 IV daily  She had an echo in April 2021 that showed EF of 75%, no evidence of heart failure  PE unlikely but will monitor saturations and continue dvt prophylaxis (lovenox)  Wean oxygen as able    PVD (peripheral  vascular disease) (HCC)- (present on admission)  Assessment & Plan  Continue atorvastatin    Pacemaker  Assessment & Plan  -Was implanted in May 2021 by Dr. Hollis.  Plan as above.  -Continue metoprolol    Cataract cortical, senile, bilateral- (present on admission)  Assessment & Plan  Resume home prednisolone    Transaminitis  Assessment & Plan  RUQ US done inpatient as this has been planned as outpatient and she had a very mild bump in LFTs  RUQ US shows cholelithiasis however no evidence of cholecystitis or obstruction  Okay to continue statin for now however if LFTs increase further tomorrow, will discontinue    Normocytic anemia  Assessment & Plan  Patient states she has had anemia since a GI bleed in the past, denies blood in stool currently (constipated).  She had a moderate drop preoperatively and again postoperatively, to 7.0 and was transfused 1 unit RBCs on 11/6  Iron studies more c/w chronic disease-likely some decreased production in the setting of a long bone fracture  No evidence of hematoma on operative extremity  Repeat Hgb in am  Ok for lovenox as DVT risk is high    Depression- (present on admission)  Assessment & Plan  Continue lexapro    Stage 3b chronic kidney disease (HCC)- (present on admission)  Assessment & Plan  Slight increase in creatinine since admission, still with good urine output  Avoid nephrotoxins, including contrast  Continue low-dose Lasix due to pulmonary edema and hypoxia  Repeat creatinine in a.m.    S/P total knee replacement right- (present on admission)  Assessment & Plan  -Dr. Styles, 2013.    Preoperative cardiovascular examination  Assessment & Plan  .       VTE prophylaxis: enoxaparin ppx --> asa 81 bid on dc    I have performed a physical exam and reviewed and updated ROS and Plan today (11/7/2021). In review of yesterday's note (11/6/2021), there are no changes except as documented above.

## 2021-11-07 NOTE — THERAPY
Physical Therapy   Daily Treatment     Patient Name: Yoli Carmichael  Age:  88 y.o., Sex:  female  Medical Record #: [id number]  Today's Date: 11/7/2021     Precautions  Comments: (P) NWB R LE in chart. immobilizer on all times.     Assessment  Nsg requesting pt to get up in chair. Pt c/o of abd discomfort from receiving stool softener. Pt agrees to try after coaxing. MD writes pt is NWB R LE with immob.pt unable to maintain NWB RLE.  nsg to check for WB clarification. Pt able to stand pivot into chair with mod assist x 2. Pt c/o of dizziness and nausea when up at EOB. Recommend post acute PT services. Will follow.         Plan    Continue current treatment plan.    DC Equipment Recommendations: (P) Unable to determine at this time  Discharge Recommendations: (P) Recommend post-acute placement for additional physical therapy services prior to discharge home             11/07/21 1122   Total Time Spent   Total Time Spent (Mins) 30   Charge Group   Charges  Yes   PT Therapeutic Activities 2   PT Therapeutic Exercise 1   Precautions   Comments NWB R LE in chart. immobilizer on all times.    Vitals   O2 Delivery Device None - Room Air   Pain 0 - 10 Group   Therapist Pain Assessment Nurse Notified;Post Activity Pain Same as Prior to Activity   Cognition    Cognition / Consciousness WDL   Level of Consciousness Alert   Attention Impaired   Comments pt c/o of abd discomfort due to hard stool and taking stool softener    Passive ROM Lower Body   Passive ROM Lower Body X   Comments immob RLE, ankle dorsi flesion limited due to tightness, L>R    Active ROM Lower Body    Active ROM Lower Body  X   Comments as above, active ankle pumps and ankle circles.    Strength Lower Body   Lower Body Strength  X   Comments general deconditioning. L>R LE weakness. assist with RLE off bed   Sensation Lower Body   Lower Extremity Sensation   WDL   Lower Body Muscle Tone   Lower Body Muscle Tone  WDL   Supine Lower Body Exercise   Supine  Lower Body Exercises Yes   Straight Leg Raises 1 set of 10   Ankle Pumps Reciprocal;1 set of 10   Quadriceps Isometrics 1 set of 10;Right   Sitting Lower Body Exercises   Comments breathing ther ex for relaxation and to slow breathing.    Other Treatments   Other Treatments Provided deep breathing and relaxation techniques   Neurological Concerns   Neurological Concerns No   Balance   Sitting Balance (Static) Good   Sitting Balance (Dynamic) Fair +   Standing Balance (Static) Fair -   Standing Balance (Dynamic) Fair -   Weight Shift Sitting Fair   Weight Shift Standing Poor   Skilled Intervention Verbal Cuing   Comments FWW   Gait Analysis   Gait Level Of Assist Moderate Assist  (x 2 stand pivot transfer)   Assistive Device Front Wheel Walker   Deviation Antalgic   Weight Bearing Status NWB vs WBAT R  (recent note from hopitalist NWB [facility]  )   Skilled Intervention Postural Facilitation;Compensatory Strategies;Sequencing;Verbal Cuing   Bed Mobility    Supine to Sit Minimal Assist   Sit to Supine   (pt left up in chair at bedside )   Scooting Minimal Assist   Skilled Intervention Verbal Cuing;Postural Facilitation   Comments bed flat with rail    Functional Mobility   Sit to Stand Minimal Assist   Bed, Chair, Wheelchair Transfer Moderate Assist  (x 2 stand pivot)   Skilled Intervention Verbal Cuing;Compensatory Strategies   Comments pt c/o of nausea    How much difficulty does the patient currently have...   Turning over in bed (including adjusting bedclothes, sheets and blankets)? 2   Sitting down on and standing up from a chair with arms (e.g., wheelchair, bedside commode, etc.) 2   Moving from lying on back to sitting on the side of the bed? 2   How much help from another person does the patient currently need...   Moving to and from a bed to a chair (including a wheelchair)? 2   Need to walk in a hospital room? 2   Climbing 3-5 steps with a railing? 1   6 clicks Mobility Score 11   Activity Tolerance   Sitting  Edge of Bed 10 mins   Standing 5  mins   Patient / Family Goals    Patient / Family Goal #1 to go back to PLOF    Goal #1 Outcome Goal not met   Short Term Goals    Short Term Goal # 1 pt will go supine<>sit w/hob flat and rails down w/spv in 6tx    Goal Outcome # 1 Progressing as expected   Short Term Goal # 2 pt will go sit<>stand w/fww w/spv in 6tx    Goal Outcome # 2 Progressing slower than expected   Short Term Goal # 3 pt will transfer bed<>chair w/Min A w/fww in 6tx    Goal Outcome # 3 Progressing slower than expected   Short Term Goal # 4 pt will ambulate for 50ft w/fww w/Min A in 6tx    Goal Outcome # 4 Progressing slower than expected   Education Group   Role of Physical Therapist Patient Response Patient;Acceptance;Explanation;Demonstration;Verbal Demonstration;Action Demonstration   Use of Assistive Device Patient Response Patient;Acceptance;Demonstration;Explanation;Verbal Demonstration;Action Demonstration   Weight Bearing Status Patient Response Patient;Acceptance;Explanation;Demonstration;Action Demonstration;Reinforcement Needed;Verbal Demonstration   Anticipated Discharge Equipment and Recommendations   DC Equipment Recommendations Unable to determine at this time   Discharge Recommendations Recommend post-acute placement for additional physical therapy services prior to discharge home   Interdisciplinary Plan of Care Collaboration   IDT Collaboration with  Nursing   Patient Position at End of Therapy Seated;Phone within Reach;Tray Table within Reach;Call Light within Reach   Collaboration Comments re: tx   Session Information   Date / Session Number  11/7/21 3/5 11/10

## 2021-11-08 ENCOUNTER — APPOINTMENT (OUTPATIENT)
Dept: RADIOLOGY | Facility: MEDICAL CENTER | Age: 86
DRG: 466 | End: 2021-11-08
Attending: INTERNAL MEDICINE
Payer: MEDICARE

## 2021-11-08 VITALS
WEIGHT: 117 LBS | HEART RATE: 87 BPM | HEIGHT: 63 IN | SYSTOLIC BLOOD PRESSURE: 107 MMHG | RESPIRATION RATE: 17 BRPM | BODY MASS INDEX: 20.73 KG/M2 | OXYGEN SATURATION: 96 % | DIASTOLIC BLOOD PRESSURE: 44 MMHG | TEMPERATURE: 97.8 F

## 2021-11-08 LAB
ALBUMIN SERPL BCP-MCNC: 2.2 G/DL (ref 3.2–4.9)
BASOPHILS # BLD AUTO: 0.3 % (ref 0–1.8)
BASOPHILS # BLD: 0.04 K/UL (ref 0–0.12)
BUN SERPL-MCNC: 25 MG/DL (ref 8–22)
CALCIUM SERPL-MCNC: 8 MG/DL (ref 8.4–10.2)
CHLORIDE SERPL-SCNC: 100 MMOL/L (ref 96–112)
CO2 SERPL-SCNC: 23 MMOL/L (ref 20–33)
CREAT SERPL-MCNC: 1.12 MG/DL (ref 0.5–1.4)
EOSINOPHIL # BLD AUTO: 0.23 K/UL (ref 0–0.51)
EOSINOPHIL NFR BLD: 1.8 % (ref 0–6.9)
ERYTHROCYTE [DISTWIDTH] IN BLOOD BY AUTOMATED COUNT: 46.3 FL (ref 35.9–50)
GLUCOSE SERPL-MCNC: 109 MG/DL (ref 65–99)
HCT VFR BLD AUTO: 26.8 % (ref 37–47)
HGB BLD-MCNC: 8.7 G/DL (ref 12–16)
IMM GRANULOCYTES # BLD AUTO: 0.19 K/UL (ref 0–0.11)
IMM GRANULOCYTES NFR BLD AUTO: 1.5 % (ref 0–0.9)
LYMPHOCYTES # BLD AUTO: 1.85 K/UL (ref 1–4.8)
LYMPHOCYTES NFR BLD: 14.7 % (ref 22–41)
MAGNESIUM SERPL-MCNC: 2.1 MG/DL (ref 1.5–2.5)
MCH RBC QN AUTO: 28.2 PG (ref 27–33)
MCHC RBC AUTO-ENTMCNC: 32.5 G/DL (ref 33.6–35)
MCV RBC AUTO: 86.7 FL (ref 81.4–97.8)
MONOCYTES # BLD AUTO: 1.68 K/UL (ref 0–0.85)
MONOCYTES NFR BLD AUTO: 13.4 % (ref 0–13.4)
NEUTROPHILS # BLD AUTO: 8.57 K/UL (ref 2–7.15)
NEUTROPHILS NFR BLD: 68.3 % (ref 44–72)
NRBC # BLD AUTO: 0 K/UL
NRBC BLD-RTO: 0 /100 WBC
PHOSPHATE SERPL-MCNC: 2.7 MG/DL (ref 2.5–4.5)
PLATELET # BLD AUTO: 242 K/UL (ref 164–446)
PMV BLD AUTO: 10 FL (ref 9–12.9)
POTASSIUM SERPL-SCNC: 4 MMOL/L (ref 3.6–5.5)
RBC # BLD AUTO: 3.09 M/UL (ref 4.2–5.4)
SODIUM SERPL-SCNC: 133 MMOL/L (ref 135–145)
WBC # BLD AUTO: 12.6 K/UL (ref 4.8–10.8)

## 2021-11-08 PROCEDURE — 71045 X-RAY EXAM CHEST 1 VIEW: CPT

## 2021-11-08 PROCEDURE — 99239 HOSP IP/OBS DSCHRG MGMT >30: CPT | Performed by: INTERNAL MEDICINE

## 2021-11-08 PROCEDURE — 83735 ASSAY OF MAGNESIUM: CPT

## 2021-11-08 PROCEDURE — 97530 THERAPEUTIC ACTIVITIES: CPT

## 2021-11-08 PROCEDURE — 700102 HCHG RX REV CODE 250 W/ 637 OVERRIDE(OP): Performed by: HOSPITALIST

## 2021-11-08 PROCEDURE — 94760 N-INVAS EAR/PLS OXIMETRY 1: CPT

## 2021-11-08 PROCEDURE — 80069 RENAL FUNCTION PANEL: CPT

## 2021-11-08 PROCEDURE — 36415 COLL VENOUS BLD VENIPUNCTURE: CPT

## 2021-11-08 PROCEDURE — 700111 HCHG RX REV CODE 636 W/ 250 OVERRIDE (IP): Performed by: INTERNAL MEDICINE

## 2021-11-08 PROCEDURE — 85025 COMPLETE CBC W/AUTO DIFF WBC: CPT

## 2021-11-08 PROCEDURE — 97535 SELF CARE MNGMENT TRAINING: CPT

## 2021-11-08 PROCEDURE — A9270 NON-COVERED ITEM OR SERVICE: HCPCS | Performed by: HOSPITALIST

## 2021-11-08 RX ORDER — FUROSEMIDE 20 MG/1
20 TABLET ORAL DAILY
Qty: 30 TABLET | Refills: 1 | Status: SHIPPED
Start: 2021-11-08 | End: 2021-12-09

## 2021-11-08 RX ORDER — ACETAMINOPHEN 325 MG/1
650 TABLET ORAL EVERY 6 HOURS PRN
Qty: 30 TABLET | Refills: 0 | Status: SHIPPED | OUTPATIENT
Start: 2021-11-08 | End: 2021-12-07

## 2021-11-08 RX ORDER — POLYETHYLENE GLYCOL 3350 17 G/17G
17 POWDER, FOR SOLUTION ORAL DAILY
Status: SHIPPED
Start: 2021-11-08 | End: 2021-12-07

## 2021-11-08 RX ADMIN — PREDNISOLONE ACETATE 1 DROP: 10 SUSPENSION/ DROPS OPHTHALMIC at 12:00

## 2021-11-08 RX ADMIN — ATORVASTATIN CALCIUM 40 MG: 40 TABLET, FILM COATED ORAL at 17:38

## 2021-11-08 RX ADMIN — PREDNISOLONE ACETATE 1 DROP: 10 SUSPENSION/ DROPS OPHTHALMIC at 12:13

## 2021-11-08 RX ADMIN — PREDNISOLONE ACETATE 1 DROP: 10 SUSPENSION/ DROPS OPHTHALMIC at 18:18

## 2021-11-08 RX ADMIN — PREDNISOLONE ACETATE 1 DROP: 10 SUSPENSION/ DROPS OPHTHALMIC at 17:38

## 2021-11-08 RX ADMIN — SENNOSIDES AND DOCUSATE SODIUM 2 TABLET: 50; 8.6 TABLET ORAL at 17:38

## 2021-11-08 RX ADMIN — PREDNISOLONE ACETATE 1 DROP: 10 SUSPENSION/ DROPS OPHTHALMIC at 09:06

## 2021-11-08 RX ADMIN — METOPROLOL SUCCINATE 25 MG: 25 TABLET, EXTENDED RELEASE ORAL at 06:37

## 2021-11-08 RX ADMIN — PREDNISOLONE ACETATE 1 DROP: 10 SUSPENSION/ DROPS OPHTHALMIC at 06:38

## 2021-11-08 RX ADMIN — ESCITALOPRAM OXALATE 5 MG: 10 TABLET ORAL at 06:37

## 2021-11-08 RX ADMIN — ENOXAPARIN SODIUM 30 MG: 30 INJECTION SUBCUTANEOUS at 06:36

## 2021-11-08 ASSESSMENT — COGNITIVE AND FUNCTIONAL STATUS - GENERAL
TOILETING: A LOT
PERSONAL GROOMING: A LITTLE
SUGGESTED CMS G CODE MODIFIER DAILY ACTIVITY: CK
DAILY ACTIVITIY SCORE: 16
TURNING FROM BACK TO SIDE WHILE IN FLAT BAD: A LOT
SUGGESTED CMS G CODE MODIFIER MOBILITY: CM
DRESSING REGULAR LOWER BODY CLOTHING: A LOT
MOVING TO AND FROM BED TO CHAIR: A LOT
STANDING UP FROM CHAIR USING ARMS: A LOT
MOVING FROM LYING ON BACK TO SITTING ON SIDE OF FLAT BED: UNABLE
MOBILITY SCORE: 9
HELP NEEDED FOR BATHING: A LOT
DRESSING REGULAR UPPER BODY CLOTHING: A LITTLE
WALKING IN HOSPITAL ROOM: TOTAL
CLIMB 3 TO 5 STEPS WITH RAILING: TOTAL

## 2021-11-08 ASSESSMENT — PAIN DESCRIPTION - PAIN TYPE: TYPE: SURGICAL PAIN

## 2021-11-08 ASSESSMENT — GAIT ASSESSMENTS: GAIT LEVEL OF ASSIST: UNABLE TO PARTICIPATE

## 2021-11-08 NOTE — DISCHARGE PLANNING
DC Transport Scheduled    Received request at: 1259    Transport Company Scheduled: [employer]    Scheduled Date: 11/8/21  Scheduled Time: 1605    [facility]     Notified care team of scheduled transport via [employer].     If there are any changes needed to the DC transportation scheduled, please contact [facility] at ext. [phone] between the hours of 3752-4756 Mon-Fri. If outside those hours, contact the ED Case Manager at ext. [phone].

## 2021-11-08 NOTE — DISCHARGE SUMMARY
Discharge Summary    CHIEF COMPLAINT ON ADMISSION  Chief Complaint   Patient presents with   • Knee Pain     fell injuring R knee     • Knee Injury       Reason for Admission  right knee, fall     Admission Date  11/2/2021    CODE STATUS  Full Code    HPI & HOSPITAL COURSE  This is a 88 y.o. female with a History of sick sinus syndrome status post pacemaker, hyperlipidemia, hypertension, depression, right knee replacement in 2013 by Dr. Styles who was admitted after a ground-level fall complaining of right leg pain found to have a right periprosthetic distal femur fracture.  Orthopedic surgery was consulted and recommended operative repair.  Preoperatively her hemoglobin was noted to be 9.6 which was down approximately 3 points from 48 hours prior.  She did not demonstrate any evidence of bleeding or hematoma on exam and reported no blood in the stool.  2 units were prepped on her way to the OR however she did not require transfusion perioperatively.  On the subsequent days postoperatively her hemoglobin trended down and she ultimately did require 1 unit RBC transfusion on 11/6/2021 for hemoglobin of 7.0 associated with generalized weakness.    After 1 unit RBC transfusion her hemoglobin improved and continued to trend up.  Iron studies were consistent with chronic disease rather than iron deficiency.  She likely had some decreased production due to long bone fracture however was not seen to have any active bleeding therefore was continued on prophylactic Lovenox therapy.    She had a very mild rise in her LFTs into the 40-60 range.  This was painless and has also been noted on outpatient labs, and she had been scheduled for an outpatient right upper quadrant ultrasound.  To avoid a repeat appointment for ultrasound, the RUQ US was ordered during this hospital stay.  It revealed cholelithiasis however no evidence of cholecystitis or obstruction.  Otherwise normal appearing liver.    She did develop acute respiratory  "failure with hypoxia requiring 4 L of oxygen.  Serial chest x-rays continued to show atelectasis and mild pulmonary edema, therefore she was encouraged to use her incentive spirometer and mobilize as able.  She received approximately 3 doses of IV Lasix which also helped to optimize her respiratory status.  She was still requiring 1 L of oxygen at the time of discharge.    She will take aspirin 81 mg p.o. twice daily for DVT prophylaxis for the next 1 month or until discontinued by orthopedic surgery.    She worked with PT and OT who recommended skilled nursing facility versus rehabilitation.  She was accepted to skilled nursing facility therefore was discharged to advanced SNF in stable condition.      Therefore, she is discharged in good and stable condition to skilled nursing facility.    The patient met 2-midnight criteria for an inpatient stay at the time of discharge.    Discharge Date  11/8/2021    FOLLOW UP ITEMS POST DISCHARGE  Follow-up with orthopedic surgery within 1 week of discharge  Continue to wean oxygen while at skilled nursing facility      DISCHARGE DIAGNOSES  Principal Problem:    Closed fracture of right distal femur (HCC) POA: Unknown  Active Problems:    S/P total knee replacement right POA: Yes    Stage 3b chronic kidney disease (HCC) POA: Yes      Overview: [facility]. \"Need to include - controlled with current medications or give       update on current work up/treatment\"            Consider Low gFR , check recent KFT --            4/8/15 --------not chronic the previous were with in normal limits twice       possible secondary to dehydration appropriate diagnosis mild renal       insufficiency which would hope fully resolve     Depression POA: Yes    Normocytic anemia POA: Unknown    Transaminitis POA: Unknown    Cataract cortical, senile, bilateral POA: Yes    Pacemaker POA: Unknown      Overview: May 2021: [employer] ESCOBAR [id number] implanted by Dr. Hollis.    PVD (peripheral vascular " disease) (HCC) POA: Yes    Acute respiratory failure with hypoxia (HCC) POA: Unknown    Preoperative cardiovascular examination POA: Unknown  Resolved Problems:    * No resolved hospital problems. *      FOLLOW UP  Future Appointments   Date Time Provider Department Center   11/23/2021  1:45 PM Malloryshai Prince, OTD, OTR/L [address] [place] [address]   2/1/2022 12:45 PM PACER CHECK-[facility] B [facility] None     Ramesh Styles M.D.  [address] [facility] [address]  [phone]    In 1 week        MEDICATIONS ON DISCHARGE     Medication List      START taking these medications      Instructions   acetaminophen 325 MG Tabs  Commonly known as: Tylenol   Take 2 Tablets by mouth every 6 hours as needed (Mild Pain; (Pain scale 1-3); Temp greater than 100.5 F).  Dose: 650 mg     aspirin EC 81 MG Tbec  Commonly known as: ECOTRIN   Take 1 Tablet by mouth 2 times a day for 28 days.  Dose: 81 mg     furosemide 20 MG Tabs  Commonly known as: LASIX   Take 1 Tablet by mouth every day.  Dose: 20 mg     polyethylene glycol/lytes 17 g Pack  Commonly known as: MIRALAX   Take 1 Packet by mouth every day.  Dose: 17 g        CHANGE how you take these medications      Instructions   atorvastatin 40 MG Tabs  What changed: when to take this  Commonly known as: LIPITOR   Take 1 tablet by mouth every evening.  Dose: 40 mg     escitalopram 5 MG tablet  What changed: when to take this  Commonly known as: Lexapro   TAKE ONE TABLET BY MOUTH ONCE DAILY     metoprolol SR 25 MG Tb24  What changed: when to take this  Commonly known as: TOPROL XL   Take 1 tablet by mouth every day.  Dose: 25 mg        CONTINUE taking these medications      Instructions   * DAILY MULTI PO   Take 1 Tablet by mouth every day.  Dose: 1 Tablet     * ICAPS AREDS 2 PO   Take 2 Capsules by mouth every evening.  Dose: 2 Capsule     prednisoLONE acetate 1 % Susp  Commonly known as: PRED FORTE   Administer 1 Drop into both eyes see administration instructions. three times a day in left eye;  Six  time a day right eye  Dose: 1 Drop     Vitamin D 125 MCG (5000 UT) Caps   Take 5,000 Units by mouth.  Dose: 5,000 Units         * This list has 2 medication(s) that are the same as other medications prescribed for you. Read the directions carefully, and ask your doctor or other care provider to review them with you.                Allergies  Allergies   Allergen Reactions   • Other Environmental Runny Nose and Itching     Seasonal allergies       DIET  Orders Placed This Encounter   Procedures   • Diet Order Diet: Regular     Standing Status:   Standing     Number of Occurrences:   1     Order Specific Question:   Diet:     Answer:   Regular [1]       ACTIVITY  As tolerated.  Weight bearing as tolerated    CONSULTATIONS  Dr. Styles - Ortho    PROCEDURES  11/3/2021: Revision right total knee arthroplasty with resection of distal femur and placement of distal femoral replacement hinged implant.    LABORATORY  Lab Results   Component Value Date    SODIUM 133 (L) 11/08/2021    POTASSIUM 4.0 11/08/2021    CHLORIDE 100 11/08/2021    CO2 23 11/08/2021    GLUCOSE 109 (H) 11/08/2021    BUN 25 (H) 11/08/2021    CREATININE 1.12 11/08/2021        Lab Results   Component Value Date    WBC 12.6 (H) 11/08/2021    HEMOGLOBIN 8.7 (L) 11/08/2021    HEMATOCRIT 26.8 (L) 11/08/2021    PLATELETCT 242 11/08/2021        Total time of the discharge process exceeds 40 minutes.

## 2021-11-08 NOTE — DISCHARGE PLANNING
Msg placed to Dr. Jameson-verifying medical clearance.  Submitted to insurance.  Will discuss this case further with the Admissions Team this morning.     0748-Duong remains medically cleared per Dr. Jameson.    0925-Spoke with Clarita, daughter regarding D/C resources/support.  Duong lives alone.  Clarita is able to stay with her or Duong can stay with her in her home.  Clarita works F/T days and Duong will be home alone.      0966-Case is under review by Administration.  No beds available for today @ this time.  Msg placed to Dr. Jameson.

## 2021-11-08 NOTE — PROGRESS NOTES
Received report from night shift RN. Patient sitting up in bed, AAOx4. Declines any pain. Declines N/V. Updated on POC. Bed locked, in lowest position. Call light within reach. No additional needs at this time.    Covid-19 surge in effect.

## 2021-11-08 NOTE — PROGRESS NOTES
Assumed pt care. Pt reports no pain at this time, but states she would like to start getting ready for bed. RN set pt up to brush teeth. Discussed all other POC. Pt verbalize agreement.     Covid-19 surge in effect.

## 2021-11-08 NOTE — DISCHARGE PLANNING
[facility]tic[facility]: [facility]    Action: LSW completed chart review. Per chart review, Rehab does not have a bed today.     0930: LSW called and left VM with Mira [facility] regarding referral.     1145: LSW received VM from Mira at [facility] that pt is accepted and they have a bed.    1200: LSW met with pt at bedside to see if pt is agreeable to transfer to [facility] today. Pt states she would prefer to go to [facility] rehab. LSW explained that rehab does not have any beds and does not know when they will have a bed. Pt called her dtr to discuss options. Pt is agreeable to go to [facility] and then transfer to rehab when they have a bed available. Pt signed cobra consent to transfer. Pt states she will notify family of transfer.    1300: LSW faxed transport forms to [facility], requested [facility] 1430.     1307: [facility] [place].    1322: LSW met w/ pt at bedside to deliver IMM. Pt expressed understanding and signed IMM 11/8/2021 at 1322. LSW provided pt with copy of IMM. LSW faxed IMM to [facility].    1337: Jake Bailon requested approved service for [facility] transport.     1402: LSW faxed approved service to [facility] with supervisor Lyndsay FENTON verbal signature.     Barriers to Discharge: confirmed transport time.    Plan: LSW to follow and assist as needed with transport.    1444: LSW messaged Jake with [facility] for update on transfer.    1500: Manuel confirmed 1605 via [facility] to [facility]. LSW notified RACHAEL Cardona.

## 2021-11-08 NOTE — DISCHARGE INSTRUCTIONS
Discharge Instructions    Discharged to other by ambulance with escort. Discharged via ambulance, hospital escort: Yes.  Special equipment needed: Immobilizer    Be sure to schedule a follow-up appointment with your primary care doctor or any specialists as instructed.     Discharge Plan:   Diet Plan: Discussed  Activity Level: Discussed  Confirmed Follow up Appointment: Patient to Call and Schedule Appointment  Confirmed Symptoms Management: Discussed  Medication Reconciliation Updated: Yes  Influenza Vaccine Indication: Patient Refuses    I understand that a diet low in cholesterol, fat, and sodium is recommended for good health. Unless I have been given specific instructions below for another diet, I accept this instruction as my diet prescription.   Other diet: reg    Special Instructions: Discharge instructions for the Orthopedic Patient    Follow up with Primary Care Physician within 2 weeks of discharge to home, regarding:  Review of medications and diagnostic testing.  Surveillance for medical complications.  Workup and treatment of osteoporosis, if appropriate.     -Is this a Hip/Knee/Shoulder Joint Replacement patient? No    -Is this patient being discharged with medication to prevent blood clots?  No    · Is patient discharged on Warfarin / Coumadin?   No     Depression / Suicide Risk    As you are discharged from this [facility] facility, it is important to learn how to keep safe from harming yourself.    Recognize the warning signs:  · Abrupt changes in personality, positive or negative- including increase in energy   · Giving away possessions  · Change in eating patterns- significant weight changes-  positive or negative  · Change in sleeping patterns- unable to sleep or sleeping all the time   · Unwillingness or inability to communicate  · Depression  · Unusual sadness, discouragement and loneliness  · Talk of wanting to die  · Neglect of personal appearance   · Rebelliousness- reckless  behavior  · Withdrawal from people/activities they love  · Confusion- inability to concentrate     If you or a loved one observes any of these behaviors or has concerns about self-harm, here's what you can do:  · Talk about it- your feelings and reasons for harming yourself  · Remove any means that you might use to hurt yourself (examples: pills, rope, extension cords, firearm)  · Get professional help from the community (Mental Health, Substance Abuse, psychological counseling)  · Do not be alone:Call your Safe Contact- someone whom you trust who will be there for you.  · Call your local CRISIS HOTLINE 137-2562 or 609-762-8201  · Call your local Children's Mobile Crisis Response Team [place] (100) 723-1829 or www.SqueezeCMM  · Call the toll free National Suicide Prevention Hotlines   · National Suicide Prevention Lifeline 541-957-TTIP (5558)  · National Hope Line Network 800-SUICIDE (358-6828)

## 2021-11-08 NOTE — THERAPY
Occupational Therapy  Daily Treatment     Patient Name: Yoli Carmichael  Age:  88 y.o., Sex:  female  Medical Record #: [id number]  Today's Date: 11/8/2021     Precautions  Precautions: Weight Bearing As Tolerated Right Lower Extremity,Immobilizer Right Lower Extremity  Comments: Per MD: May have immobilizer off when in bed and for range of motion.  Needs immobilizer for ambulating until quad is stronger    Assessment    Pt agreeable to OT, planning for s/c to SNF today.  Worked on LB dressing which required maxA as pt was too fatigued and in too much pain to stand for pulling up clothing.  She completed simple grooming seated in chair, and lifting her bags several times while packing and re-arranging all her belongings.  Pt [facility] and begin more rehab.  OT will follow if pt does not discharge today.    Plan    Continue current treatment plan.    DC Equipment Recommendations: None  Discharge Recommendations: Recommend post-acute placement for additional occupational therapy services prior to discharge home       11/08/21 1425   Cognition    Cognition / Consciousness WDL   Level of Consciousness Alert   Comments Oriented, cooperative slightly delayed responses at times, appears tired overall   Active ROM Upper Body   Active ROM Upper Body  WDL   Strength Upper Body   Upper Body Strength  X   Gross Strength Generalized Weakness, Equal Bilaterally.    Comments able to lift self up from chair with BUE 2 times for hiking clothing 2/2 unable to tolerate standing   Other Treatments   Other Treatments Provided LB dressing training, sequencing ADL tasks   Balance   Sitting Balance (Static) Fair +   Sitting Balance (Dynamic) Fair   Standing Balance (Static)   (declined standing 2/2 pain)   Weight Shift Sitting Fair   Skilled Intervention Verbal Cuing;Sequencing   Bed Mobility    Comments UIC during session   Activities of Daily Living   Grooming Supervision;Seated   Upper Body Dressing Minimal Assist   Lower  Body Dressing Maximal Assist   Skilled Intervention Verbal Cuing;Tactile Cuing;Sequencing   Comments Unable to tolerate standing to hike briefs and pj pants,. so she lifted up with B arms on arms of chair and needed help to pull clothing up over hips   How much help from another person does the patient currently need...   Putting on and taking off regular lower body clothing? 2   Bathing (including washing, rinsing, and drying)? 2   Toileting, which includes using a toilet, bedpan, or urinal? 2   Putting on and taking off regular upper body clothing? 3   Taking care of personal grooming such as brushing teeth? 3   Eating meals? 4   6 Clicks Daily Activity Score 16   Functional Mobility   Sit to Stand   (unable this session 2/2 fatigue, pain)   Visual Perception   Visual Perception  WDL   Activity Tolerance   Sitting in Chair > 2 hours    Sitting Edge of Bed 15 min edge of chair   Comments unable to tolerate standing this session, fatigued from PT and up to chair this am   Patient / Family Goals   Patient / Family Goal #1 home   Goal #1 Outcome Progressing slower than expected   Short Term Goals   Short Term Goal # 1 pt will perform sup to sit with Spv within 5 days   Goal Outcome # 1 Progressing as expected   Short Term Goal # 2 pt will perform ADL transfer with modA within 5 days    Goal Outcome # 2 Progressing as expected   Short Term Goal # 3 pt will perform toiletin at [facility] with [facility]win within 5 days   Goal Outcome # 3 Progressing slower than expected

## 2021-11-08 NOTE — DISCHARGE PLANNING
Agency/Facility Name: [facility]  Spoke To: Mira   Outcome: Pt accepted pending medical clearance and transportation.     1218-  Agency/Facility Name: [facility]  Outcome: DPA left v-mail with request for callback regarding gurney transportation and preferred time for transportation.     1331-  Agency/Facility Name: [facility]  Spoke To: Mira  Outcome: Admissions offering 1600 transport time via [facility] van.    SW notified, DPA awaiting confirmation of time for follow up with [facility].    1335-  Agency/Facility Name: [facility]  Spoke To: Mira  Outcome: DPA advised admissions of 1430 transport via [facility] to facility.

## 2021-11-08 NOTE — THERAPY
Physical Therapy   Daily Treatment     Patient Name: Yoli Carmichael  Age:  88 y.o., Sex:  female  Medical Record #: [id number]  Today's Date: 11/8/2021     Precautions  Precautions: Weight Bearing As Tolerated Right Lower Extremity;Immobilizer Right Lower Extremity  Comments: Per MD: May have immobilizer off when in bed and for range of motion.  Needs immobilizer for ambulating until quad is stronger    Assessment    Pt is motivated to recover, willing to work hard with therapy and knows she needs to mobilize to get better.  Pt still with limited WB tolerance R LE due to pain and weakness. Pt is approp for AR[facility] vs SNF.     Plan    Continue current treatment plan.    DC Equipment Recommendations: Unable to determine at this time  Discharge Recommendations: Recommend post-acute placement for additional physical therapy services prior to discharge home       11/08/21 1109   Cognition    Comments Pt is oriented x4, cooperative and eager to mobilize   Supine Lower Body Exercise   Hip Abduction 1 set of 10;Right    Ankle Pumps Reciprocal;1 set of 10   Quadriceps Isometrics 1 set of 10;Right   Comments gentle AAROM R knee ~20-25 deg tolerance   Balance   Sitting Balance (Static) Fair +   Sitting Balance (Dynamic) Fair   Standing Balance (Static) Fair -   Standing Balance (Dynamic) Poor   Weight Shift Sitting Fair   Weight Shift Standing Poor   Skilled Intervention Postural Facilitation;Sequencing;Tactile Cuing;Verbal Cuing   Comments stdg with FWW   Gait Analysis   Gait Level Of Assist Unable to Participate   Comments Pt able to take a few pivot steps to chair with FWW Nicolas, max cueing for technique   Bed Mobility    Supine to Sit Moderate Assist   Functional Mobility   Sit to Stand Moderate Assist   Bed, Chair, Wheelchair Transfer Minimal Assist   Transfer Method Stand Step  (with FWW)   Short Term Goals    Short Term Goal # 1 pt will go supine<>sit w/hob flat and rails down w/spv in 6tx    Goal Outcome # 1 Progressing as  expected   Short Term Goal # 2 pt will go sit<>stand w/fww w/spv in 6tx    Goal Outcome # 2 Progressing as expected   Short Term Goal # 3 pt will transfer bed<>chair w/Min A w/fww in 6tx    Goal Outcome # 3 Progressing as expected   Short Term Goal # 4 pt will ambulate for 50ft w/fww w/Min A in 6tx    Goal Outcome # 4 Progressing as expected

## 2021-11-09 PROBLEM — Z47.89 ORTHOPEDIC AFTERCARE: Status: ACTIVE | Noted: 2021-11-09

## 2021-11-09 PROBLEM — Z87.19 HISTORY OF GI BLEED: Status: ACTIVE | Noted: 2021-11-09

## 2021-11-09 PROBLEM — K80.20 CALCULUS OF GALLBLADDER WITHOUT CHOLECYSTITIS WITHOUT OBSTRUCTION: Status: ACTIVE | Noted: 2021-11-09

## 2021-11-12 PROBLEM — M25.471 RIGHT ANKLE SWELLING: Status: ACTIVE | Noted: 2021-11-12

## 2021-11-23 ENCOUNTER — APPOINTMENT (OUTPATIENT)
Dept: OCCUPATIONAL THERAPY | Facility: REHABILITATION | Age: 86
End: 2021-11-23
Attending: FAMILY MEDICINE
Payer: MEDICARE

## 2021-11-25 ENCOUNTER — HOME HEALTH ADMISSION (OUTPATIENT)
Dept: HOME HEALTH SERVICES | Facility: HOME HEALTHCARE | Age: 86
End: 2021-11-25
Payer: MEDICARE

## 2021-12-01 ENCOUNTER — HOME CARE VISIT (OUTPATIENT)
Dept: HOME HEALTH SERVICES | Facility: HOME HEALTHCARE | Age: 86
End: 2021-12-01

## 2021-12-01 VITALS
DIASTOLIC BLOOD PRESSURE: 58 MMHG | OXYGEN SATURATION: 97 % | RESPIRATION RATE: 16 BRPM | TEMPERATURE: 97.4 F | HEART RATE: 73 BPM | SYSTOLIC BLOOD PRESSURE: 122 MMHG

## 2021-12-01 ASSESSMENT — PATIENT HEALTH QUESTIONNAIRE - PHQ9
CLINICAL INTERPRETATION OF PHQ2 SCORE: 1
5. POOR APPETITE OR OVEREATING: 0 - NOT AT ALL
SUM OF ALL RESPONSES TO PHQ QUESTIONS 1-9: 1

## 2021-12-01 ASSESSMENT — ENCOUNTER SYMPTOMS
DENIES PAIN: 1
PERSON REPORTING PAIN: PATIENT

## 2021-12-01 NOTE — CASE COMMUNICATION
Non admit. Pt is moving to [facility] either tomorrow or Friday. Pt needed/wanted OT and PT.  Pt says that [facility] offers those services.  Called the facility and they reported that they offer that service at the facility. Pt says that she will try PT and OT at [facility] first and she is not needing HH at this time.  Encouraged pt to reach out to her PCP if the services at [facility] doesn't work out and HH can be ordered [facility]. Pt verbalized understanding.

## 2021-12-06 ENCOUNTER — TELEPHONE (OUTPATIENT)
Dept: HEALTH INFORMATION MANAGEMENT | Facility: OTHER | Age: 86
End: 2021-12-06

## 2021-12-06 NOTE — TELEPHONE ENCOUNTER
TCN spoke with pt daughter who reports pt has moved to [facility] as of 12/3. Daughter is hoping to get her started with therapy at the facility but was unsure if she will be doing HH or OP therapy. Daughter is aware OP therapy will have a co-payment and they may not be contracted. Daughter to follow up with [facility] to discuss therapy recommendation. TCN to follow up with daughter next week.

## 2021-12-07 ENCOUNTER — OFFICE VISIT (OUTPATIENT)
Dept: MEDICAL GROUP | Facility: PHYSICIAN GROUP | Age: 86
End: 2021-12-07
Payer: MEDICARE

## 2021-12-07 VITALS
BODY MASS INDEX: 21.17 KG/M2 | TEMPERATURE: 98.1 F | WEIGHT: 124 LBS | HEIGHT: 64 IN | SYSTOLIC BLOOD PRESSURE: 110 MMHG | HEART RATE: 91 BPM | OXYGEN SATURATION: 98 % | DIASTOLIC BLOOD PRESSURE: 58 MMHG

## 2021-12-07 DIAGNOSIS — Z71.89 COUNSELING REGARDING END OF LIFE DECISION MAKING: ICD-10-CM

## 2021-12-07 DIAGNOSIS — S72.491D OTHER CLOSED FRACTURE OF DISTAL END OF RIGHT FEMUR WITH ROUTINE HEALING, SUBSEQUENT ENCOUNTER: ICD-10-CM

## 2021-12-07 DIAGNOSIS — I10 ESSENTIAL HYPERTENSION, BENIGN: ICD-10-CM

## 2021-12-07 DIAGNOSIS — N18.32 STAGE 3B CHRONIC KIDNEY DISEASE: ICD-10-CM

## 2021-12-07 DIAGNOSIS — F33.42 RECURRENT MAJOR DEPRESSIVE DISORDER, IN FULL REMISSION (HCC): ICD-10-CM

## 2021-12-07 PROBLEM — S22.42XG CLOSED FRACTURE OF MULTIPLE RIBS OF LEFT SIDE WITH DELAYED HEALING: Status: RESOLVED | Noted: 2021-05-08 | Resolved: 2021-12-07

## 2021-12-07 PROCEDURE — 99215 OFFICE O/P EST HI 40 MIN: CPT | Performed by: FAMILY MEDICINE

## 2021-12-07 RX ORDER — POTASSIUM CHLORIDE 20 MEQ/1
20 TABLET, EXTENDED RELEASE ORAL 2 TIMES DAILY
Qty: 60 TABLET | Refills: 11
Start: 2021-12-07 | End: 2022-01-04

## 2021-12-07 ASSESSMENT — FIBROSIS 4 INDEX: FIB4 SCORE: 3.79

## 2021-12-07 NOTE — PROGRESS NOTES
CC:There were no encounter diagnoses.      HISTORY OF PRESENT ILLNESS: Patient is a 88 y.o. female established patient who presents today to follow-up on recent hospitalization for fall with associated closed femur fracture status post repair with Dr. Ramesh Styles.  Patient has been in acute rehab followed by assisted living facility and presents today with daughter to discuss medications, post rehab follow-up and discuss POLST and advanced directive paperwork with daughter.  Currently healing well and without complaint.      Patient Active Problem List    Diagnosis Date Noted   • Right ankle swelling 11/12/2021   • Orthopedic aftercare 11/09/2021   • Calculus of gallbladder without cholecystitis without obstruction 11/09/2021   • History of GI bleed 11/09/2021   • Acute respiratory failure with hypoxia ([facility]) 11/04/2021   • Closed fracture of right distal femur ([facility]) 11/03/2021   • PVD (peripheral vascular disease) ([facility]) 07/30/2021   • Gait instability 07/16/2021   • Pleural effusion 06/28/2021   • Cataract cortical, senile, bilateral 06/25/2021   • Pacemaker 06/25/2021   • Upper GI bleed 06/20/2021   • Transaminitis 06/20/2021   • Leukocytosis 06/20/2021   • Second degree atrioventricular block, Mobitz (type) I 05/09/2021   • Fall 05/08/2021   • Closed fracture of multiple ribs of left side with delayed healing 05/08/2021   • Preoperative cardiovascular examination 05/08/2021   • Traumatic rhabdomyolysis ([facility]) 05/08/2021   • BRIAN (acute kidney injury) ([facility]) 04/06/2021   • Anemia 04/04/2021   • Near syncope likely vasovagal versus secondary to symptomatic SVT versus HOCM-like cardiomyopathy 04/02/2021   • Recurrent major depressive disorder, in full remission ([facility]) 06/26/2019   • Imbalance 03/19/2019   • SVT (supraventricular tachycardia) ([facility]) 11/06/2018   • Localized edema 11/01/2018   • Depression 09/27/2018   • Low serum vitamin D 06/19/2018   • Essential hypertension, benign 11/10/2017   • Degenerative disc  disease, lumbar 07/17/2017   • Non-rheumatic mitral regurgitation 05/07/2015   • Stage 3b chronic kidney disease (HCC) 04/07/2015   • S/P total knee replacement right 09/03/2013      Allergies:Other environmental    Current Outpatient Medications   Medication Sig Dispense Refill   • potassium chloride SA (KDUR) 20 MEQ Tab CR Take 1 Tablet by mouth 2 times a day. 60 Tablet 11   • omeprazole (PRILOSEC OTC) 20 MG tablet Take 20 mg by mouth every day.     • furosemide (LASIX) 20 MG Tab Take 1 Tablet by mouth every day. (Patient not taking: Reported on 12/1/2021) 30 Tablet 1   • prednisoLONE acetate (PRED FORTE) 1 % Suspension Administer 1 Drop into both eyes see administration instructions. three times a day in left eye;  Six time a day right eye      • Cholecalciferol (VITAMIN D) 125 MCG (5000 UT) Cap Take 5,000 Units by mouth.     • escitalopram (LEXAPRO) 5 MG tablet TAKE ONE TABLET BY MOUTH ONCE DAILY (Patient taking differently: Take 5 mg by mouth every day.) 90 Tablet 1   • Multiple Vitamins-Minerals (DAILY MULTI PO) Take 1 Tablet by mouth every day.     • metoprolol SR (TOPROL XL) 25 MG TABLET SR 24 HR Take 1 tablet by mouth every day. (Patient taking differently: Take 25 mg by mouth every evening.) 90 tablet 3     No current facility-administered medications for this visit.       Social History     Tobacco Use   • Smoking status: Never Smoker   • Smokeless tobacco: Never Used   • Tobacco comment: Pt exposed to second hand smoker   Vaping Use   • Vaping Use: Never used   Substance Use Topics   • Alcohol use: Yes     Comment: Occasionally   • Drug use: No     Social History     Social History Narrative   • Not on file       Family History   Problem Relation Age of Onset   • Lung Disease Mother         frequent pneumonia   • Heart Disease Father 54        MI   • Lung Disease Father         Emphysema   • Heart Attack Father    • Cancer Maternal Aunt         Great Aunt and Cousin Ovarian CA   • Hypertension Maternal  "Aunt    • Cancer Daughter         Lung and Brain CA   • Seizures Daughter    • Cancer Daughter         Pituitary tumor   • Cancer Brother         leukemia       Review of Systems:    Constitutional: No Fevers, Chills  Eyes: No vision changes  ENT: No hearing changes  Resp: No Shortness of breath  CV: No Chest pain  GI: No Nausea/Vomiting  MSK: No weakness  Skin: No rashes  Neuro: No Headaches  Psych: No Suicidal ideations    All remaining systems reviewed and found to be negative, except as stated above.    Exam:    /58 (BP Location: Right arm, Patient Position: Sitting, BP Cuff Size: Adult)   Pulse 91   Temp 36.7 °C (98.1 °F) (Temporal)   Ht 1.626 m (5' 4\")   Wt 56.2 kg (124 lb)   SpO2 98%  Body mass index is 21.28 kg/m².    General:  Well nourished, well developed female in NAD  HENT: Atraumatic, normocephalic  EYES: Extraocular movements intact, pupils equal and reactive to light  NECK: Supple, FROM  CHEST: No deformities, Equal chest expansion  RESP: Unlabored, no stridor or audible wheeze  ABD: Non-Distended  Extremities: No Clubbing, Cyanosis, or Edema  Skin: Warm/dry, without rashes  Neuro: A/O x 4, CN 2-12 Grossly intact, Motor/sensory grossly intact, ambulating with walker  Psych: Normal behavior, normal affect      LABS: 11/2/2021 through 11/8/2021: Results reviewed and discussed with the patient, questions answered.    ED MD, hospitalist, orthopedist, geriatrics notes from 11-21 through 11/28/2021 reviewed and discussed with patient    11/2/2021 through 11/8/2021, 12/6/2021 x-rays, CTs, and right upper quadrant ultrasound reviewed    Assessment/Plan:  1. Stage 3b chronic kidney disease (HCC)  Chronic stable medical condition.  No interventions at this time.  Counseled on intermittent furosemide use for any fluid retention or heart failure exacerbation.    2. Recurrent major depressive disorder, in full remission (HCC)  Chronic stable medical condition.  Continue Lexapro 5 mg daily.    3. " Essential hypertension, benign  Chronic ongoing medical condition.  Continue metoprolol succinate 25 mg daily and intermittent Lasix as needed    4. Other closed fracture of distal end of right femur with routine healing, subsequent encounter  New problem to examiner.  Currently with routine healing and follow-up with PT.    5. Counseling regarding end of life decision making  Extensive counseling today regarding end-of-life care and POLST form filled out today with daughter present.  Extensive discussion on CPR, full treatment versus comfort care measures, prolonged feeding and IV hydration measures.  Discussed importance and need for establishing DPOA as well as completing advanced directive should she not be able to express her wishes or have next of kin make medical decisions for her.    My total time spent caring for the patient on the day of the encounter was 55 minutes.   This does not include time spent on separately billable procedures/tests.     Please note that this dictation was created using voice recognition software. I have worked with consultants from the vendor as well as technical experts from [facility] [employer] to optimize the interface. I have made every reasonable attempt to correct obvious errors, but I expect that there are errors of grammar and possibly content that I did not discover before finalizing the note.

## 2021-12-13 ENCOUNTER — TELEPHONE (OUTPATIENT)
Dept: HEALTH INFORMATION MANAGEMENT | Facility: OTHER | Age: 86
End: 2021-12-13

## 2021-12-13 NOTE — TELEPHONE ENCOUNTER
TCN left VM for pt and her daughter requesting call back to discuss pt progress since DC from SNF.

## 2021-12-14 ENCOUNTER — TELEPHONE (OUTPATIENT)
Dept: MEDICAL GROUP | Facility: PHYSICIAN GROUP | Age: 86
End: 2021-12-14

## 2021-12-14 DIAGNOSIS — Z96.651 STATUS POST TOTAL RIGHT KNEE REPLACEMENT: ICD-10-CM

## 2021-12-14 DIAGNOSIS — S72.491D OTHER CLOSED FRACTURE OF DISTAL END OF RIGHT FEMUR WITH ROUTINE HEALING, SUBSEQUENT ENCOUNTER: ICD-10-CM

## 2021-12-17 ENCOUNTER — TELEPHONE (OUTPATIENT)
Dept: HEALTH INFORMATION MANAGEMENT | Facility: OTHER | Age: 86
End: 2021-12-17

## 2021-12-17 NOTE — TELEPHONE ENCOUNTER
TCN spoke with pt who reports she is doing pretty well at [facility]. Pt does not feel she needs the assistance but is eager to start therapy. PT states she spoke with Dr. Santana who was re-ordering  but she hasnt heard form  yet. TCN sent message to Itzel at  requesting follow on this order for start of care for services. TCN to follow up with pt next week. Pt to call TCN prior to that as needed.

## 2021-12-17 NOTE — TELEPHONE ENCOUNTER
TCN spoke with pt daughter requesting she contact pt PCP and ask for HH order. HH reports PCP placed order for OP therapy not HH. Daughter to contact MD to request new order. Daughter also reports PCP is leaving and requested assistance with getting new PCP. Daughter informed about [facility] on [address] near pt [facility] as well as [facility]. Daughter to look into options. TCN left contact information with daughter. TCN follow up next week on status of HH POC.

## 2021-12-24 ENCOUNTER — TELEPHONE (OUTPATIENT)
Dept: HEALTH INFORMATION MANAGEMENT | Facility: OTHER | Age: 86
End: 2021-12-24

## 2021-12-24 DIAGNOSIS — S72.491D OTHER CLOSED FRACTURE OF DISTAL END OF RIGHT FEMUR WITH ROUTINE HEALING, SUBSEQUENT ENCOUNTER: ICD-10-CM

## 2021-12-24 DIAGNOSIS — I10 ESSENTIAL HYPERTENSION, BENIGN: ICD-10-CM

## 2021-12-24 DIAGNOSIS — F33.42 RECURRENT MAJOR DEPRESSIVE DISORDER, IN FULL REMISSION (HCC): ICD-10-CM

## 2021-12-24 DIAGNOSIS — N18.32 STAGE 3B CHRONIC KIDNEY DISEASE: ICD-10-CM

## 2021-12-24 NOTE — TELEPHONE ENCOUNTER
TCN received confirmation from HH they have been unsuccessful in getting orders for pt for HH despite their attempts as well as attempts by pt daughter. TCN sent message to PCP requesting order for HH be sent to [facility]. Pt is not contracted with therapy at [facility] and needs orders to start HH care with contracted provider ([facility]). TCN to follow up next week on status of order.

## 2021-12-27 ENCOUNTER — TELEPHONE (OUTPATIENT)
Dept: MEDICAL GROUP | Facility: PHYSICIAN GROUP | Age: 86
End: 2021-12-27

## 2021-12-27 ENCOUNTER — HOME HEALTH ADMISSION (OUTPATIENT)
Dept: HOME HEALTH SERVICES | Facility: HOME HEALTHCARE | Age: 86
End: 2021-12-27
Payer: MEDICARE

## 2021-12-28 NOTE — TELEPHONE ENCOUNTER
Patients needs to establish with new PCP for home health. [facility] order on hold.   
Phone Number Called: [phone]    Call outcome: Spoke to patients daughter    Message: Informed patients daughter home health order is placed on hold until she can get in with a new provider as a new patient. Informed patient to call schedule to get scheduled to establish with someone as Dr. Santana is no longer with [facility].  
No

## 2021-12-29 ENCOUNTER — TELEPHONE (OUTPATIENT)
Dept: MEDICAL GROUP | Facility: PHYSICIAN GROUP | Age: 86
End: 2021-12-29

## 2021-12-30 NOTE — TELEPHONE ENCOUNTER
Yoli called 12/29/21 and informed that she had a VM and called [facility]. She only knows that it was something about Dr. Santana. She said she will call back tomorrow 12/30/21. I believe it is to make a NEW Patient appt to establish with a new PCP.

## 2022-01-05 PROBLEM — J96.01 ACUTE RESPIRATORY FAILURE WITH HYPOXIA (HCC): Status: RESOLVED | Noted: 2021-11-04 | Resolved: 2022-01-05

## 2022-01-05 PROBLEM — Z01.810 PREOPERATIVE CARDIOVASCULAR EXAMINATION: Status: RESOLVED | Noted: 2021-05-08 | Resolved: 2022-01-05

## 2022-01-05 PROBLEM — T79.6XXA TRAUMATIC RHABDOMYOLYSIS (HCC): Status: RESOLVED | Noted: 2021-05-08 | Resolved: 2022-01-05

## 2022-01-13 ENCOUNTER — HOME HEALTH ADMISSION (OUTPATIENT)
Dept: HOME HEALTH SERVICES | Facility: HOME HEALTHCARE | Age: 87
End: 2022-01-13
Payer: MEDICARE

## 2022-01-18 ENCOUNTER — HOME CARE VISIT (OUTPATIENT)
Dept: HOME HEALTH SERVICES | Facility: HOME HEALTHCARE | Age: 87
End: 2022-01-18

## 2022-01-18 NOTE — CASE COMMUNICATION
Patient declined [facility] home health services at this time, as her residence provides therapy twice a week.

## 2022-01-25 ENCOUNTER — TELEPHONE (OUTPATIENT)
Dept: CARDIOLOGY | Facility: MEDICAL CENTER | Age: 87
End: 2022-01-25

## 2022-01-25 NOTE — TELEPHONE ENCOUNTER
----- Message from Shyann Cortes, Med Ass't sent at 1/25/2022 10:01 AM PST -----  Please call patient--she needs to reschedule her device check appointment on 2/1/22    Thank you,  Shyann

## 2022-02-03 ENCOUNTER — NON-PROVIDER VISIT (OUTPATIENT)
Dept: CARDIOLOGY | Facility: MEDICAL CENTER | Age: 87
End: 2022-02-03
Payer: MEDICARE

## 2022-02-03 DIAGNOSIS — I49.5 SICK SINUS SYNDROME (HCC): ICD-10-CM

## 2022-02-03 DIAGNOSIS — Z95.0 CARDIAC PACEMAKER IN SITU: ICD-10-CM

## 2022-02-21 ENCOUNTER — PATIENT MESSAGE (OUTPATIENT)
Dept: HEALTH INFORMATION MANAGEMENT | Facility: OTHER | Age: 87
End: 2022-02-21
Payer: MEDICARE

## 2022-02-21 PROBLEM — J90 PLEURAL EFFUSION: Status: RESOLVED | Noted: 2021-06-28 | Resolved: 2022-02-21

## 2022-03-01 PROCEDURE — 93280 PM DEVICE PROGR EVAL DUAL: CPT | Performed by: INTERNAL MEDICINE

## 2022-03-01 NOTE — ED PROVIDER NOTES
"ED Provider Note    CHIEF COMPLAINT  Chief Complaint   Patient presents with   • Dizziness     Onset this am.    • N/V     one episode. EMS reports appeared very dark and \"GI bleed like\"       HPI  Yoli Carmichael is a 87 y.o. female with a history of recurrent syncope, tachybradycardia syndrome, status post pacemaker placement, SVT, status post ablation, arthritis, who presents with complaints of dizziness, nausea, vomiting since earlier today.  The patient lives alone, and says after waking this morning she was feeling lightheaded and dizzy.  She went to the bathroom, and had an episode of projectile vomiting which she reports appeared to be dark red in color.  The patient takes a baby aspirin daily but denies any use of blood thinners such as Coumadin.  Triage reports that the patient passed out, but the patient denies any syncopal episode.  She says she got very lightheaded and dizzy on her way back from the bathroom to her bedroom, and lost her balance and fell.  She was alone, and use her life alert alarm.  She continues to feel nauseous and dizzy which appears to be worsened with movement.  She recently had a pacemaker placed approximately 5 weeks ago by Dr. Hollis due to syncopal episodes.  She has had no fever, shaking chills, sore throat, cough, congestion, or difficulty breathing.  She has had no urinary symptoms or diarrhea.      According to EMS, the patient appeared to be unresponsive on their arrival.  They found her on the floor, and she woke up after being placed on the gurney.      REVIEW OF SYSTEMS  See HPI for further details. All other systems are negative.     PAST MEDICAL HISTORY  Past Medical History:   Diagnosis Date   • Arrhythmia    • Arthritis     knees   • Bronchitis     long time ago   • CATARACT     no surgery yet   • Dry eyes, bilateral 3/27/2014   • Osteopenia of the elderly 3/27/2014   • Pneumonia     long time ago       FAMILY HISTORY  Family History   Problem Relation Age of " [address] [place]   OPERATIVE REPORT    Name:  Cortney Conway  MR#:  [id number]  :  2019  ACCOUNT #:  [de-identified]  DATE OF SERVICE:  2022      PREOPERATIVE DIAGNOSES:  Right ear foreign body and left cerumen impaction of the ear. POSTOPERATIVE DIAGNOSES:  Right ear foreign body and left cerumen impaction of the ear. PROCEDURES PERFORMED:  1. Right ear foreign body removal.  2.  Left cerumen impaction removal.    SURGEON:  Reese Morales MD    ASSISTANT:  none    ANESTHESIA:  General.    COMPLICATIONS:  None. SPECIMENS REMOVED:  None. IMPLANTS:  None     ESTIMATED BLOOD LOSS:  1 mL. FINDINGS:  1.  Small round smooth bead removed from the right external ear that had penetrated the tympanic membrane with a 75% tympanic membrane loss. 2.  Left cerumen impaction removed. PROCEDURE:  After the parents gave informed consent, the patient was taken to the operating room. The patient was kept in supine position, dressed and draped in the usual fashion. After administration of general anesthesia, the table was kept in neutral position. The operating microscope was used for the entire case. The right ear was examined. Using a speculum, the right ear was examined and there appeared to be a bead that was blocking the entire tympanic membrane and it could not be visualized. Next, using a small right angle, the instrument was gently placed underneath the bead and after several attempts, the bead was then removed from what was then figured to be the middle ear. The tympanic membrane was then brought into visualization. Excess cerumen was removed and there appeared to be a 75% inferior perforation. The ossicular chain could not be evaluated or seen. We then turned our attention to the left ear. There was some wax in the anterior sulcus that was removed. The tympanic membrane was normal and there was no foreign body.   At this point, the general anesthesia was then reversed "Onset   • Lung Disease Mother         frequent pneumonia   • Heart Disease Father 54        MI   • Lung Disease Father         Emphysema   • Heart Attack Father    • Cancer Maternal Aunt         Great Aunt and Cousin Ovarian CA   • Hypertension Maternal Aunt    • Cancer Daughter         Lung and Brain CA   • Seizures Daughter    • Cancer Daughter         Pituitary tumor   • Cancer Brother         leukemia       SOCIAL HISTORY  Social History     Tobacco Use   • Smoking status: Never Smoker   • Smokeless tobacco: Never Used   • Tobacco comment: Pt exposed to second hand smoker   Vaping Use   • Vaping Use: Never used   Substance Use Topics   • Alcohol use: Yes     Comment: Occasionally   • Drug use: No      Social History     Substance and Sexual Activity   Drug Use No       SURGICAL HISTORY  Past Surgical History:   Procedure Laterality Date   • KNEE ARTHROPLASTY TOTAL  9/3/2013    Performed by Ramesh Styles M.D. at SURGERY [facility]   • TONSILLECTOMY         CURRENT MEDICATIONS  Home Medications    **Home medications have not yet been reviewed for this encounter**         ALLERGIES  Allergies   Allergen Reactions   • Other Environmental      Seasonal allergies       PHYSICAL EXAM0  VITAL SIGNS: Blood Pressure (Abnormal) 77/48 Comment: RN aware  Pulse (Abnormal) 102   Respiration 18   Height 1.6 m (5' 3\")   Weight 58.5 kg (129 lb)   Oxygen Saturation 93%   Body Mass Index 22.85 kg/m²   Constitutional: Awake, alert, in no acute distress, Non-toxic appearance.  Slightly pale in appearance.  HENT: Atraumatic. Bilateral external ears normal, mucous membranes moist, throat nonerythematous without exudates, nose is normal.  There are some dark dried blood noted at the nares and around her mouth.  Eyes: PERRL, EOMI, conjunctiva moist, noninjected.  Neck: Nontender, Normal range of motion, No nuchal rigidity, No stridor.   Lymphatic: No lymphadenopathy noted.   Cardiovascular: Regular rate and rhythm, no " after Floxin drops were placed in the right ear and a cotton ball was placed. All counts were correct. The patient went to PACU in stable condition.       Geoffrey Holter, MD      [id number]  D:  03/01/2022 7:54  T:  03/01/2022 15:16  JOB #:  [id number] murmurs, rubs, gallops.  Thorax & Lungs:  Good breath sounds bilaterally, no wheezes, rales, or retractions.  No chest tenderness.  Abdomen: Bowel sounds normal, Soft, nontender, nondistended, no rebound, guarding, masses.  Back: No CVA or spinal tenderness.  Extremities: Intact distal pulses, No edema, No tenderness.   Skin: Warm, Dry, No rashes.   Musculoskeletal: No joint swelling or tenderness.  Neurologic: Alert & oriented x 3, sensory and motor function normal. No focal deficits.   Psychiatric: Affect normal, Judgment normal, Mood normal.     EKG  EKG Interpretation:  Interpreted by me    Rhythm:  Normal sinus rhythm   Rate: 79  Intervals: normal  Axis: normal  Ectopy: none  Conduction: normal  ST Segments: no evidence of elevation or depression  T Waves: no acute change  Q Waves: none  Clinical Impression: No acute injury or ischemic pattern.   Comparison to previous EKG from May 2021 shows no acute changes.    LABS  Labs Reviewed   CBC WITH DIFFERENTIAL - Abnormal; Notable for the following components:       Result Value    WBC 13.2 (*)     RBC 2.88 (*)     Hemoglobin 7.8 (*)     Hematocrit 27.2 (*)     MCHC 28.7 (*)     RDW 51.7 (*)     Neutrophils-Polys 74.60 (*)     Lymphocytes 15.00 (*)     Immature Granulocytes 1.60 (*)     Neutrophils (Absolute) 9.81 (*)     Monos (Absolute) 1.00 (*)     Immature Granulocytes (abs) 0.21 (*)     All other components within normal limits    Narrative:     Indicate which anticoagulants the patient is on:->NONE   COMP METABOLIC PANEL - Abnormal; Notable for the following components:    Co2 19 (*)     Glucose 117 (*)     Bun 31 (*)     Creatinine 1.48 (*)     Calcium 8.3 (*)     Albumin 2.7 (*)     Total Protein 5.4 (*)     All other components within normal limits    Narrative:     Indicate which anticoagulants the patient is on:->NONE   TROPONIN - Abnormal; Notable for the following components:    Troponin T 32 (*)     All other components within normal limits     Narrative:     Indicate which anticoagulants the patient is on:->NONE   ESTIMATED GFR - Abnormal; Notable for the following components:    GFR If  40 (*)     GFR If Non  33 (*)     All other components within normal limits    Narrative:     Indicate which anticoagulants the patient is on:->NONE   APTT    Narrative:     Indicate which anticoagulants the patient is on:->NONE   PERIPHERAL SMEAR REVIEW    Narrative:     Indicate which anticoagulants the patient is on:->NONE   PLATELET ESTIMATE    Narrative:     Indicate which anticoagulants the patient is on:->NONE   MORPHOLOGY    Narrative:     Indicate which anticoagulants the patient is on:->NONE   DIFFERENTIAL COMMENT    Narrative:     Indicate which anticoagulants the patient is on:->NONE   PROTHROMBIN TIME   COD (ADULT)       All labs reviewed by me.      RADIOLOGY/PROCEDURES  CT-HEAD W/O   Final Result         NO ACUTE ABNORMALITIES ARE NOTED ON CT SCAN OF THE HEAD.      Findings are consistent with atrophy.  Decreased attenuation in the periventricular white matter likely indicates microvascular ischemic disease.      DX-CHEST-PORTABLE (1 VIEW)   Final Result         1.  Some increase in size of left pleural effusion.      2.  Probable increase in consolidative atelectasis in the left lung base.      CT-ABDOMEN-PELVIS WITH    (Results Pending)       The radiologist's interpretations of all radiological studies have been reviewed by me.        COURSE & MEDICAL DECISION MAKING  Pertinent Labs & Imaging studies reviewed. (See chart for details)  The patient presents after an episode of vomiting along with dizziness.  She appears to have some mild vertigo on examination.  I laid her down in the gurney and sent her back up in she became more symptomatic.  Clinically she appears dehydrated with dry mucous membranes, and did have a episode of coffee-ground type emesis.  She is on aspirin but no other blood thinners.  EKG shows a normal  sinus rhythm.  Chest x-ray shows some increase size of a left pleural effusion, with no obvious infiltrate.  Because of her dizziness and syncopal episode a CT scan head without contrast was obtained which showed no acute intracranial findings.  The pacemaker was interrogated and showed no significant abnormalities.    While here in the emergency department the patient became near syncopal with a drop in blood pressure when she stood up to use the bedside commode.  The patient then passed a prior black stool.  The patient denies any history of upper GI bleed or peptic ulcer disease in the past.  A CT scan of the abdomen/pelvis with IV contrast was been ordered.    CBC showed a white count 13,200, hemoglobin 7.8, 75% polys, chemistry shows a CO2 of 19, BUN 31, creatinine 1.48, glucose 117, liver function test normal, troponin 32, INR 1.08.  The patient was crossmatched.  She was started on a Protonix IV infusion with a bolus of 80 mg IV.  Findings were discussed with the patient and family and patient is agreeable for admission.  Case discussed with the hospitalist.  Patient has had a colonoscopy with [facility] in the past, and Dr. Everett has been consulted.  Critical care time of 35 minutes.      FINAL IMPRESSION  1. Syncope, unspecified syncope type    2. Upper GI bleed    3. Anemia, unspecified type          Electronically signed by: Cornell Tyler M.D., 6/20/2021 10:53 AM

## 2022-03-28 NOTE — CARE PLAN
Problem: Communication  Goal: The ability to communicate needs accurately and effectively will improve  Outcome: PROGRESSING AS EXPECTED  Intervention: [place] patient and significant other/support system to call light to alert staff of needs  Flowsheets (Taken 4/3/2021 0042)  Oriented to:: All of the Following : Location of Bathroom, Visiting Policy, Unit Routine, Call Light and Bedside Controls, Bedside Rail Policy, Smoking Policy, Rights and Responsibilities, Bedside Report, and Patient Education Notebook  Intervention: Reorient patient to environment as needed  Flowsheets (Taken 4/3/2021 0042)  Oriented to:: All of the Following : Location of Bathroom, Visiting Policy, Unit Routine, Call Light and Bedside Controls, Bedside Rail Policy, Smoking Policy, Rights and Responsibilities, Bedside Report, and Patient Education Notebook  Intervention: Educate patient and significant other/support system about the plan of care, procedures, treatments, medications and allow for questions  Flowsheets (Taken 4/3/2021 0042)  Pt & Family Have Been Educated on Methods Available to Report Concerns Related to Care, Treatment, Services, and Patient Safety Issues: Yes  Intervention: Use communication aids and/or /Language Line as appropriate  Flowsheets (Taken 4/3/2021 0042)  Patient's Primary Language: English  Does Pt Need Special Equipment for the Hearing Impaired?: No     Problem: Safety  Goal: Will remain free from injury  Outcome: PROGRESSING AS EXPECTED  Intervention: Provide assistance with mobility  Flowsheets (Taken 4/2/2021 1945)  Assistance: Standby Assist  Ambulation Tolerance: Tolerates Well  Intervention: Collaborate with Interdisciplinary Team for safe transfer and mobilization techniques  Flowsheets (Taken 4/2/2021 1945)  Assistive Devices: None  Goal: Will remain free from falls  Outcome: PROGRESSING AS EXPECTED  Intervention: Assess risk factors for falls  Flowsheets  Taken 4/3/2021 0042  Mobility  Status Assessment: 1-1 Healthcare Provider Required for Assistance with Ambulation & Transfer  Risk for Injury-Any positive answers results in the pt being at high risk for fall related injury: Age:  Over 85 Years  Taken 4/2/2021 1945  Pt Calls for Assistance: Yes  Intervention: Implement fall precautions  Flowsheets  Taken 4/3/2021 0042  Bed Alarm: Yes - Alarm On  Taken 4/2/2021 1945  Environmental Precautions:   Treaded Slipper Socks on Patient   Personal Belongings, Wastebasket, Call Bell etc. in Easy Reach   Report Given to Other Health Care Providers Regarding Fall Risk   Communication Sign for Patients & Families   Mobility Assessed & Appropriate Sign Placed   Bed in Low Position      Home

## 2022-03-29 PROBLEM — Z87.11 HISTORY OF GASTRIC ULCER: Status: ACTIVE | Noted: 2022-03-29

## 2022-03-29 PROBLEM — Z86.39 H/O THYROID NODULE: Status: ACTIVE | Noted: 2022-03-29

## 2022-03-30 ENCOUNTER — HOSPITAL ENCOUNTER (OUTPATIENT)
Dept: RADIOLOGY | Facility: MEDICAL CENTER | Age: 87
End: 2022-03-30
Attending: NURSE PRACTITIONER
Payer: MEDICARE

## 2022-03-30 DIAGNOSIS — Z12.31 VISIT FOR SCREENING MAMMOGRAM: ICD-10-CM

## 2022-03-30 PROCEDURE — 77063 BREAST TOMOSYNTHESIS BI: CPT

## 2022-04-15 ENCOUNTER — OCCUPATIONAL THERAPY (OUTPATIENT)
Dept: OCCUPATIONAL THERAPY | Facility: REHABILITATION | Age: 87
End: 2022-04-15
Attending: NURSE PRACTITIONER
Payer: MEDICARE

## 2022-04-15 DIAGNOSIS — S72.491D OTHER FRACTURE OF LOWER END OF RIGHT FEMUR, SUBSEQUENT ENCOUNTER FOR CLOSED FRACTURE WITH ROUTINE HEALING: ICD-10-CM

## 2022-04-15 PROCEDURE — 97750 PHYSICAL PERFORMANCE TEST: CPT

## 2022-04-15 ASSESSMENT — BALANCE ASSESSMENTS
BALANCE - SITTING STATIC: NORMAL
BALANCE - STANDING DYNAMIC: POOR +
BALANCE - STANDING STATIC: FAIR +
BALANCE - SITTING DYNAMIC: GOOD
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
## 2022-04-17 NOTE — PROGRESS NOTES
Subjective:   Chief Complaint:   Chief Complaint   Patient presents with   • Supraventricular Tachycardia (SVT)     F/V Dx: Near syncope likely vasovagal versus secondary to symptomatic SVT versus HOCM-like cardiomyopathy   • AV Block Complete     F/V Dx: Second degree atrioventricular block, Mobitz (type) I   • Hypertension       Yoli Carmichael is a 88 y.o. female who returns today for SVT, SSS, PM, HTN, presyncope, mild LVOT gradient.    Followed by EP service for ,   Last seen by Dr. Hastings also 7-30-21.  Started seeing cardiology 2017 with auras, HTN, tachycardia but progressed to syncope.  I met her in the hospital 5/9/2021 after SVT and syncope, standing in front of the mirror, doing her hair/make-up, noted her typical flushing feeling coming across her head, then noted her heart began to pound, turned around to find a place to sit, fell but not complete black out. Has happened many times before.  Suffered broken ribs.    Event monitor 4/6/2021 reviewed by me, patient appears to have symptomatic runs of SVT, reporting frequent lightheadedness.  She states her heart racing is typically involved in the events where she falls down are nearly falls down.  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  Pacemaker placed 5/12/2021 with Dr. Hollis.  Cont on metoprolol for SVT.    Pacemaker check 2/3/2022  A paced 20%, V paced 19%, no mode switches.    Echo with mild LVOT gradient 4-3-21.    Vomiting blood, seeing GI, has a hiatal hernia, no intervention    She fell at home, broken femur/knee cap.  She thinks mechanical, no sx of lightheadedness or syncope.  Not limited by chest pain, pressure or tightness with activity.   No significant dyspnea on exertion, orthopnea or lower extremity swelling.   No significant palpitations, lightheadedness, or presyncope/syncope.   No symptoms of leg claudication.   No stroke/TIA like symptoms.    Prior hypertension but not needing meds now, blood pressure  control.    Mild hyperlipidemia, LDL was 114 in 2019, more recently 67 in , she recalls being on a statin in the past but not now.    Carotid study 2021 without significant subclavian disease.    Mild CKD 3A.    No prior smoking history.  No history of diabetes.  No history of autoimmune disease such as lupus or rheumatoid arthritis.  No ETOH overuse.  No caffeine overuse.  No recreation substance use.  No hx asthma.    Father  of MI in 50s.    Lives alone in [place] but in SNF for now, using walker today    DATA REVIEWED by me:  ECG (my personal interpretation) 11/3/2021  Sinus, 83, intermittent ventricular pacing    Pacemaker check 2/3/2022  A paced 20%, V paced 19%, no mode switches.    Pacemaker 2021 Dr. Pato Hollis Medtronic  IMPLANTED DEVICE INFORMATION:  Pulse generator is a Medtronic model W3DR01  Serial # [id number]     LEAD INFORMATION:  1)Right atrial lead is a Medtronic  model #5076-45, serial # [id number],P wave 3 millivolts, threshold 1 volts, pacing impedance 798 ohms.     2)Right ventricular lead is a Medtronic model #5076-52, serial # [id number],R wave 20 millivolts, threshold 0.5 volts, pacing impedance 703 ohms.    Heart monitor 2021  Patient had continuous rhythm monitoring for 21 days and 15 hours.   Rhythm is sinus, maximum rate was 146 bpm, average was 66 bpm, lowest was 50 bpm.   Less than 1% PACs, PVCs.   Longest pause was for 2.1 seconds, first-degree AV block was noted.   Patient symptoms of lightheadedness correlated with paroxysmal supraventricular tachycardia episodes.   Longest lasting PSVT for 41 seconds at a rate of 128 bpm.  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     Echo 4/3/2021  Compared to the images of the prior study done 2018, intracavitary   gradient is present.  Hyperdynamic left ventricular systolic function.  Left ventricular ejection fraction is visually estimated to be greater   than 75%.  Evidence of increased intracavity gradient, peak velocity is 2.6  m/sec.  Systolic anterior motion of the anterior mitral valve leaflet.      Most recent labs:       Lab Results   Component Value Date/Time    WBC 12.6 (H) 11/08/2021 05:13 AM    HEMOGLOBIN 8.7 (L) 11/08/2021 05:13 AM    HEMATOCRIT 26.8 (L) 11/08/2021 05:13 AM    MCV 86.7 11/08/2021 05:13 AM    INR 1.13 11/03/2021 01:18 AM      Lab Results   Component Value Date/Time    SODIUM 133 (L) 11/08/2021 05:13 AM    POTASSIUM 4.0 11/08/2021 05:13 AM    CHLORIDE 100 11/08/2021 05:13 AM    CO2 23 11/08/2021 05:13 AM    GLUCOSE 109 (H) 11/08/2021 05:13 AM    BUN 25 (H) 11/08/2021 05:13 AM    CREATININE 1.12 11/08/2021 05:13 AM      Lab Results   Component Value Date/Time    ASTSGOT 33 11/05/2021 01:07 AM    ALTSGPT 10 11/05/2021 01:07 AM    ALBUMIN 2.2 (L) 11/08/2021 05:13 AM      Lab Results   Component Value Date/Time    CHOLSTRLTOT 132 05/09/2021 03:36 AM    LDL 67 05/09/2021 03:36 AM    HDL 49 05/09/2021 03:36 AM    TRIGLYCERIDE 80 05/09/2021 03:36 AM     No results for input(s): NTPROBNP, TROPONINT in the last 72 hours.      Past Medical History:   Diagnosis Date   • Acute respiratory failure with hypoxia (HCC) 11/4/2021   • Arrhythmia    • Arthritis     knees   • Bronchitis     long time ago   • CATARACT     no surgery yet   • Dry eyes, bilateral 3/27/2014   • Osteopenia of the elderly 3/27/2014   • Pleural effusion 6/28/2021   • Pneumonia     long time ago   • Preoperative cardiovascular examination 5/8/2021   • Traumatic rhabdomyolysis (HCC) 5/8/2021     Past Surgical History:   Procedure Laterality Date   • PB REVISE KNEE JOINT REPLACE,ALL PARTS Right 11/3/2021    Procedure: REVISION, TOTAL ARTHROPLASTY, KNEE, ALL COMPONENTS;  Surgeon: Ramesh Styles M.D.;  Location: SURGERY [facility];  Service: Orthopedics   • MS UPPER GI ENDOSCOPY,DIAGNOSIS N/A 6/21/2021    Procedure: GASTROSCOPY;  Surgeon: Rafita Plaza D.O.;  Location: SURGERY [facility];  Service: Gastroenterology   • KNEE ARTHROPLASTY TOTAL  9/3/2013     Performed by Ramesh Styles M.D. at SURGERY [facility]   • TONSILLECTOMY       Family History   Problem Relation Age of Onset   • Lung Disease Mother         frequent pneumonia   • Heart Disease Father 54         of MI   • Lung Disease Father         Emphysema   • Heart Attack Father    • Cancer Maternal Aunt         Great Aunt and Cousin Ovarian CA   • Hypertension Maternal Aunt    • Cancer Daughter         Lung and Brain CA   • Seizures Daughter    • Cancer Daughter         Pituitary tumor   • Cancer Brother         leukemia     Social History     Socioeconomic History   • Marital status:      Spouse name: Not on file   • Number of children: Not on file   • Years of education: Not on file   • Highest education level: Not on file   Occupational History   • Not on file   Tobacco Use   • Smoking status: Never Smoker   • Smokeless tobacco: Never Used   • Tobacco comment: Pt exposed to second hand smoker   Vaping Use   • Vaping Use: Never used   Substance and Sexual Activity   • Alcohol use: Yes     Comment: Occasionally   • Drug use: No   • Sexual activity: Never   Other Topics Concern   • Not on file   Social History Narrative   • Not on file     Social Determinants of Health     Financial Resource Strain: Not on file   Food Insecurity: Not on file   Transportation Needs: Not on file   Physical Activity: Not on file   Stress: Not on file   Social Connections: Not on file   Intimate Partner Violence: Not on file   Housing Stability: Not on file     Allergies   Allergen Reactions   • Other Environmental Runny Nose and Itching     Seasonal allergies       Current Outpatient Medications   Medication Sig Dispense Refill   • metoprolol SR (TOPROL XL) 25 MG TABLET SR 24 HR Take 1 Tablet by mouth every evening. For Heart 90 Tablet 1   • escitalopram (LEXAPRO) 5 MG tablet Take 1 Tablet by mouth at bedtime. For depression 90 Tablet 1   • Cholecalciferol (VITAMIN D) 125 MCG (5000 UT) Cap Take 5,000 Units by  "mouth every day. For osteoporosis and vitamin D Deficiency 90 Capsule 1   • prednisoLONE acetate (PRED FORTE) 1 % Suspension Administer 1 Drop into both eyes see administration instructions. three times a day in left eye;  Six time a day right eye     • Multiple Vitamins-Minerals (DAILY MULTI PO) Take 1 Tablet by mouth every day.       No current facility-administered medications for this visit.       ROS  All others systems reviewed and negative.     Objective:     /70 (BP Location: Left arm, Patient Position: Sitting, BP Cuff Size: Adult)   Pulse 72   Resp 12   Ht 1.575 m (5' 2\")   Wt 58.5 kg (129 lb)   SpO2 95%  Body mass index is 23.59 kg/m².    General: No acute distress. Well nourished.  HEENT: EOM grossly intact, no scleral icterus, no pharyngeal erythema.   Neck:  No JVD at 90, no bruits, trachea midline  CVS: RRR. Normal S1, S2.  2 out of 6 systolic murmur left sternal border and apex no significant LE edema.  2+ radial pulses, 2+ PT pulses, trivial varicose veins of the lower extremities  Resp: CTAB. No wheezing or crackles/rhonchi. Normal respiratory effort.  Abdomen: Soft, NT, no deacon hepatomegaly.  MSK/Ext: No clubbing or cyanosis.  Skin: Warm and dry, no rashes.  Neurological: CN III-XII grossly intact. No focal deficits. Using walker today  Psych: A&O x 3, appropriate affect, good judgement        Assessment:     1. Sick sinus syndrome (HCC)     2. Tachy-fany syndrome (HCC)     3. Syncope and collapse     4. Cardiac pacemaker in situ     5. SVT (supraventricular tachycardia) (HCC)     6. Stage 3b chronic kidney disease (HCC)     7. Essential hypertension     8. Palpitations     9. Stage 3a chronic kidney disease (HCC)         Medical Decision Making:  Today's Assessment / Status / Plan:     -Most recent issue is mechanical fall resulting in her being in rehab, trying to get off of using her walker.  Previously lives independently at home alone.  Family close by.  -From a cardiac " standpoint, doing very well.  No more syncope after placement of pacemaker.  -Tachybradycardia syndrome well managed with the pacemaker, SVT appears well managed with her metoprolol.  -Prior history of hypertension but not now, not needing additional medications, metoprolol is for her SVT  -Was previously on cholesterol medication and was a statin responder but she says she is been taking off of it for now, no concerns about following up on lipids at this point  -Pacemaker check every 6 months  -No concerns about mild LVOT gradient, no significant murmur or hemodynamic compromise and she is on low-dose metoprolol anyway  -MD 6 months, Dr. Menjivar    Written instructions given today:      - See Dr. Yanet Menjivar in about 6 months with your next pacemaker check.    -We try to get your pacemaker check on the same day as the doctor but while we are short staffed, its not always been working out.  Please be patient with this.    Return in about 6 months (around 10/21/2022).    It is my pleasure to participate in the care of Ms. Carmichael.  Please do not hesitate to contact me with questions or concerns.    Sendy Sanz MD, [facility]  Cardiologist [facility]    Please note that this dictation was created using voice recognition software. I have made every reasonable attempt to correct obvious errors, but it is possible there are errors of grammar and possibly content that I did not discover before finalizing the note.

## 2022-04-21 ENCOUNTER — OFFICE VISIT (OUTPATIENT)
Dept: CARDIOLOGY | Facility: MEDICAL CENTER | Age: 87
End: 2022-04-21
Payer: MEDICARE

## 2022-04-21 VITALS
OXYGEN SATURATION: 95 % | HEIGHT: 62 IN | SYSTOLIC BLOOD PRESSURE: 124 MMHG | BODY MASS INDEX: 23.74 KG/M2 | RESPIRATION RATE: 12 BRPM | HEART RATE: 72 BPM | DIASTOLIC BLOOD PRESSURE: 70 MMHG | WEIGHT: 129 LBS

## 2022-04-21 DIAGNOSIS — R55 SYNCOPE AND COLLAPSE: ICD-10-CM

## 2022-04-21 DIAGNOSIS — I49.5 TACHY-BRADY SYNDROME (HCC): ICD-10-CM

## 2022-04-21 DIAGNOSIS — I49.5 SICK SINUS SYNDROME (HCC): ICD-10-CM

## 2022-04-21 DIAGNOSIS — N18.32 STAGE 3B CHRONIC KIDNEY DISEASE: ICD-10-CM

## 2022-04-21 DIAGNOSIS — I47.10 SVT (SUPRAVENTRICULAR TACHYCARDIA) (HCC): ICD-10-CM

## 2022-04-21 DIAGNOSIS — N18.31 STAGE 3A CHRONIC KIDNEY DISEASE: ICD-10-CM

## 2022-04-21 DIAGNOSIS — R00.2 PALPITATIONS: ICD-10-CM

## 2022-04-21 DIAGNOSIS — Z95.0 CARDIAC PACEMAKER IN SITU: ICD-10-CM

## 2022-04-21 DIAGNOSIS — I10 ESSENTIAL HYPERTENSION: ICD-10-CM

## 2022-04-21 PROCEDURE — 99214 OFFICE O/P EST MOD 30 MIN: CPT | Performed by: INTERNAL MEDICINE

## 2022-04-21 ASSESSMENT — FIBROSIS 4 INDEX: FIB4 SCORE: 3.79

## 2022-04-21 NOTE — LETTER
[facility]   [address], [address]  [place] [address]  Phone: [phone]  Fax: [phone]              Yoli Carmichael  8/21/1933    Encounter Date: 4/21/2022    Sendy Sanz M.D.          PROGRESS NOTE:  Subjective:   Chief Complaint: No chief complaint on file.      Yoli Carmichael is a 88 y.o. female who returns today for SVT, SSS, PM, HTN, presyncope, mild LVOT gradient.    Followed by EP service for ,   Last seen by Dr. Hastings also 7-30-21    DATA REVIEWED by me:  ECG (my personal interpretation)    Echo    Most recent labs:       Lab Results   Component Value Date/Time    WBC 12.6 (H) 11/08/2021 05:13 AM    HEMOGLOBIN 8.7 (L) 11/08/2021 05:13 AM    HEMATOCRIT 26.8 (L) 11/08/2021 05:13 AM    MCV 86.7 11/08/2021 05:13 AM    INR 1.13 11/03/2021 01:18 AM      Lab Results   Component Value Date/Time    SODIUM 133 (L) 11/08/2021 05:13 AM    POTASSIUM 4.0 11/08/2021 05:13 AM    CHLORIDE 100 11/08/2021 05:13 AM    CO2 23 11/08/2021 05:13 AM    GLUCOSE 109 (H) 11/08/2021 05:13 AM    BUN 25 (H) 11/08/2021 05:13 AM    CREATININE 1.12 11/08/2021 05:13 AM      Lab Results   Component Value Date/Time    ASTSGOT 33 11/05/2021 01:07 AM    ALTSGPT 10 11/05/2021 01:07 AM    ALBUMIN 2.2 (L) 11/08/2021 05:13 AM      Lab Results   Component Value Date/Time    CHOLSTRLTOT 132 05/09/2021 03:36 AM    LDL 67 05/09/2021 03:36 AM    HDL 49 05/09/2021 03:36 AM    TRIGLYCERIDE 80 05/09/2021 03:36 AM     No results for input(s): NTPROBNP, TROPONINT in the last 72 hours.      Past Medical History:   Diagnosis Date   • Acute respiratory failure with hypoxia (HCC) 11/4/2021   • Arrhythmia    • Arthritis     knees   • Bronchitis     long time ago   • CATARACT     no surgery yet   • Dry eyes, bilateral 3/27/2014   • Osteopenia of the elderly 3/27/2014   • Pleural effusion 6/28/2021   • Pneumonia     long time ago   • Preoperative cardiovascular examination 5/8/2021   • Traumatic  rhabdomyolysis (HCC) 5/8/2021     Past Surgical History:   Procedure Laterality Date   • PB REVISE KNEE JOINT REPLACE,ALL PARTS Right 11/3/2021    Procedure: REVISION, TOTAL ARTHROPLASTY, KNEE, ALL COMPONENTS;  Surgeon: Ramesh Styles M.D.;  Location: SURGERY [facility];  Service: Orthopedics   • [place] UPPER GI ENDOSCOPY,DIAGNOSIS N/A 6/21/2021    Procedure: GASTROSCOPY;  Surgeon: Rafita Plaza D.O.;  Location: SURGERY [facility];  Service: Gastroenterology   • KNEE ARTHROPLASTY TOTAL  9/3/2013    Performed by Ramesh Styles M.D. at SURGERY [facility] ORS   • TONSILLECTOMY       Family History   Problem Relation Age of Onset   • Lung Disease Mother         frequent pneumonia   • Heart Disease Father 54        MI   • Lung Disease Father         Emphysema   • Heart Attack Father    • Cancer Maternal Aunt         Great Aunt and Cousin Ovarian CA   • Hypertension Maternal Aunt    • Cancer Daughter         Lung and Brain CA   • Seizures Daughter    • Cancer Daughter         Pituitary tumor   • Cancer Brother         leukemia     Social History     Socioeconomic History   • Marital status:      Spouse name: Not on file   • Number of children: Not on file   • Years of education: Not on file   • Highest education level: Not on file   Occupational History   • Not on file   Tobacco Use   • Smoking status: Never Smoker   • Smokeless tobacco: Never Used   • Tobacco comment: Pt exposed to second hand smoker   Vaping Use   • Vaping Use: Never used   Substance and Sexual Activity   • Alcohol use: Yes     Comment: Occasionally   • Drug use: No   • Sexual activity: Never   Other Topics Concern   • Not on file   Social History Narrative   • Not on file     Social Determinants of Health     Financial Resource Strain: Not on file   Food Insecurity: Not on file   Transportation Needs: Not on file   Physical Activity: Not on file   Stress: Not on file   Social Connections: Not on file   Intimate Partner Violence: Not on file    Housing Stability: Not on file     Allergies   Allergen Reactions   • Other Environmental Runny Nose and Itching     Seasonal allergies       Current Outpatient Medications   Medication Sig Dispense Refill   • metoprolol SR (TOPROL XL) 25 MG TABLET SR 24 HR Take 1 Tablet by mouth every evening. For Heart 90 Tablet 1   • escitalopram (LEXAPRO) 5 MG tablet Take 1 Tablet by mouth at bedtime. For depression 90 Tablet 1   • Cholecalciferol (VITAMIN D) 125 MCG (5000 UT) Cap Take 5,000 Units by mouth every day. For osteoporosis and vitamin D Deficiency 90 Capsule 1   • prednisoLONE acetate (PRED FORTE) 1 % Suspension Administer 1 Drop into both eyes see administration instructions. three times a day in left eye;  Six time a day right eye      • Multiple Vitamins-Minerals (DAILY MULTI PO) Take 1 Tablet by mouth every day.       No current facility-administered medications for this visit.       ROS  All others systems reviewed and negative.     Objective:     There were no vitals taken for this visit. There is no height or weight on file to calculate BMI.    General: No acute distress. Well nourished.  HEENT: EOM grossly intact, no scleral icterus, no pharyngeal erythema.   Neck:  No JVD, no bruits, trachea midline  CVS: RRR. Normal S1, S2. No M/R/G. No LE edema.  2+ radial pulses, 2+ PT pulses  Resp: CTAB. No wheezing or crackles/rhonchi. Normal respiratory effort.  Abdomen: Soft, NT, no deacon hepatomegaly.  MSK/Ext: No clubbing or cyanosis.  Skin: Warm and dry, no rashes.  Neurological: CN III-XII grossly intact. No focal deficits.   Psych: A&O x 3, appropriate affect, good judgement        Assessment:     No diagnosis found.    Medical Decision Making:  Today's Assessment / Status / Plan:               Written instructions given today:        No follow-ups on file.    It is my pleasure to participate in the care of Ms. Carmichael.  Please do not hesitate to contact me with questions or concerns.    Sendy Sanz MD,  [facility]  Cardiologist [facility]    Please note that this dictation was created using voice recognition software. I have made every reasonable attempt to correct obvious errors, but it is possible there are errors of grammar and possibly content that I did not discover before finalizing the note.            No Recipients

## 2022-04-21 NOTE — PATIENT INSTRUCTIONS
- See Dr. Yanet Menjivar in about 6 months with your next pacemaker check.    -We try to get your pacemaker check on the same day as the doctor but while we are short staffed, its not always been working out.  Please be patient with this.    -For new PCP call [phone].

## 2022-05-05 PROBLEM — S51.811A FOREARM LACERATION, RIGHT, INITIAL ENCOUNTER: Status: ACTIVE | Noted: 2022-05-04

## 2022-05-05 PROBLEM — Z51.89 ENCOUNTER FOR WOUND CARE: Status: ACTIVE | Noted: 2022-05-04

## 2022-05-06 ENCOUNTER — HOME HEALTH ADMISSION (OUTPATIENT)
Dept: HOME HEALTH SERVICES | Facility: HOME HEALTHCARE | Age: 87
End: 2022-05-06
Payer: MEDICARE

## 2022-05-13 ENCOUNTER — APPOINTMENT (OUTPATIENT)
Dept: OCCUPATIONAL THERAPY | Facility: REHABILITATION | Age: 87
End: 2022-05-13
Attending: NURSE PRACTITIONER
Payer: MEDICARE

## 2022-05-14 ENCOUNTER — HOME CARE VISIT (OUTPATIENT)
Dept: HOME HEALTH SERVICES | Facility: HOME HEALTHCARE | Age: 87
End: 2022-05-14
Payer: MEDICARE

## 2022-05-14 VITALS
OXYGEN SATURATION: 97 % | SYSTOLIC BLOOD PRESSURE: 126 MMHG | WEIGHT: 122 LBS | HEART RATE: 85 BPM | TEMPERATURE: 98.6 F | HEIGHT: 62 IN | DIASTOLIC BLOOD PRESSURE: 70 MMHG | RESPIRATION RATE: 18 BRPM | BODY MASS INDEX: 22.45 KG/M2

## 2022-05-14 PROCEDURE — 665001 SOC-HOME HEALTH

## 2022-05-14 PROCEDURE — G0493 RN CARE EA 15 MIN HH/HOSPICE: HCPCS

## 2022-05-14 ASSESSMENT — FIBROSIS 4 INDEX: FIB4 SCORE: 3.79

## 2022-05-14 ASSESSMENT — ENCOUNTER SYMPTOMS: DENIES PAIN: 1

## 2022-05-14 ASSESSMENT — PATIENT HEALTH QUESTIONNAIRE - PHQ9
1. LITTLE INTEREST OR PLEASURE IN DOING THINGS: 00
2. FEELING DOWN, DEPRESSED, IRRITABLE, OR HOPELESS: 00
CLINICAL INTERPRETATION OF PHQ2 SCORE: 0

## 2022-05-14 NOTE — CASE COMMUNICATION
Continue current medications  Please consider sleep study referral if excessive sleepiness worsens  Please monitor mood and call if worse  Follow up 6 months for medicare wellness  - fasting lab will be done prior   Primary dx/Skilled need:  SN frequency: EXAMPLE: 1-2w9, if pt has lab draw, dialysis, wound care, etc. on specific days please indicate under this area.  Zip code:   Disciplines ordered:   Insurance & authorization:   Certification period:  Special considerations: EXAMPLE: specific directions to pt home etc.   Primary dx/Skilled need:  SN frequency: EXAMPLE: 1-2w9, if pt has lab draw, dialysis, wound care, etc. on specific days please i ndicate under this area.  Zip code:   Disciplines ordered:   Insurance & authorization:   Certification period:  Special considerations: EXAMPLE: specific directions to pt home etc.   Primary dx/Skilled need:wound care  SN frequency: 1w1,2w2.  Zip code: [id number]  Disciplines ordered:   Insurance & authorization:[employer]   Certification period:5/14-7/12/22  Special considerations: Anticipate rapid discharge-norman PANTOJA

## 2022-05-17 ENCOUNTER — HOME CARE VISIT (OUTPATIENT)
Dept: HOME HEALTH SERVICES | Facility: HOME HEALTHCARE | Age: 87
End: 2022-05-17
Payer: MEDICARE

## 2022-05-17 VITALS — TEMPERATURE: 98 F | HEART RATE: 60 BPM | OXYGEN SATURATION: 96 % | RESPIRATION RATE: 18 BRPM

## 2022-05-17 PROCEDURE — G0299 HHS/HOSPICE OF RN EA 15 MIN: HCPCS

## 2022-05-17 ASSESSMENT — ENCOUNTER SYMPTOMS
MUSCLE WEAKNESS: 1
DENIES PAIN: 1
POOR JUDGMENT: 1

## 2022-05-20 ENCOUNTER — HOSPITAL ENCOUNTER (OUTPATIENT)
Dept: LAB | Facility: MEDICAL CENTER | Age: 87
End: 2022-05-20
Attending: NURSE PRACTITIONER
Payer: MEDICARE

## 2022-05-20 ENCOUNTER — HOME CARE VISIT (OUTPATIENT)
Dept: HOME HEALTH SERVICES | Facility: HOME HEALTHCARE | Age: 87
End: 2022-05-20
Payer: MEDICARE

## 2022-05-20 LAB
ALBUMIN SERPL BCP-MCNC: 3.8 G/DL (ref 3.2–4.9)
ALBUMIN/GLOB SERPL: 1.2 G/DL
ALP SERPL-CCNC: 81 U/L (ref 30–99)
ALT SERPL-CCNC: 11 U/L (ref 2–50)
ANION GAP SERPL CALC-SCNC: 10 MMOL/L (ref 7–16)
AST SERPL-CCNC: 24 U/L (ref 12–45)
BASOPHILS # BLD AUTO: 1 % (ref 0–1.8)
BASOPHILS # BLD: 0.07 K/UL (ref 0–0.12)
BILIRUB SERPL-MCNC: 0.6 MG/DL (ref 0.1–1.5)
BUN SERPL-MCNC: 20 MG/DL (ref 8–22)
CALCIUM SERPL-MCNC: 9.4 MG/DL (ref 8.5–10.5)
CHLORIDE SERPL-SCNC: 107 MMOL/L (ref 96–112)
CO2 SERPL-SCNC: 26 MMOL/L (ref 20–33)
CREAT SERPL-MCNC: 1.26 MG/DL (ref 0.5–1.4)
EOSINOPHIL # BLD AUTO: 0.13 K/UL (ref 0–0.51)
EOSINOPHIL NFR BLD: 1.9 % (ref 0–6.9)
ERYTHROCYTE [DISTWIDTH] IN BLOOD BY AUTOMATED COUNT: 48.5 FL (ref 35.9–50)
GFR SERPLBLD CREATININE-BSD FMLA CKD-EPI: 41 ML/MIN/1.73 M 2
GLOBULIN SER CALC-MCNC: 3.2 G/DL (ref 1.9–3.5)
GLUCOSE SERPL-MCNC: 85 MG/DL (ref 65–99)
HCT VFR BLD AUTO: 45 % (ref 37–47)
HGB BLD-MCNC: 14.1 G/DL (ref 12–16)
IMM GRANULOCYTES # BLD AUTO: 0.03 K/UL (ref 0–0.11)
IMM GRANULOCYTES NFR BLD AUTO: 0.4 % (ref 0–0.9)
LYMPHOCYTES # BLD AUTO: 2.18 K/UL (ref 1–4.8)
LYMPHOCYTES NFR BLD: 32.6 % (ref 22–41)
MCH RBC QN AUTO: 28.2 PG (ref 27–33)
MCHC RBC AUTO-ENTMCNC: 31.3 G/DL (ref 33.6–35)
MCV RBC AUTO: 90 FL (ref 81.4–97.8)
MONOCYTES # BLD AUTO: 0.71 K/UL (ref 0–0.85)
MONOCYTES NFR BLD AUTO: 10.6 % (ref 0–13.4)
NEUTROPHILS # BLD AUTO: 3.57 K/UL (ref 2–7.15)
NEUTROPHILS NFR BLD: 53.5 % (ref 44–72)
NRBC # BLD AUTO: 0 K/UL
NRBC BLD-RTO: 0 /100 WBC
PLATELET # BLD AUTO: 213 K/UL (ref 164–446)
PMV BLD AUTO: 10.4 FL (ref 9–12.9)
POTASSIUM SERPL-SCNC: 4.8 MMOL/L (ref 3.6–5.5)
PROT SERPL-MCNC: 7 G/DL (ref 6–8.2)
RBC # BLD AUTO: 5 M/UL (ref 4.2–5.4)
SODIUM SERPL-SCNC: 143 MMOL/L (ref 135–145)
WBC # BLD AUTO: 6.7 K/UL (ref 4.8–10.8)

## 2022-05-20 PROCEDURE — G0493 RN CARE EA 15 MIN HH/HOSPICE: HCPCS

## 2022-05-20 PROCEDURE — 80053 COMPREHEN METABOLIC PANEL: CPT

## 2022-05-20 PROCEDURE — 85025 COMPLETE CBC W/AUTO DIFF WBC: CPT

## 2022-05-20 PROCEDURE — 36415 COLL VENOUS BLD VENIPUNCTURE: CPT

## 2022-05-22 VITALS
TEMPERATURE: 97.9 F | DIASTOLIC BLOOD PRESSURE: 60 MMHG | SYSTOLIC BLOOD PRESSURE: 110 MMHG | HEART RATE: 89 BPM | RESPIRATION RATE: 16 BRPM | OXYGEN SATURATION: 94 %

## 2022-05-22 ASSESSMENT — PATIENT HEALTH QUESTIONNAIRE - PHQ9: CLINICAL INTERPRETATION OF PHQ2 SCORE: 0

## 2022-05-22 ASSESSMENT — ACTIVITIES OF DAILY LIVING (ADL)
OASIS_M1830: 01
HOME_HEALTH_OASIS: 00

## 2022-05-22 ASSESSMENT — ENCOUNTER SYMPTOMS
POOR JUDGMENT: 1
DENIES PAIN: 1

## 2022-05-25 ENCOUNTER — HOME CARE VISIT (OUTPATIENT)
Dept: HOME HEALTH SERVICES | Facility: HOME HEALTHCARE | Age: 87
End: 2022-05-25
Payer: MEDICARE

## 2022-05-25 ASSESSMENT — ACTIVITIES OF DAILY LIVING (ADL): OASIS_M1830: 03

## 2022-05-25 NOTE — CASE COMMUNICATION
Quality Review Completed for 5/14 SOC OASIS by JIM Ricardo RN on 5/25/2022:  Edits completed by JIM Ricardo RN:  1.  changed to #4 for impaired judgement per the Orientation/mental assessment screen  2. PCP SOLANGE Pastor on 5/10 reports in her OV note that patient uses a walker, has Gait instability, and history of recent Falls. Changed , 1810, 1820, 1840, 1845 to 1 set up assist.  and 1860 to 3 for gait instability,  t o a 2 for use of walker  3. Added falls to [facility]-10  4.  is No, unable to find supportive documentation for clinically significant medication issues found at the SOC visit  5. [id number] changed to #1 per CG notes  6. [id number] E changed to supervision, F, G and H changed to set up   7. [id number] D, E, F, I, J, K changed to supervision  8. Added hearing, ambulation to functional limitations,   9. Checked instruction of pain due to consent dis cussion with pain control policy

## 2022-05-25 NOTE — CASE COMMUNICATION
Quality Review Completed for DC OASIS by JIM Ricardo, RN on 5/25/2022:  Edits completed by JIM Ricardo RN:  1. [id number] is yes to PU interventions per care plan  2. S0720L is CG assists per cog issues  3.  is NA for no supportive documentation found for significant medication criteria  4.  changed to #1 per cog status with safe medication administration ability

## 2022-05-30 NOTE — CASE COMMUNICATION
I agree with changes  ----- Message -----  From: Crys Ricardo R.N.  Sent: 5/25/2022  10:13 AM PDT  To: Farzana Flower R.N.      Quality Review Completed for DC OASIS by JIM Ricardo, RN on 5/25/2022:  Edits completed by JIM Ricardo, RN:  1. I2346X is yes to PU interventions per care plan  2. B8801Q is CG assists per cog issues  3.  is NA for no supportive documentation found for significant medication criteria  4.  changed to #1 p er cog status with safe medication administration ability

## 2022-05-30 NOTE — CASE COMMUNICATION
I agree with changes  ----- Message -----  From: Crys Ricardo R.N.  Sent: 5/25/2022   9:54 AM PDT  To: Farzana Flower R.N.      Quality Review Completed for 5/14 SOC OASIS by IJM Ricardo, RN on 5/25/2022:  Edits completed by JIM Ricardo RN:  1.  changed to #4 for impaired judgement per the Orientation/mental assessment screen  2. PCP SOLANGE Pastor on 5/10 reports in her OV note that patient uses a walker, has Gait instability, and his tory of recent Falls. Changed , 1810, 1820, 1840, 1845 to 1 set up assist.  and 1860 to 3 for gait instability,  to a 2 for use of walker  3. Added falls to [facility]  4.  is No, unable to find supportive documentation for clinically significant medication issues found at the SOC visit  5. [id number] changed to #1 per CG notes  6. [id number] E changed to supervision, F, G and H changed to set up   7. [id number] D, E, F, I, J, K ch anged to supervision  8. Added hearing, ambulation to functional limitations,   9. Checked instruction of pain due to consent discussion with pain control policy

## 2022-06-08 ENCOUNTER — APPOINTMENT (RX ONLY)
Dept: URBAN - METROPOLITAN AREA CLINIC 22 | Facility: CLINIC | Age: 87
Setting detail: DERMATOLOGY
End: 2022-06-08

## 2022-06-08 DIAGNOSIS — D18.0 HEMANGIOMA: ICD-10-CM

## 2022-06-08 DIAGNOSIS — Z85.828 PERSONAL HISTORY OF OTHER MALIGNANT NEOPLASM OF SKIN: ICD-10-CM

## 2022-06-08 DIAGNOSIS — L82.1 OTHER SEBORRHEIC KERATOSIS: ICD-10-CM

## 2022-06-08 DIAGNOSIS — I83.9 ASYMPTOMATIC VARICOSE VEINS OF LOWER EXTREMITIES: ICD-10-CM

## 2022-06-08 DIAGNOSIS — D22 MELANOCYTIC NEVI: ICD-10-CM

## 2022-06-08 DIAGNOSIS — L81.4 OTHER MELANIN HYPERPIGMENTATION: ICD-10-CM

## 2022-06-08 PROBLEM — D22.5 MELANOCYTIC NEVI OF TRUNK: Status: ACTIVE | Noted: 2022-06-08

## 2022-06-08 PROBLEM — D23.71 OTHER BENIGN NEOPLASM OF SKIN OF RIGHT LOWER LIMB, INCLUDING HIP: Status: ACTIVE | Noted: 2022-06-08

## 2022-06-08 PROBLEM — D18.01 HEMANGIOMA OF SKIN AND SUBCUTANEOUS TISSUE: Status: ACTIVE | Noted: 2022-06-08

## 2022-06-08 PROBLEM — I83.93 ASYMPTOMATIC VARICOSE VEINS OF BILATERAL LOWER EXTREMITIES: Status: ACTIVE | Noted: 2022-06-08

## 2022-06-08 PROCEDURE — ? SUNSCREEN TREATMENT REGIMEN

## 2022-06-08 PROCEDURE — ? COUNSELING

## 2022-06-08 PROCEDURE — 99213 OFFICE O/P EST LOW 20 MIN: CPT

## 2022-06-08 ASSESSMENT — LOCATION SIMPLE DESCRIPTION DERM
LOCATION SIMPLE: RIGHT FOREARM
LOCATION SIMPLE: CHEST
LOCATION SIMPLE: LEFT THIGH
LOCATION SIMPLE: RIGHT THIGH
LOCATION SIMPLE: UPPER BACK
LOCATION SIMPLE: RIGHT PRETIBIAL REGION
LOCATION SIMPLE: LEFT FOREARM
LOCATION SIMPLE: RIGHT TEMPLE
LOCATION SIMPLE: INFERIOR FOREHEAD
LOCATION SIMPLE: ABDOMEN
LOCATION SIMPLE: LEFT ANKLE

## 2022-06-08 ASSESSMENT — LOCATION DETAILED DESCRIPTION DERM
LOCATION DETAILED: RIGHT VENTRAL PROXIMAL FOREARM
LOCATION DETAILED: INFERIOR THORACIC SPINE
LOCATION DETAILED: RIGHT DISTAL PRETIBIAL REGION
LOCATION DETAILED: LEFT VENTRAL PROXIMAL FOREARM
LOCATION DETAILED: UPPER STERNUM
LOCATION DETAILED: SUPERIOR THORACIC SPINE
LOCATION DETAILED: INFERIOR MID FOREHEAD
LOCATION DETAILED: LEFT MEDIAL SUPERIOR CHEST
LOCATION DETAILED: EPIGASTRIC SKIN
LOCATION DETAILED: LEFT ANTERIOR DISTAL THIGH
LOCATION DETAILED: RIGHT CENTRAL TEMPLE
LOCATION DETAILED: RIGHT ANTERIOR PROXIMAL THIGH
LOCATION DETAILED: LEFT ANKLE

## 2022-06-08 ASSESSMENT — LOCATION ZONE DERM
LOCATION ZONE: FACE
LOCATION ZONE: ARM
LOCATION ZONE: LEG
LOCATION ZONE: TRUNK

## 2022-06-28 PROBLEM — K08.89 PAIN, DENTAL: Status: ACTIVE | Noted: 2022-06-28

## 2022-07-28 ENCOUNTER — TELEPHONE (OUTPATIENT)
Dept: CARDIOLOGY | Facility: MEDICAL CENTER | Age: 87
End: 2022-07-28
Payer: MEDICARE

## 2022-07-28 PROBLEM — I70.0 ATHEROSCLEROSIS OF AORTA (HCC): Status: ACTIVE | Noted: 2022-07-28

## 2022-07-28 PROBLEM — F32.5 MAJOR DEPRESSION IN REMISSION (HCC): Status: ACTIVE | Noted: 2022-07-28

## 2022-07-28 PROBLEM — G31.9 CEREBRAL ATROPHY (HCC): Status: ACTIVE | Noted: 2022-07-28

## 2022-07-28 NOTE — TELEPHONE ENCOUNTER
Returned call. Informed pt that she will need to make an appt w/ our office and be evaluated before we can see about filling out the requested paperwork. Transferred pt to scheduling to see if she can make a sooner appt w/ any avail provider.

## 2022-07-28 NOTE — TELEPHONE ENCOUNTER
ALISTAIR    Caller: Yoli Carmichael    Topic/issue: DMV PAPERWORK    Patient states that she is needing clearance from cardio in order to get her DL back from the [place] DMV. Please advise.    FAX: [phone]    Thank you,  Hemanth RIBEIRO    Callback Number: [phone] (home) [phone] (work)

## 2022-07-29 ENCOUNTER — OFFICE VISIT (OUTPATIENT)
Dept: CARDIOLOGY | Facility: MEDICAL CENTER | Age: 87
End: 2022-07-29
Payer: MEDICARE

## 2022-07-29 VITALS
RESPIRATION RATE: 14 BRPM | DIASTOLIC BLOOD PRESSURE: 76 MMHG | HEART RATE: 92 BPM | BODY MASS INDEX: 22.29 KG/M2 | HEIGHT: 63 IN | SYSTOLIC BLOOD PRESSURE: 130 MMHG | WEIGHT: 125.8 LBS | OXYGEN SATURATION: 94 %

## 2022-07-29 DIAGNOSIS — I47.10 SVT (SUPRAVENTRICULAR TACHYCARDIA) (HCC): ICD-10-CM

## 2022-07-29 DIAGNOSIS — I49.5 SICK SINUS SYNDROME (HCC): ICD-10-CM

## 2022-07-29 DIAGNOSIS — R55 SYNCOPE AND COLLAPSE: ICD-10-CM

## 2022-07-29 DIAGNOSIS — Z79.899 HIGH RISK MEDICATION USE: ICD-10-CM

## 2022-07-29 DIAGNOSIS — I49.5 TACHY-BRADY SYNDROME (HCC): ICD-10-CM

## 2022-07-29 DIAGNOSIS — Z95.0 CARDIAC PACEMAKER IN SITU: ICD-10-CM

## 2022-07-29 PROCEDURE — 99214 OFFICE O/P EST MOD 30 MIN: CPT | Performed by: NURSE PRACTITIONER

## 2022-07-29 ASSESSMENT — ENCOUNTER SYMPTOMS
SHORTNESS OF BREATH: 0
MYALGIAS: 0
FEVER: 0
PND: 0
ORTHOPNEA: 0
PALPITATIONS: 0
COUGH: 0
ABDOMINAL PAIN: 0
DIZZINESS: 0
CLAUDICATION: 0

## 2022-07-29 ASSESSMENT — FIBROSIS 4 INDEX: FIB4 SCORE: 2.99

## 2022-07-29 NOTE — PROGRESS NOTES
Chief Complaint   Patient presents with   • Supraventricular Tachycardia (SVT)     F/V DX: SVT (supraventricular tachycardia) (HCC)        Subjective     Yoli Carmichael is a 88 y.o. female who presents today for follow up and get clearance for driving.    Previous patient of Dr. Sanz.  Patient was last seen in clinic on 4/21/2022 with Dr. Sanz.  No changes were made during that visit.    Patient comes in the clinic today reporting that she is in the process of trying to get her 's license back.  She did have a evaluation by physical therapy/Occupational Therapy and was able to pass her evaluation.  Patient was told that she now needs cardiac clearance.    Patient reports she has been doing well, denies symptoms.  Denies chest pain, palpitations, orthopnea, PND, edema, dizziness/lightheadedness, shortness of breath or recurrent syncope/presyncope.    She continues to receive physical therapy 2 times a week and states she continues to improve and feel better.    She denies any problems with her pacemaker.    Her next check is in August.    Additonally, patient has the following medical problems:    -SVT, SSS, s/p PPM on 5/12/2021    -Hypertension    -Mild LVOT gradient  Past Medical History:   Diagnosis Date   • Acute respiratory failure with hypoxia (HCC) 11/4/2021   • Arrhythmia    • Arthritis     knees   • Bronchitis     long time ago   • CATARACT     no surgery yet   • Dry eyes, bilateral 3/27/2014   • Osteopenia of the elderly 3/27/2014   • Pleural effusion 6/28/2021   • Pneumonia     long time ago   • Preoperative cardiovascular examination 5/8/2021   • Traumatic rhabdomyolysis (HCC) 5/8/2021     Past Surgical History:   Procedure Laterality Date   • PB REVISE KNEE JOINT REPLACE,ALL PARTS Right 11/3/2021    Procedure: REVISION, TOTAL ARTHROPLASTY, KNEE, ALL COMPONENTS;  Surgeon: Ramesh Styles M.D.;  Location: SURGERY [facility];  Service: Orthopedics   • TX UPPER GI ENDOSCOPY,DIAGNOSIS N/A  2021    Procedure: GASTROSCOPY;  Surgeon: Rafita Plaza D.O.;  Location: SURGERY [facility];  Service: Gastroenterology   • KNEE ARTHROPLASTY TOTAL  9/3/2013    Performed by Ramesh Styles M.D. at SURGERY [facility] ORS   • TONSILLECTOMY       Family History   Problem Relation Age of Onset   • Lung Disease Mother         frequent pneumonia   • Heart Disease Father 54         of MI   • Lung Disease Father         Emphysema   • Heart Attack Father    • Cancer Maternal Aunt         Great Aunt and Cousin Ovarian CA   • Hypertension Maternal Aunt    • Cancer Daughter         Lung and Brain CA   • Seizures Daughter    • Cancer Daughter         Pituitary tumor   • Cancer Brother         leukemia     Social History     Socioeconomic History   • Marital status:      Spouse name: Not on file   • Number of children: Not on file   • Years of education: Not on file   • Highest education level: Not on file   Occupational History   • Not on file   Tobacco Use   • Smoking status: Never Smoker   • Smokeless tobacco: Never Used   • Tobacco comment: Pt exposed to second hand smoker   Vaping Use   • Vaping Use: Never used   Substance and Sexual Activity   • Alcohol use: Yes     Comment: Occasionally   • Drug use: No   • Sexual activity: Never   Other Topics Concern   • Not on file   Social History Narrative   • Not on file     Social Determinants of Health     Financial Resource Strain: Not on file   Food Insecurity: Not on file   Transportation Needs: Not on file   Physical Activity: Not on file   Stress: Not on file   Social Connections: Not on file   Intimate Partner Violence: Not on file   Housing Stability: Not on file     Allergies   Allergen Reactions   • Other Environmental Runny Nose and Itching     Seasonal allergies     Outpatient Encounter Medications as of 2022   Medication Sig Dispense Refill   • metoprolol SR (TOPROL XL) 25 MG TABLET SR 24 HR Take 1 Tablet by mouth every evening. For Heart 90 Tablet  "3   • Multiple Vitamins-Minerals (ICAPS) Tab Take 1 Tablet by mouth 2 times a day. Indications: eye health     • Probiotic Product (PROBIOTIC DIGESTIVE SUPP) Cap Take 1 Capsule by mouth every day. Indications: digestion     • escitalopram (LEXAPRO) 5 MG tablet Take 1 Tablet by mouth at bedtime. For depression 90 Tablet 1   • Cholecalciferol (VITAMIN D) 125 MCG (5000 UT) Cap Take 5,000 Units by mouth every day. For osteoporosis and vitamin D Deficiency 90 Capsule 1   • Multiple Vitamins-Minerals (DAILY MULTI PO) Take 1 Tablet by mouth every day.     • prednisoLONE acetate (PRED FORTE) 1 % Suspension Administer 1 Drop into both eyes see administration instructions. three times a day in left eye;  Six time a day right eye (Patient not taking: Reported on 7/29/2022)       No facility-administered encounter medications on file as of 7/29/2022.     Review of Systems   Constitutional: Negative for fever and malaise/fatigue.   Respiratory: Negative for cough and shortness of breath.    Cardiovascular: Negative for chest pain, palpitations, orthopnea, claudication, leg swelling and PND.   Gastrointestinal: Negative for abdominal pain.   Musculoskeletal: Negative for myalgias.   Neurological: Negative for dizziness.   All other systems reviewed and are negative.             Objective     /76 (BP Location: Left arm, Patient Position: Sitting, BP Cuff Size: Adult)   Pulse 92   Resp 14   Ht 1.6 m (5' 3\")   Wt 57.1 kg (125 lb 12.8 oz)   SpO2 94%   BMI 22.28 kg/m²     Physical Exam  Vitals reviewed.   Constitutional:       Appearance: She is well-developed.   HENT:      Head: Normocephalic and atraumatic.   Eyes:      Pupils: Pupils are equal, round, and reactive to light.   Neck:      Vascular: No JVD.   Cardiovascular:      Rate and Rhythm: Normal rate and regular rhythm.      Heart sounds: Normal heart sounds.      Comments: Left chest PPM-no erosion, drainage or Erythema  Pulmonary:      Effort: Pulmonary effort is " normal. No respiratory distress.      Breath sounds: Normal breath sounds. No wheezing or rales.   Abdominal:      General: Bowel sounds are normal.      Palpations: Abdomen is soft.   Musculoskeletal:         General: Normal range of motion.      Cervical back: Normal range of motion and neck supple.      Right lower leg: No edema.      Left lower leg: No edema.   Skin:     General: Skin is warm and dry.   Neurological:      General: No focal deficit present.      Mental Status: She is alert and oriented to person, place, and time.   Psychiatric:         Behavior: Behavior normal.       Lab Results   Component Value Date/Time    CHOLSTRLTOT 132 05/09/2021 03:36 AM    LDL 67 05/09/2021 03:36 AM    HDL 49 05/09/2021 03:36 AM    TRIGLYCERIDE 80 05/09/2021 03:36 AM       Lab Results   Component Value Date/Time    SODIUM 143 05/20/2022 11:29 AM    POTASSIUM 4.8 05/20/2022 11:29 AM    CHLORIDE 107 05/20/2022 11:29 AM    CO2 26 05/20/2022 11:29 AM    GLUCOSE 85 05/20/2022 11:29 AM    BUN 20 05/20/2022 11:29 AM    CREATININE 1.26 05/20/2022 11:29 AM     Lab Results   Component Value Date/Time    ALKPHOSPHAT 81 05/20/2022 11:29 AM    ASTSGOT 24 05/20/2022 11:29 AM    ALTSGPT 11 05/20/2022 11:29 AM    TBILIRUBIN 0.6 05/20/2022 11:29 AM      Transthoracic Echo Report 3/23/2015  Grade II diastolic dysfunction - pseudonormal mitral inflow is   observed.   Left ventricular ejection fraction is 55% to 60%.   Mild mitral regurgitation.   Right ventricular systolic pressure is estimated to be at least 40   mmhg.  Normal estimated right atrial pressure.   No prior study is available for comparison.      Stress Echo Report 10/13/2016  Negative stress echocardiogram for ischemia with adequate stress achieved by heart rate criteria.      Transthoracic Echo Report 11/2/2018  Normal left ventricular size and systolic function.  Left ventricular ejection fraction is visually estimated to be 70%.  Mild concentric left ventricular  hypertrophy.  Compared to the images of the prior study done 03/25/2018  there has been no significant change.      Transthoracic Echo Report 4/3/2021  Compared to the images of the prior study done 11/2/2018, intracavitary gradient is present.  Hyperdynamic left ventricular systolic function.  Left ventricular ejection fraction is visually estimated to be greater   than 75%.  Evidence of increased intracavity gradient, peak velocity is 2.6 m/sec.  Systolic anterior motion of the anterior mitral valve leaflet.    [employer] 3/1/2021 - 4/2/2021  Patient had continuous rhythm monitoring for 21 days and 15 hours.   Rhythm is sinus, maximum rate was 146 bpm, average was 66 bpm, lowest was 50 bpm.   Less than 1% PACs, PVCs.   Longest pause was for 2.1 seconds, first-degree AV block was noted.   Patient symptoms of lightheadedness correlated with paroxysmal supraventricular tachycardia episodes.   Longest lasting PSVT for 41 seconds at a rate of 128 bpm.  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          Assessment & Plan     1. Sick sinus syndrome (HCC)     2. Tachy-fany syndrome (HCC)     3. Syncope and collapse     4. Cardiac pacemaker in situ     5. SVT (supraventricular tachycardia) (HCC)     6. High risk medication use         Medical Decision Making: Today's Assessment/Status/Plan:        DMV clearance filled out for patient.    SSS, s/p PPM:  -Last device check 2/3/2022  -Next device check 8/3/2022  -Patient has had no more syncopal episodes since her pacemaker placement    History SVT:  -Continue metoprolol SR 25 mg daily    HTN:  -Stable   -Continue Metoprolol SR 25 mg daily    FU in clinic in 3 months to establish with Dr. Menjivar. Sooner if needed.    Patient verbalizes understanding and agrees with the plan of care.     PLEASE NOTE: This Note was created using voice recognition Software. I have made every reasonable attempt to correct obvious errors, but I expect that there are errors of grammar and possibly content that I did  not discover before finalizing the note

## 2022-08-01 PROBLEM — N18.32 STAGE 3B CHRONIC KIDNEY DISEASE: Status: ACTIVE | Noted: 2022-08-01

## 2022-08-03 ENCOUNTER — NON-PROVIDER VISIT (OUTPATIENT)
Dept: CARDIOLOGY | Facility: MEDICAL CENTER | Age: 87
End: 2022-08-03
Payer: MEDICARE

## 2022-08-03 DIAGNOSIS — I44.1 SECOND DEGREE ATRIOVENTRICULAR BLOCK, MOBITZ (TYPE) I: ICD-10-CM

## 2022-08-03 DIAGNOSIS — Z95.0 PACEMAKER: ICD-10-CM

## 2022-08-03 PROCEDURE — 93280 PM DEVICE PROGR EVAL DUAL: CPT | Performed by: INTERNAL MEDICINE

## 2022-08-14 PROBLEM — R26.89 IMBALANCE: Status: RESOLVED | Noted: 2019-03-19 | Resolved: 2022-08-14

## 2022-08-14 PROBLEM — Z51.89 ENCOUNTER FOR WOUND CARE: Status: RESOLVED | Noted: 2022-05-04 | Resolved: 2022-08-14

## 2022-09-22 PROBLEM — Z91.81 AT RISK FOR FALLING: Status: ACTIVE | Noted: 2022-09-20

## 2022-10-20 ENCOUNTER — OFFICE VISIT (OUTPATIENT)
Dept: CARDIOLOGY | Facility: MEDICAL CENTER | Age: 87
End: 2022-10-20
Payer: MEDICARE

## 2022-10-20 VITALS
BODY MASS INDEX: 23.04 KG/M2 | DIASTOLIC BLOOD PRESSURE: 60 MMHG | RESPIRATION RATE: 16 BRPM | HEART RATE: 69 BPM | WEIGHT: 130 LBS | HEIGHT: 63 IN | OXYGEN SATURATION: 95 % | SYSTOLIC BLOOD PRESSURE: 120 MMHG

## 2022-10-20 DIAGNOSIS — I47.10 PAROXYSMAL SVT (SUPRAVENTRICULAR TACHYCARDIA) (HCC): ICD-10-CM

## 2022-10-20 DIAGNOSIS — Z95.0 CARDIAC PACEMAKER IN SITU: ICD-10-CM

## 2022-10-20 DIAGNOSIS — I10 ESSENTIAL HYPERTENSION: ICD-10-CM

## 2022-10-20 DIAGNOSIS — I49.5 SICK SINUS SYNDROME (HCC): ICD-10-CM

## 2022-10-20 PROCEDURE — 99214 OFFICE O/P EST MOD 30 MIN: CPT | Performed by: STUDENT IN AN ORGANIZED HEALTH CARE EDUCATION/TRAINING PROGRAM

## 2022-10-20 ASSESSMENT — ENCOUNTER SYMPTOMS
DIZZINESS: 0
ABDOMINAL PAIN: 0
FEVER: 0
NIGHT SWEATS: 0
ORTHOPNEA: 0
SYNCOPE: 0
DIARRHEA: 0
NEAR-SYNCOPE: 0
WEAKNESS: 0
COUGH: 0
FOCAL WEAKNESS: 0
VOMITING: 0
DYSPNEA ON EXERTION: 0
IRREGULAR HEARTBEAT: 0
NAUSEA: 0
PND: 0
WHEEZING: 0
PALPITATIONS: 0
SHORTNESS OF BREATH: 0

## 2022-10-20 ASSESSMENT — FIBROSIS 4 INDEX: FIB4 SCORE: 3.02

## 2022-10-20 NOTE — PROGRESS NOTES
Cardiology Follow-up Consultation Note    Date of note:    10/20/2022    Primary Care Provider: CHILANGO Quijano    Patient Name: Yoli Carmichael   YOB: 1933  MRN:              [id number]    Chief Complaint: Follow-up SVT and hypertension    History of Present Illness: Ms. Yoli Carmichael is a 89 y.o. female whose current medical problems include sick sinus syndrome status post pacemaker placement 5/2021, paroxysmal SVT status post ablation 5/2021, and hypertension who is here for follow-up SVT and hypertension.    The patient was previously a patient of Dr. Sanz, last seen 4/21/2022.  The patient then followed with SOLANGE Thompson, on 7/29/2022. She was doing well the last visit, and no changes were made to her medications.    The patient returns today for follow-up.  The patient reports feeling very well today.  She denies any chest pain or shortness of breath on exertion.  She denies any orthopnea, PND, or leg swelling.  No palpitations.  No syncope or presyncopal episodes.      Cardiovascular Risk Factors:  1. Smoking status: Never smoker  2. Type II Diabetes Mellitus: Prediabetes, diet controlled  Lab Results   Component Value Date/Time    HBA1C 5.9 (H) 04/02/2021 04:09 PM    HBA1C 5.8 (H) 01/16/2018 09:21 AM     3. Hypertension: On medication  4. Dyslipidemia: None  Cholesterol,Tot   Date Value Ref Range Status   05/09/2021 132 100 - 199 mg/dL Final     LDL   Date Value Ref Range Status   05/09/2021 67 <100 mg/dL Final     HDL   Date Value Ref Range Status   05/09/2021 49 >=40 mg/dL Final     Triglycerides   Date Value Ref Range Status   05/09/2021 80 0 - 149 mg/dL Final     5. Family history of early Coronary Artery Disease in a first degree relative (Male less than 55 years of age; Female less than 65 years of age): Father with MI none  6.  Obesity and/or Metabolic Syndrome: Body mass index is 23.03 kg/m².  7. Sedentary lifestyle: Not sedentary    Review of Systems  "  Constitutional: Negative for fever, malaise/fatigue and night sweats.   Cardiovascular:  Negative for chest pain, dyspnea on exertion, irregular heartbeat, leg swelling, near-syncope, orthopnea, palpitations, paroxysmal nocturnal dyspnea and syncope.   Respiratory:  Negative for cough, shortness of breath and wheezing.    Gastrointestinal:  Negative for abdominal pain, diarrhea, nausea and vomiting.   Neurological:  Negative for dizziness, focal weakness and weakness.       All other systems reviewed and are negative.         Current Outpatient Medications   Medication Sig Dispense Refill    escitalopram (LEXAPRO) 5 MG tablet Take 1 Tablet by mouth at bedtime. For depression 90 Tablet 3    metoprolol SR (TOPROL XL) 25 MG TABLET SR 24 HR Take 1 Tablet by mouth every evening. For Heart 90 Tablet 3    Multiple Vitamins-Minerals (ICAPS) Tab Take 1 Tablet by mouth 2 times a day. Indications: eye health      Probiotic Product (PROBIOTIC DIGESTIVE SUPP) Cap Take 1 Capsule by mouth every day. Indications: digestion      Cholecalciferol (VITAMIN D) 125 MCG (5000 UT) Cap Take 5,000 Units by mouth every day. For osteoporosis and vitamin D Deficiency 90 Capsule 1    Multiple Vitamins-Minerals (DAILY MULTI PO) Take 1 Tablet by mouth every day.       No current facility-administered medications for this visit.         Allergies   Allergen Reactions    Other Environmental Runny Nose and Itching     Seasonal allergies       Physical Exam:  Ambulatory Vitals  /60 (BP Location: Left arm, Patient Position: Sitting, BP Cuff Size: Adult)   Pulse 69   Resp 16   Ht 1.6 m (5' 3\")   Wt 59 kg (130 lb)   SpO2 95%    Oxygen Therapy:  Pulse Oximetry: 95 %  BP Readings from Last 4 Encounters:   10/20/22 120/60   09/20/22 126/62   08/09/22 104/62   07/29/22 130/76       Weight/BMI: Body mass index is 23.03 kg/m².  Wt Readings from Last 4 Encounters:   10/20/22 59 kg (130 lb)   08/09/22 57.1 kg (125 lb 12.8 oz)   07/29/22 57.1 kg (125 " lb 12.8 oz)   07/28/22 55.8 kg (123 lb)         General: Well appearing and in no apparent distress  Eyes: nl conjunctiva, no icteric sclera  ENT: wearing a mask, normal external appearance of ears  Neck: no visible JVP,  no carotid bruits  Lungs: normal respiratory effort, CTAB  Heart: RRR, no murmurs, no rubs or gallops,  no edema bilateral lower extremities. No LV/RV heave on cardiac palpatation. + bilateral radial pulses.  + bilateral dp pulses.   Abdomen: soft, non tender, non distended, no masses, normal bowel sounds.  No HSM.  Extremities/MSK: no clubbing, no cyanosis  Neurological: No focal sensory deficits  Psychiatric: Appropriate affect, A/O x 3, intact judgement and insight  Skin: Warm extremities      Lab Data Review:  Lab Results   Component Value Date/Time    CHOLSTRLTOT 132 05/09/2021 03:36 AM    LDL 67 05/09/2021 03:36 AM    HDL 49 05/09/2021 03:36 AM    TRIGLYCERIDE 80 05/09/2021 03:36 AM       Lab Results   Component Value Date/Time    SODIUM 143 05/20/2022 11:29 AM    POTASSIUM 4.8 05/20/2022 11:29 AM    CHLORIDE 107 05/20/2022 11:29 AM    CO2 26 05/20/2022 11:29 AM    GLUCOSE 85 05/20/2022 11:29 AM    BUN 20 05/20/2022 11:29 AM    CREATININE 1.26 05/20/2022 11:29 AM     Lab Results   Component Value Date/Time    ALKPHOSPHAT 81 05/20/2022 11:29 AM    ASTSGOT 24 05/20/2022 11:29 AM    ALTSGPT 11 05/20/2022 11:29 AM    TBILIRUBIN 0.6 05/20/2022 11:29 AM      Lab Results   Component Value Date/Time    WBC 6.7 05/20/2022 11:29 AM     Lab Results   Component Value Date/Time    HBA1C 5.9 (H) 04/02/2021 04:09 PM    HBA1C 5.8 (H) 01/16/2018 09:21 AM         Cardiac Imaging and Procedures Review:    EKG dated 11/3/2021: My personal interpretation is V-paced rhythm, intermittent sinus rhythm    Echo dated 4/3/2021:   CONCLUSIONS  Compared to the images of the prior study done 11/2/2018, intracavitary   gradient is present.  Hyperdynamic left ventricular systolic function.  Left ventricular ejection  fraction is visually estimated to be greater   than 75%.  Evidence of increased intracavity gradient, peak velocity is 2.6 m/sec.  Systolic anterior motion of the anterior mitral valve leaflet.      Assessment & Plan     1. Paroxysmal SVT (supraventricular tachycardia) (HCC)        2. Sick sinus syndrome (HCC)        3. Cardiac pacemaker in situ        4. Essential hypertension              Shared Medical Decision Making:    Paroxysmal SVT  S/p ablation 5/2021  Asymptomatic.  -Continue metoprolol 25 mg daily    Hypertension  BP well controlled this visit  -Continue metoprolol 25 mg daily    Sick sinus syndrome  Status post pacemaker placement  -Continue follow-up in device clinic    All of the patient's excellent questions were answered to the best of my knowledge and to her satisfaction.  It was a pleasure seeing Ms. Yoli Carmichael in my clinic today. Return in about 6 months (around 4/20/2023). Patient is aware to call the cardiology clinic with any questions or concerns.      Yanet Menjivar MD  [facility], [address]  [address] [address]  Phone: [phone]  Fax: [phone]

## 2022-11-15 PROBLEM — J18.9 COMMUNITY ACQUIRED PNEUMONIA OF LEFT LOWER LOBE OF LUNG: Status: ACTIVE | Noted: 2022-11-15

## 2022-12-02 ENCOUNTER — DOCUMENTATION (OUTPATIENT)
Dept: HEALTH INFORMATION MANAGEMENT | Facility: OTHER | Age: 87
End: 2022-12-02
Payer: MEDICARE

## 2023-01-04 ENCOUNTER — APPOINTMENT (OUTPATIENT)
Dept: RADIOLOGY | Facility: MEDICAL CENTER | Age: 88
End: 2023-01-04
Attending: NURSE PRACTITIONER
Payer: MEDICARE

## 2023-02-08 ENCOUNTER — NON-PROVIDER VISIT (OUTPATIENT)
Dept: CARDIOLOGY | Facility: MEDICAL CENTER | Age: 88
End: 2023-02-08

## 2023-02-08 ENCOUNTER — NON-PROVIDER VISIT (OUTPATIENT)
Dept: CARDIOLOGY | Facility: MEDICAL CENTER | Age: 88
End: 2023-02-08
Payer: MEDICARE

## 2023-02-08 DIAGNOSIS — Z95.0 PACEMAKER: ICD-10-CM

## 2023-02-08 DIAGNOSIS — I49.5 SICK SINUS SYNDROME (HCC): ICD-10-CM

## 2023-02-08 PROCEDURE — 93280 PM DEVICE PROGR EVAL DUAL: CPT | Performed by: INTERNAL MEDICINE

## 2023-02-09 NOTE — CARDIAC REMOTE MONITOR - SCAN
Device transmission reviewed. Device demonstrated appropriate function.       Electronically Signed by: Rodriguez Dailey M.D.    2/11/2023  1:29 PM

## 2023-02-23 PROBLEM — S51.811A FOREARM LACERATION, RIGHT, INITIAL ENCOUNTER: Status: RESOLVED | Noted: 2022-05-04 | Resolved: 2023-02-23

## 2023-02-23 PROBLEM — N60.39: Status: ACTIVE | Noted: 2023-02-23

## 2023-03-02 ENCOUNTER — APPOINTMENT (RX ONLY)
Dept: URBAN - METROPOLITAN AREA CLINIC 22 | Facility: CLINIC | Age: 88
Setting detail: DERMATOLOGY
End: 2023-03-02

## 2023-03-02 DIAGNOSIS — L81.8 OTHER SPECIFIED DISORDERS OF PIGMENTATION: ICD-10-CM

## 2023-03-02 DIAGNOSIS — L57.0 ACTINIC KERATOSIS: ICD-10-CM

## 2023-03-02 PROBLEM — D48.5 NEOPLASM OF UNCERTAIN BEHAVIOR OF SKIN: Status: ACTIVE | Noted: 2023-03-02

## 2023-03-02 PROCEDURE — 99212 OFFICE O/P EST SF 10 MIN: CPT | Mod: 25

## 2023-03-02 PROCEDURE — 11102 TANGNTL BX SKIN SINGLE LES: CPT

## 2023-03-02 PROCEDURE — 17000 DESTRUCT PREMALG LESION: CPT | Mod: 59

## 2023-03-02 PROCEDURE — ? COUNSELING

## 2023-03-02 PROCEDURE — ? BIOPSY BY SHAVE METHOD

## 2023-03-02 PROCEDURE — ? LIQUID NITROGEN

## 2023-03-02 ASSESSMENT — LOCATION SIMPLE DESCRIPTION DERM
LOCATION SIMPLE: LEFT ANKLE
LOCATION SIMPLE: RIGHT ANKLE

## 2023-03-02 ASSESSMENT — LOCATION ZONE DERM: LOCATION ZONE: LEG

## 2023-03-02 ASSESSMENT — LOCATION DETAILED DESCRIPTION DERM
LOCATION DETAILED: RIGHT POSTERIOR ANKLE
LOCATION DETAILED: LEFT LATERAL POSTERIOR ANKLE

## 2023-03-02 NOTE — PROCEDURE: LIQUID NITROGEN
Duration Of Freeze Thaw-Cycle (Seconds): 0
Show Aperture Variable?: Yes
Render Post-Care Instructions In Note?: no
Consent: The patient's consent was obtained including but not limited to risks of crusting, scabbing, blistering, scarring, darker or lighter pigmentary change, recurrence, incomplete removal and infection.
Number Of Freeze-Thaw Cycles: 1 freeze-thaw cycle
Detail Level: Detailed
Post-Care Instructions: I reviewed with the patient in detail post-care instructions. Patient is to wear sunprotection, and avoid picking at any of the treated lesions. Pt may apply Vaseline to crusted or scabbing areas.

## 2023-03-03 NOTE — NON-PROVIDER
[facility] INTERNAL MEDICINE PROGRESS NOTE     Attending: Hector Tidwell MD  Senior Resident: Kate Villarreal MD  Eduardo Resident: Kristine Abraham MD    PATIENT: Yoli Carmichael; [id number]; 8/21/1933    SUBJECTIVE: Yoli Carmichael is a pleasant 87-year-old female who underwent an ablation with pacemaker insertion yesterday from Dr. 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 She is feeling well since the surgery but her only complaint is the tape on her shoulder over the area of her surgery that is uncomfortable. Rib pain has been adequately controlled, noting pain mainly with going to sit up.       OBJECTIVE:  Vitals:    05/13/21 0500 05/13/21 0719 05/13/21 1029 05/13/21 1129   BP: 160/56 133/57  127/62   Pulse: 81 70  77   Resp: 17 18  18   Temp: 36.6 °C (97.9 °F) 36.2 °C (97.2 °F)  36.2 °C (97.2 °F)   TempSrc: Temporal Temporal  Temporal   SpO2: 96% 90%  91%   Weight:   56.7 kg (125 lb)    Height:           PE:  General: No acute distress, resting comfortably in the chair.  HEENT: normocephalic, atraumatic, PERRLA, EOMI, mucous membranes are pink and moist. Posterior oropharynx without edema or erythema.   Skin: Surgical dressing to the left anterior chest. No surrounding erythema  Cardiovascular: Heart RRR with a holosystolic murmur. No rubs or gallops. Distal Pulses 2+ with testing of radial and DP/PT pulses  Respiratory: poor respiratory effort secondary to pain. symmetric chest expansion, lungs CTA bilaterally with no wheezes rales or rhonci  Abdomen: soft, nontender, nondistended, no masses palpated, +BS  Musculoskeletal: strength 5/5 in four extremities. No bony vertebral point tenderness. Mild, healing ecchymosis to the left paraspinal area in the lumbar spine. No overlying ecchymosis over the are of the posterolateral left chest wall.  Neuro: No focal neurologic findings. A&Ox3. Normal sensation.      LABS:  Recent Labs     05/11/21  0246 05/12/21  0322 05/13/21  0305   WBC 9.8 10.2 10.3   RBC  3.08* 3.19* 3.19*   HEMOGLOBIN 8.8* 9.2* 9.2*   HEMATOCRIT 28.4* 28.6* 29.5*   MCV 92.2 89.7 92.5   MCH 28.6 28.8 28.8   RDW 52.7* 52.3* 54.4*   PLATELETCT 196 200 193   MPV 10.6 10.1 9.9   NEUTSPOLYS 49.80 53.10 62.10   LYMPHOCYTES 31.50 28.90 21.00*   MONOCYTES 12.60 11.80 11.10   EOSINOPHILS 4.00 3.80 3.90   BASOPHILS 0.90 1.00 0.70     Recent Labs     05/11/21 0246 05/12/21 0322 05/13/21  0305   SODIUM 139 139 138   POTASSIUM 3.6 3.8 4.1   CHLORIDE 111 111 112   CO2 18* 19* 19*   BUN 19 19 20   CREATININE 1.32 1.48* 1.42*   CALCIUM 8.4* 8.7 9.0   MAGNESIUM 2.2 2.1 2.0   ALBUMIN 2.9*  --  3.0*     Estimated GFR/CRCL = Estimated Creatinine Clearance: 23.1 mL/min (A) (by C-G formula based on SCr of 1.42 mg/dL (H)).  Recent Labs     05/11/21 0246 05/12/21 0322 05/13/21  0305   GLUCOSE 104* 108* 113*     Recent Labs     05/11/21 0246 05/12/21 0322 05/13/21  0305   ASTSGOT 28  --  50*   ALTSGPT 20  --  27   TBILIRUBIN 0.5  --  0.3   ALKPHOSPHAT 56  --  72   GLOBULIN 2.6  --  2.6   INR  --  1.08  --      DX-CHEST-PORTABLE (1 VIEW)   Final Result      No pneumothorax identified status post pacemaker placement.       Cath Lab Report 5/12/2021 per Dr. Hollis:    Impressions:    1. Typical (slow/fast) AV satya reentrant tachycardia.  2. Successful catheter mediated ablation of slow AV satya pathway with elimination of AVNRT.    OP Report for Pacemaker insertion 5/12/2021 per Dr. Hollis:    IMPLANTED DEVICE INFORMATION:  Pulse generator is a LatamLeap model W3DR01  Serial # [id number]     LEAD INFORMATION:  1)Right atrial lead is a Medtronic  model #5076-45, serial # [id number],P wave 3 millivolts, threshold 1 volts, pacing impedance 798 ohms.     2)Right ventricular lead is a Medtronic model #5076-52, serial # [id number],R wave 20 millivolts, threshold 0.5 volts, pacing impedance 703 ohms.  IMPRESSIONS:  1. Successful dual-chamber pacemaker implantation      ASSESSMENT & PLAN:  This is a pleasant 87-year-old female  with a past history of PSVT, HTN, mitral valve regurgitation, hyperdynamic left ventricular systolic dysfunction, and CKD stage 3 who was admitted for observation following an episode of syncope. 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 Dr. Hollis performed a slow pathway ablation and placement of permanent pacemaker yesterday.     #Near Syncope  #PSVT  #First Degree AVB Type I (transiet at night only)  -Patient had reported feeling palpitations, lightheaded, and slightly dizzy prior to the fall  -She was tachycardic on arrival with a normal EKG  -History of PSVT, mitral regurg  -Cardiology did not feel the heart block during sleep was concerning  -POD#1 s/p slow pathway ablation and placement of permanent pacemaker.  -Device interrogation prior to hospital discharge, hopefully later today    #Multiple rib fractures of the left sided chest with associated traumatic rhabdomyolysis  -Rib x-ray at urgent care showed acute fracture of left posterior lateral ribs 5-8 with no evidence of pneumothorax  -Pain contro (mild to severe scale)l: lidoderm, tylenol, Robaxin, oxycodone, morphine  -Patient received oxycodone and felt it was too strong, will advise use of less potent analgesia in future  -CPK is back down to normal levels  -Encourage IS usage  -PT/OT saw the patient yesterday and are recommending a front wheel walker and 3xper week therapy  -Patient to receive home health for this  -Continue IS at home    #CKD/BRIAN  -Cr hback to 1.42 today  -F/U with primary care for continued management.       Rory James, MS3  [employer] Class of 2022     Age appropriate

## 2023-04-11 ENCOUNTER — HOSPITAL ENCOUNTER (OUTPATIENT)
Dept: RADIOLOGY | Facility: MEDICAL CENTER | Age: 88
End: 2023-04-11
Attending: NURSE PRACTITIONER
Payer: MEDICARE

## 2023-04-11 ENCOUNTER — APPOINTMENT (RX ONLY)
Dept: URBAN - METROPOLITAN AREA CLINIC 22 | Facility: CLINIC | Age: 88
Setting detail: DERMATOLOGY
End: 2023-04-11

## 2023-04-11 DIAGNOSIS — M81.0 AGE-RELATED OSTEOPOROSIS WITHOUT CURRENT PATHOLOGICAL FRACTURE: ICD-10-CM

## 2023-04-11 PROBLEM — C44.629 SQUAMOUS CELL CARCINOMA OF SKIN OF LEFT UPPER LIMB, INCLUDING SHOULDER: Status: ACTIVE | Noted: 2023-04-11

## 2023-04-11 PROCEDURE — 77080 DXA BONE DENSITY AXIAL: CPT

## 2023-04-11 PROCEDURE — ? EXCISION

## 2023-04-11 PROCEDURE — 11602 EXC TR-EXT MAL+MARG 1.1-2 CM: CPT

## 2023-04-11 PROCEDURE — 12031 INTMD RPR S/A/T/EXT 2.5 CM/<: CPT

## 2023-04-11 NOTE — PROCEDURE: EXCISION
Double M-Plasty Intermediate Repair Preamble Text (Leave Blank If You Do Not Want): Undermining was performed with blunt dissection.
Dressing: pressure dressing with telfa
Complex Repair And O-T Advancement Flap Text: The defect edges were debeveled with a #15 scalpel blade.  The primary defect was closed partially with a complex linear closure.  Given the location of the remaining defect, shape of the defect and the proximity to free margins an O-T advancement flap was deemed most appropriate for complete closure of the defect.  Using a sterile surgical marker, an appropriate advancement flap was drawn incorporating the defect and placing the expected incisions within the relaxed skin tension lines where possible.    The area thus outlined was incised deep to adipose tissue with a #15 scalpel blade.  The skin margins were undermined to an appropriate distance in all directions utilizing iris scissors.
Trilobed Flap Text: The defect edges were debeveled with a #15 scalpel blade.  Given the location of the defect and the proximity to free margins a trilobed flap was deemed most appropriate.  Using a sterile surgical marker, an appropriate trilobed flap drawn around the defect.    The area thus outlined was incised deep to adipose tissue with a #15 scalpel blade.  The skin margins were undermined to an appropriate distance in all directions utilizing iris scissors.
Rotation Flap Text: The defect edges were debeveled with a #15 scalpel blade.  Given the location of the defect, shape of the defect and the proximity to free margins a rotation flap was deemed most appropriate.  Using a sterile surgical marker, an appropriate rotation flap was drawn incorporating the defect and placing the expected incisions within the relaxed skin tension lines where possible.    The area thus outlined was incised deep to adipose tissue with a #15 scalpel blade.  The skin margins were undermined to an appropriate distance in all directions utilizing iris scissors.
Detail Level: Detailed
Show Repair Surgeon Variable: Yes
Size Of Margin In Cm: 0.3
Burow's Advancement Flap Text: The defect edges were debeveled with a #15 scalpel blade.  Given the location of the defect and the proximity to free margins a Burow's advancement flap was deemed most appropriate.  Using a sterile surgical marker, the appropriate advancement flap was drawn incorporating the defect and placing the expected incisions within the relaxed skin tension lines where possible.    The area thus outlined was incised deep to adipose tissue with a #15 scalpel blade.  The skin margins were undermined to an appropriate distance in all directions utilizing iris scissors.
Complex Repair And Epidermal Autograft Text: The defect edges were debeveled with a #15 scalpel blade.  The primary defect was closed partially with a complex linear closure.  Given the location of the defect, shape of the defect and the proximity to free margins an epidermal autograft was deemed most appropriate to repair the remaining defect.  The graft was trimmed to fit the size of the remaining defect.  The graft was then placed in the primary defect, oriented appropriately, and sutured into place.
Muscle Hinge Flap Text: The defect edges were debeveled with a #15 scalpel blade.  Given the size, depth and location of the defect and the proximity to free margins a muscle hinge flap was deemed most appropriate.  Using a sterile surgical marker, an appropriate hinge flap was drawn incorporating the defect. The area thus outlined was incised with a #15 scalpel blade.  The skin margins were undermined to an appropriate distance in all directions utilizing iris scissors.
Lab Facility: 
Complex Repair And Split-Thickness Skin Graft Text: The defect edges were debeveled with a #15 scalpel blade.  The primary defect was closed partially with a complex linear closure.  Given the location of the defect, shape of the defect and the proximity to free margins a split thickness skin graft was deemed most appropriate to repair the remaining defect.  The graft was trimmed to fit the size of the remaining defect.  The graft was then placed in the primary defect, oriented appropriately, and sutured into place.
A-T Advancement Flap Text: The defect edges were debeveled with a #15 scalpel blade.  Given the location of the defect, shape of the defect and the proximity to free margins an A-T advancement flap was deemed most appropriate.  Using a sterile surgical marker, an appropriate advancement flap was drawn incorporating the defect and placing the expected incisions within the relaxed skin tension lines where possible.    The area thus outlined was incised deep to adipose tissue with a #15 scalpel blade.  The skin margins were undermined to an appropriate distance in all directions utilizing iris scissors.
Excision Depth: adipose tissue
Graft Donor Site Will Heal By Secondary Intention: No
Post-Care Instructions: I reviewed with the patient in detail post-care instructions. Patient is not to engage in any heavy lifting, exercise, or swimming for the next 14 days. Should the patient develop any fevers, chills, expanding redness, bleeding, purulent drainage, severe pain patient will contact the office immediately.
Complex Repair And Single Advancement Flap Text: The defect edges were debeveled with a #15 scalpel blade.  The primary defect was closed partially with a complex linear closure.  Given the location of the remaining defect, shape of the defect and the proximity to free margins a single advancement flap was deemed most appropriate for complete closure of the defect.  Using a sterile surgical marker, an appropriate advancement flap was drawn incorporating the defect and placing the expected incisions within the relaxed skin tension lines where possible.    The area thus outlined was incised deep to adipose tissue with a #15 scalpel blade.  The skin margins were undermined to an appropriate distance in all directions utilizing iris scissors.
V-Y Flap Text: The defect edges were debeveled with a #15 scalpel blade.  Given the location of the defect, shape of the defect and the proximity to free margins a V-Y flap was deemed most appropriate.  Using a sterile surgical marker, an appropriate advancement flap was drawn incorporating the defect and placing the expected incisions within the relaxed skin tension lines where possible.    The area thus outlined was incised deep to adipose tissue with a #15 scalpel blade.  The skin margins were undermined to an appropriate distance in all directions utilizing iris scissors.
Previous Accession (Optional): [id number]
Complex Repair And Transposition Flap Text: The defect edges were debeveled with a #15 scalpel blade.  The primary defect was closed partially with a complex linear closure.  Given the location of the remaining defect, shape of the defect and the proximity to free margins a transposition flap was deemed most appropriate for complete closure of the defect.  Using a sterile surgical marker, an appropriate advancement flap was drawn incorporating the defect and placing the expected incisions within the relaxed skin tension lines where possible.    The area thus outlined was incised deep to adipose tissue with a #15 scalpel blade.  The skin margins were undermined to an appropriate distance in all directions utilizing iris scissors.
Scalpel Size: 15 blade
Dorsal Nasal Flap Text: The defect edges were debeveled with a #15 scalpel blade.  Given the location of the defect and the proximity to free margins a dorsal nasal flap was deemed most appropriate.  Using a sterile surgical marker, an appropriate dorsal nasal flap was drawn around the defect.    The area thus outlined was incised deep to adipose tissue with a #15 scalpel blade.  The skin margins were undermined to an appropriate distance in all directions utilizing iris scissors.
Modified Advancement Flap Text: The defect edges were debeveled with a #15 scalpel blade.  Given the location of the defect, shape of the defect and the proximity to free margins a modified advancement flap was deemed most appropriate.  Using a sterile surgical marker, an appropriate advancement flap was drawn incorporating the defect and placing the expected incisions within the relaxed skin tension lines where possible.    The area thus outlined was incised deep to adipose tissue with a #15 scalpel blade.  The skin margins were undermined to an appropriate distance in all directions utilizing iris scissors.
Complex Repair And Double Advancement Flap Text: The defect edges were debeveled with a #15 scalpel blade.  The primary defect was closed partially with a complex linear closure.  Given the location of the remaining defect, shape of the defect and the proximity to free margins a double advancement flap was deemed most appropriate for complete closure of the defect.  Using a sterile surgical marker, an appropriate advancement flap was drawn incorporating the defect and placing the expected incisions within the relaxed skin tension lines where possible.    The area thus outlined was incised deep to adipose tissue with a #15 scalpel blade.  The skin margins were undermined to an appropriate distance in all directions utilizing iris scissors.
Complex Repair And Skin Substitute Graft Text: The defect edges were debeveled with a #15 scalpel blade.  The primary defect was closed partially with a complex linear closure.  Given the location of the remaining defect, shape of the defect and the proximity to free margins a skin substitute graft was deemed most appropriate to repair the remaining defect.  The graft was trimmed to fit the size of the remaining defect.  The graft was then placed in the primary defect, oriented appropriately, and sutured into place.
Lab: [id number]
Complex Repair And Rotation Flap Text: The defect edges were debeveled with a #15 scalpel blade.  The primary defect was closed partially with a complex linear closure.  Given the location of the remaining defect, shape of the defect and the proximity to free margins a rotation flap was deemed most appropriate for complete closure of the defect.  Using a sterile surgical marker, an appropriate advancement flap was drawn incorporating the defect and placing the expected incisions within the relaxed skin tension lines where possible.    The area thus outlined was incised deep to adipose tissue with a #15 scalpel blade.  The skin margins were undermined to an appropriate distance in all directions utilizing iris scissors.
Dermal Closure: simple interrupted
Cheek-To-Nose Interpolation Flap Text: A decision was made to reconstruct the defect utilizing an interpolation axial flap and a staged reconstruction.  A telfa template was made of the defect.  This telfa template was then used to outline the Cheek-To-Nose Interpolation flap.  The donor area for the pedicle flap was then injected with anesthesia.  The flap was excised through the skin and subcutaneous tissue down to the layer of the underlying musculature.  The interpolation flap was carefully excised within this deep plane to maintain its blood supply.  The edges of the donor site were undermined.   The donor site was closed in a primary fashion.  The pedicle was then rotated into position and sutured.  Once the tube was sutured into place, adequate blood supply was confirmed with blanching and refill.  The pedicle was then wrapped with xeroform gauze and dressed appropriately with a telfa and gauze bandage to ensure continued blood supply and protect the attached pedicle.
Curvilinear Excision Additional Text (Leave Blank If You Do Not Want): The margin was drawn around the clinically apparent lesion.  A curvilinear shape was then drawn on the skin incorporating the lesion and margins.  Incisions were then made along these lines to the appropriate tissue plane and the lesion was extirpated.
Complex Repair And V-Y Plasty Text: The defect edges were debeveled with a #15 scalpel blade.  The primary defect was closed partially with a complex linear closure.  Given the location of the remaining defect, shape of the defect and the proximity to free margins a V-Y plasty was deemed most appropriate for complete closure of the defect.  Using a sterile surgical marker, an appropriate advancement flap was drawn incorporating the defect and placing the expected incisions within the relaxed skin tension lines where possible.    The area thus outlined was incised deep to adipose tissue with a #15 scalpel blade.  The skin margins were undermined to an appropriate distance in all directions utilizing iris scissors.
Island Pedicle Flap-Requiring Vessel Identification Text: The defect edges were debeveled with a #15 scalpel blade.  Given the location of the defect, shape of the defect and the proximity to free margins an island pedicle advancement flap was deemed most appropriate.  Using a sterile surgical marker, an appropriate advancement flap was drawn, based on the axial vessel mentioned above, incorporating the defect, outlining the appropriate donor tissue and placing the expected incisions within the relaxed skin tension lines where possible.    The area thus outlined was incised deep to adipose tissue with a #15 scalpel blade.  The skin margins were undermined to an appropriate distance in all directions around the primary defect and laterally outward around the island pedicle utilizing iris scissors.  There was minimal undermining beneath the pedicle flap.
Bilobed Transposition Flap Text: The defect edges were debeveled with a #15 scalpel blade.  Given the location of the defect and the proximity to free margins a bilobed transposition flap was deemed most appropriate.  Using a sterile surgical marker, an appropriate bilobe flap drawn around the defect.    The area thus outlined was incised deep to adipose tissue with a #15 scalpel blade.  The skin margins were undermined to an appropriate distance in all directions utilizing iris scissors.
Posterior Auricular Interpolation Flap Text: A decision was made to reconstruct the defect utilizing an interpolation axial flap and a staged reconstruction.  A telfa template was made of the defect.  This telfa template was then used to outline the posterior auricular interpolation flap.  The donor area for the pedicle flap was then injected with anesthesia.  The flap was excised through the skin and subcutaneous tissue down to the layer of the underlying musculature.  The pedicle flap was carefully excised within this deep plane to maintain its blood supply.  The edges of the donor site were undermined.   The donor site was closed in a primary fashion.  The pedicle was then rotated into position and sutured.  Once the tube was sutured into place, adequate blood supply was confirmed with blanching and refill.  The pedicle was then wrapped with xeroform gauze and dressed appropriately with a telfa and gauze bandage to ensure continued blood supply and protect the attached pedicle.
Complex Repair And Z Plasty Text: The defect edges were debeveled with a #15 scalpel blade.  The primary defect was closed partially with a complex linear closure.  Given the location of the remaining defect, shape of the defect and the proximity to free margins a Z plasty was deemed most appropriate for complete closure of the defect.  Using a sterile surgical marker, an appropriate advancement flap was drawn incorporating the defect and placing the expected incisions within the relaxed skin tension lines where possible.    The area thus outlined was incised deep to adipose tissue with a #15 scalpel blade.  The skin margins were undermined to an appropriate distance in all directions utilizing iris scissors.
Perilesional Excision Additional Text (Leave Blank If You Do Not Want): The margin was drawn around the clinically apparent lesion. Incisions were then made along these lines to the appropriate tissue plane and the lesion was extirpated.
M-Plasty Complex Repair Preamble Text (Leave Blank If You Do Not Want): Extensive wide undermining was performed.
Estimated Blood Loss (Cc): minimal
Ftsg Text: The defect edges were debeveled with a #15 scalpel blade.  Given the location of the defect, shape of the defect and the proximity to free margins a full thickness skin graft was deemed most appropriate.  Using a sterile surgical marker, the primary defect shape was transferred to the donor site. The area thus outlined was incised deep to adipose tissue with a #15 scalpel blade.  The harvested graft was then trimmed of adipose tissue until only dermis and epidermis was left.  The skin margins of the secondary defect were undermined to an appropriate distance in all directions utilizing iris scissors.  The secondary defect was closed with interrupted buried subcutaneous sutures.  The skin edges were then re-apposed with running  sutures.  The skin graft was then placed in the primary defect and oriented appropriately.
Saucerization Excision Additional Text (Leave Blank If You Do Not Want): The margin was drawn around the clinically apparent lesion.  Incisions were then made along these lines, in a tangential fashion, to the appropriate tissue plane and the lesion was extirpated.
Complex Repair And Ftsg Text: The defect edges were debeveled with a #15 scalpel blade.  The primary defect was closed partially with a complex linear closure.  Given the location of the defect, shape of the defect and the proximity to free margins a full thickness skin graft was deemed most appropriate to repair the remaining defect.  The graft was trimmed to fit the size of the remaining defect.  The graft was then placed in the primary defect, oriented appropriately, and sutured into place.
No Repair - Repaired With Adjacent Surgical Defect Text (Leave Blank If You Do Not Want): After the excision the defect was repaired concurrently with another surgical defect which was in close approximation.
Interpolation Flap Text: A decision was made to reconstruct the defect utilizing an interpolation axial flap and a staged reconstruction.  A telfa template was made of the defect.  This telfa template was then used to outline the interpolation flap.  The donor area for the pedicle flap was then injected with anesthesia.  The flap was excised through the skin and subcutaneous tissue down to the layer of the underlying musculature.  The interpolation flap was carefully excised within this deep plane to maintain its blood supply.  The edges of the donor site were undermined.   The donor site was closed in a primary fashion.  The pedicle was then rotated into position and sutured.  Once the tube was sutured into place, adequate blood supply was confirmed with blanching and refill.  The pedicle was then wrapped with xeroform gauze and dressed appropriately with a telfa and gauze bandage to ensure continued blood supply and protect the attached pedicle.
W Plasty Text: The lesion was extirpated to the level of the fat with a #15 scalpel blade.  Given the location of the defect, shape of the defect and the proximity to free margins a W-plasty was deemed most appropriate for repair.  Using a sterile surgical marker, the appropriate transposition arms of the W-plasty were drawn incorporating the defect and placing the expected incisions within the relaxed skin tension lines where possible.    The area thus outlined was incised deep to adipose tissue with a #15 scalpel blade.  The skin margins were undermined to an appropriate distance in all directions utilizing iris scissors.  The opposing transposition arms were then transposed into place in opposite direction and anchored with interrupted buried subcutaneous sutures.
O-L Flap Text: The defect edges were debeveled with a #15 scalpel blade.  Given the location of the defect, shape of the defect and the proximity to free margins an O-L flap was deemed most appropriate.  Using a sterile surgical marker, an appropriate advancement flap was drawn incorporating the defect and placing the expected incisions within the relaxed skin tension lines where possible.    The area thus outlined was incised deep to adipose tissue with a #15 scalpel blade.  The skin margins were undermined to an appropriate distance in all directions utilizing iris scissors.
Advancement Flap (Double) Text: The defect edges were debeveled with a #15 scalpel blade.  Given the location of the defect and the proximity to free margins a double advancement flap was deemed most appropriate.  Using a sterile surgical marker, the appropriate advancement flaps were drawn incorporating the defect and placing the expected incisions within the relaxed skin tension lines where possible.    The area thus outlined was incised deep to adipose tissue with a #15 scalpel blade.  The skin margins were undermined to an appropriate distance in all directions utilizing iris scissors.
Partial Purse String (Simple) Text: Given the location of the defect and the characteristics of the surrounding skin a simple purse string closure was deemed most appropriate.  Undermining was performed circumferentially around the surgical defect.  A purse string suture was then placed and tightened. Wound tension of the circular defect prevented complete closure of the wound.
Complex Repair And W Plasty Text: The defect edges were debeveled with a #15 scalpel blade.  The primary defect was closed partially with a complex linear closure.  Given the location of the remaining defect, shape of the defect and the proximity to free margins a W plasty was deemed most appropriate for complete closure of the defect.  Using a sterile surgical marker, an appropriate advancement flap was drawn incorporating the defect and placing the expected incisions within the relaxed skin tension lines where possible.    The area thus outlined was incised deep to adipose tissue with a #15 scalpel blade.  The skin margins were undermined to an appropriate distance in all directions utilizing iris scissors.
Secondary Defect Width (In Cm): 0
Partial Purse String (Intermediate) Text: Given the location of the defect and the characteristics of the surrounding skin an intermediate purse string closure was deemed most appropriate.  Undermining was performed circumferentially around the surgical defect.  A purse string suture was then placed and tightened. Wound tension of the circular defect prevented complete closure of the wound.
Helical Rim Advancement Flap Text: The defect edges were debeveled with a #15 blade scalpel.  Given the location of the defect and the proximity to free margins (helical rim) a double helical rim advancement flap was deemed most appropriate.  Using a sterile surgical marker, the appropriate advancement flaps were drawn incorporating the defect and placing the expected incisions between the helical rim and antihelix where possible.  The area thus outlined was incised through and through with a #15 scalpel blade.  With a skin hook and iris scissors, the flaps were gently and sharply undermined and freed up.
Complex Repair And M Plasty Text: The defect edges were debeveled with a #15 scalpel blade.  The primary defect was closed partially with a complex linear closure.  Given the location of the remaining defect, shape of the defect and the proximity to free margins an M plasty was deemed most appropriate for complete closure of the defect.  Using a sterile surgical marker, an appropriate advancement flap was drawn incorporating the defect and placing the expected incisions within the relaxed skin tension lines where possible.    The area thus outlined was incised deep to adipose tissue with a #15 scalpel blade.  The skin margins were undermined to an appropriate distance in all directions utilizing iris scissors.
Complex Repair And O-L Flap Text: The defect edges were debeveled with a #15 scalpel blade.  The primary defect was closed partially with a complex linear closure.  Given the location of the remaining defect, shape of the defect and the proximity to free margins an O-L flap was deemed most appropriate for complete closure of the defect.  Using a sterile surgical marker, an appropriate flap was drawn incorporating the defect and placing the expected incisions within the relaxed skin tension lines where possible.    The area thus outlined was incised deep to adipose tissue with a #15 scalpel blade.  The skin margins were undermined to an appropriate distance in all directions utilizing iris scissors.
Slit Excision Additional Text (Leave Blank If You Do Not Want): A linear line was drawn on the skin overlying the lesion. An incision was made slowly until the lesion was visualized.  Once visualized, the lesion was removed with blunt dissection.
Dermal Autograft Text: The defect edges were debeveled with a #15 scalpel blade.  Given the location of the defect, shape of the defect and the proximity to free margins a dermal autograft was deemed most appropriate.  Using a sterile surgical marker, the primary defect shape was transferred to the donor site. The area thus outlined was incised deep to adipose tissue with a #15 scalpel blade.  The harvested graft was then trimmed of adipose and epidermal tissue until only dermis was left.  The skin graft was then placed in the primary defect and oriented appropriately.
Complex Repair And Bilobe Flap Text: The defect edges were debeveled with a #15 scalpel blade.  The primary defect was closed partially with a complex linear closure.  Given the location of the remaining defect, shape of the defect and the proximity to free margins a bilobe flap was deemed most appropriate for complete closure of the defect.  Using a sterile surgical marker, an appropriate advancement flap was drawn incorporating the defect and placing the expected incisions within the relaxed skin tension lines where possible.    The area thus outlined was incised deep to adipose tissue with a #15 scalpel blade.  The skin margins were undermined to an appropriate distance in all directions utilizing iris scissors.
Wound Care: Petrolatum
Complex Repair And Melolabial Flap Text: The defect edges were debeveled with a #15 scalpel blade.  The primary defect was closed partially with a complex linear closure.  Given the location of the remaining defect, shape of the defect and the proximity to free margins a melolabial flap was deemed most appropriate for complete closure of the defect.  Using a sterile surgical marker, an appropriate advancement flap was drawn incorporating the defect and placing the expected incisions within the relaxed skin tension lines where possible.    The area thus outlined was incised deep to adipose tissue with a #15 scalpel blade.  The skin margins were undermined to an appropriate distance in all directions utilizing iris scissors.
Suture Removal: 14 days
Graft Donor Site Bandage (Optional-Leave Blank If You Don't Want In Note): Steri-strips and a pressure bandage were applied to the donor site.
Alar Island Pedicle Flap Text: The defect edges were debeveled with a #15 scalpel blade.  Given the location of the defect, shape of the defect and the proximity to the alar rim an island pedicle advancement flap was deemed most appropriate.  Using a sterile surgical marker, an appropriate advancement flap was drawn incorporating the defect, outlining the appropriate donor tissue and placing the expected incisions within the nasal ala running parallel to the alar rim. The area thus outlined was incised with a #15 scalpel blade.  The skin margins were undermined minimally to an appropriate distance in all directions around the primary defect and laterally outward around the island pedicle utilizing iris scissors.  There was minimal undermining beneath the pedicle flap.
Pre-Excision Curettage Text (Leave Blank If You Do Not Want): Prior to drawing the surgical margin the visible lesion was removed with electrodesiccation and curettage to clearly define the lesion size.
Complex Repair And Tissue Cultured Epidermal Autograft Text: The defect edges were debeveled with a #15 scalpel blade.  The primary defect was closed partially with a complex linear closure.  Given the location of the defect, shape of the defect and the proximity to free margins an tissue cultured epidermal autograft was deemed most appropriate to repair the remaining defect.  The graft was trimmed to fit the size of the remaining defect.  The graft was then placed in the primary defect, oriented appropriately, and sutured into place.
Fusiform Excision Additional Text (Leave Blank If You Do Not Want): The margin was drawn around the clinically apparent lesion.  A fusiform shape was then drawn on the skin incorporating the lesion and margins.  Incisions were then made along these lines to the appropriate tissue plane and the lesion was extirpated.
Island Pedicle Flap Text: The defect edges were debeveled with a #15 scalpel blade.  Given the location of the defect, shape of the defect and the proximity to free margins an island pedicle advancement flap was deemed most appropriate.  Using a sterile surgical marker, an appropriate advancement flap was drawn incorporating the defect, outlining the appropriate donor tissue and placing the expected incisions within the relaxed skin tension lines where possible.    The area thus outlined was incised deep to adipose tissue with a #15 scalpel blade.  The skin margins were undermined to an appropriate distance in all directions around the primary defect and laterally outward around the island pedicle utilizing iris scissors.  There was minimal undermining beneath the pedicle flap.
Advancement Flap (Single) Text: The defect edges were debeveled with a #15 scalpel blade.  Given the location of the defect and the proximity to free margins a single advancement flap was deemed most appropriate.  Using a sterile surgical marker, an appropriate advancement flap was drawn incorporating the defect and placing the expected incisions within the relaxed skin tension lines where possible.    The area thus outlined was incised deep to adipose tissue with a #15 scalpel blade.  The skin margins were undermined to an appropriate distance in all directions utilizing iris scissors.
Bi-Rhombic Flap Text: The defect edges were debeveled with a #15 scalpel blade.  Given the location of the defect and the proximity to free margins a bi-rhombic flap was deemed most appropriate.  Using a sterile surgical marker, an appropriate rhombic flap was drawn incorporating the defect. The area thus outlined was incised deep to adipose tissue with a #15 scalpel blade.  The skin margins were undermined to an appropriate distance in all directions utilizing iris scissors.
Purse String (Simple) Text: Given the location of the defect and the characteristics of the surrounding skin a purse string simple closure was deemed most appropriate.  Undermining was performed circumferentially around the surgical defect.  A purse string suture was then placed and tightened.
Deep Sutures: 4-0 Maxon
Path Notes (To The Dermatopathologist): Please check margins.
Cartilage Graft Text: The defect edges were debeveled with a #15 scalpel blade.  Given the location of the defect, shape of the defect, the fact the defect involved a full thickness cartilage defect a cartilage graft was deemed most appropriate.  An appropriate donor site was identified, cleansed, and anesthetized. The cartilage graft was then harvested and transferred to the recipient site, oriented appropriately and then sutured into place.  The secondary defect was then repaired using a primary closure.
Hemostasis: Hot cautery
Ear Star Wedge Flap Text: The defect edges were debeveled with a #15 blade scalpel.  Given the location of the defect and the proximity to free margins (helical rim) an ear star wedge flap was deemed most appropriate.  Using a sterile surgical marker, the appropriate flap was drawn incorporating the defect and placing the expected incisions between the helical rim and antihelix where possible.  The area thus outlined was incised through and through with a #15 scalpel blade.
Purse String (Intermediate) Text: Given the location of the defect and the characteristics of the surrounding skin a purse string intermediate closure was deemed most appropriate.  Undermining was performed circumfirentially around the surgical defect.  A purse string suture was then placed and tightened.
Complex Repair And Dorsal Nasal Flap Text: The defect edges were debeveled with a #15 scalpel blade.  The primary defect was closed partially with a complex linear closure.  Given the location of the remaining defect, shape of the defect and the proximity to free margins a dorsal nasal flap was deemed most appropriate for complete closure of the defect.  Using a sterile surgical marker, an appropriate flap was drawn incorporating the defect and placing the expected incisions within the relaxed skin tension lines where possible.    The area thus outlined was incised deep to adipose tissue with a #15 scalpel blade.  The skin margins were undermined to an appropriate distance in all directions utilizing iris scissors.
Complex Repair And Dermal Autograft Text: The defect edges were debeveled with a #15 scalpel blade.  The primary defect was closed partially with a complex linear closure.  Given the location of the defect, shape of the defect and the proximity to free margins an dermal autograft was deemed most appropriate to repair the remaining defect.  The graft was trimmed to fit the size of the remaining defect.  The graft was then placed in the primary defect, oriented appropriately, and sutured into place.
O-T Advancement Flap Text: The defect edges were debeveled with a #15 scalpel blade.  Given the location of the defect, shape of the defect and the proximity to free margins an O-T advancement flap was deemed most appropriate.  Using a sterile surgical marker, an appropriate advancement flap was drawn incorporating the defect and placing the expected incisions within the relaxed skin tension lines where possible.    The area thus outlined was incised deep to adipose tissue with a #15 scalpel blade.  The skin margins were undermined to an appropriate distance in all directions utilizing iris scissors.
Complex Repair And A-T Advancement Flap Text: The defect edges were debeveled with a #15 scalpel blade.  The primary defect was closed partially with a complex linear closure.  Given the location of the remaining defect, shape of the defect and the proximity to free margins an A-T advancement flap was deemed most appropriate for complete closure of the defect.  Using a sterile surgical marker, an appropriate advancement flap was drawn incorporating the defect and placing the expected incisions within the relaxed skin tension lines where possible.    The area thus outlined was incised deep to adipose tissue with a #15 scalpel blade.  The skin margins were undermined to an appropriate distance in all directions utilizing iris scissors.
Epidermal Autograft Text: The defect edges were debeveled with a #15 scalpel blade.  Given the location of the defect, shape of the defect and the proximity to free margins an epidermal autograft was deemed most appropriate.  Using a sterile surgical marker, the primary defect shape was transferred to the donor site. The epidermal graft was then harvested.  The skin graft was then placed in the primary defect and oriented appropriately.
Split-Thickness Skin Graft Text: The defect edges were debeveled with a #15 scalpel blade.  Given the location of the defect, shape of the defect and the proximity to free margins a split thickness skin graft was deemed most appropriate.  Using a sterile surgical marker, the primary defect shape was transferred to the donor site. The split thickness graft was then harvested.  The skin graft was then placed in the primary defect and oriented appropriately.
Rhombic Flap Text: The defect edges were debeveled with a #15 scalpel blade.  Given the location of the defect and the proximity to free margins a rhombic flap was deemed most appropriate.  Using a sterile surgical marker, an appropriate rhombic flap was drawn incorporating the defect.    The area thus outlined was incised deep to adipose tissue with a #15 scalpel blade.  The skin margins were undermined to an appropriate distance in all directions utilizing iris scissors.
Mastoid Interpolation Flap Text: A decision was made to reconstruct the defect utilizing an interpolation axial flap and a staged reconstruction.  A telfa template was made of the defect.  This telfa template was then used to outline the mastoid interpolation flap.  The donor area for the pedicle flap was then injected with anesthesia.  The flap was excised through the skin and subcutaneous tissue down to the layer of the underlying musculature.  The pedicle flap was carefully excised within this deep plane to maintain its blood supply.  The edges of the donor site were undermined.   The donor site was closed in a primary fashion.  The pedicle was then rotated into position and sutured.  Once the tube was sutured into place, adequate blood supply was confirmed with blanching and refill.  The pedicle was then wrapped with xeroform gauze and dressed appropriately with a telfa and gauze bandage to ensure continued blood supply and protect the attached pedicle.
Anesthesia Volume In Cc: 3
Xenograft Text: The defect edges were debeveled with a #15 scalpel blade.  Given the location of the defect, shape of the defect and the proximity to free margins a xenograft was deemed most appropriate.  The graft was then trimmed to fit the size of the defect.  The graft was then placed in the primary defect and oriented appropriately.
Mucosal Advancement Flap Text: Given the location of the defect, shape of the defect and the proximity to free margins a mucosal advancement flap was deemed most appropriate. Incisions were made with a 15 blade scalpel in the appropriate fashion along the cutaneous vermilion border and the mucosal lip. The remaining actinically damaged mucosal tissue was excised.  The mucosal advancement flap was then elevated to the gingival sulcus with care taken to preserve the neurovascular structures and advanced into the primary defect. Care was taken to ensure that precise realignment of the vermilion border was achieved.
S Plasty Text: Given the location and shape of the defect, and the orientation of relaxed skin tension lines, an S-plasty was deemed most appropriate for repair.  Using a sterile surgical marker, the appropriate outline of the S-plasty was drawn, incorporating the defect and placing the expected incisions within the relaxed skin tension lines where possible.  The area thus outlined was incised deep to adipose tissue with a #15 scalpel blade.  The skin margins were undermined to an appropriate distance in all directions utilizing iris scissors. The skin flaps were advanced over the defect.  The opposing margins were then approximated with interrupted buried subcutaneous sutures.
V-Y Plasty Text: The defect edges were debeveled with a #15 scalpel blade.  Given the location of the defect, shape of the defect and the proximity to free margins an V-Y advancement flap was deemed most appropriate.  Using a sterile surgical marker, an appropriate advancement flap was drawn incorporating the defect and placing the expected incisions within the relaxed skin tension lines where possible.    The area thus outlined was incised deep to adipose tissue with a #15 scalpel blade.  The skin margins were undermined to an appropriate distance in all directions utilizing iris scissors.
Composite Graft Text: The defect edges were debeveled with a #15 scalpel blade.  Given the location of the defect, shape of the defect, the proximity to free margins and the fact the defect was full thickness a composite graft was deemed most appropriate.  The defect was outline and then transferred to the donor site.  A full thickness graft was then excised from the donor site. The graft was then placed in the primary defect, oriented appropriately and then sutured into place.  The secondary defect was then repaired using a primary closure.
Bilobed Flap Text: The defect edges were debeveled with a #15 scalpel blade.  Given the location of the defect and the proximity to free margins a bilobe flap was deemed most appropriate.  Using a sterile surgical marker, an appropriate bilobe flap drawn around the defect.    The area thus outlined was incised deep to adipose tissue with a #15 scalpel blade.  The skin margins were undermined to an appropriate distance in all directions utilizing iris scissors.
Spiral Flap Text: The defect edges were debeveled with a #15 scalpel blade.  Given the location of the defect, shape of the defect and the proximity to free margins a spiral flap was deemed most appropriate.  Using a sterile surgical marker, an appropriate rotation flap was drawn incorporating the defect and placing the expected incisions within the relaxed skin tension lines where possible. The area thus outlined was incised deep to adipose tissue with a #15 scalpel blade.  The skin margins were undermined to an appropriate distance in all directions utilizing iris scissors.
Z Plasty Text: The lesion was extirpated to the level of the fat with a #15 scalpel blade.  Given the location of the defect, shape of the defect and the proximity to free margins a Z-plasty was deemed most appropriate for repair.  Using a sterile surgical marker, the appropriate transposition arms of the Z-plasty were drawn incorporating the defect and placing the expected incisions within the relaxed skin tension lines where possible.    The area thus outlined was incised deep to adipose tissue with a #15 scalpel blade.  The skin margins were undermined to an appropriate distance in all directions utilizing iris scissors.  The opposing transposition arms were then transposed into place in opposite direction and anchored with interrupted buried subcutaneous sutures.
Excisional Biopsy Additional Text (Leave Blank If You Do Not Want): The margin was drawn around the clinically apparent lesion. An elliptical shape was then drawn on the skin incorporating the lesion and margins.  Incisions were then made along these lines to the appropriate tissue plane and the lesion was extirpated.
Anesthesia Type: 0.5% bupivacaine with 1:200,000 epinephrine
Mercedes Flap Text: The defect edges were debeveled with a #15 scalpel blade.  Given the location of the defect, shape of the defect and the proximity to free margins a Mercedes flap was deemed most appropriate.  Using a sterile surgical marker, an appropriate advancement flap was drawn incorporating the defect and placing the expected incisions within the relaxed skin tension lines where possible. The area thus outlined was incised deep to adipose tissue with a #15 scalpel blade.  The skin margins were undermined to an appropriate distance in all directions utilizing iris scissors.
Star Wedge Flap Text: The defect edges were debeveled with a #15 scalpel blade.  Given the location of the defect, shape of the defect and the proximity to free margins a star wedge flap was deemed most appropriate.  Using a sterile surgical marker, an appropriate rotation flap was drawn incorporating the defect and placing the expected incisions within the relaxed skin tension lines where possible. The area thus outlined was incised deep to adipose tissue with a #15 scalpel blade.  The skin margins were undermined to an appropriate distance in all directions utilizing iris scissors.
Banner Transposition Flap Text: The defect edges were debeveled with a #15 scalpel blade.  Given the location of the defect and the proximity to free margins a Banner transposition flap was deemed most appropriate.  Using a sterile surgical marker, an appropriate flap drawn around the defect. The area thus outlined was incised deep to adipose tissue with a #15 scalpel blade.  The skin margins were undermined to an appropriate distance in all directions utilizing iris scissors.
Repair Performed By Another Provider Text (Leave Blank If You Do Not Want): After the tissue was excised the defect was repaired by another provider.
Home Suture Removal Text: Patient was provided a home suture removal kit and will remove their sutures at home.  If they have any questions or difficulties they will call the office.
Date Of Previous Biopsy (Optional): 03/02/2023
Double Island Pedicle Flap Text: The defect edges were debeveled with a #15 scalpel blade.  Given the location of the defect, shape of the defect and the proximity to free margins a double island pedicle advancement flap was deemed most appropriate.  Using a sterile surgical marker, an appropriate advancement flap was drawn incorporating the defect, outlining the appropriate donor tissue and placing the expected incisions within the relaxed skin tension lines where possible.    The area thus outlined was incised deep to adipose tissue with a #15 scalpel blade.  The skin margins were undermined to an appropriate distance in all directions around the primary defect and laterally outward around the island pedicle utilizing iris scissors.  There was minimal undermining beneath the pedicle flap.
Repair Type: Intermediate
Excision Method: Elliptical
Complex Repair And Double M Plasty Text: The defect edges were debeveled with a #15 scalpel blade.  The primary defect was closed partially with a complex linear closure.  Given the location of the remaining defect, shape of the defect and the proximity to free margins a double M plasty was deemed most appropriate for complete closure of the defect.  Using a sterile surgical marker, an appropriate advancement flap was drawn incorporating the defect and placing the expected incisions within the relaxed skin tension lines where possible.    The area thus outlined was incised deep to adipose tissue with a #15 scalpel blade.  The skin margins were undermined to an appropriate distance in all directions utilizing iris scissors.
Billing Type: Third-Party Bill
Melolabial Transposition Flap Text: The defect edges were debeveled with a #15 scalpel blade.  Given the location of the defect and the proximity to free margins a melolabial flap was deemed most appropriate.  Using a sterile surgical marker, an appropriate melolabial transposition flap was drawn incorporating the defect.    The area thus outlined was incised deep to adipose tissue with a #15 scalpel blade.  The skin margins were undermined to an appropriate distance in all directions utilizing iris scissors.
Size Of Lesion In Cm: 0.7
Elliptical Excision Additional Text (Leave Blank If You Do Not Want): The margin was drawn around the clinically apparent lesion.  An elliptical shape was then drawn on the skin incorporating the lesion and margins.  Incisions were then made along these lines to the appropriate tissue plane and the lesion was extirpated.
Intermediate / Complex Repair - Final Wound Length In Cm: 2.1
Island Pedicle Flap With Canthal Suspension Text: The defect edges were debeveled with a #15 scalpel blade.  Given the location of the defect, shape of the defect and the proximity to free margins an island pedicle advancement flap was deemed most appropriate.  Using a sterile surgical marker, an appropriate advancement flap was drawn incorporating the defect, outlining the appropriate donor tissue and placing the expected incisions within the relaxed skin tension lines where possible. The area thus outlined was incised deep to adipose tissue with a #15 scalpel blade.  The skin margins were undermined to an appropriate distance in all directions around the primary defect and laterally outward around the island pedicle utilizing iris scissors.  There was minimal undermining beneath the pedicle flap. A suspension suture was placed in the canthal tendon to prevent tension and prevent ectropion.
Consent was obtained from the patient. The risks and benefits to therapy were discussed in detail. Specifically, the risks of infection, scarring, bleeding, prolonged wound healing, incomplete removal, allergy to anesthesia, nerve injury and recurrence were addressed. Prior to the procedure, the treatment site was clearly identified and confirmed by the patient. All components of Universal Protocol/PAUSE Rule completed.
H Plasty Text: Given the location of the defect, shape of the defect and the proximity to free margins a H-plasty was deemed most appropriate for repair.  Using a sterile surgical marker, the appropriate advancement arms of the H-plasty were drawn incorporating the defect and placing the expected incisions within the relaxed skin tension lines where possible. The area thus outlined was incised deep to adipose tissue with a #15 scalpel blade. The skin margins were undermined to an appropriate distance in all directions utilizing iris scissors.  The opposing advancement arms were then advanced into place in opposite direction and anchored with interrupted buried subcutaneous sutures.
Bilateral Helical Rim Advancement Flap Text: The defect edges were debeveled with a #15 blade scalpel.  Given the location of the defect and the proximity to free margins (helical rim) a bilateral helical rim advancement flap was deemed most appropriate.  Using a sterile surgical marker, the appropriate advancement flaps were drawn incorporating the defect and placing the expected incisions between the helical rim and antihelix where possible.  The area thus outlined was incised through and through with a #15 scalpel blade.  With a skin hook and iris scissors, the flaps were gently and sharply undermined and freed up.
Number Of Deep Sutures (Optional): 1
Complex Repair And Rhombic Flap Text: The defect edges were debeveled with a #15 scalpel blade.  The primary defect was closed partially with a complex linear closure.  Given the location of the remaining defect, shape of the defect and the proximity to free margins a rhombic flap was deemed most appropriate for complete closure of the defect.  Using a sterile surgical marker, an appropriate advancement flap was drawn incorporating the defect and placing the expected incisions within the relaxed skin tension lines where possible.    The area thus outlined was incised deep to adipose tissue with a #15 scalpel blade.  The skin margins were undermined to an appropriate distance in all directions utilizing iris scissors.
O-Z Plasty Text: The defect edges were debeveled with a #15 scalpel blade.  Given the location of the defect, shape of the defect and the proximity to free margins an O-Z plasty (double transposition flap) was deemed most appropriate.  Using a sterile surgical marker, the appropriate transposition flaps were drawn incorporating the defect and placing the expected incisions within the relaxed skin tension lines where possible.    The area thus outlined was incised deep to adipose tissue with a #15 scalpel blade.  The skin margins were undermined to an appropriate distance in all directions utilizing iris scissors.  Hemostasis was achieved with electrocautery.  The flaps were then transposed into place, one clockwise and the other counterclockwise, and anchored with interrupted buried subcutaneous sutures.
Cheek Interpolation Flap Text: A decision was made to reconstruct the defect utilizing an interpolation axial flap and a staged reconstruction.  A telfa template was made of the defect.  This telfa template was then used to outline the Cheek Interpolation flap.  The donor area for the pedicle flap was then injected with anesthesia.  The flap was excised through the skin and subcutaneous tissue down to the layer of the underlying musculature.  The interpolation flap was carefully excised within this deep plane to maintain its blood supply.  The edges of the donor site were undermined.   The donor site was closed in a primary fashion.  The pedicle was then rotated into position and sutured.  Once the tube was sutured into place, adequate blood supply was confirmed with blanching and refill.  The pedicle was then wrapped with xeroform gauze and dressed appropriately with a telfa and gauze bandage to ensure continued blood supply and protect the attached pedicle.
Crescentic Advancement Flap Text: The defect edges were debeveled with a #15 scalpel blade.  Given the location of the defect and the proximity to free margins a crescentic advancement flap was deemed most appropriate.  Using a sterile surgical marker, the appropriate advancement flap was drawn incorporating the defect and placing the expected incisions within the relaxed skin tension lines where possible.    The area thus outlined was incised deep to adipose tissue with a #15 scalpel blade.  The skin margins were undermined to an appropriate distance in all directions utilizing iris scissors.
Melolabial Interpolation Flap Text: A decision was made to reconstruct the defect utilizing an interpolation axial flap and a staged reconstruction.  A telfa template was made of the defect.  This telfa template was then used to outline the melolabial interpolation flap.  The donor area for the pedicle flap was then injected with anesthesia.  The flap was excised through the skin and subcutaneous tissue down to the layer of the underlying musculature.  The pedicle flap was carefully excised within this deep plane to maintain its blood supply.  The edges of the donor site were undermined.   The donor site was closed in a primary fashion.  The pedicle was then rotated into position and sutured.  Once the tube was sutured into place, adequate blood supply was confirmed with blanching and refill.  The pedicle was then wrapped with xeroform gauze and dressed appropriately with a telfa and gauze bandage to ensure continued blood supply and protect the attached pedicle.
Tissue Cultured Epidermal Autograft Text: The defect edges were debeveled with a #15 scalpel blade.  Given the location of the defect, shape of the defect and the proximity to free margins a tissue cultured epidermal autograft was deemed most appropriate.  The graft was then trimmed to fit the size of the defect.  The graft was then placed in the primary defect and oriented appropriately.
Advancement-Rotation Flap Text: The defect edges were debeveled with a #15 scalpel blade.  Given the location of the defect, shape of the defect and the proximity to free margins an advancement-rotation flap was deemed most appropriate.  Using a sterile surgical marker, an appropriate flap was drawn incorporating the defect and placing the expected incisions within the relaxed skin tension lines where possible. The area thus outlined was incised deep to adipose tissue with a #15 scalpel blade.  The skin margins were undermined to an appropriate distance in all directions utilizing iris scissors.
Positioning (Leave Blank If You Do Not Want): The patient was placed in a comfortable position exposing the surgical site.
Transposition Flap Text: The defect edges were debeveled with a #15 scalpel blade.  Given the location of the defect and the proximity to free margins a transposition flap was deemed most appropriate.  Using a sterile surgical marker, an appropriate transposition flap was drawn incorporating the defect.    The area thus outlined was incised deep to adipose tissue with a #15 scalpel blade.  The skin margins were undermined to an appropriate distance in all directions utilizing iris scissors.
Complex Repair And Xenograft Text: The defect edges were debeveled with a #15 scalpel blade.  The primary defect was closed partially with a complex linear closure.  Given the location of the defect, shape of the defect and the proximity to free margins a xenograft was deemed most appropriate to repair the remaining defect.  The graft was trimmed to fit the size of the remaining defect.  The graft was then placed in the primary defect, oriented appropriately, and sutured into place.
Paramedian Forehead Flap Text: A decision was made to reconstruct the defect utilizing an interpolation axial flap and a staged reconstruction.  A telfa template was made of the defect.  This telfa template was then used to outline the paramedian forehead pedicle flap.  The donor area for the pedicle flap was then injected with anesthesia.  The flap was excised through the skin and subcutaneous tissue down to the layer of the underlying musculature.  The pedicle flap was carefully excised within this deep plane to maintain its blood supply.  The edges of the donor site were undermined.   The donor site was closed in a primary fashion.  The pedicle was then rotated into position and sutured.  Once the tube was sutured into place, adequate blood supply was confirmed with blanching and refill.  The pedicle was then wrapped with xeroform gauze and dressed appropriately with a telfa and gauze bandage to ensure continued blood supply and protect the attached pedicle.
Complex Repair And Modified Advancement Flap Text: The defect edges were debeveled with a #15 scalpel blade.  The primary defect was closed partially with a complex linear closure.  Given the location of the remaining defect, shape of the defect and the proximity to free margins a modified advancement flap was deemed most appropriate for complete closure of the defect.  Using a sterile surgical marker, an appropriate advancement flap was drawn incorporating the defect and placing the expected incisions within the relaxed skin tension lines where possible.    The area thus outlined was incised deep to adipose tissue with a #15 scalpel blade.  The skin margins were undermined to an appropriate distance in all directions utilizing iris scissors.
Skin Substitute Text: The defect edges were debeveled with a #15 scalpel blade.  Given the location of the defect, shape of the defect and the proximity to free margins a skin substitute graft was deemed most appropriate.  The graft material was trimmed to fit the size of the defect. The graft was then placed in the primary defect and oriented appropriately.
Keystone Flap Text: The defect edges were debeveled with a #15 scalpel blade.  Given the location of the defect, shape of the defect a keystone flap was deemed most appropriate.  Using a sterile surgical marker, an appropriate keystone flap was drawn incorporating the defect, outlining the appropriate donor tissue and placing the expected incisions within the relaxed skin tension lines where possible. The area thus outlined was incised deep to adipose tissue with a #15 scalpel blade.  The skin margins were undermined to an appropriate distance in all directions around the primary defect and laterally outward around the flap utilizing iris scissors.
Hatchet Flap Text: The defect edges were debeveled with a #15 scalpel blade.  Given the location of the defect, shape of the defect and the proximity to free margins a hatchet flap was deemed most appropriate.  Using a sterile surgical marker, an appropriate hatchet flap was drawn incorporating the defect and placing the expected incisions within the relaxed skin tension lines where possible.    The area thus outlined was incised deep to adipose tissue with a #15 scalpel blade.  The skin margins were undermined to an appropriate distance in all directions utilizing iris scissors.
O-T Plasty Text: The defect edges were debeveled with a #15 scalpel blade.  Given the location of the defect, shape of the defect and the proximity to free margins an O-T plasty was deemed most appropriate.  Using a sterile surgical marker, an appropriate O-T plasty was drawn incorporating the defect and placing the expected incisions within the relaxed skin tension lines where possible.    The area thus outlined was incised deep to adipose tissue with a #15 scalpel blade.  The skin margins were undermined to an appropriate distance in all directions utilizing iris scissors.
Epidermal Sutures: 4-0 Surgipro
Lip Wedge Excision Repair Text: Given the location of the defect and the proximity to free margins a full thickness wedge repair was deemed most appropriate.  Using a sterile surgical marker, the appropriate repair was drawn incorporating the defect and placing the expected incisions perpendicular to the vermilion border.  The vermilion border was also meticulously outlined to ensure appropriate reapproximation during the repair.  The area thus outlined was incised through and through with a #15 scalpel blade.  The muscularis and dermis were reaproximated with deep sutures following hemostasis. Care was taken to realign the vermilion border before proceeding with the superficial closure.  Once the vermilion was realigned the superfical and mucosal closure was finished.
Complex Repair And Burow's Graft Text: The defect edges were debeveled with a #15 scalpel blade.  The primary defect was closed partially with a complex linear closure.  Given the location of the defect, shape of the defect, the proximity to free margins and the presence of a standing cone deformity a Burow's graft was deemed most appropriate to repair the remaining defect.  The graft was trimmed to fit the size of the remaining defect.  The graft was then placed in the primary defect, oriented appropriately, and sutured into place.
Chonodrocutaneous Helical Advancement Flap Text: The defect edges were debeveled with a #15 scalpel blade.  Given the location of the defect and the proximity to free margins a chondrocutaneous helical advancement flap was deemed most appropriate.  Using a sterile surgical marker, the appropriate advancement flap was drawn incorporating the defect and placing the expected incisions within the relaxed skin tension lines where possible.    The area thus outlined was incised deep to adipose tissue with a #15 scalpel blade.  The skin margins were undermined to an appropriate distance in all directions utilizing iris scissors.
Rhomboid Transposition Flap Text: The defect edges were debeveled with a #15 scalpel blade.  Given the location of the defect and the proximity to free margins a rhomboid transposition flap was deemed most appropriate.  Using a sterile surgical marker, an appropriate rhomboid flap was drawn incorporating the defect.    The area thus outlined was incised deep to adipose tissue with a #15 scalpel blade.  The skin margins were undermined to an appropriate distance in all directions utilizing iris scissors.
Retention Suture Text: Retention sutures were placed to support the closure and prevent dehiscence.
O-Z Flap Text: The defect edges were debeveled with a #15 scalpel blade.  Given the location of the defect, shape of the defect and the proximity to free margins an O-Z flap was deemed most appropriate.  Using a sterile surgical marker, an appropriate transposition flap was drawn incorporating the defect and placing the expected incisions within the relaxed skin tension lines where possible. The area thus outlined was incised deep to adipose tissue with a #15 scalpel blade.  The skin margins were undermined to an appropriate distance in all directions utilizing iris scissors.
Debridement Text: The wound edges were debrided prior to proceeding with the closure to facilitate wound healing.
Double O-Z Flap Text: The defect edges were debeveled with a #15 scalpel blade.  Given the location of the defect, shape of the defect and the proximity to free margins a Double O-Z flap was deemed most appropriate.  Using a sterile surgical marker, an appropriate transposition flap was drawn incorporating the defect and placing the expected incisions within the relaxed skin tension lines where possible. The area thus outlined was incised deep to adipose tissue with a #15 scalpel blade.  The skin margins were undermined to an appropriate distance in all directions utilizing iris scissors.
Double O-Z Plasty Text: The defect edges were debeveled with a #15 scalpel blade.  Given the location of the defect, shape of the defect and the proximity to free margins a Double O-Z plasty (double transposition flap) was deemed most appropriate.  Using a sterile surgical marker, the appropriate transposition flaps were drawn incorporating the defect and placing the expected incisions within the relaxed skin tension lines where possible. The area thus outlined was incised deep to adipose tissue with a #15 scalpel blade.  The skin margins were undermined to an appropriate distance in all directions utilizing iris scissors.  Hemostasis was achieved with electrocautery.  The flaps were then transposed into place, one clockwise and the other counterclockwise, and anchored with interrupted buried subcutaneous sutures.
Zygomaticofacial Flap Text: Given the location of the defect, shape of the defect and the proximity to free margins a zygomaticofacial flap was deemed most appropriate for repair.  Using a sterile surgical marker, the appropriate flap was drawn incorporating the defect and placing the expected incisions within the relaxed skin tension lines where possible. The area thus outlined was incised deep to adipose tissue with a #15 scalpel blade with preservation of a vascular pedicle.  The skin margins were undermined to an appropriate distance in all directions utilizing iris scissors.  The flap was then placed into the defect and anchored with interrupted buried subcutaneous sutures.
Peng Advancement Flap Text: The defect edges were debeveled with a #15 scalpel blade.  Given the location of the defect, shape of the defect and the proximity to free margins a Peng advancement flap was deemed most appropriate.  Using a sterile surgical marker, an appropriate advancement flap was drawn incorporating the defect and placing the expected incisions within the relaxed skin tension lines where possible. The area thus outlined was incised deep to adipose tissue with a #15 scalpel blade.  The skin margins were undermined to an appropriate distance in all directions utilizing iris scissors.
Suturegard Intro: Intraoperative tissue expansion was performed, utilizing the SUTUREGARD device, in order to reduce wound tension.
Abbe Flap (Upper To Lower Lip) Text: The defect of the lower lip was assessed and measured.  Given the location and size of the defect, an Abbe flap was deemed most appropriate. Using a sterile surgical marker, an appropriate Abbe flap was measured and drawn on the upper lip. Local anesthesia was then infiltrated.  A scalpel was then used to incise the upper lip through and through the skin, vermilion, muscle and mucosa, leaving the flap pedicled on the opposite side.  The flap was then rotated and transferred to the lower lip defect.  The flap was then sutured into place with a three layer technique, closing the orbicularis oris muscle layer with subcutaneous buried sutures, followed by a mucosal layer and an epidermal layer.
Suturegard Body: The suture ends were repeatedly re-tightened and re-clamped to achieve the desired tissue expansion.
Staged Advancement Flap Text: The defect edges were debeveled with a #15 scalpel blade.  Given the location of the defect, shape of the defect and the proximity to free margins a staged advancement flap was deemed most appropriate.  Using a sterile surgical marker, an appropriate advancement flap was drawn incorporating the defect and placing the expected incisions within the relaxed skin tension lines where possible. The area thus outlined was incised deep to adipose tissue with a #15 scalpel blade.  The skin margins were undermined to an appropriate distance in all directions utilizing iris scissors.
Helical Rim Text: The closure involved the helical rim.
Eye Clamp Note Details: An eye clamp was used during the procedure.
Abbe Flap (Lower To Upper Lip) Text: The defect of the upper lip was assessed and measured.  Given the location and size of the defect, an Abbe flap was deemed most appropriate. Using a sterile surgical marker, an appropriate Abbe flap was measured and drawn on the lower lip. Local anesthesia was then infiltrated. A scalpel was then used to incise the upper lip through and through the skin, vermilion, muscle and mucosa, leaving the flap pedicled on the opposite side.  The flap was then rotated and transferred to the lower lip defect.  The flap was then sutured into place with a three layer technique, closing the orbicularis oris muscle layer with subcutaneous buried sutures, followed by a mucosal layer and an epidermal layer.
Mustarde Flap Text: The defect edges were debeveled with a #15 scalpel blade.  Given the size, depth and location of the defect and the proximity to free margins a Mustarde flap was deemed most appropriate.  Using a sterile surgical marker, an appropriate flap was drawn incorporating the defect. The area thus outlined was incised with a #15 scalpel blade.  The skin margins were undermined to an appropriate distance in all directions utilizing iris scissors.
Hemigard Intro: Due to skin fragility and wound tension, it was decided to use HEMIGARD adhesive retention suture devices to permit a linear closure. The skin was cleaned and dried for a 6cm distance away from the wound. Excessive hair, if present, was removed to allow for adhesion.
Estlander Flap (Upper To Lower Lip) Text: The defect of the lower lip was assessed and measured.  Given the location and size of the defect, an Estlander flap was deemed most appropriate. Using a sterile surgical marker, an appropriate Estlander flap was measured and drawn on the upper lip. Local anesthesia was then infiltrated. A scalpel was then used to incise the lateral aspect of the flap, through skin, muscle and mucosa, leaving the flap pedicled medially.  The flap was then rotated and positioned to fill the lower lip defect.  The flap was then sutured into place with a three layer technique, closing the orbicularis oris muscle layer with subcutaneous buried sutures, followed by a mucosal layer and an epidermal layer.
Vermilion Border Text: The closure involved the vermilion border.
Nasal Turnover Hinge Flap Text: The defect edges were debeveled with a #15 scalpel blade.  Given the size, depth, location of the defect and the defect being full thickness a nasal turnover hinge flap was deemed most appropriate.  Using a sterile surgical marker, an appropriate hinge flap was drawn incorporating the defect. The area thus outlined was incised with a #15 scalpel blade. The flap was designed to recreate the nasal mucosal lining and the alar rim. The skin margins were undermined to an appropriate distance in all directions utilizing iris scissors.
Nostril Rim Text: The closure involved the nostril rim.
Hemigard Postcare Instructions: The HEMIGARD strips are to remain completely dry for at least 5-7 days.
Nasalis-Muscle-Based Myocutaneous Island Pedicle Flap Text: Using a #15 blade, an incision was made around the donor flap to the level of the nasalis muscle. Wide lateral undermining was then performed in both the subcutaneous plane above the nasalis muscle, and in a submuscular plane just above periosteum. This allowed the formation of a free nasalis muscle axial pedicle (based on the angular artery) which was still attached to the actual cutaneous flap, increasing its mobility and vascular viability. Hemostasis was obtained with pinpoint electrocoagulation. The flap was mobilized into position and the pivotal anchor points positioned and stabilized with buried interrupted sutures. Subcutaneous and dermal tissues were closed in a multilayered fashion with sutures. Tissue redundancies were excised, and the epidermal edges were apposed without significant tension and sutured with sutures.
Burow's Graft Text: The defect edges were debeveled with a #15 scalpel blade.  Given the location of the defect, shape of the defect, the proximity to free margins and the presence of a standing cone deformity a Burow's skin graft was deemed most appropriate. The standing cone was removed and this tissue was then trimmed to the shape of the primary defect. The adipose tissue was also removed until only dermis and epidermis were left.  The skin margins of the secondary defect were undermined to an appropriate distance in all directions utilizing iris scissors.  The secondary defect was closed with interrupted buried subcutaneous sutures.  The skin edges were then re-apposed with running  sutures.  The skin graft was then placed in the primary defect and oriented appropriately.
Adjacent Tissue Transfer Text: The defect edges were debeveled with a #15 scalpel blade.  Given the location of the defect and the proximity to free margins an adjacent tissue transfer was deemed most appropriate.  Using a sterile surgical marker, an appropriate flap was drawn incorporating the defect and placing the expected incisions within the relaxed skin tension lines where possible.    The area thus outlined was incised deep to adipose tissue with a #15 scalpel blade.  The skin margins were undermined to an appropriate distance in all directions utilizing iris scissors.
Orbicularis Oris Muscle Flap Text: The defect edges were debeveled with a #15 scalpel blade.  Given that the defect affected the competency of the oral sphincter an obicularis oris muscle flap was deemed most appropriate to restore this competency and normal muscle function.  Using a sterile surgical marker, an appropriate flap was drawn incorporating the defect. The area thus outlined was incised with a #15 scalpel blade.
Information: Selecting Yes will display possible errors in your note based on the variables you have selected. This validation is only offered as a suggestion for you. PLEASE NOTE THAT THE VALIDATION TEXT WILL BE REMOVED WHEN YOU FINALIZE YOUR NOTE. IF YOU WANT TO FAX A PRELIMINARY NOTE YOU WILL NEED TO TOGGLE THIS TO 'NO' IF YOU DO NOT WANT IT IN YOUR FAXED NOTE.

## 2023-04-18 PROBLEM — D64.9 ANEMIA: Status: RESOLVED | Noted: 2021-04-04 | Resolved: 2023-04-18

## 2023-04-18 PROBLEM — E53.8 VITAMIN B12 DEFICIENCY: Status: ACTIVE | Noted: 2023-04-18

## 2023-04-18 PROBLEM — M81.0 AGE-RELATED OSTEOPOROSIS WITHOUT CURRENT PATHOLOGICAL FRACTURE: Status: ACTIVE | Noted: 2023-04-18

## 2023-04-18 PROBLEM — N17.9 AKI (ACUTE KIDNEY INJURY) (HCC): Status: RESOLVED | Noted: 2021-04-06 | Resolved: 2023-04-18

## 2023-04-19 PROBLEM — H61.23 BILATERAL IMPACTED CERUMEN: Status: ACTIVE | Noted: 2023-04-18

## 2023-04-20 ENCOUNTER — APPOINTMENT (OUTPATIENT)
Dept: RADIOLOGY | Facility: MEDICAL CENTER | Age: 88
End: 2023-04-20
Attending: NURSE PRACTITIONER
Payer: MEDICARE

## 2023-04-25 ENCOUNTER — APPOINTMENT (RX ONLY)
Dept: URBAN - METROPOLITAN AREA CLINIC 22 | Facility: CLINIC | Age: 88
Setting detail: DERMATOLOGY
End: 2023-04-25

## 2023-04-25 DIAGNOSIS — Z48.02 ENCOUNTER FOR REMOVAL OF SUTURES: ICD-10-CM

## 2023-04-25 PROCEDURE — ? SUTURE REMOVAL (GLOBAL PERIOD)

## 2023-04-25 ASSESSMENT — LOCATION SIMPLE DESCRIPTION DERM: LOCATION SIMPLE: LEFT WRIST

## 2023-04-25 ASSESSMENT — LOCATION DETAILED DESCRIPTION DERM: LOCATION DETAILED: LEFT DORSAL WRIST

## 2023-04-25 ASSESSMENT — LOCATION ZONE DERM: LOCATION ZONE: ARM

## 2023-04-25 NOTE — PROCEDURE: SUTURE REMOVAL (GLOBAL PERIOD)
Detail Level: Detailed
Add 58318 Cpt? (Important Note: In 2017 The Use Of 86839 Is Being Tracked By Cms To Determine Future Global Period Reimbursement For Global Periods): no

## 2023-04-27 ENCOUNTER — HOSPITAL ENCOUNTER (OUTPATIENT)
Dept: RADIOLOGY | Facility: MEDICAL CENTER | Age: 88
End: 2023-04-27
Attending: NURSE PRACTITIONER
Payer: MEDICARE

## 2023-04-27 DIAGNOSIS — Z12.31 VISIT FOR SCREENING MAMMOGRAM: ICD-10-CM

## 2023-04-27 PROCEDURE — 77063 BREAST TOMOSYNTHESIS BI: CPT

## 2023-06-06 ENCOUNTER — APPOINTMENT (RX ONLY)
Dept: URBAN - METROPOLITAN AREA CLINIC 22 | Facility: CLINIC | Age: 88
Setting detail: DERMATOLOGY
End: 2023-06-06

## 2023-06-06 DIAGNOSIS — I83.9 ASYMPTOMATIC VARICOSE VEINS OF LOWER EXTREMITIES: ICD-10-CM

## 2023-06-06 DIAGNOSIS — Z85.828 PERSONAL HISTORY OF OTHER MALIGNANT NEOPLASM OF SKIN: ICD-10-CM

## 2023-06-06 DIAGNOSIS — D18.0 HEMANGIOMA: ICD-10-CM

## 2023-06-06 DIAGNOSIS — L72.0 EPIDERMAL CYST: ICD-10-CM

## 2023-06-06 DIAGNOSIS — Z48.02 ENCOUNTER FOR REMOVAL OF SUTURES: ICD-10-CM

## 2023-06-06 DIAGNOSIS — L81.4 OTHER MELANIN HYPERPIGMENTATION: ICD-10-CM

## 2023-06-06 DIAGNOSIS — L82.1 OTHER SEBORRHEIC KERATOSIS: ICD-10-CM

## 2023-06-06 DIAGNOSIS — D22 MELANOCYTIC NEVI: ICD-10-CM

## 2023-06-06 PROBLEM — D22.5 MELANOCYTIC NEVI OF TRUNK: Status: ACTIVE | Noted: 2023-06-06

## 2023-06-06 PROBLEM — D18.01 HEMANGIOMA OF SKIN AND SUBCUTANEOUS TISSUE: Status: ACTIVE | Noted: 2023-06-06

## 2023-06-06 PROBLEM — I83.93 ASYMPTOMATIC VARICOSE VEINS OF BILATERAL LOWER EXTREMITIES: Status: ACTIVE | Noted: 2023-06-06

## 2023-06-06 PROCEDURE — 99213 OFFICE O/P EST LOW 20 MIN: CPT

## 2023-06-06 PROCEDURE — ? COUNSELING

## 2023-06-06 PROCEDURE — ? SUNSCREEN TREATMENT REGIMEN

## 2023-06-06 ASSESSMENT — LOCATION DETAILED DESCRIPTION DERM
LOCATION DETAILED: EPIGASTRIC SKIN
LOCATION DETAILED: RIGHT DISTAL PRETIBIAL REGION
LOCATION DETAILED: RIGHT ANTERIOR PROXIMAL THIGH
LOCATION DETAILED: LEFT DORSAL WRIST
LOCATION DETAILED: LEFT RIB CAGE
LOCATION DETAILED: INFERIOR MID FOREHEAD
LOCATION DETAILED: RIGHT VENTRAL PROXIMAL FOREARM
LOCATION DETAILED: LEFT MEDIAL FRONTAL SCALP
LOCATION DETAILED: RIGHT CENTRAL TEMPLE
LOCATION DETAILED: LEFT VENTRAL PROXIMAL FOREARM
LOCATION DETAILED: INFERIOR THORACIC SPINE
LOCATION DETAILED: SUPERIOR THORACIC SPINE
LOCATION DETAILED: UPPER STERNUM
LOCATION DETAILED: LEFT ANKLE
LOCATION DETAILED: PERIUMBILICAL SKIN
LOCATION DETAILED: LEFT MEDIAL SUPERIOR CHEST
LOCATION DETAILED: LEFT ANTERIOR DISTAL THIGH

## 2023-06-06 ASSESSMENT — LOCATION ZONE DERM
LOCATION ZONE: LEG
LOCATION ZONE: TRUNK
LOCATION ZONE: SCALP
LOCATION ZONE: FACE
LOCATION ZONE: ARM

## 2023-06-06 ASSESSMENT — LOCATION SIMPLE DESCRIPTION DERM
LOCATION SIMPLE: CHEST
LOCATION SIMPLE: LEFT ANKLE
LOCATION SIMPLE: UPPER BACK
LOCATION SIMPLE: RIGHT TEMPLE
LOCATION SIMPLE: LEFT FOREARM
LOCATION SIMPLE: LEFT SCALP
LOCATION SIMPLE: LEFT WRIST
LOCATION SIMPLE: RIGHT PRETIBIAL REGION
LOCATION SIMPLE: RIGHT THIGH
LOCATION SIMPLE: INFERIOR FOREHEAD
LOCATION SIMPLE: LEFT THIGH
LOCATION SIMPLE: ABDOMEN
LOCATION SIMPLE: RIGHT FOREARM

## 2023-06-08 ENCOUNTER — HOSPITAL ENCOUNTER (OUTPATIENT)
Dept: LAB | Facility: MEDICAL CENTER | Age: 88
End: 2023-06-08
Attending: NURSE PRACTITIONER
Payer: MEDICARE

## 2023-06-08 DIAGNOSIS — E83.42 HYPOMAGNESEMIA: ICD-10-CM

## 2023-06-08 DIAGNOSIS — E53.8 VITAMIN B12 DEFICIENCY: ICD-10-CM

## 2023-06-08 DIAGNOSIS — R79.89 LOW SERUM VITAMIN D: ICD-10-CM

## 2023-06-08 DIAGNOSIS — I10 ESSENTIAL HYPERTENSION, BENIGN: ICD-10-CM

## 2023-06-08 LAB
ALBUMIN SERPL BCP-MCNC: 3.9 G/DL (ref 3.2–4.9)
ALBUMIN/GLOB SERPL: 1.6 G/DL
ALP SERPL-CCNC: 78 U/L (ref 30–99)
ALT SERPL-CCNC: 11 U/L (ref 2–50)
ANION GAP SERPL CALC-SCNC: 9 MMOL/L (ref 7–16)
AST SERPL-CCNC: 21 U/L (ref 12–45)
BASOPHILS # BLD AUTO: 0.5 % (ref 0–1.8)
BASOPHILS # BLD: 0.05 K/UL (ref 0–0.12)
BILIRUB SERPL-MCNC: 0.6 MG/DL (ref 0.1–1.5)
BUN SERPL-MCNC: 23 MG/DL (ref 8–22)
CALCIUM ALBUM COR SERPL-MCNC: 9 MG/DL (ref 8.5–10.5)
CALCIUM SERPL-MCNC: 8.9 MG/DL (ref 8.5–10.5)
CHLORIDE SERPL-SCNC: 109 MMOL/L (ref 96–112)
CO2 SERPL-SCNC: 25 MMOL/L (ref 20–33)
CREAT SERPL-MCNC: 1.18 MG/DL (ref 0.5–1.4)
EOSINOPHIL # BLD AUTO: 0.13 K/UL (ref 0–0.51)
EOSINOPHIL NFR BLD: 1.4 % (ref 0–6.9)
ERYTHROCYTE [DISTWIDTH] IN BLOOD BY AUTOMATED COUNT: 45.8 FL (ref 35.9–50)
GFR SERPLBLD CREATININE-BSD FMLA CKD-EPI: 44 ML/MIN/1.73 M 2
GLOBULIN SER CALC-MCNC: 2.5 G/DL (ref 1.9–3.5)
GLUCOSE SERPL-MCNC: 92 MG/DL (ref 65–99)
HCT VFR BLD AUTO: 44.1 % (ref 37–47)
HGB BLD-MCNC: 14.1 G/DL (ref 12–16)
IMM GRANULOCYTES # BLD AUTO: 0.04 K/UL (ref 0–0.11)
IMM GRANULOCYTES NFR BLD AUTO: 0.4 % (ref 0–0.9)
LYMPHOCYTES # BLD AUTO: 2.09 K/UL (ref 1–4.8)
LYMPHOCYTES NFR BLD: 22.3 % (ref 22–41)
MAGNESIUM SERPL-MCNC: 2.2 MG/DL (ref 1.5–2.5)
MCH RBC QN AUTO: 28.6 PG (ref 27–33)
MCHC RBC AUTO-ENTMCNC: 32 G/DL (ref 32.2–35.5)
MCV RBC AUTO: 89.5 FL (ref 81.4–97.8)
MONOCYTES # BLD AUTO: 0.91 K/UL (ref 0–0.85)
MONOCYTES NFR BLD AUTO: 9.7 % (ref 0–13.4)
NEUTROPHILS # BLD AUTO: 6.14 K/UL (ref 1.82–7.42)
NEUTROPHILS NFR BLD: 65.7 % (ref 44–72)
NRBC # BLD AUTO: 0 K/UL
NRBC BLD-RTO: 0 /100 WBC (ref 0–0.2)
PLATELET # BLD AUTO: 158 K/UL (ref 164–446)
PMV BLD AUTO: 11.6 FL (ref 9–12.9)
POTASSIUM SERPL-SCNC: 4.6 MMOL/L (ref 3.6–5.5)
PROT SERPL-MCNC: 6.4 G/DL (ref 6–8.2)
RBC # BLD AUTO: 4.93 M/UL (ref 4.2–5.4)
SODIUM SERPL-SCNC: 143 MMOL/L (ref 135–145)
WBC # BLD AUTO: 9.4 K/UL (ref 4.8–10.8)

## 2023-06-08 PROCEDURE — 85025 COMPLETE CBC W/AUTO DIFF WBC: CPT

## 2023-06-08 PROCEDURE — 83735 ASSAY OF MAGNESIUM: CPT

## 2023-06-08 PROCEDURE — 80053 COMPREHEN METABOLIC PANEL: CPT

## 2023-06-08 PROCEDURE — 36415 COLL VENOUS BLD VENIPUNCTURE: CPT

## 2023-06-08 PROCEDURE — 82306 VITAMIN D 25 HYDROXY: CPT

## 2023-06-08 PROCEDURE — 82607 VITAMIN B-12: CPT

## 2023-06-09 LAB
25(OH)D3 SERPL-MCNC: 74 NG/ML (ref 30–100)
VIT B12 SERPL-MCNC: 517 PG/ML (ref 211–911)

## 2023-06-19 PROBLEM — Z47.89 ORTHOPEDIC AFTERCARE: Status: RESOLVED | Noted: 2021-11-09 | Resolved: 2023-06-19

## 2023-06-19 PROBLEM — Z59.82: Status: ACTIVE | Noted: 2023-06-13

## 2023-06-19 PROBLEM — K08.89 PAIN, DENTAL: Status: RESOLVED | Noted: 2022-06-28 | Resolved: 2023-06-19

## 2023-06-19 PROBLEM — M25.471 RIGHT ANKLE SWELLING: Status: RESOLVED | Noted: 2021-11-12 | Resolved: 2023-06-19

## 2023-06-19 PROBLEM — K92.2 UPPER GI BLEED: Status: RESOLVED | Noted: 2021-06-20 | Resolved: 2023-06-19

## 2023-06-19 PROBLEM — H90.0 CONDUCTIVE HEARING LOSS, BILATERAL: Status: ACTIVE | Noted: 2023-06-13

## 2023-06-19 PROBLEM — R41.3 SHORT-TERM MEMORY LOSS: Status: ACTIVE | Noted: 2023-06-19

## 2023-06-19 PROBLEM — R74.01 TRANSAMINITIS: Status: RESOLVED | Noted: 2021-06-20 | Resolved: 2023-06-19

## 2023-07-31 PROBLEM — Z78.9 IMPAIRED DRIVING SKILLS: Status: ACTIVE | Noted: 2023-07-31

## 2023-07-31 PROBLEM — Z86.79 HISTORY OF IRREGULAR HEARTBEAT: Status: ACTIVE | Noted: 2023-07-31

## 2023-07-31 PROBLEM — F33.9 MAJOR DEPRESSION, RECURRENT, CHRONIC (HCC): Status: ACTIVE | Noted: 2022-07-28

## 2023-07-31 PROBLEM — I47.10 PAROXYSMAL SVT (SUPRAVENTRICULAR TACHYCARDIA) (HCC): Status: RESOLVED | Noted: 2018-11-06 | Resolved: 2023-07-31

## 2023-08-09 ENCOUNTER — NON-PROVIDER VISIT (OUTPATIENT)
Dept: CARDIOLOGY | Facility: MEDICAL CENTER | Age: 88
End: 2023-08-09
Attending: STUDENT IN AN ORGANIZED HEALTH CARE EDUCATION/TRAINING PROGRAM
Payer: MEDICARE

## 2023-08-09 ENCOUNTER — NON-PROVIDER VISIT (OUTPATIENT)
Dept: CARDIOLOGY | Facility: MEDICAL CENTER | Age: 88
End: 2023-08-09

## 2023-08-09 DIAGNOSIS — Z95.0 CARDIAC PACEMAKER IN SITU: ICD-10-CM

## 2023-08-09 DIAGNOSIS — I44.1 SECOND DEGREE ATRIOVENTRICULAR BLOCK, MOBITZ (TYPE) I: ICD-10-CM

## 2023-08-09 PROCEDURE — 93280 PM DEVICE PROGR EVAL DUAL: CPT | Mod: 26 | Performed by: INTERNAL MEDICINE

## 2023-08-09 PROCEDURE — 93280 PM DEVICE PROGR EVAL DUAL: CPT | Performed by: INTERNAL MEDICINE

## 2023-08-11 NOTE — CARDIAC REMOTE MONITOR - SCAN
Device transmission reviewed. Device demonstrated appropriate function.       Electronically Signed by: Rodriguez Dailey M.D.    8/15/2023  3:35 PM

## 2023-09-04 ENCOUNTER — HOSPITAL ENCOUNTER (INPATIENT)
Facility: MEDICAL CENTER | Age: 88
LOS: 4 days | DRG: 193 | End: 2023-09-08
Attending: EMERGENCY MEDICINE | Admitting: HOSPITALIST
Payer: MEDICARE

## 2023-09-04 ENCOUNTER — APPOINTMENT (OUTPATIENT)
Dept: RADIOLOGY | Facility: MEDICAL CENTER | Age: 88
DRG: 193 | End: 2023-09-04
Attending: EMERGENCY MEDICINE
Payer: MEDICARE

## 2023-09-04 DIAGNOSIS — R09.02 HYPOXIA: ICD-10-CM

## 2023-09-04 DIAGNOSIS — J96.01 ACUTE HYPOXEMIC RESPIRATORY FAILURE (HCC): ICD-10-CM

## 2023-09-04 DIAGNOSIS — J18.9 PNEUMONIA OF LEFT LOWER LOBE DUE TO INFECTIOUS ORGANISM: ICD-10-CM

## 2023-09-04 PROBLEM — R79.89 ELEVATED TROPONIN: Status: ACTIVE | Noted: 2023-09-04

## 2023-09-04 PROBLEM — Z71.89 ADVANCE CARE PLANNING: Status: ACTIVE | Noted: 2023-09-04

## 2023-09-04 LAB
ALBUMIN SERPL BCP-MCNC: 3.3 G/DL (ref 3.2–4.9)
ALBUMIN/GLOB SERPL: 1.1 G/DL
ALP SERPL-CCNC: 78 U/L (ref 30–99)
ALT SERPL-CCNC: 16 U/L (ref 2–50)
ANION GAP SERPL CALC-SCNC: 14 MMOL/L (ref 7–16)
AST SERPL-CCNC: 29 U/L (ref 12–45)
BASOPHILS # BLD AUTO: 0.4 % (ref 0–1.8)
BASOPHILS # BLD: 0.06 K/UL (ref 0–0.12)
BILIRUB SERPL-MCNC: 1.2 MG/DL (ref 0.1–1.5)
BLOOD CULTURE HOLD CXBCH: NORMAL
BUN SERPL-MCNC: 23 MG/DL (ref 8–22)
CALCIUM ALBUM COR SERPL-MCNC: 8.8 MG/DL (ref 8.5–10.5)
CALCIUM SERPL-MCNC: 8.2 MG/DL (ref 8.4–10.2)
CHLORIDE SERPL-SCNC: 100 MMOL/L (ref 96–112)
CO2 SERPL-SCNC: 19 MMOL/L (ref 20–33)
CREAT SERPL-MCNC: 1.23 MG/DL (ref 0.5–1.4)
D DIMER PPP IA.FEU-MCNC: 0.97 UG/ML (FEU) (ref 0–0.5)
EKG IMPRESSION: NORMAL
EOSINOPHIL # BLD AUTO: 0 K/UL (ref 0–0.51)
EOSINOPHIL NFR BLD: 0 % (ref 0–6.9)
ERYTHROCYTE [DISTWIDTH] IN BLOOD BY AUTOMATED COUNT: 45.1 FL (ref 35.9–50)
FLUAV RNA SPEC QL NAA+PROBE: NEGATIVE
FLUBV RNA SPEC QL NAA+PROBE: NEGATIVE
GFR SERPLBLD CREATININE-BSD FMLA CKD-EPI: 42 ML/MIN/1.73 M 2
GLOBULIN SER CALC-MCNC: 2.9 G/DL (ref 1.9–3.5)
GLUCOSE SERPL-MCNC: 131 MG/DL (ref 65–99)
HCT VFR BLD AUTO: 39.1 % (ref 37–47)
HGB BLD-MCNC: 12.7 G/DL (ref 12–16)
IMM GRANULOCYTES # BLD AUTO: 0.16 K/UL (ref 0–0.11)
IMM GRANULOCYTES NFR BLD AUTO: 1.1 % (ref 0–0.9)
LACTATE SERPL-SCNC: 1.3 MMOL/L (ref 0.5–2)
LACTATE SERPL-SCNC: 1.5 MMOL/L (ref 0.5–2)
LYMPHOCYTES # BLD AUTO: 1.09 K/UL (ref 1–4.8)
LYMPHOCYTES NFR BLD: 7.6 % (ref 22–41)
MCH RBC QN AUTO: 28.5 PG (ref 27–33)
MCHC RBC AUTO-ENTMCNC: 32.5 G/DL (ref 32.2–35.5)
MCV RBC AUTO: 87.9 FL (ref 81.4–97.8)
MONOCYTES # BLD AUTO: 1.67 K/UL (ref 0–0.85)
MONOCYTES NFR BLD AUTO: 11.6 % (ref 0–13.4)
NEUTROPHILS # BLD AUTO: 11.37 K/UL (ref 1.82–7.42)
NEUTROPHILS NFR BLD: 79.3 % (ref 44–72)
NRBC # BLD AUTO: 0 K/UL
NRBC BLD-RTO: 0 /100 WBC (ref 0–0.2)
NT-PROBNP SERPL IA-MCNC: 6448 PG/ML (ref 0–125)
PLATELET # BLD AUTO: 110 K/UL (ref 164–446)
PMV BLD AUTO: 10.4 FL (ref 9–12.9)
POTASSIUM SERPL-SCNC: 4 MMOL/L (ref 3.6–5.5)
PROT SERPL-MCNC: 6.2 G/DL (ref 6–8.2)
RBC # BLD AUTO: 4.45 M/UL (ref 4.2–5.4)
RSV RNA SPEC QL NAA+PROBE: NEGATIVE
SARS-COV-2 RNA RESP QL NAA+PROBE: NOTDETECTED
SODIUM SERPL-SCNC: 133 MMOL/L (ref 135–145)
SPECIMEN SOURCE: NORMAL
TROPONIN T SERPL-MCNC: 34 NG/L (ref 6–19)
TROPONIN T SERPL-MCNC: 35 NG/L (ref 6–19)
WBC # BLD AUTO: 14.4 K/UL (ref 4.8–10.8)

## 2023-09-04 PROCEDURE — 83605 ASSAY OF LACTIC ACID: CPT | Mod: 91

## 2023-09-04 PROCEDURE — 85025 COMPLETE CBC W/AUTO DIFF WBC: CPT

## 2023-09-04 PROCEDURE — 84484 ASSAY OF TROPONIN QUANT: CPT

## 2023-09-04 PROCEDURE — 80053 COMPREHEN METABOLIC PANEL: CPT

## 2023-09-04 PROCEDURE — A9270 NON-COVERED ITEM OR SERVICE: HCPCS | Performed by: HOSPITALIST

## 2023-09-04 PROCEDURE — 770020 HCHG ROOM/CARE - TELE (206)

## 2023-09-04 PROCEDURE — 99223 1ST HOSP IP/OBS HIGH 75: CPT | Mod: AI,25 | Performed by: HOSPITALIST

## 2023-09-04 PROCEDURE — 700111 HCHG RX REV CODE 636 W/ 250 OVERRIDE (IP): Mod: JZ | Performed by: HOSPITALIST

## 2023-09-04 PROCEDURE — 700102 HCHG RX REV CODE 250 W/ 637 OVERRIDE(OP): Performed by: HOSPITALIST

## 2023-09-04 PROCEDURE — 99497 ADVNCD CARE PLAN 30 MIN: CPT | Mod: 25 | Performed by: HOSPITALIST

## 2023-09-04 PROCEDURE — C9803 HOPD COVID-19 SPEC COLLECT: HCPCS | Performed by: EMERGENCY MEDICINE

## 2023-09-04 PROCEDURE — 700105 HCHG RX REV CODE 258: Performed by: HOSPITALIST

## 2023-09-04 PROCEDURE — 94760 N-INVAS EAR/PLS OXIMETRY 1: CPT

## 2023-09-04 PROCEDURE — 71275 CT ANGIOGRAPHY CHEST: CPT

## 2023-09-04 PROCEDURE — 85379 FIBRIN DEGRADATION QUANT: CPT

## 2023-09-04 PROCEDURE — 83880 ASSAY OF NATRIURETIC PEPTIDE: CPT

## 2023-09-04 PROCEDURE — 36415 COLL VENOUS BLD VENIPUNCTURE: CPT

## 2023-09-04 PROCEDURE — 99285 EMERGENCY DEPT VISIT HI MDM: CPT

## 2023-09-04 PROCEDURE — 700117 HCHG RX CONTRAST REV CODE 255: Performed by: EMERGENCY MEDICINE

## 2023-09-04 PROCEDURE — 71045 X-RAY EXAM CHEST 1 VIEW: CPT

## 2023-09-04 PROCEDURE — 700101 HCHG RX REV CODE 250: Performed by: HOSPITALIST

## 2023-09-04 PROCEDURE — 93005 ELECTROCARDIOGRAM TRACING: CPT | Performed by: EMERGENCY MEDICINE

## 2023-09-04 PROCEDURE — 0241U HCHG SARS-COV-2 COVID-19 NFCT DS RESP RNA 4 TRGT MIC: CPT

## 2023-09-04 RX ORDER — MORPHINE SULFATE 4 MG/ML
2 INJECTION INTRAVENOUS
Status: DISCONTINUED | OUTPATIENT
Start: 2023-09-04 | End: 2023-09-09 | Stop reason: HOSPADM

## 2023-09-04 RX ORDER — DOXYCYCLINE 100 MG/1
100 TABLET ORAL EVERY 12 HOURS
Status: DISCONTINUED | OUTPATIENT
Start: 2023-09-04 | End: 2023-09-09 | Stop reason: HOSPADM

## 2023-09-04 RX ORDER — AMOXICILLIN 250 MG
2 CAPSULE ORAL 2 TIMES DAILY
Status: DISCONTINUED | OUTPATIENT
Start: 2023-09-05 | End: 2023-09-09 | Stop reason: HOSPADM

## 2023-09-04 RX ORDER — GUAIFENESIN/DEXTROMETHORPHAN 100-10MG/5
10 SYRUP ORAL EVERY 6 HOURS PRN
Status: DISCONTINUED | OUTPATIENT
Start: 2023-09-04 | End: 2023-09-09 | Stop reason: HOSPADM

## 2023-09-04 RX ORDER — ONDANSETRON 4 MG/1
4 TABLET, ORALLY DISINTEGRATING ORAL EVERY 4 HOURS PRN
Status: DISCONTINUED | OUTPATIENT
Start: 2023-09-04 | End: 2023-09-09 | Stop reason: HOSPADM

## 2023-09-04 RX ORDER — ASPIRIN 81 MG/1
81 TABLET ORAL DAILY
Status: DISCONTINUED | OUTPATIENT
Start: 2023-09-05 | End: 2023-09-04

## 2023-09-04 RX ORDER — OXYCODONE HYDROCHLORIDE 5 MG/1
5 TABLET ORAL
Status: DISCONTINUED | OUTPATIENT
Start: 2023-09-04 | End: 2023-09-09 | Stop reason: HOSPADM

## 2023-09-04 RX ORDER — POLYETHYLENE GLYCOL 3350 17 G/17G
1 POWDER, FOR SOLUTION ORAL
Status: DISCONTINUED | OUTPATIENT
Start: 2023-09-04 | End: 2023-09-09 | Stop reason: HOSPADM

## 2023-09-04 RX ORDER — BISACODYL 10 MG
10 SUPPOSITORY, RECTAL RECTAL
Status: DISCONTINUED | OUTPATIENT
Start: 2023-09-04 | End: 2023-09-09 | Stop reason: HOSPADM

## 2023-09-04 RX ORDER — ACETAMINOPHEN 325 MG/1
650 TABLET ORAL EVERY 6 HOURS PRN
Status: DISCONTINUED | OUTPATIENT
Start: 2023-09-04 | End: 2023-09-09 | Stop reason: HOSPADM

## 2023-09-04 RX ORDER — ESCITALOPRAM OXALATE 10 MG/1
5 TABLET ORAL
Status: DISCONTINUED | OUTPATIENT
Start: 2023-09-04 | End: 2023-09-09 | Stop reason: HOSPADM

## 2023-09-04 RX ORDER — OXYCODONE HYDROCHLORIDE 5 MG/1
2.5 TABLET ORAL
Status: DISCONTINUED | OUTPATIENT
Start: 2023-09-04 | End: 2023-09-09 | Stop reason: HOSPADM

## 2023-09-04 RX ORDER — METOPROLOL SUCCINATE 25 MG/1
25 TABLET, EXTENDED RELEASE ORAL
Status: DISCONTINUED | OUTPATIENT
Start: 2023-09-04 | End: 2023-09-09 | Stop reason: HOSPADM

## 2023-09-04 RX ORDER — ONDANSETRON 2 MG/ML
4 INJECTION INTRAMUSCULAR; INTRAVENOUS EVERY 4 HOURS PRN
Status: DISCONTINUED | OUTPATIENT
Start: 2023-09-04 | End: 2023-09-09 | Stop reason: HOSPADM

## 2023-09-04 RX ORDER — PREDNISOLONE ACETATE 10 MG/ML
1 SUSPENSION/ DROPS OPHTHALMIC 4 TIMES DAILY
Status: DISCONTINUED | OUTPATIENT
Start: 2023-09-04 | End: 2023-09-09 | Stop reason: HOSPADM

## 2023-09-04 RX ORDER — SODIUM CHLORIDE, SODIUM LACTATE, POTASSIUM CHLORIDE, AND CALCIUM CHLORIDE .6; .31; .03; .02 G/100ML; G/100ML; G/100ML; G/100ML
500 INJECTION, SOLUTION INTRAVENOUS
Status: DISCONTINUED | OUTPATIENT
Start: 2023-09-04 | End: 2023-09-09 | Stop reason: HOSPADM

## 2023-09-04 RX ADMIN — DOXYCYCLINE 100 MG: 100 TABLET, FILM COATED ORAL at 18:00

## 2023-09-04 RX ADMIN — PREDNISOLONE ACETATE 1 DROP: 10 SUSPENSION/ DROPS OPHTHALMIC at 18:00

## 2023-09-04 RX ADMIN — IOHEXOL 60 ML: 350 INJECTION, SOLUTION INTRAVENOUS at 14:56

## 2023-09-04 RX ADMIN — PREDNISOLONE ACETATE 1 DROP: 10 SUSPENSION/ DROPS OPHTHALMIC at 21:32

## 2023-09-04 RX ADMIN — RIVAROXABAN 10 MG: 10 TABLET, FILM COATED ORAL at 18:00

## 2023-09-04 RX ADMIN — AMPICILLIN AND SULBACTAM 3 G: 1; 2 INJECTION, POWDER, FOR SOLUTION INTRAMUSCULAR; INTRAVENOUS at 17:58

## 2023-09-04 RX ADMIN — ESCITALOPRAM OXALATE 5 MG: 10 TABLET ORAL at 21:32

## 2023-09-04 RX ADMIN — METOPROLOL SUCCINATE 25 MG: 25 TABLET, EXTENDED RELEASE ORAL at 21:33

## 2023-09-04 ASSESSMENT — COGNITIVE AND FUNCTIONAL STATUS - GENERAL
SUGGESTED CMS G CODE MODIFIER DAILY ACTIVITY: CJ
MOVING FROM LYING ON BACK TO SITTING ON SIDE OF FLAT BED: A LITTLE
CLIMB 3 TO 5 STEPS WITH RAILING: A LITTLE
HELP NEEDED FOR BATHING: A LITTLE
MOBILITY SCORE: 20
TURNING FROM BACK TO SIDE WHILE IN FLAT BAD: A LITTLE
DRESSING REGULAR LOWER BODY CLOTHING: A LITTLE
WALKING IN HOSPITAL ROOM: A LITTLE
TOILETING: A LITTLE
DAILY ACTIVITIY SCORE: 20
DRESSING REGULAR UPPER BODY CLOTHING: A LITTLE
SUGGESTED CMS G CODE MODIFIER MOBILITY: CJ

## 2023-09-04 ASSESSMENT — PATIENT HEALTH QUESTIONNAIRE - PHQ9
4. FEELING TIRED OR HAVING LITTLE ENERGY: SEVERAL DAYS
2. FEELING DOWN, DEPRESSED, IRRITABLE, OR HOPELESS: NOT AT ALL
5. POOR APPETITE OR OVEREATING: NOT AT ALL
8. MOVING OR SPEAKING SO SLOWLY THAT OTHER PEOPLE COULD HAVE NOTICED. OR THE OPPOSITE, BEING SO FIGETY OR RESTLESS THAT YOU HAVE BEEN MOVING AROUND A LOT MORE THAN USUAL: NOT AT ALL
SUM OF ALL RESPONSES TO PHQ9 QUESTIONS 1 AND 2: 1
7. TROUBLE CONCENTRATING ON THINGS, SUCH AS READING THE NEWSPAPER OR WATCHING TELEVISION: NOT AT ALL
SUM OF ALL RESPONSES TO PHQ QUESTIONS 1-9: 2
9. THOUGHTS THAT YOU WOULD BE BETTER OFF DEAD, OR OF HURTING YOURSELF: NOT AT ALL
1. LITTLE INTEREST OR PLEASURE IN DOING THINGS: SEVERAL DAYS
6. FEELING BAD ABOUT YOURSELF - OR THAT YOU ARE A FAILURE OR HAVE LET YOURSELF OR YOUR FAMILY DOWN: NOT AL ALL
3. TROUBLE FALLING OR STAYING ASLEEP OR SLEEPING TOO MUCH: NOT AT ALL

## 2023-09-04 ASSESSMENT — LIFESTYLE VARIABLES
EVER FELT BAD OR GUILTY ABOUT YOUR DRINKING: NO
EVER HAD A DRINK FIRST THING IN THE MORNING TO STEADY YOUR NERVES TO GET RID OF A HANGOVER: NO
HOW MANY TIMES IN THE PAST YEAR HAVE YOU HAD 5 OR MORE DRINKS IN A DAY: 0
TOTAL SCORE: 0
ALCOHOL_USE: YES
TOTAL SCORE: 0
HAVE PEOPLE ANNOYED YOU BY CRITICIZING YOUR DRINKING: NO
HAVE YOU EVER FELT YOU SHOULD CUT DOWN ON YOUR DRINKING: NO
AVERAGE NUMBER OF DAYS PER WEEK YOU HAVE A DRINK CONTAINING ALCOHOL: 0
ON A TYPICAL DAY WHEN YOU DRINK ALCOHOL HOW MANY DRINKS DO YOU HAVE: 1
CONSUMPTION TOTAL: NEGATIVE
TOTAL SCORE: 0

## 2023-09-04 ASSESSMENT — ENCOUNTER SYMPTOMS
CHILLS: 1
ABDOMINAL PAIN: 0
VOMITING: 0
COUGH: 1
BRUISES/BLEEDS EASILY: 0
MYALGIAS: 0
EYE DISCHARGE: 0
EYE REDNESS: 0
FOCAL WEAKNESS: 0
SHORTNESS OF BREATH: 1
FEVER: 0
STRIDOR: 0
FLANK PAIN: 0
NERVOUS/ANXIOUS: 0

## 2023-09-04 ASSESSMENT — COPD QUESTIONNAIRES
HAVE YOU SMOKED AT LEAST 100 CIGARETTES IN YOUR ENTIRE LIFE: NO/DON'T KNOW
DO YOU EVER COUGH UP ANY MUCUS OR PHLEGM?: YES, A FEW DAYS A WEEK OR MONTH
COPD SCREENING SCORE: 5
DURING THE PAST 4 WEEKS HOW MUCH DID YOU FEEL SHORT OF BREATH: SOME OF THE TIME

## 2023-09-04 ASSESSMENT — FIBROSIS 4 INDEX
FIB4 SCORE: 3.61
FIB4 SCORE: 5.93
FIB4 SCORE: 5.93

## 2023-09-04 ASSESSMENT — PAIN DESCRIPTION - PAIN TYPE
TYPE: ACUTE PAIN
TYPE: ACUTE PAIN

## 2023-09-04 NOTE — ED PROVIDER NOTES
ED Provider Note    CHIEF COMPLAINT  Chief Complaint   Patient presents with    Shortness of Breath     Over the last few days   Pt states that she has been having increased weakness with this   Per family it has been waking her up          EXTERNAL RECORDS REVIEWED  Other there is a note from Dr. Barreto the cardiologist that her pacemaker is working appropriately on 2023    HPI/ROS  LIMITATION TO HISTORY   Select: : None  OUTSIDE HISTORIAN(S):  Patient's daughter    Yoli Carmichael is a 89 y.o. female who presents with past medical history significant for anemia, osteopenia, pleural effusion, pneumonia, she presents stating that for the last 3 days she has been short of breath.  She says in the middle the night she feels short of breath and a strange sensation in the middle of the chest that resolves.  She usually is not on oxygen and currently she has an oxygen requirement.  She has been coughing as well.  They tested her for COVID with a an antigen test where she lives which was negative.  This was last night.    PAST MEDICAL HISTORY   has a past medical history of Acute respiratory failure with hypoxia (HCC) (2021), BRIAN (acute kidney injury) (HCC) (2021), Anemia (2021), Arrhythmia, Arthritis, Bronchitis, CATARACT, Dry eyes, bilateral (3/27/2014), Osteopenia of the elderly (3/27/2014), Pleural effusion (2021), Pneumonia, Preoperative cardiovascular examination (2021), Transaminitis (2021), and Traumatic rhabdomyolysis (HCC) (2021).    SURGICAL HISTORY   has a past surgical history that includes knee arthroplasty total (9/3/2013); tonsillectomy; upper gi endoscopy,diagnosis (N/A, 2021); and revise knee joint replace,all parts (Right, 11/3/2021).    FAMILY HISTORY  Family History   Problem Relation Age of Onset    Lung Disease Mother         frequent pneumonia    Heart Disease Father 54         of MI    Lung Disease Father         Emphysema    Heart Attack Father   "   Cancer Maternal Aunt         Great Aunt and Cousin Ovarian CA    Hypertension Maternal Aunt     Cancer Daughter         Lung and Brain CA    Seizures Daughter     Cancer Daughter         Pituitary tumor    Cancer Brother         leukemia       SOCIAL HISTORY  Social History     Tobacco Use    Smoking status: Never    Smokeless tobacco: Never    Tobacco comments:     Pt exposed to second hand smoker   Vaping Use    Vaping Use: Never used   Substance and Sexual Activity    Alcohol use: Yes     Comment: Occasionally    Drug use: No    Sexual activity: Never       CURRENT MEDICATIONS  Home Medications       Reviewed by Matthew Teran (Pharmacy Tech) on 09/04/23 at 1247  Med List Status: Complete     Medication Last Dose Status   Cholecalciferol (VITAMIN D) 125 MCG (5000 UT) Cap 9/3/2023 Active   escitalopram (LEXAPRO) 5 MG tablet 9/3/2023 Active   metoprolol SR (TOPROL XL) 25 MG TABLET SR 24 HR 9/3/2023 Active   Multiple Vitamins-Minerals (ICAPS) Tab 9/3/2023 Active   Multiple Vitamins-Minerals (MULTIVITAMIN GUMMIES WOMENS) Chew Tab 9/3/2023 Active   prednisoLONE acetate (PRED FORTE) 1 % Suspension 9/4/2023 Active   Probiotic Product (PROBIOTIC DIGESTIVE SUPP) Cap 9/3/2023 Active                    ALLERGIES  Allergies   Allergen Reactions    Other Environmental Runny Nose and Itching     Seasonal allergies       PHYSICAL EXAM  VITAL SIGNS: BP (!) 140/62   Pulse 81   Temp 37.1 °C (98.7 °F) (Temporal)   Resp (!) 32   Ht 1.626 m (5' 4\")   Wt 64 kg (141 lb 1.5 oz)   SpO2 94%   BMI 24.22 kg/m²    Constitutional: Alert.  HENT: No signs of trauma.  Eyes: Conjunctiva normal, Non-icteric.   Neck: No stridor.   Cardiovascular: Regular rate and rhythm.   Thorax & Lungs: No respiratory distress.  Abdomen: Nondistended.  Skin: No rash.   Extremities: No cyanosis.  Musculoskeletal: No obvious deformities.  Neurologic: Alert , No focal deficits noted.   Psychiatric: Affect normal, Judgment normal, Mood normal. "       DIAGNOSTIC STUDIES / PROCEDURES  EKG  I have independently interpreted this EKG  This is a twelve-lead EKG interpretation by myself.  It is normal sinus rhythm at a rate of 84.  The KS interval is prolonged 230 consistent with first-degree AV block.  The QRS and QTc are normal.  There are nonspecific ST-T wave changes.  My impression of this EKG, it does not indicate STEMI criteria at this time.    LABS  Labs Reviewed   CBC WITH DIFFERENTIAL - Abnormal; Notable for the following components:       Result Value    WBC 14.4 (*)     Platelet Count 110 (*)     Neutrophils-Polys 79.30 (*)     Lymphocytes 7.60 (*)     Immature Granulocytes 1.10 (*)     Neutrophils (Absolute) 11.37 (*)     Monos (Absolute) 1.67 (*)     Immature Granulocytes (abs) 0.16 (*)     All other components within normal limits    Narrative:     Biotin intake of greater than 5 mg per day may interfere with  troponin levels, causing false low values.   COMP METABOLIC PANEL - Abnormal; Notable for the following components:    Sodium 133 (*)     Co2 19 (*)     Glucose 131 (*)     Bun 23 (*)     Calcium 8.2 (*)     All other components within normal limits    Narrative:     Biotin intake of greater than 5 mg per day may interfere with  troponin levels, causing false low values.   TROPONIN - Abnormal; Notable for the following components:    Troponin T 34 (*)     All other components within normal limits    Narrative:     Biotin intake of greater than 5 mg per day may interfere with  troponin levels, causing false low values.   PROBRAIN NATRIURETIC PEPTIDE, NT - Abnormal; Notable for the following components:    NT-proBNP 6448 (*)     All other components within normal limits    Narrative:     Biotin intake of greater than 5 mg per day may interfere with  troponin levels, causing false low values.   D-DIMER - Abnormal; Notable for the following components:    D-Dimer 0.97 (*)     All other components within normal limits   ESTIMATED GFR - Abnormal;  Notable for the following components:    GFR (CKD-EPI) 42 (*)     All other components within normal limits    Narrative:     Biotin intake of greater than 5 mg per day may interfere with  troponin levels, causing false low values.   COV-2, FLU A/B, AND RSV BY PCR (CEPHEID)    Narrative:     Release to patient->Immediate         RADIOLOGY  I have independently interpreted the diagnostic imaging associated with this visit and am waiting the final reading from the radiologist.   My preliminary interpretation is as follows: Bilateral pulmonary effusions  Radiologist interpretation:     CT-CTA CHEST PULMONARY ARTERY W/ RECONS   Final Result      1.  No evidence of pulmonary embolus.      2.  Moderate size bilateral pleural effusions with associated atelectasis, left greater than right.      3.  Patchy groundglass opacities with more focal consolidation in left upper lobe could be due to edema or pneumonia.            DX-CHEST-PORTABLE (1 VIEW)   Final Result      1.  Mild interstitial opacities most likely represent fibrosis and/or edema.   2.  Small left and trace right pleural effusions.            COURSE & MEDICAL DECISION MAKING    ED Observation Status? Yes; I am placing the patient in to an observation status due to a diagnostic uncertainty as well as therapeutic intensity. Patient placed in observation status at 12:45 PM, 9/4/2023.     Observation plan is as follows: The patient presents with hypoxia, shortness of breath, she possibly has COVID, I will send a PCR test, it is possible that she had an false negative antigen test last night, differential also includes CHF, pneumonia, pleural effusion, pulmonary embolism.  Chest x-ray was ordered, labs were ordered.    Upon Reevaluation, the patient's condition has: not improved; and will be escalated to hospitalization.    Patient discharged from ED Observation status at 3:30 PM (Time) September 4, 2023 (Date).     INITIAL ASSESSMENT, COURSE AND PLAN  Care  Narrative:     The patient's D-dimer is elevated, troponin is elevated, BNP is elevated, chest x-ray is equivocal for possible pneumonia.  White blood count is elevated.  CT scan was ordered to evaluate further.    3:31 PM CT scan is consistent with either pulmonary edema or pneumonia.  Her white count is elevated.  I spoke with the hospitalist who will assess the patient and decide on antibiotics.          ADDITIONAL PROBLEM LIST  Pneumonia, anemia  DISPOSITION AND DISCUSSIONS  I have discussed management of the patient with the following physicians and CHERIE's: I spoke with the hospitalist to assess the patient for hospitalization.    Discussion of management with other QHP or appropriate source(s): None         Barriers to care at this time, including but not limited to:  None .     Decision tools and prescription drugs considered including, but not limited to:  None          FINAL DIAGNOSIS  1. Hypoxia           Electronically signed by: Good Holt M.D., 9/4/2023 12:45 PM

## 2023-09-04 NOTE — ASSESSMENT & PLAN NOTE
CT imaging concerning for pneumonia.  COVID-19/influenza is negative.  Continue Unasyn and doxycycline, follow cultures and sensitivities.  Requiring 2 L to maintain oxygen saturation  Repeat chest x-ray showed small bilateral pleural effusions - give 1 dose lasix to see if less oxygen is needed.

## 2023-09-04 NOTE — ASSESSMENT & PLAN NOTE
Secondary to pneumonia.  COVID-19/influenza is negative  Continue with Unasyn Doxy  Currently needing 2 L nasal cannula, patient is not on oxygen at baseline, has needed oxygen to return home in the past.  Start Mucinex  Oxygen as needed, Respiratory protocol, Bronchodilators, Incentive spirometry

## 2023-09-04 NOTE — ED TRIAGE NOTES
"Chief Complaint   Patient presents with    Shortness of Breath     Over the last few days   Pt states that she has been having increased weakness with this   Per family it has been waking her up        BP (!) 142/65   Pulse 92   Temp 37.1 °C (98.7 °F) (Temporal)   Resp 18   Ht 1.626 m (5' 4\")   Wt 64 kg (141 lb 1.5 oz)   SpO2 (!) 83%   BMI 24.22 kg/m²     Pt placed on 3L NC in triage for RA sats at 83%. PTs oxygen improved to 94% on 3L.   Due to hypoxia pts EKG to be completed in room.   "

## 2023-09-04 NOTE — H&P
Hospital Medicine History & Physical Note    Date of Service  9/4/2023    Primary Care Physician  CHILANGO Quijano    Consultants  None     Code Status  Full Code    Chief Complaint  Chief Complaint   Patient presents with    Shortness of Breath     Over the last few days   Pt states that she has been having increased weakness with this   Per family it has been waking her up        History of Presenting Illness  Yoli Carmichael is a 89 y.o. female with a past medical history of primary hypertension who presented 9/4/2023 with generalized weakness and shortness of breath.  Patient reports that she has not been feeling well over the past 3-4 days.  She has been having progressively worsening shortness of breath and generalized weakness.  She denies noticing any lower extremity pain redness or swelling.  She reports having chills but no fevers.     I discussed the plan of care with emergency department physician, patient daughter, Clarita, and the patient.    Review of Systems  Review of Systems   Constitutional:  Positive for chills and malaise/fatigue. Negative for fever.   Eyes:  Negative for discharge and redness.   Respiratory:  Positive for cough and shortness of breath. Negative for stridor.    Cardiovascular:  Negative for chest pain and leg swelling.   Gastrointestinal:  Negative for abdominal pain and vomiting.   Genitourinary:  Negative for flank pain.   Musculoskeletal:  Negative for myalgias.   Skin: Negative.    Neurological:  Negative for focal weakness.   Endo/Heme/Allergies:  Does not bruise/bleed easily.   Psychiatric/Behavioral:  The patient is not nervous/anxious.      Past Medical History   has a past medical history of Acute respiratory failure with hypoxia ([facility]) (11/4/2021), BRIAN (acute kidney injury) ([facility]) (4/6/2021), Anemia (4/4/2021), Arrhythmia, Arthritis, Bronchitis, CATARACT, Dry eyes, bilateral (3/27/2014), Osteopenia of the elderly (3/27/2014), Pleural effusion (6/28/2021),  Pneumonia, Preoperative cardiovascular examination (5/8/2021), Transaminitis (6/20/2021), and Traumatic rhabdomyolysis (HCC) (5/8/2021).    Surgical History   has a past surgical history that includes knee arthroplasty total (9/3/2013); tonsillectomy; pr upper gi endoscopy,diagnosis (N/A, 6/21/2021); and pr revise knee joint replace,all parts (Right, 11/3/2021).     Family History  family history includes Cancer in her brother, daughter, daughter, and maternal aunt; Heart Attack in her father; Heart Disease (age of onset: 54) in her father; Hypertension in her maternal aunt; Lung Disease in her father and mother; Seizures in her daughter.      Social History   reports that she has never smoked. She has never used smokeless tobacco. She reports current alcohol use. She reports that she does not use drugs.    Allergies  Allergies   Allergen Reactions    Other Environmental Runny Nose and Itching     Seasonal allergies     Medications  Prior to Admission Medications   Prescriptions Last Dose Informant Patient Reported? Taking?   Cholecalciferol (VITAMIN D) 125 MCG (5000 UT) Cap 9/3/2023 at 2300 Patient No No   Sig: Take 5,000 Units by mouth every day. For osteoporosis and vitamin D Deficiency   Patient taking differently: Take 5,000 Units by mouth every evening. For osteoporosis and vitamin D Deficiency   Multiple Vitamins-Minerals (ICAPS) Tab 9/3/2023 at 2300 Patient Yes No   Sig: Take 1 Tablet by mouth every evening. Indications: eye health   Multiple Vitamins-Minerals (MULTIVITAMIN GUMMIES WOMENS) Chew Tab 9/3/2023 at 2300 Patient Yes No   Sig: Chew 1 Dose every evening.   Probiotic Product (PROBIOTIC DIGESTIVE SUPP) Cap 9/3/2023 at 2300 Patient Yes No   Sig: Take 1 Capsule by mouth every evening. Indications: digestion   escitalopram (LEXAPRO) 5 MG tablet 9/3/2023 at 2300 Patient No No   Sig: Take 1 Tablet by mouth at bedtime. For depression   metoprolol SR (TOPROL XL) 25 MG TABLET SR 24 HR 9/3/2023 at 2300 Patient  No No   Sig: Take 1 Tablet by mouth every day. For Heart   Patient taking differently: Take 25 mg by mouth every evening. For Heart   prednisoLONE acetate (PRED FORTE) 1 % Suspension 9/4/2023 at 1000 Patient Yes No   Sig: Administer 1 Drop into the right eye 4 times a day.      Facility-Administered Medications: None     Physical Exam  Temp:  [37.1 °C (98.7 °F)] 37.1 °C (98.7 °F)  Pulse:  [78-92] 83  Resp:  [18-37] 37  BP: (140-157)/(57-65) 153/59  SpO2:  [83 %-95 %] 94 %  Blood Pressure : (!) 140/62   Temperature: 37.1 °C (98.7 °F)   Pulse: 81   Respiration: (!) 32   Pulse Oximetry: 94 %     Physical Exam  Constitutional:       General: She is not in acute distress.  HENT:      Head: Normocephalic and atraumatic.      Right Ear: External ear normal.      Left Ear: External ear normal.      Nose: No congestion or rhinorrhea.      Mouth/Throat:      Mouth: Mucous membranes are dry.      Pharynx: No oropharyngeal exudate or posterior oropharyngeal erythema.   Eyes:      General: No scleral icterus.        Right eye: No discharge.         Left eye: No discharge.      Conjunctiva/sclera: Conjunctivae normal.      Pupils: Pupils are equal, round, and reactive to light.   Cardiovascular:      Rate and Rhythm: Regular rhythm. Tachycardia present.      Heart sounds:      No friction rub. No gallop.   Pulmonary:      Comments: Requiring 3 L of oxygen to achieve adequate saturation.  Is able to speak in full sentences.  Abdominal:      General: Abdomen is flat. There is no distension.      Tenderness: There is no guarding.   Musculoskeletal:         General: No swelling.      Cervical back: Neck supple. No rigidity. No muscular tenderness.      Right lower leg: No edema.      Left lower leg: No edema.   Skin:     General: Skin is dry.      Capillary Refill: Capillary refill takes 2 to 3 seconds.      Coloration: Skin is pale. Skin is not jaundiced.      Findings: No bruising or erythema.   Neurological:      Mental Status:  She is alert and oriented to person, place, and time.   Psychiatric:         Mood and Affect: Mood normal.         Judgment: Judgment normal.       Laboratory:  Recent Labs     09/04/23  1310   WBC 14.4*   RBC 4.45   HEMOGLOBIN 12.7   HEMATOCRIT 39.1   MCV 87.9   MCH 28.5   MCHC 32.5   RDW 45.1   PLATELETCT 110*   MPV 10.4     Recent Labs     09/04/23  1310   SODIUM 133*   POTASSIUM 4.0   CHLORIDE 100   CO2 19*   GLUCOSE 131*   BUN 23*   CREATININE 1.23   CALCIUM 8.2*     Recent Labs     09/04/23  1310   ALTSGPT 16   ASTSGOT 29   ALKPHOSPHAT 78   TBILIRUBIN 1.2   GLUCOSE 131*         Recent Labs     09/04/23  1310   NTPROBNP 6448*         Recent Labs     09/04/23  1310   TROPONINT 34*     Imaging:  CT-CTA CHEST PULMONARY ARTERY W/ RECONS   Final Result      1.  No evidence of pulmonary embolus.      2.  Moderate size bilateral pleural effusions with associated atelectasis, left greater than right.      3.  Patchy groundglass opacities with more focal consolidation in left upper lobe could be due to edema or pneumonia.            DX-CHEST-PORTABLE (1 VIEW)   Final Result      1.  Mild interstitial opacities most likely represent fibrosis and/or edema.   2.  Small left and trace right pleural effusions.        Assessment/Plan:  Justification for Admission Status  I anticipate this patient will require at least two midnights for appropriate medical management, necessitating inpatient admission because the patient has pneumonia will require intravenous antibiotics.    Patient will need a Telemetry bed on MEDICAL service.  The patient has pneumonia.    I personally reviewed patient EKG shows sinus rhythm with a rate of 84, there is no ST elevation, there is 0.5 mm ST depression in lead I, 2, leads V4-V6.  QTc is 460.    * Acute hypoxemic respiratory failure (HCC)- (present on admission)  Assessment & Plan  Secondary to pneumonia.  COVID-19/influenza is negative  Treated with intravenous antibiotics  Oxygen as needed,  Respiratory protocol, Bronchodilators, Incentive spirometry     Pneumonia- (present on admission)  Assessment & Plan  CT imaging concerning for pneumonia.  COVID-19/influenza is negative.  I will start Unasyn and doxycycline, follow cultures and sensitivities.    Elevated troponin- (present on admission)  Assessment & Plan  In the indeterminate range  Denies chest pain.  EKG shows sinus rhythm with a rate of 84, there is no ST elevation, there is 0.5 mm ST depression in lead I, 2, leads V4-V6.  QTc is 460.  Continuous cardiac monitoring    Stat EKG, troponin for chest pain or shortness of breath     Advance care planning- (present on admission)  Assessment & Plan  I had a discussion with the patient and patient daughter, Clarita, present at bedside regarding goals of care, diagnoses, prognosis, and CODE STATUS. We discussed her prognosis and comorbidities.  The patient has advanced age with few comorbid medical problems including chronic kidney disease stage IIIb, and primary hypertension however she has relatively good mental and functional status.  At this point the patient wants to maintain a full code.  She is interested in receiving diagnostic/therapeutic interventions as needed including CPR/intubation.       Stage 3b chronic kidney disease (HCC)- (present on admission)  Assessment & Plan  Avoid/minimize nephrotoxins as much as possible, renally dose medications, monitor inputs and outputs     Essential hypertension, benign- (present on admission)  Assessment & Plan  Resume metoprolol with hold parameters    Recurrent major depressive disorder, in full remission (HCC)- (present on admission)  Assessment & Plan  Resume escitalopram      VTE prophylaxis: SCDs/TEDs and Xarelto 10 mg daily as prophylaxis    I had a discussion with the patient and patient daughter, Clarita, present at bedside regarding goals of care, diagnoses, prognosis, and CODE STATUS. We discussed her prognosis and comorbidities.  The patient has  advanced age with few comorbid medical problems including chronic kidney disease stage IIIb, and primary hypertension however she has relatively good mental and functional status.  At this point the patient wants to maintain a full code.  She is interested in receiving diagnostic/therapeutic interventions as needed including CPR/intubation.  I spent 17 minutes on advanced care planning in addition to the time spent managing the other medical problems. Advance care planning start time  4:33 PM. Advance care planning end time   4:50 PM

## 2023-09-04 NOTE — ASSESSMENT & PLAN NOTE
Denies chest pain.  EKG shows sinus rhythm with a rate of 84, there is no ST elevation, there is 0.5 mm ST depression in lead I, 2, leads V4-V6.  QTc is 460.  Continuous cardiac monitoring

## 2023-09-04 NOTE — ED NOTES
Erp to bedside. Covid swab collected after same. Aware of pending cxr, call light in reach. Dtr at bedside

## 2023-09-05 LAB
ALBUMIN SERPL BCP-MCNC: 3.1 G/DL (ref 3.2–4.9)
ALBUMIN/GLOB SERPL: 1 G/DL
ALP SERPL-CCNC: 76 U/L (ref 30–99)
ALT SERPL-CCNC: 17 U/L (ref 2–50)
ANION GAP SERPL CALC-SCNC: 11 MMOL/L (ref 7–16)
AST SERPL-CCNC: 25 U/L (ref 12–45)
BILIRUB SERPL-MCNC: 1 MG/DL (ref 0.1–1.5)
BUN SERPL-MCNC: 21 MG/DL (ref 8–22)
CALCIUM ALBUM COR SERPL-MCNC: 9 MG/DL (ref 8.5–10.5)
CALCIUM SERPL-MCNC: 8.3 MG/DL (ref 8.4–10.2)
CHLORIDE SERPL-SCNC: 104 MMOL/L (ref 96–112)
CO2 SERPL-SCNC: 19 MMOL/L (ref 20–33)
CREAT SERPL-MCNC: 1.08 MG/DL (ref 0.5–1.4)
ERYTHROCYTE [DISTWIDTH] IN BLOOD BY AUTOMATED COUNT: 45.9 FL (ref 35.9–50)
GFR SERPLBLD CREATININE-BSD FMLA CKD-EPI: 49 ML/MIN/1.73 M 2
GLOBULIN SER CALC-MCNC: 3 G/DL (ref 1.9–3.5)
GLUCOSE SERPL-MCNC: 102 MG/DL (ref 65–99)
HCT VFR BLD AUTO: 37 % (ref 37–47)
HGB BLD-MCNC: 12.1 G/DL (ref 12–16)
LACTATE SERPL-SCNC: 0.6 MMOL/L (ref 0.5–2)
LACTATE SERPL-SCNC: 0.8 MMOL/L (ref 0.5–2)
MAGNESIUM SERPL-MCNC: 2 MG/DL (ref 1.5–2.5)
MCH RBC QN AUTO: 29 PG (ref 27–33)
MCHC RBC AUTO-ENTMCNC: 32.7 G/DL (ref 32.2–35.5)
MCV RBC AUTO: 88.7 FL (ref 81.4–97.8)
PLATELET # BLD AUTO: 121 K/UL (ref 164–446)
PMV BLD AUTO: 11.2 FL (ref 9–12.9)
POTASSIUM SERPL-SCNC: 4 MMOL/L (ref 3.6–5.5)
PROCALCITONIN SERPL-MCNC: 0.15 NG/ML
PROT SERPL-MCNC: 6.1 G/DL (ref 6–8.2)
RBC # BLD AUTO: 4.17 M/UL (ref 4.2–5.4)
SODIUM SERPL-SCNC: 134 MMOL/L (ref 135–145)
WBC # BLD AUTO: 12.1 K/UL (ref 4.8–10.8)

## 2023-09-05 PROCEDURE — 36415 COLL VENOUS BLD VENIPUNCTURE: CPT

## 2023-09-05 PROCEDURE — 700102 HCHG RX REV CODE 250 W/ 637 OVERRIDE(OP): Performed by: INTERNAL MEDICINE

## 2023-09-05 PROCEDURE — 94760 N-INVAS EAR/PLS OXIMETRY 1: CPT

## 2023-09-05 PROCEDURE — 700105 HCHG RX REV CODE 258: Performed by: HOSPITALIST

## 2023-09-05 PROCEDURE — 97166 OT EVAL MOD COMPLEX 45 MIN: CPT

## 2023-09-05 PROCEDURE — A9270 NON-COVERED ITEM OR SERVICE: HCPCS | Performed by: INTERNAL MEDICINE

## 2023-09-05 PROCEDURE — 97162 PT EVAL MOD COMPLEX 30 MIN: CPT

## 2023-09-05 PROCEDURE — 80053 COMPREHEN METABOLIC PANEL: CPT

## 2023-09-05 PROCEDURE — 700102 HCHG RX REV CODE 250 W/ 637 OVERRIDE(OP): Performed by: HOSPITALIST

## 2023-09-05 PROCEDURE — A9270 NON-COVERED ITEM OR SERVICE: HCPCS | Performed by: HOSPITALIST

## 2023-09-05 PROCEDURE — 99232 SBSQ HOSP IP/OBS MODERATE 35: CPT | Performed by: INTERNAL MEDICINE

## 2023-09-05 PROCEDURE — 85027 COMPLETE CBC AUTOMATED: CPT

## 2023-09-05 PROCEDURE — 84145 PROCALCITONIN (PCT): CPT

## 2023-09-05 PROCEDURE — 94669 MECHANICAL CHEST WALL OSCILL: CPT

## 2023-09-05 PROCEDURE — 83605 ASSAY OF LACTIC ACID: CPT

## 2023-09-05 PROCEDURE — 700111 HCHG RX REV CODE 636 W/ 250 OVERRIDE (IP): Mod: JZ | Performed by: HOSPITALIST

## 2023-09-05 PROCEDURE — 83735 ASSAY OF MAGNESIUM: CPT

## 2023-09-05 PROCEDURE — 770020 HCHG ROOM/CARE - TELE (206)

## 2023-09-05 RX ORDER — GUAIFENESIN 600 MG/1
600 TABLET, EXTENDED RELEASE ORAL EVERY 12 HOURS
Status: DISCONTINUED | OUTPATIENT
Start: 2023-09-05 | End: 2023-09-09 | Stop reason: HOSPADM

## 2023-09-05 RX ADMIN — PREDNISOLONE ACETATE 1 DROP: 10 SUSPENSION/ DROPS OPHTHALMIC at 20:59

## 2023-09-05 RX ADMIN — AMPICILLIN AND SULBACTAM 3 G: 1; 2 INJECTION, POWDER, FOR SOLUTION INTRAMUSCULAR; INTRAVENOUS at 05:35

## 2023-09-05 RX ADMIN — GUAIFENESIN 600 MG: 600 TABLET, EXTENDED RELEASE ORAL at 09:32

## 2023-09-05 RX ADMIN — AMPICILLIN AND SULBACTAM 3 G: 1; 2 INJECTION, POWDER, FOR SOLUTION INTRAMUSCULAR; INTRAVENOUS at 18:28

## 2023-09-05 RX ADMIN — ESCITALOPRAM OXALATE 5 MG: 10 TABLET ORAL at 20:58

## 2023-09-05 RX ADMIN — PREDNISOLONE ACETATE 1 DROP: 10 SUSPENSION/ DROPS OPHTHALMIC at 11:51

## 2023-09-05 RX ADMIN — DOXYCYCLINE 100 MG: 100 TABLET, FILM COATED ORAL at 05:35

## 2023-09-05 RX ADMIN — DOXYCYCLINE 100 MG: 100 TABLET, FILM COATED ORAL at 18:30

## 2023-09-05 RX ADMIN — PREDNISOLONE ACETATE 1 DROP: 10 SUSPENSION/ DROPS OPHTHALMIC at 18:31

## 2023-09-05 RX ADMIN — METOPROLOL SUCCINATE 25 MG: 25 TABLET, EXTENDED RELEASE ORAL at 20:58

## 2023-09-05 RX ADMIN — RIVAROXABAN 10 MG: 10 TABLET, FILM COATED ORAL at 18:30

## 2023-09-05 RX ADMIN — PREDNISOLONE ACETATE 1 DROP: 10 SUSPENSION/ DROPS OPHTHALMIC at 05:36

## 2023-09-05 RX ADMIN — GUAIFENESIN 600 MG: 600 TABLET, EXTENDED RELEASE ORAL at 18:30

## 2023-09-05 ASSESSMENT — PAIN DESCRIPTION - PAIN TYPE
TYPE: ACUTE PAIN

## 2023-09-05 ASSESSMENT — ENCOUNTER SYMPTOMS
PHOTOPHOBIA: 0
COUGH: 0
FEVER: 0
SORE THROAT: 0
WEAKNESS: 0
CHILLS: 0
DIARRHEA: 0
VOMITING: 0
DEPRESSION: 0
SHORTNESS OF BREATH: 0
SPEECH CHANGE: 0
SPUTUM PRODUCTION: 0
HEADACHES: 0
DIZZINESS: 0
BLURRED VISION: 0
NERVOUS/ANXIOUS: 0
INSOMNIA: 0
SENSORY CHANGE: 0
HEMOPTYSIS: 0
HEARTBURN: 0
ABDOMINAL PAIN: 0
CONSTIPATION: 0
MYALGIAS: 0
CLAUDICATION: 0

## 2023-09-05 ASSESSMENT — COGNITIVE AND FUNCTIONAL STATUS - GENERAL
DAILY ACTIVITIY SCORE: 22
MOBILITY SCORE: 23
DRESSING REGULAR LOWER BODY CLOTHING: A LITTLE
HELP NEEDED FOR BATHING: A LITTLE
SUGGESTED CMS G CODE MODIFIER MOBILITY: CI
MOVING FROM LYING ON BACK TO SITTING ON SIDE OF FLAT BED: A LITTLE
SUGGESTED CMS G CODE MODIFIER DAILY ACTIVITY: CJ

## 2023-09-05 ASSESSMENT — GAIT ASSESSMENTS
DEVIATION: SHUFFLED GAIT
GAIT LEVEL OF ASSIST: SUPERVISED
DISTANCE (FEET): 150
DISTANCE (FEET): 150

## 2023-09-05 ASSESSMENT — ACTIVITIES OF DAILY LIVING (ADL): TOILETING: INDEPENDENT

## 2023-09-05 ASSESSMENT — FIBROSIS 4 INDEX: FIB4 SCORE: 4.51

## 2023-09-05 NOTE — HOSPITAL COURSE
Patient is a 89-year-old female with history of hypertension came in with generalized weakness and shortness of breath for the last few days.  She been feeling poorly over the last 3 to 4 days with worsening shortness of breath and weakness.  She has been having issues with her right eye and has been taking Pred Forte with some improvement initially thought it was an eye infection however it continued to progress.  Chest x-ray is concerning for pneumonia she was started on antibiotics and is currently requiring oxygen to maintain saturation.

## 2023-09-05 NOTE — PROGRESS NOTES
4 Eyes Skin Assessment Completed by Shaylee, RN and Oneil RN.    Head WDL  Ears WDL  Nose WDL  Mouth WDL  Neck WDL  Breast/Chest WDL  Shoulder Blades WDL  Spine WDL  (R) Arm/Elbow/Hand Bruising  (L) Arm/Elbow/Hand Bruising  Abdomen WDL  Groin WDL  Scrotum/Coccyx/Buttocks WDL  (R) Leg WDL  (L) Leg WDL  (R) Heel/Foot/Toe WDL  (L) Heel/Foot/Toe WDL          Devices In Places Tele Box and Pulse Ox      Interventions In Place Gray Ear Foams    Possible Skin Injury No    Pictures Uploaded Into Epic N/A  Wound Consult Placed N/A  RN Wound Prevention Protocol Ordered No

## 2023-09-05 NOTE — PROGRESS NOTES
Telemetry Shift Summary     Rhythm Paced/SR  HR Range 75-88  Ectopy rPVC  Measurements 0.28/0.08/0.40      as reported per monitor tech     Normal Values  Rhythm SR  HR Range    Measurements 0.12-0.20 / 0.06-0.10  / 0.30-0.52

## 2023-09-05 NOTE — DISCHARGE PLANNING
"HTH/SCP TCN chart review completed. Noted patient followed by [facility] for primary care. Collaborated with FAUSTO Galvez prior to meeting with the pt. The most current review of medical record, knowledge of pt's PLOF and social support, LACE+ score of 65, 6 clicks scores of 20 ADL and 23 mobility were considered.      TCN met with patient at bedside. Introduced TCN program. Provided education regarding post acute levels of care. Discussed HTH/SCP plan benefits (Meds to Beds, medical uber and [facility] transitional care). Pt verbalized understanding.     Patient endorsed she resides at [facility], but stated her goal was \"to go back home.\" Patient endorsed some mobility concerns related to \"balance\" and stated she was amendable to home healthcare should it be recommended based on therapy consults. Patient stated use of oxygen at home prior to acute hospitalization with choice provided to [facility].     As of this note, appreciate PT consult 9/5/2023 with recommendations for home health for continued physical therapy services. Choice proactively obtained for [facility], faxed to [facility] and given to FAUSTO. As patient followed by [facility] prior to acute hospitalization, patient amendable to TCN sending after hospital visit referral to [facility].     TCN will continue to follow and collaborate with discharge planning team as additional post acute needs arise. Thank you.     Completed today  PT with recommendations for home health for continued physical therapy services on 9/5/2023  Choice obtained: [facility] (O2 with patient providing choice for [facility])  Patient followed by [facility] prior to acute hospitalization. [facility] referral (Y) sent 9/5/2023   "

## 2023-09-05 NOTE — CARE PLAN
The patient is Stable - Low risk of patient condition declining or worsening    Shift Goals  Clinical Goals: abx; monitor O2 needs; monitor labs; monitor vitals  Patient Goals: rest    Progress made toward(s) clinical / shift goals:    Problem: Knowledge Deficit - Standard  Goal: Patient and family/care givers will demonstrate understanding of plan of care, disease process/condition, diagnostic tests and medications  Outcome: Progressing  Note: Patient updated on plan of care, questions answered.      Problem: Respiratory  Goal: Patient will achieve/maintain optimum respiratory ventilation and gas exchange  Outcome: Progressing  Flowsheets  Taken 9/4/2023 2249 by Tosha Marcos, R.N.  O2 Delivery Device: Nasal Cannula  Deep Breathe and Cough: Performs Correctly  Taken 9/4/2023 2045 by Berny Pinzon, RRT  Incentive Spirometer Volume: 750 mL  Note: Continue working with RT, monitor oxygen needs, abx as ordered       Problem: Fall Risk  Goal: Patient will remain free from falls  Outcome: Progressing  Note: Appropriate fall risk prevention interventions in place.        Patient is not progressing towards the following goals:

## 2023-09-05 NOTE — CARE PLAN
Problem: Hyperinflation  Goal: Prevent or improve atelectasis  Description: Target End Date:  3 to 4 days    1. Instruct incentive spirometry usage  2.  Perform hyperinflation therapy as indicated  Flowsheets (Taken 9/4/2023 2045 by Berny Pinzon, BECKY)  Hyperinflation Protocol Goals/Outcome: Stable Vital Capacity x24 hrs and Patient Understands / uses I.S.  Hyperinflation Protocol Indications: Pneumonia  Note: PEP QID  IS 1000

## 2023-09-05 NOTE — THERAPY
Physical Therapy   Initial Evaluation     Patient Name: Yoli Carmichael  Age:  89 y.o., Sex:  female  Medical Record #: [id number]  Today's Date: 9/5/2023     Precautions  Precautions: Other (See Comments)  Comments: desats on 4L nc with activity down to 86%    Assessment  Patient is 89 y.o. female with a diagnosis of acute hypoxemic respiratory failure, PNA. Past medical history of primary hypertension who presented 9/4/2023 with generalized weakness and shortness of breath.  Patient reports that she has not been feeling well over the past 3-4 days.   Pt is presenting with increased O2 needs and mild SOB with activity but otherwise is able to ambulate safely without AD and appears to be close to baseline for mobility.  There are no further acute skilled PT needs at this time.  DC PT.     Plan    Physical Therapy Initial Treatment Plan   Duration: Evaluation only    DC Equipment Recommendations: None  Discharge Recommendations: Recommend home health for continued physical therapy services        09/05/23 0940   Prior Living Situation   Housing / Facility Assisted Living Residence   Equipment Owned Front-Wheel Walker;Single Point Cane   Prior Level of Functional Mobility   Bed Mobility Independent   Transfer Status Independent   Ambulation Independent   Assistive Devices Used None   Stairs Independent   Cognition    Level of Consciousness Alert   Comments Oriented x4   Active ROM Lower Body    Active ROM Lower Body  WDL   Strength Lower Body   Lower Body Strength  WDL   Balance Assessment   Sitting Balance (Static) Fair +   Sitting Balance (Dynamic) Fair   Standing Balance (Static) Fair   Standing Balance (Dynamic) Fair   Weight Shift Sitting Fair   Weight Shift Standing Fair   Bed Mobility    Supine to Sit Supervised   Gait Analysis   Gait Level Of Assist Supervised   Assistive Device None   Distance (Feet) 150   # of Times Distance was Traveled 1   Deviation Shuffled Gait   Comments no LOB with gait   Functional  Mobility   Sit to Stand Supervised   Bed, Chair, Wheelchair Transfer Supervised   Activity Tolerance   Standing desats on 4L nc down to 86% with activity

## 2023-09-05 NOTE — THERAPY
"Occupational Therapy   Initial Evaluation     Patient Name: Yoli Carmichael  Age:  89 y.o., Sex:  female  Medical Record #: [id number]  Today's Date: 9/5/2023     Precautions  Precautions: Other (See Comments)  Comments: desats on 4L with activity    Assessment  Patient is 89 y.o. female admit with Shortness of Breath, increased weakness. Acute hypoxemic respiratory failure , PNA, h/o kidney disease.  Pt currently demos adequate strength and balance for safe ADl's and mobility in her home environment.  She is limited by SOB and increased O2 needs with activity.  Pt will benefit from HH therapy if she needs to go home on O2.  Otherwise, no further OT needs in this setting, Rec continue to mobilize with nursing.    Plan    Occupational Therapy Initial Treatment Plan   Duration: Evaluation only       Discharge Recommendations: Recommend home health for continued occupational therapy services (if she goes home on O2- may need assist learning ADl's with O2)     Subjective    \"I'm fine.\"     Objective       09/05/23 0941   Prior Living Situation   Prior Services Home With Outpatient Therapy   Housing / Facility Assisted Living Residence   Steps Into Home 0   Steps In Home 0   Bathroom Set up Walk In Shower;Grab Bars;Shower Chair   Equipment Owned Front-Wheel Walker;Single Point Cane;Tub / Shower Seat;Grab Bar(s) In Tub / Shower;Grab Bar(s) By Toilet   Lives with - Patient's Self Care Capacity Other (Comments)  ([facility] at [facility] to [facility])   Comments Pt reports she goes to outpt PT at her [facility]   Prior Level of ADL Function   Self Feeding Independent   Grooming / Hygiene Independent   Bathing Independent   Dressing Independent   Toileting Independent   Prior Level of IADL Function   Medication Management Unable To Determine At This Time   Laundry Unable To Determine At This Time   Kitchen Mobility Unable To Determine At This Time   Finances Unable To Determine At This Time   Home Management Requires Assist   Shopping " Requires Assist   Prior Level Of Mobility Independent Without Device in Home   Driving / Transportation Unable To Determine At This Time   Occupation (Pre-Hospital Vocational) Retired Due To Disability   Leisure Interests Unable To Determine At This Time   Precautions   Comments desats on 4L with activity   Vitals   Pulse Oximetry 92 %   O2 (LPM) 4   O2 Delivery Device Silicone Nasal Cannula   Vitals Comments Needed 5L with activity to keep at 91% or above   Pain 0 - 10 Group   Therapist Pain Assessment 0;Nurse Notified   Cognition    Level of Consciousness Alert   Comments Oriented, attentive.  Irritated by lines, cords   Active ROM Upper Body   Active ROM Upper Body  WDL   Strength Upper Body   Upper Body Strength  WDL   Balance Assessment   Sitting Balance (Static) Fair +   Sitting Balance (Dynamic) Fair   Standing Balance (Static) Fair   Standing Balance (Dynamic) Fair   Weight Shift Sitting Fair   Weight Shift Standing Fair   Comments no device   Bed Mobility    Supine to Sit Supervised   Comments UIC post   ADL Assessment   Grooming Supervision;Standing   Upper Body Dressing Modified Independent   Lower Body Dressing Supervision   Toileting   (reports sup with nursing)   How much help from another person does the patient currently need...   Putting on and taking off regular lower body clothing? 3   Bathing (including washing, rinsing, and drying)? 3   Toileting, which includes using a toilet, bedpan, or urinal? 4   Putting on and taking off regular upper body clothing? 4   Taking care of personal grooming such as brushing teeth? 4   Eating meals? 4   6 Clicks Daily Activity Score 22   Functional Mobility   Sit to Stand Supervised   Bed, Chair, Wheelchair Transfer Supervised   Transfer Method Stand Step   Mobility sup without device, therapist assist with O2 tank   Distance (Feet) 150   # of Times Distance was Traveled 1   Visual Perception   Visual Perception  WDL   Activity Tolerance   Sitting in Chair > 20  min   Sitting Edge of Bed 5 min   Standing 8 min at sink for groomin

## 2023-09-05 NOTE — PROGRESS NOTES
[facility] Medicine Daily Progress Note    Date of Service  9/5/2023    Chief Complaint  Yoli Carmichael is a 89 y.o. female admitted 9/4/2023 with shortness of breath and weakness.    Hospital Course  Patient is a 89-year-old female with history of hypertension came in with generalized weakness and shortness of breath for the last few days.  She been feeling poorly over the last 3 to 4 days with worsening shortness of breath and weakness.  She has been having issues with her right eye and has been taking Pred Forte with some improvement initially thought it was an eye infection however it continued to progress.  Chest x-ray is concerning for pneumonia she was started on antibiotics and is currently requiring oxygen to maintain saturation.    Interval Problem Update  Patient states that overall she is feeling much better today.  She is still currently requiring 4 L nasal cannula to maintain adequate saturation.  WBC count has decreased from 14.4-12.1.  Lactic acid is within normal limits.  I will start her on Mucinex to see if we can help expectorate some of her congestion.    I have discussed this patient's plan of care and discharge plan at IDT rounds today with Case Management, Nursing, Nursing leadership, and other members of the IDT team.    Consultants/Specialty  none    Code Status  Full Code    Disposition  The patient is not medically cleared for discharge to home or a post-acute facility.  Anticipate discharge to: home with close outpatient follow-up    I have placed the appropriate orders for post-discharge needs.    Review of Systems  Review of Systems   Constitutional:  Negative for chills and fever.   HENT:  Negative for congestion and sore throat.    Eyes:  Negative for blurred vision and photophobia.   Respiratory:  Negative for cough, hemoptysis, sputum production and shortness of breath.    Cardiovascular:  Negative for chest pain, claudication and leg swelling.   Gastrointestinal:  Negative for  abdominal pain, constipation, diarrhea, heartburn and vomiting.   Genitourinary:  Negative for dysuria and hematuria.   Musculoskeletal:  Negative for joint pain and myalgias.   Skin:  Negative for itching and rash.   Neurological:  Negative for dizziness, sensory change, speech change, weakness and headaches.   Psychiatric/Behavioral:  Negative for depression. The patient is not nervous/anxious and does not have insomnia.         Physical Exam  Temp:  [36.9 °C (98.4 °F)-37.2 °C (99 °F)] 36.9 °C (98.4 °F)  Pulse:  [75-92] 84  Resp:  [18-37] 18  BP: (131-157)/(54-66) 153/56  SpO2:  [83 %-95 %] 92 %    Physical Exam  Vitals and nursing note reviewed.   Constitutional:       General: She is not in acute distress.     Appearance: Normal appearance. She is not ill-appearing.   HENT:      Head: Normocephalic and atraumatic.      Nose: Nose normal.   Eyes:      General:         Right eye: No discharge.         Left eye: No discharge.      Pupils: Pupils are equal, round, and reactive to light.   Cardiovascular:      Rate and Rhythm: Normal rate and regular rhythm.      Heart sounds: Normal heart sounds. No murmur heard.  Pulmonary:      Effort: Pulmonary effort is normal.      Breath sounds: Rales (Right lung base) present.   Abdominal:      General: Bowel sounds are normal. There is no distension.      Palpations: Abdomen is soft.   Musculoskeletal:         General: No swelling or tenderness.      Cervical back: Neck supple.   Skin:     General: Skin is warm and dry.   Neurological:      General: No focal deficit present.      Mental Status: She is alert and oriented to person, place, and time.   Psychiatric:         Mood and Affect: Mood normal.         Fluids    Intake/Output Summary (Last 24 hours) at 9/5/2023 1153  Last data filed at 9/5/2023 0829  Gross per 24 hour   Intake 420 ml   Output --   Net 420 ml       Laboratory  Recent Labs     09/04/23  1310 09/05/23  0123   WBC 14.4* 12.1*   RBC 4.45 4.17*   HEMOGLOBIN  12.7 12.1   HEMATOCRIT 39.1 37.0   MCV 87.9 88.7   MCH 28.5 29.0   MCHC 32.5 32.7   RDW 45.1 45.9   PLATELETCT 110* 121*   MPV 10.4 11.2     Recent Labs     09/04/23  1310 09/05/23  0123   SODIUM 133* 134*   POTASSIUM 4.0 4.0   CHLORIDE 100 104   CO2 19* 19*   GLUCOSE 131* 102*   BUN 23* 21   CREATININE 1.23 1.08   CALCIUM 8.2* 8.3*                   Imaging  CT-CTA CHEST PULMONARY ARTERY W/ RECONS   Final Result      1.  No evidence of pulmonary embolus.      2.  Moderate size bilateral pleural effusions with associated atelectasis, left greater than right.      3.  Patchy groundglass opacities with more focal consolidation in left upper lobe could be due to edema or pneumonia.            DX-CHEST-PORTABLE (1 VIEW)   Final Result      1.  Mild interstitial opacities most likely represent fibrosis and/or edema.   2.  Small left and trace right pleural effusions.           Assessment/Plan  * Acute hypoxemic respiratory failure (HCC)- (present on admission)  Assessment & Plan  Secondary to pneumonia.  COVID-19/influenza is negative  Continue with Unasyn Doxy  Currently needing 4 L nasal cannula, patient is not on oxygen at baseline, has needed oxygen to return home in the past.  Start Mucinex  Oxygen as needed, Respiratory protocol, Bronchodilators, Incentive spirometry     Elevated troponin- (present on admission)  Assessment & Plan  Denies chest pain.  EKG shows sinus rhythm with a rate of 84, there is no ST elevation, there is 0.5 mm ST depression in lead I, 2, leads V4-V6.  QTc is 460.  Continuous cardiac monitoring        Advance care planning- (present on admission)  Assessment & Plan  Patient request full code    Pneumonia- (present on admission)  Assessment & Plan  CT imaging concerning for pneumonia.  COVID-19/influenza is negative.  I will start Unasyn and doxycycline, follow cultures and sensitivities.    Stage 3b chronic kidney disease (HCC)- (present on admission)  Assessment & Plan  Avoid/minimize  nephrotoxins as much as possible, renally dose medications, monitor inputs and outputs     Recurrent major depressive disorder, in full remission (HCC)- (present on admission)  Assessment & Plan  Resume escitalopram    Essential hypertension, benign- (present on admission)  Assessment & Plan  Resume metoprolol with hold parameters         VTE prophylaxis:    Xarelto 10mg daily as prophylaxis      I have performed a physical exam and reviewed and updated ROS and Plan today (9/5/2023). In review of yesterday's note (9/4/2023), there are no changes except as documented above.

## 2023-09-05 NOTE — PROGRESS NOTES
Telemetry Shift Summary    Rhythm Paced/SR underlying with 1st deg AVB  HR Range 77-86  Ectopy R-PVC  Measurements 0.28/0.08/0.40        Normal Values  Rhythm SR  HR Range    Measurements 0.12-0.20 / 0.06-0.10  / 0.30-0.52

## 2023-09-05 NOTE — PROGRESS NOTES
Charge Nurse Rounding Note     Bedside rounding completed to address quality of care and overall patient experience.     Patient Satisfaction addressed including staff responsiveness. Patient/family are aware of the POC and any questions answered. Thorough safety education completed including use of call light prior to all mobility throughout the entirety of the hospital stay.      Patient/family aware of time of next Hourly Round.     No further questions/concerns currently.     Additional comments: assisted pt with TV sound

## 2023-09-06 ENCOUNTER — APPOINTMENT (OUTPATIENT)
Dept: RADIOLOGY | Facility: MEDICAL CENTER | Age: 88
DRG: 193 | End: 2023-09-06
Attending: INTERNAL MEDICINE
Payer: MEDICARE

## 2023-09-06 LAB
ANION GAP SERPL CALC-SCNC: 12 MMOL/L (ref 7–16)
B PARAP IS1001 DNA NPH QL NAA+NON-PROBE: NOT DETECTED
B PERT.PT PRMT NPH QL NAA+NON-PROBE: NOT DETECTED
BUN SERPL-MCNC: 18 MG/DL (ref 8–22)
C PNEUM DNA NPH QL NAA+NON-PROBE: NOT DETECTED
CALCIUM SERPL-MCNC: 8.2 MG/DL (ref 8.4–10.2)
CHLORIDE SERPL-SCNC: 106 MMOL/L (ref 96–112)
CO2 SERPL-SCNC: 19 MMOL/L (ref 20–33)
CREAT SERPL-MCNC: 0.97 MG/DL (ref 0.5–1.4)
ERYTHROCYTE [DISTWIDTH] IN BLOOD BY AUTOMATED COUNT: 44.9 FL (ref 35.9–50)
FLUAV RNA NPH QL NAA+NON-PROBE: NOT DETECTED
FLUBV RNA NPH QL NAA+NON-PROBE: NOT DETECTED
GFR SERPLBLD CREATININE-BSD FMLA CKD-EPI: 55 ML/MIN/1.73 M 2
GLUCOSE SERPL-MCNC: 110 MG/DL (ref 65–99)
HADV DNA NPH QL NAA+NON-PROBE: NOT DETECTED
HCOV 229E RNA NPH QL NAA+NON-PROBE: NOT DETECTED
HCOV HKU1 RNA NPH QL NAA+NON-PROBE: NOT DETECTED
HCOV NL63 RNA NPH QL NAA+NON-PROBE: NOT DETECTED
HCOV OC43 RNA NPH QL NAA+NON-PROBE: NOT DETECTED
HCT VFR BLD AUTO: 34.8 % (ref 37–47)
HGB BLD-MCNC: 11.2 G/DL (ref 12–16)
HMPV RNA NPH QL NAA+NON-PROBE: NOT DETECTED
HPIV1 RNA NPH QL NAA+NON-PROBE: NOT DETECTED
HPIV2 RNA NPH QL NAA+NON-PROBE: NOT DETECTED
HPIV3 RNA NPH QL NAA+NON-PROBE: NOT DETECTED
HPIV4 RNA NPH QL NAA+NON-PROBE: NOT DETECTED
M PNEUMO DNA NPH QL NAA+NON-PROBE: NOT DETECTED
MCH RBC QN AUTO: 28.4 PG (ref 27–33)
MCHC RBC AUTO-ENTMCNC: 32.2 G/DL (ref 32.2–35.5)
MCV RBC AUTO: 88.3 FL (ref 81.4–97.8)
PLATELET # BLD AUTO: 150 K/UL (ref 164–446)
PMV BLD AUTO: 10.9 FL (ref 9–12.9)
POTASSIUM SERPL-SCNC: 4.1 MMOL/L (ref 3.6–5.5)
RBC # BLD AUTO: 3.94 M/UL (ref 4.2–5.4)
RSV RNA NPH QL NAA+NON-PROBE: NOT DETECTED
RV+EV RNA NPH QL NAA+NON-PROBE: NOT DETECTED
SARS-COV-2 RNA NPH QL NAA+NON-PROBE: NOTDETECTED
SODIUM SERPL-SCNC: 137 MMOL/L (ref 135–145)
WBC # BLD AUTO: 11.5 K/UL (ref 4.8–10.8)

## 2023-09-06 PROCEDURE — 700105 HCHG RX REV CODE 258: Performed by: HOSPITALIST

## 2023-09-06 PROCEDURE — 700102 HCHG RX REV CODE 250 W/ 637 OVERRIDE(OP): Performed by: INTERNAL MEDICINE

## 2023-09-06 PROCEDURE — 700111 HCHG RX REV CODE 636 W/ 250 OVERRIDE (IP): Mod: JZ | Performed by: HOSPITALIST

## 2023-09-06 PROCEDURE — 87486 CHLMYD PNEUM DNA AMP PROBE: CPT

## 2023-09-06 PROCEDURE — 700102 HCHG RX REV CODE 250 W/ 637 OVERRIDE(OP): Performed by: HOSPITALIST

## 2023-09-06 PROCEDURE — 94760 N-INVAS EAR/PLS OXIMETRY 1: CPT

## 2023-09-06 PROCEDURE — 99232 SBSQ HOSP IP/OBS MODERATE 35: CPT | Performed by: INTERNAL MEDICINE

## 2023-09-06 PROCEDURE — 36415 COLL VENOUS BLD VENIPUNCTURE: CPT

## 2023-09-06 PROCEDURE — 770020 HCHG ROOM/CARE - TELE (206)

## 2023-09-06 PROCEDURE — 94669 MECHANICAL CHEST WALL OSCILL: CPT

## 2023-09-06 PROCEDURE — 87633 RESP VIRUS 12-25 TARGETS: CPT

## 2023-09-06 PROCEDURE — A9270 NON-COVERED ITEM OR SERVICE: HCPCS | Performed by: INTERNAL MEDICINE

## 2023-09-06 PROCEDURE — 85027 COMPLETE CBC AUTOMATED: CPT

## 2023-09-06 PROCEDURE — 87581 M.PNEUMON DNA AMP PROBE: CPT

## 2023-09-06 PROCEDURE — 71045 X-RAY EXAM CHEST 1 VIEW: CPT

## 2023-09-06 PROCEDURE — 80048 BASIC METABOLIC PNL TOTAL CA: CPT

## 2023-09-06 PROCEDURE — 87798 DETECT AGENT NOS DNA AMP: CPT

## 2023-09-06 PROCEDURE — A9270 NON-COVERED ITEM OR SERVICE: HCPCS | Performed by: HOSPITALIST

## 2023-09-06 RX ADMIN — METOPROLOL SUCCINATE 25 MG: 25 TABLET, EXTENDED RELEASE ORAL at 21:37

## 2023-09-06 RX ADMIN — ESCITALOPRAM OXALATE 5 MG: 10 TABLET ORAL at 21:36

## 2023-09-06 RX ADMIN — PREDNISOLONE ACETATE 1 DROP: 10 SUSPENSION/ DROPS OPHTHALMIC at 17:31

## 2023-09-06 RX ADMIN — DOXYCYCLINE 100 MG: 100 TABLET, FILM COATED ORAL at 17:29

## 2023-09-06 RX ADMIN — PREDNISOLONE ACETATE 1 DROP: 10 SUSPENSION/ DROPS OPHTHALMIC at 05:56

## 2023-09-06 RX ADMIN — PREDNISOLONE ACETATE 1 DROP: 10 SUSPENSION/ DROPS OPHTHALMIC at 11:32

## 2023-09-06 RX ADMIN — GUAIFENESIN 600 MG: 600 TABLET, EXTENDED RELEASE ORAL at 05:56

## 2023-09-06 RX ADMIN — PREDNISOLONE ACETATE 1 DROP: 10 SUSPENSION/ DROPS OPHTHALMIC at 21:37

## 2023-09-06 RX ADMIN — GUAIFENESIN 600 MG: 600 TABLET, EXTENDED RELEASE ORAL at 17:29

## 2023-09-06 RX ADMIN — AMPICILLIN AND SULBACTAM 3 G: 1; 2 INJECTION, POWDER, FOR SOLUTION INTRAMUSCULAR; INTRAVENOUS at 05:57

## 2023-09-06 RX ADMIN — AMPICILLIN AND SULBACTAM 3 G: 1; 2 INJECTION, POWDER, FOR SOLUTION INTRAMUSCULAR; INTRAVENOUS at 17:26

## 2023-09-06 RX ADMIN — RIVAROXABAN 10 MG: 10 TABLET, FILM COATED ORAL at 17:29

## 2023-09-06 RX ADMIN — DOXYCYCLINE 100 MG: 100 TABLET, FILM COATED ORAL at 05:56

## 2023-09-06 ASSESSMENT — ENCOUNTER SYMPTOMS
NERVOUS/ANXIOUS: 0
HEMOPTYSIS: 0
MYALGIAS: 0
FEVER: 0
PHOTOPHOBIA: 0
SHORTNESS OF BREATH: 1
CLAUDICATION: 0
CONSTIPATION: 0
SPEECH CHANGE: 0
HEARTBURN: 0
DIZZINESS: 0
ABDOMINAL PAIN: 0
HEADACHES: 0
SPUTUM PRODUCTION: 0
SORE THROAT: 0
COUGH: 0
BLURRED VISION: 0
DEPRESSION: 0
SENSORY CHANGE: 0
INSOMNIA: 0
VOMITING: 0
DIARRHEA: 0
CHILLS: 0
WEAKNESS: 0

## 2023-09-06 ASSESSMENT — PAIN DESCRIPTION - PAIN TYPE
TYPE: ACUTE PAIN

## 2023-09-06 NOTE — CARE PLAN
The patient is Stable - Low risk of patient condition declining or worsening    Shift Goals  Clinical Goals: ABX, monitor O2 needs, montior labs  Patient Goals: rest, go home    Progress made toward(s) clinical / shift goals:    Problem: Knowledge Deficit - Standard  Goal: Patient and family/care givers will demonstrate understanding of plan of care, disease process/condition, diagnostic tests and medications  Outcome: Progressing  Note: Patient updated on plan of care.      Problem: Respiratory  Goal: Patient will achieve/maintain optimum respiratory ventilation and gas exchange  Outcome: Progressing  Flowsheets (Taken 9/5/2023 7061)  O2 Delivery Device: Oxymask  Note: Encourage TCDB, continue monitoring oxygen needs, RT      Problem: Fall Risk  Goal: Patient will remain free from falls  Outcome: Progressing  Note: Appropriate fall risk interventions in place.        Patient is not progressing towards the following goals:

## 2023-09-06 NOTE — PROGRESS NOTES
Charge Nurse Rounding Note    Bedside rounding completed to address quality of care and overall patient experience.    Patient Satisfaction addressed including staff responsiveness. Patient/family are aware of the POC and any questions answered. Thorough safety education completed including use of call light prior to all mobility throughout the entirety of the hospital stay.     Patient/family aware of time of next Hourly Round.    No further questions/concerns currently.     Additional Notes: pt conversing with family on phone, no needs at this time.

## 2023-09-06 NOTE — PROGRESS NOTES
Hospital Medicine Daily Progress Note    Date of Service  9/6/2023    Chief Complaint  Yoli Carmichael is a 89 y.o. female admitted 9/4/2023 with shortness of breath and weakness.    Hospital Course  Patient is a 89-year-old female with history of hypertension came in with generalized weakness and shortness of breath for the last few days.  She been feeling poorly over the last 3 to 4 days with worsening shortness of breath and weakness.  She has been having issues with her right eye and has been taking Pred Forte with some improvement initially thought it was an eye infection however it continued to progress.  Chest x-ray is concerning for pneumonia she was started on antibiotics and is currently requiring oxygen to maintain saturation.    Interval Problem Update  9/5 Patient states that overall she is feeling much better today.  She is still currently requiring 4 L nasal cannula to maintain adequate saturation.  WBC count has decreased from 14.4-12.1.  Lactic acid is within normal limits.  I will start her on Mucinex to see if we can help expectorate some of her congestion.  9/6 patient states she feels about the same as she did yesterday.  She is not having any signs of volume overload but still requiring 4 to 5 L oxygen to maintain saturation.  WBC count continues to trend down.  Given her lack of improvement chest x-ray is currently pending.  We will continue with current antibiotics.    I have discussed this patient's plan of care and discharge plan at IDT rounds today with Case Management, Nursing, Nursing leadership, and other members of the IDT team.    Consultants/Specialty  none    Code Status  Full Code    Disposition  The patient is not medically cleared for discharge to home or a post-acute facility.  Anticipate discharge to: home with organized home healthcare and close outpatient follow-up    I have placed the appropriate orders for post-discharge needs.    Review of Systems  Review of Systems    Constitutional:  Negative for chills and fever.   HENT:  Negative for congestion and sore throat.    Eyes:  Negative for blurred vision and photophobia.   Respiratory:  Positive for shortness of breath. Negative for cough, hemoptysis and sputum production.    Cardiovascular:  Negative for chest pain, claudication and leg swelling.   Gastrointestinal:  Negative for abdominal pain, constipation, diarrhea, heartburn and vomiting.   Genitourinary:  Negative for dysuria and hematuria.   Musculoskeletal:  Negative for joint pain and myalgias.   Skin:  Negative for itching and rash.   Neurological:  Negative for dizziness, sensory change, speech change, weakness and headaches.   Psychiatric/Behavioral:  Negative for depression. The patient is not nervous/anxious and does not have insomnia.         Physical Exam  Temp:  [36.4 °C (97.5 °F)-36.9 °C (98.5 °F)] 36.6 °C (97.9 °F)  Pulse:  [69-82] 82  Resp:  [16-20] 17  BP: (132-147)/(50-78) 144/53  SpO2:  [90 %-97 %] 97 %    Physical Exam  Vitals and nursing note reviewed.   Constitutional:       General: She is not in acute distress.     Appearance: Normal appearance. She is not ill-appearing.   HENT:      Head: Normocephalic and atraumatic.      Nose: Nose normal.   Eyes:      General:         Right eye: No discharge.         Left eye: No discharge.      Pupils: Pupils are equal, round, and reactive to light.   Cardiovascular:      Rate and Rhythm: Normal rate and regular rhythm.      Heart sounds: Normal heart sounds. No murmur heard.  Pulmonary:      Effort: Pulmonary effort is normal.      Breath sounds: Rales (Right lung base) present. No rhonchi.   Abdominal:      General: Bowel sounds are normal. There is no distension.      Palpations: Abdomen is soft.   Musculoskeletal:         General: No swelling or tenderness.      Cervical back: Neck supple.      Right lower leg: No edema.      Left lower leg: No edema.   Skin:     General: Skin is warm and dry.   Neurological:       General: No focal deficit present.      Mental Status: She is alert and oriented to person, place, and time.   Psychiatric:         Mood and Affect: Mood normal.         Fluids  No intake or output data in the 24 hours ending 09/06/23 1145      Laboratory  Recent Labs     09/04/23  1310 09/05/23  0123 09/06/23  0123   WBC 14.4* 12.1* 11.5*   RBC 4.45 4.17* 3.94*   HEMOGLOBIN 12.7 12.1 11.2*   HEMATOCRIT 39.1 37.0 34.8*   MCV 87.9 88.7 88.3   MCH 28.5 29.0 28.4   MCHC 32.5 32.7 32.2   RDW 45.1 45.9 44.9   PLATELETCT 110* 121* 150*   MPV 10.4 11.2 10.9       Recent Labs     09/04/23  1310 09/05/23  0123 09/06/23  0123   SODIUM 133* 134* 137   POTASSIUM 4.0 4.0 4.1   CHLORIDE 100 104 106   CO2 19* 19* 19*   GLUCOSE 131* 102* 110*   BUN 23* 21 18   CREATININE 1.23 1.08 0.97   CALCIUM 8.2* 8.3* 8.2*                     Imaging  CT-CTA CHEST PULMONARY ARTERY W/ RECONS   Final Result      1.  No evidence of pulmonary embolus.      2.  Moderate size bilateral pleural effusions with associated atelectasis, left greater than right.      3.  Patchy groundglass opacities with more focal consolidation in left upper lobe could be due to edema or pneumonia.            DX-CHEST-PORTABLE (1 VIEW)   Final Result      1.  Mild interstitial opacities most likely represent fibrosis and/or edema.   2.  Small left and trace right pleural effusions.      DX-CHEST-LIMITED (1 VIEW)    (Results Pending)          Assessment/Plan  * Acute hypoxemic respiratory failure (HCC)- (present on admission)  Assessment & Plan  Secondary to pneumonia.  COVID-19/influenza is negative  Continue with Unasyn Doxy  Currently needing 4 L nasal cannula, patient is not on oxygen at baseline, has needed oxygen to return home in the past.  Start Mucinex  Oxygen as needed, Respiratory protocol, Bronchodilators, Incentive spirometry     Elevated troponin- (present on admission)  Assessment & Plan  Denies chest pain.  EKG shows sinus rhythm with a rate of 84,  there is no ST elevation, there is 0.5 mm ST depression in lead I, 2, leads V4-V6.  QTc is 460.  Continuous cardiac monitoring        Advance care planning- (present on admission)  Assessment & Plan  Patient request full code    Pneumonia- (present on admission)  Assessment & Plan  CT imaging concerning for pneumonia.  COVID-19/influenza is negative.  Continue Unasyn and doxycycline, follow cultures and sensitivities.  Requiring 4 to 5 L to maintain oxygen saturation  Repeat chest x-ray pending    Stage 3b chronic kidney disease (HCC)- (present on admission)  Assessment & Plan  Avoid/minimize nephrotoxins as much as possible, renally dose medications, monitor inputs and outputs     Recurrent major depressive disorder, in full remission (HCC)- (present on admission)  Assessment & Plan  Resume escitalopram    Essential hypertension, benign- (present on admission)  Assessment & Plan  Resume metoprolol with hold parameters         VTE prophylaxis:    Xarelto 10mg daily as prophylaxis      I have performed a physical exam and reviewed and updated ROS and Plan today (9/6/2023). In review of yesterday's note (9/5/2023), there are no changes except as documented above.

## 2023-09-06 NOTE — PROGRESS NOTES
Telemetry Shift Summary     Rhythm Paced on demand/ SR with 1st degree   HR Range 65-82  Ectopy rPVC  Measurements 0.30/0.08/0.40         As reported per monitor tech   Normal Values  Rhythm SR  HR Range    Measurements 0.12-0.20 / 0.06-0.10  / 0.30-0.52

## 2023-09-06 NOTE — DISCHARGE PLANNING
Case Management Discharge Planning    Admission Date: 9/4/2023  GMLOS: 4  ALOS: 2    6-Clicks ADL Score: 22  6-Clicks Mobility Score: 23      Anticipated Discharge Dispo: Discharge Disposition: D/T to home under HHA care in anticipation of covered skilled care (06)    DME Needed: No    Action(s) Taken: RNCM attended IDT rounds and reviewed chart. Per chart PT/OT recommending HH, pending HH order. Anticipate pt will require home O2.    Escalations Completed: None    Medically Clear: No    Next Steps:  f/u with medical team to discuss DC needs and barriers    Barriers to Discharge: Medical clearance

## 2023-09-06 NOTE — PROGRESS NOTES
Telemetry Shift Summary    Rhythm AV-POD/underlying SR; 1st deg AVB  HR Range 67-83  Ectopy R-PVC  Measurements 0.30/0.10/0.40        Normal Values  Rhythm SR  HR Range    Measurements 0.12-0.20 / 0.06-0.10  / 0.30-0.52

## 2023-09-06 NOTE — CARE PLAN
The patient is Stable - Low risk of patient condition declining or worsening    Shift Goals  Clinical Goals: ABX, O2 protocol, monitor labs.  Patient Goals: Comfort    Progress made toward(s) clinical / shift goals:    Problem: Knowledge Deficit - Standard  Goal: Patient and family/care givers will demonstrate understanding of plan of care, disease process/condition, diagnostic tests and medications  Outcome: Progressing       Patient is not progressing towards the following goals:      Problem: Respiratory  Goal: Patient will achieve/maintain optimum respiratory ventilation and gas exchange  Outcome: Not Progressing  Note: Oxygen needs have not decreased.

## 2023-09-06 NOTE — DISCHARGE PLANNING
HTH/[facility]P TCN chart review completed. Noted patient seen by PT and OT with recommendations noted to home health for continued physical and occupational therapy on 9/5/2023.     Collaborated with FAUSTO Saleem. Discussed current discharge considerations given PT and OT current recommendations for home healthcare.     TCN met with patient at bedside. Discussed current discharge considerations as noted to home with home healthcare. Patient verbalized understanding and agreement.     TCN will continue to follow and collaborate with discharge planning team as additional post acute needs arise. Thank you.    Completed  PT with recommendations for home health for continued physical therapy services on 9/5/2023  OT with recommendations for home health for continued occupational therapy services on 9/5/2023  Choice obtained: [facility], [facility] (O2 with patient providing choice for [facility])  Patient followed by [facility] prior to acute hospitalization. [facility] referral (Y) sent 9/5/2023

## 2023-09-07 ENCOUNTER — HOME HEALTH ADMISSION (OUTPATIENT)
Dept: HOME HEALTH SERVICES | Facility: HOME HEALTHCARE | Age: 88
End: 2023-09-07
Payer: MEDICARE

## 2023-09-07 LAB
ANION GAP SERPL CALC-SCNC: 12 MMOL/L (ref 7–16)
BUN SERPL-MCNC: 21 MG/DL (ref 8–22)
CALCIUM SERPL-MCNC: 8.3 MG/DL (ref 8.4–10.2)
CHLORIDE SERPL-SCNC: 105 MMOL/L (ref 96–112)
CO2 SERPL-SCNC: 20 MMOL/L (ref 20–33)
CREAT SERPL-MCNC: 1.05 MG/DL (ref 0.5–1.4)
ERYTHROCYTE [DISTWIDTH] IN BLOOD BY AUTOMATED COUNT: 44 FL (ref 35.9–50)
GFR SERPLBLD CREATININE-BSD FMLA CKD-EPI: 50 ML/MIN/1.73 M 2
GLUCOSE SERPL-MCNC: 100 MG/DL (ref 65–99)
HCT VFR BLD AUTO: 35.4 % (ref 37–47)
HGB BLD-MCNC: 11.5 G/DL (ref 12–16)
MCH RBC QN AUTO: 28.6 PG (ref 27–33)
MCHC RBC AUTO-ENTMCNC: 32.5 G/DL (ref 32.2–35.5)
MCV RBC AUTO: 88.1 FL (ref 81.4–97.8)
PLATELET # BLD AUTO: 206 K/UL (ref 164–446)
PMV BLD AUTO: 10.3 FL (ref 9–12.9)
POTASSIUM SERPL-SCNC: 4.2 MMOL/L (ref 3.6–5.5)
RBC # BLD AUTO: 4.02 M/UL (ref 4.2–5.4)
SODIUM SERPL-SCNC: 137 MMOL/L (ref 135–145)
WBC # BLD AUTO: 9.1 K/UL (ref 4.8–10.8)

## 2023-09-07 PROCEDURE — A9270 NON-COVERED ITEM OR SERVICE: HCPCS | Performed by: INTERNAL MEDICINE

## 2023-09-07 PROCEDURE — 36415 COLL VENOUS BLD VENIPUNCTURE: CPT

## 2023-09-07 PROCEDURE — 85027 COMPLETE CBC AUTOMATED: CPT

## 2023-09-07 PROCEDURE — 770020 HCHG ROOM/CARE - TELE (206)

## 2023-09-07 PROCEDURE — 700111 HCHG RX REV CODE 636 W/ 250 OVERRIDE (IP): Mod: JZ | Performed by: INTERNAL MEDICINE

## 2023-09-07 PROCEDURE — 700102 HCHG RX REV CODE 250 W/ 637 OVERRIDE(OP): Performed by: HOSPITALIST

## 2023-09-07 PROCEDURE — 700111 HCHG RX REV CODE 636 W/ 250 OVERRIDE (IP): Mod: JZ | Performed by: HOSPITALIST

## 2023-09-07 PROCEDURE — A9270 NON-COVERED ITEM OR SERVICE: HCPCS | Performed by: HOSPITALIST

## 2023-09-07 PROCEDURE — 99232 SBSQ HOSP IP/OBS MODERATE 35: CPT | Performed by: INTERNAL MEDICINE

## 2023-09-07 PROCEDURE — 94760 N-INVAS EAR/PLS OXIMETRY 1: CPT

## 2023-09-07 PROCEDURE — 80048 BASIC METABOLIC PNL TOTAL CA: CPT

## 2023-09-07 PROCEDURE — 700102 HCHG RX REV CODE 250 W/ 637 OVERRIDE(OP): Performed by: INTERNAL MEDICINE

## 2023-09-07 PROCEDURE — 700105 HCHG RX REV CODE 258: Performed by: HOSPITALIST

## 2023-09-07 RX ORDER — AMOXICILLIN AND CLAVULANATE POTASSIUM 875; 125 MG/1; MG/1
1 TABLET, FILM COATED ORAL EVERY 12 HOURS
Status: DISCONTINUED | OUTPATIENT
Start: 2023-09-07 | End: 2023-09-09 | Stop reason: HOSPADM

## 2023-09-07 RX ORDER — FUROSEMIDE 10 MG/ML
20 INJECTION INTRAMUSCULAR; INTRAVENOUS ONCE
Status: COMPLETED | OUTPATIENT
Start: 2023-09-07 | End: 2023-09-07

## 2023-09-07 RX ADMIN — ESCITALOPRAM OXALATE 5 MG: 10 TABLET ORAL at 21:45

## 2023-09-07 RX ADMIN — PREDNISOLONE ACETATE 1 DROP: 10 SUSPENSION/ DROPS OPHTHALMIC at 05:58

## 2023-09-07 RX ADMIN — GUAIFENESIN 600 MG: 600 TABLET, EXTENDED RELEASE ORAL at 05:58

## 2023-09-07 RX ADMIN — AMOXICILLIN AND CLAVULANATE POTASSIUM 1 TABLET: 875; 125 TABLET, FILM COATED ORAL at 13:54

## 2023-09-07 RX ADMIN — DOXYCYCLINE 100 MG: 100 TABLET, FILM COATED ORAL at 18:30

## 2023-09-07 RX ADMIN — RIVAROXABAN 10 MG: 10 TABLET, FILM COATED ORAL at 18:30

## 2023-09-07 RX ADMIN — DOXYCYCLINE 100 MG: 100 TABLET, FILM COATED ORAL at 05:58

## 2023-09-07 RX ADMIN — GUAIFENESIN 600 MG: 600 TABLET, EXTENDED RELEASE ORAL at 18:30

## 2023-09-07 RX ADMIN — PREDNISOLONE ACETATE 1 DROP: 10 SUSPENSION/ DROPS OPHTHALMIC at 18:32

## 2023-09-07 RX ADMIN — METOPROLOL SUCCINATE 25 MG: 25 TABLET, EXTENDED RELEASE ORAL at 21:45

## 2023-09-07 RX ADMIN — FUROSEMIDE 20 MG: 10 INJECTION, SOLUTION INTRAVENOUS at 08:53

## 2023-09-07 RX ADMIN — PREDNISOLONE ACETATE 1 DROP: 10 SUSPENSION/ DROPS OPHTHALMIC at 13:54

## 2023-09-07 RX ADMIN — PREDNISOLONE ACETATE 1 DROP: 10 SUSPENSION/ DROPS OPHTHALMIC at 21:45

## 2023-09-07 RX ADMIN — AMPICILLIN AND SULBACTAM 3 G: 1; 2 INJECTION, POWDER, FOR SOLUTION INTRAMUSCULAR; INTRAVENOUS at 05:56

## 2023-09-07 ASSESSMENT — ENCOUNTER SYMPTOMS
CLAUDICATION: 0
SPUTUM PRODUCTION: 0
PHOTOPHOBIA: 0
HEARTBURN: 0
CONSTIPATION: 0
NERVOUS/ANXIOUS: 0
DIARRHEA: 0
ABDOMINAL PAIN: 0
DEPRESSION: 0
SENSORY CHANGE: 0
MYALGIAS: 0
HEMOPTYSIS: 0
VOMITING: 0
COUGH: 0
INSOMNIA: 0
BLURRED VISION: 0
HEADACHES: 0
SPEECH CHANGE: 0
WEAKNESS: 0
CHILLS: 0
SORE THROAT: 0
DIZZINESS: 0
SHORTNESS OF BREATH: 1
FEVER: 0

## 2023-09-07 ASSESSMENT — PAIN DESCRIPTION - PAIN TYPE
TYPE: ACUTE PAIN
TYPE: ACUTE PAIN

## 2023-09-07 NOTE — PROGRESS NOTES
Telemetry Shift Summary     Rhythm AV-POD/ SR 1st degree HB  HR Range 68-86  Ectopy rPVC  Measurements 0.34/0.08/0.40      as reported per monitor tech      Normal Values  Rhythm SR  HR Range    Measurements 0.12-0.20 / 0.06-0.10  / 0.30-0.52

## 2023-09-07 NOTE — PROGRESS NOTES
Hospital Medicine Daily Progress Note    Date of Service  9/7/2023    Chief Complaint  Yoli Carmichael is a 89 y.o. female admitted 9/4/2023 with shortness of breath and weakness.    Hospital Course  Patient is a 89-year-old female with history of hypertension came in with generalized weakness and shortness of breath for the last few days.  She been feeling poorly over the last 3 to 4 days with worsening shortness of breath and weakness.  She has been having issues with her right eye and has been taking Pred Forte with some improvement initially thought it was an eye infection however it continued to progress.  Chest x-ray is concerning for pneumonia she was started on antibiotics and is currently requiring oxygen to maintain saturation.    Interval Problem Update  9/5 Patient states that overall she is feeling much better today.  She is still currently requiring 4 L nasal cannula to maintain adequate saturation.  WBC count has decreased from 14.4-12.1.  Lactic acid is within normal limits.  I will start her on Mucinex to see if we can help expectorate some of her congestion.  9/6 patient states she feels about the same as she did yesterday.  She is not having any signs of volume overload but still requiring 4 to 5 L oxygen to maintain saturation.  WBC count continues to trend down.  Given her lack of improvement chest x-ray is currently pending.  We will continue with current antibiotics.  9/7 Patient looking better today, only needing 2L oxygen to maintain saturation and quicker to recover with ambulation.  One dose IV lasix ordered d/t small bilateral pleural effusions on CXR yesterday.  I anticipate dc home tomorrow +/- oxygen.    I have discussed this patient's plan of care and discharge plan at IDT rounds today with Case Management, Nursing, Nursing leadership, and other members of the IDT team.    Consultants/Specialty  none    Code Status  Full Code    Disposition  The patient is not medically cleared for  discharge to home or a post-acute facility.  Anticipate discharge to: home with close outpatient follow-up    I have placed the appropriate orders for post-discharge needs.    Review of Systems  Review of Systems   Constitutional:  Negative for chills and fever.   HENT:  Negative for congestion and sore throat.    Eyes:  Negative for blurred vision and photophobia.   Respiratory:  Positive for shortness of breath. Negative for cough, hemoptysis and sputum production.    Cardiovascular:  Negative for chest pain, claudication and leg swelling.   Gastrointestinal:  Negative for abdominal pain, constipation, diarrhea, heartburn and vomiting.   Genitourinary:  Negative for dysuria and hematuria.   Musculoskeletal:  Negative for joint pain and myalgias.   Skin:  Negative for itching and rash.   Neurological:  Negative for dizziness, sensory change, speech change, weakness and headaches.   Psychiatric/Behavioral:  Negative for depression. The patient is not nervous/anxious and does not have insomnia.         Physical Exam  Temp:  [36.7 °C (98 °F)-37.2 °C (98.9 °F)] 37.1 °C (98.7 °F)  Pulse:  [71-87] 79  Resp:  [16-18] 16  BP: (116-151)/(53-81) 116/62  SpO2:  [90 %-98 %] 98 %    Physical Exam  Vitals and nursing note reviewed.   Constitutional:       General: She is not in acute distress.     Appearance: Normal appearance. She is not ill-appearing.   HENT:      Head: Normocephalic and atraumatic.      Nose: Nose normal.   Eyes:      General:         Right eye: No discharge.         Left eye: No discharge.      Pupils: Pupils are equal, round, and reactive to light.   Cardiovascular:      Rate and Rhythm: Normal rate and regular rhythm.      Heart sounds: Normal heart sounds. No murmur heard.  Pulmonary:      Effort: Pulmonary effort is normal.      Breath sounds: Rales (Right lung base) present. No rhonchi.   Abdominal:      General: Bowel sounds are normal. There is no distension.      Palpations: Abdomen is soft.    Musculoskeletal:         General: No swelling or tenderness.      Cervical back: Neck supple.      Right lower leg: No edema.      Left lower leg: No edema.   Skin:     General: Skin is warm and dry.   Neurological:      General: No focal deficit present.      Mental Status: She is alert and oriented to person, place, and time.   Psychiatric:         Mood and Affect: Mood normal.         Fluids  No intake or output data in the 24 hours ending 09/07/23 1154      Laboratory  Recent Labs     09/05/23 0123 09/06/23 0123 09/07/23 0227   WBC 12.1* 11.5* 9.1   RBC 4.17* 3.94* 4.02*   HEMOGLOBIN 12.1 11.2* 11.5*   HEMATOCRIT 37.0 34.8* 35.4*   MCV 88.7 88.3 88.1   MCH 29.0 28.4 28.6   MCHC 32.7 32.2 32.5   RDW 45.9 44.9 44.0   PLATELETCT 121* 150* 206   MPV 11.2 10.9 10.3       Recent Labs     09/05/23 0123 09/06/23  0123 09/07/23  0227   SODIUM 134* 137 137   POTASSIUM 4.0 4.1 4.2   CHLORIDE 104 106 105   CO2 19* 19* 20   GLUCOSE 102* 110* 100*   BUN 21 18 21   CREATININE 1.08 0.97 1.05   CALCIUM 8.3* 8.2* 8.3*                     Imaging  DX-CHEST-LIMITED (1 VIEW)   Final Result      1.  Stable small left and minimal right pleural effusions.      2.  Stable cardiomegaly with interstitial prominence.      CT-CTA CHEST PULMONARY ARTERY W/ RECONS   Final Result      1.  No evidence of pulmonary embolus.      2.  Moderate size bilateral pleural effusions with associated atelectasis, left greater than right.      3.  Patchy groundglass opacities with more focal consolidation in left upper lobe could be due to edema or pneumonia.            DX-CHEST-PORTABLE (1 VIEW)   Final Result      1.  Mild interstitial opacities most likely represent fibrosis and/or edema.   2.  Small left and trace right pleural effusions.             Assessment/Plan  * Acute hypoxemic respiratory failure (HCC)- (present on admission)  Assessment & Plan  Secondary to pneumonia.  COVID-19/influenza is negative  Continue with Unasyn Doxy  Currently  needing 2 L nasal cannula, patient is not on oxygen at baseline, has needed oxygen to return home in the past.  Start Mucinex  Oxygen as needed, Respiratory protocol, Bronchodilators, Incentive spirometry     Elevated troponin- (present on admission)  Assessment & Plan  Denies chest pain.  EKG shows sinus rhythm with a rate of 84, there is no ST elevation, there is 0.5 mm ST depression in lead I, 2, leads V4-V6.  QTc is 460.  Continuous cardiac monitoring        Advance care planning- (present on admission)  Assessment & Plan  Patient request full code    Pneumonia- (present on admission)  Assessment & Plan  CT imaging concerning for pneumonia.  COVID-19/influenza is negative.  Continue Unasyn and doxycycline, follow cultures and sensitivities.  Requiring 2 L to maintain oxygen saturation  Repeat chest x-ray showed small bilateral pleural effusions - give 1 dose lasix to see if less oxygen is needed.    Stage 3b chronic kidney disease (HCC)- (present on admission)  Assessment & Plan  Avoid/minimize nephrotoxins as much as possible, renally dose medications, monitor inputs and outputs     Recurrent major depressive disorder, in full remission (HCC)- (present on admission)  Assessment & Plan  Resume escitalopram    Essential hypertension, benign- (present on admission)  Assessment & Plan  Resume metoprolol with hold parameters         VTE prophylaxis:    Xarelto 10mg daily as prophylaxis      I have performed a physical exam and reviewed and updated ROS and Plan today (9/7/2023). In review of yesterday's note (9/6/2023), there are no changes except as documented above.

## 2023-09-07 NOTE — CARE PLAN
The patient is Stable - Low risk of patient condition declining or worsening    Shift Goals  Clinical Goals: abx, wean O2 as able; monitor labs  Patient Goals: rest, go home    Progress made toward(s) clinical / shift goals:    Problem: Knowledge Deficit - Standard  Goal: Patient and family/care givers will demonstrate understanding of plan of care, disease process/condition, diagnostic tests and medications  Outcome: Progressing  Note: Patient updated on plan or care      Problem: Respiratory  Goal: Patient will achieve/maintain optimum respiratory ventilation and gas exchange  Outcome: Progressing  Note: Monitor O2 wean  as able, abx, RT     Problem: Fall Risk  Goal: Patient will remain free from falls  Outcome: Progressing  Note: Appropriate fall risk prevention interventions in place.        Patient is not progressing towards the following goals:

## 2023-09-07 NOTE — DISCHARGE PLANNING
HTH/SCP TCN chart review completed. Collaborated with FAUSTO Saleem. Discussed current discharge considerations noted to home with home healthcare per current therapy recommendations and possible oxygen needs.     No new TCN needs anticipated in patient discharge planning at this time as choice previously obtained for HH and DME (for possible oxygen needs). TCN will continue to follow and collaborate with discharge planning team as additional post acute needs arise. Thank you.    Completed  PT with recommendations for home health for continued physical therapy services on 9/5/2023  OT with recommendations for home health for continued occupational therapy services (if she goes home on O2- may need assist learning ADl's with O2)   Choice obtained: HH, DME (O2 with patient provided choice for [facility])  [facility] to [facility]. [facility] referral (Y) sent 9/5/2023

## 2023-09-07 NOTE — PROGRESS NOTES
Charge Nurse Rounding Note    Bedside rounding completed to address quality of care and overall patient experience.    Patient Satisfaction addressed including staff responsiveness. Patient/family are aware of the POC and any questions answered. Thorough safety education completed including use of call light prior to all mobility throughout the entirety of the hospital stay.     Patient/family aware of time of next Hourly Round.    No further questions/concerns currently.     Additional Notes: pt happy of possible D/C tomorrow.

## 2023-09-07 NOTE — DISCHARGE PLANNING
ATTN: Case Management  RE: Referral for Home Health    As of 9/7/23, we have accepted the Home Health referral for the patient listed above.    A [facility] clinician will be out to see the patient within 48 hours. If you have any questions or concerns regarding the patient’s transition to Home Health, please do not hesitate to contact us at [phone].      We look forward to collaborating with you,  [facility] Team

## 2023-09-07 NOTE — DISCHARGE PLANNING
Case Management Discharge Planning    Admission Date: 9/4/2023  GMLOS: 4  ALOS: 3    6-Clicks ADL Score: 22  6-Clicks Mobility Score: 23      Anticipated Discharge Dispo: Discharge Disposition: D/T to home under HHA care in anticipation of covered skilled care (06)    DME Needed: No    Action(s) Taken: HH order received. HH referral faxed to [facility] per choice form.     Escalations Completed: None    Medically Clear: No    Next Steps: f/u with [facility] regarding acceptance    Barriers to Discharge: Medical clearance

## 2023-09-08 ENCOUNTER — PATIENT OUTREACH (OUTPATIENT)
Dept: SCHEDULING | Facility: IMAGING CENTER | Age: 88
End: 2023-09-08
Payer: MEDICARE

## 2023-09-08 VITALS
RESPIRATION RATE: 16 BRPM | WEIGHT: 142.42 LBS | SYSTOLIC BLOOD PRESSURE: 132 MMHG | TEMPERATURE: 97.7 F | HEIGHT: 63 IN | OXYGEN SATURATION: 96 % | BODY MASS INDEX: 25.23 KG/M2 | DIASTOLIC BLOOD PRESSURE: 55 MMHG | HEART RATE: 81 BPM

## 2023-09-08 LAB
ANION GAP SERPL CALC-SCNC: 13 MMOL/L (ref 7–16)
BUN SERPL-MCNC: 21 MG/DL (ref 8–22)
CALCIUM SERPL-MCNC: 8.4 MG/DL (ref 8.4–10.2)
CHLORIDE SERPL-SCNC: 106 MMOL/L (ref 96–112)
CO2 SERPL-SCNC: 21 MMOL/L (ref 20–33)
CREAT SERPL-MCNC: 0.91 MG/DL (ref 0.5–1.4)
ERYTHROCYTE [DISTWIDTH] IN BLOOD BY AUTOMATED COUNT: 42.5 FL (ref 35.9–50)
GFR SERPLBLD CREATININE-BSD FMLA CKD-EPI: 60 ML/MIN/1.73 M 2
GLUCOSE SERPL-MCNC: 105 MG/DL (ref 65–99)
HCT VFR BLD AUTO: 35.3 % (ref 37–47)
HGB BLD-MCNC: 11.8 G/DL (ref 12–16)
MCH RBC QN AUTO: 28.6 PG (ref 27–33)
MCHC RBC AUTO-ENTMCNC: 33.4 G/DL (ref 32.2–35.5)
MCV RBC AUTO: 85.7 FL (ref 81.4–97.8)
PLATELET # BLD AUTO: 244 K/UL (ref 164–446)
PMV BLD AUTO: 9.6 FL (ref 9–12.9)
POTASSIUM SERPL-SCNC: 3.8 MMOL/L (ref 3.6–5.5)
RBC # BLD AUTO: 4.12 M/UL (ref 4.2–5.4)
SODIUM SERPL-SCNC: 140 MMOL/L (ref 135–145)
WBC # BLD AUTO: 9.1 K/UL (ref 4.8–10.8)

## 2023-09-08 PROCEDURE — 700102 HCHG RX REV CODE 250 W/ 637 OVERRIDE(OP): Performed by: HOSPITALIST

## 2023-09-08 PROCEDURE — A9270 NON-COVERED ITEM OR SERVICE: HCPCS | Performed by: HOSPITALIST

## 2023-09-08 PROCEDURE — 99239 HOSP IP/OBS DSCHRG MGMT >30: CPT | Performed by: INTERNAL MEDICINE

## 2023-09-08 PROCEDURE — 94760 N-INVAS EAR/PLS OXIMETRY 1: CPT

## 2023-09-08 PROCEDURE — 85027 COMPLETE CBC AUTOMATED: CPT

## 2023-09-08 PROCEDURE — 80048 BASIC METABOLIC PNL TOTAL CA: CPT

## 2023-09-08 PROCEDURE — 36415 COLL VENOUS BLD VENIPUNCTURE: CPT

## 2023-09-08 PROCEDURE — 700102 HCHG RX REV CODE 250 W/ 637 OVERRIDE(OP): Performed by: INTERNAL MEDICINE

## 2023-09-08 PROCEDURE — A9270 NON-COVERED ITEM OR SERVICE: HCPCS | Performed by: INTERNAL MEDICINE

## 2023-09-08 RX ORDER — AMOXICILLIN AND CLAVULANATE POTASSIUM 875; 125 MG/1; MG/1
1 TABLET, FILM COATED ORAL EVERY 12 HOURS
Qty: 2 TABLET | Refills: 0 | Status: ACTIVE | OUTPATIENT
Start: 2023-09-08 | End: 2023-09-09

## 2023-09-08 RX ORDER — DOXYCYCLINE 100 MG/1
100 TABLET ORAL EVERY 12 HOURS
Qty: 2 TABLET | Refills: 0 | Status: ACTIVE | OUTPATIENT
Start: 2023-09-08 | End: 2023-09-09

## 2023-09-08 RX ADMIN — GUAIFENESIN 600 MG: 600 TABLET, EXTENDED RELEASE ORAL at 04:51

## 2023-09-08 RX ADMIN — AMOXICILLIN AND CLAVULANATE POTASSIUM 1 TABLET: 875; 125 TABLET, FILM COATED ORAL at 04:50

## 2023-09-08 RX ADMIN — GUAIFENESIN 600 MG: 600 TABLET, EXTENDED RELEASE ORAL at 18:22

## 2023-09-08 RX ADMIN — RIVAROXABAN 10 MG: 10 TABLET, FILM COATED ORAL at 18:22

## 2023-09-08 RX ADMIN — DOXYCYCLINE 100 MG: 100 TABLET, FILM COATED ORAL at 18:22

## 2023-09-08 RX ADMIN — SENNOSIDES AND DOCUSATE SODIUM 2 TABLET: 50; 8.6 TABLET ORAL at 18:22

## 2023-09-08 RX ADMIN — AMOXICILLIN AND CLAVULANATE POTASSIUM 1 TABLET: 875; 125 TABLET, FILM COATED ORAL at 18:22

## 2023-09-08 RX ADMIN — PREDNISOLONE ACETATE 1 DROP: 10 SUSPENSION/ DROPS OPHTHALMIC at 13:14

## 2023-09-08 RX ADMIN — PREDNISOLONE ACETATE 1 DROP: 10 SUSPENSION/ DROPS OPHTHALMIC at 18:32

## 2023-09-08 RX ADMIN — DOXYCYCLINE 100 MG: 100 TABLET, FILM COATED ORAL at 04:50

## 2023-09-08 RX ADMIN — PREDNISOLONE ACETATE 1 DROP: 10 SUSPENSION/ DROPS OPHTHALMIC at 04:50

## 2023-09-08 NOTE — PROGRESS NOTES
1850 received bedside report and assumed patient care. Patient engaged in report.     2033 patient assessment completed. Patient denies any shortness of breath or dizziness. Patient discussed wanting to be more active and how she is not feeling as weak as she had been. Medications, mobility, and plan of care for the evening discussed. All patient's needs and questions addressed at this time.

## 2023-09-08 NOTE — FACE TO FACE
"Face to Face Note  -  Durable Medical Equipment    China Landrum D.O. - NPI: [id number]  I certify that this patient is under my care and that they had a durable medical equipment(DME)face to face encounter by myself that meets the physician DME face-to-face encounter requirements with this patient on:    Date of encounter:   Patient:                    MRN:                       YOB: 2023  Yoli Carmichael  [id number]  8/21/1933     The encounter with the patient was in whole, or in part, for the following medical condition, which is the primary reason for durable medical equipment:  Other - pneumonia    I certify that, based on my findings, the following durable medical equipment is medically necessary:    Oxygen   HOME O2 Saturation Measurements:(Values must be present for Home Oxygen orders)  Room air sat at rest: 91  Room air sat with amb: (S) 87  With liters of O2: 2, O2 sat at rest with O2: 97  With Liters of O2: 2, O2 sat with amb with O2 : 97  Is the patient mobile?: Yes  If patient feels more short of breath, they can go up to 6 liters per minute and contact healthcare provider.    Supporting Symptoms: The patient requires supplemental oxygen, as the following interventions have been tried with limited or no improvement: \"Ambulation with oximetry.    My Clinical findings support the need for the above equipment due to:  Hypoxia  "

## 2023-09-08 NOTE — DISCHARGE PLANNING
Agency[facility]Facility [facility]  Outcome: DPA advised that facility has a over flow of calls and referrals and has been routing calls to the after hours  phone line. They took down the patients information and stated that's all they can do    DPA called back and the  stated they already have the patient information and they can not route me to admissions department.

## 2023-09-08 NOTE — DISCHARGE PLANNING
Case Management Discharge Planning    Admission Date: 9/4/2023  GMLOS: 4  ALOS: 4    6-Clicks ADL Score: 22  6-Clicks Mobility Score: 23      Anticipated Discharge Dispo: Discharge Disposition: D/T to home under HHA care in anticipation of covered skilled care (06)    DME Needed: Yes    DME Ordered: Yes    Action(s) Taken:     1313: DME O2 referrral faxed to [facility] per choice form.    1435: Spoke to Lizz from [facility]. Per Lizz, referral is still under review due to high call volumes.    1530: RNCM called [facility]. RNCM was forwarded to overflow service. Per representative, message would be passed to admissions team and CM will be receiving call back,    1605: No call back from [facility] at this time    [facility] CM aware of pending O2 in need of follow up.      Escalations Completed: DME Company    Medically Clear: Yes    Next Steps: f/u on O2 delivery status    Barriers to Discharge: DME

## 2023-09-08 NOTE — PROGRESS NOTES
Telemetry Shift Summary    Rhythm SR, paced on demand, first degree HB  HR Range 69-83  Ectopy rPVC  Measurements 0.40/0.08/0.36        Normal Values  Rhythm SR  HR Range    Measurements 0.12-0.20 / 0.06-0.10  / 0.30-0.52

## 2023-09-08 NOTE — CARE PLAN
The patient is Stable - Low risk of patient condition declining or worsening    Shift Goals  Clinical Goals: oxygen saturation above 90%  Patient Goals: more ambulation    Progress made toward(s) clinical / shift goals:    Problem: Knowledge Deficit - Standard  Goal: Patient and family/care givers will demonstrate understanding of plan of care, disease process/condition, diagnostic tests and medications  Outcome: Progressing     Problem: Respiratory  Goal: Patient will achieve/maintain optimum respiratory ventilation and gas exchange  Outcome: Progressing     Problem: Fall Risk  Goal: Patient will remain free from falls  Outcome: Progressing

## 2023-09-08 NOTE — DISCHARGE SUMMARY
Discharge Summary    CHIEF COMPLAINT ON ADMISSION  Chief Complaint   Patient presents with    Shortness of Breath     Over the last few days   Pt states that she has been having increased weakness with this   Per family it has been waking her up          Reason for Admission  Shortness of Breath, Weak     Admission Date  9/4/2023    CODE STATUS  Full Code    HPI & HOSPITAL COURSE  Patient is a 89-year-old female with history of hypertension came in with generalized weakness and shortness of breath for the last few days.  She been feeling poorly over the last 3 to 4 days with worsening shortness of breath and weakness.  She has been having issues with her right eye and has been taking Pred Forte with some improvement initially thought it was an eye infection however it continued to progress.  Chest x-ray is concerning for pneumonia she was started on antibiotics and is currently requiring oxygen to maintain saturation.  Initially she was to requiring between 4 to 5 L to maintain oxygenation.  With continued antibiotics and single dose of IV Lasix her oxygenation has greatly improved to the point where she is only requiring 2 L nasal cannula to maintain saturation.  She is feeling markedly better and will be discharged home with home oxygen and home health.  She will follow-up with her primary care practitioner and should be able to wean from oxygen over the next 2-4 weeks.    Therefore, she is discharged in good and stable condition to home with organized home healthcare and close outpatient follow-up.    The patient met 2-midnight criteria for an inpatient stay at the time of discharge.    Discharge Date  9/8/23    FOLLOW UP ITEMS POST DISCHARGE  PCP-2 weeks    DISCHARGE DIAGNOSES  Principal Problem:    Acute hypoxemic respiratory failure (HCC) (POA: Yes)  Active Problems:    Essential hypertension, benign (POA: Yes)    Recurrent major depressive disorder, in full remission (HCC) (POA: Yes)    Stage 3b chronic  kidney disease (HCC) (POA: Yes)    Pneumonia (POA: Yes)    Advance care planning (POA: Yes)    Elevated troponin (POA: Yes)  Resolved Problems:    * No resolved hospital problems. *      FOLLOW UP  Future Appointments   Date Time Provider Department Center   2/16/2024 11:00 AM PACER CHECK-CAM B [facility] None     [facility]  [address]  [place] [address]  [phone]        HOLA QuijanoRAshleyNAshley  [address] [facility] [address]  [phone]    Follow up  Per CHILANGO Quijano offfice they will reach out to you to schedule. If you do not recieve a call within 1 week please call to schedule your follow up appointment.    Juan Santana M.D.  [address] [facility] [id number] [phone]            MEDICATIONS ON DISCHARGE     Medication List        START taking these medications        Instructions   amoxicillin-clavulanate 875-125 MG Tabs  Commonly known as: Augmentin   Take 1 Tablet by mouth every 12 hours for 1 day.  Dose: 1 Tablet     doxycycline monohydrate 100 MG tablet  Commonly known as: Adoxa   Take 1 Tablet by mouth every 12 hours for 1 day.  Dose: 100 mg            CHANGE how you take these medications        Instructions   metoprolol SR 25 MG Tb24  What changed: when to take this  Commonly known as: Toprol XL   Take 1 Tablet by mouth every day. For Heart  Dose: 25 mg     Vitamin D 125 MCG (5000 UT) Caps  What changed: when to take this   Take 5,000 Units by mouth every day. For osteoporosis and vitamin D Deficiency  Dose: 5,000 Units            CONTINUE taking these medications        Instructions   escitalopram 5 MG tablet  Commonly known as: Lexapro   Take 1 Tablet by mouth at bedtime. For depression  Dose: 5 mg     * Multivitamin Gummies Womens Chew   Chew 1 Dose every evening.  Dose: 1 Dose     * ICAPS Tabs   Take 1 Tablet by mouth every evening. Indications: eye health  Dose: 1 Tablet     prednisoLONE acetate 1 % Susp  Commonly known as: Pred Forte    Administer 1 Drop into the right eye 4 times a day.  Dose: 1 Drop           * This list has 2 medication(s) that are the same as other medications prescribed for you. Read the directions carefully, and ask your doctor or other care provider to review them with you.                  Allergies  Allergies   Allergen Reactions    Other Environmental Runny Nose and Itching     Seasonal allergies       DIET  Orders Placed This Encounter   Procedures    Diet Order Diet: Regular     Standing Status:   Standing     Number of Occurrences:   1     Order Specific Question:   Diet:     Answer:   Regular [1]       ACTIVITY  As tolerated.  Weight bearing as tolerated    CONSULTATIONS  none    PROCEDURES  none    LABORATORY  Lab Results   Component Value Date    SODIUM 140 09/08/2023    POTASSIUM 3.8 09/08/2023    CHLORIDE 106 09/08/2023    CO2 21 09/08/2023    GLUCOSE 105 (H) 09/08/2023    BUN 21 09/08/2023    CREATININE 0.91 09/08/2023        Lab Results   Component Value Date    WBC 9.1 09/08/2023    HEMOGLOBIN 11.8 (L) 09/08/2023    HEMATOCRIT 35.3 (L) 09/08/2023    PLATELETCT 244 09/08/2023        Total time of the discharge process exceeds 36 minutes.

## 2023-09-08 NOTE — PROGRESS NOTES
Telemetry Shift Summary    Rhythm ST/SR   HR Range   Ectopy RPVC W/1st deg.   Measurements 0.40/0.08/0.36      Normal Values  Rhythm SR  HR Range:   Measurements: 0.12-0.20/0.06-0.10/0.30-0.52

## 2023-09-08 NOTE — DISCHARGE PLANNING
HTH/SCP TCN chart review completed. Noted patient acceptance to [facility] 9/7/2023. Collaborated with FAUSTO Saleem. Discussed current discharge considerations noted to home with home healthcare and [facility]. Appreciate CM following for patient potential home oxygen needs at time of discharge.     No new TCN needs anticipated in patient discharge planning at this time. TCN will continue to follow and collaborate with discharge planning team as additional post acute needs arise. Thank you.    Completed  PT with recommendations for home health for continued physical therapy services on 9/5/2023  OT with recommendations for home health for continued occupational therapy services (if she goes home on O2- may need assist learning ADl's with O2)   Choice obtained: HH, DME (O2 with patient provided choice for [facility]). Noted patient acceptance to [facility] 9/7/2023  Patient followed by [facility] prior to acute hospitalization. [facility] referral (Y) sent 9/5/2023

## 2023-09-08 NOTE — CARE PLAN
Problem: Knowledge Deficit - Standard  Goal: Patient and family/care givers will demonstrate understanding of plan of care, disease process/condition, diagnostic tests and medications  Outcome: Met     Problem: Hemodynamics  Goal: Patient's hemodynamics, fluid balance and neurologic status will be stable or improve  Outcome: Met     Problem: Fluid Volume  Goal: Fluid volume balance will be maintained  Outcome: Met     Problem: Urinary - Renal Perfusion  Goal: Ability to achieve and maintain adequate renal perfusion and functioning will improve  Outcome: Met     Problem: Respiratory  Goal: Patient will achieve/maintain optimum respiratory ventilation and gas exchange  Outcome: Met     Problem: Mechanical Ventilation  Goal: Safe management of artificial airway and ventilation  Outcome: Met  Goal: Successful weaning off mechanical ventilator, spontaneously maintains adequate gas exchange  Outcome: Met  Goal: Patient will be able to express needs and understand communication  Outcome: Met     Problem: Physical Regulation  Goal: Diagnostic test results will improve  Outcome: Met  Goal: Signs and symptoms of infection will decrease  Outcome: Met     Problem: Fall Risk  Goal: Patient will remain free from falls  Outcome: Met   The patient is Stable - Low risk of patient condition declining or worsening    Shift Goals  Clinical Goals: oxygen saturation above 90%  Patient Goals: more ambulation    Progress made toward(s) clinical / shift goals:  D/C to home     Patient is not progressing towards the following goals: d/c with o2

## 2023-09-09 NOTE — DISCHARGE PLANNING
note:  Discussed with daughter Clarita that [facility] has not called CM for 3 hrs so we are not sure whether they are processing the order or not. She agreed to order the O2 from another company. Choice made for [facility]. Faxed order to [facility]. Paged oncall for [facility].    Paged on call for [facility].     [facility] trip# [id number]  Cost $184.37   830-9pm , via wheelchair.   Going to [place] at [facility].

## 2023-09-09 NOTE — DISCHARGE PLANNING
note:  Received a message from RACHAEL Lee stating that last evening, the Oxygen was delivered by [facility] without any notice to this CM after attempting to call several times. As well [place] came but was asked by [facility] to go away.

## 2023-09-09 NOTE — DISCHARGE PLANNING
CM calling [facility]. Pt was placed on hold for at least 30 minutes and then option to talk to oncall . CM left a message for on call  and for someone from [facility] to call back.

## 2023-09-12 PROBLEM — I67.82 SUBCORTICAL MICROVASCULAR ISCHEMIC OCCLUSIVE DISEASE: Status: ACTIVE | Noted: 2023-09-12

## 2023-09-12 PROBLEM — R09.02 HYPOXIA: Status: ACTIVE | Noted: 2023-09-12

## 2023-09-12 PROBLEM — G31.84 MILD COGNITIVE IMPAIRMENT WITH MEMORY LOSS: Status: ACTIVE | Noted: 2023-06-19

## 2023-09-14 ENCOUNTER — HOME CARE VISIT (OUTPATIENT)
Dept: HOME HEALTH SERVICES | Facility: HOME HEALTHCARE | Age: 88
End: 2023-09-14
Payer: MEDICARE

## 2023-09-14 ENCOUNTER — DOCUMENTATION (OUTPATIENT)
Dept: MEDICAL GROUP | Facility: PHYSICIAN GROUP | Age: 88
End: 2023-09-14
Payer: MEDICARE

## 2023-09-14 VITALS
DIASTOLIC BLOOD PRESSURE: 60 MMHG | HEIGHT: 63 IN | RESPIRATION RATE: 16 BRPM | SYSTOLIC BLOOD PRESSURE: 98 MMHG | BODY MASS INDEX: 24.17 KG/M2 | TEMPERATURE: 99.2 F | HEART RATE: 79 BPM | OXYGEN SATURATION: 95 % | WEIGHT: 136.4 LBS

## 2023-09-14 PROCEDURE — 665001 SOC-HOME HEALTH

## 2023-09-14 PROCEDURE — G0493 RN CARE EA 15 MIN HH/HOSPICE: HCPCS

## 2023-09-14 SDOH — ECONOMIC STABILITY: HOUSING INSECURITY
HOME SAFETY: IN THE HOME. SMOKE ALARMS ARE PRESENT AND FUNCTIONAL ON EACH LEVEL OF THE HOME. PATIENT DOES HAVE A FIRE ESCAPE PLAN DEVELOPED. PATIENT DOES NOT HAVE FLAMMABLE MATERIALS PRESENT IN THE HOME PRESENTING A FIRE HAZARD. NO EVIDENCE FOUND OF SMOKING MATER

## 2023-09-14 SDOH — ECONOMIC STABILITY: HOUSING INSECURITY: HOME SAFETY: IALS PRESENT IN THE HOME.    SN EDUCATED PT IN REGARD TO FALL PREVENTION USING HANDOUT IN WHITE BINDER.

## 2023-09-14 SDOH — ECONOMIC STABILITY: HOUSING INSECURITY
HOME SAFETY: OXYGEN SAFETY RISK ASSESSMENT PERFORMED. PATIENT DOES NOT HAVE A NO SMOKING SIGN POSTED IN THE HOME., PT WAS GIVEN A NO SMOKING SIGN AND PROVIDED EDUCATION ABOUT WHY IT IS IMPORTANT TO PLACE ONE. PATIENT DOES HAVE A WORKING FIRE EXTINGUISHER PRESENT

## 2023-09-14 SDOH — ECONOMIC STABILITY: HOUSING INSECURITY: EVIDENCE OF SMOKING MATERIAL: 0

## 2023-09-14 ASSESSMENT — ACTIVITIES OF DAILY LIVING (ADL)
USING THE TELPHONE: INDEPENDENT
FEEDING: INDEPENDENT
TELEPHONE USE ASSESSED: 1
TRANSPORTATION COMMENTS: PT NEEDS ASSISTANCE TO LEAVE HOME
BATHING ASSESSED: 1
DRESSING_UB_CURRENT_FUNCTION: SUPERVISION
GROOMING ASSESSED: 1
PHYSICAL TRANSFERS ASSESSED: 1
FEEDING ASSESSED: 1
BATHING_CURRENT_FUNCTION: STAND BY ASSIST
CURRENT_FUNCTION: SUPERVISION
AMBULATION ASSISTANCE: 1
AMBULATION ASSISTANCE: SUPERVISION
ORAL_CARE_CURRENT_FUNCTION: INDEPENDENT
TOILETING: 1
ORAL_CARE_ASSESSED: 1
GROOMING_CURRENT_FUNCTION: INDEPENDENT
TOILETING: SUPERVISION
DRESSING_LB_CURRENT_FUNCTION: SUPERVISION

## 2023-09-14 ASSESSMENT — ENCOUNTER SYMPTOMS
LAST BOWEL MOVEMENT: 66731
PAIN SEVERITY GOAL: 0/10
VOMITING: PT DENIES
DEPRESSED MOOD: 1
NAUSEA: PT DENIES
DYSPNEA ACTIVITY LEVEL: AFTER AMBULATING MORE THAN 20 FT
LOWER EXTREMITY EDEMA: 1
DYSPNEA ON EXERTION: 1
DENIES PAIN: 1
BOWEL PATTERN NORMAL: 1
STOOL FREQUENCY: DAILY
LOWEST PAIN SEVERITY IN PAST 24 HOURS: 0/10
HIGHEST PAIN SEVERITY IN PAST 24 HOURS: 0/10
SHORTNESS OF BREATH: 1
PERSON REPORTING PAIN: PATIENT
DIFFICULTY THINKING: 1

## 2023-09-14 ASSESSMENT — FIBROSIS 4 INDEX: FIB4 SCORE: 2.24

## 2023-09-14 NOTE — Clinical Note
Primary dx/Skilled need: Pt with recent hospitalization for PNA, elevated troponin. Hx of pacemaker (2021), heart block, falls, HTN, DDD, mitral regurgitation, CKD III, osteopenia, Resighini, mild cognitive impairment, vit B12 def, subcortical microvascular ischemic, occlusive dx, depression, PVD, fx of R distal femur, gait instability, pleural effusion, leukocytosis, transminitis.   SN frequency: 1w1, 2w2 for education regarding disease process, medications, home/oxygen safety.   Zip code: [id number]  Disciplines ordered: PT: 1w1 eval and treat; MSW: 1w1 for assistance with Advance Directive.   Insurance & authorization: [facility]  Certification period: 9/14/23 to 11/12/23  Special consideration:  Pt refused OT at this time.

## 2023-09-15 ENCOUNTER — HOME CARE VISIT (OUTPATIENT)
Dept: HOME HEALTH SERVICES | Facility: HOME HEALTHCARE | Age: 88
End: 2023-09-15
Payer: MEDICARE

## 2023-09-15 NOTE — Clinical Note
received order from DAYDAY NARVAEZ for MSW eval to include assistance with advanced directives and healthcare power of

## 2023-09-15 NOTE — PROGRESS NOTES
Medication chart review for [facility] services    Received referral from [facility].   Medications reviewed  compared with discharge summary if available.  Discharge summary date, if applicable:   9/8/23    Current medication list per [facility]     Medication list one, patient is currently taking    Current Outpatient Medications:     Lactobacillus-Inulin (CULTURELLE DIGESTIVE DAILY PO), 1 Capsule, Oral, DAILY    Home Care Oxygen, 2 L/min, Inhalation, QHS    metoprolol SR, 25 mg, Oral, DAILY (Patient taking differently: 25 mg, Oral, EVERY EVENING, For Heart)    escitalopram, 5 mg, Oral, QHS    prednisoLONE acetate, 1 Drop, Right Eye, 4XDAY    Multivitamin Gummies Womens, 1 Dose, Oral, Q EVENING    ICAPS, 1 Tablet, Oral, Q EVENING    Vitamin D, 5,000 Units, Oral, DAILY (Patient taking differently: 5,000 Units, Oral, EVERY EVENING, For osteoporosis and vitamin D Deficiency)      Medication list two, drugs that the patient has been prescribed or recommended to take by their healthcare provider on discharge summary     START taking these medications         Instructions   amoxicillin-clavulanate 875-125 MG Tabs  Commonly known as: Augmentin    Take 1 Tablet by mouth every 12 hours for 1 day.  Dose: 1 Tablet      doxycycline monohydrate 100 MG tablet  Commonly known as: Adoxa    Take 1 Tablet by mouth every 12 hours for 1 day.  Dose: 100 mg                CHANGE how you take these medications         Instructions   metoprolol SR 25 MG Tb24  What changed: when to take this  Commonly known as: Toprol XL    Take 1 Tablet by mouth every day. For Heart  Dose: 25 mg      Vitamin D 125 MCG (5000 UT) Caps  What changed: when to take this    Take 5,000 Units by mouth every day. For osteoporosis and vitamin D Deficiency  Dose: 5,000 Units                CONTINUE taking these medications         Instructions   escitalopram 5 MG tablet  Commonly known as: Lexapro    Take 1 Tablet by mouth at bedtime. For  depression  Dose: 5 mg      * Multivitamin Gummies Womens Chew    Chew 1 Dose every evening.  Dose: 1 Dose      * ICAPS Tabs    Take 1 Tablet by mouth every evening. Indications: eye health  Dose: 1 Tablet      prednisoLONE acetate 1 % Susp  Commonly known as: Pred Forte    Administer 1 Drop into the right eye 4 times a day.  Dose: 1 Drop           Allergies   Allergen Reactions    Other Environmental Runny Nose and Itching     Seasonal allergies       Labs     Lab Results   Component Value Date/Time    SODIUM 140 09/08/2023 02:36 AM    POTASSIUM 3.8 09/08/2023 02:36 AM    CHLORIDE 106 09/08/2023 02:36 AM    CO2 21 09/08/2023 02:36 AM    GLUCOSE 105 (H) 09/08/2023 02:36 AM    BUN 21 09/08/2023 02:36 AM    CREATININE 0.91 09/08/2023 02:36 AM     Lab Results   Component Value Date/Time    ALKPHOSPHAT 76 09/05/2023 01:23 AM    ASTSGOT 25 09/05/2023 01:23 AM    ALTSGPT 17 09/05/2023 01:23 AM    TBILIRUBIN 1.0 09/05/2023 01:23 AM    INR 1.13 11/03/2021 01:18 AM    ALBUMIN 3.1 (L) 09/05/2023 01:23 AM        Assessment for clinically significant drug interactions, drug omissions/additions, duplicative therapies.            CC   Yodit Pastor, BAKARI.P.R.N.  [address] [facility] [address]  Fax: [phone]    [facility]  Phone [phone] fax [phone]    This note was created using voice recognition software (Dragon). The accuracy of the dictation is limited by the abilities of the software. I have reviewed the note prior to signing, however some errors in grammar and context are still possible. If you have any questions related to this note please do not hesitate to contact our office.

## 2023-09-16 NOTE — CASE COMMUNICATION
noted  ----- Message -----  From: Suad Silva R.N.  Sent: 9/14/2023   3:35 PM PDT  To: Farzana Flower R.N.; Roya Don R.N.; *      Primary dx/Skilled need: Pt with recent hospitalization for PNA, elevated troponin. Hx of pacemaker (2021), heart block, falls, HTN, DDD, mitral regurgitation, CKD III, osteopenia, Jackson, mild cognitive impairment, vit B12 def, subcortical microvascular ischemic, occlusive dx, depression, PVD, fx  of R distal femur, gait instability, pleural effusion, leukocytosis, transminitis.   SN frequency: 1w1, 2w2 for education regarding disease process, medications, home/oxygen safety.   Zip code: [id number]  Disciplines ordered: PT: 1w1 eval and treat; MSW: 1w1 for assistance with Advance Directive.   Insurance & authorization: [facility]  Certification period: 9/14/23 to 11/12/23  Special consideration:  Pt refused OT at this time.

## 2023-09-16 NOTE — CASE COMMUNICATION
noted  ----- Message -----  From: Roya Don R.N.  Sent: 9/15/2023   1:34 PM PDT  To: Farzana Flower R.N.; MIKE Andre, MSW; *      received order from DAYDAY NARVAEZ for MSW eval to include assistance with advanced directives and healthcare power of

## 2023-09-18 ENCOUNTER — HOME CARE VISIT (OUTPATIENT)
Dept: HOME HEALTH SERVICES | Facility: HOME HEALTHCARE | Age: 88
End: 2023-09-18
Payer: MEDICARE

## 2023-09-18 PROCEDURE — G0151 HHCP-SERV OF PT,EA 15 MIN: HCPCS

## 2023-09-18 NOTE — Clinical Note
PT evaluation completed requesting follow up visits with frequency of 1w1,2w1,1w1 effective 09/18/2023.

## 2023-09-19 ENCOUNTER — HOME CARE VISIT (OUTPATIENT)
Dept: HOME HEALTH SERVICES | Facility: HOME HEALTHCARE | Age: 88
End: 2023-09-19
Payer: MEDICARE

## 2023-09-19 VITALS
SYSTOLIC BLOOD PRESSURE: 115 MMHG | TEMPERATURE: 98.1 F | DIASTOLIC BLOOD PRESSURE: 70 MMHG | RESPIRATION RATE: 16 BRPM | HEART RATE: 78 BPM | OXYGEN SATURATION: 94 %

## 2023-09-19 PROCEDURE — G0493 RN CARE EA 15 MIN HH/HOSPICE: HCPCS

## 2023-09-19 ASSESSMENT — ACTIVITIES OF DAILY LIVING (ADL): AMBULATION ASSISTANCE ON FLAT SURFACES: 1

## 2023-09-19 ASSESSMENT — ENCOUNTER SYMPTOMS
MUSCLE WEAKNESS: 1
PERSON REPORTING PAIN: PATIENT
DENIES PAIN: 1
ARTHRALGIAS: 1

## 2023-09-20 ENCOUNTER — HOME CARE VISIT (OUTPATIENT)
Dept: HOME HEALTH SERVICES | Facility: HOME HEALTHCARE | Age: 88
End: 2023-09-20
Payer: MEDICARE

## 2023-09-20 PROCEDURE — G0155 HHCP-SVS OF CSW,EA 15 MIN: HCPCS

## 2023-09-20 ASSESSMENT — ACTIVITIES OF DAILY LIVING (ADL)
SHOPPING_REQUIRES_ASSISTANCE: 1
OASIS_M1830: 03
LAUNDRY_REQUIRES_ASSISTANCE: 1

## 2023-09-20 NOTE — CASE COMMUNICATION
Quality Review Completed for 9/14 SOC OASIS by JIM Ricardo RN on 9/20/2023:     Edits completed by JIM Ricardo RN:     1.  and  diagnosis coding updated per chart review  2.  is 9/14 for LSOC ordered  3.  is at rest when o2 is used only at NOC  4.  is D. Therapeutic diet for HTN  5.  C1 and C3 intermittent oxygen therapy  6. Per narrative for supervision level of assist on functional status items, changed M18 00, 1810, 1820, 1845, 1850 to 2.  is 3  7.  changed to 3 per guidelines when ambulation is supervised  8. Checked proper med use to 485 Safety Measures

## 2023-09-20 NOTE — PROGRESS NOTES
HV to residence. Permitted entry by patient verbally. Patient denied falls, changes in medication or pain. Reviewed reason for visit. Advance Directive completed, called daughter to assist with information. MSW educated daughter and patient how to finalize document. Patient orietned x4. Reports positive mood. Agreeable to MSW eval only.     Falls: 0    DME: gra bars, hand shower, portable oxygen, O2    Community resources: [facility]    Advance Directive: pending signatures, POLST on file    Oxygen: n/a      Liters:      Storage: Tanks/concentrator stored correctly      Medication List: Oxygen on medication list    Discipline Frequency: 1 visit, patient agreeable    Follow Up: n/a

## 2023-09-20 NOTE — Clinical Note
I agree with these changes  ----- Message -----  From: Crys Ricardo R.N.  Sent: 9/20/2023  12:50 PM PDT  To: Suad Silva R.N.      Quality Review Completed for 9/14 SOC OASIS by JIM Ricardo RN on 9/20/2023:     Edits completed by JIM Ricardo RN:     1.  and  diagnosis coding updated per chart review  2.  is 9/14 for LSOC ordered  3.  is at rest when o2 is used only at NOC  4.  is D. Therapeutic diet for HTN  5.  C1 and C3 intermittent oxygen therapy  6. Per narrative for supervision level of assist on functional status items, changed , 1810, 1820, 1845, 1850 to 2.  is 3  7.  changed to 3 per guidelines when ambulation is supervised  8. Checked proper med use to 485 Safety Measures

## 2023-09-20 NOTE — CASE COMMUNICATION
noted  ----- Message -----  From: Mao Caballero, PT  Sent: 9/19/2023   9:03 PM PDT  To: Farzana Flower R.N.; [address]      PT evaluation completed requesting follow up visits with frequency of 1w1,2w1,1w1 effective 09/18/2023.

## 2023-09-21 ENCOUNTER — HOME CARE VISIT (OUTPATIENT)
Dept: HOME HEALTH SERVICES | Facility: HOME HEALTHCARE | Age: 88
End: 2023-09-21
Payer: MEDICARE

## 2023-09-21 VITALS
DIASTOLIC BLOOD PRESSURE: 70 MMHG | OXYGEN SATURATION: 96 % | SYSTOLIC BLOOD PRESSURE: 128 MMHG | OXYGEN SATURATION: 92 % | RESPIRATION RATE: 18 BRPM | DIASTOLIC BLOOD PRESSURE: 66 MMHG | RESPIRATION RATE: 18 BRPM | TEMPERATURE: 98.7 F | SYSTOLIC BLOOD PRESSURE: 130 MMHG | HEART RATE: 80 BPM | TEMPERATURE: 97.7 F | HEART RATE: 81 BPM

## 2023-09-21 PROCEDURE — G0493 RN CARE EA 15 MIN HH/HOSPICE: HCPCS

## 2023-09-21 ASSESSMENT — ENCOUNTER SYMPTOMS
DENIES PAIN: 1
STOOL FREQUENCY: DAILY
BOWEL PATTERN NORMAL: 1
BOWEL PATTERN NORMAL: 1
LAST BOWEL MOVEMENT: 66736
DENIES PAIN: 1
LAST BOWEL MOVEMENT: 66736
STOOL FREQUENCY: LESS THAN DAILY
POOR JUDGMENT: 1
POOR JUDGMENT: 1
MUSCLE WEAKNESS: 1

## 2023-09-25 ENCOUNTER — HOME CARE VISIT (OUTPATIENT)
Dept: HOME HEALTH SERVICES | Facility: HOME HEALTHCARE | Age: 88
End: 2023-09-25
Payer: MEDICARE

## 2023-09-25 PROCEDURE — G0151 HHCP-SERV OF PT,EA 15 MIN: HCPCS

## 2023-09-26 ENCOUNTER — HOME CARE VISIT (OUTPATIENT)
Dept: HOME HEALTH SERVICES | Facility: HOME HEALTHCARE | Age: 88
End: 2023-09-26
Payer: MEDICARE

## 2023-09-26 VITALS
SYSTOLIC BLOOD PRESSURE: 130 MMHG | RESPIRATION RATE: 18 BRPM | DIASTOLIC BLOOD PRESSURE: 70 MMHG | OXYGEN SATURATION: 95 % | HEART RATE: 76 BPM | TEMPERATURE: 98.3 F

## 2023-09-26 VITALS
RESPIRATION RATE: 18 BRPM | DIASTOLIC BLOOD PRESSURE: 70 MMHG | TEMPERATURE: 98.1 F | HEART RATE: 64 BPM | OXYGEN SATURATION: 97 % | SYSTOLIC BLOOD PRESSURE: 110 MMHG

## 2023-09-26 PROCEDURE — G0495 RN CARE TRAIN/EDU IN HH: HCPCS

## 2023-09-26 ASSESSMENT — ENCOUNTER SYMPTOMS
LAST BOWEL MOVEMENT: 66743
POOR JUDGMENT: 1
DENIES PAIN: 1
DENIES PAIN: 1
PERSON REPORTING PAIN: PATIENT
BOWEL PATTERN NORMAL: 1
STOOL FREQUENCY: DAILY

## 2023-09-27 ENCOUNTER — HOME CARE VISIT (OUTPATIENT)
Dept: HOME HEALTH SERVICES | Facility: HOME HEALTHCARE | Age: 88
End: 2023-09-27
Payer: MEDICARE

## 2023-09-27 PROCEDURE — G0151 HHCP-SERV OF PT,EA 15 MIN: HCPCS

## 2023-09-27 NOTE — Clinical Note
Patient discharged from home health PT effective 09/27/2023 with all goals met, patient transferred to outpatient PT.

## 2023-09-28 ENCOUNTER — HOME CARE VISIT (OUTPATIENT)
Dept: HOME HEALTH SERVICES | Facility: HOME HEALTHCARE | Age: 88
End: 2023-09-28
Payer: MEDICARE

## 2023-09-28 VITALS
RESPIRATION RATE: 18 BRPM | OXYGEN SATURATION: 94 % | SYSTOLIC BLOOD PRESSURE: 150 MMHG | HEART RATE: 62 BPM | TEMPERATURE: 98.7 F | DIASTOLIC BLOOD PRESSURE: 70 MMHG

## 2023-09-28 VITALS
DIASTOLIC BLOOD PRESSURE: 70 MMHG | HEART RATE: 82 BPM | SYSTOLIC BLOOD PRESSURE: 128 MMHG | OXYGEN SATURATION: 94 % | RESPIRATION RATE: 18 BRPM | TEMPERATURE: 97.8 F

## 2023-09-28 PROCEDURE — G0493 RN CARE EA 15 MIN HH/HOSPICE: HCPCS

## 2023-09-28 SDOH — ECONOMIC STABILITY: HOUSING INSECURITY: EVIDENCE OF SMOKING MATERIAL: 0

## 2023-09-28 ASSESSMENT — ENCOUNTER SYMPTOMS
POOR JUDGMENT: 1
BOWEL PATTERN NORMAL: 1
PERSON REPORTING PAIN: PATIENT
LAST BOWEL MOVEMENT: 66745
DENIES PAIN: 1
DENIES PAIN: 1
STOOL FREQUENCY: DAILY

## 2023-09-28 NOTE — CASE COMMUNICATION
noted  ----- Message -----  From: Mao Caballero, PT  Sent: 9/28/2023   9:26 AM PDT  To: Farzana Flower R.N.; [address]      Patient discharged from home health PT effective 09/27/2023 with all goals met, patient transferred to outpatient PT.

## 2023-09-29 ENCOUNTER — HOME CARE VISIT (OUTPATIENT)
Dept: HOME HEALTH SERVICES | Facility: HOME HEALTHCARE | Age: 88
End: 2023-09-29
Payer: MEDICARE

## 2023-09-29 SDOH — ECONOMIC STABILITY: FOOD INSECURITY: MEALS PER DAY: 3

## 2023-09-29 ASSESSMENT — ENCOUNTER SYMPTOMS
APPETITE LEVEL: GOOD
CHANGE IN APPETITE: UNCHANGED

## 2023-09-29 ASSESSMENT — ACTIVITIES OF DAILY LIVING (ADL)
OASIS_M1830: 00
HOME_HEALTH_OASIS: 00

## 2023-10-03 ENCOUNTER — HOME CARE VISIT (OUTPATIENT)
Dept: HOME HEALTH SERVICES | Facility: HOME HEALTHCARE | Age: 88
End: 2023-10-03
Payer: MEDICARE

## 2023-10-03 NOTE — CASE COMMUNICATION
Quality Review for GA OASIS by DENIS Montejo RN on  October 3, 2023       Edits completed by DENIS Montejo RN:  1.  E is NA per the POC

## 2023-10-04 NOTE — CASE COMMUNICATION
I agree with change  ----- Message -----  From: Rut Montejo R.N.  Sent: 10/3/2023   1:21 PM PDT  To: Farzana Flower R.N.      Quality Review for DC OASIS by DENIS Montejo RN on  October 3, 2023       Edits completed by DENIS Montejo RN:  1.  E is NA per the POC

## 2023-10-18 PROBLEM — H53.9 VISUAL CHANGES: Status: ACTIVE | Noted: 2023-10-16

## 2023-11-28 PROBLEM — J96.01 ACUTE HYPOXEMIC RESPIRATORY FAILURE (HCC): Status: RESOLVED | Noted: 2023-09-04 | Resolved: 2023-11-28

## 2024-01-04 ENCOUNTER — APPOINTMENT (OUTPATIENT)
Dept: RADIOLOGY | Facility: MEDICAL CENTER | Age: 89
End: 2024-01-04
Attending: NURSE PRACTITIONER
Payer: MEDICARE

## 2024-01-22 PROBLEM — U07.1 COVID-19: Status: ACTIVE | Noted: 2024-01-22

## 2024-02-26 ENCOUNTER — HOSPITAL ENCOUNTER (OUTPATIENT)
Dept: LAB | Facility: MEDICAL CENTER | Age: 89
End: 2024-02-26
Attending: NURSE PRACTITIONER
Payer: MEDICARE

## 2024-02-26 LAB
AMMONIA PLAS-SCNC: 13 UMOL/L (ref 11–45)
FOLATE SERPL-MCNC: 16.9 NG/ML
T PALLIDUM AB SER QL IA: NORMAL
T3 SERPL-MCNC: 99.9 NG/DL (ref 60–181)
T4 SERPL-MCNC: 7 UG/DL (ref 4–12)
TSH SERPL DL<=0.005 MIU/L-ACNC: 2.65 UIU/ML (ref 0.38–5.33)
VIT B12 SERPL-MCNC: 417 PG/ML (ref 211–911)

## 2024-02-26 PROCEDURE — 84207 ASSAY OF VITAMIN B-6: CPT

## 2024-02-26 PROCEDURE — 84443 ASSAY THYROID STIM HORMONE: CPT

## 2024-02-26 PROCEDURE — 82140 ASSAY OF AMMONIA: CPT

## 2024-02-26 PROCEDURE — 82746 ASSAY OF FOLIC ACID SERUM: CPT

## 2024-02-26 PROCEDURE — 82607 VITAMIN B-12: CPT

## 2024-02-26 PROCEDURE — 84425 ASSAY OF VITAMIN B-1: CPT

## 2024-02-26 PROCEDURE — 36415 COLL VENOUS BLD VENIPUNCTURE: CPT

## 2024-02-26 PROCEDURE — 84480 ASSAY TRIIODOTHYRONINE (T3): CPT

## 2024-02-26 PROCEDURE — 84436 ASSAY OF TOTAL THYROXINE: CPT

## 2024-02-26 PROCEDURE — 86780 TREPONEMA PALLIDUM: CPT

## 2024-02-27 ENCOUNTER — NON-PROVIDER VISIT (OUTPATIENT)
Dept: CARDIOLOGY | Facility: MEDICAL CENTER | Age: 89
End: 2024-02-27
Attending: NURSE PRACTITIONER
Payer: MEDICARE

## 2024-02-27 VITALS
WEIGHT: 133.2 LBS | OXYGEN SATURATION: 94 % | BODY MASS INDEX: 23.6 KG/M2 | DIASTOLIC BLOOD PRESSURE: 74 MMHG | HEIGHT: 63 IN | SYSTOLIC BLOOD PRESSURE: 128 MMHG | HEART RATE: 71 BPM | RESPIRATION RATE: 17 BRPM

## 2024-02-27 DIAGNOSIS — Z95.0 PACEMAKER: ICD-10-CM

## 2024-02-27 DIAGNOSIS — I44.1 SECOND DEGREE ATRIOVENTRICULAR BLOCK, MOBITZ (TYPE) I: ICD-10-CM

## 2024-02-27 PROCEDURE — 93288 INTERROG EVL PM/LDLS PM IP: CPT | Mod: 26 | Performed by: NURSE PRACTITIONER

## 2024-02-27 ASSESSMENT — FIBROSIS 4 INDEX: FIB4 SCORE: 2.24

## 2024-02-27 NOTE — PROGRESS NOTES
Device is working normally. No mode switching episodes.  Normal sensing and capture of RA and RV leads; stable impedances. Battery longevity is 10.8 years.  No changes are made today.    Follow-up in 6 months for next PM check with me, as well as Dr. Menjivar for annual follow-up.    Patient is scheduled for MRI of the head in April 2024; she DOES have an MRI conditional pacemaker, and can proceed.  (Cornelia Castro DR MRI [id number]).

## 2024-03-01 LAB
VIT B1 BLD-MCNC: 103 NMOL/L (ref 70–180)
VIT B6 SERPL-MCNC: 29.5 NMOL/L (ref 20–125)

## 2024-03-21 ENCOUNTER — TELEPHONE (OUTPATIENT)
Dept: INTERNAL MEDICINE | Facility: OTHER | Age: 89
End: 2024-03-21
Payer: MEDICARE
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
## 2024-04-16 ENCOUNTER — HOSPITAL ENCOUNTER (OUTPATIENT)
Dept: RADIOLOGY | Facility: MEDICAL CENTER | Age: 89
End: 2024-04-16
Attending: NURSE PRACTITIONER
Payer: MEDICARE

## 2024-04-16 DIAGNOSIS — R93.0 FAMILIAL ENLARGEMENT OF THE SELLA TURCICA: ICD-10-CM

## 2024-04-16 DIAGNOSIS — R41.3 MEMORY LOSS: ICD-10-CM

## 2024-04-16 PROCEDURE — 70551 MRI BRAIN STEM W/O DYE: CPT

## 2024-04-16 NOTE — PROGRESS NOTES
Patient presents for MRI with [employer] pacemaker. Remote elana utilized to change pacemaker mode to MRI safe mode. Verified DOO at 100 bpm for MRI exam. After MRI, utilized remote elana to return pacemaker to baseline mode at 13:43. Verified with final session summary via [employer] iPad.

## 2024-06-04 PROBLEM — R01.1 CARDIAC MURMUR: Status: ACTIVE | Noted: 2024-06-04

## 2024-07-02 ENCOUNTER — HOSPITAL ENCOUNTER (OUTPATIENT)
Dept: RADIOLOGY | Facility: MEDICAL CENTER | Age: 89
End: 2024-07-02
Attending: NURSE PRACTITIONER
Payer: MEDICARE

## 2024-07-02 DIAGNOSIS — Z12.31 VISIT FOR SCREENING MAMMOGRAM: ICD-10-CM

## 2024-07-02 PROCEDURE — 77063 BREAST TOMOSYNTHESIS BI: CPT

## 2024-07-16 PROBLEM — D48.5 NEOPLASM OF UNCERTAIN BEHAVIOR OF SKIN: Status: ACTIVE | Noted: 2024-07-16

## 2024-07-16 PROBLEM — R42 DIZZINESS: Status: ACTIVE | Noted: 2024-07-16

## 2024-07-16 PROBLEM — Z66 DNR (DO NOT RESUSCITATE): Status: ACTIVE | Noted: 2023-09-04

## 2024-07-16 PROBLEM — R94.01 ABNORMAL EEG: Status: ACTIVE | Noted: 2024-07-16

## 2024-07-16 PROBLEM — J18.9 COMMUNITY ACQUIRED PNEUMONIA OF LEFT LOWER LOBE OF LUNG: Status: RESOLVED | Noted: 2022-11-15 | Resolved: 2024-07-16
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
## 2024-08-27 PROBLEM — R27.0 ATAXIA: Status: ACTIVE | Noted: 2024-08-27

## 2024-08-27 PROBLEM — R41.89 MODERATE COGNITIVE IMPAIRMENT: Status: ACTIVE | Noted: 2023-06-19

## 2024-09-04 ENCOUNTER — PATIENT MESSAGE (OUTPATIENT)
Dept: HEALTH INFORMATION MANAGEMENT | Facility: OTHER | Age: 89
End: 2024-09-04

## 2024-09-06 ENCOUNTER — APPOINTMENT (RX ONLY)
Dept: URBAN - METROPOLITAN AREA CLINIC 22 | Facility: CLINIC | Age: 89
Setting detail: DERMATOLOGY
End: 2024-09-06

## 2024-09-06 DIAGNOSIS — Z85.828 PERSONAL HISTORY OF OTHER MALIGNANT NEOPLASM OF SKIN: ICD-10-CM

## 2024-09-06 DIAGNOSIS — L81.4 OTHER MELANIN HYPERPIGMENTATION: ICD-10-CM

## 2024-09-06 DIAGNOSIS — D18.0 HEMANGIOMA: ICD-10-CM

## 2024-09-06 DIAGNOSIS — L82.1 OTHER SEBORRHEIC KERATOSIS: ICD-10-CM

## 2024-09-06 DIAGNOSIS — D22 MELANOCYTIC NEVI: ICD-10-CM

## 2024-09-06 DIAGNOSIS — I83.9 ASYMPTOMATIC VARICOSE VEINS OF LOWER EXTREMITIES: ICD-10-CM

## 2024-09-06 PROBLEM — D48.5 NEOPLASM OF UNCERTAIN BEHAVIOR OF SKIN: Status: ACTIVE | Noted: 2024-09-06

## 2024-09-06 PROBLEM — I83.93 ASYMPTOMATIC VARICOSE VEINS OF BILATERAL LOWER EXTREMITIES: Status: ACTIVE | Noted: 2024-09-06

## 2024-09-06 PROBLEM — D18.01 HEMANGIOMA OF SKIN AND SUBCUTANEOUS TISSUE: Status: ACTIVE | Noted: 2024-09-06

## 2024-09-06 PROBLEM — D22.5 MELANOCYTIC NEVI OF TRUNK: Status: ACTIVE | Noted: 2024-09-06

## 2024-09-06 PROCEDURE — ? ADDITIONAL NOTES

## 2024-09-06 PROCEDURE — ? COUNSELING

## 2024-09-06 PROCEDURE — 99213 OFFICE O/P EST LOW 20 MIN: CPT | Mod: 25

## 2024-09-06 PROCEDURE — ? BIOPSY BY SHAVE METHOD

## 2024-09-06 PROCEDURE — ? SUNSCREEN TREATMENT REGIMEN

## 2024-09-06 PROCEDURE — 11102 TANGNTL BX SKIN SINGLE LES: CPT

## 2024-09-06 ASSESSMENT — LOCATION DETAILED DESCRIPTION DERM
LOCATION DETAILED: LEFT LATERAL SUPERIOR CHEST
LOCATION DETAILED: LEFT ANTERIOR DISTAL THIGH
LOCATION DETAILED: PERIUMBILICAL SKIN
LOCATION DETAILED: LEFT MEDIAL SUPERIOR CHEST
LOCATION DETAILED: SUPERIOR THORACIC SPINE
LOCATION DETAILED: EPIGASTRIC SKIN
LOCATION DETAILED: LEFT ANTERIOR SHOULDER
LOCATION DETAILED: RIGHT VENTRAL PROXIMAL FOREARM
LOCATION DETAILED: LEFT ANKLE
LOCATION DETAILED: RIGHT MID-UPPER BACK
LOCATION DETAILED: RIGHT ANTERIOR PROXIMAL THIGH
LOCATION DETAILED: LEFT RIB CAGE
LOCATION DETAILED: LEFT MID-UPPER BACK
LOCATION DETAILED: LEFT DORSAL WRIST
LOCATION DETAILED: RIGHT DISTAL PRETIBIAL REGION
LOCATION DETAILED: UPPER STERNUM
LOCATION DETAILED: RIGHT CENTRAL TEMPLE
LOCATION DETAILED: INFERIOR THORACIC SPINE
LOCATION DETAILED: LEFT VENTRAL PROXIMAL FOREARM
LOCATION DETAILED: INFERIOR MID FOREHEAD
LOCATION DETAILED: LEFT MEDIAL UPPER BACK
LOCATION DETAILED: LEFT SUPERIOR LATERAL MIDBACK

## 2024-09-06 ASSESSMENT — LOCATION ZONE DERM
LOCATION ZONE: FACE
LOCATION ZONE: ARM
LOCATION ZONE: LEG
LOCATION ZONE: TRUNK

## 2024-09-06 ASSESSMENT — LOCATION SIMPLE DESCRIPTION DERM
LOCATION SIMPLE: LEFT UPPER BACK
LOCATION SIMPLE: LEFT SHOULDER
LOCATION SIMPLE: RIGHT UPPER BACK
LOCATION SIMPLE: ABDOMEN
LOCATION SIMPLE: CHEST
LOCATION SIMPLE: RIGHT FOREARM
LOCATION SIMPLE: LEFT FOREARM
LOCATION SIMPLE: UPPER BACK
LOCATION SIMPLE: RIGHT THIGH
LOCATION SIMPLE: RIGHT TEMPLE
LOCATION SIMPLE: LEFT ANKLE
LOCATION SIMPLE: LEFT WRIST
LOCATION SIMPLE: INFERIOR FOREHEAD
LOCATION SIMPLE: RIGHT PRETIBIAL REGION
LOCATION SIMPLE: LEFT THIGH
LOCATION SIMPLE: LEFT LOWER BACK

## 2024-09-06 NOTE — PROCEDURE: ADDITIONAL NOTES
Additional Notes: Will refer to [facility] vein [facility]
Render Risk Assessment In Note?: no
Detail Level: Simple

## 2024-10-07 ENCOUNTER — OFF SITE VISIT (OUTPATIENT)
Dept: FAMILY PLANNING/WOMEN'S HEALTH CLINIC | Facility: PHYSICIAN GROUP | Age: 89
End: 2024-10-07
Payer: MEDICARE

## 2024-10-07 VITALS
SYSTOLIC BLOOD PRESSURE: 131 MMHG | RESPIRATION RATE: 16 BRPM | WEIGHT: 133 LBS | HEIGHT: 63 IN | HEART RATE: 70 BPM | OXYGEN SATURATION: 95 % | DIASTOLIC BLOOD PRESSURE: 85 MMHG | BODY MASS INDEX: 23.57 KG/M2

## 2024-10-07 DIAGNOSIS — I47.10 PAROXYSMAL SVT (SUPRAVENTRICULAR TACHYCARDIA) (HCC): ICD-10-CM

## 2024-10-07 DIAGNOSIS — I70.0 ATHEROSCLEROSIS OF AORTA (HCC): ICD-10-CM

## 2024-10-07 DIAGNOSIS — G31.9 CEREBRAL ATROPHY (HCC): ICD-10-CM

## 2024-10-07 DIAGNOSIS — Z95.0 PRESENCE OF CARDIAC PACEMAKER: ICD-10-CM

## 2024-10-07 DIAGNOSIS — F33.42 RECURRENT MAJOR DEPRESSIVE DISORDER, IN FULL REMISSION (HCC): ICD-10-CM

## 2024-10-07 DIAGNOSIS — M81.0 AGE-RELATED OSTEOPOROSIS WITHOUT CURRENT PATHOLOGICAL FRACTURE: ICD-10-CM

## 2024-10-07 DIAGNOSIS — R41.89 MODERATE COGNITIVE IMPAIRMENT: ICD-10-CM

## 2024-10-07 DIAGNOSIS — I10 ESSENTIAL HYPERTENSION, BENIGN: ICD-10-CM

## 2024-10-07 DIAGNOSIS — R27.0 ATAXIA: ICD-10-CM

## 2024-10-07 PROBLEM — N18.32 STAGE 3B CHRONIC KIDNEY DISEASE: Status: RESOLVED | Noted: 2022-08-01 | Resolved: 2024-10-07

## 2024-10-07 PROBLEM — F33.9 MAJOR DEPRESSION, RECURRENT, CHRONIC (HCC): Status: RESOLVED | Noted: 2022-07-28 | Resolved: 2024-10-07

## 2024-10-07 PROBLEM — R26.81 GAIT INSTABILITY: Status: RESOLVED | Noted: 2021-07-16 | Resolved: 2024-10-07

## 2024-10-07 PROBLEM — I73.9 PVD (PERIPHERAL VASCULAR DISEASE) (HCC): Status: RESOLVED | Noted: 2021-07-30 | Resolved: 2024-10-07

## 2024-10-07 PROBLEM — Z91.81 AT RISK FOR FALLING: Status: RESOLVED | Noted: 2022-09-20 | Resolved: 2024-10-07

## 2024-10-07 PROCEDURE — G0439 PPPS, SUBSEQ VISIT: HCPCS | Performed by: NURSE PRACTITIONER

## 2024-10-07 SDOH — HEALTH STABILITY: PHYSICAL HEALTH: ON AVERAGE, HOW MANY DAYS PER WEEK DO YOU ENGAGE IN MODERATE TO STRENUOUS EXERCISE (LIKE A BRISK WALK)?: 5 DAYS

## 2024-10-07 SDOH — ECONOMIC STABILITY: INCOME INSECURITY: HOW HARD IS IT FOR YOU TO PAY FOR THE VERY BASICS LIKE FOOD, HOUSING, MEDICAL CARE, AND HEATING?: NOT VERY HARD

## 2024-10-07 SDOH — ECONOMIC STABILITY: INCOME INSECURITY: IN THE LAST 12 MONTHS, WAS THERE A TIME WHEN YOU WERE NOT ABLE TO PAY THE MORTGAGE OR RENT ON TIME?: NO

## 2024-10-07 SDOH — ECONOMIC STABILITY: FOOD INSECURITY: WITHIN THE PAST 12 MONTHS, THE FOOD YOU BOUGHT JUST DIDN'T LAST AND YOU DIDN'T HAVE MONEY TO GET MORE.: NEVER TRUE

## 2024-10-07 SDOH — ECONOMIC STABILITY: FOOD INSECURITY: WITHIN THE PAST 12 MONTHS, YOU WORRIED THAT YOUR FOOD WOULD RUN OUT BEFORE YOU GOT MONEY TO BUY MORE.: NEVER TRUE

## 2024-10-07 SDOH — ECONOMIC STABILITY: HOUSING INSECURITY: AT ANY TIME IN THE PAST 12 MONTHS, WERE YOU HOMELESS OR LIVING IN A SHELTER (INCLUDING NOW)?: NO

## 2024-10-07 SDOH — ECONOMIC STABILITY: TRANSPORTATION INSECURITY
IN THE PAST 12 MONTHS, HAS LACK OF TRANSPORTATION KEPT YOU FROM MEETINGS, WORK, OR FROM GETTING THINGS NEEDED FOR DAILY LIVING?: NO

## 2024-10-07 SDOH — HEALTH STABILITY: PHYSICAL HEALTH: ON AVERAGE, HOW MANY MINUTES DO YOU ENGAGE IN EXERCISE AT THIS LEVEL?: 20 MIN

## 2024-10-07 SDOH — ECONOMIC STABILITY: HOUSING INSECURITY: IN THE PAST 12 MONTHS, HOW MANY TIMES HAVE YOU MOVED WHERE YOU WERE LIVING?: 0

## 2024-10-07 ASSESSMENT — ACTIVITIES OF DAILY LIVING (ADL): BATHING_REQUIRES_ASSISTANCE: 0

## 2024-10-07 ASSESSMENT — ENCOUNTER SYMPTOMS: GENERAL WELL-BEING: GOOD

## 2024-10-07 ASSESSMENT — PAIN SCALES - GENERAL: PAINLEVEL: NO PAIN

## 2024-10-07 ASSESSMENT — FIBROSIS 4 INDEX: FIB4 SCORE: 2.26

## 2024-10-07 ASSESSMENT — PATIENT HEALTH QUESTIONNAIRE - PHQ9: CLINICAL INTERPRETATION OF PHQ2 SCORE: 0

## 2024-10-22 ENCOUNTER — APPOINTMENT (RX ONLY)
Dept: URBAN - METROPOLITAN AREA CLINIC 22 | Facility: CLINIC | Age: 89
Setting detail: DERMATOLOGY
End: 2024-10-22

## 2024-10-22 PROBLEM — C44.311 BASAL CELL CARCINOMA OF SKIN OF NOSE: Status: ACTIVE | Noted: 2024-10-22

## 2024-10-22 PROCEDURE — ? CURETTAGE AND DESTRUCTION

## 2024-10-22 PROCEDURE — 17281 DSTR MAL LS F/E/E/N/L/M .6-1: CPT

## 2024-10-22 NOTE — PROCEDURE: CURETTAGE AND DESTRUCTION
Detail Level: Detailed
Number Of Curettages: 3
Size Of Lesion In Cm: 0.6
Size Of Lesion After Curettage: 0.9
Add Intralesional Injection: No
Total Volume (Ccs): 1
Anesthesia Type: 1% lidocaine with epinephrine and a 1:10 solution of 8.4% sodium bicarbonate
Anesthesia Volume In Cc: 0.8
Cautery Type: electrocautery (heat cautery)
What Was Performed First?: Curettage
Final Size Statement: The size of the lesion after curettage was
Additional Information: (Optional): The wound was cleaned, and a pressure dressing was applied.  The patient received detailed post-op instructions.
Consent was obtained from the patient. The risks, benefits and alternatives to therapy were discussed in detail. Specifically, the risks of infection, scarring, bleeding, prolonged wound healing, nerve injury, incomplete removal, allergy to anesthesia and recurrence were addressed. Alternatives to ED&C, such as: surgical removal and XRT were also discussed.  Prior to the procedure, the treatment site was clearly identified and confirmed by the patient. All components of Universal Protocol/PAUSE Rule completed.
Post-Care Instructions: I reviewed with the patient in detail post-care instructions. Patient is to keep the area dry for 48 hours, and not to engage in any swimming until the area is healed. Should the patient develop any fevers, chills, bleeding, severe pain patient will contact the office immediately.
Bill As A Line Item Or As Units: Line Item

## 2024-10-31 ENCOUNTER — HOSPITAL ENCOUNTER (OUTPATIENT)
Dept: LAB | Facility: MEDICAL CENTER | Age: 89
End: 2024-10-31
Attending: PHYSICIAN ASSISTANT
Payer: MEDICARE

## 2024-10-31 DIAGNOSIS — I47.10 PAROXYSMAL SVT (SUPRAVENTRICULAR TACHYCARDIA) (HCC): ICD-10-CM

## 2024-10-31 DIAGNOSIS — M81.0 AGE-RELATED OSTEOPOROSIS WITHOUT CURRENT PATHOLOGICAL FRACTURE: ICD-10-CM

## 2024-10-31 DIAGNOSIS — I10 ESSENTIAL HYPERTENSION, BENIGN: ICD-10-CM

## 2024-10-31 DIAGNOSIS — E53.8 VITAMIN B12 DEFICIENCY: ICD-10-CM

## 2024-10-31 DIAGNOSIS — Z87.19 HISTORY OF GI BLEED: ICD-10-CM

## 2024-10-31 LAB
25(OH)D3 SERPL-MCNC: 60 NG/ML (ref 30–100)
ALBUMIN SERPL BCP-MCNC: 3.9 G/DL (ref 3.2–4.9)
ALBUMIN/GLOB SERPL: 1.3 G/DL
ALP SERPL-CCNC: 79 U/L (ref 30–99)
ALT SERPL-CCNC: 8 U/L (ref 2–50)
ANION GAP SERPL CALC-SCNC: 10 MMOL/L (ref 7–16)
AST SERPL-CCNC: 20 U/L (ref 12–45)
BILIRUB SERPL-MCNC: 0.6 MG/DL (ref 0.1–1.5)
BUN SERPL-MCNC: 20 MG/DL (ref 8–22)
CALCIUM ALBUM COR SERPL-MCNC: 9.4 MG/DL (ref 8.5–10.5)
CALCIUM SERPL-MCNC: 9.3 MG/DL (ref 8.5–10.5)
CHLORIDE SERPL-SCNC: 108 MMOL/L (ref 96–112)
CHOLEST SERPL-MCNC: 211 MG/DL (ref 100–199)
CO2 SERPL-SCNC: 25 MMOL/L (ref 20–33)
CREAT SERPL-MCNC: 1.17 MG/DL (ref 0.5–1.4)
ERYTHROCYTE [DISTWIDTH] IN BLOOD BY AUTOMATED COUNT: 46.5 FL (ref 35.9–50)
GFR SERPLBLD CREATININE-BSD FMLA CKD-EPI: 44 ML/MIN/1.73 M 2
GLOBULIN SER CALC-MCNC: 3 G/DL (ref 1.9–3.5)
GLUCOSE SERPL-MCNC: 99 MG/DL (ref 65–99)
HCT VFR BLD AUTO: 45.2 % (ref 37–47)
HDLC SERPL-MCNC: 65 MG/DL
HGB BLD-MCNC: 14.5 G/DL (ref 12–16)
LDLC SERPL CALC-MCNC: 122 MG/DL
MCH RBC QN AUTO: 28.7 PG (ref 27–33)
MCHC RBC AUTO-ENTMCNC: 32.1 G/DL (ref 32.2–35.5)
MCV RBC AUTO: 89.3 FL (ref 81.4–97.8)
PLATELET # BLD AUTO: 230 K/UL (ref 164–446)
PMV BLD AUTO: 10.5 FL (ref 9–12.9)
POTASSIUM SERPL-SCNC: 5.1 MMOL/L (ref 3.6–5.5)
PROT SERPL-MCNC: 6.9 G/DL (ref 6–8.2)
RBC # BLD AUTO: 5.06 M/UL (ref 4.2–5.4)
SODIUM SERPL-SCNC: 143 MMOL/L (ref 135–145)
TRIGL SERPL-MCNC: 122 MG/DL (ref 0–149)
TSH SERPL DL<=0.005 MIU/L-ACNC: 2.31 UIU/ML (ref 0.38–5.33)
VIT B12 SERPL-MCNC: 1089 PG/ML (ref 211–911)
WBC # BLD AUTO: 7.5 K/UL (ref 4.8–10.8)

## 2024-10-31 PROCEDURE — 84443 ASSAY THYROID STIM HORMONE: CPT

## 2024-10-31 PROCEDURE — 80061 LIPID PANEL: CPT

## 2024-10-31 PROCEDURE — 85027 COMPLETE CBC AUTOMATED: CPT

## 2024-10-31 PROCEDURE — 82306 VITAMIN D 25 HYDROXY: CPT

## 2024-10-31 PROCEDURE — 80053 COMPREHEN METABOLIC PANEL: CPT

## 2024-10-31 PROCEDURE — 82607 VITAMIN B-12: CPT

## 2024-10-31 PROCEDURE — 36415 COLL VENOUS BLD VENIPUNCTURE: CPT

## 2024-11-01 PROBLEM — Z86.2 HISTORY OF ANEMIA: Status: ACTIVE | Noted: 2024-11-01

## 2024-11-01 PROBLEM — D72.829 LEUKOCYTOSIS: Status: RESOLVED | Noted: 2021-06-20 | Resolved: 2024-11-01

## 2024-11-01 PROBLEM — E55.9 VITAMIN D DEFICIENCY: Status: ACTIVE | Noted: 2018-06-19

## 2024-11-04 ASSESSMENT — ENCOUNTER SYMPTOMS
DIZZINESS: 0
FOCAL WEAKNESS: 0
DIARRHEA: 0
ORTHOPNEA: 0
SHORTNESS OF BREATH: 0
WEAKNESS: 0
COUGH: 0
DYSPNEA ON EXERTION: 0
PALPITATIONS: 0
FEVER: 0
WHEEZING: 0
SYNCOPE: 0
NIGHT SWEATS: 0
IRREGULAR HEARTBEAT: 0
PND: 0
NEAR-SYNCOPE: 0
NAUSEA: 0
VOMITING: 0
ABDOMINAL PAIN: 0

## 2024-11-05 ENCOUNTER — NON-PROVIDER VISIT (OUTPATIENT)
Dept: CARDIOLOGY | Facility: MEDICAL CENTER | Age: 89
End: 2024-11-05
Attending: NURSE PRACTITIONER
Payer: MEDICARE

## 2024-11-05 ENCOUNTER — OFFICE VISIT (OUTPATIENT)
Dept: CARDIOLOGY | Facility: MEDICAL CENTER | Age: 89
End: 2024-11-05
Attending: STUDENT IN AN ORGANIZED HEALTH CARE EDUCATION/TRAINING PROGRAM
Payer: MEDICARE

## 2024-11-05 VITALS
HEIGHT: 63 IN | BODY MASS INDEX: 23.5 KG/M2 | OXYGEN SATURATION: 96 % | HEART RATE: 80 BPM | RESPIRATION RATE: 16 BRPM | OXYGEN SATURATION: 96 % | SYSTOLIC BLOOD PRESSURE: 100 MMHG | RESPIRATION RATE: 16 BRPM | SYSTOLIC BLOOD PRESSURE: 100 MMHG | WEIGHT: 132.6 LBS | HEIGHT: 63 IN | BODY MASS INDEX: 23.56 KG/M2 | DIASTOLIC BLOOD PRESSURE: 58 MMHG | HEART RATE: 80 BPM | DIASTOLIC BLOOD PRESSURE: 58 MMHG

## 2024-11-05 DIAGNOSIS — Z95.0 PRESENCE OF CARDIAC PACEMAKER: ICD-10-CM

## 2024-11-05 DIAGNOSIS — I44.1 SECOND DEGREE ATRIOVENTRICULAR BLOCK, MOBITZ (TYPE) I: ICD-10-CM

## 2024-11-05 DIAGNOSIS — I49.5 SICK SINUS SYNDROME (HCC): ICD-10-CM

## 2024-11-05 DIAGNOSIS — Z98.890 H/O CARDIAC RADIOFREQUENCY ABLATION: ICD-10-CM

## 2024-11-05 DIAGNOSIS — I10 ESSENTIAL HYPERTENSION: ICD-10-CM

## 2024-11-05 DIAGNOSIS — E78.5 DYSLIPIDEMIA: ICD-10-CM

## 2024-11-05 DIAGNOSIS — I47.10 PAROXYSMAL SVT (SUPRAVENTRICULAR TACHYCARDIA) (HCC): ICD-10-CM

## 2024-11-05 DIAGNOSIS — Z95.0 CARDIAC PACEMAKER IN SITU: ICD-10-CM

## 2024-11-05 PROCEDURE — 3074F SYST BP LT 130 MM HG: CPT | Performed by: STUDENT IN AN ORGANIZED HEALTH CARE EDUCATION/TRAINING PROGRAM

## 2024-11-05 PROCEDURE — 99214 OFFICE O/P EST MOD 30 MIN: CPT | Performed by: STUDENT IN AN ORGANIZED HEALTH CARE EDUCATION/TRAINING PROGRAM

## 2024-11-05 PROCEDURE — 3078F DIAST BP <80 MM HG: CPT | Performed by: STUDENT IN AN ORGANIZED HEALTH CARE EDUCATION/TRAINING PROGRAM

## 2024-11-05 PROCEDURE — 1170F FXNL STATUS ASSESSED: CPT | Performed by: STUDENT IN AN ORGANIZED HEALTH CARE EDUCATION/TRAINING PROGRAM

## 2024-11-05 PROCEDURE — 99212 OFFICE O/P EST SF 10 MIN: CPT | Performed by: STUDENT IN AN ORGANIZED HEALTH CARE EDUCATION/TRAINING PROGRAM

## 2024-11-05 PROCEDURE — 93288 INTERROG EVL PM/LDLS PM IP: CPT | Mod: 26 | Performed by: NURSE PRACTITIONER

## 2024-11-05 ASSESSMENT — FIBROSIS 4 INDEX: FIB4 SCORE: 2.8

## 2024-11-05 NOTE — PROGRESS NOTES
Cardiology Follow-up Consultation Note    Date of note:    11/05/24  Primary Care Provider: CHILANGO Quijano    Patient Name: Yoli Carmichael   YOB: 1933  MRN:              [id number]    Chief Complaint: Follow-up SVT and hypertension    History of Present Illness: Ms. Yoli Carmichael is a 89 y.o. female whose current medical problems include sick sinus syndrome status post pacemaker placement 5/2021, paroxysmal SVT status post ablation 5/2021, and hypertension who is here for follow-up SVT and hypertension.    The patient was last seen in my clinic on 10/20/2022 (her initial visit with me, previously a patient of Dr. Sanz). She was doing well during the last visit, and no changes were made to her medications.     The patient returns today for follow-up.  The patient reports feeling very well today.  She denies any chest pain or shortness of breath on exertion.  She does exercises at home for her legs.  She denies any orthopnea, PND, or leg swelling.  No palpitations.  No syncope or presyncopal episodes.      Cardiovascular Risk Factors:  1. Smoking status: Never smoker  2. Type II Diabetes Mellitus: Prediabetes, diet controlled  Lab Results   Component Value Date/Time    HBA1C 5.9 (H) 04/02/2021 04:09 PM    HBA1C 5.8 (H) 01/16/2018 09:21 AM     3. Hypertension: On medication  4. Dyslipidemia: None  Lab Results   Component Value Date/Time    CHOLSTRLTOT 211 (H) 10/31/2024 1135    TRIGLYCERIDE 122 10/31/2024 1135    HDL 65 10/31/2024 1135     (H) 10/31/2024 1135     5. Family history of early Coronary Artery Disease in a first degree relative (Male less than 55 years of age; Female less than 65 years of age): Father with MI none  6.  Obesity and/or Metabolic Syndrome: Body mass index is 23.49 kg/m².  7. Sedentary lifestyle: Not sedentary    Review of Systems   Constitutional: Negative for fever, malaise/fatigue and night sweats.   Cardiovascular:  Negative for chest pain,  "dyspnea on exertion, irregular heartbeat, leg swelling, near-syncope, orthopnea, palpitations, paroxysmal nocturnal dyspnea and syncope.   Respiratory:  Negative for cough, shortness of breath and wheezing.    Gastrointestinal:  Negative for abdominal pain, diarrhea, nausea and vomiting.   Neurological:  Negative for dizziness, focal weakness and weakness.         All other systems reviewed and are negative.         Current Outpatient Medications   Medication Sig Dispense Refill    Multiple Vitamins-Minerals (PRESERVISION AREDS 2 PO) Take 1 Tablet by mouth 2 times a day.      Cholecalciferol 2000 UNIT Cap Take 2,000 Units by mouth every day. Indications: Osteoporosis, Vitamin D Deficiency      artificial tears (EYE LUBRICANT) Ointment ophth ointment Apply 1 Application to both eyes as needed (for dry eyes).      Cyanocobalamin (VITAMIN B-12) 1000 MCG SL Tab Place 1 Tablet under the tongue every day. Indications: Inadequate Vitamin B12 30 Tablet 11    escitalopram (LEXAPRO) 5 MG tablet Take 1 Tablet by mouth at bedtime. For depression  Indications: Major Depressive Disorder 90 Tablet 3    metoprolol SR (TOPROL XL) 25 MG TABLET SR 24 HR Take 1 Tablet by mouth every day. For Heart  Indications: High Blood Pressure Disorder 90 Tablet 3     No current facility-administered medications for this visit.         Allergies   Allergen Reactions    Other Environmental Runny Nose and Itching     Seasonal allergies       Physical Exam:  Ambulatory Vitals  /58 (BP Location: Left arm, Patient Position: Sitting, BP Cuff Size: Adult)   Pulse 80   Resp 16   Ht 1.6 m (5' 3\")   Wt 60.1 kg (132 lb 9.6 oz)   SpO2 96%    Oxygen Therapy:  Pulse Oximetry: 96 %  BP Readings from Last 4 Encounters:   11/05/24 100/58   11/05/24 100/58   11/04/24 128/70   10/08/24 128/72       Weight/BMI: Body mass index is 23.49 kg/m².  Wt Readings from Last 4 Encounters:   11/05/24 60.1 kg (132 lb 9.6 oz)   10/08/24 60.3 kg (133 lb)   10/07/24 60.3 " kg (133 lb)   02/27/24 60.4 kg (133 lb 3.2 oz)         General: Well appearing and in no apparent distress  Eyes: nl conjunctiva, no icteric sclera  ENT:  normal external appearance of ears, nose, and throat  Neck: no visible JVP,  no carotid bruits  Lungs: normal respiratory effort, CTAB  Heart: RRR, no murmurs, no rubs or gallops,  no edema bilateral lower extremities. No LV/RV heave on cardiac palpatation. + bilateral radial pulses.  + bilateral dp pulses.   Abdomen: soft, non tender, non distended, no masses, normal bowel sounds.  No HSM.  Extremities/MSK: no clubbing, no cyanosis  Neurological: No focal sensory deficits  Psychiatric: Appropriate affect, A/O x 3, intact judgement and insight  Skin: Warm extremities      Lab Data Review:  Lab Results   Component Value Date/Time    CHOLSTRLTOT 211 (H) 10/31/2024 11:35 AM     (H) 10/31/2024 11:35 AM    HDL 65 10/31/2024 11:35 AM    TRIGLYCERIDE 122 10/31/2024 11:35 AM       Lab Results   Component Value Date/Time    SODIUM 143 10/31/2024 11:35 AM    POTASSIUM 5.1 10/31/2024 11:35 AM    CHLORIDE 108 10/31/2024 11:35 AM    CO2 25 10/31/2024 11:35 AM    GLUCOSE 99 10/31/2024 11:35 AM    BUN 20 10/31/2024 11:35 AM    CREATININE 1.17 10/31/2024 11:35 AM     Lab Results   Component Value Date/Time    ALKPHOSPHAT 79 10/31/2024 11:35 AM    ASTSGOT 20 10/31/2024 11:35 AM    ALTSGPT 8 10/31/2024 11:35 AM    TBILIRUBIN 0.6 10/31/2024 11:35 AM      Lab Results   Component Value Date/Time    WBC 7.5 10/31/2024 11:35 AM    HEMOGLOBIN 14.5 10/31/2024 11:35 AM     Lab Results   Component Value Date/Time    HBA1C 5.9 (H) 04/02/2021 04:09 PM    HBA1C 5.8 (H) 01/16/2018 09:21 AM         Cardiac Imaging and Procedures Review:    EKG dated 11/3/2021: My personal interpretation is V-paced rhythm, intermittent sinus rhythm    Echo dated 4/3/2021:   CONCLUSIONS  Compared to the images of the prior study done 11/2/2018, intracavitary   gradient is present.  Hyperdynamic left  ventricular systolic function.  Left ventricular ejection fraction is visually estimated to be greater   than 75%.  Evidence of increased intracavity gradient, peak velocity is 2.6 m/sec.  Systolic anterior motion of the anterior mitral valve leaflet.      Assessment & Plan     1. Paroxysmal SVT (supraventricular tachycardia) (HCC)        2. H/O cardiac radiofrequency ablation        3. Cardiac pacemaker in situ        4. Sick sinus syndrome (HCC)        5. Essential hypertension        6. Dyslipidemia              Shared Medical Decision Making:    Paroxysmal SVT  S/p ablation 5/2021  Asymptomatic.  -Continue metoprolol 25 mg daily    Hypertension  BP well controlled this visit. BP borderline low but patient denies any lightheadedness.   -Continue metoprolol 25 mg daily    Sick sinus syndrome  Status post pacemaker placement  -Continue follow-up in device clinic    Dyslipidemia  Age above limit for ASCVD risk calculation  -Continue heart healthy diet    All of the patient's excellent questions were answered to the best of my knowledge and to her satisfaction.  It was a pleasure seeing Ms. Yoli Carmichael in my clinic today. Return in about 1 year (around 11/5/2025), or if symptoms worsen or fail to improve. Patient is aware to call the cardiology clinic with any questions or concerns.      Yanet Menjivar MD  [facility]  [facility], [address]  [address] [address]  Phone: [phone]  Fax: [phone]

## 2024-11-05 NOTE — PROGRESS NOTES
Device is working normally. No mode switching episodes.  Normal sensing and capture of RA and RV leads; stable impedances. Battery longevity is 10.1 years.  No changes are made today.    She sees Dr. Menjivar today too.    Follow-up in 6 months for next PM check with me.

## 2024-11-15 PROBLEM — G30.9 MIXED ALZHEIMER AND VASCULAR DEMENTIA (HCC): Status: ACTIVE | Noted: 2023-06-19

## 2024-11-15 PROBLEM — F02.80 MIXED ALZHEIMER AND VASCULAR DEMENTIA (HCC): Status: ACTIVE | Noted: 2023-06-19

## 2024-11-15 PROBLEM — F01.50 MIXED ALZHEIMER AND VASCULAR DEMENTIA (HCC): Status: ACTIVE | Noted: 2023-06-19

## 2025-01-29 PROBLEM — Z78.9 IMPAIRED DRIVING SKILLS: Status: RESOLVED | Noted: 2023-07-31 | Resolved: 2025-01-29

## 2025-01-29 PROBLEM — R42 DIZZINESS: Status: RESOLVED | Noted: 2024-07-16 | Resolved: 2025-01-29

## 2025-01-29 PROBLEM — H61.23 BILATERAL IMPACTED CERUMEN: Status: RESOLVED | Noted: 2023-04-18 | Resolved: 2025-01-29

## 2025-01-29 PROBLEM — J18.9 PNEUMONIA: Status: RESOLVED | Noted: 2023-09-04 | Resolved: 2025-01-29

## 2025-01-29 PROBLEM — H25.013 CATARACT CORTICAL, SENILE, BILATERAL: Status: RESOLVED | Noted: 2021-06-25 | Resolved: 2025-01-29

## 2025-01-29 PROBLEM — H53.9 VISUAL CHANGES: Status: RESOLVED | Noted: 2023-10-16 | Resolved: 2025-01-29

## 2025-01-29 PROBLEM — Z86.79 HISTORY OF IRREGULAR HEARTBEAT: Status: RESOLVED | Noted: 2023-07-31 | Resolved: 2025-01-29

## 2025-01-29 PROBLEM — R60.0 LOCALIZED EDEMA: Status: RESOLVED | Noted: 2018-11-01 | Resolved: 2025-01-29

## 2025-01-29 PROBLEM — Z87.11 HISTORY OF GASTRIC ULCER: Status: RESOLVED | Noted: 2022-03-29 | Resolved: 2025-01-29

## 2025-01-29 PROBLEM — R79.89 ELEVATED TROPONIN: Status: RESOLVED | Noted: 2023-09-04 | Resolved: 2025-01-29

## 2025-01-29 PROBLEM — Z87.19 HISTORY OF GI BLEED: Status: RESOLVED | Noted: 2021-11-09 | Resolved: 2025-01-29

## 2025-01-29 PROBLEM — K80.20 CALCULUS OF GALLBLADDER WITHOUT CHOLECYSTITIS WITHOUT OBSTRUCTION: Status: RESOLVED | Noted: 2021-11-09 | Resolved: 2025-01-29

## 2025-01-29 PROBLEM — R09.02 HYPOXIA: Status: RESOLVED | Noted: 2023-09-12 | Resolved: 2025-01-29

## 2025-01-29 PROBLEM — U07.1 COVID-19: Status: RESOLVED | Noted: 2024-01-22 | Resolved: 2025-01-29

## 2025-01-29 PROBLEM — Z86.2 HISTORY OF ANEMIA: Status: RESOLVED | Noted: 2024-11-01 | Resolved: 2025-01-29

## 2025-01-29 PROBLEM — S72.401A CLOSED FRACTURE OF RIGHT DISTAL FEMUR (HCC): Status: RESOLVED | Noted: 2021-11-03 | Resolved: 2025-01-29

## 2025-01-29 PROBLEM — I83.90 VARICOSE VEINS OF ANKLE: Status: ACTIVE | Noted: 2025-01-29

## 2025-02-25 PROBLEM — H04.552 OBSTRUCTION OF LEFT TEAR DUCT: Status: ACTIVE | Noted: 2025-02-25

## 2025-03-25 PROBLEM — J30.9 ALLERGIC SHINERS: Status: ACTIVE | Noted: 2025-03-25

## 2025-05-06 ENCOUNTER — APPOINTMENT (OUTPATIENT)
Dept: CARDIOLOGY | Facility: MEDICAL CENTER | Age: OVER 89
End: 2025-05-06
Attending: NURSE PRACTITIONER
Payer: MEDICARE

## 2025-05-13 ENCOUNTER — NON-PROVIDER VISIT (OUTPATIENT)
Dept: CARDIOLOGY | Facility: MEDICAL CENTER | Age: OVER 89
End: 2025-05-13
Attending: NURSE PRACTITIONER
Payer: MEDICARE

## 2025-05-13 DIAGNOSIS — Z95.0 PRESENCE OF CARDIAC PACEMAKER: ICD-10-CM

## 2025-05-13 PROCEDURE — 99212 OFFICE O/P EST SF 10 MIN: CPT | Performed by: NURSE PRACTITIONER

## 2025-05-13 PROCEDURE — 93280 PM DEVICE PROGR EVAL DUAL: CPT | Mod: 26 | Performed by: NURSE PRACTITIONER

## 2025-05-13 PROCEDURE — 93280 PM DEVICE PROGR EVAL DUAL: CPT | Performed by: NURSE PRACTITIONER

## 2025-05-13 NOTE — PROGRESS NOTES
5/13/2025  [id number]  Yoli Carmichael    Patient here for annual in office device check    Device Type: Medtronic Dual Chamber Pacemaker   AP%: 49.9   %: 39.4  Battery Longevity: 9.6yrs  Lead Impedance: stable and within normal limits  Indication: sick sinu  Events: none    Device interrogated and programmed by Shant Story

## 2025-05-21 ENCOUNTER — HOME HEALTH ADMISSION (OUTPATIENT)
Dept: HOME HEALTH SERVICES | Facility: HOME HEALTHCARE | Age: OVER 89
End: 2025-05-21
Payer: MEDICARE

## 2025-05-21 PROBLEM — S61.511A TEAR OF SKIN OF RIGHT WRIST: Status: ACTIVE | Noted: 2025-05-21

## 2025-05-22 ENCOUNTER — HOME CARE VISIT (OUTPATIENT)
Dept: HOME HEALTH SERVICES | Facility: HOME HEALTHCARE | Age: OVER 89
End: 2025-05-22
Payer: MEDICARE

## 2025-05-22 VITALS
RESPIRATION RATE: 18 BRPM | DIASTOLIC BLOOD PRESSURE: 66 MMHG | TEMPERATURE: 97.9 F | HEART RATE: 90 BPM | SYSTOLIC BLOOD PRESSURE: 140 MMHG

## 2025-05-22 PROCEDURE — 665005 NO-PAY RAP - HOME HEALTH

## 2025-05-22 PROCEDURE — G0299 HHS/HOSPICE OF RN EA 15 MIN: HCPCS

## 2025-05-22 ASSESSMENT — ENCOUNTER SYMPTOMS
FATIGUES EASILY: 1
PAIN LOCATION - RELIEVING FACTORS: REST
VOMITING: DENIES
PAIN LOCATION: RIGHT WRIST
HYPERTENSION: 1
PAIN LOCATION - PAIN SEVERITY: 2/10
FORGETFULNESS: 1
DRY SKIN: 1
LOWEST PAIN SEVERITY IN PAST 24 HOURS: 0/10
SHORTNESS OF BREATH: 1
BOWEL PATTERN NORMAL: 1
NAUSEA: DENIES
DYSPNEA ON EXERTION: 1
SUBJECTIVE PAIN PROGRESSION: UNCHANGED
HIGHEST PAIN SEVERITY IN PAST 24 HOURS: 4/10
STOOL FREQUENCY: DAILY
DESCRIPTION OF MEMORY LOSS: IMMEDIATE
PAIN LOCATION - EXACERBATING FACTORS: ACTIVITY
PAIN SEVERITY GOAL: 0/10
LAST BOWEL MOVEMENT: 67347
DESCRIPTION OF MEMORY LOSS: SHORT TERM
PAIN LOCATION - PAIN FREQUENCY: INTERMITTENT
PAIN LOCATION - PAIN QUALITY: ACHES
PAIN: 1
DYSPNEA ACTIVITY LEVEL: AFTER AMBULATING MORE THAN 20 FT

## 2025-05-22 ASSESSMENT — ACTIVITIES OF DAILY LIVING (ADL)
OASIS_M1830: 03
TRANSPORTATION COMMENTS: NEEDS ASSISTANCE TO LEAVE THE HOME

## 2025-05-22 NOTE — TELEPHONE ENCOUNTER
Called and spoke to patient regarding home health scheduling. Scheduled start of care for 5/22 within the hour.

## 2025-05-26 ENCOUNTER — HOME CARE VISIT (OUTPATIENT)
Dept: HOME HEALTH SERVICES | Facility: HOME HEALTHCARE | Age: OVER 89
End: 2025-05-26
Payer: MEDICARE

## 2025-05-26 VITALS
HEART RATE: 72 BPM | OXYGEN SATURATION: 92 % | TEMPERATURE: 97.9 F | RESPIRATION RATE: 16 BRPM | SYSTOLIC BLOOD PRESSURE: 140 MMHG | DIASTOLIC BLOOD PRESSURE: 70 MMHG

## 2025-05-26 PROCEDURE — G0493 RN CARE EA 15 MIN HH/HOSPICE: HCPCS

## 2025-05-26 ASSESSMENT — ENCOUNTER SYMPTOMS
DESCRIPTION OF MEMORY LOSS: LONG TERM
STOOL FREQUENCY: DAILY
BOWEL PATTERN NORMAL: 1
DESCRIPTION OF MEMORY LOSS: SHORT TERM
LAST BOWEL MOVEMENT: 67351
DENIES PAIN: 1
MUSCLE WEAKNESS: 1
POOR JUDGMENT: 1

## 2025-05-27 ENCOUNTER — HOME CARE VISIT (OUTPATIENT)
Dept: HOME HEALTH SERVICES | Facility: HOME HEALTHCARE | Age: OVER 89
End: 2025-05-27
Payer: MEDICARE

## 2025-05-27 PROCEDURE — G0151 HHCP-SERV OF PT,EA 15 MIN: HCPCS

## 2025-05-28 VITALS
TEMPERATURE: 98.1 F | HEART RATE: 63 BPM | OXYGEN SATURATION: 95 % | SYSTOLIC BLOOD PRESSURE: 140 MMHG | RESPIRATION RATE: 16 BRPM | DIASTOLIC BLOOD PRESSURE: 70 MMHG

## 2025-05-28 ASSESSMENT — BALANCE ASSESSMENTS
SITTING BALANCE: 1 - STEADY, SAFE
ARISES: 1 - ABLE, USES ARMS TO HELP
ARISING SCORE: 1
ATTEMPTS TO ARISE: 2 - ABLE TO RISE, ONE ATTEMPT
IMMEDIATE STANDING BALANCE FIRST 5 SECONDS: 2 - STEADY WITHOUT WALKER OR OTHER SUPPORT
NUDGED SCORE: 0
NUDGED: 0 - BEGINS TO FALL
TURNING 360 DEGREES STEPS: 0 - DISCONTINUOUS STEPS
EYES CLOSED AT MAXIMUM POSITION NUDGED: 0 - UNSTEADY
STANDING BALANCE: 2 - NARROW STANCE WITHOUT SUPPORT
SITTING DOWN: 1 - USES ARMS OR NOT SMOOTH MOTION
BALANCE SCORE: 10

## 2025-05-28 ASSESSMENT — GAIT ASSESSMENTS
INITIATION OF GAIT IMMEDIATELY AFTER GO: 1 - NO HESITANCY
PATH SCORE: 1
WALKING STANCE: 1 - HEELS ALMOST TOUCHING WHILE WALKING
STEP CONTINUITY: 1 - STEPS APPEAR CONTINUOUS
STEP SYMMETRY: 1 - RIGHT AND LEFT STEP LENGTH APPEAR EQUAL
TRUNK SCORE: 2
BALANCE AND GAIT SCORE: 21
PATH: 1 - MILD/MODERATE DEVIATION OR USES WALKING AID
GAIT SCORE: 11
TRUNK: 2 - NO SWAY, NO FLEXION, NO USE OF ARMS, NO WALKING AID

## 2025-05-28 ASSESSMENT — ACTIVITIES OF DAILY LIVING (ADL)
FEEDING_WITHIN_DEFINED_LIMITS: 1
PHYSICAL TRANSFERS ASSESSED: 1
BATHING_COMMENTS: SEE OT EVAL
CURRENT_FUNCTION: SUPERVISION
GROOMING_COMMENTS: SEE OT EVAL
AMBULATION ASSISTANCE: SUPERVISION
AMBULATION ASSISTANCE: 1

## 2025-05-28 ASSESSMENT — ENCOUNTER SYMPTOMS
ARTHRALGIAS: 1
MUSCLE WEAKNESS: 1
DENIES PAIN: 1

## 2025-05-28 ASSESSMENT — PATIENT HEALTH QUESTIONNAIRE - PHQ9: CLINICAL INTERPRETATION OF PHQ2 SCORE: 0

## 2025-05-29 ENCOUNTER — HOME CARE VISIT (OUTPATIENT)
Dept: HOME HEALTH SERVICES | Facility: HOME HEALTHCARE | Age: OVER 89
End: 2025-05-29
Payer: MEDICARE

## 2025-05-29 VITALS
SYSTOLIC BLOOD PRESSURE: 136 MMHG | HEART RATE: 78 BPM | OXYGEN SATURATION: 92 % | RESPIRATION RATE: 16 BRPM | DIASTOLIC BLOOD PRESSURE: 74 MMHG | TEMPERATURE: 98.8 F

## 2025-05-29 PROCEDURE — G0299 HHS/HOSPICE OF RN EA 15 MIN: HCPCS

## 2025-05-29 ASSESSMENT — ENCOUNTER SYMPTOMS
DESCRIPTION OF MEMORY LOSS: SHORT TERM
FATIGUES EASILY: 1
LOWER EXTREMITY EDEMA: 1
DRY SKIN: 1
DENIES PAIN: 1
FORGETFULNESS: 1
LIMITED RANGE OF MOTION: 1
MUSCLE WEAKNESS: 1
BOWEL PATTERN NORMAL: 1
FATIGUE: 1
LAST BOWEL MOVEMENT: 67354

## 2025-05-29 ASSESSMENT — PATIENT HEALTH QUESTIONNAIRE - PHQ9: CLINICAL INTERPRETATION OF PHQ2 SCORE: 0

## 2025-06-02 ENCOUNTER — HOME CARE VISIT (OUTPATIENT)
Dept: HOME HEALTH SERVICES | Facility: HOME HEALTHCARE | Age: OVER 89
End: 2025-06-02
Payer: MEDICARE

## 2025-06-03 ENCOUNTER — HOME CARE VISIT (OUTPATIENT)
Dept: HOME HEALTH SERVICES | Facility: HOME HEALTHCARE | Age: OVER 89
End: 2025-06-03
Payer: MEDICARE

## 2025-06-03 VITALS
RESPIRATION RATE: 16 BRPM | TEMPERATURE: 99.1 F | DIASTOLIC BLOOD PRESSURE: 68 MMHG | HEART RATE: 60 BPM | SYSTOLIC BLOOD PRESSURE: 134 MMHG | OXYGEN SATURATION: 96 %

## 2025-06-03 VITALS
OXYGEN SATURATION: 96 % | DIASTOLIC BLOOD PRESSURE: 68 MMHG | RESPIRATION RATE: 16 BRPM | SYSTOLIC BLOOD PRESSURE: 134 MMHG | HEART RATE: 60 BPM | TEMPERATURE: 99.1 F

## 2025-06-03 PROCEDURE — G0299 HHS/HOSPICE OF RN EA 15 MIN: HCPCS

## 2025-06-03 PROCEDURE — G0151 HHCP-SERV OF PT,EA 15 MIN: HCPCS

## 2025-06-03 ASSESSMENT — ENCOUNTER SYMPTOMS
VOMITING: PT CURRENTLY DENIES ANY VOMMITING
NAUSEA: PT CURRENTLY DENIES ANY NAUSEA
DRY SKIN: 1
DENIES PAIN: 1
STOOL FREQUENCY: LESS THAN DAILY
LAST BOWEL MOVEMENT: 67358
DENIES PAIN: 1
BOWEL PATTERN NORMAL: 1

## 2025-06-03 ASSESSMENT — PATIENT HEALTH QUESTIONNAIRE - PHQ9: CLINICAL INTERPRETATION OF PHQ2 SCORE: 0

## 2025-06-04 ENCOUNTER — HOME CARE VISIT (OUTPATIENT)
Dept: HOME HEALTH SERVICES | Facility: HOME HEALTHCARE | Age: OVER 89
End: 2025-06-04
Payer: MEDICARE

## 2025-06-05 ENCOUNTER — HOME CARE VISIT (OUTPATIENT)
Dept: HOME HEALTH SERVICES | Facility: HOME HEALTHCARE | Age: OVER 89
End: 2025-06-05
Payer: MEDICARE

## 2025-06-05 PROCEDURE — G0299 HHS/HOSPICE OF RN EA 15 MIN: HCPCS

## 2025-06-05 PROCEDURE — G0151 HHCP-SERV OF PT,EA 15 MIN: HCPCS

## 2025-06-05 PROCEDURE — G0152 HHCP-SERV OF OT,EA 15 MIN: HCPCS

## 2025-06-05 ASSESSMENT — ENCOUNTER SYMPTOMS
FORGETFULNESS: 1
DENIES PAIN: 1
LAST BOWEL MOVEMENT: 67360
DESCRIPTION OF MEMORY LOSS: SHORT TERM
LIMITED RANGE OF MOTION: 1
FATIGUE: 1
FATIGUES EASILY: 1
DIFFICULTY THINKING: 1
MUSCLE WEAKNESS: 1

## 2025-06-06 ASSESSMENT — ENCOUNTER SYMPTOMS: DENIES PAIN: 1

## 2025-06-06 ASSESSMENT — ACTIVITIES OF DAILY LIVING (ADL)
CURRENT_FUNCTION: INDEPENDENT
DRESSING_LB_CURRENT_FUNCTION: INDEPENDENT
AMBULATION ASSISTANCE: SUPERVISION
GROOMING_WITHIN_DEFINED_LIMITS: 1
FEEDING: INDEPENDENT
TOILETING: INDEPENDENT
PHYSICAL TRANSFERS ASSESSED: 1
ORAL_CARE_CURRENT_FUNCTION: INDEPENDENT
AMBULATION ASSISTANCE: 1
TOILETING: 1
ORAL_CARE_ASSESSED: 1
FEEDING_WITHIN_DEFINED_LIMITS: 1
FEEDING ASSESSED: 1
GROOMING ASSESSED: 1
DRESSING_UB_CURRENT_FUNCTION: INDEPENDENT
GROOMING_CURRENT_FUNCTION: INDEPENDENT

## 2025-06-06 NOTE — PROGRESS NOTES
Medical decision making:  -Today, she appears to be doing well. PM functioning well.  -We will arrange for her PM check in person with us at , hopefully on days she can see me also.   -She is on minimal medications, no cardiac meds.   -No longer on BB for SVT, no recurrence for PM check. Low threshold to resume if we see recurrence.    -BP looks fine.  -No indication for primary prevention statin.  Atorvastatin has been placed on her intolerance list and she does not need it.  -I have asked for an updated echocardiogram to follow-up on murmur which could be mild LVOT gradient versus tricuspid regurgitation associated with pacemaker wire.  There is no rush.  -Needs a PCP, I have reached out to get support in this manner and asked her to call to schedule. Has notes from AVELINO Doty but Yoli tells me Glendy only comes to her place of living and will not be seeing her again when she moves home.   -RTC in 4 to 8 months to follow-up on echocardiogram and next pacemaker check.    I reached out to Clarita At [phone] but unfortunately woke her up because she is in [place].  She is traveling back tomorrow.  I will try to touch base with Clarita about her mom's care later in the week.  I have also reached out to AVELINO Doty, geriatric medicine to confirm if I am getting the correct history.    Addendum 6/20/2025: I have reached out to Clarita to confirm that although Yoli does discuss moving back home, as of now, she remains in assisted living but is quite functional.  She is responsible for her own medications.  We are not certain why metoprolol succinate was not on her medication list but this has been reordered to [facility].  I have also coordinated with her PCP Glendy Driver to ensure there were no side effects or issues with the metoprolol.      Problem list:  1. Sinus node dysfunction (HCC)    2. Paroxysmal SVT (supraventricular tachycardia) (HCC)    3. Cardiac pacemaker in situ    4. H/O cardiac  radiofrequency ablation    5. Second degree atrioventricular block, Mobitz (type) I    6. Nonrheumatic tricuspid valve regurgitation  - EC-ECHOCARDIOGRAM COMPLETE W/O CONT; Future    7. Memory loss  - Referral back to PCP     Chief Complaint:   Chief Complaint   Patient presents with    Other     F/V Dx: Sick sinus syndrome (HCC)     History of Present Illness:  Yoli Carmichael is a 89 y.o. female who returns for long-term management of SND, PM, SVT, ablation, TR, mild LVOT gradient, memory loss.    Started seeing cardiology 2017 with auras, HTN, tachycardia but progressed to syncope.  I met her in the hospital 5/9/2021 after SVT and syncope, standing in front of the mirror, doing her hair/make-up, noted her typical flushing feeling coming across her head, then noted her heart began to pound, turned around to find a place to sit, fell but not complete black out. Has happened many times before.  Suffered broken ribs.   Event monitor 4/6/2021 reviewed by me, patient appears to have symptomatic runs of SVT, reporting frequent lightheadedness.  She states her heart racing is typically involved in the events where she falls down are nearly falls down.  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  Had ablation on 5-12-21.  Pacemaker placed 5/12/2021 with Dr. Hollis.  Previously on metoprolol for SVT.     Pacemaker check in person, I do not think she is able to problem solve a device at home.  AP%: 49.9   %: 39.4  Battery Longevity: 9.6yrs  No SVT.     Echo with mild LVOT gradient 4-3-21.  Murmur on echo, possibly TR or LVOT gradient.   Updated echo pending.     Prior hypertension but not needing meds now, blood pressure control.        Mild hyperlipidemia, LDL was 114 in 2019, then 67 in 2021, she recalls being on a statin in the past but not now. No prior MI/CVA.     Carotid study April 2021 without significant disease.     Mild CKD 3A.     Has a hiatal hernia, no symptoms.    Jumped up quickly  from the couch and fell.   Working with PT.   Says she is moving back home soon.  No sx of lightheadedness or syncope she reports but not great historian today.   Not limited by chest pain, pressure or tightness with activity.   No significant dyspnea on exertion, orthopnea or lower extremity swelling.   No significant palpitations, lightheadedness, or presyncope/syncope.   No symptoms of leg claudication.   No stroke/TIA like symptoms.     No prior smoking history.  No history of diabetes.  No history of autoimmune disease such as lupus or rheumatoid arthritis.  No ETOH overuse.  No caffeine overuse.  No recreation substance use.  No hx asthma.     Father  of MI in 50s.     Lives in [place].  Tells me she lives in some type of Senior center, getting PT only but planning to move home where her grandson is currently living.  Tells me her daughter Clarita works for [employer].  I tried to call Clarita Gutierrez, she is out of the country so I will try again next week.    Wt Readings from Last 5 Encounters:   25 59.4 kg (131 lb)   24 60.1 kg (132 lb 9.6 oz)   10/08/24 60.3 kg (133 lb)   10/07/24 60.3 kg (133 lb)   24 60.4 kg (133 lb 3.2 oz)       DATA REVIEWED by me:  ECG (my personal interpretation)  23 Sinus and AV pacing   11/3/2021 Sinus, 83, intermittent ventricular pacing    Heart Monitor    2021  Patient had continuous rhythm monitoring for 21 days and 15 hours.   Rhythm is sinus, maximum rate was 146 bpm, average was 66 bpm, lowest was 50 bpm.   Less than 1% PACs, PVCs.   Longest pause was for 2.1 seconds, first-degree AV block was noted.   Patient symptoms of lightheadedness correlated with paroxysmal supraventricular tachycardia episodes.   Longest lasting PSVT for 41 seconds at a rate of 128 bpm.  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     Device check    25  Device Type: Medtronic Dual Chamber Pacemaker   AP%: 49.9   %: 39.4  Battery Longevity: 9.6yrs  Lead Impedance: stable and within normal  limits  Indication: sick sinu  Events: none   2/3/2022  A paced 20%, V paced 19%, no mode switches.    EP procedure  5-12-21 SVT ablation- Dr. Hollis  Successful slow pathway modification   5/12/2021 DC Pacemaker  Dr. Pato Hollis Medtronic  IMPLANTED DEVICE INFORMATION:  Pulse generator is a Medtronic model W3DR01  Serial # [id number]   LEAD INFORMATION:  1)Right atrial lead is a Medtronic  model #5076-45, serial # [id number],P wave 3 millivolts, threshold 1 volts, pacing impedance 798 ohms.   2)Right ventricular lead is a Medtronic model #5076-52, serial # [id number],R wave 20 millivolts, threshold 0.5 volts, pacing impedance 703 ohms.    Echocardiogram    4/3/2021  Compared to the images of the prior study done 11/2/2018, intracavitary   gradient is present.  Hyperdynamic left ventricular systolic function.  Left ventricular ejection fraction is visually estimated to be greater   than 75%.  Evidence of increased intracavity gradient, peak velocity is 2.6 m/sec.  Systolic anterior motion of the anterior mitral valve leaflet.    Heart Catheterization    NA    Stress test    N/A    Most recent labs:        Lab Results   Component Value Date/Time    WBC 7.5 10/31/2024 11:35 AM    HEMOGLOBIN 14.5 10/31/2024 11:35 AM    HEMATOCRIT 45.2 10/31/2024 11:35 AM    MCV 89.3 10/31/2024 11:35 AM    INR 1.13 11/03/2021 01:18 AM      Lab Results   Component Value Date/Time    SODIUM 143 10/31/2024 11:35 AM    POTASSIUM 5.1 10/31/2024 11:35 AM    CHLORIDE 108 10/31/2024 11:35 AM    CO2 25 10/31/2024 11:35 AM    GLUCOSE 99 10/31/2024 11:35 AM    BUN 20 10/31/2024 11:35 AM    CREATININE 1.17 10/31/2024 11:35 AM      Lab Results   Component Value Date/Time    ASTSGOT 20 10/31/2024 11:35 AM    ALTSGPT 8 10/31/2024 11:35 AM    ALBUMIN 3.9 10/31/2024 11:35 AM      Lab Results   Component Value Date/Time    CHOLSTRLTOT 211 (H) 10/31/2024 11:35 AM     (H) 10/31/2024 11:35 AM    HDL 65 10/31/2024 11:35 AM    TRIGLYCERIDE 122  "10/31/2024 11:35 AM     No results for input(s): \"NTPROBNP\", \"TROPONINT\" in the last 72 hours.      Past Medical History[1]  Past Surgical History[2]  Family History   Problem Relation Age of Onset    Lung Disease Mother         frequent pneumonia    Heart Disease Father 54         of MI    Lung Disease Father         Emphysema    Heart Attack Father     Cancer Maternal Aunt         Great Aunt and Cousin Ovarian CA    Hypertension Maternal Aunt     Cancer Daughter         Lung and Brain CA    Seizures Daughter     Cancer Daughter         Pituitary tumor    Cancer Brother         leukemia     Social History     Socioeconomic History    Marital status:      Spouse name: Not on file    Number of children: Not on file    Years of education: Not on file    Highest education level: Not on file   Occupational History    Not on file   Tobacco Use    Smoking status: Never    Smokeless tobacco: Never    Tobacco comments:     Pt exposed to second hand smoker   Vaping Use    Vaping status: Never Used   Substance and Sexual Activity    Alcohol use: Yes     Comment: Occasionally    Drug use: No    Sexual activity: Never   Other Topics Concern    Not on file   Social History Narrative    Not on file     Social Drivers of Health     Financial Resource Strain: Low Risk  (10/7/2024)    Overall Financial Resource Strain (CARDIA)     Difficulty of Paying Living Expenses: Not very hard   Food Insecurity: No Food Insecurity (10/7/2024)    Hunger Vital Sign     Worried About Running Out of Food in the Last Year: Never true     Ran Out of Food in the Last Year: Never true   Transportation Needs: Unknown (10/7/2024)    PRAPARE - Transportation     Lack of Transportation (Medical): Not on file     Lack of Transportation (Non-Medical): No   Physical Activity: Insufficiently Active (10/7/2024)    Exercise Vital Sign     Days of Exercise per Week: 5 days     Minutes of Exercise per Session: 20 min   Stress: Not on file   Social " "Connections: Feeling Socially Integrated (5/22/2025)    OASIS : Social Isolation     Frequency of experiencing loneliness or isolation: Never   Intimate Partner Violence: Not on file   Housing Stability: Low Risk  (10/7/2024)    Housing Stability Vital Sign     Unable to Pay for Housing in the Last Year: No     Number of Times Moved in the Last Year: 0     Homeless in the Last Year: No     Allergies[3]    Current Medications[4]    ROS  All others systems reviewed and negative.    /58 (BP Location: Left arm, Patient Position: Sitting, BP Cuff Size: Adult)   Pulse 83   Resp 16   Ht 1.6 m (5' 3\")   Wt 59.4 kg (131 lb)   SpO2 97%  Body mass index is 23.21 kg/m².    General: No acute distress. Well nourished.  HEENT: EOM grossly intact, no scleral icterus, no pharyngeal erythema.   Neck:  No JVD at 90, no bruits, trachea midline  CVS: RRR. Normal S1, S2.  2 out of 6 systolic murmur left sternal border and apex, 1+ LE edema.  2+ radial pulses, 2+ PT pulses, trivial varicose veins of the lower extremities  Resp: CTAB. No wheezing or crackles/rhonchi. Normal respiratory effort.  Abdomen: Soft, NT, no deacon hepatomegaly.  MSK/Ext: No clubbing or cyanosis.  Skin: Warm and dry, no rashes.  Neurological: CN III-XII grossly intact. No focal deficits. Hard of hearing.  Psych: A&O x self, place, pleasant, reduced short-term memory, reduced insight.    Physical Exam listed below was completed in entrety today and unchanged from 4-21-22 except where noted.  Some elements were copied from my note of that same day, which have been updated where appropriate and all reflect current meedical decision making from today.  I have confirmed and edited as necessary, the PFSH and ROS obtained by others.    Return in about 5 months (around 11/17/2025).    Written instructions given today:      - Everyone's heart valves leak a little bit.  I am getting an updated echocardiogram which is an ultrasound of the heart to look at your " leaky tricuspid valve just to get a baseline.  I am not worried about it.    - Call [phone], request an appointment with a primary care provider at the [facility] location.  Ask for an appointment with AVELINO Mittal.  She is a fabulous nurse practitioner.    - I have reached out to Blanca Jerome to see if she can take you on as a new patient but I would not wait to hear back from our office, I encourage that you call on your own to make an appointment.    It is my pleasure to participate in the care of Ms. Carmichael.  Please do not hesitate to contact me with questions or concerns.    Sendy Sanz MD, [facility]  Cardiologist, [facility]    Please note that this dictation was created using voice recognition software. I have made every reasonable attempt to correct obvious errors, but it is possible there are errors of grammar and possibly content that I did not discover before finalizing the note.         [1]   Past Medical History:  Diagnosis Date    Acute hypoxemic respiratory failure (HCC) 09/04/2023    Acute respiratory failure with hypoxia (HCC) 11/04/2021    BRIAN (acute kidney injury) ([facility]) 04/06/2021    Anemia 04/04/2021    Arrhythmia     Arthritis     knees    Bronchitis     long time ago    CATARACT     no surgery yet    Dry eyes, bilateral 03/27/2014    Osteopenia of the elderly 03/27/2014    Pleural effusion 06/28/2021    Pneumonia     long time ago    Preoperative cardiovascular examination 05/08/2021    Transaminitis 06/20/2021    Traumatic rhabdomyolysis ([facility]) 05/08/2021   [2]   Past Surgical History:  Procedure Laterality Date    PB REVISE KNEE JOINT REPLACE,ALL PARTS Right 11/3/2021    Procedure: REVISION, TOTAL ARTHROPLASTY, KNEE, ALL COMPONENTS;  Surgeon: Ramesh Styles M.D.;  Location: SURGERY [facility];  Service: Orthopedics    AK UPPER GI ENDOSCOPY,DIAGNOSIS N/A 6/21/2021    Procedure: GASTROSCOPY;  Surgeon: Rafita Plaza D.O.;  Location: [facility];  Service: Gastroenterology    KNEE ARTHROPLASTY TOTAL  9/3/2013    Performed by Ramesh Styles M.D. at SURGERY [facility]S    TONSILLECTOMY     [3]   Allergies  Allergen Reactions    Atorvastatin      Not indicated given age    Other Environmental Runny Nose and Itching     Seasonal allergies   [4]   Current Outpatient Medications   Medication Sig Dispense Refill    loratadine (CLARITIN) 10 MG Tab Take 10 mg by mouth 1 time a day as needed (allergies). Indications: Hayfever, Inflammation of Eyelid Lining due to Allergy      escitalopram (LEXAPRO) 5 MG tablet Take 1 Tablet by mouth at bedtime. For depression  Indications: Major Depressive Disorder 90 Tablet 3     No current facility-administered medications for this visit.

## 2025-06-07 VITALS
DIASTOLIC BLOOD PRESSURE: 70 MMHG | HEART RATE: 72 BPM | OXYGEN SATURATION: 94 % | TEMPERATURE: 98.2 F | SYSTOLIC BLOOD PRESSURE: 125 MMHG | RESPIRATION RATE: 16 BRPM

## 2025-06-07 ASSESSMENT — ENCOUNTER SYMPTOMS: DENIES PAIN: 1

## 2025-06-09 ENCOUNTER — HOME CARE VISIT (OUTPATIENT)
Dept: HOME HEALTH SERVICES | Facility: HOME HEALTHCARE | Age: OVER 89
End: 2025-06-09
Payer: MEDICARE

## 2025-06-09 VITALS
TEMPERATURE: 97.8 F | RESPIRATION RATE: 16 BRPM | OXYGEN SATURATION: 95 % | SYSTOLIC BLOOD PRESSURE: 115 MMHG | DIASTOLIC BLOOD PRESSURE: 65 MMHG | HEART RATE: 75 BPM

## 2025-06-09 VITALS
SYSTOLIC BLOOD PRESSURE: 115 MMHG | HEART RATE: 75 BPM | TEMPERATURE: 97.8 F | DIASTOLIC BLOOD PRESSURE: 65 MMHG | OXYGEN SATURATION: 95 % | RESPIRATION RATE: 16 BRPM

## 2025-06-09 PROCEDURE — G0151 HHCP-SERV OF PT,EA 15 MIN: HCPCS

## 2025-06-09 PROCEDURE — G0299 HHS/HOSPICE OF RN EA 15 MIN: HCPCS

## 2025-06-09 ASSESSMENT — ENCOUNTER SYMPTOMS
DENIES PAIN: 1
DENIES PAIN: 1
LAST BOWEL MOVEMENT: 67365
VOMITING: DENIES
NAUSEA: DENIES
BOWEL PATTERN NORMAL: 1

## 2025-06-09 ASSESSMENT — PATIENT HEALTH QUESTIONNAIRE - PHQ9: CLINICAL INTERPRETATION OF PHQ2 SCORE: 0

## 2025-06-12 ENCOUNTER — HOME CARE VISIT (OUTPATIENT)
Dept: HOME HEALTH SERVICES | Facility: HOME HEALTHCARE | Age: OVER 89
End: 2025-06-12
Payer: MEDICARE

## 2025-06-12 PROCEDURE — G0151 HHCP-SERV OF PT,EA 15 MIN: HCPCS

## 2025-06-12 PROCEDURE — G0299 HHS/HOSPICE OF RN EA 15 MIN: HCPCS

## 2025-06-13 ASSESSMENT — PATIENT HEALTH QUESTIONNAIRE - PHQ9: CLINICAL INTERPRETATION OF PHQ2 SCORE: 0

## 2025-06-13 ASSESSMENT — ENCOUNTER SYMPTOMS
FATIGUE: 1
DIFFICULTY THINKING: 1
FORGETFULNESS: 1
DESCRIPTION OF MEMORY LOSS: SHORT TERM
LIMITED RANGE OF MOTION: 1
MUSCLE WEAKNESS: 1
DENIES PAIN: 1
LAST BOWEL MOVEMENT: 67368

## 2025-06-14 VITALS
OXYGEN SATURATION: 94 % | TEMPERATURE: 98.3 F | DIASTOLIC BLOOD PRESSURE: 70 MMHG | HEART RATE: 71 BPM | SYSTOLIC BLOOD PRESSURE: 125 MMHG | RESPIRATION RATE: 16 BRPM

## 2025-06-14 ASSESSMENT — ENCOUNTER SYMPTOMS: DENIES PAIN: 1

## 2025-06-16 ENCOUNTER — HOME CARE VISIT (OUTPATIENT)
Dept: HOME HEALTH SERVICES | Facility: HOME HEALTHCARE | Age: OVER 89
End: 2025-06-16
Payer: MEDICARE

## 2025-06-16 VITALS
TEMPERATURE: 98.1 F | HEART RATE: 76 BPM | OXYGEN SATURATION: 94 % | RESPIRATION RATE: 16 BRPM | DIASTOLIC BLOOD PRESSURE: 70 MMHG | SYSTOLIC BLOOD PRESSURE: 120 MMHG

## 2025-06-16 PROCEDURE — G0151 HHCP-SERV OF PT,EA 15 MIN: HCPCS

## 2025-06-16 ASSESSMENT — ENCOUNTER SYMPTOMS: DENIES PAIN: 1

## 2025-06-17 ENCOUNTER — OFFICE VISIT (OUTPATIENT)
Facility: MEDICAL CENTER | Age: OVER 89
End: 2025-06-17
Attending: INTERNAL MEDICINE
Payer: MEDICARE

## 2025-06-17 ENCOUNTER — HOME CARE VISIT (OUTPATIENT)
Dept: HOME HEALTH SERVICES | Facility: HOME HEALTHCARE | Age: OVER 89
End: 2025-06-17
Payer: MEDICARE

## 2025-06-17 VITALS
HEIGHT: 63 IN | HEART RATE: 83 BPM | OXYGEN SATURATION: 97 % | DIASTOLIC BLOOD PRESSURE: 58 MMHG | SYSTOLIC BLOOD PRESSURE: 122 MMHG | WEIGHT: 131 LBS | RESPIRATION RATE: 16 BRPM | BODY MASS INDEX: 23.21 KG/M2

## 2025-06-17 VITALS
SYSTOLIC BLOOD PRESSURE: 120 MMHG | TEMPERATURE: 99 F | RESPIRATION RATE: 16 BRPM | OXYGEN SATURATION: 95 % | DIASTOLIC BLOOD PRESSURE: 60 MMHG | HEART RATE: 88 BPM

## 2025-06-17 DIAGNOSIS — I49.5 SINUS NODE DYSFUNCTION (HCC): Primary | ICD-10-CM

## 2025-06-17 DIAGNOSIS — Z95.0 CARDIAC PACEMAKER IN SITU: ICD-10-CM

## 2025-06-17 DIAGNOSIS — Z98.890 H/O CARDIAC RADIOFREQUENCY ABLATION: ICD-10-CM

## 2025-06-17 DIAGNOSIS — I47.10 PAROXYSMAL SVT (SUPRAVENTRICULAR TACHYCARDIA) (HCC): ICD-10-CM

## 2025-06-17 DIAGNOSIS — I36.1 NONRHEUMATIC TRICUSPID VALVE REGURGITATION: ICD-10-CM

## 2025-06-17 DIAGNOSIS — I44.1 SECOND DEGREE ATRIOVENTRICULAR BLOCK, MOBITZ (TYPE) I: ICD-10-CM

## 2025-06-17 DIAGNOSIS — R41.3 MEMORY LOSS: ICD-10-CM

## 2025-06-17 PROCEDURE — 99214 OFFICE O/P EST MOD 30 MIN: CPT | Performed by: INTERNAL MEDICINE

## 2025-06-17 PROCEDURE — 3078F DIAST BP <80 MM HG: CPT | Performed by: INTERNAL MEDICINE

## 2025-06-17 PROCEDURE — G0299 HHS/HOSPICE OF RN EA 15 MIN: HCPCS

## 2025-06-17 PROCEDURE — 3074F SYST BP LT 130 MM HG: CPT | Performed by: INTERNAL MEDICINE

## 2025-06-17 ASSESSMENT — ENCOUNTER SYMPTOMS
FORGETFULNESS: 1
LAST BOWEL MOVEMENT: 67373
BOWEL PATTERN NORMAL: 1
NAUSEA: DENIES
DENIES PAIN: 1
VOMITING: DENIES

## 2025-06-17 ASSESSMENT — FIBROSIS 4 INDEX: FIB4 SCORE: 2.8

## 2025-06-17 NOTE — PATIENT INSTRUCTIONS
- Everyone's heart valves leak a little bit.  I am getting an updated echocardiogram which is an ultrasound of the heart to look at your leaky tricuspid valve just to get a baseline.  I am not worried about it.    - Call [phone], request an appointment with a primary care provider [facility] [place] [facility].  Ask for an appointment with AVELINO Mittal.  She is a fabulous nurse practitioner.    - I have reached out to Blanca Jerome to see if she can take you on as a new patient but I would not wait to hear back from our office, I encourage that you call on your own to make an appointment.

## 2025-06-18 ENCOUNTER — HOME CARE VISIT (OUTPATIENT)
Dept: HOME HEALTH SERVICES | Facility: HOME HEALTHCARE | Age: OVER 89
End: 2025-06-18
Payer: MEDICARE

## 2025-06-18 ENCOUNTER — APPOINTMENT (OUTPATIENT)
Dept: LAB | Facility: MEDICAL CENTER | Age: OVER 89
End: 2025-06-18
Payer: MEDICARE

## 2025-06-18 PROCEDURE — G0151 HHCP-SERV OF PT,EA 15 MIN: HCPCS

## 2025-06-19 ENCOUNTER — HOME CARE VISIT (OUTPATIENT)
Dept: HOME HEALTH SERVICES | Facility: HOME HEALTHCARE | Age: OVER 89
End: 2025-06-19
Payer: MEDICARE

## 2025-06-19 VITALS
TEMPERATURE: 97.4 F | SYSTOLIC BLOOD PRESSURE: 128 MMHG | RESPIRATION RATE: 16 BRPM | DIASTOLIC BLOOD PRESSURE: 62 MMHG | OXYGEN SATURATION: 94 % | HEART RATE: 82 BPM

## 2025-06-19 VITALS
TEMPERATURE: 98.6 F | HEART RATE: 78 BPM | DIASTOLIC BLOOD PRESSURE: 70 MMHG | RESPIRATION RATE: 16 BRPM | OXYGEN SATURATION: 98 % | SYSTOLIC BLOOD PRESSURE: 115 MMHG

## 2025-06-19 PROCEDURE — G0299 HHS/HOSPICE OF RN EA 15 MIN: HCPCS

## 2025-06-19 ASSESSMENT — BALANCE ASSESSMENTS
ATTEMPTS TO ARISE: 2 - ABLE TO RISE, ONE ATTEMPT
SITTING DOWN: 1 - USES ARMS OR NOT SMOOTH MOTION
BALANCE SCORE: 13
NUDGED SCORE: 2
SITTING BALANCE: 1 - STEADY, SAFE
NUDGED: 2 - STEADY
TURNING 360 DEGREES STEPS: 1 - CONTINUOUS STEPS
STANDING BALANCE: 2 - NARROW STANCE WITHOUT SUPPORT
IMMEDIATE STANDING BALANCE FIRST 5 SECONDS: 2 - STEADY WITHOUT WALKER OR OTHER SUPPORT
EYES CLOSED AT MAXIMUM POSITION NUDGED: 0 - UNSTEADY
ARISES: 1 - ABLE, USES ARMS TO HELP
ARISING SCORE: 1

## 2025-06-19 ASSESSMENT — ACTIVITIES OF DAILY LIVING (ADL)
BATHING_WITHIN_DEFINED_LIMITS: 1
CURRENT_FUNCTION: INDEPENDENT
AMBULATION ASSISTANCE: 1
GROOMING_WITHIN_DEFINED_LIMITS: 1
PHYSICAL TRANSFERS ASSESSED: 1
FEEDING_WITHIN_DEFINED_LIMITS: 1

## 2025-06-19 ASSESSMENT — PATIENT HEALTH QUESTIONNAIRE - PHQ9
CLINICAL INTERPRETATION OF PHQ2 SCORE: 0
CLINICAL INTERPRETATION OF PHQ2 SCORE: 0

## 2025-06-19 ASSESSMENT — GAIT ASSESSMENTS
INITIATION OF GAIT IMMEDIATELY AFTER GO: 1 - NO HESITANCY
TRUNK SCORE: 2
PATH: 2 - STRAIGHT WITHOUT WALKING AID
WALKING STANCE: 1 - HEELS ALMOST TOUCHING WHILE WALKING
STEP SYMMETRY: 1 - RIGHT AND LEFT STEP LENGTH APPEAR EQUAL
GAIT SCORE: 12
TRUNK: 2 - NO SWAY, NO FLEXION, NO USE OF ARMS, NO WALKING AID
STEP CONTINUITY: 1 - STEPS APPEAR CONTINUOUS
PATH SCORE: 2
BALANCE AND GAIT SCORE: 25

## 2025-06-19 ASSESSMENT — ENCOUNTER SYMPTOMS
DRY SKIN: 1
FORGETFULNESS: 1
MUSCLE WEAKNESS: 1
FATIGUE: 1
DESCRIPTION OF MEMORY LOSS: SHORT TERM
DENIES PAIN: 1
FATIGUES EASILY: 1
LAST BOWEL MOVEMENT: 67374
BOWEL PATTERN NORMAL: 1
LIMITED RANGE OF MOTION: 1
LOWER EXTREMITY EDEMA: 1
DENIES PAIN: 1
STOOL FREQUENCY: LESS THAN DAILY

## 2025-06-20 DIAGNOSIS — I10 ESSENTIAL HYPERTENSION, BENIGN: ICD-10-CM

## 2025-06-20 DIAGNOSIS — I47.10 PAROXYSMAL SVT (SUPRAVENTRICULAR TACHYCARDIA) (HCC): Primary | ICD-10-CM

## 2025-06-20 RX ORDER — METOPROLOL SUCCINATE 25 MG/1
25 TABLET, EXTENDED RELEASE ORAL DAILY
Qty: 100 TABLET | Refills: 3 | Status: SHIPPED | OUTPATIENT
Start: 2025-06-20

## 2025-06-23 ENCOUNTER — HOME CARE VISIT (OUTPATIENT)
Dept: HOME HEALTH SERVICES | Facility: HOME HEALTHCARE | Age: OVER 89
End: 2025-06-23
Payer: MEDICARE

## 2025-06-23 VITALS
TEMPERATURE: 98.1 F | SYSTOLIC BLOOD PRESSURE: 120 MMHG | DIASTOLIC BLOOD PRESSURE: 70 MMHG | HEART RATE: 79 BPM | RESPIRATION RATE: 16 BRPM | OXYGEN SATURATION: 95 %

## 2025-06-23 VITALS
OXYGEN SATURATION: 95 % | RESPIRATION RATE: 16 BRPM | SYSTOLIC BLOOD PRESSURE: 120 MMHG | DIASTOLIC BLOOD PRESSURE: 70 MMHG | HEART RATE: 79 BPM | TEMPERATURE: 98.1 F

## 2025-06-23 PROCEDURE — G0493 RN CARE EA 15 MIN HH/HOSPICE: HCPCS

## 2025-06-23 PROCEDURE — G0151 HHCP-SERV OF PT,EA 15 MIN: HCPCS

## 2025-06-23 ASSESSMENT — ENCOUNTER SYMPTOMS
MUSCLE WEAKNESS: 1
STOOL FREQUENCY: DAILY
DENIES PAIN: 1
DENIES PAIN: 1
DESCRIPTION OF MEMORY LOSS: SHORT TERM
BOWEL PATTERN NORMAL: 1
DESCRIPTION OF MEMORY LOSS: LONG TERM
LAST BOWEL MOVEMENT: 67378
FORGETFULNESS: 1
POOR JUDGMENT: 1

## 2025-06-24 ENCOUNTER — HOME CARE VISIT (OUTPATIENT)
Dept: HOME HEALTH SERVICES | Facility: HOME HEALTHCARE | Age: OVER 89
End: 2025-06-24
Payer: MEDICARE

## 2025-06-24 NOTE — CASE COMMUNICATION
ACTION NEEDED  Pt is out of metoprolol succinated 25 mg by mouth daily Per [facility] refill line, she needs a new prescription.  Uses [facility] on [address].

## 2025-06-25 ENCOUNTER — HOME CARE VISIT (OUTPATIENT)
Dept: HOME HEALTH SERVICES | Facility: HOME HEALTHCARE | Age: OVER 89
End: 2025-06-25
Payer: MEDICARE

## 2025-06-25 VITALS
RESPIRATION RATE: 16 BRPM | SYSTOLIC BLOOD PRESSURE: 125 MMHG | HEART RATE: 81 BPM | OXYGEN SATURATION: 98 % | DIASTOLIC BLOOD PRESSURE: 70 MMHG | TEMPERATURE: 98.1 F

## 2025-06-25 PROCEDURE — G0151 HHCP-SERV OF PT,EA 15 MIN: HCPCS

## 2025-06-25 ASSESSMENT — ENCOUNTER SYMPTOMS: DENIES PAIN: 1

## 2025-06-25 NOTE — Clinical Note
[facility]  [address] [place] [address]    [id number]    Yoli Carmichael  [address] [place] [address]    June 25, 2025    Member Name: Yoli Carmichael   Member Number: [id number]   Reference Number: [id number]   Approved Services: Echos and EKG   Approved Service Dates: 06/17/2025 - 10/23/2025   Requesting Provider: Sendy Sanz   Requested Provider: [facility]     Dear Yoli Anca Carmichael:    The following medical service(s) requested by Sendy Sanz have been approved:    Procedure Code Procedure Code Name Requested Quantity Approved Quantity Status   09355 (CPT®) AR ECHO HEART XTHORACIC,COMPLETE W DOPPLER 1 1 Authorized       Approved Quantity means the number of visits approved for medication treatments and/or medical services.    The services should be provided by [facility] no later than 10/23/2025. Please contact the provider listed below with any questions.     Provider Information:  [facility]  [phone]    Your plan benefit may require a deductible, co-payment or coinsurance for these services. This authorization does not guarantee [facility] will pay the claim for services that you receive. Payment by [facility] for these services is subject to the terms of your Evidence of Coverage, your eligibility at the time of service, and confirmation of benefit coverage.    For any questions or additional information, please contact Customer Service:    [facility] Toll Free: [phone]  TTY users dial: [phone]   Call Center Hours:  Oct 1 - Mar 31, Mon - Fri 7 AM to 8 PM PST  Oct 1 - Mar 31, Sat - Sun 8 AM to 8 PM PST  Apr 1 - Sep 30, Mon - Fri 7 AM to 8 PM PST   Office Hours: Mon - Fri 8 AM to 5 PM PST   E-mail: [email or web address]   Website:  [email or web address]      This information is available for free in other languages. Please contact Customer Service at the  phone number above for more information. [facility] complies with applicable Federal civil rights laws and does not discriminate on the basis of race, color, national origin, age, disability or sex.    Sincerely,     Healthcare Utilization Management Department     Cc: [facility]   Sendy Sanz    Multi-Language Insert  Multi- Services  English: We have free  services to answer any questions you may have about our health or drug plan.  To get an , just call us at [phone].  Someone who speaks English/Language can help you.  This is a free service.  Irish: Tenemos servicios de intérprete sin costo alguno  para responder cualquier pregunta que pueda tener sobre nuestro plan de caitlin o medicamentos. Para hablar con un intérprete, por favor llame al [phone]. Alguien que hable español le podrá ayudar. Alexa es un servicio gratuito.  Chinese Mandarin: ?????????????????????????????? ???????????????? [phone]????????????????? ?????????  Chinese Cantonese: ?????????????????????????????? ????????????? [phone]???????????????????? ????????  Tagalog:  [place] pink serbisyo sa duartesalonny garzaa gabriela carter hinggil sa ofelia carlos o panggamot.  magdalene Phillip  [phone]. Mar hillman Tagalog.  Donovan manzanares.  Yakut:  Nous proposons alyson services gratuits d'interprétation pour répondre à toutes philly questions relatives à notre régime de santé ou d'assurance-médicaments. Pour accéder au service d'interprétation, il vous suffit de nous appeler au [phone]. Un interlocuteur parlant Français pourra vous aider. Ce service est gratuit.  Israeli:  Di jolley có d?ch v? thông d?ch mi?n phí ð? tr? l?i các câu h?i v? chýõng s?c kh?e và chýõng trình thu?c men. N?u quí v? c?n thông d?ch  viên elvis g?i [phone] s? có nhân viên nói ti?ng Vi?t giúp ð? quí v?. Ðây là d?ch v? mi?n phí .  Greek:  Unser kostenser Dolmetscherservice beantwortet Ihren Fragen zu unserem Gesundheits- und Arzneimittelplan. Unsere Dolmetscher erreichen Sie [phone]. Man wird Ihnen stephanie auf [facility]. Dieser Service ist lisCentral [facility] Medical Center.  Kyrgyz:  ??? ?? ?? ?? ?? ??? ?? ??? ?? ???? ?? ?? ???? ???? ????. ?? ???? ????? ?? [phone] ??? ??? ????.  ???? ?? ???? ?? ?? ????. ? ???? ??? ?????.   Tajik: Åñëè ó âàñ âîçíèêíóò âîïðîñû îòíîñèòåëüíî ñòðàõîâîãî èëè ìåäèêàìåíòíîãî ïëàíà, âû ìîæåòå âîñïîëüçîâàòüñÿ íàøèìè áåñïëàòíûìè óñëóãàìè ïåðåâîä÷èêîâ. ×òîáû âîñïîëüçîâàòüñÿ óñëóãàìè ïåðåâîä÷èêà, ïîçâîíèòå íàì ïî òåëåôîíó [phone]. Âàì îêàæåò ïîìîùü ñîòðóäíèê, êîòîðûé ãîâîðèò ïî-póññêè. Äàííàÿ óñëóãà áåñïëàòíàÿ.  Pashto: ÅääÇ äÞÏã ÎÏãÇÊ ÇáãÊÑÌã ÇáÝæÑí ÇáãÌÇäíÉ ááÅÌÇÈÉ Úä Ãí ÃÓÆáÉ ÊÊÚáÞ ÈÇáÕÍÉ Ãæ ÌÏæá ÇáÃÏæíÉ áÏíäÇ. ááÍÕæá Úáì ãÊÑÌã ÝæÑí¡ áíÓ Úáíß Óæì ÇáÇÊÕÇá ÈäÇ Úáì [phone] . ÓíÞæã ÔÎÕ ãÇ íÊÍÏË ÇáÚÑÈíÉ ÈãÓÇÚÏÊß. åÐå ÎÏãÉ ãÌÇäíÉ.  Kaz: ????? ????????? ?? ??? ?? ????? ?? ???? ??? ???? ???? ?? ?????? ?? ???? ???? ?? ??? ????? ??? ????? ???????? ?????? ?????? ???. ?? ???????? ??????? ???? ?? ???, ?? ???? [phone] ?? ??? ????. ??? ??????? ?? ?????? ????? ?? ???? ??? ?? ???? ??. ?? ?? ????? ???? ??.   Upper sorbian:  È disponibile un servizio di interpretariato gratuito per rispondere a eventuali domande sul nostro piano sanitario e farmaceutico. Per un interprete, contattare il harley [phone]. Un nostro incaricato bridgett parla Italianovi fornirà l'assistenza necessaria. È un servizio gratuito.  Portugués:  Dispomos de serviços de interpretação gratuitos para responder a qualquer questão que tenha acerca do nosso plano de saúde ou de medicação. Para obter um intérprete, contacte-nos através do número [phone]. Irá encontrar alguém que fale o idioma  Português para o ajudar. Alexa serviço é  gratuito.  Turkmen Creole:  Nou genyen sèvis entèprèt gratis norman reponn tout kesyon ou ta genyen konsènan plan medikal oswa dwòg nou an.  Norman jwenn yon entèprèt, jis rele nou nan [phone]. Yon moun ki pale Kreyòl kapab larry w.  Sa a se yon sèvis ki gratis.  Polish:  Umo¿liwiamy bezp³atne skorzystanie z us³ug t³umacza ustnego, który pomo¿e w uzyskaniu odpowiedzi na temat planu zdrowotnego lub dawkowania chantellew. Dianna skorzystaæ z pomocy t³umacza znaj¹cego rebeca brock¿y zadzwoniæ pod numer [phone]. Ta us³uga jest bezp³atna.  Vietnamese: ????? ??????? ????????????????????? ??????????????????????????????????[phone] ???????????????? ? ????????????????? ?????

## 2025-07-03 ENCOUNTER — HOME CARE VISIT (OUTPATIENT)
Dept: HOME HEALTH SERVICES | Facility: HOME HEALTHCARE | Age: OVER 89
End: 2025-07-03
Payer: MEDICARE

## 2025-07-03 ENCOUNTER — HOSPITAL ENCOUNTER (OUTPATIENT)
Dept: LAB | Facility: MEDICAL CENTER | Age: OVER 89
End: 2025-07-03
Attending: NURSE PRACTITIONER
Payer: MEDICARE

## 2025-07-03 VITALS
SYSTOLIC BLOOD PRESSURE: 120 MMHG | TEMPERATURE: 98.3 F | OXYGEN SATURATION: 93 % | DIASTOLIC BLOOD PRESSURE: 70 MMHG | HEART RATE: 70 BPM | RESPIRATION RATE: 16 BRPM

## 2025-07-03 DIAGNOSIS — E53.8 VITAMIN B12 DEFICIENCY: ICD-10-CM

## 2025-07-03 DIAGNOSIS — Z13.29 SCREENING FOR THYROID DISORDER: ICD-10-CM

## 2025-07-03 DIAGNOSIS — E55.9 VITAMIN D DEFICIENCY: ICD-10-CM

## 2025-07-03 DIAGNOSIS — N18.32 STAGE 3B CHRONIC KIDNEY DISEASE: ICD-10-CM

## 2025-07-03 DIAGNOSIS — Z86.2 HISTORY OF ANEMIA: ICD-10-CM

## 2025-07-03 DIAGNOSIS — E78.5 DYSLIPIDEMIA: ICD-10-CM

## 2025-07-03 LAB
25(OH)D3 SERPL-MCNC: 48 NG/ML (ref 30–100)
ALBUMIN SERPL BCP-MCNC: 3.8 G/DL (ref 3.2–4.9)
ALBUMIN/GLOB SERPL: 1.4 G/DL
ALP SERPL-CCNC: 65 U/L (ref 30–99)
ALT SERPL-CCNC: <5 U/L (ref 2–50)
ANION GAP SERPL CALC-SCNC: 10 MMOL/L (ref 7–16)
AST SERPL-CCNC: 21 U/L (ref 12–45)
BASOPHILS # BLD AUTO: 1.3 % (ref 0–1.8)
BASOPHILS # BLD: 0.09 K/UL (ref 0–0.12)
BILIRUB SERPL-MCNC: 0.6 MG/DL (ref 0.1–1.5)
BUN SERPL-MCNC: 20 MG/DL (ref 8–22)
CALCIUM ALBUM COR SERPL-MCNC: 9.1 MG/DL (ref 8.5–10.5)
CALCIUM SERPL-MCNC: 8.9 MG/DL (ref 8.5–10.5)
CHLORIDE SERPL-SCNC: 108 MMOL/L (ref 96–112)
CHOLEST SERPL-MCNC: 206 MG/DL (ref 100–199)
CO2 SERPL-SCNC: 23 MMOL/L (ref 20–33)
CREAT SERPL-MCNC: 1.15 MG/DL (ref 0.5–1.4)
EOSINOPHIL # BLD AUTO: 0.18 K/UL (ref 0–0.51)
EOSINOPHIL NFR BLD: 2.7 % (ref 0–6.9)
ERYTHROCYTE [DISTWIDTH] IN BLOOD BY AUTOMATED COUNT: 47.2 FL (ref 35.9–50)
FASTING STATUS PATIENT QL REPORTED: NORMAL
GFR SERPLBLD CREATININE-BSD FMLA CKD-EPI: 45 ML/MIN/1.73 M 2
GLOBULIN SER CALC-MCNC: 2.8 G/DL (ref 1.9–3.5)
GLUCOSE SERPL-MCNC: 96 MG/DL (ref 65–99)
HCT VFR BLD AUTO: 44.3 % (ref 37–47)
HDLC SERPL-MCNC: 65 MG/DL
HGB BLD-MCNC: 13.6 G/DL (ref 12–16)
IMM GRANULOCYTES # BLD AUTO: 0.03 K/UL (ref 0–0.11)
IMM GRANULOCYTES NFR BLD AUTO: 0.4 % (ref 0–0.9)
LDLC SERPL CALC-MCNC: 116 MG/DL
LYMPHOCYTES # BLD AUTO: 2.29 K/UL (ref 1–4.8)
LYMPHOCYTES NFR BLD: 33.9 % (ref 22–41)
MCH RBC QN AUTO: 27.9 PG (ref 27–33)
MCHC RBC AUTO-ENTMCNC: 30.7 G/DL (ref 32.2–35.5)
MCV RBC AUTO: 90.8 FL (ref 81.4–97.8)
MONOCYTES # BLD AUTO: 0.68 K/UL (ref 0–0.85)
MONOCYTES NFR BLD AUTO: 10.1 % (ref 0–13.4)
NEUTROPHILS # BLD AUTO: 3.49 K/UL (ref 1.82–7.42)
NEUTROPHILS NFR BLD: 51.6 % (ref 44–72)
NRBC # BLD AUTO: 0 K/UL
NRBC BLD-RTO: 0 /100 WBC (ref 0–0.2)
PLATELET # BLD AUTO: 239 K/UL (ref 164–446)
PMV BLD AUTO: 10.1 FL (ref 9–12.9)
POTASSIUM SERPL-SCNC: 4.3 MMOL/L (ref 3.6–5.5)
PROT SERPL-MCNC: 6.6 G/DL (ref 6–8.2)
RBC # BLD AUTO: 4.88 M/UL (ref 4.2–5.4)
SODIUM SERPL-SCNC: 141 MMOL/L (ref 135–145)
T4 SERPL-MCNC: 5.6 UG/DL (ref 4–12)
TRIGL SERPL-MCNC: 127 MG/DL (ref 0–149)
TSH SERPL-ACNC: 2.5 UIU/ML (ref 0.38–5.33)
VIT B12 SERPL-MCNC: 1117 PG/ML (ref 211–911)
WBC # BLD AUTO: 6.8 K/UL (ref 4.8–10.8)

## 2025-07-03 PROCEDURE — 82607 VITAMIN B-12: CPT

## 2025-07-03 PROCEDURE — 84443 ASSAY THYROID STIM HORMONE: CPT

## 2025-07-03 PROCEDURE — 36415 COLL VENOUS BLD VENIPUNCTURE: CPT

## 2025-07-03 PROCEDURE — 80053 COMPREHEN METABOLIC PANEL: CPT

## 2025-07-03 PROCEDURE — 82306 VITAMIN D 25 HYDROXY: CPT

## 2025-07-03 PROCEDURE — G0151 HHCP-SERV OF PT,EA 15 MIN: HCPCS

## 2025-07-03 PROCEDURE — 85025 COMPLETE CBC W/AUTO DIFF WBC: CPT

## 2025-07-03 PROCEDURE — 84436 ASSAY OF TOTAL THYROXINE: CPT

## 2025-07-03 PROCEDURE — 80061 LIPID PANEL: CPT

## 2025-07-03 ASSESSMENT — ENCOUNTER SYMPTOMS: DENIES PAIN: 1

## 2025-07-04 ENCOUNTER — HOME CARE VISIT (OUTPATIENT)
Dept: HOME HEALTH SERVICES | Facility: HOME HEALTHCARE | Age: OVER 89
End: 2025-07-04
Payer: MEDICARE

## 2025-07-04 PROCEDURE — G0151 HHCP-SERV OF PT,EA 15 MIN: HCPCS

## 2025-07-05 VITALS
DIASTOLIC BLOOD PRESSURE: 70 MMHG | OXYGEN SATURATION: 94 % | RESPIRATION RATE: 16 BRPM | SYSTOLIC BLOOD PRESSURE: 120 MMHG | HEART RATE: 73 BPM | TEMPERATURE: 98.2 F

## 2025-07-05 ASSESSMENT — ENCOUNTER SYMPTOMS: DENIES PAIN: 1

## 2025-07-07 ENCOUNTER — HOME CARE VISIT (OUTPATIENT)
Dept: HOME HEALTH SERVICES | Facility: HOME HEALTHCARE | Age: OVER 89
End: 2025-07-07
Payer: MEDICARE

## 2025-07-07 VITALS
DIASTOLIC BLOOD PRESSURE: 65 MMHG | RESPIRATION RATE: 16 BRPM | TEMPERATURE: 98.6 F | HEART RATE: 16 BPM | OXYGEN SATURATION: 95 % | SYSTOLIC BLOOD PRESSURE: 110 MMHG

## 2025-07-07 PROCEDURE — G0151 HHCP-SERV OF PT,EA 15 MIN: HCPCS

## 2025-07-07 ASSESSMENT — ACTIVITIES OF DAILY LIVING (ADL)
OASIS_M1830: 01
HOME_HEALTH_OASIS: 00

## 2025-07-07 ASSESSMENT — ENCOUNTER SYMPTOMS: DENIES PAIN: 1

## 2025-07-07 ASSESSMENT — PATIENT HEALTH QUESTIONNAIRE - PHQ9: CLINICAL INTERPRETATION OF PHQ2 SCORE: 0

## 2025-07-09 ENCOUNTER — ANCILLARY PROCEDURE (OUTPATIENT)
Facility: MEDICAL CENTER | Age: OVER 89
End: 2025-07-09
Attending: INTERNAL MEDICINE
Payer: MEDICARE

## 2025-07-09 DIAGNOSIS — I36.1 NONRHEUMATIC TRICUSPID VALVE REGURGITATION: ICD-10-CM

## 2025-07-09 PROCEDURE — 93306 TTE W/DOPPLER COMPLETE: CPT

## 2025-07-10 ENCOUNTER — RESULTS FOLLOW-UP (OUTPATIENT)
Dept: CARDIOLOGY | Facility: MEDICAL CENTER | Age: OVER 89
End: 2025-07-10

## 2025-07-10 LAB
LV EJECT FRACT  99904: 75
LV EJECT FRACT MOD 2C 99903: 58.64
LV EJECT FRACT MOD 4C 99902: 56.32
LV EJECT FRACT MOD BP 99901: 56.51

## 2025-07-10 PROCEDURE — 93306 TTE W/DOPPLER COMPLETE: CPT | Mod: 26 | Performed by: INTERNAL MEDICINE

## (undated) DEVICE — Device

## (undated) DEVICE — TOWEL STOP TIMEOUT SAFETY FLAG (40EA/CA)

## (undated) DEVICE — PROTECTOR ULNA NERVE - (36PR/CA)

## (undated) DEVICE — BLADE 90X18X1.27MM SAW SAGITTAL

## (undated) DEVICE — LENS/HOOD FOR SPACESUIT - (32/PK) PEEL AWAY FACE

## (undated) DEVICE — GLOVE BIOGEL SZ 8.5 SURGICAL PF LTX - (50PR/BX 4BX/CA)

## (undated) DEVICE — SUTURE 2-0 VICRYL PLUS CT-1 - 8 X 18 INCH(12/BX)

## (undated) DEVICE — SET EXTENSION WITH 2 PORTS (48EA/CA) ***PART #2C8610 IS A SUBSTITUTE*****

## (undated) DEVICE — LACTATED RINGERS INJ 1000 ML - (14EA/CA 60CA/PF)

## (undated) DEVICE — DRESSING AQUACEL AG ADVANTAGE 3.5 X 10" (10EA/BX)"

## (undated) DEVICE — SODIUM CHL IRRIGATION 0.9% 1000ML (12EA/CA)

## (undated) DEVICE — NEPTUNE 4 PORT MANIFOLD - (20/PK)

## (undated) DEVICE — SUTURE GENERAL

## (undated) DEVICE — TOURNIQUET, STERILE 34 (BLUE)

## (undated) DEVICE — PACK TOTAL HIP - (1/CA)

## (undated) DEVICE — TIP INTPLS HFLO ML ORFC BTRY - (12/CS)  FOR SURGILAV

## (undated) DEVICE — MASK AIRWAY SIZE 4 UNIQUE SILICON (10EA/BX)

## (undated) DEVICE — GOWN WARMING STANDARD FLEX - (30/CA)

## (undated) DEVICE — BLADE SAGITTAL SYSTEM 18MM

## (undated) DEVICE — TRAY SPINAL ANESTHESIA NON-SAFETY (10/CA)

## (undated) DEVICE — KIT HIP PREP IM ENCHANCE TOTAL (5EA/BX)

## (undated) DEVICE — ELECTRODE DUAL RETURN W/ CORD - (50/PK)

## (undated) DEVICE — SLEEVE, VASO, THIGH, MED

## (undated) DEVICE — SUCTION INSTRUMENT YANKAUER BULBOUS TIP W/O VENT (50EA/CA)

## (undated) DEVICE — HANDPIECE 10FT INTPLS SCT PLS IRRIGATION HAND CONTROL SET (6/PK)

## (undated) DEVICE — BLADE SAGITTAL SAW DUAL CUT 25.0 X 90.0 X 1.27MM (1/EA)

## (undated) DEVICE — ELECTRODE 850 FOAM ADHESIVE - HYDROGEL RADIOTRNSPRNT (50/PK)

## (undated) DEVICE — BOVIE BLADE COATED - (50/PK)

## (undated) DEVICE — CANISTER SUCTION 3000ML MECHANICAL FILTER AUTO SHUTOFF MEDI-VAC NONSTERILE LF DISP  (40EA/CA)

## (undated) DEVICE — SENSOR SPO2 NEO LNCS ADHESIVE (20/BX) SEE USER NOTES

## (undated) DEVICE — BLADE SAGITTAL DUAL 25MM

## (undated) DEVICE — TUBING CLEARLINK DUO-VENT - C-FLO (48EA/CA)

## (undated) DEVICE — PACK TOTAL KNEE  (1/CA)

## (undated) DEVICE — MIXER BONE CEMENT REVOLUTION - W/FEMORAL PRESSURIZER (6/CA)

## (undated) DEVICE — PAD UNIVERSAL MULTI USE (1/EA)

## (undated) DEVICE — SUCTION INSTRUMENT YANKAUER OPEN TIP W/O VENT (50EA/CA)

## (undated) DEVICE — AQUACEL 4X4

## (undated) DEVICE — KIT ANESTHESIA W/CIRCUIT & 3/LT BAG W/FILTER (20EA/CA)

## (undated) DEVICE — SET LEADWIRE 5 LEAD BEDSIDE DISPOSABLE ECG (1SET OF 5/EA)

## (undated) DEVICE — HEAD HOLDER JUNIOR/ADULT

## (undated) DEVICE — GLOVE BIOGEL PI INDICATOR SZ 8.5 SURGICAL PF LF - (50PR/BX 4BX/CA)

## (undated) DEVICE — MASK ANESTHESIA ADULT  - (100/CA)